# Patient Record
Sex: MALE | Race: WHITE | Employment: OTHER | URBAN - METROPOLITAN AREA
[De-identification: names, ages, dates, MRNs, and addresses within clinical notes are randomized per-mention and may not be internally consistent; named-entity substitution may affect disease eponyms.]

---

## 2022-09-27 ENCOUNTER — ANESTHESIA EVENT (OUTPATIENT)
Dept: INTERVENTIONAL RADIOLOGY/VASCULAR | Age: 67
DRG: 034 | End: 2022-09-27
Payer: COMMERCIAL

## 2022-09-27 ENCOUNTER — APPOINTMENT (OUTPATIENT)
Dept: INTERVENTIONAL RADIOLOGY/VASCULAR | Age: 67
DRG: 034 | End: 2022-09-27
Attending: INTERNAL MEDICINE
Payer: MEDICARE

## 2022-09-27 ENCOUNTER — APPOINTMENT (OUTPATIENT)
Dept: CT IMAGING | Age: 67
DRG: 034 | End: 2022-09-27
Attending: INTERNAL MEDICINE
Payer: MEDICARE

## 2022-09-27 ENCOUNTER — HOSPITAL ENCOUNTER (INPATIENT)
Age: 67
LOS: 29 days | Discharge: NURSING, REHAB OR LONG-TERM CARE | DRG: 034 | End: 2022-10-26
Attending: INTERNAL MEDICINE | Admitting: FAMILY MEDICINE
Payer: MEDICARE

## 2022-09-27 ENCOUNTER — ANESTHESIA (OUTPATIENT)
Dept: INTERVENTIONAL RADIOLOGY/VASCULAR | Age: 67
DRG: 034 | End: 2022-09-27
Payer: COMMERCIAL

## 2022-09-27 DIAGNOSIS — I65.22 STENOSIS OF LEFT CAROTID ARTERY: ICD-10-CM

## 2022-09-27 DIAGNOSIS — D64.9 ANEMIA, UNSPECIFIED TYPE: Primary | ICD-10-CM

## 2022-09-27 DIAGNOSIS — K92.2 GI BLEEDING: ICD-10-CM

## 2022-09-27 PROBLEM — I63.9 ACUTE CEREBROVASCULAR ACCIDENT (CVA) (HCC): Status: ACTIVE | Noted: 2022-09-27

## 2022-09-27 LAB
AADO2: 164.3 MMHG
ABO/RH: NORMAL
ALBUMIN SERPL-MCNC: 2.7 G/DL (ref 3.5–5.2)
ALP BLD-CCNC: 165 U/L (ref 40–129)
ALT SERPL-CCNC: <5 U/L (ref 0–40)
ANION GAP SERPL CALCULATED.3IONS-SCNC: 9 MMOL/L (ref 7–16)
ANION GAP: 11 MMOL/L (ref 7–16)
ANTIBODY SCREEN: NORMAL
AST SERPL-CCNC: 11 U/L (ref 0–39)
B.E.: -4.4 MMOL/L (ref -3–3)
B.E.: -5.7 MMOL/L (ref -3–3)
BASOPHILS ABSOLUTE: 0.04 E9/L (ref 0–0.2)
BASOPHILS RELATIVE PERCENT: 0.5 % (ref 0–2)
BILIRUB SERPL-MCNC: <0.2 MG/DL (ref 0–1.2)
BUN BLDV-MCNC: 89 MG/DL (ref 6–23)
CALCIUM SERPL-MCNC: 8.3 MG/DL (ref 8.6–10.2)
CARDIOPULMONARY BYPASS: NO
CHLORIDE BLD-SCNC: 110 MMOL/L (ref 98–107)
CO2: 24 MMOL/L (ref 22–29)
COHB: 0.5 % (ref 0–1.5)
CREAT SERPL-MCNC: 2.9 MG/DL (ref 0.7–1.2)
CRITICAL: ABNORMAL
DATE ANALYZED: ABNORMAL
DATE OF COLLECTION: ABNORMAL
DEVICE: ABNORMAL
EOSINOPHILS ABSOLUTE: 0.16 E9/L (ref 0.05–0.5)
EOSINOPHILS RELATIVE PERCENT: 1.8 % (ref 0–6)
FIO2: 70 %
GFR AFRICAN AMERICAN: 27
GFR AFRICAN AMERICAN: 28
GFR NON-AFRICAN AMERICAN: 22 ML/MIN/1.73
GFR, ESTIMATED: 23 ML/MIN/1.73
GLUCOSE BLD-MCNC: 106 MG/DL (ref 74–99)
GLUCOSE BLD-MCNC: 134 MG/DL (ref 74–99)
HCO3: 20.6 MMOL/L (ref 22–26)
HCO3: 21.4 MMOL/L (ref 22–26)
HCT VFR BLD CALC: 18.7 % (ref 37–54)
HEMATOCRIT: 15 % (ref 37–54)
HEMOGLOBIN: 5 G/DL (ref 12.5–15.5)
HEMOGLOBIN: 5.6 G/DL (ref 12.5–16.5)
HHB: 1.2 % (ref 0–5)
IMMATURE GRANULOCYTES #: 0.04 E9/L
IMMATURE GRANULOCYTES %: 0.5 % (ref 0–5)
INR BLD: 2.1
LAB: ABNORMAL
LYMPHOCYTES ABSOLUTE: 1.57 E9/L (ref 1.5–4)
LYMPHOCYTES RELATIVE PERCENT: 18.1 % (ref 20–42)
Lab: ABNORMAL
MCH RBC QN AUTO: 27.7 PG (ref 26–35)
MCHC RBC AUTO-ENTMCNC: 29.9 % (ref 32–34.5)
MCV RBC AUTO: 92.6 FL (ref 80–99.9)
METHB: 0.4 % (ref 0–1.5)
MODE: AC
MONOCYTES ABSOLUTE: 0.64 E9/L (ref 0.1–0.95)
MONOCYTES RELATIVE PERCENT: 7.4 % (ref 2–12)
NEUTROPHILS ABSOLUTE: 6.22 E9/L (ref 1.8–7.3)
NEUTROPHILS RELATIVE PERCENT: 71.7 % (ref 43–80)
O2 SATURATION: 98.8 % (ref 92–98.5)
O2 SATURATION: 99.8 % (ref 92–98.5)
O2HB: 97.9 % (ref 94–97)
OPERATOR ID: 421
OPERATOR ID: ABNORMAL
PATIENT TEMP: 37 C
PCO2 37: 52.5 MMHG (ref 35–45)
PCO2: 37.4 MMHG (ref 35–45)
PDW BLD-RTO: 14.7 FL (ref 11.5–15)
PEEP/CPAP: 5 CMH2O
PFO2: 3.96 MMHG/%
PH 37: 7.22 (ref 7.35–7.45)
PH BLOOD GAS: 7.36 (ref 7.35–7.45)
PLATELET # BLD: 305 E9/L (ref 130–450)
PMV BLD AUTO: 9.8 FL (ref 7–12)
PO2 37: 256.3 MMHG (ref 60–80)
PO2: 277.1 MMHG (ref 75–100)
POC BUN: 86 MG/DL (ref 8–23)
POC CHLORIDE: 112 MMOL/L (ref 100–108)
POC CO2: 22 MMOL/L (ref 22–29)
POC CREATININE: 2.8 MG/DL (ref 0.7–1.2)
POC IONIZED CALCIUM: 1.2 (ref 1.1–1.3)
POC LACTIC ACID: 0.3 (ref 0.5–2.2)
POC SODIUM: 145 MMOL/L (ref 132–146)
POC SOURCE: ABNORMAL
POTASSIUM SERPL-SCNC: 4.5 MMOL/L (ref 3.5–5.5)
POTASSIUM SERPL-SCNC: 5.2 MMOL/L (ref 3.5–5)
PROTHROMBIN TIME: 23.2 SEC (ref 9.3–12.4)
RBC # BLD: 2.02 E12/L (ref 3.8–5.8)
RI(T): 0.59
RR MECHANICAL: 18 B/MIN
SODIUM BLD-SCNC: 143 MMOL/L (ref 132–146)
SOURCE, BLOOD GAS: ABNORMAL
THB: 7 G/DL (ref 11.5–16.5)
TIME ANALYZED: 2303
TOTAL PROTEIN: 5.3 G/DL (ref 6.4–8.3)
VT MECHANICAL: 500 ML
WBC # BLD: 8.7 E9/L (ref 4.5–11.5)

## 2022-09-27 PROCEDURE — 6360000004 HC RX CONTRAST MEDICATION: Performed by: RADIOLOGY

## 2022-09-27 PROCEDURE — 86901 BLOOD TYPING SEROLOGIC RH(D): CPT

## 2022-09-27 PROCEDURE — 86923 COMPATIBILITY TEST ELECTRIC: CPT

## 2022-09-27 PROCEDURE — 82803 BLOOD GASES ANY COMBINATION: CPT

## 2022-09-27 PROCEDURE — 70496 CT ANGIOGRAPHY HEAD: CPT | Performed by: RADIOLOGY

## 2022-09-27 PROCEDURE — 6360000002 HC RX W HCPCS

## 2022-09-27 PROCEDURE — 0BH17EZ INSERTION OF ENDOTRACHEAL AIRWAY INTO TRACHEA, VIA NATURAL OR ARTIFICIAL OPENING: ICD-10-PCS | Performed by: ANESTHESIOLOGY

## 2022-09-27 PROCEDURE — 6360000002 HC RX W HCPCS: Performed by: RADIOLOGY

## 2022-09-27 PROCEDURE — P9016 RBC LEUKOCYTES REDUCED: HCPCS

## 2022-09-27 PROCEDURE — 2500000003 HC RX 250 WO HCPCS: Performed by: RADIOLOGY

## 2022-09-27 PROCEDURE — 70450 CT HEAD/BRAIN W/O DYE: CPT | Performed by: RADIOLOGY

## 2022-09-27 PROCEDURE — 4A03X5D MEASUREMENT OF ARTERIAL FLOW, INTRACRANIAL, EXTERNAL APPROACH: ICD-10-PCS | Performed by: RADIOLOGY

## 2022-09-27 PROCEDURE — 3700000001 HC ADD 15 MINUTES (ANESTHESIA)

## 2022-09-27 PROCEDURE — 7100000000 HC PACU RECOVERY - FIRST 15 MIN

## 2022-09-27 PROCEDURE — 86850 RBC ANTIBODY SCREEN: CPT

## 2022-09-27 PROCEDURE — C1725 CATH, TRANSLUMIN NON-LASER: HCPCS

## 2022-09-27 PROCEDURE — 0042T CT BRAIN PERFUSION: CPT | Performed by: RADIOLOGY

## 2022-09-27 PROCEDURE — 3700000000 HC ANESTHESIA ATTENDED CARE

## 2022-09-27 PROCEDURE — 86920 COMPATIBILITY TEST SPIN: CPT

## 2022-09-27 PROCEDURE — 2500000003 HC RX 250 WO HCPCS

## 2022-09-27 PROCEDURE — 86900 BLOOD TYPING SEROLOGIC ABO: CPT

## 2022-09-27 PROCEDURE — B41F1ZZ FLUOROSCOPY OF RIGHT LOWER EXTREMITY ARTERIES USING LOW OSMOLAR CONTRAST: ICD-10-PCS | Performed by: RADIOLOGY

## 2022-09-27 PROCEDURE — C1894 INTRO/SHEATH, NON-LASER: HCPCS

## 2022-09-27 PROCEDURE — 37215 TRANSCATH STENT CCA W/EPS: CPT | Performed by: RADIOLOGY

## 2022-09-27 PROCEDURE — 7100000001 HC PACU RECOVERY - ADDTL 15 MIN

## 2022-09-27 PROCEDURE — 94002 VENT MGMT INPAT INIT DAY: CPT

## 2022-09-27 PROCEDURE — 70496 CT ANGIOGRAPHY HEAD: CPT

## 2022-09-27 PROCEDURE — 2580000003 HC RX 258

## 2022-09-27 PROCEDURE — C1876 STENT, NON-COA/NON-COV W/DEL: HCPCS

## 2022-09-27 PROCEDURE — 36415 COLL VENOUS BLD VENIPUNCTURE: CPT

## 2022-09-27 PROCEDURE — 80053 COMPREHEN METABOLIC PANEL: CPT

## 2022-09-27 PROCEDURE — 70498 CT ANGIOGRAPHY NECK: CPT

## 2022-09-27 PROCEDURE — 037L3DZ DILATION OF LEFT INTERNAL CAROTID ARTERY WITH INTRALUMINAL DEVICE, PERCUTANEOUS APPROACH: ICD-10-PCS | Performed by: RADIOLOGY

## 2022-09-27 PROCEDURE — 99233 SBSQ HOSP IP/OBS HIGH 50: CPT | Performed by: RADIOLOGY

## 2022-09-27 PROCEDURE — P9041 ALBUMIN (HUMAN),5%, 50ML: HCPCS

## 2022-09-27 PROCEDURE — 70498 CT ANGIOGRAPHY NECK: CPT | Performed by: RADIOLOGY

## 2022-09-27 PROCEDURE — 0042T CT BRAIN PERFUSION: CPT

## 2022-09-27 PROCEDURE — 85025 COMPLETE CBC W/AUTO DIFF WBC: CPT

## 2022-09-27 PROCEDURE — 82805 BLOOD GASES W/O2 SATURATION: CPT

## 2022-09-27 PROCEDURE — 85610 PROTHROMBIN TIME: CPT

## 2022-09-27 PROCEDURE — 5A1945Z RESPIRATORY VENTILATION, 24-96 CONSECUTIVE HOURS: ICD-10-PCS | Performed by: ANESTHESIOLOGY

## 2022-09-27 PROCEDURE — 70450 CT HEAD/BRAIN W/O DYE: CPT

## 2022-09-27 PROCEDURE — 2000000000 HC ICU R&B

## 2022-09-27 RX ORDER — ONDANSETRON 4 MG/1
4 TABLET, ORALLY DISINTEGRATING ORAL EVERY 8 HOURS PRN
Status: DISCONTINUED | OUTPATIENT
Start: 2022-09-27 | End: 2022-10-15

## 2022-09-27 RX ORDER — HEPARIN SODIUM 10000 [USP'U]/ML
INJECTION, SOLUTION INTRAVENOUS; SUBCUTANEOUS
Status: COMPLETED | OUTPATIENT
Start: 2022-09-27 | End: 2022-09-27

## 2022-09-27 RX ORDER — PROPOFOL 10 MG/ML
5-50 INJECTION, EMULSION INTRAVENOUS CONTINUOUS
Status: DISCONTINUED | OUTPATIENT
Start: 2022-09-27 | End: 2022-09-27

## 2022-09-27 RX ORDER — SODIUM CHLORIDE 9 MG/ML
INJECTION, SOLUTION INTRAVENOUS CONTINUOUS PRN
Status: DISCONTINUED | OUTPATIENT
Start: 2022-09-27 | End: 2022-09-27 | Stop reason: SDUPTHER

## 2022-09-27 RX ORDER — ASPIRIN 325 MG
325 TABLET ORAL DAILY
Status: DISCONTINUED | OUTPATIENT
Start: 2022-09-28 | End: 2022-09-27

## 2022-09-27 RX ORDER — CHLORHEXIDINE GLUCONATE 0.12 MG/ML
15 RINSE ORAL 2 TIMES DAILY
Status: DISCONTINUED | OUTPATIENT
Start: 2022-09-27 | End: 2022-09-29

## 2022-09-27 RX ORDER — ASPIRIN 81 MG/1
81 TABLET ORAL DAILY
Status: DISCONTINUED | OUTPATIENT
Start: 2022-09-27 | End: 2022-10-16

## 2022-09-27 RX ORDER — PROPOFOL 10 MG/ML
INJECTION, EMULSION INTRAVENOUS
Status: COMPLETED
Start: 2022-09-27 | End: 2022-09-27

## 2022-09-27 RX ORDER — MINERAL OIL AND WHITE PETROLATUM 150; 830 MG/G; MG/G
OINTMENT OPHTHALMIC EVERY 4 HOURS
Status: DISCONTINUED | OUTPATIENT
Start: 2022-09-28 | End: 2022-09-29

## 2022-09-27 RX ORDER — POLYETHYLENE GLYCOL 3350 17 G/17G
17 POWDER, FOR SOLUTION ORAL DAILY
Status: DISCONTINUED | OUTPATIENT
Start: 2022-09-28 | End: 2022-09-30

## 2022-09-27 RX ORDER — POLYVINYL ALCOHOL 14 MG/ML
1 SOLUTION/ DROPS OPHTHALMIC EVERY 4 HOURS
Status: DISCONTINUED | OUTPATIENT
Start: 2022-09-27 | End: 2022-09-29

## 2022-09-27 RX ORDER — FENTANYL CITRATE 50 UG/ML
100 INJECTION, SOLUTION INTRAMUSCULAR; INTRAVENOUS
Status: DISCONTINUED | OUTPATIENT
Start: 2022-09-27 | End: 2022-09-30

## 2022-09-27 RX ORDER — MIDAZOLAM HYDROCHLORIDE 1 MG/ML
INJECTION INTRAMUSCULAR; INTRAVENOUS PRN
Status: DISCONTINUED | OUTPATIENT
Start: 2022-09-27 | End: 2022-09-27 | Stop reason: SDUPTHER

## 2022-09-27 RX ORDER — EPTIFIBATIDE 0.75 MG/ML
INJECTION, SOLUTION INTRAVENOUS CONTINUOUS PRN
Status: DISCONTINUED | OUTPATIENT
Start: 2022-09-27 | End: 2022-09-27 | Stop reason: SDUPTHER

## 2022-09-27 RX ORDER — SODIUM CHLORIDE 9 MG/ML
INJECTION, SOLUTION INTRAVENOUS PRN
Status: DISCONTINUED | OUTPATIENT
Start: 2022-09-27 | End: 2022-09-27

## 2022-09-27 RX ORDER — POLYETHYLENE GLYCOL 3350 17 G/17G
17 POWDER, FOR SOLUTION ORAL DAILY PRN
Status: DISCONTINUED | OUTPATIENT
Start: 2022-09-27 | End: 2022-09-27

## 2022-09-27 RX ORDER — ALBUMIN, HUMAN INJ 5% 5 %
SOLUTION INTRAVENOUS PRN
Status: DISCONTINUED | OUTPATIENT
Start: 2022-09-27 | End: 2022-09-27 | Stop reason: SDUPTHER

## 2022-09-27 RX ORDER — ONDANSETRON 2 MG/ML
INJECTION INTRAMUSCULAR; INTRAVENOUS PRN
Status: DISCONTINUED | OUTPATIENT
Start: 2022-09-27 | End: 2022-09-27 | Stop reason: SDUPTHER

## 2022-09-27 RX ORDER — EPINEPHRINE 1 MG/ML
INJECTION, SOLUTION, CONCENTRATE INTRAVENOUS PRN
Status: DISCONTINUED | OUTPATIENT
Start: 2022-09-27 | End: 2022-09-27 | Stop reason: SDUPTHER

## 2022-09-27 RX ORDER — SUCCINYLCHOLINE CHLORIDE 20 MG/ML
INJECTION INTRAMUSCULAR; INTRAVENOUS PRN
Status: DISCONTINUED | OUTPATIENT
Start: 2022-09-27 | End: 2022-09-27 | Stop reason: SDUPTHER

## 2022-09-27 RX ORDER — EPTIFIBATIDE 0.75 MG/ML
0.5 INJECTION, SOLUTION INTRAVENOUS CONTINUOUS
Status: DISCONTINUED | OUTPATIENT
Start: 2022-09-27 | End: 2022-09-28

## 2022-09-27 RX ORDER — ATORVASTATIN CALCIUM 40 MG/1
80 TABLET, FILM COATED ORAL NIGHTLY
Status: DISCONTINUED | OUTPATIENT
Start: 2022-09-27 | End: 2022-10-26 | Stop reason: HOSPADM

## 2022-09-27 RX ORDER — ENOXAPARIN SODIUM 100 MG/ML
40 INJECTION SUBCUTANEOUS DAILY
Status: DISCONTINUED | OUTPATIENT
Start: 2022-09-27 | End: 2022-09-27

## 2022-09-27 RX ORDER — EPHEDRINE SULFATE 50 MG/ML
INJECTION INTRAVENOUS PRN
Status: DISCONTINUED | OUTPATIENT
Start: 2022-09-27 | End: 2022-09-27 | Stop reason: SDUPTHER

## 2022-09-27 RX ORDER — ASPIRIN 300 MG/1
300 SUPPOSITORY RECTAL DAILY
Status: DISCONTINUED | OUTPATIENT
Start: 2022-09-27 | End: 2022-10-16

## 2022-09-27 RX ORDER — ROCURONIUM BROMIDE 10 MG/ML
INJECTION, SOLUTION INTRAVENOUS PRN
Status: DISCONTINUED | OUTPATIENT
Start: 2022-09-27 | End: 2022-09-27 | Stop reason: SDUPTHER

## 2022-09-27 RX ORDER — PROPOFOL 10 MG/ML
INJECTION, EMULSION INTRAVENOUS PRN
Status: DISCONTINUED | OUTPATIENT
Start: 2022-09-27 | End: 2022-09-27 | Stop reason: SDUPTHER

## 2022-09-27 RX ORDER — PROPOFOL 10 MG/ML
5-50 INJECTION, EMULSION INTRAVENOUS CONTINUOUS
Status: DISCONTINUED | OUTPATIENT
Start: 2022-09-28 | End: 2022-09-29

## 2022-09-27 RX ORDER — ONDANSETRON 2 MG/ML
4 INJECTION INTRAMUSCULAR; INTRAVENOUS EVERY 6 HOURS PRN
Status: DISCONTINUED | OUTPATIENT
Start: 2022-09-27 | End: 2022-10-15

## 2022-09-27 RX ADMIN — EPHEDRINE SULFATE 5 MG: 50 INJECTION, SOLUTION INTRAVENOUS at 19:17

## 2022-09-27 RX ADMIN — EPTIFIBATIDE 0.5 MCG/KG/MIN: 0.75 INJECTION INTRAVENOUS at 19:45

## 2022-09-27 RX ADMIN — EPHEDRINE SULFATE 5 MG: 50 INJECTION, SOLUTION INTRAVENOUS at 19:21

## 2022-09-27 RX ADMIN — Medication 1 KIT: at 20:04

## 2022-09-27 RX ADMIN — SODIUM CHLORIDE: 9 INJECTION, SOLUTION INTRAVENOUS at 20:00

## 2022-09-27 RX ADMIN — MIDAZOLAM 2 MG: 1 INJECTION INTRAMUSCULAR; INTRAVENOUS at 20:31

## 2022-09-27 RX ADMIN — ONDANSETRON HYDROCHLORIDE 4 MG: 2 SOLUTION INTRAMUSCULAR; INTRAVENOUS at 20:22

## 2022-09-27 RX ADMIN — EPTIFIBATIDE 0.5 MCG/KG/MIN: 0.75 INJECTION INTRAVENOUS at 21:29

## 2022-09-27 RX ADMIN — PROPOFOL 100 MG: 10 INJECTION, EMULSION INTRAVENOUS at 18:58

## 2022-09-27 RX ADMIN — SODIUM CHLORIDE: 9 INJECTION, SOLUTION INTRAVENOUS at 20:18

## 2022-09-27 RX ADMIN — IOPAMIDOL 100 ML: 755 INJECTION, SOLUTION INTRAVENOUS at 18:23

## 2022-09-27 RX ADMIN — Medication 5000 UNITS: at 19:35

## 2022-09-27 RX ADMIN — SUGAMMADEX 250 MG: 100 INJECTION, SOLUTION INTRAVENOUS at 20:18

## 2022-09-27 RX ADMIN — ALBUMIN (HUMAN) 25 G: 12.5 INJECTION, SOLUTION INTRAVENOUS at 19:26

## 2022-09-27 RX ADMIN — MIDAZOLAM 2 MG: 1 INJECTION INTRAMUSCULAR; INTRAVENOUS at 19:05

## 2022-09-27 RX ADMIN — IOPAMIDOL 65 ML: 612 INJECTION, SOLUTION INTRAVENOUS at 20:09

## 2022-09-27 RX ADMIN — Medication 1000 ML: at 19:36

## 2022-09-27 RX ADMIN — SODIUM CHLORIDE: 9 INJECTION, SOLUTION INTRAVENOUS at 18:54

## 2022-09-27 RX ADMIN — PROPOFOL 50 MCG/KG/MIN: 10 INJECTION, EMULSION INTRAVENOUS at 21:18

## 2022-09-27 RX ADMIN — Medication 1000 ML: at 19:35

## 2022-09-27 RX ADMIN — SODIUM CHLORIDE: 9 INJECTION, SOLUTION INTRAVENOUS at 19:04

## 2022-09-27 RX ADMIN — LIDOCAINE HYDROCHLORIDE 50 MG: 10 INJECTION, SOLUTION INFILTRATION; PERINEURAL at 18:58

## 2022-09-27 RX ADMIN — SUCCINYLCHOLINE CHLORIDE 160 MG: 20 INJECTION, SOLUTION INTRAMUSCULAR; INTRAVENOUS at 18:58

## 2022-09-27 RX ADMIN — ROCURONIUM BROMIDE 50 MG: 10 INJECTION, SOLUTION INTRAVENOUS at 19:08

## 2022-09-27 RX ADMIN — EPINEPHRINE 100 MCG: 1 INJECTION, SOLUTION INTRAMUSCULAR; SUBCUTANEOUS at 20:31

## 2022-09-27 NOTE — LETTER
[place] Department Medicaid  CERTIFICATION OF NECESSITY  FOR NON-EMERGENCY TRANSPORTATION   BY GROUND AMBULANCE      Individual Information   1. Name: Tuyet Terry 2. [place] Medicaid Billing Number:    3. Address: [facility] [facility]      Transportation Provider Information   4. Provider Name:    5. [place] Provider Number:  National Provider Identifier (NPI):      Certification  7. Criteria:  During transport, this individual requires:  [x] Medical treatment or continuous     supervision by an EMT. [] The administration or regulation of oxygen by another person. [] Supervised protective restraint. 8. Period Beginning Date:    5. Length  [x] Not more than 1 day(s)  [] One Year     Additional Information Relevant to Certification   10. Comments or Explanations, If Necessary or Appropriate   Osteomyelitis, wound to right foot, non ambulatory. Certifying Practitioner Information   11. Name of Practitioner: Shannan Meier   12. [place] Medicaid Provider Number, If Applicable:  [address] Provider Identifier (NPI):      Signature Information   14. Date of Signature: 10/6/2022  15. Name of Person Signing: Electronically signed by Ean Robles RN on 10/6/2022 at 4:07 PM   16. Signature and Professional Designation: Electronically signed by Ean Robles RN on 10/6/2022 at 4:07 PM     OD 21832  Rev. 7/2015  [address] Encounter Date/Time: 9/27/2022 Spring Philip Account: [de-identified]    MRN: [id number]    Patient: Tuyet Terry    Contact Serial #: [id number]      ENCOUNTER          Patient Class: I Private Enc?   No Unit RM BD: SEYZ 4SE ICN [id number]   Hospital Service: WILD   Encounter DX: Acute cerebrovascular ac*   ADM Provider: Danny Teague DO   Procedure:     ATT Provider: Yolande Jimenez DO   REF Provider: Ameena Bauman      Admission DX: Acute cerebrovascular accident (CVA) ([facility] [address] and DX codes: I63.9      PATIENT                 Name: Brian Cabral : 1955 (74 yrs)   Address: [address] [place] [address] [address] Sex: Male   City: [place] [address]         Marital Status:    Employer: DISABLED         Mosque: [employer]   Primary Care Provider: Gerardo Bright MD         Primary Phone: [phone]   EMERGENCY CONTACT   Contact Name Legal Guardian? Relationship to Patient Home Phone Work Phone   1. alejandra Lizarraga  2. Emeli Perez      Other  Other [phone]                 GUARANTOR            Guarantor: Brian Cabral     : 1955   Address: [facility] [address] Sex: Male     [place] [address]     Relation to Patient: Self       Home Phone: [phone]   Guarantor ID: [id number]       Work Phone:     Guarantor Employer: DISABLED         Status: DISABLED      COVERAGE        PRIMARY INSURANCE   Payor: [facility] MEDICARE Plan: [facility] LOC*   Payor Address: [facility] [id number] [address]       Group Number:   Insurance Type: [id number] Name: Olu Daily : 1955   Subscriber ID: [id number] Joserleonardo Dangta. Rel. to Sub: Self   SECONDARY INSURANCE   [place] Plan[place] DEPT OF PUBL*   Payor Address:  [facility] [address] [place] [address]          Group Number:   Insurance Type: INDEMNITY   Subscriber Name: Olu Daily : 1955   Subscriber ID: [id number] Pat.  Rel. to Sub: SELF

## 2022-09-27 NOTE — H&P
Hospitalist History & Physical      PCP: No primary care provider on file. Date of Service: Pt seen/examined on 9/27/2022     Chief Complaint:  had no chief complaint listed for this encounter. History Of Present Illness:    Mr. Suzie Huggins, a 74y.o. year old male  who  has no past medical history on file. Patient presented to [facility] with strokelike symptoms, right-sided paralysis and aphasia. Was found to have severe left ICA stenosis and taken to IR for left ICA angioplasty with stent placement. Patient was intubated and sedated and transferred to the ICU. Vital signs are within normal limits and stable. Patient is afebrile. Laboratory studies demonstrate potassium 5.2, BUN 89, creatinine 2.9, glucose 106, troponin 153, hemoglobin 5.0. Patient was transfused packed red blood cells. Repeat hemoglobin was 6.3. No past medical history on file. No past surgical history on file. Prior to Admission medications    Not on File         Allergies:  Codeine    Social History:    TOBACCO:   has no history on file for tobacco use. ETOH:   has no history on file for alcohol use. Family History:    Reviewed in detail and negative for DM, CAD, Cancer, CVA. Positive as follows\"  No family history on file. REVIEW OF SYSTEMS:   Pertinent positives as noted in the HPI. All other systems reviewed and negative. PHYSICAL EXAM:  /79   Pulse 78   Temp 98.8 °F (37.1 °C) (Axillary)   Resp 26   SpO2 95%   General appearance: Intubated and sedated  HEENT: Normal cephalic, atraumatic without obvious deformity  Neck: Supple, with full range of motion. No jugular venous distention. Trachea midline. Respiratory: Mechanically ventilated  Cardiovascular: Regular rate and rhythm  Abdomen: Soft, nontender, nondistended  Musculoskeletal: No clubbing, cyanosis, edema of bilateral lower extremities. Brisk capillary refill. Skin: Normal skin color.   No rashes or lesions. Neurologic: Intubated and sedated      CBC:   No results for input(s): WBC, RBC, HGB, HCT, MCV, RDW, PLT in the last 72 hours. BMP: No results for input(s): NA, K, CL, CO2, BUN, CREATININE, CA, MG, PHOS in the last 72 hours. LFT:  No results for input(s): PROT, ALB, ALKPHOS, ALT, AST, BILITOT, AMYLASE, LIPASE in the last 72 hours. CE:  No results for input(s): Normajean [place] in the last 72 hours. PT/INR: No results for input(s): INR, APTT in the last 72 hours. BNP: No results for input(s): BNP in the last 72 hours. ESR: No results found for: SEDRATE  CRP: No results found for: CRP  D Dimer: No results found for: DDIMER   Folate and B12: No results found for: HEOMDKYR26, No results found for: FOLATE  Lactic Acid: No results found for: LACTA  Thyroid Studies: No results found for: TSH, F4LJHFX, X7KARYF, THYROIDAB    Oupatient labs:  No results found for: CHOL, TRIG, HDL, LDLCALC, TSH, PSA, INR, LABA1C    Urinalysis:  No results found for: NITRU, [address], BACTERIA, RBCUA, BLOODU, SPECGRAV, GLUCOSEU    Imaging:  CT HEAD WO CONTRAST    Result Date: 2022  Patient MRN:  [id number] : 1955 Age: 74 years Gender: Male Order Date:  2022 EXAM: CT HEAD WO CONTRAST NUMBER OF IMAGES:  357 INDICATION:  cva cva COMPARISON: None Technique: Low-dose CT  acquisition technique included one of following options; 1 . Automated exposure control, 2. Adjustment of MA and or KV according to patient's size or 3. Use of iterative reconstruction. Multiple CT sections were obtained with sagittal and coronal MPR reconstructions. The ventricles are prominent. The gyri and sulci appear  prominent. The white matter appears  prominent. There is no evidence for hemorrhage. There is no infarct identified. There is no mass effect identified. There is no mass identified. Diffuse atrophy likely age related Findings compatible with small vessel ischemic changes.  Findings were called at the time of dictation     CTA NECK W CONTRAST    Result Date: 2022  Patient MRN:  [id number] : 1955 Age: 74 years Gender: Male Order Date:  2022 6:24 PM EXAM: CTA NECK W CONTRAST, CTA HEAD W CONTRAST NUMBER OF IMAGES:  36 INDICATION:  cva cva What reading provider will be dictating this exam?->[facility] COMPARISON: None Technique: Low-dose CT  acquisition technique included one of following options; 1 . Automated exposure control, 2. Adjustment of MA and or KV according to patient's size or 3. Use of iterative reconstruction. Contiguous spiral images were obtained in the axial plane, following the administration of intravenous contrast using CT angiographic protocol. Sagittal and coronal images were reconstructed from the axial plane acquisition. Additional MIP reconstructions were presented to aid in the interpretation of this study. Images were obtained from the skull base cranially. There is mild calcified plaque identified in the vessels compatible with atherosclerotic disease. The right carotid is moderately atherosclerotic without significant stenosis The left carotid is abnormal. There is  evidence for hemodynamically significant stenosis at the level the proximal internal carotid artery. By NASCET criteria estimated stenosis is 90% or greater The right vertebral artery is mildly atherosclerotic without significant stenosis The left vertebral artery is mildly atherosclerotic without significant stenosis The basilar artery is unremarkable The middle cerebral arteries are unremarkable The anterior cerebral arteries are unremarkable The posterior cerebral arteries are unremarkable     1. Estimated stenosis of the proximal right and left internal carotid artery by NASCET criteria is greater than 90% on the left 2. Severe atherosclerotic disease . 3. No large vessel occlusion identified This study was analyzed by the Viz. ai algorithm.      CT BRAIN PERFUSION    Result Date: 2022  Patient MRN: [id number] : 1955 Age:  74 years Gender: Male Order Date: 2022 Exam: CT BRAIN PERFUSION Number of Images: 333 views Indication:   cva cva What reading provider will be dictating this exam?->[facility] Comparison: None. Findings: Perfusion images demonstrate symmetric blood volume Blood flow images demonstrate symmetric blood flow There is no significant ischemic penumbra identified. There is no significant core infarct identified. No significant ischemic penumbra identified This study was analyzed by the Viz. ai algorithm. CTA HEAD W CONTRAST    Result Date: 2022  Patient MRN:  [id number] : 1955 Age: 74 years Gender: Male Order Date:  2022 6:24 PM EXAM: CTA NECK W CONTRAST, CTA HEAD W CONTRAST NUMBER OF IMAGES:  36 INDICATION:  cva cva What reading provider will be dictating this exam?->[facility] COMPARISON: None Technique: Low-dose CT  acquisition technique included one of following options; 1 . Automated exposure control, 2. Adjustment of MA and or KV according to patient's size or 3. Use of iterative reconstruction. Contiguous spiral images were obtained in the axial plane, following the administration of intravenous contrast using CT angiographic protocol. Sagittal and coronal images were reconstructed from the axial plane acquisition. Additional MIP reconstructions were presented to aid in the interpretation of this study. Images were obtained from the skull base cranially. There is mild calcified plaque identified in the vessels compatible with atherosclerotic disease. The right carotid is moderately atherosclerotic without significant stenosis The left carotid is abnormal. There is  evidence for hemodynamically significant stenosis at the level the proximal internal carotid artery.  By NASCET criteria estimated stenosis is 90% or greater The right vertebral artery is mildly atherosclerotic without significant stenosis The left vertebral artery is mildly atherosclerotic without significant stenosis The basilar artery is unremarkable The middle cerebral arteries are unremarkable The anterior cerebral arteries are unremarkable The posterior cerebral arteries are unremarkable     1. Estimated stenosis of the proximal right and left internal carotid artery by NASCET criteria is greater than 90% on the left 2. Severe atherosclerotic disease . 3. No large vessel occlusion identified This study was analyzed by the Viz. ai algorithm. ASSESSMENT:  -Acute CVA  -Acute respiratory failure  -Acute blood loss anemia  -Hyperkalemia  -Acute renal failure  -Elevated troponin      PLAN:  -Admit to the ICU  -Consult critical care  -Neurology following  -MRI of the brain without contrast  -Monitor hemoglobin level  -Transfuse for hemoglobin less than 7.0  -Telemetry  -Repeat troponin  -Monitor renal function  -Monitor serum electrolytes  -Echocardiogram  -Mechanical ventilation wean as tolerated        Diet: Diet NPO  Code Status: Full Code  Surrogate decision maker confirmed with patient:   No emergency contact information on file. DVT Prophylaxis: []Lovenox []Heparin []PCD [] [address] []Encouraged ambulation  Disposition: []Med/Surg [] Intermediate [] ICU/CCU  Admit status: [] Observation [] Inpatient     +++++++++++++++++++++++++++++++++++++++++++++++++  Soumya Garcia, DO  +++++++++++++++++++++++++++++++++++++++++++++++++  NOTE: This report was transcribed using voice recognition software. Every effort was made to ensure accuracy; however, inadvertent computerized transcription errors may be present.

## 2022-09-27 NOTE — CONSULTS
Neuro-Interventional/ Interventional Consult     Patient Kyleigh Martinez  MRN: [id number]  YOB: 1955  DATE OF EVALUATION: 2022    HPI: Patient with acute stroke like symptoms transferred from [facility] to [facility] neuro icu. LKW approximately 1:30 pm at [facility]. Assessment:   Reason For Evaluation:   Zina Rankin a 74 y.o. male presents with acute stroke like symptoms. Patient on ASA and plavix at home. Imaging reviewed by me shows severe left ICA stenosis  Physical Exam: right arm flaccid, right leg flaccid, severe aphasia, patient however appears orientated to time and place and able to head nod yes and no    Plan:     Rec    Angio and possible left ica angioplasty and possible stent  ASA and plavix   Maintain Bp      45 min of which greater then 50% was spent either coordinating care or couseling    We discussed the possible risks including, of stroke and completing current right hemisphere stroke. We discussed bleeding and possible death. Patient agrees and consents to intervention with head nodes for yes. We had an extensive discussion regarding different treatment options. We discussed possible risks of procedure including death. I answered all questions from patient/family to their satisfaction, they understand the options and risk and wishes to proceed. Spoke with . She states not poa. She does not have any other contact info or family. Allergies: Not on File  Prior to Visit Medications    Not on File        No family history on file. No past surgical history on file. No past medical history on file. Objective:   /79   Pulse 78   Temp 98.8 °F (37.1 °C) (Axillary)   Resp 26   SpO2 95%       Laboratory/Radiology:     No results found for this or any previous visit (from the past 24 hour(s)).     CT HEAD WO CONTRAST    Result Date: 2022  Patient MRN:  [id number] : 1955 Age: 74 years Gender: Male Order Date:  2022 EXAM: CT HEAD WO CONTRAST NUMBER OF IMAGES:  357 INDICATION:  cva cva COMPARISON: None Technique: Low-dose CT  acquisition technique included one of following options; 1 . Automated exposure control, 2. Adjustment of MA and or KV according to patient's size or 3. Use of iterative reconstruction. Multiple CT sections were obtained with sagittal and coronal MPR reconstructions. The ventricles are prominent. The gyri and sulci appear  prominent. The white matter appears  prominent. There is no evidence for hemorrhage. There is no infarct identified. There is no mass effect identified. There is no mass identified. Diffuse atrophy likely age related Findings compatible with small vessel ischemic changes. Findings were called at the time of dictation     CTA NECK W CONTRAST    Result Date: 2022  Patient MRN:  [id number] : 1955 Age: 74 years Gender: Male Order Date:  2022 6:24 PM EXAM: CTA NECK W CONTRAST, CTA HEAD W CONTRAST NUMBER OF IMAGES:  36 INDICATION:  cva cva What reading provider will be dictating this exam?->[facility] COMPARISON: None Technique: Low-dose CT  acquisition technique included one of following options; 1 . Automated exposure control, 2. Adjustment of MA and or KV according to patient's size or 3. Use of iterative reconstruction. Contiguous spiral images were obtained in the axial plane, following the administration of intravenous contrast using CT angiographic protocol. Sagittal and coronal images were reconstructed from the axial plane acquisition. Additional MIP reconstructions were presented to aid in the interpretation of this study. Images were obtained from the skull base cranially. There is mild calcified plaque identified in the vessels compatible with atherosclerotic disease. The right carotid is moderately atherosclerotic without significant stenosis The left carotid is abnormal. There is  evidence for hemodynamically significant stenosis at the level the proximal internal carotid artery. By NASCET criteria estimated stenosis is 90% or greater The right vertebral artery is mildly atherosclerotic without significant stenosis The left vertebral artery is mildly atherosclerotic without significant stenosis The basilar artery is unremarkable The middle cerebral arteries are unremarkable The anterior cerebral arteries are unremarkable The posterior cerebral arteries are unremarkable     1. Estimated stenosis of the proximal right and left internal carotid artery by NASCET criteria is greater than 90% on the left 2. Severe atherosclerotic disease . 3. No large vessel occlusion identified This study was analyzed by the [employer]. ai algorithm. CT BRAIN PERFUSION    Result Date: 2022  Patient MRN: [id number] : 1955 Age:  74 years Gender: Male Order Date: 2022 Exam: CT BRAIN PERFUSION Number of Images: 333 views Indication:   cva cva What reading provider will be dictating this exam?->[facility] Comparison: None. Findings: Perfusion images demonstrate symmetric blood volume Blood flow images demonstrate symmetric blood flow There is no significant ischemic penumbra identified. There is no significant core infarct identified. No significant ischemic penumbra identified This study was analyzed by the [employer]. ai algorithm. CTA HEAD W CONTRAST    Result Date: 2022  Patient MRN:  [id number] : 1955 Age: 74 years Gender: Male Order Date:  2022 6:24 PM EXAM: CTA NECK W CONTRAST, CTA HEAD W CONTRAST NUMBER OF IMAGES:  36 INDICATION:  cva cva What reading provider will be dictating this exam?->[facility] COMPARISON: None Technique: Low-dose CT  acquisition technique included one of following options; 1 . Automated exposure control, 2. Adjustment of MA and or KV according to patient's size or 3. Use of iterative reconstruction. Contiguous spiral images were obtained in the axial plane, following the administration of intravenous contrast using CT angiographic protocol.  Sagittal and coronal images were reconstructed from the axial plane acquisition. Additional MIP reconstructions were presented to aid in the interpretation of this study. Images were obtained from the skull base cranially. There is mild calcified plaque identified in the vessels compatible with atherosclerotic disease. The right carotid is moderately atherosclerotic without significant stenosis The left carotid is abnormal. There is  evidence for hemodynamically significant stenosis at the level the proximal internal carotid artery. By NASCET criteria estimated stenosis is 90% or greater The right vertebral artery is mildly atherosclerotic without significant stenosis The left vertebral artery is mildly atherosclerotic without significant stenosis The basilar artery is unremarkable The middle cerebral arteries are unremarkable The anterior cerebral arteries are unremarkable The posterior cerebral arteries are unremarkable     1. Estimated stenosis of the proximal right and left internal carotid artery by NASCET criteria is greater than 90% on the left 2. Severe atherosclerotic disease . 3. No large vessel occlusion identified This study was analyzed by the Viz. ai algorithm. There is no problem list on file for this patient.         Andrei Fletcher MD  6:44 PM  9/27/2022

## 2022-09-27 NOTE — FLOWSHEET NOTE
Report received from [place] at [facility]. Joseph Barajas was 1130 today. Admitted 9/19 with a diabetic ulcer on R heel. 9/23 - surgery on R heel wound vac applied  9/27- L sided weakness with confusion 1130. Head CT showed acute on chronic ischemia with chronic small vessel disease. Wound vac removed and heel wrapped before transported. [facility] was not known.

## 2022-09-27 NOTE — BRIEF OP NOTE
Brief Postoperative Note    Marcella Rivero  YOB: 1955  [id number]    Pre-operative Diagnosis and Procedure: Angio and possible left ica angioplasty and possible stent    Post-operative Diagnosis: Same    Anesthesia: Local    Estimated Blood Loss: < 10 cc    Surgeon: Bindu CELIS     Complications: none    Specimen obtained: none     Findings: none     Mendel Bucco, II, MD   9/27/2022 6:54 PM

## 2022-09-27 NOTE — H&P
Interventional Radiology  Attending Pre-operative History and Physical    DIAGNOSIS:  There is no problem list on file for this patient. CHIEF COMPLAINT: <principal problem not specified>        No current facility-administered medications for this encounter. Not on File    No past medical history on file. No past surgical history on file. No family history on file. Social History     Socioeconomic History    Marital status: Unknown     Spouse name: Not on file    Number of children: Not on file    Years of education: Not on file    Highest education level: Not on file   Occupational History    Not on file   Tobacco Use    Smoking status: Not on file    Smokeless tobacco: Not on file   Substance and Sexual Activity    Alcohol use: Not on file    Drug use: Not on file    Sexual activity: Not on file   Other Topics Concern    Not on file   Social History Narrative    Not on file     Social Determinants of Health     Financial Resource Strain: Not on file   Food Insecurity: Not on file   Transportation Needs: Not on file   Physical Activity: Not on file   Stress: Not on file   Social Connections: Not on file   Intimate Partner Violence: Not on file   Housing Stability: Not on file       ROS: Non-contributory other than as noted above    PHYSICAL EXAM:      Heart and Lungs:  demonstrate no contraindications to proceed  1  DATA:  CBC: No results found for: WBC, RBC, HGB, HCT, MCV, MCH, MCHC, RDW, PLT, MPV  CBC with Differential:  No results found for: WBC, RBC, HGB, HCT, PLT, MCV, MCH, MCHC, RDW, NRBC, SEGSPCT, BANDSPCT, BLASTSPCT, METASPCT, LYMPHOPCT, PROMYELOPCT, MONOPCT, MYELOPCT, EOSPCT, BASOPCT, MONOSABS, LYMPHSABS, EOSABS, BASOSABS, DIFFTYPE  Platelets:  No results found for: PLT  BUN/Creatinine:  No results found for: BUN, CREATININE    ASSESSMENT AND PLAN:  1. Angio and possible left ica angioplasty and possible stent  2. Procedure options, risks and benefits reviewed with patient.   Patient expresses understanding.     Electronically signed by Yordan Polanco MD on 9/27/2022 at 6:54 PM     2

## 2022-09-28 ENCOUNTER — APPOINTMENT (OUTPATIENT)
Dept: GENERAL RADIOLOGY | Age: 67
DRG: 034 | End: 2022-09-28
Attending: INTERNAL MEDICINE
Payer: MEDICARE

## 2022-09-28 ENCOUNTER — APPOINTMENT (OUTPATIENT)
Dept: CT IMAGING | Age: 67
DRG: 034 | End: 2022-09-28
Attending: INTERNAL MEDICINE
Payer: MEDICARE

## 2022-09-28 ENCOUNTER — APPOINTMENT (OUTPATIENT)
Dept: MRI IMAGING | Age: 67
DRG: 034 | End: 2022-09-28
Attending: INTERNAL MEDICINE
Payer: MEDICARE

## 2022-09-28 PROBLEM — E88.09 HYPOALBUMINEMIA: Status: ACTIVE | Noted: 2022-09-28

## 2022-09-28 PROBLEM — J96.01 ACUTE RESPIRATORY FAILURE WITH HYPOXIA (HCC): Status: ACTIVE | Noted: 2022-09-28

## 2022-09-28 PROBLEM — I65.22 STENOSIS OF LEFT CAROTID ARTERY: Status: ACTIVE | Noted: 2022-09-28

## 2022-09-28 LAB
AADO2: 76.3 MMHG
ALBUMIN SERPL-MCNC: 2.8 G/DL (ref 3.5–5.2)
ALP BLD-CCNC: 144 U/L (ref 40–129)
ALT SERPL-CCNC: <5 U/L (ref 0–40)
ANION GAP SERPL CALCULATED.3IONS-SCNC: 12 MMOL/L (ref 7–16)
AST SERPL-CCNC: 12 U/L (ref 0–39)
B.E.: -4.2 MMOL/L (ref -3–3)
BASOPHILS ABSOLUTE: 0.03 E9/L (ref 0–0.2)
BASOPHILS ABSOLUTE: 0.03 E9/L (ref 0–0.2)
BASOPHILS RELATIVE PERCENT: 0.3 % (ref 0–2)
BASOPHILS RELATIVE PERCENT: 0.3 % (ref 0–2)
BILIRUB SERPL-MCNC: 0.4 MG/DL (ref 0–1.2)
BLOOD BANK DISPENSE STATUS: NORMAL
BLOOD BANK PRODUCT CODE: NORMAL
BPU ID: NORMAL
BUN BLDV-MCNC: 83 MG/DL (ref 6–23)
CALCIUM IONIZED: 1.21 MMOL/L (ref 1.15–1.33)
CALCIUM SERPL-MCNC: 8.1 MG/DL (ref 8.6–10.2)
CHLORIDE BLD-SCNC: 111 MMOL/L (ref 98–107)
CO2: 21 MMOL/L (ref 22–29)
COHB: 0.3 % (ref 0–1.5)
CREAT SERPL-MCNC: 2.7 MG/DL (ref 0.7–1.2)
CRITICAL: ABNORMAL
DATE ANALYZED: ABNORMAL
DATE OF COLLECTION: ABNORMAL
DESCRIPTION BLOOD BANK: NORMAL
EKG ATRIAL RATE: 65 BPM
EKG P AXIS: 62 DEGREES
EKG P-R INTERVAL: 150 MS
EKG Q-T INTERVAL: 432 MS
EKG QRS DURATION: 102 MS
EKG QTC CALCULATION (BAZETT): 449 MS
EKG R AXIS: 46 DEGREES
EKG T AXIS: 11 DEGREES
EKG VENTRICULAR RATE: 65 BPM
EOSINOPHILS ABSOLUTE: 0.13 E9/L (ref 0.05–0.5)
EOSINOPHILS ABSOLUTE: 0.2 E9/L (ref 0.05–0.5)
EOSINOPHILS RELATIVE PERCENT: 1.3 % (ref 0–6)
EOSINOPHILS RELATIVE PERCENT: 2.1 % (ref 0–6)
FIO2: 40 %
GFR AFRICAN AMERICAN: 29
GFR NON-AFRICAN AMERICAN: 24 ML/MIN/1.73
GLUCOSE BLD-MCNC: 107 MG/DL (ref 74–99)
HBA1C MFR BLD: 6.4 % (ref 4–5.6)
HCO3: 20.4 MMOL/L (ref 22–26)
HCT VFR BLD CALC: 20.1 % (ref 37–54)
HCT VFR BLD CALC: 23.3 % (ref 37–54)
HCT VFR BLD CALC: 23.6 % (ref 37–54)
HEMOGLOBIN: 6.3 G/DL (ref 12.5–16.5)
HEMOGLOBIN: 7.5 G/DL (ref 12.5–16.5)
HEMOGLOBIN: 7.6 G/DL (ref 12.5–16.5)
HHB: 1.8 % (ref 0–5)
IMMATURE GRANULOCYTES #: 0.05 E9/L
IMMATURE GRANULOCYTES #: 0.08 E9/L
IMMATURE GRANULOCYTES %: 0.5 % (ref 0–5)
IMMATURE GRANULOCYTES %: 0.8 % (ref 0–5)
LAB: ABNORMAL
LACTIC ACID: 0.7 MMOL/L (ref 0.5–2.2)
LV EF: 63 %
LVEF MODALITY: NORMAL
LYMPHOCYTES ABSOLUTE: 1.13 E9/L (ref 1.5–4)
LYMPHOCYTES ABSOLUTE: 1.62 E9/L (ref 1.5–4)
LYMPHOCYTES RELATIVE PERCENT: 11.6 % (ref 20–42)
LYMPHOCYTES RELATIVE PERCENT: 17.3 % (ref 20–42)
Lab: ABNORMAL
MAGNESIUM: 2.4 MG/DL (ref 1.6–2.6)
MCH RBC QN AUTO: 28.4 PG (ref 26–35)
MCH RBC QN AUTO: 29.2 PG (ref 26–35)
MCHC RBC AUTO-ENTMCNC: 31.3 % (ref 32–34.5)
MCHC RBC AUTO-ENTMCNC: 32.6 % (ref 32–34.5)
MCV RBC AUTO: 89.6 FL (ref 80–99.9)
MCV RBC AUTO: 90.5 FL (ref 80–99.9)
METER GLUCOSE: 103 MG/DL (ref 74–99)
METER GLUCOSE: 76 MG/DL (ref 74–99)
METER GLUCOSE: 80 MG/DL (ref 74–99)
METER GLUCOSE: 84 MG/DL (ref 74–99)
METER GLUCOSE: 97 MG/DL (ref 74–99)
METHB: 0.3 % (ref 0–1.5)
MODE: AC
MONOCYTES ABSOLUTE: 0.6 E9/L (ref 0.1–0.95)
MONOCYTES ABSOLUTE: 0.68 E9/L (ref 0.1–0.95)
MONOCYTES RELATIVE PERCENT: 6.1 % (ref 2–12)
MONOCYTES RELATIVE PERCENT: 7.3 % (ref 2–12)
NEUTROPHILS ABSOLUTE: 6.79 E9/L (ref 1.8–7.3)
NEUTROPHILS ABSOLUTE: 7.79 E9/L (ref 1.8–7.3)
NEUTROPHILS RELATIVE PERCENT: 72.5 % (ref 43–80)
NEUTROPHILS RELATIVE PERCENT: 79.9 % (ref 43–80)
O2 SATURATION: 98.2 % (ref 92–98.5)
O2HB: 97.6 % (ref 94–97)
OPERATOR ID: 1893
PATIENT TEMP: 37 C
PCO2: 35.3 MMHG (ref 35–45)
PDW BLD-RTO: 15.1 FL (ref 11.5–15)
PDW BLD-RTO: 15.1 FL (ref 11.5–15)
PEEP/CPAP: 8 CMH2O
PFO2: 3.96 MMHG/%
PH BLOOD GAS: 7.38 (ref 7.35–7.45)
PHOSPHORUS: 3.5 MG/DL (ref 2.5–4.5)
PLATELET # BLD: 272 E9/L (ref 130–450)
PLATELET # BLD: 274 E9/L (ref 130–450)
PMV BLD AUTO: 9.7 FL (ref 7–12)
PMV BLD AUTO: 9.7 FL (ref 7–12)
PO2: 158.3 MMHG (ref 75–100)
POTASSIUM SERPL-SCNC: 4.7 MMOL/L (ref 3.5–5)
RBC # BLD: 2.22 E12/L (ref 3.8–5.8)
RBC # BLD: 2.6 E12/L (ref 3.8–5.8)
RI(T): 0.48
RR MECHANICAL: 18 B/MIN
SODIUM BLD-SCNC: 144 MMOL/L (ref 132–146)
SOURCE, BLOOD GAS: ABNORMAL
THB: 8.4 G/DL (ref 11.5–16.5)
TIME ANALYZED: 519
TOTAL PROTEIN: 5.2 G/DL (ref 6.4–8.3)
TROPONIN, HIGH SENSITIVITY: 149 NG/L (ref 0–11)
TROPONIN, HIGH SENSITIVITY: 153 NG/L (ref 0–11)
TROPONIN, HIGH SENSITIVITY: 169 NG/L (ref 0–11)
VT MECHANICAL: 500 ML
WBC # BLD: 9.4 E9/L (ref 4.5–11.5)
WBC # BLD: 9.8 E9/L (ref 4.5–11.5)

## 2022-09-28 PROCEDURE — 6370000000 HC RX 637 (ALT 250 FOR IP): Performed by: NURSE PRACTITIONER

## 2022-09-28 PROCEDURE — 2000000000 HC ICU R&B

## 2022-09-28 PROCEDURE — 37215 TRANSCATH STENT CCA W/EPS: CPT

## 2022-09-28 PROCEDURE — 93306 TTE W/DOPPLER COMPLETE: CPT

## 2022-09-28 PROCEDURE — 99222 1ST HOSP IP/OBS MODERATE 55: CPT | Performed by: PHYSICIAN ASSISTANT

## 2022-09-28 PROCEDURE — 84484 ASSAY OF TROPONIN QUANT: CPT

## 2022-09-28 PROCEDURE — 70450 CT HEAD/BRAIN W/O DYE: CPT

## 2022-09-28 PROCEDURE — 6360000004 HC RX CONTRAST MEDICATION: Performed by: STUDENT IN AN ORGANIZED HEALTH CARE EDUCATION/TRAINING PROGRAM

## 2022-09-28 PROCEDURE — 80053 COMPREHEN METABOLIC PANEL: CPT

## 2022-09-28 PROCEDURE — 6360000002 HC RX W HCPCS: Performed by: STUDENT IN AN ORGANIZED HEALTH CARE EDUCATION/TRAINING PROGRAM

## 2022-09-28 PROCEDURE — 71045 X-RAY EXAM CHEST 1 VIEW: CPT

## 2022-09-28 PROCEDURE — 70450 CT HEAD/BRAIN W/O DYE: CPT | Performed by: RADIOLOGY

## 2022-09-28 PROCEDURE — 36430 TRANSFUSION BLD/BLD COMPNT: CPT

## 2022-09-28 PROCEDURE — 74018 RADEX ABDOMEN 1 VIEW: CPT

## 2022-09-28 PROCEDURE — A4216 STERILE WATER/SALINE, 10 ML: HCPCS | Performed by: STUDENT IN AN ORGANIZED HEALTH CARE EDUCATION/TRAINING PROGRAM

## 2022-09-28 PROCEDURE — 36415 COLL VENOUS BLD VENIPUNCTURE: CPT

## 2022-09-28 PROCEDURE — 83605 ASSAY OF LACTIC ACID: CPT

## 2022-09-28 PROCEDURE — 93005 ELECTROCARDIOGRAM TRACING: CPT | Performed by: INTERNAL MEDICINE

## 2022-09-28 PROCEDURE — 85018 HEMOGLOBIN: CPT

## 2022-09-28 PROCEDURE — 70551 MRI BRAIN STEM W/O DYE: CPT | Performed by: RADIOLOGY

## 2022-09-28 PROCEDURE — 70551 MRI BRAIN STEM W/O DYE: CPT

## 2022-09-28 PROCEDURE — 94640 AIRWAY INHALATION TREATMENT: CPT

## 2022-09-28 PROCEDURE — 2580000003 HC RX 258: Performed by: STUDENT IN AN ORGANIZED HEALTH CARE EDUCATION/TRAINING PROGRAM

## 2022-09-28 PROCEDURE — 82962 GLUCOSE BLOOD TEST: CPT

## 2022-09-28 PROCEDURE — 85014 HEMATOCRIT: CPT

## 2022-09-28 PROCEDURE — 83735 ASSAY OF MAGNESIUM: CPT

## 2022-09-28 PROCEDURE — 99222 1ST HOSP IP/OBS MODERATE 55: CPT | Performed by: INTERNAL MEDICINE

## 2022-09-28 PROCEDURE — 94003 VENT MGMT INPAT SUBQ DAY: CPT

## 2022-09-28 PROCEDURE — 84100 ASSAY OF PHOSPHORUS: CPT

## 2022-09-28 PROCEDURE — 97605 NEG PRS WND THER DME<=50SQCM: CPT

## 2022-09-28 PROCEDURE — 76377 3D RENDER W/INTRP POSTPROCES: CPT

## 2022-09-28 PROCEDURE — 82805 BLOOD GASES W/O2 SATURATION: CPT

## 2022-09-28 PROCEDURE — 82330 ASSAY OF CALCIUM: CPT

## 2022-09-28 PROCEDURE — 85025 COMPLETE CBC W/AUTO DIFF WBC: CPT

## 2022-09-28 PROCEDURE — 99291 CRITICAL CARE FIRST HOUR: CPT | Performed by: SURGERY

## 2022-09-28 PROCEDURE — 6370000000 HC RX 637 (ALT 250 FOR IP): Performed by: FAMILY MEDICINE

## 2022-09-28 PROCEDURE — 6370000000 HC RX 637 (ALT 250 FOR IP): Performed by: STUDENT IN AN ORGANIZED HEALTH CARE EDUCATION/TRAINING PROGRAM

## 2022-09-28 PROCEDURE — 37799 UNLISTED PX VASCULAR SURGERY: CPT

## 2022-09-28 PROCEDURE — G0269 OCCLUSIVE DEVICE IN VEIN ART: HCPCS

## 2022-09-28 PROCEDURE — 2500000003 HC RX 250 WO HCPCS: Performed by: STUDENT IN AN ORGANIZED HEALTH CARE EDUCATION/TRAINING PROGRAM

## 2022-09-28 PROCEDURE — 83036 HEMOGLOBIN GLYCOSYLATED A1C: CPT

## 2022-09-28 RX ORDER — CETIRIZINE HYDROCHLORIDE 10 MG/1
10 TABLET ORAL DAILY
Status: DISCONTINUED | OUTPATIENT
Start: 2022-09-28 | End: 2022-10-15

## 2022-09-28 RX ORDER — MIDAZOLAM HYDROCHLORIDE 2 MG/2ML
2 INJECTION, SOLUTION INTRAMUSCULAR; INTRAVENOUS ONCE
Status: COMPLETED | OUTPATIENT
Start: 2022-09-28 | End: 2022-09-28

## 2022-09-28 RX ORDER — FLUTICASONE PROPIONATE 50 MCG
2 SPRAY, SUSPENSION (ML) NASAL DAILY
COMMUNITY

## 2022-09-28 RX ORDER — CLONIDINE HYDROCHLORIDE 0.1 MG/1
0.1 TABLET ORAL EVERY 12 HOURS
COMMUNITY

## 2022-09-28 RX ORDER — SODIUM CHLORIDE 9 MG/ML
INJECTION, SOLUTION INTRAVENOUS PRN
Status: DISCONTINUED | OUTPATIENT
Start: 2022-09-28 | End: 2022-09-29

## 2022-09-28 RX ORDER — HYDROCODONE BITARTRATE AND ACETAMINOPHEN 5; 325 MG/1; MG/1
1 TABLET ORAL 2 TIMES DAILY
COMMUNITY

## 2022-09-28 RX ORDER — EZETIMIBE 10 MG/1
10 TABLET ORAL DAILY
COMMUNITY

## 2022-09-28 RX ORDER — AMLODIPINE BESYLATE 5 MG/1
5 TABLET ORAL DAILY
Status: DISCONTINUED | OUTPATIENT
Start: 2022-09-28 | End: 2022-10-02

## 2022-09-28 RX ORDER — LORAZEPAM 2 MG/ML
2 INJECTION INTRAMUSCULAR ONCE
Status: DISCONTINUED | OUTPATIENT
Start: 2022-09-28 | End: 2022-09-28

## 2022-09-28 RX ORDER — CARVEDILOL 25 MG/1
25 TABLET ORAL 2 TIMES DAILY WITH MEALS
Status: ON HOLD | COMMUNITY
End: 2022-10-24 | Stop reason: HOSPADM

## 2022-09-28 RX ORDER — LABETALOL HYDROCHLORIDE 5 MG/ML
10 INJECTION, SOLUTION INTRAVENOUS EVERY 30 MIN PRN
Status: DISCONTINUED | OUTPATIENT
Start: 2022-09-28 | End: 2022-10-26 | Stop reason: HOSPADM

## 2022-09-28 RX ORDER — CETIRIZINE HYDROCHLORIDE 10 MG/1
10 TABLET ORAL DAILY
COMMUNITY

## 2022-09-28 RX ORDER — CLOPIDOGREL BISULFATE 75 MG/1
75 TABLET ORAL DAILY
Status: ON HOLD | COMMUNITY
End: 2022-10-24 | Stop reason: SDUPTHER

## 2022-09-28 RX ORDER — ASPIRIN 81 MG/1
81 TABLET, CHEWABLE ORAL DAILY
COMMUNITY

## 2022-09-28 RX ORDER — INSULIN LISPRO 100 [IU]/ML
0-16 INJECTION, SOLUTION INTRAVENOUS; SUBCUTANEOUS EVERY 4 HOURS
Status: DISCONTINUED | OUTPATIENT
Start: 2022-09-28 | End: 2022-09-30

## 2022-09-28 RX ORDER — BENZONATATE 100 MG/1
100 CAPSULE ORAL 3 TIMES DAILY
COMMUNITY

## 2022-09-28 RX ORDER — BENZONATATE 100 MG/1
100 CAPSULE ORAL 3 TIMES DAILY
Status: DISCONTINUED | OUTPATIENT
Start: 2022-09-28 | End: 2022-10-26 | Stop reason: HOSPADM

## 2022-09-28 RX ORDER — AMLODIPINE BESYLATE 5 MG/1
5 TABLET ORAL DAILY
COMMUNITY

## 2022-09-28 RX ORDER — EZETIMIBE 10 MG/1
10 TABLET ORAL DAILY
Status: DISCONTINUED | OUTPATIENT
Start: 2022-09-28 | End: 2022-10-21

## 2022-09-28 RX ORDER — GABAPENTIN 600 MG/1
600 TABLET ORAL 4 TIMES DAILY
COMMUNITY

## 2022-09-28 RX ORDER — DIPHENHYDRAMINE HCL 25 MG
25 CAPSULE ORAL EVERY 6 HOURS
COMMUNITY

## 2022-09-28 RX ORDER — ACETAMINOPHEN 325 MG/1
650 TABLET ORAL EVERY 6 HOURS PRN
COMMUNITY

## 2022-09-28 RX ORDER — INSULIN GLARGINE 300 U/ML
66 INJECTION, SOLUTION SUBCUTANEOUS DAILY
COMMUNITY

## 2022-09-28 RX ORDER — GABAPENTIN 600 MG/1
600 TABLET ORAL 4 TIMES DAILY
Status: DISCONTINUED | OUTPATIENT
Start: 2022-09-28 | End: 2022-10-10 | Stop reason: SDUPTHER

## 2022-09-28 RX ORDER — HYDRALAZINE HYDROCHLORIDE 100 MG/1
100 TABLET, FILM COATED ORAL 3 TIMES DAILY
Status: ON HOLD | COMMUNITY
End: 2022-10-24 | Stop reason: HOSPADM

## 2022-09-28 RX ORDER — DEXTROSE MONOHYDRATE 100 MG/ML
INJECTION, SOLUTION INTRAVENOUS CONTINUOUS PRN
Status: DISCONTINUED | OUTPATIENT
Start: 2022-09-28 | End: 2022-10-26 | Stop reason: HOSPADM

## 2022-09-28 RX ORDER — HYDRALAZINE HYDROCHLORIDE 20 MG/ML
10 INJECTION INTRAMUSCULAR; INTRAVENOUS EVERY 30 MIN PRN
Status: DISCONTINUED | OUTPATIENT
Start: 2022-09-28 | End: 2022-10-16

## 2022-09-28 RX ORDER — IPRATROPIUM BROMIDE AND ALBUTEROL SULFATE 2.5; .5 MG/3ML; MG/3ML
1 SOLUTION RESPIRATORY (INHALATION)
Status: DISCONTINUED | OUTPATIENT
Start: 2022-09-28 | End: 2022-10-26 | Stop reason: HOSPADM

## 2022-09-28 RX ADMIN — MINERAL OIL, WHITE PETROLATUM: .03; .94 OINTMENT OPHTHALMIC at 16:44

## 2022-09-28 RX ADMIN — POLYVINYL ALCOHOL 1 DROP: 14 SOLUTION/ DROPS OPHTHALMIC at 11:00

## 2022-09-28 RX ADMIN — CARBIDOPA AND LEVODOPA 1 TABLET: 25; 100 TABLET ORAL at 13:52

## 2022-09-28 RX ADMIN — FENTANYL CITRATE 100 MCG: 0.05 INJECTION, SOLUTION INTRAMUSCULAR; INTRAVENOUS at 00:59

## 2022-09-28 RX ADMIN — IPRATROPIUM BROMIDE AND ALBUTEROL SULFATE 1 AMPULE: .5; 2.5 SOLUTION RESPIRATORY (INHALATION) at 20:25

## 2022-09-28 RX ADMIN — PROPOFOL 15 MCG/KG/MIN: 10 INJECTION, EMULSION INTRAVENOUS at 08:22

## 2022-09-28 RX ADMIN — ASPIRIN 300 MG: 300 SUPPOSITORY RECTAL at 08:21

## 2022-09-28 RX ADMIN — POLYETHYLENE GLYCOL 3350 17 G: 17 POWDER, FOR SOLUTION ORAL at 09:26

## 2022-09-28 RX ADMIN — MINERAL OIL, WHITE PETROLATUM: .03; .94 OINTMENT OPHTHALMIC at 12:35

## 2022-09-28 RX ADMIN — BENZONATATE 100 MG: 100 CAPSULE ORAL at 20:06

## 2022-09-28 RX ADMIN — HYDRALAZINE HYDROCHLORIDE 10 MG: 20 INJECTION INTRAMUSCULAR; INTRAVENOUS at 03:16

## 2022-09-28 RX ADMIN — MINERAL OIL, WHITE PETROLATUM: .03; .94 OINTMENT OPHTHALMIC at 08:30

## 2022-09-28 RX ADMIN — GABAPENTIN 600 MG: 600 TABLET, FILM COATED ORAL at 20:06

## 2022-09-28 RX ADMIN — PROPOFOL 20 MCG/KG/MIN: 10 INJECTION, EMULSION INTRAVENOUS at 14:40

## 2022-09-28 RX ADMIN — MINERAL OIL, WHITE PETROLATUM: .03; .94 OINTMENT OPHTHALMIC at 01:37

## 2022-09-28 RX ADMIN — POLYVINYL ALCOHOL 1 DROP: 14 SOLUTION/ DROPS OPHTHALMIC at 13:52

## 2022-09-28 RX ADMIN — IPRATROPIUM BROMIDE AND ALBUTEROL SULFATE 1 AMPULE: .5; 2.5 SOLUTION RESPIRATORY (INHALATION) at 16:50

## 2022-09-28 RX ADMIN — EZETIMIBE 10 MG: 10 TABLET ORAL at 20:06

## 2022-09-28 RX ADMIN — PROPOFOL 40 MCG/KG/MIN: 10 INJECTION, EMULSION INTRAVENOUS at 00:25

## 2022-09-28 RX ADMIN — SENNOSIDES 5 ML: 8.8 SYRUP ORAL at 02:28

## 2022-09-28 RX ADMIN — MINERAL OIL, WHITE PETROLATUM: .03; .94 OINTMENT OPHTHALMIC at 04:22

## 2022-09-28 RX ADMIN — 0.12% CHLORHEXIDINE GLUCONATE 15 ML: 1.2 RINSE ORAL at 20:05

## 2022-09-28 RX ADMIN — PERFLUTREN 1.5 ML: 6.52 INJECTION, SUSPENSION INTRAVENOUS at 09:24

## 2022-09-28 RX ADMIN — ATORVASTATIN CALCIUM 80 MG: 40 TABLET, FILM COATED ORAL at 01:36

## 2022-09-28 RX ADMIN — PROPOFOL 20 MCG/KG/MIN: 10 INJECTION, EMULSION INTRAVENOUS at 20:00

## 2022-09-28 RX ADMIN — IPRATROPIUM BROMIDE AND ALBUTEROL SULFATE 1 AMPULE: .5; 2.5 SOLUTION RESPIRATORY (INHALATION) at 12:49

## 2022-09-28 RX ADMIN — CETIRIZINE HYDROCHLORIDE 10 MG: 10 TABLET, FILM COATED ORAL at 20:19

## 2022-09-28 RX ADMIN — FAMOTIDINE 20 MG: 10 INJECTION INTRAVENOUS at 09:25

## 2022-09-28 RX ADMIN — SENNOSIDES 5 ML: 8.8 SYRUP ORAL at 20:05

## 2022-09-28 RX ADMIN — TICAGRELOR 180 MG: 90 TABLET ORAL at 08:21

## 2022-09-28 RX ADMIN — 0.12% CHLORHEXIDINE GLUCONATE 15 ML: 1.2 RINSE ORAL at 01:35

## 2022-09-28 RX ADMIN — MINERAL OIL, WHITE PETROLATUM: .03; .94 OINTMENT OPHTHALMIC at 23:52

## 2022-09-28 RX ADMIN — MIDAZOLAM HYDROCHLORIDE 1 MG: 1 INJECTION, SOLUTION INTRAMUSCULAR; INTRAVENOUS at 05:54

## 2022-09-28 RX ADMIN — POLYVINYL ALCOHOL 1 DROP: 14 SOLUTION/ DROPS OPHTHALMIC at 05:56

## 2022-09-28 RX ADMIN — AMLODIPINE BESYLATE 5 MG: 5 TABLET ORAL at 10:45

## 2022-09-28 RX ADMIN — FENTANYL CITRATE 100 MCG: 0.05 INJECTION, SOLUTION INTRAMUSCULAR; INTRAVENOUS at 03:22

## 2022-09-28 RX ADMIN — POLYVINYL ALCOHOL 1 DROP: 14 SOLUTION/ DROPS OPHTHALMIC at 22:01

## 2022-09-28 RX ADMIN — MINERAL OIL, WHITE PETROLATUM: .03; .94 OINTMENT OPHTHALMIC at 20:05

## 2022-09-28 RX ADMIN — POLYVINYL ALCOHOL 1 DROP: 14 SOLUTION/ DROPS OPHTHALMIC at 02:20

## 2022-09-28 RX ADMIN — POLYVINYL ALCOHOL 1 DROP: 14 SOLUTION/ DROPS OPHTHALMIC at 18:00

## 2022-09-28 RX ADMIN — ATORVASTATIN CALCIUM 80 MG: 40 TABLET, FILM COATED ORAL at 20:06

## 2022-09-28 RX ADMIN — CARBIDOPA AND LEVODOPA 1 TABLET: 25; 100 TABLET ORAL at 20:06

## 2022-09-28 RX ADMIN — PROPOFOL 18 MCG/KG/MIN: 10 INJECTION, EMULSION INTRAVENOUS at 18:55

## 2022-09-28 RX ADMIN — POLYVINYL ALCOHOL 1 DROP: 14 SOLUTION/ DROPS OPHTHALMIC at 01:38

## 2022-09-28 RX ADMIN — FAMOTIDINE 20 MG: 10 INJECTION INTRAVENOUS at 01:36

## 2022-09-28 RX ADMIN — 0.12% CHLORHEXIDINE GLUCONATE 15 ML: 1.2 RINSE ORAL at 09:25

## 2022-09-28 ASSESSMENT — PULMONARY FUNCTION TESTS
PIF_VALUE: 35
PIF_VALUE: 16
PIF_VALUE: 41
PIF_VALUE: 30
PIF_VALUE: 32
PIF_VALUE: 17
PIF_VALUE: 47
PIF_VALUE: 31
PIF_VALUE: 29
PIF_VALUE: 33
PIF_VALUE: 33
PIF_VALUE: 38
PIF_VALUE: 32
PIF_VALUE: 17
PIF_VALUE: 35
PIF_VALUE: 32
PIF_VALUE: 30
PIF_VALUE: 53
PIF_VALUE: 34
PIF_VALUE: 31
PIF_VALUE: 13
PIF_VALUE: 32
PIF_VALUE: 17
PIF_VALUE: 31
PIF_VALUE: 39

## 2022-09-28 NOTE — CARE COORDINATION
S/p L ica angioplasty and stent on 9/27. Pt is intubated and sedated on vent. Met with pt's foster father/ Jerry Hernandez and Kam's daughter. Pt has been living with his caregiver Jolene Tello. He owns a cane and walker from prev [employer], but does not use them. Pt is being treated for a R foot wound and has an off loading shoe. Pcp is Dr Mojgan Dennis [phone] and preferred pharmacy is [facility] in [place] . Pt [place] and [place], Will follow up with Timoteo Ibarra to discuss transition of care plans as pt stabilizes.

## 2022-09-28 NOTE — PROGRESS NOTES
Jalil served Brenna CARTER for neurology due to MRI results. Small petechial infarcts in left parietal and left posterior frontal lobe. Message read. done

## 2022-09-28 NOTE — ANESTHESIA POSTPROCEDURE EVALUATION
Department of Anesthesiology  Postprocedure Note    Patient: Colonel Cruz  MRN: [id number]  [place]: 1955  Date of evaluation: 9/28/2022      Procedure Summary     Date: 09/27/22 Room / Location: [address] [place] [address]    Anesthesia Start: 5018 Anesthesia Stop: 2100    Procedure: IR CAROTID STENT UNI W PROTECTION Diagnosis:       Stenosis of left carotid artery      (left carotid stenosis)    Scheduled Providers:  General Radiologist Responsible Provider: Bere Jones DO    Anesthesia Type: General ASA Status: 4 - Emergent          Anesthesia Type: General    Amy Phase I: Amy Score: 4    Amy Phase II:        Anesthesia Post Evaluation    Patient location during evaluation: ICU  Patient participation: complete - patient cannot participate  Level of consciousness: sedated and ventilated  Airway patency: patent  Nausea & Vomiting: no nausea and no vomiting  Complications: no  Cardiovascular status: blood pressure returned to baseline  Respiratory status: acceptable  Hydration status: euvolemic

## 2022-09-28 NOTE — ANESTHESIA PRE PROCEDURE
Department of Anesthesiology  Preprocedure Note       Name:  Radha Yu   Age:  74 y.o.  :  1955                                          MRN:  [id number]         Date:  2022      Surgeon: * No surgeons listed *    Procedure: * No procedures listed *    Medications prior to admission:   Prior to Admission medications    Not on File       Current medications:    Current Facility-Administered Medications   Medication Dose Route Frequency Provider Last Rate Last Admin    [START ON 2022] ticagrelor (BRILINTA) tablet 180 mg  180 mg Oral Once Joshua Jernigan MD        [START ON 2022] ticagrelor (BRILINTA) tablet 90 mg  90 mg Oral BID Eugenia Peña II, MD        ondansetron (ZOFRAN-ODT) disintegrating tablet 4 mg  4 mg Oral Q8H PRN Oscar Berry, DO        Or    [facility]) injection 4 mg  4 mg IntraVENous Q6H PRN Oscar Romeroari, DO        polyethylene glycol (GLYCOLAX) packet 17 g  17 g Oral Daily PRN Oscar Romeroari, DO        aspirin EC tablet 81 mg  81 mg Oral Daily Oscar Berry, DO        Or    aspirin suppository 300 mg  300 mg Rectal Daily Oscar Berry, DO        perflutren lipid microspheres (DEFINITY) injection 1.65 mg  1.5 mL IntraVENous ONCE PRN Oscar Jamal, DO        atorvastatin (LIPITOR) tablet 80 mg  80 mg Oral Nightly Oscar Jamal, DO        eptifibatide (INTEGRILIN) 0.75 mg/mL infusion  0.5 mcg/kg/min IntraVENous Continuous Eugenia Peña II, MD        0.9 % sodium chloride infusion   IntraVENous PRN Nighat Vasquez DO        0.9 % sodium chloride infusion   IntraVENous PRN Oscar Berry, DO        propofol 1000 MG/100ML injection              Facility-Administered Medications Ordered in Other Encounters   Medication Dose Route Frequency Provider Last Rate Last Admin    propofol injection   IntraVENous PRN Carlos Cabrera RN   100 mg at 22    succinylcholine (ANECTINE) injection   IntraVENous PRN Carlos Cabrera RN   160 mg at 22  rocuronium (ZEMURON) injection   IntraVENous PRN Nino Specking, RN   50 mg at 09/27/22 1908    midazolam (VERSED) injection   IntraVENous PRN Nino Specking, RN   2 mg at 09/27/22 1905    lidocaine 1 % injection   IntraVENous PRN Nino Specking, RN   50 mg at 09/27/22 1858    0.9 % sodium chloride infusion   IntraVENous Continuous PRN Nino Specking, RN   New Bag at 09/27/22 2018    0.9 % sodium chloride infusion   IntraVENous Continuous PRN Nino Specking, RN   New Bag at 09/27/22 2000    ePHEDrine injection   IntraVENous PRN Nino Specking, RN   5 mg at 09/27/22 1921    albumin human 5 % IV solution   IntraVENous PRN Nino Specking, RN   25 g at 09/27/22 1926    eptifibatide (INTEGRILIN) 0.75 mg/mL infusion   IntraVENous Continuous PRN Nino Specking, RN 4.8 mL/hr at 09/27/22 1945 0.5 mcg/kg/min at 09/27/22 1945    sugammadex (BRIDION) 500 MG/5ML injection   IntraVENous PRN Nino Specking, RN   250 mg at 09/27/22 2018    ondansetron (ZOFRAN) injection   IntraVENous PRN Nino Specking, RN   4 mg at 09/27/22 2022       Allergies: Allergies   Allergen Reactions    Codeine Other (See Comments)     blisters       Problem List:    Patient Active Problem List   Diagnosis Code    [facility] I63.9       Past Medical History:  No past medical history on file. Past Surgical History:  No past surgical history on file.     Social History:    Social History     Tobacco Use    Smoking status: Not on file    Smokeless tobacco: Not on file   Substance Use Topics    Alcohol use: Not on file                                Counseling given: Not Answered      Vital Signs (Current):   Vitals:    09/27/22 1715   BP: 136/79   Pulse: 78   Resp: 26   Temp: 98.8 °F (37.1 °C)   TempSrc: Axillary   SpO2: 95%                                              BP Readings from Last 3 Encounters:   09/27/22 136/79       NPO Status:  unknown BMI:   Wt Readings from Last 3 Encounters:   No data found for Wt     There is no height or weight on file to calculate BMI.    CBC:   Lab Results   Component Value Date/Time    WBC 8.7 09/27/2022 01:12 PM    RBC 2.02 09/27/2022 01:12 PM    HGB 5.6 09/27/2022 01:12 PM    HCT 18.7 09/27/2022 01:12 PM    MCV 92.6 09/27/2022 01:12 PM    RDW 14.7 09/27/2022 01:12 PM     09/27/2022 01:12 PM       CMP:   Lab Results   Component Value Date/Time     09/27/2022 01:12 PM    K 5.2 09/27/2022 01:12 PM     09/27/2022 01:12 PM    CO2 24 09/27/2022 01:12 PM    BUN 89 09/27/2022 01:12 PM    CREATININE 2.9 09/27/2022 01:12 PM    GFRAA 27 09/27/2022 01:12 PM    LABGLOM 22 09/27/2022 01:12 PM    GLUCOSE 106 09/27/2022 01:12 PM    PROT 5.3 09/27/2022 01:12 PM    CALCIUM 8.3 09/27/2022 01:12 PM    BILITOT <0.2 09/27/2022 01:12 PM    ALKPHOS 165 09/27/2022 01:12 PM    AST 11 09/27/2022 01:12 PM    ALT <5 09/27/2022 01:12 PM       POC Tests: No results for input(s): POCGLU, POCNA, POCK, POCCL, POCBUN, POCHEMO, POCHCT in the last 72 hours.     Coags:   Lab Results   Component Value Date/Time    PROTIME 23.2 09/27/2022 01:12 PM    INR 2.1 09/27/2022 01:12 PM       HCG (If Applicable): No results found for: PREGTESTUR, PREGSERUM, HCG, HCGQUANT     ABGs: No results found for: PHART, PO2ART, KVK9YHW, SCQ8WBW, BEART, B2LDIJDM     Type & Screen (If Applicable):  No results found for: LABABO, LABRH    Drug/Infectious Status (If Applicable):  No results found for: HIV, HEPCAB    COVID-19 Screening (If Applicable): No results found for: COVID19        Anesthesia Evaluation  Nursing notes reviewed  Airway: Mallampati: Unable to assess / NA          Dental:    (+) edentulous      Pulmonary:   (+) decreased breath sounds                            Cardiovascular:            Rhythm: regular  Rate: normal                    Neuro/Psych:   (+) CVA:,              ROS comment: Patient with acute stroke like symptoms transferred from [place] to [facility] neuro icu. LKW approximately 1:30 pm at [place]. Left IC stenosis per Dr Jeremy Landin for stenting GI/Hepatic/Renal:             Endo/Other:                     Abdominal:             Vascular: Other Findings:           Anesthesia Plan      general     ASA 4 - emergent         arterial line  MIPS: Postoperative opioids intended, Prophylactic antiemetics administered and Postoperative ventilation. Anesthetic plan and risks discussed with patient. Plan discussed with CRNA.                     Baldomero Hough,    9/27/2022

## 2022-09-28 NOTE — CONSULTS
Inpatient Cardiology Consultation      Reason for Consult: Elevated troponin and extensive vascular disease    Consulting Physician: Dr. Jesse Brunson    Requesting Physician:  Odalis Agarwal    Date of Consultation: 9/28/2022    HISTORY OF PRESENT ILLNESS:     The following information is taken from a review of electronic medical records and from the patient's family who is at the bedside as he is intubated and sedated. This 53-year-old male has a  cardiac history but is new to [facility]. He lives in [place] and is followed by a cardiology group there and records are pending. The family states he has had a myocardial infarction but they are unclear if he has had any stents. He has a history of a CVA and peripheral vascular disease, MI and is on triple therapy [address] [place] [address] for PAD of the lower extremities. He has a wound of the right lower extremities and was at [facility] for almost a week. A wound VAC was recently placed. Yesterday he developed aphasia and a facial droop according to the family. His right side is chronically weak and now it was flaccid. He was anemic at [facility] but not transfused. He was started on aspirin and Plavix and transferred to [facility] around 5 PM yesterday. He was placed in the neuro ICU. He was intubated and sedated a few hours after admission. He underwent interventional radiology procedure last evening around 7 PM with a left ICA angioplasty and stent. Blood pressure on admission 136/79 and heart rate 78 and he was afebrile and O2 saturation 95% on nasal cannula    Hemoglobin was 5.6 on admission and he was transfused. WBCs were 8.7 with a hematocrit of 18.7 and platelets 571, albumin 2.7, troponin 153-169, potassium 5.2 and BUN and creatinine 89 and 2.9, calcium 8.3. There were no prior labs for comparison. CT of the head showed diffuse atrophy and CTA of the neck showed a 90% stenosis proximal internal carotid, right and left. CT brain perfusion showed no significant ischemic penumbra. CTA of the head with contrast.  Chest x-ray on admission showed pulmonary opacities favoring edema and atelectasis and small pleural effusions    EKG this morning shows sinus rhythm with no acute ST-T wave changes. 2 d echo is pending as well as MRI of the head. .  Chest x-ray this morning shows trace bilateral pleural effusions and airspace disease. Past medical history  Smoker hx and quit 25 years ago  COPD  obese  Hypertension  Hyperlipidemia  Diabetes, insulin requiring  Depression  Parkinson's disease  MI, details unknown and records are pending  Peripheral vascular disease, PAD with a femoral artery stent 12/22    <div>74year-old white male with chronic ulceration of the right lower extremity who in December underwent right SFA angioplasty stent and popliteal artery arthrectomy and peroneal angioplasty then had a thrombosis and underwent thrombolysis with right tibioperoneal trunk arthrectomy and right peroneal angioplasty stent is anterior tibial goes down to the foot the ulceration has deteriorated in his right leg and he had an arterial duplex ultrasound performed which shows new high-grade stenosis proximal to the right SFA stent and he now presents for urgent arteriogram for limb salvage</div>     Chronic back pain and neck injury and uses opioids  Ambulates with a cane  CVA X 3 with right-sided weakness  Obstructive sleep apnea and noncompliant with CPAP  COVID-19 infection in 2020  Chronic kidney disease      Medications Prior to admit:  Prior to Admission medications    Medication Sig Start Date End Date Taking?  Authorizing Provider   apixaban (ELIQUIS) 5 MG TABS tablet Take 5 mg by mouth 2 times daily   Yes Historical Provider, MD   aspirin 81 MG chewable tablet Take 81 mg by mouth daily   Yes Historical Provider, MD   carbidopa-levodopa (SINEMET)  MG per tablet Take 1 tablet by mouth 3 times daily   Yes Historical Provider, MD carvedilol (COREG) 25 MG tablet Take 25 mg by mouth 2 times daily (with meals)   Yes Historical Provider, MD   cetirizine (ZYRTEC) 10 MG tablet Take 10 mg by mouth daily   Yes Historical Provider, MD   clopidogrel (PLAVIX) 75 MG tablet Take 75 mg by mouth daily   Yes Historical Provider, MD   diphenhydrAMINE (BENADRYL) 25 MG capsule Take 25 mg by mouth every 6 hours   Yes Historical Provider, MD   ezetimibe (ZETIA) 10 MG tablet Take 10 mg by mouth daily   Yes Historical Provider, MD   fluticasone (FLONASE) 50 MCG/ACT nasal spray 2 sprays by Each Nostril route daily   Yes Historical Provider, MD   gabapentin (NEURONTIN) 600 MG tablet Take 600 mg by mouth 4 times daily. Yes Historical Provider, MD   HYDROcodone-acetaminophen (NORCO) 5-325 MG per tablet Take 1 tablet by mouth 2 times daily. Yes Historical Provider, MD   Insulin Glargine, 2 Unit Dial, (TOUJEO MAX SOLOSTAR) 300 UNIT/ML SOPN Inject 66 Units into the skin daily   Yes Historical Provider, MD   piperacillin-tazobactam (ZOSYN) 3-0.375 GM per 50ML IVPB extended infusion Infuse 4.5 mg intravenously in the morning and 4.5 mg at noon and 4.5 mg in the evening. Yes Historical Provider, MD   acetaminophen (TYLENOL) 325 MG tablet Take 650 mg by mouth every 6 hours as needed for Pain   Yes Historical Provider, MD   amLODIPine (NORVASC) 5 MG tablet Take 5 mg by mouth daily   Yes Historical Provider, MD   benzonatate (TESSALON) 100 MG capsule Take 100 mg by mouth 3 times daily   Yes Historical Provider, MD   cloNIDine (CATAPRES) 0.1 MG tablet Take 0.1 mg by mouth in the morning and 0.1 mg in the evening.    Yes Historical Provider, MD   hydrALAZINE (APRESOLINE) 100 MG tablet Take 100 mg by mouth 3 times daily   Yes Historical Provider, MD       Current Medications:    Current Facility-Administered Medications: 0.9 % sodium chloride infusion, , IntraVENous, PRN  sennosides (SENOKOT) 8.8 MG/5ML syrup 5 mL, 5 mL, Oral, Nightly  labetalol (NORMODYNE;TRANDATE) injection 10 mg, 10 mg, IntraVENous, Q30 Min PRN  hydrALAZINE (APRESOLINE) injection 10 mg, 10 mg, IntraVENous, Q30 Min PRN  glucose chewable tablet 16 g, 4 tablet, Oral, PRN  dextrose bolus 10% 125 mL, 125 mL, IntraVENous, PRN **OR** dextrose bolus 10% 250 mL, 250 mL, IntraVENous, PRN  glucagon (rDNA) injection 1 mg, 1 mg, SubCUTAneous, PRN  dextrose 10 % infusion, , IntraVENous, Continuous PRN  insulin lispro (HUMALOG) injection vial 0-16 Units, 0-16 Units, SubCUTAneous, Q4H  carbidopa-levodopa (SINEMET)  MG per tablet 1 tablet, 1 tablet, Per NG tube, TID  amLODIPine (NORVASC) tablet 5 mg, 5 mg, Per NG tube, Daily  ipratropium-albuterol (DUONEB) nebulizer solution 1 ampule, 1 ampule, Inhalation, Q4H WA  collagenase ointment, , Topical, Q MWF  [START ON 9/29/2022] ticagrelor (BRILINTA) tablet 90 mg, 90 mg, Oral, BID  ondansetron (ZOFRAN-ODT) disintegrating tablet 4 mg, 4 mg, Oral, Q8H PRN **OR** ondansetron (ZOFRAN) injection 4 mg, 4 mg, IntraVENous, Q6H PRN  aspirin EC tablet 81 mg, 81 mg, Oral, Daily **OR** aspirin suppository 300 mg, 300 mg, Rectal, Daily  atorvastatin (LIPITOR) tablet 80 mg, 80 mg, Oral, Nightly  polyethylene glycol (GLYCOLAX) packet 17 g, 17 g, Oral, Daily  chlorhexidine (PERIDEX) 0.12 % solution 15 mL, 15 mL, Mouth/Throat, BID  famotidine (PEPCID) 20 mg in sodium chloride (PF) 10 mL injection, 20 mg, IntraVENous, Daily  polyvinyl alcohol (LIQUIFILM TEARS) 1.4 % ophthalmic solution 1 drop, 1 drop, Both Eyes, Q4H **AND** lubrifresh P.M. (artificial tears) ophthalmic ointment, , Both Eyes, Q4H  fentaNYL (SUBLIMAZE) injection 100 mcg, 100 mcg, IntraVENous, Q1H PRN  propofol injection, 5-50 mcg/kg/min, IntraVENous, Continuous    Allergies:  Codeine    Social History: History of tobacco abuse but quit 25 years ago and occasional alcohol and no illicit drugs and has a partner,       Family History:   History reviewed. No pertinent family history.     REVIEW OF SYSTEMS: Unable  PHYSICAL EXAM:   BP (!) 136/49   Pulse 66   Temp 97.8 °F (36.6 °C) (Temporal)   Resp 18   Wt 260 lb 8 oz (118.2 kg)   SpO2 100%   CONST:  Well developed, well nourished who appears of stated age. Awake, alert and cooperative. No apparent distress. HEENT:   Head- Normocephalic, atraumatic   Eyes- Conjunctivae pink, anicteric  Throat- Oral mucosa pink and moist, orally intubated and orogastric tube  Neck-  No stridor, trachea midline, no jugular venous distention. No carotid bruit. CHEST: Chest symmetrical and non-tender to palpation. No accessory muscle use or intercostal retractions  RESPIRATORY: Lung sounds - clear throughout fields   CARDIOVASCULAR:     Heart Inspection- shows no noted pulsations  Heart Palpation- no heaves or thrills; PMI is non-displaced   Heart Ausculation- Regular rate and rhythm, no murmur. No s3, s4 or rub   PV: No lower extremity edema extremities but upper extremities swollen worse on the right in the low no varicosities. Pedal pulses palpable, no clubbing or cyanosis with dressing to right foot and Ace wrap  ABDOMEN: Soft, non-tender to light palpation. Bowel sounds present. No palpable masses no organomegaly; no abdominal bruit  MS:  No atrophy or abnormal movements.    :   clear yellow urine Mahan catheter  SKIN: Warm and dry no statis dermatitis or ulcers   NEURO / PSYCH: Sedated    DATA:    ECG / Tele strips: Sinus rhythm  Diagnostic:      Intake/Output Summary (Last 24 hours) at 9/28/2022 1249  Last data filed at 9/28/2022 0930  Gross per 24 hour   Intake 3528.31 ml   Output 1725 ml   Net 1803.31 ml       Labs:   CBC:   Recent Labs     09/27/22  2256 09/28/22  0305 09/28/22  0445   WBC 9.8  --  9.4   HGB 6.3* 7.5* 7.6*   HCT 20.1* 23.6* 23.3*     --  274     BMP:   Recent Labs     09/27/22  1312 09/27/22  2058 09/28/22  0445     --  144   K 5.2* 4.5 4.7   CO2 24  --  21*   BUN 89*  --  83*   CREATININE 2.9* 2.8* 2.7*   LABGLOM 22  --  24   CALCIUM 8.3* --  8.1*     Mag:   Recent Labs     09/28/22  0445   MG 2.4     Phos:   Recent Labs     09/28/22  0445   PHOS 3.5     TFT: No results found for: TSH, K7JSLWJ, O3WIOES, THYROIDAB, FT3, T4FREE   HgA1c:   Lab Results   Component Value Date/Time    LABA1C 6.4 09/28/2022 04:45 AM     No results found for: EAG  proBNP: No results found for: PROBNP  PT/INR:   Recent Labs     09/27/22  1312   PROTIME 23.2*   INR 2.1     APTT:No results for input(s): APTT in the last 72 hours. TROPONIN:  Lab Results   Component Value Date/Time    TROPHS 169 09/28/2022 08:40 AM    TROPHS 153 09/28/2022 12:10 AM     CK:No results found for: CKTOTAL  FASTING LIPID PANEL:No results found for: CHOL, HDL, LDLDIRECT, LDLCALC, TRIG  LIVER PROFILE:  Recent Labs     09/27/22  1312 09/28/22  0445   AST 11 12   ALT <5 <5   LABALBU 2.7* 2.8*     Impressions discussed with   Elevated troponin, troponin leak in the setting of hypoxic respiratory failure, severe anemia, strokelike symptoms.   There is no acute coronary syndrome with t high-sensitivity Troponins 153-169  Acute strokelike symptoms with right-sided paralysis and aphasia and left ICA stenosis and status post left ICA angioplasty and stent  Anemia  Hypokalemia  Hypoalbuminemia  PAD of the lower extremities and was on oral anticoagulation triple therapy, now Eliquis has been stopped and he is on Brilinta, history of right SFA angioplasty stent and popliteal artery arthrectomy and peroneal angioplasty and stent status post thrombosis and had thrombolysis of right tibioperoneal trunk atherectomy and right peroneal angioplasty stent with vascular studies right lower extremity showing new high-grade stenosis proximal to the right SFA stents  Renal insufficiency  Diabetes which is insulin requiring  Diabetic foot ulcer right lower extremity status post wound VAC  History of tobacco abuse and COPD  Obesity  Parkinson's disease  Questionable MI history  Chronic back pain  History of 3 prior CVAs  History of obstructive sleep apnea noncompliant with CPAP  Renal insufficiency      Plans  Recommend anemia work-up, defer to primary care and then consider an ischemic evaluation  CVA symptoms per NEUROLOGY  Obtain records from primary cardiologist  Continue aspirin and statin    Electronically signed by CONSTANTINE Mace CNP on 9/28/2022 at 12:49 PM      Patient seen and examined case discussed in detail with cardiology nurse practitioner and the ICU health care providers. Troponin leak likely due to significant anemia. Please proceed with anemia work-up and once that work-up is complete we will discuss further ischemic evaluation. Neurologic and peripheral vascular disease management per neurology and vascular surgery.

## 2022-09-28 NOTE — PROGRESS NOTES
[facility]  SURGICAL INTENSIVE CARE UNIT (SICU)  ATTENDING PHYSICIAN CRITICAL CARE PROGRESS NOTE     I have examined the patient, reviewed the record, and discussed the case with the APN/ resident. Please refer to the APN/ resident's note. I agree with the assessment and plan. I have reviewed all relevant labs and imaging data. The following summarizes my clinical findings and independent assessment.     CC:  critical care management for right hemiplegia and aphasia    Hospital Course/Overnight Events:  9/27--presented to OSH with right sided weakness and aphasia; transferred here for definitive care; found to have left ICA occlusion and underwent IR angioplasty and stent placement; at end of procedure pt developed bradycardia and hypotension and required re-intubation  9/28--completed integrelin; started on ASA/Brilinta    Intubated; sedated  Eyes to voice  Not following commands  Hrt:  regular rate/rhythm; no murmur  Lungs:  fairly clear bilaterally  Abd:  soft; BS active; obese; non-tender  Skin:  warm/dry  Ext:  wound VAC/dressing to right foot wound    Labs personally reviewed  Films personally reviewed/interpreted--bilateral infiltrates on CXR    Patient Active Problem List    Diagnosis Date Noted    Acute respiratory failure with hypoxia ([facility] 09/28/2022    Acute cerebrovascular accident (CVA) ([facility] 09/27/2022     Acute ischemic stroke--monitor neuro exam; MRI pending  S/p left ICA angioplasty/stent placement--cont ASA/Brilinta  [facility]; attempt spont breathing trial  Acute on chronic kidney disease--monitor BUN/Cr/UO  Hypoalbuminemia--start TF if unable to extubate soon  Anemia--unspecified chronicity--transfused yesterday--monitor H/H  Chronic foot ulcer--wound VAC in place--Podiatry consulted  DVT risk--PCDs/ASA/Brilinta    Pt is at risk for neurologic/metabolic/hemodynamic/respiratory deterioration for which I am actively managing and requires ongoing ICU care    Baylee Juares MD, [facility]  9/28/2022  2:59 PM    Critical care time exclusive of teaching and procedures = 40 minutes

## 2022-09-28 NOTE — PROGRESS NOTES
Perfect served Bryn Del Angel NP covering for Dr. Cheryl Cordoba due to new consult. elevated trop, extensive vascular disease. Message read.

## 2022-09-28 NOTE — FLOWSHEET NOTE
Inpatient Wound Care(initial evaluation)  [id number]    Admit Date: 9/27/2022  5:45 PM    Reason for consult:  wound vac management    Wound history:  admitted with wound   Follows podiatry with wound vac management    Findings:     09/28/22 1040   Skin Integumentary    Skin Integrity   (dry flaky thick skin)   Location feet   Skin Integrity Site 2   Skin Integrity Location 2 Vascular discoloration   Location 2 BLE   Wound 09/28/22 Foot Right;Plantar;Lateral   Date First Assessed/Time First Assessed: 09/28/22 1040   Present on Hospital Admission: No  Location: Foot  Wound Location Orientation: Right;Plantar;Lateral   Wound Image    Dressing Status New dressing applied   Wound Cleansed Cleansed with saline   Dressing/Treatment Negative pressure wound therapy   Wound Length (cm) 4.4 cm   Wound Width (cm) 4.4 cm   Wound Depth (cm) 2 cm   Wound Surface Area (cm^2) 19.36 cm^2   Wound Volume (cm^3) 38.72 cm^3   Wound Assessment Slough;Pink/red   Drainage Amount Scant   Drainage Description Serosanguinous   Odor None   Emily-wound Assessment   (calloused)   Negative Pressure Wound Therapy Foot Right;Plantar;Lateral   Placement Date/Time: 09/28/22 1040   Location: Foot  Wound Location Orientation: Right;Plantar;Lateral   $ Standard NPWT <=50 sq cm PER TX $ Yes   Dressing Type Black Foam   Number of pieces used 3   Cycle Continuous   Target Pressure (mmHg) 125   Canister changed?   (new)   Dressing Changed Changed/New   Dressing Change Due 09/30/22   Intubated with vent support at this time  2 point restraint    **Informed Consent**    photos taken of wound and inserted into their chart as part of their permanent medical record for purposes of documentation, treatment management and/or medical review. All Images taken on 9/28/22 of patient name: Meghan Rodriguez were transmitted and stored on [employer] located within [facility] by a registered [employer] Mobile Application Device.       Plan:  Wound vac applied  Will need continued preventative care  Dietary consult      Claudine Watters RN 9/28/2022 12:27 PM

## 2022-09-28 NOTE — PROGRESS NOTES
Received permission to use OG tube from Dr. Robi Valerio. Also addressed PO medication administration. Would like aspirin and brilinta held until Integrilin administration is complete.

## 2022-09-28 NOTE — PROGRESS NOTES
Talked to care giver Zach Desai and patient had contrast with arteriogram 2 to 3 weeks ago and developed a rash from that. Zaida Beckett NP made aware.

## 2022-09-28 NOTE — PROGRESS NOTES
Consent for blood transfusion present on procedural consent,ok to use for 24 hours post op. Patient originally consented to procedure.

## 2022-09-28 NOTE — PROGRESS NOTES
Physical Therapy  Physical Therapy Attempt    Name: Gume Allan  : 1955  MRN: [id number]      Date of Service: 2022  Chart reviewed. Spoke with NP - pt is not appropriate for skilled PT at this time. Will re-attempt as able.     Dinorah Bowden PT, DPT  [id number]

## 2022-09-28 NOTE — PROGRESS NOTES
Spoke to Dr. Иван Rogers regarding going to CT, Propofol infusing but increasing it makes BP soft. Order for versed received. 2mg total ordered, would like 1mg to be given at a time to ensure patient is not over sedated. See MAR.

## 2022-09-28 NOTE — PROGRESS NOTES
Wean parameters done    VT= 337 mls  F= 16 B/M  RSBI= 50-60 l/m  NIF= -46 cmH2O  VC= n/a    Cuff leak present

## 2022-09-28 NOTE — PROGRESS NOTES
Attempted to release soft wrist restraints and patient attempted to pull out ETT. Soft wrist restraints reapplied.

## 2022-09-28 NOTE — CONSULTS
Surgical Intensive Care Unit  Consult Note    Date of admission:  9/27/2022    Reason for ICU transfer:  Acute hypoxemic respiratory failure    Subjective:  Pt is a 74year old male with no significant PMHx who presented from [facility] with stroke-like symptoms of right sided paralysis and aphasia. He was found to have severe left ICA stenosis and was taken to IR for L ICA angioplasty with stent placement. He is on ASA/Plavix. He is intubated and sedated on Propofol. Hospital Course:  527: 74year old male transferred from [facility] with acute stroke like symptoms of right sided paralysis and aphasia. Taken to IR for L ICA angioplasty and stent    Physical Exam:  BP (!) 136/53   Pulse 68   Temp 97 °F (36.1 °C)   Resp 18   Wt 260 lb 8 oz (118.2 kg)   SpO2 100%     CONSTITUTIONAL:  Intubated, sedated  EYES:  Pupils round, equal, and reactive  NECK:  Trachea midline  LUNGS:  On mechanical ventilation  CARDIOVASCULAR:  RR and normotensive  ABDOMEN:  Soft, no grimace to palpation    ASSESSMENT / PLAN:  Neuro: Acute stroke like symptoms s/p L ICA angioplasty and stent, monitor neuro status, continue Integrilin, ASA/Brilinta and Lipitor, Neuro recs pending, CT head and MRI brain pending  CV: Severe L ICA stenosis s/p IR angioplasty and stent placement, maintain -160, continue Integrilin, ASA/Brilinta and Lipitor. Bradycardia and hypotension, likely secondary to stimulation of carotid sinus, continue supportive care  Pulm: Acute hypoxemic respiratory failure, on mechanical ventilation, wean as able  GI:  No acute issues  Renal: LARY, continue IVFs, monitor UOP and Cr  ID:  No acute issues  Endo: No acute issues, maintain glucose <180  MSK: No acute issues, PT/OT as able  Heme: Acute blood loss anemia, given 2u PRBC, monitor CBC Q6    Bowel regime: Glycolax, Senokot  Pain control/Sedation: Propofol, Fentanyl  DVT prophylaxis: SCDs, Integrilin, ASA/Brilinta   GI: Pepcid  Glucose protocol:  None  Mouth/Eye care:  As needed   Mahan: Keep in place for critical care monitoring of fluid balance.   CVC sites: L radial A-line Day #1  Ancillary consults: IR, IM, Neuro  Family Update: As available     Code status:   Full Code    Electronically signed by Robina Le MD on 9/27/2022 at 10:03 PM

## 2022-09-28 NOTE — PROGRESS NOTES
Comprehensive Nutrition Assessment    Type and Reason for Visit:  Initial, Consult (TF O&M)    Nutrition Recommendations/Plan:   Continue NPO. Will Start EN and monitor. TF Recommendations:  Peptide Based High Protein (Vital HP) @ 55ml/hr (Goal) Continuous x 24hr/d= 1320ml TV, 1320kcal, 116gm Pro, 1104ml freewater. Flush per critical care mgmt. TF Recommendations w/ Current Propofol would meet 100% kcal/protein needs at this time. Malnutrition Assessment:  Malnutrition Status: At risk for malnutrition (Comment) (09/28/22 1409)    Context:  Acute Illness     Findings of the 6 clinical characteristics of malnutrition:  Energy Intake:  Mild decrease in energy intake (Comment) (since adm)  Weight Loss:  Unable to assess (2/2 poor EMR wt hx pta)     Body Fat Loss:  No significant body fat loss     Muscle Mass Loss:  No significant muscle mass loss    Fluid Accumulation:  Unable to assess     Strength:  Not Performed    Nutrition Assessment:    Pt adm/trans from [facility] (9/19-9/27 for DMFU, s/p debride?/Vac placement 9/23) 2/2 Rt sided ataxia w/ aphasia x ~1d pta. PMHx previous CVA, CAD/NSTEMI, DM, PVD, Chronic DM Rt Foot Ulcer, CKD, MDD, Parkinsons, Sz Ds; Adm w/ CVA/ARF, s/p Lt Angio w/ stent 9/27, +post-op hypotension resulting in re-intubation/ICU care. Pt at nutritional risk d/t ongoing NPO/Vent support 2/2 CVA as well as w/ increased needs for wound healing. Will Start EN and monitor.     Nutrition Related Findings:    intubated/sedated, MAP 78, no pressors, Abd/BS WDL, +OG clamped, +1/2 edema, +I/O's Wound Type: Diabetic Ulcer, Wound Vac       Current Nutrition Intake & Therapies:    Average Meal Intake: NPO  Average Supplements Intake: NPO  Diet NPO  Additional Calorie Sources:  Propofol @ 17.7ml/hr= 467kcal additional lipid calories daily    Anthropometric Measures:  Height: 5' 8\" (172.7 cm)  Ideal Body Weight (IBW): 154 lbs (70 kg)    Admission Body Weight: 260 lb (117.9 kg) (bed 9/27)  Current Body Weight: 260 lb (117.9 kg) (bed 9/27), 168.8 % IBW. Weight Source: Bed Scale  Current BMI (kg/m2): 39.5  Usual Body Weight:  (UTO UBW 2/2 poor EMR wt hx pta)     Weight Adjustment For: No Adjustment                 BMI Categories: Obese Class 2 (BMI 35.0 -39.9)    Estimated Daily Nutrient Needs:  Energy Requirements Based On: Formula  Weight Used for Energy Requirements: Current  Energy (kcal/day): ;   Weight Used for Protein Requirements: Ideal  Protein (g/day): 1.5-1.8gm/kg IBW= 105-125; as tolerated w/ CKD and increased needs  Method Used for Fluid Requirements: Other (Comment)  Fluid (ml/day): per critical care mgmt    Nutrition Diagnosis:   Inadequate oral intake related to impaired respiratory function as evidenced by NPO or clear liquid status due to medical condition, intubation    Nutrition Interventions:   Food and/or Nutrient Delivery: Continue NPO, Start Tube Feeding (Continue NPO. Will Start EN and monitor. TF Recommendations:  Peptide Based High Protein (Vital HP) @ 55ml/hr (Goal) Continuous x 24hr/d= 1320ml TV, 1320kcal, 116gm Pro, 1104ml freewater. Flush per critical care mgmt.)  Nutrition Education/Counseling: Education not appropriate  Coordination of Nutrition Care: Continue to monitor while inpatient       Goals:     Goals: Initiate nutrition support, Tolerate nutrition support at goal rate       Nutrition Monitoring and Evaluation:   Behavioral-Environmental Outcomes: None Identified  Food/Nutrient Intake Outcomes: Enteral Nutrition Intake/Tolerance  Physical Signs/Symptoms Outcomes: Biochemical Data, GI Status, Fluid Status or Edema, Hemodynamic Status, Nutrition Focused Physical Findings, Skin, Weight    Discharge Planning:     Too soon to determine     Radha Mandujano RD, LD  Contact: ext [phone]

## 2022-09-28 NOTE — PROGRESS NOTES
This patient is on medication that requires renal, weight, and/or indication dose adjustment. Date Body Weight IBW  Adjusted BW SCr  CrCl Dialysis status   9/27/2022 260 lb 8 oz (118.2 kg) Patient height not recorded Creatinine clearance cannot be calculated (Unknown ideal weight.) N/a       Pharmacy has dose-adjusted the following medication(s):    Date Previous Order Adjusted Order   9/27/2022 Famotidine 20mg IV BID Famotidine 20mg IV q24h       These changes were made per protocol according to the [address] for Pharmacists. *Please note this dose may need readjusted if patient's condition changes. Please contact pharmacy with any questions regarding these changes.     Ana Luisa Radford PharmD, [facility], BCPS 9/27/2022 10:05 PM

## 2022-09-28 NOTE — PROGRESS NOTES
OCCUPATIONAL THERAPY    Date:2022  Patient Name: Isac Nguyen  MRN: [id number]  : 1955  Room: [address] [place] [address]              Chart reviewed. Pt not appropriate for therapy at this time. Will re-attempt at later time. Thank you for consult.     Joey El, OTR/L 1640

## 2022-09-28 NOTE — PLAN OF CARE
Problem: Skin/Tissue Integrity  Goal: Absence of new skin breakdown  Description: 1. Monitor for areas of redness and/or skin breakdown  2. Assess vascular access sites hourly  3. Every 4-6 hours minimum:  Change oxygen saturation probe site  4. Every 4-6 hours:  If on nasal continuous positive airway pressure, respiratory therapy assess nares and determine need for appliance change or resting period.   Outcome: Progressing     Problem: Safety - Adult  Goal: Free from fall injury  Outcome: Progressing     Problem: ABCDS Injury Assessment  Goal: Absence of physical injury  9/28/2022 0940 by Julieanne Brunner, RN  Outcome: Progressing  9/27/2022 2330 by Wilma Jaimes RN  Outcome: Progressing  Flowsheets (Taken 9/27/2022 2330)  Absence of Physical Injury: Implement safety measures based on patient assessment     Problem: Chronic Conditions and Co-morbidities  Goal: Patient's chronic conditions and co-morbidity symptoms are monitored and maintained or improved  Outcome: Progressing     Problem: Neurosensory - Adult  Goal: Achieves stable or improved neurological status  Outcome: Progressing  Goal: Achieves maximal functionality and self care  Outcome: Progressing     Problem: Respiratory - Adult  Goal: Achieves optimal ventilation and oxygenation  9/28/2022 0940 by Julieanne Brunner, RN  Outcome: Progressing  9/27/2022 2330 by Wilma Jaimes RN  Outcome: Progressing  Flowsheets (Taken 9/27/2022 2330)  Achieves optimal ventilation and oxygenation:   Assess for changes in respiratory status   Assess for changes in mentation and behavior   Oxygen supplementation based on oxygen saturation or arterial blood gases   Position to facilitate oxygenation and minimize respiratory effort   Assess the need for suctioning and aspirate as needed   Assess and instruct to report shortness of breath or any respiratory difficulty   Respiratory therapy support as indicated     Problem: Cardiovascular - Adult  Goal: Maintains optimal cardiac

## 2022-09-28 NOTE — PLAN OF CARE
Problem: Respiratory - Adult  Goal: Achieves optimal ventilation and oxygenation  9/28/2022 1446 by Ladene Ormond, RCP  Outcome: Progressing

## 2022-09-28 NOTE — PROGRESS NOTES
[facility] [address]  Neurology Consult    Date:  9/28/2022  Patient Name:  Rodrigo Bowden  YOB: 1955  MRN: [id number]     PCP:  Meenu Phipps MD   Referring:  Ramon Garner DO      Chief Complaint: stroke     History obtained from: family member at bedside, chart     Assessment  LMCA stroke 2/2 symptomatic LICA stenosis s/p IR angioplasty and stent placement     Significant vascular history with prior hx of stroke, MI, PVD    Hx of PD   On sinemet     Plan  Continue ASA and Brilinta   Statin therapy with goal LDL < 70  Risk factor modification   Follow up MRI brain   F/u echocardiogram   PT/OT/speech evals when able   Will follow     History of Present Illness:  Rodrigo Bowden is a 74 y.o. male presenting for evaluation of stroke . PMH significant for prior stroke, parkinson's disease, prior MI, PVD, MATT, DM, CKD. He presented with R side weakness and aphasia. Imaging revealed significant LICA stenosis. He underwent IR angioplasty with stent placement. Repeat CT head stable. MRI brain planned for 2pm today. Echo pending. Family at bedside. She does report he has R side weakness from prior stroke. Review of Systems:  ROS unable 2/2 intubation/sedation     Medical History:   Past Medical History:   Diagnosis Date    Chronic back pain     CKD (chronic kidney disease)     CVA (cerebral vascular accident) ([address]     CVA (cerebral vascular accident) ([address]     DM (diabetes mellitus) ([address]     Major depression     MI (myocardial infarction) ([address]     MATT (obstructive sleep apnea)     Parkinson disease ([address]     PVD (peripheral vascular disease) ([address]     Seizure ([address]         Surgical History:   Past Surgical History:   Procedure Laterality Date    FEMORAL ARTERY STENT      IR CAROTID STENT UNI W PROTECTION  9/27/2022    IR CAROTID STENT UNI W PROTECTION 9/27/2022 Lisa Perez MD [facility]        Family History:   History reviewed.  No pertinent family history.     Current Medications:      Current Facility-Administered Medications   Medication Dose Route Frequency Provider Last Rate Last Admin    0.9 % sodium chloride infusion   IntraVENous PRN Deidra Moffett MD        sennosides (SENOKOT) 8.8 MG/5ML syrup 5 mL  5 mL Oral Nightly Estle Cleveland, DO   5 mL at 09/28/22 0228    labetalol (NORMODYNE;TRANDATE) injection 10 mg  10 mg IntraVENous Q30 Min PRN Deidar Moffett MD        hydrALAZINE (APRESOLINE) injection 10 mg  10 mg IntraVENous Q30 Min PRN Deidra Moffett MD   10 mg at 09/28/22 0316    glucose chewable tablet 16 g  4 tablet Oral PRN Carlos Yordan, APRN - CNP        dextrose bolus 10% 125 mL  125 mL IntraVENous PRN Carlos Yordan, APRN - CNP        Or    dextrose bolus 10% 250 mL  250 mL IntraVENous PRN Carlos Yordan, APRN - CNP        glucagon (rDNA) injection 1 mg  1 mg SubCUTAneous PRN Carlos Yordan, APRN - CNP        dextrose 10 % infusion   IntraVENous Continuous PRN Carlos Yordan, APRN - CNP        insulin lispro (HUMALOG) injection vial 0-16 Units  0-16 Units SubCUTAneous Q4H Carlos Yordan, APRN - CNP        carbidopa-levodopa (SINEMET)  MG per tablet 1 tablet  1 tablet Per NG tube TID Carlos Yordan, APRN - CNP        amLODIPine (NORVASC) tablet 5 mg  5 mg Per NG tube Daily Carlos Yordan, APRN - CNP   5 mg at 09/28/22 1045    ipratropium-albuterol (DUONEB) nebulizer solution 1 ampule  1 ampule Inhalation Q4H [address], APRN - CNP   1 ampule at 09/28/22 1249    collagenase ointment   Topical Q MWF Rukhsana Molina DPM        [START ON 9/29/2022] ticagrelor (BRILINTA) tablet 90 mg  90 mg Oral BID Deidra Moffett MD        ondansetron (ZOFRAN-ODT) disintegrating tablet 4 mg  4 mg Oral Q8H PRN Deidra Moffett MD        Or    ondansetron [facility]) injection 4 mg  4 mg IntraVENous Q6H PRN Deidra Moffett MD        aspirin EC tablet 81 mg  81 mg Oral Daily Deidra Moffett MD        Or    aspirin suppository 300 mg  300 mg Rectal Daily Deidra Moffett MD   300 mg at 09/28/22 0821    atorvastatin (LIPITOR) tablet 80 mg  80 mg Oral Nightly Mary Ward MD   80 mg at 09/28/22 0136    polyethylene glycol (GLYCOLAX) packet 17 g  17 g Oral Daily Mary Ward MD   17 g at 09/28/22 0926    chlorhexidine (PERIDEX) 0.12 % solution 15 mL  15 mL Mouth/Throat BID Mary Ward MD   15 mL at 09/28/22 0925    famotidine (PEPCID) 20 mg in sodium chloride (PF) 10 mL injection  20 mg IntraVENous Daily Mary Ward MD   20 mg at 09/28/22 0925    polyvinyl alcohol (LIQUIFILM TEARS) 1.4 % ophthalmic solution 1 drop  1 drop Both Eyes Q4H Mary Ward MD   1 drop at 09/28/22 1100    And    lubrifresh P.M. (artificial tears) ophthalmic ointment   Both Eyes Q4H Mary Ward MD   Given at 09/28/22 1235    fentaNYL (SUBLIMAZE) injection 100 mcg  100 mcg IntraVENous Q1H PRN Mary Ward MD   100 mcg at 09/28/22 0322    propofol injection  5-50 mcg/kg/min IntraVENous Continuous Mary Ward MD 17.7 mL/hr at 09/28/22 0902 25 mcg/kg/min at 09/28/22 0902        Allergies: Allergies   Allergen Reactions    Codeine Other (See Comments)     blisters        Physical Examination  Vitals   Vitals:    09/28/22 1045 09/28/22 1249 09/28/22 1259 09/28/22 1315   BP: (!) 136/49      Pulse:  65 65    Resp:       Temp:       TempSrc:       SpO2:  100% 100%    Weight:       Height:    5' 8\" (1.727 m)        General: Patient appears in no acute distress. Awake  HEENT: Normocephalic, atraumatic  Chest: Chest rise symmetrical on mechanical ventilation. Breathing over vent   Heart: NSR on tele   Extremities/Peripheral vascular: L foot wrapped due to wound. Severe PVD. B/l wrist restraints     Neurologic Examination    Mental Status  Alert, Intubated and does follow simple commands on propofol. Cranial Nerves  II. Visual fields b/l threat   III, IV, VI: Pupils equally round and reactive to light, 3 to 2 mm bilaterally. EOMs: full, no nystagmus.    V. Facial sensation intact to light touch bilaterally  VII: Facial movements appear symmetric around ETT  VIII: Hearing intact to voice  IX,X: Palate elevates symmetrically. XI: Sternocleidomastoid and trapezius 5/5 bilaterally   XII: Tongue is midline    Motor    R hemiparesis   5/5 on L   Normal bulk   No abnormal movements     Sensation  Responds to pain in all limbs     Reflexes    No babinski L, not tested R (foot wrapped)     Coordination  No resting tremors observed     Gait  Deferred for safety/fall consideration      Labs  Recent Labs     09/28/22  0010 09/28/22  0305 09/28/22  0445 09/28/22  0519   NA  --   --  144  --    K  --   --  4.7  --    CL  --   --  111*  --    CO2  --   --  21*  --    BUN  --   --  83*  --    CREATININE  --   --  2.7*  --    GLUCOSE  --   --  107*  --    CALCIUM  --   --  8.1*  --    PROT  --   --  5.2*  --    LABALBU  --   --  2.8*  --    BILITOT  --   --  0.4  --    ALKPHOS  --   --  144*  --    AST  --   --  12  --    ALT  --   --  <5  --    WBC  --   --  9.4  --    RBC  --   --  2.60*  --    HGB  --    < > 7.6*  --    HCT  --    < > 23.3*  --    MCV  --   --  89.6  --    MCH  --   --  29.2  --    MCHC  --   --  32.6  --    RDW  --   --  15.1*  --    PLT  --   --  274  --    MPV  --   --  9.7  --    PH  --   --   --  7.380   PO2  --   --   --  158.3*   PCO2  --   --   --  35.3   HCO3  --   --   --  20.4*   BE  --   --   --  -4.2*   O2SAT  --   --   --  98.2   LACTA 0.7  --   --   --    LABA1C  --   --  6.4*  --     < > = values in this interval not displayed. No results found for: LDLCALC, LDLCHOLESTEROL, LDLDIRECT    Imaging  XR CHEST PORTABLE   Final Result   1. Multifocal bilateral airspace disease more prominent within the lower   lobes. The airspace disease is unchanged when compared with the prior study. CT HEAD WO CONTRAST   Final Result   Diffuse atrophy likely age related   Findings compatible with small vessel ischemic changes. XR CHEST PORTABLE   Final Result   1.  Pulmonary opacities present favoring edema and atelectasis, also small   pleural effusions, but nonspecific with some additional considerations noted   above   2. Support devices present as described above   3. Heart size appears borderline enlarged         XR ABDOMEN FOR NG/OG/NE TUBE PLACEMENT   Final Result   NG/OG is in the stomach. IR CAROTID STENT W PROTECTION   Final Result   1. Angiogram demonstrates successful placement of a carotid stent ,   post procedure images demonstrate a widely patent stent and internal   carotid         CT HEAD WO CONTRAST   Final Result   Diffuse atrophy likely age related   Findings compatible with small vessel ischemic changes. Findings were called at the time of dictation         CT BRAIN PERFUSION   Final Result      No significant ischemic penumbra identified      This study was analyzed by the [employer] algorithm. CTA NECK W CONTRAST   Final Result   1. Estimated stenosis of the proximal right and left internal carotid   artery by NASCET criteria is greater than 90% on the left   2. Severe atherosclerotic disease . 3. No large vessel occlusion identified            This study was analyzed by the [employer] algorithm. CTA HEAD W CONTRAST   Final Result   1. Estimated stenosis of the proximal right and left internal carotid   artery by NASCET criteria is greater than 90% on the left   2. Severe atherosclerotic disease . 3. No large vessel occlusion identified            This study was analyzed by the [employer] algorithm.                MRI BRAIN WO CONTRAST    (Results Pending)   XR CHEST PORTABLE    (Results Pending)         I have personally reviewed the following images: CT head     Electronically signed by RAUL Lacey on 9/28/2022 at 1:22 PM

## 2022-09-28 NOTE — PROGRESS NOTES
Hospitalist Progress Note      SYNOPSIS: Patient admitted on 2022 for Acute cerebrovascular accident (CVA) (HCC)Patient presented to [facility] with strokelike symptoms, right-sided paralysis and aphasia. Was found to have severe left ICA stenosis and taken to IR for left ICA angioplasty with stent placement. Patient was intubated and sedated and transferred to the ICU. Vital signs are within normal limits and stable. Patient is afebrile. Laboratory studies demonstrate potassium 5.2, BUN 89, creatinine 2.9, glucose 106, troponin 153, hemoglobin 5.0. Patient transfused PRBC. Neurology, cardiology, podiatry following. SUBJECTIVE:  Stable overnight. No other overnight issues reported. Patient seen and examined  Records reviewed. Remains intubated  Echo and MRI pending. Temp (24hrs), Av.6 °F (36.4 °C), Min:96.7 °F (35.9 °C), Max:98.8 °F (37.1 °C)    DIET: Diet NPO  CODE: Full Code    Intake/Output Summary (Last 24 hours) at 2022 0757  Last data filed at 2022 0700  Gross per 24 hour   Intake 3528.31 ml   Output 1545 ml   Net 1983.31 ml       Review of Systems  Unable to be obtained due to patient status      OBJECTIVE:    BP (!) 167/61   Pulse 65   Temp 97.8 °F (36.6 °C) (Temporal)   Resp 18   Wt 260 lb 8 oz (118.2 kg)   SpO2 100%     General appearance: intubated, sedated  HEENT:  Conjunctivae/corneas clear. Neck: Supple. No jugular venous distention. Respiratory: symmetrical; clear to auscultation bilaterally; no wheezes; no rhonchi; no rales  Cardiovascular: rhythm regular; rate controlled; no murmurs  Abdomen: Soft, nontender, nondistended  Extremities:  peripheral pulses present; no peripheral edema;  seebelow  Musculoskeletal: No clubbing, cyanosis, no bilateral lower extremity edema. Brisk capillary refill.    Skin:  No rashes  on visible skin  Neurologic: r hemiparesis        ASSESSMENT and PLAN:    Acute CVA- He was found to have severe left ICA stenosis and was taken to IR for L ICA angioplasty with stent placement. He is on ASA/brilinta/statin. Acute respiratory failure- requiring mechanical ventilation. Ventilator weaning per critical care. Acute blood loss anemia- transfuse for hemoglobin <7  Hyperkalemia- resolved  Acute renal failure- unknown baseline. Elevated troponin- Check EKG and repeat. Cardiology following. R foot ulcer- podiatry following. Medications:  REVIEWED DAILY    Infusion Medications    sodium chloride      dextrose      eptifibatide 0.5 mcg/kg/min (09/28/22 0650)    propofol 15 mcg/kg/min (09/28/22 0650)     Scheduled Medications    sennosides  5 mL Oral Nightly    insulin lispro  0-16 Units SubCUTAneous Q4H    ticagrelor  180 mg Oral Once    [START ON 9/29/2022] ticagrelor  90 mg Oral BID    aspirin  81 mg Oral Daily    Or    aspirin  300 mg Rectal Daily    atorvastatin  80 mg Oral Nightly    polyethylene glycol  17 g Oral Daily    chlorhexidine  15 mL Mouth/Throat BID    famotidine (PEPCID) injection  20 mg IntraVENous Daily    polyvinyl alcohol  1 drop Both Eyes Q4H    And    artificial tears   Both Eyes Q4H     PRN Meds: sodium chloride, labetalol, hydrALAZINE, glucose, dextrose bolus **OR** dextrose bolus, glucagon (rDNA), dextrose, ondansetron **OR** ondansetron, perflutren lipid microspheres, fentanNYL    Labs:     Recent Labs     09/27/22 1312 09/27/22 2058 09/27/22  2256 09/28/22  0305 09/28/22  0445   WBC 8.7  --  9.8  --  9.4   HGB 5.6*  --  6.3* 7.5* 7.6*   HCT 18.7*   < > 20.1* 23.6* 23.3*     --  272  --  274    < > = values in this interval not displayed.        Recent Labs     09/27/22 1312 09/27/22 2058 09/28/22  0445     --  144   K 5.2* 4.5 4.7   *  --  111*   CO2 24  --  21*   BUN 89*  --  83*   CREATININE 2.9* 2.8* 2.7*   CALCIUM 8.3*  --  8.1*   PHOS  --   --  3.5       Recent Labs     09/27/22  1312 09/28/22  0445   PROT 5.3* 5.2*   ALKPHOS 165* 144*   ALT <5 <5   AST 11 12   BILITOT <0.2 0.4       Recent Labs     09/27/22  1312   INR 2.1       No results for input(s): Nicol Aguirre in the last 72 hours. Chronic labs:    Lab Results   Component Value Date    INR 2.1 09/27/2022    LABA1C 6.4 (H) 09/28/2022       Radiology: REVIEWED DAILY    +++++++++++++++++++++++++++++++++++++++++++++++++  DO Emil Colón Physician - [address], [place]  +++++++++++++++++++++++++++++++++++++++++++++++++  NOTE: This report was transcribed using voice recognition software. Every effort was made to ensure accuracy; however, inadvertent computerized transcription errors may be present.

## 2022-09-28 NOTE — PLAN OF CARE
Problem: ABCDS Injury Assessment  Goal: Absence of physical injury  Outcome: Progressing  Flowsheets (Taken 9/27/2022 2330)  Absence of Physical Injury: Implement safety measures based on patient assessment     Problem: Respiratory - Adult  Goal: Achieves optimal ventilation and oxygenation  Outcome: Progressing  Flowsheets (Taken 9/27/2022 2330)  Achieves optimal ventilation and oxygenation:   Assess for changes in respiratory status   Assess for changes in mentation and behavior   Oxygen supplementation based on oxygen saturation or arterial blood gases   Position to facilitate oxygenation and minimize respiratory effort   Assess the need for suctioning and aspirate as needed   Assess and instruct to report shortness of breath or any respiratory difficulty   Respiratory therapy support as indicated     Problem: Skin/Tissue Integrity - Adult  Goal: Skin integrity remains intact  Outcome: Progressing  Flowsheets (Taken 9/27/2022 2330)  Skin Integrity Remains Intact:   Monitor for areas of redness and/or skin breakdown   Assess vascular access sites hourly   Every 4-6 hours minimum: Change oxygen saturation probe site   Every 4-6 hours: If on nasal continuous positive airway pressure, respiratory therapy assesses nares and determine need for appliance change or resting period

## 2022-09-28 NOTE — PROGRESS NOTES
Anesthesia at bedside   With patient   Patient intubated  Respiratory called for vent  Propofol pulled per Dr. Cleve Starr request  Ivs hanging connected to this patient are propofol and eptifbatide   Blood at bedside to be started stat.  Warmer obtained and blood started  Rt groin assessment C/D/I gauze with tegaderm  Iv left hand left ac and right hand   Art line left wrist connections checked leveled and waveforms appropriate   Taking over care of this patient at 2100 on  9/27/22

## 2022-09-28 NOTE — PROGRESS NOTES
Blood administration started at [address] ,     [facility]: No s/s of blood transfusion reaction. 0229: Blood transfusion complete.

## 2022-09-28 NOTE — PROGRESS NOTES
Spoke to patient's at home care taker. She lives with patient. Per her patient's POA is his father Zandra Carballo #[phone]. He is patient's foster father. Patient does have children, [place] of [place], no contact information received for them.

## 2022-09-28 NOTE — CONSULTS
Department of Podiatry   Consult Note        Reason for Consult: Foot Wound Evlauation    CHIEF COMPLAINT:  Right foot ulceration    HISTORY OF PRESENT ILLNESS:      Billy Disla is a 74 y.o. male with significant past medical history of CVA,DM,PVD, MI, Parkinson Disease. Podiatry consulted for right foot wound. Patient currently intubated and unresponsive during encounter. Right foot wound present on admission with wound vac applied. No other pedal complaints. Patient see's his podiatrist (Rica Anders) outpatient for ongoing care. He is aware patient is admitted and request wound vac application MWF to right foot wound. Past Medical History:        Diagnosis Date    Chronic back pain     CKD (chronic kidney disease)     [facility]A [facility]cerebral vascular [facility]) [facility]     CVA (cerebral vascular accident) ([facility]     DM (diabetes mellitus) ([facility]     Major depression     MI (myocardial infarction) ([facility]     MATT (obstructive sleep apnea)     Parkinson disease (HCC)     PVD (peripheral vascular disease) ([facility]     Seizure ([facility]        Past Surgical History:        Procedure Laterality Date    FEMORAL ARTERY STENT         Medications Prior to Admission:    Medications Prior to Admission: apixaban (ELIQUIS) 5 MG TABS tablet, Take 5 mg by mouth 2 times daily  aspirin 81 MG chewable tablet, Take 81 mg by mouth daily  carbidopa-levodopa (SINEMET)  MG per tablet, Take 1 tablet by mouth 3 times daily  carvedilol (COREG) 25 MG tablet, Take 25 mg by mouth 2 times daily (with meals)  cetirizine (ZYRTEC) 10 MG tablet, Take 10 mg by mouth daily  clopidogrel (PLAVIX) 75 MG tablet, Take 75 mg by mouth daily  diphenhydrAMINE (BENADRYL) 25 MG capsule, Take 25 mg by mouth every 6 hours  ezetimibe (ZETIA) 10 MG tablet, Take 10 mg by mouth daily  fluticasone (FLONASE) 50 MCG/ACT nasal spray, 2 sprays by Each Nostril route daily  gabapentin (NEURONTIN) 600 MG tablet, Take 600 mg by mouth 4 times daily.   HYDROcodone-acetaminophen (NORCO) 5-325 MG per tablet, Take 1 tablet by mouth 2 times daily. Insulin Glargine, 2 Unit Dial, (TOUJEO MAX SOLOSTAR) 300 UNIT/ML SOPN, Inject 66 Units into the skin daily  piperacillin-tazobactam (ZOSYN) 3-0.375 GM per 50ML IVPB extended infusion, Infuse 4.5 mg intravenously in the morning and 4.5 mg at noon and 4.5 mg in the evening. acetaminophen (TYLENOL) 325 MG tablet, Take 650 mg by mouth every 6 hours as needed for Pain  amLODIPine (NORVASC) 5 MG tablet, Take 5 mg by mouth daily  benzonatate (TESSALON) 100 MG capsule, Take 100 mg by mouth 3 times daily  cloNIDine (CATAPRES) 0.1 MG tablet, Take 0.1 mg by mouth in the morning and 0.1 mg in the evening. hydrALAZINE (APRESOLINE) 100 MG tablet, Take 100 mg by mouth 3 times daily    Allergies:  Codeine    Social History:   TOBACCO:   has no history on file for tobacco use. ETOH:   has no history on file for alcohol use. DRUGS:   Social History     Substance and Sexual Activity   Drug Use Not on file       Family History:   History reviewed. No pertinent family history. REVIEW OF SYSTEMS:    All pertinent positives and negatives as noted in HPI       LOWER EXTREMITY EXAMINATION     VASCULAR:  DP and PT pulses are non palpable. CFT < 5 seconds B/L. Warm to warm from the tibial tuberosity to the distal aspect of the digits dorsally. NEUROLOGIC:  Protective sensation is diminished by grossly intact    DERM:  Right foot lateral plantar wound measuring approx 6cm in diameter. No erythema, serosanguinous drainage, no malodor.     MUSCULOSKELETAL: deferred           CONSULTS:  IP CONSULT TO NEUROLOGY  IP CONSULT TO CRITICAL CARE  IP CONSULT TO PODIATRY  IP CONSULT TO DIETITIAN  IP CONSULT TO CARDIOLOGY    MEDICATION:  Scheduled Meds:   sennosides  5 mL Oral Nightly    insulin lispro  0-16 Units SubCUTAneous Q4H    carbidopa-levodopa  1 tablet Per NG tube TID    amLODIPine  5 mg Per NG tube Daily    ipratropium-albuterol  1 ampule Inhalation Q4H WA    collagenase   Topical Q MWF    [START ON 9/29/2022] ticagrelor  90 mg Oral BID    aspirin  81 mg Oral Daily    Or    aspirin  300 mg Rectal Daily    atorvastatin  80 mg Oral Nightly    polyethylene glycol  17 g Oral Daily    chlorhexidine  15 mL Mouth/Throat BID    famotidine (PEPCID) injection  20 mg IntraVENous Daily    polyvinyl alcohol  1 drop Both Eyes Q4H    And    artificial tears   Both Eyes Q4H     Continuous Infusions:   sodium chloride      dextrose      propofol 25 mcg/kg/min (09/28/22 0902)     PRN Meds:.sodium chloride, labetalol, hydrALAZINE, glucose, dextrose bolus **OR** dextrose bolus, glucagon (rDNA), dextrose, ondansetron **OR** ondansetron, fentanNYL    RADIOLOGY:  XR CHEST PORTABLE   Final Result   1. Multifocal bilateral airspace disease more prominent within the lower   lobes. The airspace disease is unchanged when compared with the prior study. CT HEAD WO CONTRAST   Final Result   Diffuse atrophy likely age related   Findings compatible with small vessel ischemic changes. XR CHEST PORTABLE   Final Result   1. Pulmonary opacities present favoring edema and atelectasis, also small   pleural effusions, but nonspecific with some additional considerations noted   above   2. Support devices present as described above   3. Heart size appears borderline enlarged         XR ABDOMEN FOR NG/OG/NE TUBE PLACEMENT   Final Result   NG/OG is in the stomach. CT HEAD WO CONTRAST   Final Result   Diffuse atrophy likely age related   Findings compatible with small vessel ischemic changes. Findings were called at the time of dictation         CT BRAIN PERFUSION   Final Result      No significant ischemic penumbra identified      This study was analyzed by the [employer] algorithm. CTA NECK W CONTRAST   Final Result   1. Estimated stenosis of the proximal right and left internal carotid   artery by NASCET criteria is greater than 90% on the left   2. Severe atherosclerotic disease . 3.  No large vessel occlusion identified            This study was analyzed by the [address] ai algorithm. CTA HEAD W CONTRAST   Final Result   1. Estimated stenosis of the proximal right and left internal carotid   artery by NASCET criteria is greater than 90% on the left   2. Severe atherosclerotic disease . 3. No large vessel occlusion identified            This study was analyzed by the [employer] algorithm. MRI BRAIN WO CONTRAST    (Results Pending)   IR CAROTID STENT W PROTECTION    (Results Pending)   XR CHEST PORTABLE    (Results Pending)       Vitals:    BP (!) 136/49   Pulse 66   Temp 97.8 °F (36.6 °C) (Temporal)   Resp 18   Wt 260 lb 8 oz (118.2 kg)   SpO2 100%     LABS:   Recent Labs     09/27/22  2256 09/28/22  0305 09/28/22  0445   WBC 9.8  --  9.4   HGB 6.3* 7.5* 7.6*   HCT 20.1* 23.6* 23.3*     --  274     Recent Labs     09/28/22  0445      K 4.7   *   CO2 21*   PHOS 3.5   BUN 83*   CREATININE 2.7*     Recent Labs     09/27/22  1312 09/28/22  0445   PROT 5.3* 5.2*   INR 2.1  --        ASSESSMENTS:   1. Right foot wound diabetic ulcer  2. DM  Acute cerebrovascular accident (CVA) [facility])            PLAN:    - Patient was examined and evaluated. Reviewed patient's recent lab results, charts and pertinent diagnostic imaging. Reviewed ancillary service notes. - Santyl to right foot wound MWF  - Wound vac to right foot wound MWF changes  - X-rays: Pending  - Discussed patient with Dr. Rodolfo Jason  - Will continue to follow patient while they are in-house. Thank you for the opportunity to take part in the patient's care. Please do not hesitate to call for any questions or concerns.

## 2022-09-28 NOTE — PROGRESS NOTES
Bed in [address] obtained   Nurse to Nurse report called   Respiratory notified of patient being transferred

## 2022-09-28 NOTE — PROGRESS NOTES
Patient transferred from IR table to bed. Patient became bradycardic, hypotensive, anesthesia aware, treating both vital signs. Dr. Nery Costello aware. Dr. Belén Mathis in room. Patient reintubated. Dr. Nery Costello aware. Patient transported to PACU, remained stable. All questions answered.

## 2022-09-29 ENCOUNTER — APPOINTMENT (OUTPATIENT)
Dept: GENERAL RADIOLOGY | Age: 67
DRG: 034 | End: 2022-09-29
Attending: INTERNAL MEDICINE
Payer: MEDICARE

## 2022-09-29 LAB
AADO2: 103.9 MMHG
ALBUMIN SERPL-MCNC: 2.7 G/DL (ref 3.5–5.2)
ALP BLD-CCNC: 149 U/L (ref 40–129)
ALT SERPL-CCNC: <5 U/L (ref 0–40)
ANION GAP SERPL CALCULATED.3IONS-SCNC: 10 MMOL/L (ref 7–16)
AST SERPL-CCNC: 14 U/L (ref 0–39)
B.E.: -3.9 MMOL/L (ref -3–3)
BASOPHILS ABSOLUTE: 0.03 E9/L (ref 0–0.2)
BASOPHILS RELATIVE PERCENT: 0.3 % (ref 0–2)
BILIRUB SERPL-MCNC: 0.3 MG/DL (ref 0–1.2)
BUN BLDV-MCNC: 80 MG/DL (ref 6–23)
CALCIUM IONIZED: 1.23 MMOL/L (ref 1.15–1.33)
CALCIUM SERPL-MCNC: 8.1 MG/DL (ref 8.6–10.2)
CHLORIDE BLD-SCNC: 110 MMOL/L (ref 98–107)
CO2: 20 MMOL/L (ref 22–29)
COHB: 0.8 % (ref 0–1.5)
CREAT SERPL-MCNC: 2.8 MG/DL (ref 0.7–1.2)
CRITICAL: ABNORMAL
DATE ANALYZED: ABNORMAL
DATE OF COLLECTION: ABNORMAL
EOSINOPHILS ABSOLUTE: 0.4 E9/L (ref 0.05–0.5)
EOSINOPHILS RELATIVE PERCENT: 4.2 % (ref 0–6)
FIO2: 40 %
GFR AFRICAN AMERICAN: 28
GFR NON-AFRICAN AMERICAN: 23 ML/MIN/1.73
GLUCOSE BLD-MCNC: 105 MG/DL (ref 74–99)
HCO3: 19.9 MMOL/L (ref 22–26)
HCT VFR BLD CALC: 21.8 % (ref 37–54)
HEMOGLOBIN: 7 G/DL (ref 12.5–16.5)
HHB: 1.9 % (ref 0–5)
IMMATURE GRANULOCYTES #: 0.05 E9/L
IMMATURE GRANULOCYTES %: 0.5 % (ref 0–5)
LAB: ABNORMAL
LYMPHOCYTES ABSOLUTE: 0.94 E9/L (ref 1.5–4)
LYMPHOCYTES RELATIVE PERCENT: 9.9 % (ref 20–42)
Lab: ABNORMAL
MAGNESIUM: 2.4 MG/DL (ref 1.6–2.6)
MCH RBC QN AUTO: 28.8 PG (ref 26–35)
MCHC RBC AUTO-ENTMCNC: 32.1 % (ref 32–34.5)
MCV RBC AUTO: 89.7 FL (ref 80–99.9)
METER GLUCOSE: 112 MG/DL (ref 74–99)
METER GLUCOSE: 125 MG/DL (ref 74–99)
METER GLUCOSE: 154 MG/DL (ref 74–99)
METER GLUCOSE: 156 MG/DL (ref 74–99)
METER GLUCOSE: 192 MG/DL (ref 74–99)
METHB: 0.3 % (ref 0–1.5)
MODE: AC
MONOCYTES ABSOLUTE: 0.63 E9/L (ref 0.1–0.95)
MONOCYTES RELATIVE PERCENT: 6.7 % (ref 2–12)
NEUTROPHILS ABSOLUTE: 7.41 E9/L (ref 1.8–7.3)
NEUTROPHILS RELATIVE PERCENT: 78.4 % (ref 43–80)
O2 SATURATION: 98.1 % (ref 92–98.5)
O2HB: 97 % (ref 94–97)
OPERATOR ID: 7221
PATIENT TEMP: 37 C
PCO2: 30.6 MMHG (ref 35–45)
PDW BLD-RTO: 15.7 FL (ref 11.5–15)
PEEP/CPAP: 8 CMH2O
PFO2: 3.4 MMHG/%
PH BLOOD GAS: 7.43 (ref 7.35–7.45)
PHOSPHORUS: 3.6 MG/DL (ref 2.5–4.5)
PLATELET # BLD: 258 E9/L (ref 130–450)
PMV BLD AUTO: 10 FL (ref 7–12)
PO2: 136.1 MMHG (ref 75–100)
POTASSIUM SERPL-SCNC: 4.2 MMOL/L (ref 3.5–5)
RBC # BLD: 2.43 E12/L (ref 3.8–5.8)
RI(T): 0.76
RR MECHANICAL: 18 B/MIN
SODIUM BLD-SCNC: 140 MMOL/L (ref 132–146)
SOURCE, BLOOD GAS: ABNORMAL
THB: 7.5 G/DL (ref 11.5–16.5)
TIME ANALYZED: 412
TOTAL PROTEIN: 5 G/DL (ref 6.4–8.3)
TROPONIN, HIGH SENSITIVITY: 143 NG/L (ref 0–11)
TROPONIN, HIGH SENSITIVITY: 151 NG/L (ref 0–11)
TROPONIN, HIGH SENSITIVITY: 161 NG/L (ref 0–11)
VT MECHANICAL: 500 ML
WBC # BLD: 9.5 E9/L (ref 4.5–11.5)

## 2022-09-29 PROCEDURE — 84484 ASSAY OF TROPONIN QUANT: CPT

## 2022-09-29 PROCEDURE — 6370000000 HC RX 637 (ALT 250 FOR IP): Performed by: NURSE PRACTITIONER

## 2022-09-29 PROCEDURE — 85025 COMPLETE CBC W/AUTO DIFF WBC: CPT

## 2022-09-29 PROCEDURE — A4216 STERILE WATER/SALINE, 10 ML: HCPCS | Performed by: STUDENT IN AN ORGANIZED HEALTH CARE EDUCATION/TRAINING PROGRAM

## 2022-09-29 PROCEDURE — 2700000000 HC OXYGEN THERAPY PER DAY

## 2022-09-29 PROCEDURE — 80053 COMPREHEN METABOLIC PANEL: CPT

## 2022-09-29 PROCEDURE — 92610 EVALUATE SWALLOWING FUNCTION: CPT | Performed by: SPEECH-LANGUAGE PATHOLOGIST

## 2022-09-29 PROCEDURE — 51702 INSERT TEMP BLADDER CATH: CPT

## 2022-09-29 PROCEDURE — 99233 SBSQ HOSP IP/OBS HIGH 50: CPT | Performed by: INTERNAL MEDICINE

## 2022-09-29 PROCEDURE — 97129 THER IVNTJ 1ST 15 MIN: CPT | Performed by: SPEECH-LANGUAGE PATHOLOGIST

## 2022-09-29 PROCEDURE — 92523 SPEECH SOUND LANG COMPREHEN: CPT | Performed by: SPEECH-LANGUAGE PATHOLOGIST

## 2022-09-29 PROCEDURE — 94640 AIRWAY INHALATION TREATMENT: CPT

## 2022-09-29 PROCEDURE — 71045 X-RAY EXAM CHEST 1 VIEW: CPT

## 2022-09-29 PROCEDURE — 99291 CRITICAL CARE FIRST HOUR: CPT | Performed by: SURGERY

## 2022-09-29 PROCEDURE — 2500000003 HC RX 250 WO HCPCS: Performed by: STUDENT IN AN ORGANIZED HEALTH CARE EDUCATION/TRAINING PROGRAM

## 2022-09-29 PROCEDURE — 84100 ASSAY OF PHOSPHORUS: CPT

## 2022-09-29 PROCEDURE — 94003 VENT MGMT INPAT SUBQ DAY: CPT

## 2022-09-29 PROCEDURE — 37799 UNLISTED PX VASCULAR SURGERY: CPT

## 2022-09-29 PROCEDURE — 2000000000 HC ICU R&B

## 2022-09-29 PROCEDURE — 99232 SBSQ HOSP IP/OBS MODERATE 35: CPT | Performed by: PHYSICIAN ASSISTANT

## 2022-09-29 PROCEDURE — 36415 COLL VENOUS BLD VENIPUNCTURE: CPT

## 2022-09-29 PROCEDURE — 2580000003 HC RX 258: Performed by: STUDENT IN AN ORGANIZED HEALTH CARE EDUCATION/TRAINING PROGRAM

## 2022-09-29 PROCEDURE — 82330 ASSAY OF CALCIUM: CPT

## 2022-09-29 PROCEDURE — 82962 GLUCOSE BLOOD TEST: CPT

## 2022-09-29 PROCEDURE — 6370000000 HC RX 637 (ALT 250 FOR IP): Performed by: STUDENT IN AN ORGANIZED HEALTH CARE EDUCATION/TRAINING PROGRAM

## 2022-09-29 PROCEDURE — 82805 BLOOD GASES W/O2 SATURATION: CPT

## 2022-09-29 PROCEDURE — 92526 ORAL FUNCTION THERAPY: CPT | Performed by: SPEECH-LANGUAGE PATHOLOGIST

## 2022-09-29 PROCEDURE — 83735 ASSAY OF MAGNESIUM: CPT

## 2022-09-29 PROCEDURE — 6360000002 HC RX W HCPCS: Performed by: STUDENT IN AN ORGANIZED HEALTH CARE EDUCATION/TRAINING PROGRAM

## 2022-09-29 PROCEDURE — 94664 DEMO&/EVAL PT USE INHALER: CPT

## 2022-09-29 PROCEDURE — 51798 US URINE CAPACITY MEASURE: CPT

## 2022-09-29 PROCEDURE — 94799 UNLISTED PULMONARY SVC/PX: CPT

## 2022-09-29 PROCEDURE — 6370000000 HC RX 637 (ALT 250 FOR IP): Performed by: FAMILY MEDICINE

## 2022-09-29 RX ADMIN — BENZONATATE 100 MG: 100 CAPSULE ORAL at 13:42

## 2022-09-29 RX ADMIN — BENZONATATE 100 MG: 100 CAPSULE ORAL at 21:39

## 2022-09-29 RX ADMIN — LABETALOL HYDROCHLORIDE 10 MG: 5 INJECTION, SOLUTION INTRAVENOUS at 08:22

## 2022-09-29 RX ADMIN — SENNOSIDES 5 ML: 8.8 SYRUP ORAL at 23:59

## 2022-09-29 RX ADMIN — LABETALOL HYDROCHLORIDE 10 MG: 5 INJECTION, SOLUTION INTRAVENOUS at 08:24

## 2022-09-29 RX ADMIN — PROPOFOL 20 MCG/KG/MIN: 10 INJECTION, EMULSION INTRAVENOUS at 02:34

## 2022-09-29 RX ADMIN — CARBIDOPA AND LEVODOPA 1 TABLET: 25; 100 TABLET ORAL at 13:40

## 2022-09-29 RX ADMIN — LABETALOL HYDROCHLORIDE 10 MG: 5 INJECTION, SOLUTION INTRAVENOUS at 11:07

## 2022-09-29 RX ADMIN — POLYVINYL ALCOHOL 1 DROP: 14 SOLUTION/ DROPS OPHTHALMIC at 02:08

## 2022-09-29 RX ADMIN — CARBIDOPA AND LEVODOPA 1 TABLET: 25; 100 TABLET ORAL at 08:07

## 2022-09-29 RX ADMIN — POLYETHYLENE GLYCOL 3350 17 G: 17 POWDER, FOR SOLUTION ORAL at 08:11

## 2022-09-29 RX ADMIN — IPRATROPIUM BROMIDE AND ALBUTEROL SULFATE 1 AMPULE: .5; 2.5 SOLUTION RESPIRATORY (INHALATION) at 07:56

## 2022-09-29 RX ADMIN — MINERAL OIL, WHITE PETROLATUM: .03; .94 OINTMENT OPHTHALMIC at 07:49

## 2022-09-29 RX ADMIN — GABAPENTIN 600 MG: 600 TABLET, FILM COATED ORAL at 13:40

## 2022-09-29 RX ADMIN — GABAPENTIN 600 MG: 600 TABLET, FILM COATED ORAL at 08:04

## 2022-09-29 RX ADMIN — EZETIMIBE 10 MG: 10 TABLET ORAL at 08:04

## 2022-09-29 RX ADMIN — TICAGRELOR 90 MG: 90 TABLET ORAL at 21:39

## 2022-09-29 RX ADMIN — AMLODIPINE BESYLATE 5 MG: 5 TABLET ORAL at 08:04

## 2022-09-29 RX ADMIN — GABAPENTIN 600 MG: 600 TABLET, FILM COATED ORAL at 21:39

## 2022-09-29 RX ADMIN — ASPIRIN 300 MG: 300 SUPPOSITORY RECTAL at 08:09

## 2022-09-29 RX ADMIN — 0.12% CHLORHEXIDINE GLUCONATE 15 ML: 1.2 RINSE ORAL at 08:08

## 2022-09-29 RX ADMIN — IPRATROPIUM BROMIDE AND ALBUTEROL SULFATE 1 AMPULE: .5; 2.5 SOLUTION RESPIRATORY (INHALATION) at 11:22

## 2022-09-29 RX ADMIN — FAMOTIDINE 20 MG: 10 INJECTION INTRAVENOUS at 08:08

## 2022-09-29 RX ADMIN — IPRATROPIUM BROMIDE AND ALBUTEROL SULFATE 1 AMPULE: .5; 2.5 SOLUTION RESPIRATORY (INHALATION) at 17:11

## 2022-09-29 RX ADMIN — IPRATROPIUM BROMIDE AND ALBUTEROL SULFATE 1 AMPULE: .5; 2.5 SOLUTION RESPIRATORY (INHALATION) at 20:26

## 2022-09-29 RX ADMIN — CARBIDOPA AND LEVODOPA 1 TABLET: 25; 100 TABLET ORAL at 23:59

## 2022-09-29 RX ADMIN — CETIRIZINE HYDROCHLORIDE 10 MG: 10 TABLET, FILM COATED ORAL at 08:07

## 2022-09-29 RX ADMIN — ATORVASTATIN CALCIUM 80 MG: 40 TABLET, FILM COATED ORAL at 21:39

## 2022-09-29 RX ADMIN — MINERAL OIL, WHITE PETROLATUM: .03; .94 OINTMENT OPHTHALMIC at 04:13

## 2022-09-29 RX ADMIN — GABAPENTIN 600 MG: 600 TABLET, FILM COATED ORAL at 16:49

## 2022-09-29 RX ADMIN — POLYVINYL ALCOHOL 1 DROP: 14 SOLUTION/ DROPS OPHTHALMIC at 06:02

## 2022-09-29 RX ADMIN — PROPOFOL 10 MCG/KG/MIN: 10 INJECTION, EMULSION INTRAVENOUS at 07:10

## 2022-09-29 RX ADMIN — TICAGRELOR 90 MG: 90 TABLET ORAL at 08:04

## 2022-09-29 ASSESSMENT — PULMONARY FUNCTION TESTS
PIF_VALUE: 31
PIF_VALUE: 29
PIF_VALUE: 21
PIF_VALUE: 29
PIF_VALUE: 21
PIF_VALUE: 28
PIF_VALUE: 29
PIF_VALUE: 29

## 2022-09-29 NOTE — PROGRESS NOTES
Department of Podiatry   Progress Note      Patient seen at bedside. No acute events , Continue wound vac to right foot. No new foot complaints      Past Medical History:            Diagnosis Date    Chronic back pain     CKD (chronic kidney disease)     CVA (cerebral vascular accident) ([facility]     CVA (cerebral vascular accident) ([facility]     DM (diabetes mellitus) ([facility]     Major depression     MI (myocardial infarction) ([facility]     MATT (obstructive sleep apnea)     Parkinson disease ([facility]     PVD (peripheral vascular disease) ([facility]     Seizure ([facility]        Past Surgical History:        Procedure Laterality Date    FEMORAL ARTERY STENT      IR CAROTID STENT UNI W PROTECTION  9/27/2022    IR CAROTID STENT UNI W PROTECTION 9/27/2022 MD DAYLIN Hatch [facility] PROC[facility]       Medications Prior to Admission:    Medications Prior to Admission: apixaban (ELIQUIS) 5 MG TABS tablet, Take 5 mg by mouth 2 times daily  aspirin 81 MG chewable tablet, Take 81 mg by mouth daily  carbidopa-levodopa (SINEMET)  MG per tablet, Take 1 tablet by mouth 3 times daily  carvedilol (COREG) 25 MG tablet, Take 25 mg by mouth 2 times daily (with meals)  cetirizine (ZYRTEC) 10 MG tablet, Take 10 mg by mouth daily  clopidogrel (PLAVIX) 75 MG tablet, Take 75 mg by mouth daily  diphenhydrAMINE (BENADRYL) 25 MG capsule, Take 25 mg by mouth every 6 hours  ezetimibe (ZETIA) 10 MG tablet, Take 10 mg by mouth daily  fluticasone (FLONASE) 50 MCG/ACT nasal spray, 2 sprays by Each Nostril route daily  gabapentin (NEURONTIN) 600 MG tablet, Take 600 mg by mouth 4 times daily. HYDROcodone-acetaminophen (NORCO) 5-325 MG per tablet, Take 1 tablet by mouth 2 times daily.   Insulin Glargine, 2 Unit Dial, (TOUJEO MAX SOLOSTAR) 300 UNIT/ML SOPN, Inject 66 Units into the skin daily  piperacillin-tazobactam (ZOSYN) 3-0.375 GM per 50ML IVPB extended infusion, Infuse 4.5 mg intravenously in the morning and 4.5 mg at noon and 4.5 mg in the evening. acetaminophen (TYLENOL) 325 MG tablet, Take 650 mg by mouth every 6 hours as needed for Pain  amLODIPine (NORVASC) 5 MG tablet, Take 5 mg by mouth daily  benzonatate (TESSALON) 100 MG capsule, Take 100 mg by mouth 3 times daily  cloNIDine (CATAPRES) 0.1 MG tablet, Take 0.1 mg by mouth in the morning and 0.1 mg in the evening. hydrALAZINE (APRESOLINE) 100 MG tablet, Take 100 mg by mouth 3 times daily    Allergies:  Codeine    Social History:   TOBACCO:   has no history on file for tobacco use. ETOH:   has no history on file for alcohol use. DRUGS:   Social History     Substance and Sexual Activity   Drug Use Not on file       Family History:   History reviewed. No pertinent family history. REVIEW OF SYSTEMS:    All pertinent positives and negatives as noted in HPI       LOWER EXTREMITY EXAMINATION     VASCULAR:  DP and PT pulses are non palpable. CFT < 5 seconds B/L. Warm to warm from the tibial tuberosity to the distal aspect of the digits dorsally. NEUROLOGIC:  Protective sensation is diminished by grossly intact    DERM:  Right foot lateral plantar wound measuring approx 6cm in diameter. No erythema, serosanguinous drainage, no malodor.     MUSCULOSKELETAL: deferred           CONSULTS:  IP CONSULT TO NEUROLOGY  IP CONSULT TO CRITICAL CARE  IP CONSULT TO DIETITIAN  IP CONSULT TO CARDIOLOGY  IP CONSULT TO PODIATRY  IP CONSULT TO PODIATRY    MEDICATION:  Scheduled Meds:   sennosides  5 mL Oral Nightly    insulin lispro  0-16 Units SubCUTAneous Q4H    carbidopa-levodopa  1 tablet Per NG tube TID    amLODIPine  5 mg Per NG tube Daily    ipratropium-albuterol  1 ampule Inhalation Q4H WA    collagenase   Topical Q MWF    benzonatate  100 mg Oral TID    cetirizine  10 mg Oral Daily    ezetimibe  10 mg Oral Daily    gabapentin  600 mg Oral 4x Daily    ticagrelor  90 mg Oral BID    aspirin  81 mg Oral Daily    Or    aspirin  300 mg Rectal Daily    atorvastatin  80 mg Oral Nightly    polyethylene glycol 17 g Oral Daily    chlorhexidine  15 mL Mouth/Throat BID    famotidine (PEPCID) injection  20 mg IntraVENous Daily    polyvinyl alcohol  1 drop Both Eyes Q4H    And    artificial tears   Both Eyes Q4H     Continuous Infusions:   sodium chloride      dextrose      propofol Stopped (09/29/22 0758)     PRN Meds:.sodium chloride, labetalol, hydrALAZINE, glucose, dextrose bolus **OR** dextrose bolus, glucagon (rDNA), dextrose, ondansetron **OR** ondansetron, fentanNYL    RADIOLOGY:  XR CHEST PORTABLE   Final Result   Unchanged bilateral atelectasis and or infiltrate as well as small right   pleural effusion. MRI BRAIN WO CONTRAST   Final Result   There are 2 small regions of petechial infarcts one in the left   posterior frontal lobe and a second in the left parietal lobe not   exceeding 3 mm. There is otherwise extensive small vessel ischemic   changes         XR CHEST PORTABLE   Final Result   1. Multifocal bilateral airspace disease more prominent within the lower   lobes. The airspace disease is unchanged when compared with the prior study. CT HEAD WO CONTRAST   Final Result   Diffuse atrophy likely age related   Findings compatible with small vessel ischemic changes. XR CHEST PORTABLE   Final Result   1. Pulmonary opacities present favoring edema and atelectasis, also small   pleural effusions, but nonspecific with some additional considerations noted   above   2. Support devices present as described above   3. Heart size appears borderline enlarged         XR ABDOMEN FOR NG/OG/NE TUBE PLACEMENT   Final Result   NG/OG is in the stomach. IR CAROTID STENT W PROTECTION   Final Result   1. Angiogram demonstrates successful placement of a carotid stent ,   post procedure images demonstrate a widely patent stent and internal   carotid         CT HEAD WO CONTRAST   Final Result   Diffuse atrophy likely age related   Findings compatible with small vessel ischemic changes.    Findings were called at the time of dictation         CT BRAIN PERFUSION   Final Result      No significant ischemic penumbra identified      This study was analyzed by the [employer] ai algorithm. CTA NECK W CONTRAST   Final Result   1. Estimated stenosis of the proximal right and left internal carotid   artery by NASCET criteria is greater than 90% on the left   2. Severe atherosclerotic disease . 3. No large vessel occlusion identified            This study was analyzed by the [employer] algorithm. CTA HEAD W CONTRAST   Final Result   1. Estimated stenosis of the proximal right and left internal carotid   artery by NASCET criteria is greater than 90% on the left   2. Severe atherosclerotic disease . 3. No large vessel occlusion identified            This study was analyzed by the [employer] ai algorithm. XR CHEST PORTABLE    (Results Pending)       Vitals:    BP (!) 136/49   Pulse 79   Temp 97.3 °F (36.3 °C)   Resp (P) 22   Ht 5' 8\" (1.727 m)   Wt 260 lb 8 oz (118.2 kg)   SpO2 98%   BMI 39.61 kg/m²     LABS:   Recent Labs     09/28/22  0445 09/29/22  0415   WBC 9.4 9.5   HGB 7.6* 7.0*   HCT 23.3* 21.8*    258     Recent Labs     09/29/22  0415      K 4.2   *   CO2 20*   PHOS 3.6   BUN 80*   CREATININE 2.8*     Recent Labs     09/27/22  1312 09/28/22  0445 09/29/22  0415   PROT 5.3* 5.2* 5.0*   INR 2.1  --   --        ASSESSMENTS:   1. Right foot wound diabetic ulcer  2. DM  Acute cerebrovascular accident (CVA) [facility])            PLAN:    - Patient was examined and evaluated. Reviewed patient's recent lab results, charts and pertinent diagnostic imaging. Reviewed ancillary service notes. - Santyl to right foot wound MWF  - Continue Wound vac to right foot. MWF changes  - Discussed patient with Dr. Venkatesh Collins  - Will continue to follow patient while they are in-house. Thank you for the opportunity to take part in the patient's care.  Please do not hesitate to call for any questions or concerns.

## 2022-09-29 NOTE — PROGRESS NOTES
[facility]  Neurology follow up     Date:  9/29/2022  Patient Name:  Tamika Hilario  YOB: 1955  MRN: [id number]     Assessment  LMCA stroke 2/2 symptomatic LICA stenosis s/p IR angioplasty and stent placement     Significant vascular history with prior hx of stroke, MI, PVD    Hx of PD   On sinemet     Plan  Continue ASA and Brilinta   Statin therapy with goal LDL < 70  Risk factor modification   PT/OT/speech   Patient is okay for d/c from neurology POV once okay with others   Will need stroke clinic f/u   Neuro will sign off, please call with questions/new issues     History of Present Illness:  Tamika Hilario is a 74 y.o. male presenting for evaluation of stroke . PMH significant for prior stroke, parkinson's disease, prior MI, PVD, MATT, DM, CKD. He presented with R side weakness and aphasia. Imaging revealed significant LICA stenosis. He underwent IR angioplasty with stent placement. Hx of R side weakness from prior stroke. Unsure if his weakness is much different from baseline. MRI brain showed two tiny foci of acute stroke on the L. Echo was unrevealing, though bubble study was suboptimal.     No family at bedside today     Review of Systems:  ROS limited due to aphasia    + R side weakness     Allergies: Allergies   Allergen Reactions    Codeine Other (See Comments)     blisters        Physical Examination  Vitals   Vitals:    09/29/22 0700 09/29/22 0756 09/29/22 0800 09/29/22 0900   BP:       Pulse: 72 77 77 79   Resp: 22  20 (P) 22   Temp:   97.3 °F (36.3 °C)    TempSrc:       SpO2: 100% 100% 100% 98%   Weight:       Height:            General: Patient appears in no acute distress. Awake  HEENT: Normocephalic, atraumatic  Chest: effort normal, nasal O2 in place. Heart: NSR on tele   Extremities/Peripheral vascular: L foot wrapped due to wound. Severe PVD.     Neurologic Examination    Mental Status  Alert, oriented to self, says he is in the emergency room, perseverates on some questions. Mild to moderate expressive aphasia noted. Able to name pen and glove, but not watch. Able to repeat a phrase. Follows simple commands pretty well, but does require some additional prompting. Cranial Nerves  II. Visual fields b/l threat   III, IV, VI: Pupils equally round and reactive to light, 3 to 2 mm bilaterally. EOMs: full, no nystagmus. V. Facial sensation intact to light touch bilaterally  VII: Facial movements appear symmetric around ETT  VIII: Hearing intact to voice  IX,X: Palate elevates symmetrically.   XI: Sternocleidomastoid and trapezius 5/5 bilaterally   XII: Tongue is midline    Motor    R hemiparesis leg appears more so than arm   5/5 on L   Normal bulk   No abnormal movements     Sensation  Responds to pain in all limbs     Reflexes    No babinski L, not tested R (foot wrapped)     Coordination  No resting tremors observed     FFM intact L, impaired R r/t weakness     Gait  Deferred for safety/fall consideration      Labs  Recent Labs     09/28/22  0010 09/28/22  0305 09/28/22  0445 09/28/22  0519 09/29/22  0412 09/29/22  0415   NA  --   --  144  --   --  140   K  --   --  4.7  --   --  4.2   CL  --   --  111*  --   --  110*   CO2  --   --  21*  --   --  20*   BUN  --   --  83*  --   --  80*   CREATININE  --   --  2.7*  --   --  2.8*   GLUCOSE  --   --  107*  --   --  105*   CALCIUM  --   --  8.1*  --   --  8.1*   PROT  --   --  5.2*  --   --  5.0*   LABALBU  --   --  2.8*  --   --  2.7*   BILITOT  --   --  0.4  --   --  0.3   ALKPHOS  --   --  144*  --   --  149*   AST  --   --  12  --   --  14   ALT  --   --  <5  --   --  <5   WBC  --   --  9.4  --   --  9.5   RBC  --   --  2.60*  --   --  2.43*   HGB  --    < > 7.6*  --   --  7.0*   HCT  --    < > 23.3*  --   --  21.8*   MCV  --   --  89.6  --   --  89.7   MCH  --   --  29.2  --   --  28.8   MCHC  --   --  32.6  --   --  32.1   RDW  --   --  15.1*  --   --  15.7*   PLT  --   --  274  --   --  258 MPV  --   --  9.7  --   --  10.0   PH  --   --   --    < > 7.431  --    PO2  --   --   --    < > 136.1*  --    PCO2  --   --   --    < > 30.6*  --    HCO3  --   --   --    < > 19.9*  --    BE  --   --   --    < > -3.9*  --    O2SAT  --   --   --    < > 98.1  --    LACTA 0.7  --   --   --   --   --    LABA1C  --   --  6.4*  --   --   --     < > = values in this interval not displayed. No results found for: LDLCALC, LDLCHOLESTEROL, LDLDIRECT    Imaging  XR CHEST PORTABLE   Final Result   Unchanged bilateral atelectasis and or infiltrate as well as small right   pleural effusion. MRI BRAIN WO CONTRAST   Final Result   There are 2 small regions of petechial infarcts one in the left   posterior frontal lobe and a second in the left parietal lobe not   exceeding 3 mm. There is otherwise extensive small vessel ischemic   changes         XR CHEST PORTABLE   Final Result   1. Multifocal bilateral airspace disease more prominent within the lower   lobes. The airspace disease is unchanged when compared with the prior study. CT HEAD WO CONTRAST   Final Result   Diffuse atrophy likely age related   Findings compatible with small vessel ischemic changes. XR CHEST PORTABLE   Final Result   1. Pulmonary opacities present favoring edema and atelectasis, also small   pleural effusions, but nonspecific with some additional considerations noted   above   2. Support devices present as described above   3. Heart size appears borderline enlarged         XR ABDOMEN FOR NG/OG/NE TUBE PLACEMENT   Final Result   NG/OG is in the stomach. IR CAROTID STENT W PROTECTION   Final Result   1. Angiogram demonstrates successful placement of a carotid stent ,   post procedure images demonstrate a widely patent stent and internal   carotid         CT HEAD WO CONTRAST   Final Result   Diffuse atrophy likely age related   Findings compatible with small vessel ischemic changes.    Findings were called at the time of dictation         CT BRAIN PERFUSION   Final Result      No significant ischemic penumbra identified      This study was analyzed by the [email or web address] algorithm. CTA NECK W CONTRAST   Final Result   1. Estimated stenosis of the proximal right and left internal carotid   artery by NASCET criteria is greater than 90% on the left   2. Severe atherosclerotic disease . 3. No large vessel occlusion identified            This study was analyzed by the [email or web address] algorithm. CTA HEAD W CONTRAST   Final Result   1. Estimated stenosis of the proximal right and left internal carotid   artery by NASCET criteria is greater than 90% on the left   2. Severe atherosclerotic disease . 3. No large vessel occlusion identified            This study was analyzed by the [email or web address] algorithm. XR CHEST PORTABLE    (Results Pending)         I have personally reviewed the following images: CT head , MRI brain         Echo    Severe concentric left ventricular hypertrophy. Ejection fraction is visually estimated at 60 to 65%. Left ventricular diastolic filling is elevated . Mildly dilated right ventricle with normal systolic function. Agitated saline injected for shunt evaluation was technically difficult   due to pt being on vent and lack of corperation. Please obtain limited   echo with Bubble study once pt is extubated and cooperative. Mild mitral regurgitation is present. Mild tricuspid regurgitation. RVSP is 42 mmHg. Physiologic and/or trace, Mild pulmonic regurgitation present.     Electronically signed by RAUL Cruz on 9/29/2022 at 10:42 AM

## 2022-09-29 NOTE — PROGRESS NOTES
[facility]  SURGICAL INTENSIVE CARE UNIT (SICU)  ATTENDING PHYSICIAN CRITICAL CARE PROGRESS NOTE     I have examined the patient, reviewed the record, and discussed the case with the APN/ resident. Please refer to the APN/ resident's note. I agree with the assessment and plan. I have reviewed all relevant labs and imaging data. The following summarizes my clinical findings and independent assessment. CC:  critical care management for right hemiplegia and aphasia    Hospital Course/Overnight Events:  9/27--presented to OSH with right sided weakness and aphasia; transferred here for definitive care; found to have left ICA occlusion and underwent IR angioplasty and stent placement; at end of procedure pt developed bradycardia and hypotension and required re-intubation  9/28--completed integrelin; started on ASA/Brilinta  9/29--extubated this AM    Pt reports some pain in his foot.     Awake and alert  Follows commands  Hrt:  regular rate/rhythm; no murmur  Lungs:  fairly clear bilaterally  Abd:  soft; BS active; obese; non-tender  Skin:  warm/dry  Ext:  wound VAC/dressing to right foot wound    Labs personally reviewed  Films personally reviewed/interpreted--bilateral infiltrates on CXR    Patient Active Problem List    Diagnosis Date Noted    Acute respiratory failure with hypoxia ([facility] 09/28/2022    Stenosis of left carotid artery 09/28/2022    Hypoalbuminemia 09/28/2022    Acute cerebrovascular accident (CVA) ([facility] 09/27/2022     Acute ischemic stroke--monitor neuro exam  S/p left ICA angioplasty/stent placement--cont ASA/Brilinta  Acute resp insuff--monitor resp status  Acute on chronic kidney disease--monitor BUN/Cr/UO  Hypoalbuminemia--check swallow eval and start po as able  Anemia--unspecified chronicity--monitor H/H  Chronic foot ulcer--wound VAC in place--Podiatry consulted  DVT risk--PCDs/ASA/Brilinta    Pt is at risk for neurologic/metabolic/hemodynamic/respiratory deterioration for which I am actively managing and requires ongoing ICU care    Lurdes Aleman MD, [facility]  9/29/2022  10:15 AM    Critical care time exclusive of teaching and procedures = 37 minutes

## 2022-09-29 NOTE — PROGRESS NOTES
SPEECH/LANGUAGE PATHOLOGY  SPEECH/LANGUAGE/COGNITIVE EVALUATION   and PLAN OF CARE      PATIENT NAME:  Homer Ye  (male)     MRN:  [id number]    :  1955  (74 y.o.)  STATUS:  Inpatient: Room 3818/3818-A    TODAY'S DATE:  22    SLP cognitive language evaluation  Start:  22,   End:  22,   ONE TIME,   Standing Count:  1 Occurrences,   [address], APRN [facility]   REASON FOR REFERRAL:  assess speech/lang/cog  EVALUATING THERAPIST: IVONNE Orozco    ADMITTING DIAGNOSIS: Acute cerebrovascular accident ([facility]) ([facility] [I63.9]    VISIT DIAGNOSIS:   Visit Diagnoses         Codes    Stenosis of left carotid artery     I65.22               SPEECH THERAPY  PLAN OF CARE   The speech therapy  POC is established based on physician order, speech pathology diagnosis and results of clinical assessment     SPEECH PATHOLOGY DIAGNOSIS:    Moderate Cog-Ling Deficits, unclear baseline    Speech Pathology intervention is recommended up to 6 times per week for LOS or when goals are met with emphasis on the following:      Conditions Requiring Skilled Therapeutic Intervention for speech, language and/or cognition    Receptive Aphasia  Expressive Aphasia   Anomia  Cognitive linguistic impairment    Specific Speech Therapy Interventions to Include:   Receptive language training   Expressive language training   Therapeutic exercises for dysarthria    Specific instructions for next treatment: To initiate POC    SHORT/LONG TERM GOALS  Pt will improve orientation to spatial and temporal surroundings with use of external memory aides.   Pt will improve immediate, short term, recent memory during structured and unstructured tasks with 75% accuracy   Pt will improve problem solving/thought organization during structured and unstructured tasks with 75% accuracy   Pt will improve receptive and expressive language skills with adequate thought content, organization, and processing time to facilitate improved communication with moderate. Pt will improve word finding and verbal fluency with phrase/sentence level, wh-questions and open ended conversation through incorporation of preparatory and circumlocution strategies 75% accuracy    Patient goals: Patient/family involved in developing goals and treatment plan:   Treatment goals discussed with Patient    The Patient understand(s) the diagnosis, prognosis and plan of care   The patient/family Agreed with above,     This plan may be re-evaluated and revised as warranted. Rehabilitation Potential/Prognosis: fair                CLINICAL ASSESSMENT:  MOTOR SPEECH       Oral Peripheral Examination   Adequate lingual/labial strength     Parameters of Speech Production  Respiration:  Adequate for speech production  Articulation:  Within functional limits  Resonance:  Within functional limits  Quality:   Harsh  Pitch: Within functional limits  Intensity: Within functional limits  Fluency:  Intact  Prosody Monotone    RECEPTIVE LANGUAGE    Comprehension of Yes/No Questions:   Latent    Process  Simple Verbal Commands:   Latent  Process Intermediate Verbal Commands:   Could not test  Process Complex Verbal Commands:     Could not test    Comprehension of Conversation:      Latent      EXPRESSIVE LANGUAGE     Serials: Functional    Imitation:  Words   Functional   Sentences To be assessed    Naming:  (Modality used:  Verbal)  Confrontation Naming  Functional  Functional Description  Impaired  Response Naming: To be assessed    Conversation:      Confusion was noted during conversation and Anomia was present    COGNITION     Attention/Orientation  Attention: Easily Distracted  Orientation:  Oriented to Person    Memory   Immediate Recall: TBA    Delayed Recall:   TBA    Long Term Recall:   TBA    Organization/Problem Solving/Reasoning   Verbal Sequencing: To be assessed        Verbal Problem solving:    To be assessed          CLINICAL OBSERVATIONS NOTED DURING THE EVALUATION  Latent responses, Inconsistent responses, Perseveration errors, Anomic errors, Cueing was required, and Reduced eye contact                  EDUCATION:   The Speech Language Pathologist (SLP) completed education regarding results of evaluation and that intervention is warranted at this time. Learner: Patient  Education: Reviewed results and recommendations of this evaluation  Evaluation of Education:  Verbalizes understanding    Evaluation Time includes thorough review of current medical information, gathering information on past medical history/social history and prior level of function, completion of standardized testing/informal observation of tasks, assessment of data and education on plan of care and goals. CPT code:    61716  eval speech sound lang comprehension      The admitting diagnosis and active problem list, as listed below have been reviewed prior to initiation of this evaluation. ACTIVE PROBLEM LIST:   Patient Active Problem List   Diagnosis    Acute cerebrovascular accident (CVA) ([facility]    Acute respiratory failure with hypoxia (HCC)    Stenosis of left carotid artery    Hypoalbuminemia       INTERVENTION  CPT Code: 92443  therapeutic interventions that focus on cognitive function , initial  15 min    Speech Pathologist (SLP) completed education with the patient and/or family regarding type of cognitive impairment. Discussed compensatory strategies to assist in improving attention, STM/LTM, problem solving, abstract reasoning and safety/insight. Encouraged patient and/or family to engage SLP in structured Q&A session relative to identified deficit areas. Patient and/or family indicated understanding of all information provided via satisfactory verbal response.

## 2022-09-29 NOTE — PROGRESS NOTES
Patient was extubated to 4 liters/min via nasal cannula. Breath Sounds post extubation were bilaterally clear/diminished. Stridor was not present post extubation. SPO2 was 100%.     CONSTANTINE Arrieta and Cirilo Combs., patients's RN at bedside    Performed by  Elmer Mcgrath RCP

## 2022-09-29 NOTE — PROGRESS NOTES
Inpatient Cardiology Consultation  follow up visit    Reason for Consult: Elevated troponin and extensive vascular disease    Consulting Physician: Dr. Lazaro Tian    Requesting Physician:  Meagan Carney    Date of Consultation: 9/29/2022      Objective: Intubated and sedated. Troponin has plateaued. Past medical history  Smoker hx and quit 25 years ago  COPD  obese  Hypertension  Hyperlipidemia  Diabetes, insulin requiring  Depression  Parkinson's disease  MI, details unknown and records are pending  Peripheral vascular disease, PAD with a femoral artery stent 12/22    <div>74year-old white male with chronic ulceration of the right lower extremity who in December underwent right SFA angioplasty stent and popliteal artery arthrectomy and peroneal angioplasty then had a thrombosis and underwent thrombolysis with right tibioperoneal trunk arthrectomy and right peroneal angioplasty stent is anterior tibial goes down to the foot the ulceration has deteriorated in his right leg and he had an arterial duplex ultrasound performed which shows new high-grade stenosis proximal to the right SFA stent and he now presents for urgent arteriogram for limb salvage</div>     Chronic back pain and neck injury and uses opioids  Ambulates with a cane  CVA X 3 with right-sided weakness  Obstructive sleep apnea and noncompliant with CPAP  COVID-19 infection in 2020  Chronic kidney disease      Medications Prior to admit:  Prior to Admission medications    Medication Sig Start Date End Date Taking?  Authorizing Provider   apixaban (ELIQUIS) 5 MG TABS tablet Take 5 mg by mouth 2 times daily   Yes Historical Provider, MD   aspirin 81 MG chewable tablet Take 81 mg by mouth daily   Yes Historical Provider, MD   carbidopa-levodopa (SINEMET)  MG per tablet Take 1 tablet by mouth 3 times daily   Yes Historical Provider, MD   carvedilol (COREG) 25 MG tablet Take 25 mg by mouth 2 times daily (with meals)   Yes Historical Provider, MD cetirizine (ZYRTEC) 10 MG tablet Take 10 mg by mouth daily   Yes Historical Provider, MD   clopidogrel (PLAVIX) 75 MG tablet Take 75 mg by mouth daily   Yes Historical Provider, MD   diphenhydrAMINE (BENADRYL) 25 MG capsule Take 25 mg by mouth every 6 hours   Yes Historical Provider, MD   ezetimibe (ZETIA) 10 MG tablet Take 10 mg by mouth daily   Yes Historical Provider, MD   fluticasone (FLONASE) 50 MCG/ACT nasal spray 2 sprays by Each Nostril route daily   Yes Historical Provider, MD   gabapentin (NEURONTIN) 600 MG tablet Take 600 mg by mouth 4 times daily. Yes Historical Provider, MD   HYDROcodone-acetaminophen (NORCO) 5-325 MG per tablet Take 1 tablet by mouth 2 times daily. Yes Historical Provider, MD   Insulin Glargine, 2 Unit Dial, (TOUJEO MAX SOLOSTAR) 300 UNIT/ML SOPN Inject 66 Units into the skin daily   Yes Historical Provider, MD   piperacillin-tazobactam (ZOSYN) 3-0.375 GM per 50ML IVPB extended infusion Infuse 4.5 mg intravenously in the morning and 4.5 mg at noon and 4.5 mg in the evening. Yes Historical Provider, MD   acetaminophen (TYLENOL) 325 MG tablet Take 650 mg by mouth every 6 hours as needed for Pain   Yes Historical Provider, MD   amLODIPine (NORVASC) 5 MG tablet Take 5 mg by mouth daily   Yes Historical Provider, MD   benzonatate (TESSALON) 100 MG capsule Take 100 mg by mouth 3 times daily   Yes Historical Provider, MD   cloNIDine (CATAPRES) 0.1 MG tablet Take 0.1 mg by mouth in the morning and 0.1 mg in the evening.    Yes Historical Provider, MD   hydrALAZINE (APRESOLINE) 100 MG tablet Take 100 mg by mouth 3 times daily   Yes Historical Provider, MD       Current Medications:    Current Facility-Administered Medications: sennosides (SENOKOT) 8.8 MG/5ML syrup 5 mL, 5 mL, Oral, Nightly  labetalol (NORMODYNE;TRANDATE) injection 10 mg, 10 mg, IntraVENous, Q30 Min PRN  hydrALAZINE (APRESOLINE) injection 10 mg, 10 mg, IntraVENous, Q30 Min PRN  glucose chewable tablet 16 g, 4 tablet, Oral, PRN  dextrose bolus 10% 125 mL, 125 mL, IntraVENous, PRN **OR** dextrose bolus 10% 250 mL, 250 mL, IntraVENous, PRN  glucagon (rDNA) injection 1 mg, 1 mg, SubCUTAneous, PRN  dextrose 10 % infusion, , IntraVENous, Continuous PRN  insulin lispro (HUMALOG) injection vial 0-16 Units, 0-16 Units, SubCUTAneous, Q4H  carbidopa-levodopa (SINEMET)  MG per tablet 1 tablet, 1 tablet, Per NG tube, TID  amLODIPine (NORVASC) tablet 5 mg, 5 mg, Per NG tube, Daily  ipratropium-albuterol (DUONEB) nebulizer solution 1 ampule, 1 ampule, Inhalation, Q4H WA  collagenase ointment, , Topical, Q MWF  benzonatate (TESSALON) capsule 100 mg, 100 mg, Oral, TID  cetirizine (ZYRTEC) tablet 10 mg, 10 mg, Oral, Daily  ezetimibe (ZETIA) tablet 10 mg, 10 mg, Oral, Daily  gabapentin (NEURONTIN) tablet 600 mg, 600 mg, Oral, 4x Daily  ticagrelor (BRILINTA) tablet 90 mg, 90 mg, Oral, BID  ondansetron (ZOFRAN-ODT) disintegrating tablet 4 mg, 4 mg, Oral, Q8H PRN **OR** ondansetron (ZOFRAN) injection 4 mg, 4 mg, IntraVENous, Q6H PRN  aspirin EC tablet 81 mg, 81 mg, Oral, Daily **OR** aspirin suppository 300 mg, 300 mg, Rectal, Daily  atorvastatin (LIPITOR) tablet 80 mg, 80 mg, Oral, Nightly  polyethylene glycol (GLYCOLAX) packet 17 g, 17 g, Oral, Daily  fentaNYL (SUBLIMAZE) injection 100 mcg, 100 mcg, IntraVENous, Q1H PRN    Allergies:  Codeine    Social History: History of tobacco abuse but quit 25 years ago and occasional alcohol and no illicit drugs and has a partner,       Family History:   History reviewed. No pertinent family history. REVIEW OF SYSTEMS:   Unable  PHYSICAL EXAM:   BP (!) 143/71   Pulse 79   Temp 97.6 °F (36.4 °C)   Resp 24   Ht 5' 8\" (1.727 m)   Wt 260 lb 8 oz (118.2 kg)   SpO2 98%   BMI 39.61 kg/m²   CONST:  Well developed, well nourished who appears of stated age. Awake, alert and cooperative. No apparent distress.    HEENT:   Head- Normocephalic, atraumatic   Eyes- Conjunctivae pink, anicteric  Throat- Oral mucosa pink and moist, orally intubated and orogastric tube  Neck-  No stridor, trachea midline, no jugular venous distention. No carotid bruit. CHEST: Chest symmetrical and non-tender to palpation. No accessory muscle use or intercostal retractions  RESPIRATORY: Lung sounds - clear throughout fields   CARDIOVASCULAR:     Heart Inspection- shows no noted pulsations  Heart Palpation- no heaves or thrills; PMI is non-displaced   Heart Ausculation- Regular rate and rhythm, no murmur. No s3, s4 or rub   PV: No lower extremity edema extremities but upper extremities swollen worse on the right in the low no varicosities. Pedal pulses palpable, no clubbing or cyanosis with dressing to right foot and Ace wrap  ABDOMEN: Soft, non-tender to light palpation. Bowel sounds present. No palpable masses no organomegaly; no abdominal bruit  MS:  No atrophy or abnormal movements.    :   clear yellow urine Mahan catheter  SKIN: Warm and dry no statis dermatitis or ulcers   NEURO / PSYCH: Sedated    DATA:    ECG / Tele strips: Sinus rhythm  Diagnostic:      Intake/Output Summary (Last 24 hours) at 9/29/2022 1531  Last data filed at 9/29/2022 1400  Gross per 24 hour   Intake 879.06 ml   Output 2095 ml   Net -1215.94 ml       Labs:   CBC:   Recent Labs     09/28/22  0445 09/29/22  0415   WBC 9.4 9.5   HGB 7.6* 7.0*   HCT 23.3* 21.8*    258     BMP:   Recent Labs     09/28/22  0445 09/29/22  0415    140   K 4.7 4.2   CO2 21* 20*   BUN 83* 80*   CREATININE 2.7* 2.8*   LABGLOM 24 23   CALCIUM 8.1* 8.1*     Mag:   Recent Labs     09/28/22  0445 09/29/22  0415   MG 2.4 2.4     Phos:   Recent Labs     09/28/22  0445 09/29/22  0415   PHOS 3.5 3.6     TFT: No results found for: TSH, R0ZFZIK, E8OWIFA, THYROIDAB, FT3, T4FREE   HgA1c:   Lab Results   Component Value Date/Time    LABA1C 6.4 09/28/2022 04:45 AM     No results found for: EAG  proBNP: No results found for: PROBNP  PT/INR:   Recent Labs     09/27/22  1312   PROTIME 23.2*   INR 2.1     APTT:No results for input(s): APTT in the last 72 hours. TROPONIN:  Lab Results   Component Value Date/Time    TROPHS 161 09/29/2022 07:50 AM    TROPHS 151 09/29/2022 12:00 AM    TROPHS 149 09/28/2022 06:06 PM    TROPHS 169 09/28/2022 08:40 AM    TROPHS 153 09/28/2022 12:10 AM     CK:No results found for: CKTOTAL  FASTING LIPID PANEL:No results found for: CHOL, HDL, LDLDIRECT, LDLCALC, TRIG  LIVER PROFILE:  Recent Labs     09/28/22  0445 09/29/22  0415   AST 12 14   ALT <5 <5   LABALBU 2.8* 2.7*       Transthoracic echocardiogram September 27, 2022    Severe concentric left ventricular hypertrophy. Ejection fraction is visually estimated at 60 to 65%. Left ventricular diastolic filling is elevated . Mildly dilated right ventricle with normal systolic function. Agitated saline injected for shunt evaluation was technically difficult due to pt being on vent and lack of corperation. Please obtain  limited echo with Bubble study once pt is extubated and cooperative. Mild mitral regurgitation is present. Mild tricuspid regurgitation. RVSP is 42 mmHg. Physiologic and/or trace, Mild pulmonic regurgitation present. Impressions/Plan  Elevated troponin, troponin leak in the setting of hypoxic respiratory failure, severe anemia, strokelike symptoms. Troponin has plateaued.   Etiology of troponin leak likely due to significant anemia and stroke as well as respiratory distress  Echo shows normal systolic function  Continue on statin and initiate low-dose beta-blocker if there is no contraindication  Obtain outpatient cardiology records to guide therapy  Acute strokelike symptoms with right-sided paralysis and aphasia and left ICA stenosis and status post left ICA angioplasty and stent  Management per neurology    Anemia  Hypokalemia  Hypoalbuminemia  PAD of the lower extremities and was on oral anticoagulation triple therapy, now Eliquis has been stopped and he is on Brilinta and aspirin for history of right SFA angioplasty stent and popliteal artery arthrectomy and peroneal angioplasty and stent status post thrombosis and had thrombolysis of right tibioperoneal trunk atherectomy and right peroneal angioplasty stent with vascular studies right lower extremity showing new high-grade stenosis proximal to the right SFA stents  Management per vascular surgery    Renal insufficiency  Severe LVH  Further evaluation and outpatient when patient is stable  Diabetes which is insulin requiring  Diabetic foot ulcer right lower extremity status post wound VAC  History of tobacco abuse and COPD  Obesity  Parkinson's disease  Questionable MI history  Chronic back pain  History of 3 prior CVAs  History of obstructive sleep apnea noncompliant with CPAP  Renal insufficiency        Electronically signed by Ann Marei Shannon MD on 9/29/2022 at 3:31 PM

## 2022-09-29 NOTE — PROGRESS NOTES
Hospitalist Progress Note      SYNOPSIS: Patient admitted on 2022 for Acute cerebrovascular accident (CVA) (HCC)Patient presented to [facility] with strokelike symptoms, right-sided paralysis and aphasia. Was found to have severe left ICA stenosis and taken to IR for left ICA angioplasty with stent placement. Patient was intubated and sedated and transferred to the ICU. Vital signs are within normal limits and stable. Patient is afebrile. Laboratory studies demonstrate potassium 5.2, BUN 89, creatinine 2.9, glucose 106, troponin 153, hemoglobin 5.0. Patient transfused PRBC. Neurology, cardiology, podiatry following. MRI showed There are 2 small regions of petechial infarcts one in the left posterior frontal lobe and a second in the left parietal lobe not exceeding 3 mm. There is otherwise extensive small vessel ischemic changes. SUBJECTIVE:  Stable overnight. No other overnight issues reported. Patient seen and examined  Records reviewed. Extubated        Temp (24hrs), Av.3 °F (36.3 °C), Min:97 °F (36.1 °C), Max:97.6 °F (36.4 °C)    DIET: ADULT TUBE FEEDING; Orogastric; Peptide Based High Protein; Continuous; 15; Yes; 20; Q 4 hours; 55; 30; Q 4 hours  CODE: Full Code    Intake/Output Summary (Last 24 hours) at 2022 0802  Last data filed at 2022 0700  Gross per 24 hour   Intake 868.27 ml   Output 1895 ml   Net -1026.73 ml       Review of Systems  All bolded are positive; please see HPI  General:  Fever, chills, diaphoresis, fatigue, malaise, night sweats, weight loss  Psychological:  Anxiety, disorientation, hallucinations. ENT:  Epistaxis, headaches, vertigo, visual changes. Cardiovascular:  Chest pain, irregular heartbeats, palpitations, paroxysmal nocturnal dyspnea. Respiratory:  Shortness of breath, coughing, sputum production, hemoptysis, wheezing, orthopnea.   Gastrointestinal:  Nausea, vomiting, diarrhea, heartburn, constipation, abdominal pain, hematemesis, hematochezia, melena, acholic stools  Genito-Urinary:  Dysuria, urgency, frequency, hematuria  Musculoskeletal:  Joint pain, joint stiffness, joint swelling, muscle pain  Neurology:  Headache, focal neurological deficits, weakness, numbness, paresthesia  Derm:  Rashes, ulcers, excoriations, bruising  Extremities:  Decreased ROM, peripheral edema, mottling      OBJECTIVE:    BP (!) 136/49   Pulse 77   Temp 97 °F (36.1 °C) (Axillary)   Resp 20   Ht 5' 8\" (1.727 m)   Wt 260 lb 8 oz (118.2 kg)   SpO2 100%   BMI 39.61 kg/m²     General appearance: Awake  HEENT:  Conjunctivae/corneas clear. Neck: Supple. No jugular venous distention. Respiratory: symmetrical; clear to auscultation bilaterally; no wheezes; no rhonchi; no rales  Cardiovascular: rhythm regular; rate controlled; no murmurs  Abdomen: Soft, nontender, nondistended  Extremities:  peripheral pulses present; no peripheral edema;  seebelow  Musculoskeletal: No clubbing, cyanosis, no bilateral lower extremity edema. Brisk capillary refill. Skin:  No rashes  on visible skin  Neurologic: r hemiparesis        ASSESSMENT and PLAN:    Acute CVA- He was found to have severe left ICA stenosis and was taken to IR for L ICA angioplasty with stent placement. He is on ASA/brilinta/statin. MRI brain showed two tiny foci of acute stroke on the L. Echo was unrevealing, though bubble study was suboptimal.   Acute respiratory failure- requiring mechanical ventilation. Ventilator weaning per critical care. Extubated 9/29  Acute blood loss anemia- transfuse for hemoglobin <7  Hyperkalemia- resolved  Acute renal failure- unknown baseline. Elevated troponin-  Cardiology following. R foot ulcer- podiatry following.       Medications:  REVIEWED DAILY    Infusion Medications    sodium chloride      dextrose      propofol 10 mcg/kg/min (09/29/22 0710)     Scheduled Medications    sennosides  5 mL Oral Nightly    insulin lispro  0-16 Units SubCUTAneous Q4H This report was transcribed using voice recognition software. Every effort was made to ensure accuracy; however, inadvertent computerized transcription errors may be present.

## 2022-09-29 NOTE — PLAN OF CARE
Problem: Pain  Goal: Verbalizes/displays adequate comfort level or baseline comfort level  9/28/2022 2037 by Wilma Jaimes RN  Outcome: Progressing  Flowsheets (Taken 9/28/2022 2037)  Verbalizes/displays adequate comfort level or baseline comfort level:   Encourage patient to monitor pain and request assistance   Assess pain using appropriate pain scale   Administer analgesics based on type and severity of pain and evaluate response   Implement non-pharmacological measures as appropriate and evaluate response   Consider cultural and social influences on pain and pain management   Notify Licensed Independent Practitioner if interventions unsuccessful or patient reports new pain     Problem: Safety - Adult  Goal: Free from fall injury  9/28/2022 2037 by Wilma Jaimes RN  Outcome: Progressing  [address] (Taken 9/28/2022 2037)  Free From Fall Injury:   Instruct family/caregiver on patient safety   Based on caregiver fall risk screen, instruct family/caregiver to ask for assistance with transferring infant if caregiver noted to have fall risk factors     Problem: Neurosensory - Adult  Goal: Achieves stable or improved neurological status  9/28/2022 2037 by Wilma Jaimes RN  Outcome: Progressing  Flowsheets (Taken 9/28/2022 2037)  Achieves stable or improved neurological status:   Assess for and report changes in neurological status   Initiate measures to prevent increased intracranial pressure   Maintain blood pressure and fluid volume within ordered parameters to optimize cerebral perfusion and minimize risk of hemorrhage   Monitor temperature, glucose, and sodium. Initiate appropriate interventions as ordered     Problem: Safety - Medical Restraint  Goal: Remains free of injury from restraints (Restraint for Interference with Medical Device)  Description: INTERVENTIONS:  1. Determine that other, less restrictive measures have been tried or would not be effective before applying the restraint  2.  Evaluate the patient's condition at the time of restraint application  3. Inform patient/family regarding the reason for restraint  4.  Q2H: Monitor safety, psychosocial status, comfort, nutrition and hydration  9/28/2022 2037 by Jadon Madrigal RN  Outcome: Progressing  Flowsheets (Taken 9/28/2022 2037)  Remains free of injury from restraints (restraint for interference with medical device):   Determine that other, less restrictive measures have been tried or would not be effective before applying the restraint   Evaluate the patient's condition at the time of restraint application   Inform patient/family regarding the reason for restraint   Every 2 hours: Monitor safety, psychosocial status, comfort, nutrition and hydration

## 2022-09-29 NOTE — PROGRESS NOTES
SPEECH/LANGUAGE PATHOLOGY  CLINICAL ASSESSMENT OF SWALLOWING FUNCTION   and PLAN OF CARE    PATIENT NAME:  Ya Victor  (male)     MRN:  [id number]    :  1955  (74 y.o.)  STATUS:  Inpatient: Room 3818/3818-A    TODAY'S DATE:  22    SLP swallowing-dysphagia evaluation and treatment  Start:  22,   End:  22,   ONE TIME,   Standing Count:  1 Occurrences,   [address], [facility]   REASON FOR REFERRAL: assess  swallow function   EVALUATING THERAPIST: Heriberto Marr, SLP                 RESULTS:    DYSPHAGIA DIAGNOSIS:   Clinical indicators of mild-moderate oral phase dysphagia       DIET RECOMMENDATIONS:  Minced and moist consistency solids (IDDSI level 5) with  thin liquids (IDDSI level 0)     FEEDING RECOMMENDATIONS:     Assistance level:  No assistance needed      Compensatory strategies recommended: Not applicable      Discussed recommendations with nursing and/or faxed report to referring provider: Yes    SPEECH THERAPY  PLAN OF CARE   The dysphagia POC is established based on physician order, dysphagia diagnosis and results of clinical assessment     Dysphagia therapy is not recommended     Conditions Requiring Skilled Therapeutic Intervention for dysphagia:    Not applicable    Specific dysphagia interventions to include:     not applicable    Specific instructions for next treatment:  not applicable   Patient Treatment Goals:    Short Term Goals:  Not applicable no therapy warranted     Long Term Goals:   Not applicable no therapy warranted      Patient/family Goal:    not applicable    Plan of care discussed with Patient   The Patient understand(s) the diagnosis, prognosis and plan of care     Rehabilitation Potential/Prognosis: good                    ADMITTING DIAGNOSIS: Acute cerebrovascular accident (CVA) ([facility] [address] [I63.9]    VISIT DIAGNOSIS:   Visit Diagnoses         Codes    Stenosis of left carotid artery     I65.22             PATIENT REPORT/COMPLAINT: denies difficulty swallowing  RN cleared patient for participation in assessment     yes     PRIOR LEVEL OF SWALLOW FUNCTION:    PAST HISTORY OF DYSPHAGIA?: none reported    Home diet: Regular consistency solids (IDDSI level 7) with  thin liquids (IDDSI level 0)  Current Diet Order:  No diet orders on file    PROCEDURE:  Consistencies Administered During the Evaluation   Liquids: thin liquid   Solids:  pureed foods and soft solid foods      Method of Intake:   cup, straw, spoon  Self fed, Fed by clinician, Hand over hand assist      Position:   Seated, upright    CLINICAL ASSESSMENT:  Oral Stage:       Decreased mastication due to:  decreased lingual control and disorganized chewing pattern      Pharyngeal Stage:    No signs of aspiration were noted during this evaluation however, silent aspiration cannot be ruled out at bedside. If silent aspiration is suspected, a Videofluoroscopic Study of Swallowing (MBS) is recommended and requires a physician order. Cognition:   Within functional limits for this exam    Oral Peripheral Examination   Adequate lingual/labial strength     Current Respiratory Status    2L     Parameters of Speech Production  Respiration:  Adequate for speech production  Quality:   Within functional limits  Intensity: Within functional limits    Volitional Swallow: present     Volitional Cough:   present     Pain: No pain reported. EDUCATION:   The Speech Language Pathologist (SLP) completed education regarding results of evaluation and that intervention is warranted at this time. Learner: Patient  Education: Reviewed results and recommendations of this evaluation and Reviewed diet and strategies  Evaluation of Education:  Lucina understanding    This plan may be re-evaluated and revised as warranted.       Evaluation Time includes thorough review of current medical information, gathering information on past medical history/social history and prior level of function, completion of standardized testing/informal observation of tasks, assessment of data and education on plan of care and goals. [x]The admitting diagnosis and active problem list, have been reviewed prior to initiation of this evaluation. ACTIVE PROBLEM LIST:   Patient Active Problem List   Diagnosis    Acute cerebrovascular accident (CVA) ([facility] [address]    Acute respiratory failure with hypoxia (HCC)    Stenosis of left carotid artery    Hypoalbuminemia         CPT code:  51863  bedside swallow eval    INTERVENTION  CPT Code: 79986  dysphagia tx    Speech Pathologist (SLP) completed education with the patient/family regarding type of swallowing impairment. Reviewed current solid/liquid consistency diet recommendations and discussed compensatory strategies to ensure safe PO intake. Reviewed aspiration precautions. Encouraged patient and/or family to engage SLP in unstructured Q&A session relative to identified deficit areas; indicated understanding of all information provided via satisfactory verbal response.       Shiva Khan M.S., [address] [facility] Pathologist  [id number]  9/29/2022

## 2022-09-29 NOTE — PROGRESS NOTES
Spontaneous Parameters performed    VT = 767 ml  f = 22  B/M  Ve = 16.1 L/M  NIF = n/a  cmH2O  VC = 733 L  RSBI = 38    Patient unable to trigger NIF at this time. CNP at bedside during parameters.        Performed by Virgilio Beebe RCP

## 2022-09-30 ENCOUNTER — APPOINTMENT (OUTPATIENT)
Dept: GENERAL RADIOLOGY | Age: 67
DRG: 034 | End: 2022-09-30
Attending: INTERNAL MEDICINE
Payer: MEDICARE

## 2022-09-30 ENCOUNTER — APPOINTMENT (OUTPATIENT)
Dept: CT IMAGING | Age: 67
DRG: 034 | End: 2022-09-30
Attending: INTERNAL MEDICINE
Payer: MEDICARE

## 2022-09-30 ENCOUNTER — APPOINTMENT (OUTPATIENT)
Dept: ULTRASOUND IMAGING | Age: 67
DRG: 034 | End: 2022-09-30
Attending: INTERNAL MEDICINE
Payer: MEDICARE

## 2022-09-30 LAB
ALBUMIN SERPL-MCNC: 2.6 G/DL (ref 3.5–5.2)
ALP BLD-CCNC: 151 U/L (ref 40–129)
ALT SERPL-CCNC: <5 U/L (ref 0–40)
ANION GAP SERPL CALCULATED.3IONS-SCNC: 11 MMOL/L (ref 7–16)
AST SERPL-CCNC: 11 U/L (ref 0–39)
B.E.: -3.4 MMOL/L (ref -3–3)
BASOPHILS ABSOLUTE: 0.02 E9/L (ref 0–0.2)
BASOPHILS RELATIVE PERCENT: 0.2 % (ref 0–2)
BILIRUB SERPL-MCNC: 0.3 MG/DL (ref 0–1.2)
BLOOD BANK DISPENSE STATUS: NORMAL
BLOOD BANK PRODUCT CODE: NORMAL
BPU ID: NORMAL
BUN BLDV-MCNC: 94 MG/DL (ref 6–23)
CALCIUM IONIZED: 1.27 MMOL/L (ref 1.15–1.33)
CALCIUM SERPL-MCNC: 8.3 MG/DL (ref 8.6–10.2)
CHLORIDE BLD-SCNC: 116 MMOL/L (ref 98–107)
CO2: 20 MMOL/L (ref 22–29)
COHB: 1.3 % (ref 0–1.5)
CREAT SERPL-MCNC: 2.8 MG/DL (ref 0.7–1.2)
CRITICAL: ABNORMAL
DATE ANALYZED: ABNORMAL
DATE OF COLLECTION: ABNORMAL
DESCRIPTION BLOOD BANK: NORMAL
EOSINOPHILS ABSOLUTE: 0.28 E9/L (ref 0.05–0.5)
EOSINOPHILS RELATIVE PERCENT: 2.3 % (ref 0–6)
GFR AFRICAN AMERICAN: 28
GFR NON-AFRICAN AMERICAN: 23 ML/MIN/1.73
GLUCOSE BLD-MCNC: 194 MG/DL (ref 74–99)
HCO3: 21.8 MMOL/L (ref 22–26)
HCT VFR BLD CALC: 18.2 % (ref 37–54)
HCT VFR BLD CALC: 20.5 % (ref 37–54)
HCT VFR BLD CALC: 20.8 % (ref 37–54)
HEMOGLOBIN: 5.6 G/DL (ref 12.5–16.5)
HEMOGLOBIN: 6.5 G/DL (ref 12.5–16.5)
HEMOGLOBIN: 6.9 G/DL (ref 12.5–16.5)
HHB: 1.8 % (ref 0–5)
IMMATURE GRANULOCYTES #: 0.1 E9/L
IMMATURE GRANULOCYTES %: 0.8 % (ref 0–5)
LAB: ABNORMAL
LYMPHOCYTES ABSOLUTE: 1.18 E9/L (ref 1.5–4)
LYMPHOCYTES RELATIVE PERCENT: 9.6 % (ref 20–42)
Lab: ABNORMAL
MAGNESIUM: 2.7 MG/DL (ref 1.6–2.6)
MCH RBC QN AUTO: 28.9 PG (ref 26–35)
MCHC RBC AUTO-ENTMCNC: 30.8 % (ref 32–34.5)
MCV RBC AUTO: 93.8 FL (ref 80–99.9)
METER GLUCOSE: 193 MG/DL (ref 74–99)
METER GLUCOSE: 198 MG/DL (ref 74–99)
METER GLUCOSE: 206 MG/DL (ref 74–99)
METER GLUCOSE: 222 MG/DL (ref 74–99)
METER GLUCOSE: 261 MG/DL (ref 74–99)
METER GLUCOSE: 263 MG/DL (ref 74–99)
METHB: 0.5 % (ref 0–1.5)
MODE: ABNORMAL
MONOCYTES ABSOLUTE: 0.82 E9/L (ref 0.1–0.95)
MONOCYTES RELATIVE PERCENT: 6.7 % (ref 2–12)
NEUTROPHILS ABSOLUTE: 9.84 E9/L (ref 1.8–7.3)
NEUTROPHILS RELATIVE PERCENT: 80.4 % (ref 43–80)
O2 SATURATION: 98.4 % (ref 92–98.5)
O2HB: 96.4 % (ref 94–97)
OPERATOR ID: 1768
PATIENT TEMP: 37 C
PCO2: 40.1 MMHG (ref 35–45)
PDW BLD-RTO: 15.5 FL (ref 11.5–15)
PH BLOOD GAS: 7.35 (ref 7.35–7.45)
PHOSPHORUS: 4 MG/DL (ref 2.5–4.5)
PLATELET # BLD: 259 E9/L (ref 130–450)
PMV BLD AUTO: 10.1 FL (ref 7–12)
PO2: 125.4 MMHG (ref 75–100)
POTASSIUM SERPL-SCNC: 5.2 MMOL/L (ref 3.5–5)
POTASSIUM SERPL-SCNC: 5.9 MMOL/L (ref 3.5–5)
PROSTATE SPECIFIC ANTIGEN: 1.13 NG/ML (ref 0–4)
RBC # BLD: 1.94 E12/L (ref 3.8–5.8)
SODIUM BLD-SCNC: 147 MMOL/L (ref 132–146)
SOURCE, BLOOD GAS: ABNORMAL
THB: 6.2 G/DL (ref 11.5–16.5)
TIME ANALYZED: 1017
TOTAL PROTEIN: 5.4 G/DL (ref 6.4–8.3)
TROPONIN, HIGH SENSITIVITY: 140 NG/L (ref 0–11)
TROPONIN, HIGH SENSITIVITY: 156 NG/L (ref 0–11)
TROPONIN, HIGH SENSITIVITY: 160 NG/L (ref 0–11)
TROPONIN, HIGH SENSITIVITY: 160 NG/L (ref 0–11)
WBC # BLD: 12.2 E9/L (ref 4.5–11.5)

## 2022-09-30 PROCEDURE — 82330 ASSAY OF CALCIUM: CPT

## 2022-09-30 PROCEDURE — 36600 WITHDRAWAL OF ARTERIAL BLOOD: CPT

## 2022-09-30 PROCEDURE — 6370000000 HC RX 637 (ALT 250 FOR IP): Performed by: NURSE PRACTITIONER

## 2022-09-30 PROCEDURE — A4216 STERILE WATER/SALINE, 10 ML: HCPCS | Performed by: NURSE PRACTITIONER

## 2022-09-30 PROCEDURE — 80053 COMPREHEN METABOLIC PANEL: CPT

## 2022-09-30 PROCEDURE — 6360000002 HC RX W HCPCS: Performed by: NURSE PRACTITIONER

## 2022-09-30 PROCEDURE — 97129 THER IVNTJ 1ST 15 MIN: CPT

## 2022-09-30 PROCEDURE — 6360000002 HC RX W HCPCS: Performed by: SURGERY

## 2022-09-30 PROCEDURE — 71045 X-RAY EXAM CHEST 1 VIEW: CPT

## 2022-09-30 PROCEDURE — 76770 US EXAM ABDO BACK WALL COMP: CPT

## 2022-09-30 PROCEDURE — 84100 ASSAY OF PHOSPHORUS: CPT

## 2022-09-30 PROCEDURE — 6360000002 HC RX W HCPCS: Performed by: STUDENT IN AN ORGANIZED HEALTH CARE EDUCATION/TRAINING PROGRAM

## 2022-09-30 PROCEDURE — 2700000000 HC OXYGEN THERAPY PER DAY

## 2022-09-30 PROCEDURE — 6360000002 HC RX W HCPCS: Performed by: INTERNAL MEDICINE

## 2022-09-30 PROCEDURE — 6370000000 HC RX 637 (ALT 250 FOR IP): Performed by: STUDENT IN AN ORGANIZED HEALTH CARE EDUCATION/TRAINING PROGRAM

## 2022-09-30 PROCEDURE — 6360000002 HC RX W HCPCS: Performed by: FAMILY MEDICINE

## 2022-09-30 PROCEDURE — C9113 INJ PANTOPRAZOLE SODIUM, VIA: HCPCS | Performed by: NURSE PRACTITIONER

## 2022-09-30 PROCEDURE — 84132 ASSAY OF SERUM POTASSIUM: CPT

## 2022-09-30 PROCEDURE — 2580000003 HC RX 258: Performed by: NURSE PRACTITIONER

## 2022-09-30 PROCEDURE — 6370000000 HC RX 637 (ALT 250 FOR IP): Performed by: INTERNAL MEDICINE

## 2022-09-30 PROCEDURE — 36430 TRANSFUSION BLD/BLD COMPNT: CPT

## 2022-09-30 PROCEDURE — 94640 AIRWAY INHALATION TREATMENT: CPT

## 2022-09-30 PROCEDURE — 2000000000 HC ICU R&B

## 2022-09-30 PROCEDURE — 99291 CRITICAL CARE FIRST HOUR: CPT | Performed by: SURGERY

## 2022-09-30 PROCEDURE — 83735 ASSAY OF MAGNESIUM: CPT

## 2022-09-30 PROCEDURE — 70450 CT HEAD/BRAIN W/O DYE: CPT

## 2022-09-30 PROCEDURE — 84484 ASSAY OF TROPONIN QUANT: CPT

## 2022-09-30 PROCEDURE — 84153 ASSAY OF PSA TOTAL: CPT

## 2022-09-30 PROCEDURE — 99233 SBSQ HOSP IP/OBS HIGH 50: CPT | Performed by: INTERNAL MEDICINE

## 2022-09-30 PROCEDURE — 82805 BLOOD GASES W/O2 SATURATION: CPT

## 2022-09-30 PROCEDURE — 82962 GLUCOSE BLOOD TEST: CPT

## 2022-09-30 PROCEDURE — 85018 HEMOGLOBIN: CPT

## 2022-09-30 PROCEDURE — 94660 CPAP INITIATION&MGMT: CPT

## 2022-09-30 PROCEDURE — 2500000003 HC RX 250 WO HCPCS: Performed by: STUDENT IN AN ORGANIZED HEALTH CARE EDUCATION/TRAINING PROGRAM

## 2022-09-30 PROCEDURE — 36415 COLL VENOUS BLD VENIPUNCTURE: CPT

## 2022-09-30 PROCEDURE — 85025 COMPLETE CBC W/AUTO DIFF WBC: CPT

## 2022-09-30 PROCEDURE — 85014 HEMATOCRIT: CPT

## 2022-09-30 PROCEDURE — 2580000003 HC RX 258: Performed by: SURGERY

## 2022-09-30 RX ORDER — FENTANYL CITRATE 50 UG/ML
50 INJECTION, SOLUTION INTRAMUSCULAR; INTRAVENOUS
Status: DISCONTINUED | OUTPATIENT
Start: 2022-09-30 | End: 2022-10-11

## 2022-09-30 RX ORDER — HYDROCODONE BITARTRATE AND ACETAMINOPHEN 7.5; 325 MG/1; MG/1
1 TABLET ORAL 2 TIMES DAILY
Status: DISCONTINUED | OUTPATIENT
Start: 2022-09-30 | End: 2022-10-15

## 2022-09-30 RX ORDER — FUROSEMIDE 10 MG/ML
40 INJECTION INTRAMUSCULAR; INTRAVENOUS ONCE
Status: COMPLETED | OUTPATIENT
Start: 2022-10-01 | End: 2022-10-01

## 2022-09-30 RX ORDER — BISACODYL 10 MG
10 SUPPOSITORY, RECTAL RECTAL
Status: COMPLETED | OUTPATIENT
Start: 2022-09-30 | End: 2022-09-30

## 2022-09-30 RX ORDER — FUROSEMIDE 10 MG/ML
40 INJECTION INTRAMUSCULAR; INTRAVENOUS ONCE
Status: COMPLETED | OUTPATIENT
Start: 2022-09-30 | End: 2022-09-30

## 2022-09-30 RX ORDER — SODIUM CHLORIDE 9 MG/ML
INJECTION, SOLUTION INTRAVENOUS PRN
Status: DISCONTINUED | OUTPATIENT
Start: 2022-09-30 | End: 2022-10-05

## 2022-09-30 RX ORDER — METHYLPREDNISOLONE SODIUM SUCCINATE 40 MG/ML
40 INJECTION, POWDER, LYOPHILIZED, FOR SOLUTION INTRAMUSCULAR; INTRAVENOUS EVERY 12 HOURS
Status: DISCONTINUED | OUTPATIENT
Start: 2022-09-30 | End: 2022-10-02

## 2022-09-30 RX ORDER — SODIUM CHLORIDE 9 MG/ML
INJECTION, SOLUTION INTRAVENOUS PRN
Status: DISCONTINUED | OUTPATIENT
Start: 2022-09-30 | End: 2022-10-10

## 2022-09-30 RX ORDER — INSULIN LISPRO 100 [IU]/ML
0-16 INJECTION, SOLUTION INTRAVENOUS; SUBCUTANEOUS
Status: DISCONTINUED | OUTPATIENT
Start: 2022-09-30 | End: 2022-10-20

## 2022-09-30 RX ORDER — LACTULOSE 10 G/15ML
20 SOLUTION ORAL 2 TIMES DAILY
Status: DISCONTINUED | OUTPATIENT
Start: 2022-09-30 | End: 2022-10-15

## 2022-09-30 RX ORDER — METOPROLOL SUCCINATE 25 MG/1
25 TABLET, EXTENDED RELEASE ORAL DAILY
Status: DISCONTINUED | OUTPATIENT
Start: 2022-09-30 | End: 2022-10-16

## 2022-09-30 RX ADMIN — HYDRALAZINE HYDROCHLORIDE 10 MG: 20 INJECTION INTRAMUSCULAR; INTRAVENOUS at 13:09

## 2022-09-30 RX ADMIN — LABETALOL HYDROCHLORIDE 10 MG: 5 INJECTION, SOLUTION INTRAVENOUS at 16:03

## 2022-09-30 RX ADMIN — FUROSEMIDE 40 MG: 10 INJECTION, SOLUTION INTRAMUSCULAR; INTRAVENOUS at 10:22

## 2022-09-30 RX ADMIN — IPRATROPIUM BROMIDE AND ALBUTEROL SULFATE 1 AMPULE: .5; 2.5 SOLUTION RESPIRATORY (INHALATION) at 10:00

## 2022-09-30 RX ADMIN — TICAGRELOR 90 MG: 90 TABLET ORAL at 09:21

## 2022-09-30 RX ADMIN — HYDROCODONE BITARTRATE AND ACETAMINOPHEN 1 TABLET: 7.5; 325 TABLET ORAL at 21:54

## 2022-09-30 RX ADMIN — CARBIDOPA AND LEVODOPA 1 TABLET: 25; 100 TABLET ORAL at 09:22

## 2022-09-30 RX ADMIN — LABETALOL HYDROCHLORIDE 10 MG: 5 INJECTION, SOLUTION INTRAVENOUS at 14:16

## 2022-09-30 RX ADMIN — BISACODYL 10 MG: 10 SUPPOSITORY RECTAL at 10:52

## 2022-09-30 RX ADMIN — LACTULOSE 20 G: 20 SOLUTION ORAL at 10:52

## 2022-09-30 RX ADMIN — INSULIN LISPRO 4 UNITS: 100 INJECTION, SOLUTION INTRAVENOUS; SUBCUTANEOUS at 05:32

## 2022-09-30 RX ADMIN — INSULIN LISPRO 4 UNITS: 100 INJECTION, SOLUTION INTRAVENOUS; SUBCUTANEOUS at 11:45

## 2022-09-30 RX ADMIN — GABAPENTIN 600 MG: 600 TABLET, FILM COATED ORAL at 21:54

## 2022-09-30 RX ADMIN — CARBIDOPA AND LEVODOPA 1 TABLET: 25; 100 TABLET ORAL at 21:54

## 2022-09-30 RX ADMIN — GABAPENTIN 600 MG: 600 TABLET, FILM COATED ORAL at 13:18

## 2022-09-30 RX ADMIN — IPRATROPIUM BROMIDE AND ALBUTEROL SULFATE 1 AMPULE: .5; 2.5 SOLUTION RESPIRATORY (INHALATION) at 17:37

## 2022-09-30 RX ADMIN — BENZONATATE 100 MG: 100 CAPSULE ORAL at 09:21

## 2022-09-30 RX ADMIN — INSULIN LISPRO 8 UNITS: 100 INJECTION, SOLUTION INTRAVENOUS; SUBCUTANEOUS at 18:11

## 2022-09-30 RX ADMIN — CETIRIZINE HYDROCHLORIDE 10 MG: 10 TABLET, FILM COATED ORAL at 09:21

## 2022-09-30 RX ADMIN — FENTANYL CITRATE 50 MCG: 50 INJECTION, SOLUTION INTRAMUSCULAR; INTRAVENOUS at 02:25

## 2022-09-30 RX ADMIN — METOPROLOL SUCCINATE 25 MG: 25 TABLET, EXTENDED RELEASE ORAL at 21:54

## 2022-09-30 RX ADMIN — IPRATROPIUM BROMIDE AND ALBUTEROL SULFATE 1 AMPULE: .5; 2.5 SOLUTION RESPIRATORY (INHALATION) at 21:06

## 2022-09-30 RX ADMIN — POLYETHYLENE GLYCOL 3350 17 G: 17 POWDER, FOR SOLUTION ORAL at 09:23

## 2022-09-30 RX ADMIN — GABAPENTIN 600 MG: 600 TABLET, FILM COATED ORAL at 09:21

## 2022-09-30 RX ADMIN — HYDRALAZINE HYDROCHLORIDE 10 MG: 20 INJECTION INTRAMUSCULAR; INTRAVENOUS at 16:27

## 2022-09-30 RX ADMIN — ATORVASTATIN CALCIUM 80 MG: 40 TABLET, FILM COATED ORAL at 21:54

## 2022-09-30 RX ADMIN — BENZONATATE 100 MG: 100 CAPSULE ORAL at 21:54

## 2022-09-30 RX ADMIN — TICAGRELOR 90 MG: 90 TABLET ORAL at 21:54

## 2022-09-30 RX ADMIN — SODIUM CHLORIDE, PRESERVATIVE FREE 40 MG: 5 INJECTION INTRAVENOUS at 16:08

## 2022-09-30 RX ADMIN — AMLODIPINE BESYLATE 5 MG: 5 TABLET ORAL at 09:28

## 2022-09-30 RX ADMIN — EZETIMIBE 10 MG: 10 TABLET ORAL at 09:22

## 2022-09-30 RX ADMIN — GABAPENTIN 600 MG: 600 TABLET, FILM COATED ORAL at 18:11

## 2022-09-30 RX ADMIN — BENZONATATE 100 MG: 100 CAPSULE ORAL at 13:18

## 2022-09-30 RX ADMIN — LABETALOL HYDROCHLORIDE 10 MG: 5 INJECTION, SOLUTION INTRAVENOUS at 11:57

## 2022-09-30 RX ADMIN — AMPICILLIN SODIUM AND SULBACTAM SODIUM 3000 MG: 2; 1 INJECTION, POWDER, FOR SOLUTION INTRAMUSCULAR; INTRAVENOUS at 22:20

## 2022-09-30 RX ADMIN — CARBIDOPA AND LEVODOPA 1 TABLET: 25; 100 TABLET ORAL at 13:20

## 2022-09-30 RX ADMIN — LABETALOL HYDROCHLORIDE 10 MG: 5 INJECTION, SOLUTION INTRAVENOUS at 19:03

## 2022-09-30 RX ADMIN — FENTANYL CITRATE 50 MCG: 50 INJECTION, SOLUTION INTRAMUSCULAR; INTRAVENOUS at 11:55

## 2022-09-30 RX ADMIN — BISACODYL 10 MG: 10 SUPPOSITORY RECTAL at 12:02

## 2022-09-30 RX ADMIN — INSULIN LISPRO 8 UNITS: 100 INJECTION, SOLUTION INTRAVENOUS; SUBCUTANEOUS at 22:21

## 2022-09-30 RX ADMIN — METHYLPREDNISOLONE SODIUM SUCCINATE 40 MG: 40 INJECTION, POWDER, FOR SOLUTION INTRAMUSCULAR; INTRAVENOUS at 10:31

## 2022-09-30 RX ADMIN — LABETALOL HYDROCHLORIDE 10 MG: 5 INJECTION, SOLUTION INTRAVENOUS at 18:34

## 2022-09-30 RX ADMIN — ASPIRIN 81 MG: 81 TABLET, COATED ORAL at 09:21

## 2022-09-30 RX ADMIN — METHYLPREDNISOLONE SODIUM SUCCINATE 40 MG: 40 INJECTION, POWDER, FOR SOLUTION INTRAMUSCULAR; INTRAVENOUS at 21:56

## 2022-09-30 RX ADMIN — AMPICILLIN SODIUM AND SULBACTAM SODIUM 3000 MG: 2; 1 INJECTION, POWDER, FOR SOLUTION INTRAMUSCULAR; INTRAVENOUS at 16:13

## 2022-09-30 RX ADMIN — HYDRALAZINE HYDROCHLORIDE 10 MG: 20 INJECTION INTRAMUSCULAR; INTRAVENOUS at 12:03

## 2022-09-30 ASSESSMENT — PAIN DESCRIPTION - LOCATION
LOCATION: FOOT
LOCATION: HEAD
LOCATION: FOOT
LOCATION: LEG

## 2022-09-30 ASSESSMENT — PAIN SCALES - GENERAL
PAINLEVEL_OUTOF10: 10
PAINLEVEL_OUTOF10: 8
PAINLEVEL_OUTOF10: 4
PAINLEVEL_OUTOF10: 10
PAINLEVEL_OUTOF10: 4
PAINLEVEL_OUTOF10: 9

## 2022-09-30 ASSESSMENT — PAIN DESCRIPTION - FREQUENCY
FREQUENCY: INTERMITTENT
FREQUENCY: CONTINUOUS
FREQUENCY: INTERMITTENT

## 2022-09-30 ASSESSMENT — PAIN DESCRIPTION - ONSET
ONSET: ON-GOING

## 2022-09-30 ASSESSMENT — PAIN DESCRIPTION - PAIN TYPE
TYPE: CHRONIC PAIN;ACUTE PAIN
TYPE: ACUTE PAIN;CHRONIC PAIN
TYPE: CHRONIC PAIN;ACUTE PAIN

## 2022-09-30 ASSESSMENT — PAIN - FUNCTIONAL ASSESSMENT
PAIN_FUNCTIONAL_ASSESSMENT: ACTIVITIES ARE NOT PREVENTED

## 2022-09-30 ASSESSMENT — PAIN DESCRIPTION - ORIENTATION
ORIENTATION: RIGHT
ORIENTATION: LEFT

## 2022-09-30 ASSESSMENT — PAIN DESCRIPTION - DESCRIPTORS
DESCRIPTORS: OTHER (COMMENT)
DESCRIPTORS: SORE;DISCOMFORT;ACHING

## 2022-09-30 NOTE — PROGRESS NOTES
Inpatient Cardiology Consultation  follow up visit    Reason for Consult: Elevated troponin and extensive vascular disease    Consulting Physician: Dr. Alex Calvillo    Requesting Physician:  Trevor Leach    Date of Consultation: 9/30/2022      Objective: Still with droping in hemoglobin. Past medical history  Smoker hx and quit 25 years ago  COPD  obese  Hypertension  Hyperlipidemia  Diabetes, insulin requiring  Depression  Parkinson's disease  MI, details unknown and records are pending  Peripheral vascular disease, PAD with a femoral artery stent 12/22    <div>74year-old white male with chronic ulceration of the right lower extremity who in December underwent right SFA angioplasty stent and popliteal artery arthrectomy and peroneal angioplasty then had a thrombosis and underwent thrombolysis with right tibioperoneal trunk arthrectomy and right peroneal angioplasty stent is anterior tibial goes down to the foot the ulceration has deteriorated in his right leg and he had an arterial duplex ultrasound performed which shows new high-grade stenosis proximal to the right SFA stent and he now presents for urgent arteriogram for limb salvage</div>     Chronic back pain and neck injury and uses opioids  Ambulates with a cane  CVA X 3 with right-sided weakness  Obstructive sleep apnea and noncompliant with CPAP  COVID-19 infection in 2020  Chronic kidney disease      Medications Prior to admit:  Prior to Admission medications    Medication Sig Start Date End Date Taking?  Authorizing Provider   apixaban (ELIQUIS) 5 MG TABS tablet Take 5 mg by mouth 2 times daily   Yes Historical Provider, MD   aspirin 81 MG chewable tablet Take 81 mg by mouth daily   Yes Historical Provider, MD   carbidopa-levodopa (SINEMET)  MG per tablet Take 1 tablet by mouth 3 times daily   Yes Historical Provider, MD   carvedilol (COREG) 25 MG tablet Take 25 mg by mouth 2 times daily (with meals)   Yes Historical Provider, MD   cetirizine (ZYRTEC) 10 MG tablet Take 10 mg by mouth daily   Yes Historical Provider, MD   clopidogrel (PLAVIX) 75 MG tablet Take 75 mg by mouth daily   Yes Historical Provider, MD   diphenhydrAMINE (BENADRYL) 25 MG capsule Take 25 mg by mouth every 6 hours   Yes Historical Provider, MD   ezetimibe (ZETIA) 10 MG tablet Take 10 mg by mouth daily   Yes Historical Provider, MD   fluticasone (FLONASE) 50 MCG/ACT nasal spray 2 sprays by Each Nostril route daily   Yes Historical Provider, MD   gabapentin (NEURONTIN) 600 MG tablet Take 600 mg by mouth 4 times daily. Yes Historical Provider, MD   HYDROcodone-acetaminophen (NORCO) 5-325 MG per tablet Take 1 tablet by mouth 2 times daily. Yes Historical Provider, MD   Insulin Glargine, 2 Unit Dial, (TOUJEO MAX SOLOSTAR) 300 UNIT/ML SOPN Inject 66 Units into the skin daily   Yes Historical Provider, MD   piperacillin-tazobactam (ZOSYN) 3-0.375 GM per 50ML IVPB extended infusion Infuse 4.5 mg intravenously in the morning and 4.5 mg at noon and 4.5 mg in the evening. Yes Historical Provider, MD   acetaminophen (TYLENOL) 325 MG tablet Take 650 mg by mouth every 6 hours as needed for Pain   Yes Historical Provider, MD   amLODIPine (NORVASC) 5 MG tablet Take 5 mg by mouth daily   Yes Historical Provider, MD   benzonatate (TESSALON) 100 MG capsule Take 100 mg by mouth 3 times daily   Yes Historical Provider, MD   cloNIDine (CATAPRES) 0.1 MG tablet Take 0.1 mg by mouth in the morning and 0.1 mg in the evening.    Yes Historical Provider, MD   hydrALAZINE (APRESOLINE) 100 MG tablet Take 100 mg by mouth 3 times daily   Yes Historical Provider, MD       Current Medications:    Current Facility-Administered Medications: fentaNYL (SUBLIMAZE) injection 50 mcg, 50 mcg, IntraVENous, Q2H PRN  0.9 % sodium chloride infusion, , IntraVENous, PRN  methylPREDNISolone sodium (SOLU-MEDROL) injection 40 mg, 40 mg, IntraVENous, Q12H  lactulose (CHRONULAC) 10 GM/15ML solution 20 g, 20 g, Oral, BID  insulin lispro (HUMALOG) injection vial 0-16 Units, 0-16 Units, SubCUTAneous, 4x Daily AC & HS  HYDROcodone-acetaminophen (NORCO) 7.5-325 MG per tablet 1 tablet, 1 tablet, Oral, BID  0.9 % sodium chloride infusion, , IntraVENous, PRN  ampicillin-sulbactam (UNASYN) 3000 mg in 100 mL NS IVPB minibag, 3,000 mg, IntraVENous, Q6H  pantoprazole (PROTONIX) 40 mg in sodium chloride (PF) 10 mL injection, 40 mg, IntraVENous, Q12H  sennosides (SENOKOT) 8.8 MG/5ML syrup 5 mL, 5 mL, Oral, Nightly  labetalol (NORMODYNE;TRANDATE) injection 10 mg, 10 mg, IntraVENous, Q30 Min PRN  hydrALAZINE (APRESOLINE) injection 10 mg, 10 mg, IntraVENous, Q30 Min PRN  glucose chewable tablet 16 g, 4 tablet, Oral, PRN  dextrose bolus 10% 125 mL, 125 mL, IntraVENous, PRN **OR** dextrose bolus 10% 250 mL, 250 mL, IntraVENous, PRN  glucagon (rDNA) injection 1 mg, 1 mg, SubCUTAneous, PRN  dextrose 10 % infusion, , IntraVENous, Continuous PRN  carbidopa-levodopa (SINEMET)  MG per tablet 1 tablet, 1 tablet, Per NG tube, TID  amLODIPine (NORVASC) tablet 5 mg, 5 mg, Per NG tube, Daily  ipratropium-albuterol (DUONEB) nebulizer solution 1 ampule, 1 ampule, Inhalation, Q4H WA  collagenase ointment, , Topical, Q MWF  benzonatate (TESSALON) capsule 100 mg, 100 mg, Oral, TID  cetirizine (ZYRTEC) tablet 10 mg, 10 mg, Oral, Daily  ezetimibe (ZETIA) tablet 10 mg, 10 mg, Oral, Daily  gabapentin (NEURONTIN) tablet 600 mg, 600 mg, Oral, 4x Daily  ticagrelor (BRILINTA) tablet 90 mg, 90 mg, Oral, BID  ondansetron (ZOFRAN-ODT) disintegrating tablet 4 mg, 4 mg, Oral, Q8H PRN **OR** ondansetron (ZOFRAN) injection 4 mg, 4 mg, IntraVENous, Q6H PRN  aspirin EC tablet 81 mg, 81 mg, Oral, Daily **OR** aspirin suppository 300 mg, 300 mg, Rectal, Daily  atorvastatin (LIPITOR) tablet 80 mg, 80 mg, Oral, Nightly    Allergies:  Codeine    Social History: History of tobacco abuse but quit 25 years ago and occasional alcohol and no illicit drugs and has a partner,       Family History:   History reviewed. No pertinent family history. REVIEW OF SYSTEMS:   Unable  PHYSICAL EXAM:   BP (!) 174/71   Pulse 80   Temp 98.8 °F (37.1 °C) (Oral)   Resp 21   Ht 5' 8\" (1.727 m)   Wt 260 lb 8 oz (118.2 kg)   SpO2 100%   BMI 39.61 kg/m²   CONST:  Well developed, well nourished who appears of stated age. Awake, alert and cooperative. No apparent distress. HEENT:   Head- Normocephalic, atraumatic   Eyes- Conjunctivae pink, anicteric  Throat- Oral mucosa pink and moist, orally intubated and orogastric tube  Neck-  No stridor, trachea midline, no jugular venous distention. No carotid bruit. CHEST: Chest symmetrical and non-tender to palpation. No accessory muscle use or intercostal retractions  RESPIRATORY: Lung sounds - clear throughout fields   CARDIOVASCULAR:     Heart Inspection- shows no noted pulsations  Heart Palpation- no heaves or thrills; PMI is non-displaced   Heart Ausculation- Regular rate and rhythm, no murmur. No s3, s4 or rub   PV: No lower extremity edema extremities but upper extremities swollen worse on the right in the low no varicosities. Pedal pulses palpable, no clubbing or cyanosis with dressing to right foot and Ace wrap  ABDOMEN: Soft, non-tender to light palpation. Bowel sounds present. No palpable masses no organomegaly; no abdominal bruit  MS:  No atrophy or abnormal movements.    :   clear yellow urine Mahan catheter  SKIN: Warm and dry no statis dermatitis or ulcers   NEURO / PSYCH: Sedated    DATA:    ECG / Tele strips: Sinus rhythm  Diagnostic:      Intake/Output Summary (Last 24 hours) at 9/30/2022 1832  Last data filed at 9/30/2022 1307  Gross per 24 hour   Intake 425 ml   Output 2200 ml   Net -1775 ml       Labs:   CBC:   Recent Labs     09/29/22  0415 09/30/22  0530 09/30/22  1237   WBC 9.5 12.2*  --    HGB 7.0* 5.6* 6.5*   HCT 21.8* 18.2* 20.5*    259  --      BMP:   Recent Labs     09/29/22  0415 09/30/22  0530    147*   K 4.2 5.9*   CO2 20* 20*   BUN 80* 94*   CREATININE 2.8* 2.8*   LABGLOM 23 23   CALCIUM 8.1* 8.3*     Mag:   Recent Labs     09/29/22  0415 09/30/22  0530   MG 2.4 2.7*     Phos:   Recent Labs     09/29/22  0415 09/30/22  0530   PHOS 3.6 4.0     TFT: No results found for: TSH, Z7RCLHN, E6UNHSP, THYROIDAB, FT3, T4FREE   HgA1c:   Lab Results   Component Value Date/Time    LABA1C 6.4 09/28/2022 04:45 AM     No results found for: EAG  proBNP: No results found for: PROBNP  PT/INR:   No results for input(s): PROTIME, INR in the last 72 hours. APTT:No results for input(s): APTT in the last 72 hours. TROPONIN:  Lab Results   Component Value Date/Time    TROPHS 160 09/30/2022 12:37 PM    TROPHS 156 09/30/2022 08:14 AM    TROPHS 160 09/30/2022 12:50 AM    TROPHS 143 09/29/2022 04:30 PM    TROPHS 161 09/29/2022 07:50 AM     CK:No results found for: CKTOTAL  FASTING LIPID PANEL:No results found for: CHOL, HDL, LDLDIRECT, LDLCALC, TRIG  LIVER PROFILE:  Recent Labs     09/29/22 0415 09/30/22  0530   AST 14 11   ALT <5 <5   LABALBU 2.7* 2.6*       Transthoracic echocardiogram September 27, 2022    Severe concentric left ventricular hypertrophy. Ejection fraction is visually estimated at 60 to 65%. Left ventricular diastolic filling is elevated . Mildly dilated right ventricle with normal systolic function. Agitated saline injected for shunt evaluation was technically difficult due to pt being on vent and lack of corperation. Please obtain  limited echo with Bubble study once pt is extubated and cooperative. Mild mitral regurgitation is present. Mild tricuspid regurgitation. RVSP is 42 mmHg. Physiologic and/or trace, Mild pulmonic regurgitation present. Impressions/Plan  Elevated troponin, troponin leak in the setting of hypoxic respiratory failure, severe anemia, strokelike symptoms.     Etiology of troponin leak due to significant anemia   Echo shows normal systolic function  Continue on statin and and beta-blocker   Obtain outpatient cardiology records to guide therapy    Acute strokelike symptoms with right-sided paralysis and aphasia and left ICA stenosis and status post left ICA angioplasty and stent  Management per neurology    Anemia  Still with drop in hemoglobin  Management per primary service      PAD of the lower extremities and was on oral anticoagulation triple therapy, now Eliquis has been stopped and he is on Brilinta and aspirin for history of right SFA angioplasty stent and popliteal artery arthrectomy and peroneal angioplasty and stent status post thrombosis and had thrombolysis of right tibioperoneal trunk atherectomy and right peroneal angioplasty stent with vascular studies right lower extremity showing new high-grade stenosis proximal to the right SFA stents  Management per vascular surgery    Renal insufficiency  Severe LVH  Further evaluation and outpatient when patient is stable perhaps with cardiac MRI if not has been done previously    Diabetes which is insulin requiring  Diabetic foot ulcer right lower extremity status post wound VAC  History of tobacco abuse and COPD  Obesity  Parkinson's disease  Questionable MI history  Chronic back pain  History of 3 prior CVAs  History of obstructive sleep apnea noncompliant with CPAP  Renal insufficiency        Electronically signed by Va Hall MD on 9/30/2022 at 6:32 PM

## 2022-09-30 NOTE — PROGRESS NOTES
Hospitalist Progress Note      SYNOPSIS: Patient admitted on 2022 for Acute cerebrovascular accident (CVA) (HCC)Patient presented to [facility] with strokelike symptoms, right-sided paralysis and aphasia. Was found to have severe left ICA stenosis and taken to IR for left ICA angioplasty with stent placement. Patient was intubated and sedated and transferred to the ICU. Vital signs are within normal limits and stable. Patient is afebrile. Laboratory studies demonstrate potassium 5.2, BUN 89, creatinine 2.9, glucose 106, troponin 153, hemoglobin 5.0. Patient transfused PRBC. Neurology, cardiology, podiatry following. MRI showed There are 2 small regions of petechial infarcts one in the left posterior frontal lobe and a second in the left parietal lobe not exceeding 3 mm. There is otherwise extensive small vessel ischemic changes. Extubated . SUBJECTIVE:  Stable overnight. No other overnight issues reported. Patient seen and examined  Records reviewed. Anemic this am  Complains of SOB however O2 sat 96% and oxygen is off his face on room air. Difficulty with word finding      Temp (24hrs), Av.9 °F (36.6 °C), Min:97.1 °F (36.2 °C), Max:98.6 °F (37 °C)    DIET: ADULT DIET; Dysphagia - Minced and Moist; 3 carb choices (45 gm/meal); no rice, loves pudding  CODE: Full Code    Intake/Output Summary (Last 24 hours) at 2022 0914  Last data filed at 2022 0600  Gross per 24 hour   Intake 0 ml   Output 1375 ml   Net -1375 ml       Review of Systems  All bolded are positive; please see HPI  General:  Fever, chills, diaphoresis, fatigue, malaise, night sweats, weight loss  Psychological:  Anxiety, disorientation, hallucinations. ENT:  Epistaxis, headaches, vertigo, visual changes. Cardiovascular:  Chest pain, irregular heartbeats, palpitations, paroxysmal nocturnal dyspnea.   Respiratory:  Shortness of breath, coughing, sputum production, hemoptysis, wheezing, sennosides  5 mL Oral Nightly    insulin lispro  0-16 Units SubCUTAneous Q4H    carbidopa-levodopa  1 tablet Per NG tube TID    amLODIPine  5 mg Per NG tube Daily    ipratropium-albuterol  1 ampule Inhalation Q4H WA    collagenase   Topical Q MWF    benzonatate  100 mg Oral TID    cetirizine  10 mg Oral Daily    ezetimibe  10 mg Oral Daily    gabapentin  600 mg Oral 4x Daily    ticagrelor  90 mg Oral BID    aspirin  81 mg Oral Daily    Or    aspirin  300 mg Rectal Daily    atorvastatin  80 mg Oral Nightly    polyethylene glycol  17 g Oral Daily     PRN Meds: fentanNYL, sodium chloride, labetalol, hydrALAZINE, glucose, dextrose bolus **OR** dextrose bolus, glucagon (rDNA), dextrose, ondansetron **OR** ondansetron    Labs:     Recent Labs     09/28/22 0445 09/29/22 0415 09/30/22  0530   WBC 9.4 9.5 12.2*   HGB 7.6* 7.0* 5.6*   HCT 23.3* 21.8* 18.2*    258 259       Recent Labs     09/28/22 0445 09/29/22 0415 09/30/22  0530    140 147*   K 4.7 4.2 5.9*   * 110* 116*   CO2 21* 20* 20*   BUN 83* 80* 94*   CREATININE 2.7* 2.8* 2.8*   CALCIUM 8.1* 8.1* 8.3*   PHOS 3.5 3.6 4.0       Recent Labs     09/28/22 0445 09/29/22 0415 09/30/22  0530   PROT 5.2* 5.0* 5.4*   ALKPHOS 144* 149* 151*   ALT <5 <5 <5   AST 12 14 11   BILITOT 0.4 0.3 0.3       Recent Labs     09/27/22  1312   INR 2.1       No results for input(s): CKTOTAL, TROPONINI in the last 72 hours. Chronic labs:    Lab Results   Component Value Date    INR 2.1 09/27/2022    LABA1C 6.4 (H) 09/28/2022       Radiology: REVIEWED DAILY    +++++++++++++++++++++++++++++++++++++++++++++++++  DO Emil Brown Physician - [address] [place], [place]  +++++++++++++++++++++++++++++++++++++++++++++++++  NOTE: This report was transcribed using voice recognition software. Every effort was made to ensure accuracy; however, inadvertent computerized transcription errors may be present.

## 2022-09-30 NOTE — CONSULTS
Nephrology Consult Note  Patient's Name: Torie Herrera  1:25 PM  9/30/2022        Reason for Consult:  Acute kidney injury/CKD  Requesting Physician:  Elton Cowden, MD    Chief Complaint:  Stroke  History Obtained From:  EHR    History of Present Ilness:    Torie Herrera is a 74 y.o. male history of CAD s/p MI, hypertension, chronic kidney disease stage IIIa (baseline creatinine of recent 1.7-1.9) he is followed by our group with Dr. Erica Barreto. He initially was admitted to [facility] with right lower extremity wound. .  Patient subsequently developed aphasia noted to facial droop associated with right-sided weakness. He was transferred to [address] [place] [address] for further management. Patient was admitted told the neuro ICU. He required intubation with mechanical ventilation. CT of the head and neck demonstrated bilateral carotid stenosis with the left ICA being dominant. IR performed angiogram with angioplasty of the left ICA. Laboratory data showed progressive increase in his serum creatinine to currently 2.9 mg/dL. Hemoglobin was noted to be 5.6. He has received at least 2 units of packed cells but with further drop in his hemoglobin with requirement for additional transfusion. He had an episode of urinary retention with gross hematuria Mahan catheter was reinserted and the hematuria subsequently cleared. Renal is consulted for LARY.     Past Medical History:   Diagnosis Date    Chronic back pain     CKD (chronic kidney disease)     CVA (cerebral vascular accident) ([address]     CVA (cerebral vascular accident) ([address]     DM (diabetes mellitus) ([address]     Major depression     MI (myocardial infarction) ([address]     MATT (obstructive sleep apnea)     Parkinson disease ([address]     PVD (peripheral vascular disease) ([address]     Seizure ([address]        Past Surgical History:   Procedure Laterality Date    FEMORAL ARTERY STENT      IR CAROTID STENT UNI W PROTECTION  9/27/2022    IR CAROTID STENT UNI W PROTECTION 9/27/2022 Ilya Pennington Carolina Cano MD [facility]       History reviewed. No pertinent family history. Allergies:  Codeine    Current Medications:    fentaNYL (SUBLIMAZE) injection 50 mcg, Q2H PRN  0.9 % sodium chloride infusion, PRN  methylPREDNISolone sodium (SOLU-MEDROL) injection 40 mg, Q12H  lactulose (CHRONULAC) 10 GM/15ML solution 20 g, BID  insulin lispro (HUMALOG) injection vial 0-16 Units, 4x Daily AC & HS  sennosides (SENOKOT) 8.8 MG/5ML syrup 5 mL, Nightly  labetalol (NORMODYNE;TRANDATE) injection 10 mg, Q30 Min PRN  hydrALAZINE (APRESOLINE) injection 10 mg, Q30 Min PRN  glucose chewable tablet 16 g, PRN  dextrose bolus 10% 125 mL, PRN   Or  dextrose bolus 10% 250 mL, PRN  glucagon (rDNA) injection 1 mg, PRN  dextrose 10 % infusion, Continuous PRN  carbidopa-levodopa (SINEMET)  MG per tablet 1 tablet, TID  amLODIPine (NORVASC) tablet 5 mg, Daily  ipratropium-albuterol (DUONEB) nebulizer solution 1 ampule, Q4H WA  collagenase ointment, Q MWF  benzonatate (TESSALON) capsule 100 mg, TID  cetirizine (ZYRTEC) tablet 10 mg, Daily  ezetimibe (ZETIA) tablet 10 mg, Daily  gabapentin (NEURONTIN) tablet 600 mg, 4x Daily  ticagrelor (BRILINTA) tablet 90 mg, BID  ondansetron (ZOFRAN-ODT) disintegrating tablet 4 mg, Q8H PRN   Or  ondansetron (ZOFRAN) injection 4 mg, Q6H PRN  aspirin EC tablet 81 mg, Daily   Or  aspirin suppository 300 mg, Daily  atorvastatin (LIPITOR) tablet 80 mg, Nightly        Review of Systems:   Pertinent items are noted in HPI. Physical exam:  Vitals:    09/30/22 1309   BP: (!) 171/91   Pulse:    Resp:    Temp:    SpO2:            General: alert, agitated  Eyes: PERRL. No sclera icterus. No conjunctival injection. ENT: No discharge. Pharynx clear. Neck: Trachea midline. Normal thyroid. Lungs: No accessory muscle use. No crackles. No wheezing. No rhonchi. CV: Regular rate. Regular rhythm. No murmur or rub. .   Abd: Non-tender. Non-distended. No masses. No organmegaly. Normal bowel sounds. Skin: Warm and dry. No nodule on exposed extremities. No rash on exposed extremities.   Ext: No cyanosis, clubbing, edema   Neuro: alert, agitated, oriented to person and time;moving all extremities      Data:   Labs:  Lab Results   Component Value Date     (H) 09/30/2022     09/29/2022     09/28/2022    K 5.9 (H) 09/30/2022    K 4.2 09/29/2022    K 4.7 09/28/2022     (H) 09/30/2022    CO2 20 (L) 09/30/2022    CO2 20 (L) 09/29/2022    CO2 21 (L) 09/28/2022    CREATININE 2.8 (H) 09/30/2022    CREATININE 2.8 (H) 09/29/2022    CREATININE 2.7 (H) 09/28/2022    BUN 94 (H) 09/30/2022    BUN 80 (H) 09/29/2022    BUN 83 (H) 09/28/2022    GLUCOSE 194 (H) 09/30/2022    GLUCOSE 105 (H) 09/29/2022    GLUCOSE 107 (H) 09/28/2022    PHOS 4.0 09/30/2022    PHOS 3.6 09/29/2022    PHOS 3.5 09/28/2022    WBC 12.2 (H) 09/30/2022    WBC 9.5 09/29/2022    WBC 9.4 09/28/2022    HGB 6.5 (LL) 09/30/2022    HGB 5.6 (LL) 09/30/2022    HGB 7.0 (L) 09/29/2022    HCT 20.5 (L) 09/30/2022    HCT 18.2 (L) 09/30/2022    HCT 21.8 (L) 09/29/2022    MCV 93.8 09/30/2022     09/30/2022         Imaging:  Echo Complete    Result Date: 9/28/2022  Transthoracic Echocardiography Report (TTE)  Demographics   Patient Name    Milton Jean-Baptiste  Gender            Male                  L   Medical Record  [id number]     Room Number       1192  Number   Account #       [de-identified]    Procedure Date    09/28/2022   Corporate ID                 Ordering                               Physician   Accession       [id number]   Referring  Number                       Physician   Date of Birth   1955   Sonographer       Rosalba Leblanc RDCS   Age             74 year(s)   Interpreting      [address]                               Physician         Physician Cardiology                                                 Domonique Thompson MD                                Any Other  Procedure Type of Study   TTE procedure  Procedure Date Date: 09/28/2022 Start: 08:56 AM Study Location: Portable Technical Quality: Adequate visualization Indications:CVA. Patient Status: Routine Contrast Medium: Definity and Bubble Study. Height: 69 inches Weight: 260 pounds BSA: 2.31 m^2 BMI: 38.39 kg/m^2 HR: 66 bpm BP: 167/61 mmHg  Findings   Left Ventricle  Severe concentric left ventricular hypertrophy. Ejection fraction is visually estimated at 60 to 65%. Left ventricular diastolic filling is elevated . Right Ventricle  Mildly dilated right ventricle with normal systolic function. Left Atrium  Normal sized left atrium. Right Atrium  Agitated saline injected for shunt evaluation was technically difficult  due to pt being on vent and lack of corperation. Please obtain limited  echo with Bubble study once pt is extubated and cooperative. Mitral Valve  Mild mitral regurgitation is present. Tricuspid Valve  Mild tricuspid regurgitation. RVSP is 42 mmHg. Aortic Valve  The aortic valve is trileaflet. Aortic valve opens well. Pulmonic Valve  Physiologic and/or trace, Mild pulmonic regurgitation present. Aorta  Aortic root dimension within normal limits. Conclusions   Summary  Severe concentric left ventricular hypertrophy. Ejection fraction is visually estimated at 60 to 65%. Left ventricular diastolic filling is elevated . Mildly dilated right ventricle with normal systolic function. Agitated saline injected for shunt evaluation was technically difficult  due to pt being on vent and lack of corperation. Please obtain limited  echo with Bubble study once pt is extubated and cooperative. Mild mitral regurgitation is present. Mild tricuspid regurgitation. RVSP is 42 mmHg. Physiologic and/or trace, Mild pulmonic regurgitation present. Signature   --------------------------------------------------------  --------    Assessment/Plans    1. Acute CVA  S/p IR intervention with angioplasty of the left ICA    2. LARY on CKD stage 3B;  LARY due to the combined effects of ACEI use and contrast nephrotoxicity. Component of urinary retention; He is nonoliguric at this time  Will check urine indices  Monitor labs and urine output  Adjust meds for level of kidney    3. Hypertensive emergency presenting  with acute CVA  Adjust meds to target blood pressure control to SBP less than 160  Adjust BP meds    4. Anemia, chronic with acute worsening  PRBC transfusion as needed  Hemoccult stools R/O blood loss  Will start ASHLEY once BP is better controlled    5. Type 2 diabetes mellitus  Monitor glucose levels    6. Hyperkalemia  Due to combination of LARY and metabolic acidosis  Repeat K level    7.  Urinary retention    D/w ICU NP    Brandy Jean MD  1:25 PM  9/30/2022

## 2022-09-30 NOTE — PROGRESS NOTES
Department of Podiatry   Progress Note      Patient seen at bedside. No acute events , Continue wound vac to right foot. Vac intact running continuous pressure. Patient moved to 4th floor. No new foot complaints      Past Medical History:            Diagnosis Date    Chronic back pain     CKD (chronic kidney disease)     CVA (cerebral vascular accident) ([facility]     [facility]A (cere[facility] accident) ([facility]     DM (diabetes mellitus) ([facility]     Major depression     MI (myocardial infarction) ([facility]     MATT (obstructive sleep apnea)     Parkinson disease ([facility]     PVD (peripheral vascular disease) ([facility]     Seizure ([facility]        Past Surgical History:        Procedure Laterality Date    FEMORAL ARTERY STENT      IR CAROTID STENT UNI W PROTECTION  9/27/2022    IR CAROTID STENT UNI W PROTECTION 9/27/2022 Rl Lugo MD [facility]       Medications Prior to Admission:    Medications Prior to Admission: apixaban (ELIQUIS) 5 MG TABS tablet, Take 5 mg by mouth 2 times daily  aspirin 81 MG chewable tablet, Take 81 mg by mouth daily  carbidopa-levodopa (SINEMET)  MG per tablet, Take 1 tablet by mouth 3 times daily  carvedilol (COREG) 25 MG tablet, Take 25 mg by mouth 2 times daily (with meals)  cetirizine (ZYRTEC) 10 MG tablet, Take 10 mg by mouth daily  clopidogrel (PLAVIX) 75 MG tablet, Take 75 mg by mouth daily  diphenhydrAMINE (BENADRYL) 25 MG capsule, Take 25 mg by mouth every 6 hours  ezetimibe (ZETIA) 10 MG tablet, Take 10 mg by mouth daily  fluticasone (FLONASE) 50 MCG/ACT nasal spray, 2 sprays by Each Nostril route daily  gabapentin (NEURONTIN) 600 MG tablet, Take 600 mg by mouth 4 times daily. HYDROcodone-acetaminophen (NORCO) 5-325 MG per tablet, Take 1 tablet by mouth 2 times daily.   Insulin Glargine, 2 Unit Dial, (TOUJEO MAX SOLOSTAR) 300 UNIT/ML SOPN, Inject 66 Units into the skin daily  piperacillin-tazobactam (ZOSYN) 3-0.375 GM per 50ML IVPB extended infusion, Infuse 4.5 mg intravenously in the morning and 4.5 mg at noon and 4.5 mg in the evening. acetaminophen (TYLENOL) 325 MG tablet, Take 650 mg by mouth every 6 hours as needed for Pain  amLODIPine (NORVASC) 5 MG tablet, Take 5 mg by mouth daily  benzonatate (TESSALON) 100 MG capsule, Take 100 mg by mouth 3 times daily  cloNIDine (CATAPRES) 0.1 MG tablet, Take 0.1 mg by mouth in the morning and 0.1 mg in the evening. hydrALAZINE (APRESOLINE) 100 MG tablet, Take 100 mg by mouth 3 times daily    Allergies:  Codeine    Social History:   TOBACCO:   has no history on file for tobacco use. ETOH:   has no history on file for alcohol use. DRUGS:   Social History     Substance and Sexual Activity   Drug Use Not on file       Family History:   History reviewed. No pertinent family history. REVIEW OF SYSTEMS:    All pertinent positives and negatives as noted in HPI       LOWER EXTREMITY EXAMINATION   Wound vac remains intact    Previous Exam:  VASCULAR:  DP and PT pulses are non palpable. CFT < 5 seconds B/L. Warm to warm from the tibial tuberosity to the distal aspect of the digits dorsally. NEUROLOGIC:  Protective sensation is diminished by grossly intact    DERM:  Right foot lateral plantar wound measuring approx 6cm in diameter. No erythema, serosanguinous drainage, no malodor.     MUSCULOSKELETAL: deferred           CONSULTS:  IP CONSULT TO CRITICAL CARE  IP CONSULT TO DIETITIAN  IP CONSULT TO CARDIOLOGY  IP CONSULT TO PODIATRY  IP CONSULT TO PODIATRY  IP CONSULT TO UROLOGY    MEDICATION:  Scheduled Meds:   sennosides  5 mL Oral Nightly    insulin lispro  0-16 Units SubCUTAneous Q4H    carbidopa-levodopa  1 tablet Per NG tube TID    amLODIPine  5 mg Per NG tube Daily    ipratropium-albuterol  1 ampule Inhalation Q4H WA    collagenase   Topical Q MWF    benzonatate  100 mg Oral TID    cetirizine  10 mg Oral Daily    ezetimibe  10 mg Oral Daily    gabapentin  600 mg Oral 4x Daily    ticagrelor  90 mg Oral BID    aspirin  81 mg Oral Daily    Or aspirin  300 mg Rectal Daily    atorvastatin  80 mg Oral Nightly    polyethylene glycol  17 g Oral Daily     Continuous Infusions:   sodium chloride      dextrose       PRN Meds:.fentanNYL, sodium chloride, labetalol, hydrALAZINE, glucose, dextrose bolus **OR** dextrose bolus, glucagon (rDNA), dextrose, ondansetron **OR** ondansetron    RADIOLOGY:  XR CHEST PORTABLE   Final Result   Unchanged bilateral atelectasis and or infiltrate as well as small right   pleural effusion. MRI BRAIN WO CONTRAST   Final Result   There are 2 small regions of petechial infarcts one in the left   posterior frontal lobe and a second in the left parietal lobe not   exceeding 3 mm. There is otherwise extensive small vessel ischemic   changes         XR CHEST PORTABLE   Final Result   1. Multifocal bilateral airspace disease more prominent within the lower   lobes. The airspace disease is unchanged when compared with the prior study. CT HEAD WO CONTRAST   Final Result   Diffuse atrophy likely age related   Findings compatible with small vessel ischemic changes. XR CHEST PORTABLE   Final Result   1. Pulmonary opacities present favoring edema and atelectasis, also small   pleural effusions, but nonspecific with some additional considerations noted   above   2. Support devices present as described above   3. Heart size appears borderline enlarged         XR ABDOMEN FOR NG/OG/NE TUBE PLACEMENT   Final Result   NG/OG is in the stomach. IR CAROTID STENT W PROTECTION   Final Result   1. Angiogram demonstrates successful placement of a carotid stent ,   post procedure images demonstrate a widely patent stent and internal   carotid         CT HEAD WO CONTRAST   Final Result   Diffuse atrophy likely age related   Findings compatible with small vessel ischemic changes.    Findings were called at the time of dictation         CT BRAIN PERFUSION   Final Result      No significant ischemic penumbra identified This study was analyzed by the [address] ai algorithm. CTA NECK W CONTRAST   Final Result   1. Estimated stenosis of the proximal right and left internal carotid   artery by NASCET criteria is greater than 90% on the left   2. Severe atherosclerotic disease . 3. No large vessel occlusion identified            This study was analyzed by the [employer] ai algorithm. CTA HEAD W CONTRAST   Final Result   1. Estimated stenosis of the proximal right and left internal carotid   artery by NASCET criteria is greater than 90% on the left   2. Severe atherosclerotic disease . 3. No large vessel occlusion identified            This study was analyzed by the [employer]. ai algorithm. Vitals:    BP (!) 148/71   Pulse 79   Temp 98.6 °F (37 °C) (Oral)   Resp 20   Ht 5' 8\" (1.727 m)   Wt 260 lb 8 oz (118.2 kg)   SpO2 98%   BMI 39.61 kg/m²     LABS:   Recent Labs     09/29/22  0415 09/30/22  0530   WBC 9.5 12.2*   HGB 7.0* 5.6*   HCT 21.8* 18.2*    259     Recent Labs     09/30/22  0530   *   K 5.9*   *   CO2 20*   PHOS 4.0   BUN 94*   CREATININE 2.8*     Recent Labs     09/27/22  1312 09/28/22  0445 09/29/22  0415 09/30/22  0530   PROT 5.3*   < > 5.0* 5.4*   INR 2.1  --   --   --     < > = values in this interval not displayed. ASSESSMENTS:   1. Right foot wound diabetic ulcer  2. DM  Acute cerebrovascular accident (CVA) [facility])            PLAN:    - Patient was examined and evaluated. Reviewed patient's recent lab results, charts and pertinent diagnostic imaging. Reviewed ancillary service notes. - Santyl to right foot wound MWF  - Continue Wound vac to right foot. MWF changes. Scheduled for change today. - Discussed patient with Dr. Jackelin Scott  - Will continue to follow patient while they are in-house. Thank you for the opportunity to take part in the patient's care. Please do not hesitate to call for any questions or concerns.

## 2022-09-30 NOTE — CONSULTS
9/30/2022 9:43 AM  Service: Urology  Group: [facility] (Andre[facility]ion[facility])    Nani Carvajal  [id number]     Chief Complaint:    Urinary retention gross hematuria    History of Present Illness: The patient is a 74 y.o. male patient who presents with stroke symptoms with right leg paralysis and aphasia was found to have a left ICA stenosis and had a left internal carotid artery angioplasty with stent placement. His BUN was 89 his creatinine was 2.9. In the course of his hospitalization he is not improved in terms of renal function the patient had been ambulatory and recently has had some foot problems and has been more or less at bedrest.  He had a trial of voiding after he had presented with retention when the catheter came out he had gross hematuria never had it before. The catheter was reinserted and presently his urine is clear.     His caretaker who lives at home with is the historian prior to this he has had no frequency urgency no history of retention no family history of urinary tract malignancy    Only urologic diagnostic test so far has been a KUB we will get a bilateral renal ultrasound sure that his azotemia is not related to hydronephrosis      Past Medical History:   Diagnosis Date    Chronic back pain     CKD (chronic kidney disease)     CVA (cerebral vascular accident) ([address]     CVA (cerebral vascular accident) ([address]     DM (diabetes mellitus) ([address]     Major depression     MI (myocardial infarction) ([address]     MATT (obstructive sleep apnea)     Parkinson disease ([address]     PVD (peripheral vascular disease) ([address]     Seizure ([address]          Past Surgical History:   Procedure Laterality Date    FEMORAL ARTERY STENT      IR CAROTID STENT UNI W PROTECTION  9/27/2022    IR CAROTID STENT UNI W PROTECTION 9/27/2022 Jak Fox MD [facility]       Medications Prior to Admission:    Medications Prior to Admission: apixaban (ELIQUIS) 5 MG TABS tablet, Take 5 mg by mouth 2 times daily  aspirin 81 MG chewable tablet, Take 81 mg by mouth daily  carbidopa-levodopa (SINEMET)  MG per tablet, Take 1 tablet by mouth 3 times daily  carvedilol (COREG) 25 MG tablet, Take 25 mg by mouth 2 times daily (with meals)  cetirizine (ZYRTEC) 10 MG tablet, Take 10 mg by mouth daily  clopidogrel (PLAVIX) 75 MG tablet, Take 75 mg by mouth daily  diphenhydrAMINE (BENADRYL) 25 MG capsule, Take 25 mg by mouth every 6 hours  ezetimibe (ZETIA) 10 MG tablet, Take 10 mg by mouth daily  fluticasone (FLONASE) 50 MCG/ACT nasal spray, 2 sprays by Each Nostril route daily  gabapentin (NEURONTIN) 600 MG tablet, Take 600 mg by mouth 4 times daily. HYDROcodone-acetaminophen (NORCO) 5-325 MG per tablet, Take 1 tablet by mouth 2 times daily. Insulin Glargine, 2 Unit Dial, (TOUJEO MAX SOLOSTAR) 300 UNIT/ML SOPN, Inject 66 Units into the skin daily  piperacillin-tazobactam (ZOSYN) 3-0.375 GM per 50ML IVPB extended infusion, Infuse 4.5 mg intravenously in the morning and 4.5 mg at noon and 4.5 mg in the evening. acetaminophen (TYLENOL) 325 MG tablet, Take 650 mg by mouth every 6 hours as needed for Pain  amLODIPine (NORVASC) 5 MG tablet, Take 5 mg by mouth daily  benzonatate (TESSALON) 100 MG capsule, Take 100 mg by mouth 3 times daily  cloNIDine (CATAPRES) 0.1 MG tablet, Take 0.1 mg by mouth in the morning and 0.1 mg in the evening. hydrALAZINE (APRESOLINE) 100 MG tablet, Take 100 mg by mouth 3 times daily    Allergies:    Codeine    Social History:        Family History:   Non-contributory to this Urological problem  family history is not on file. Review of Systems:  Respiratory: negative for cough and hemoptysis acute respiratory failure obstructive sleep apnea   cardiovascular: negative for chest pain and dyspnea stenosis of the right carotid artery.   Previous history of myocardial infarction  Gastrointestinal: negative for abdominal pain, diarrhea, nausea and vomiting  Derm: negative for rash and skin lesion(s)  Neurological: negative for seizures and tremors acute cerebrovascular accident history of Parkinson disease major depressive disorder  Endocrine: Diabetes mellitus   : Chronic renal insufficiency  Musculoskeletal: Chronic back pain    Physical Exam:     Vitals:  BP (!) 143/74   Pulse 79   Temp 98.6 °F (37 °C) (Oral)   Resp 20   Ht 5' 8\" (1.727 m)   Wt 260 lb 8 oz (118.2 kg)   SpO2 98%   BMI 39.61 kg/m²     General:  Awake, alert, oriented X 3. Well developed, well nourished, well groomed. No apparent distress. HEENT:  Normocephalic, atraumatic. Pupils equal, round. No scleral icterus. No conjunctival injection. Normal lips, teeth, and gums. No nasal discharge. Neck:  Supple, no masses. Heart:  RRR  Lungs:  No audible wheezing. Respirations symmetric and non-labored. Abdomen:  soft, nontender, no masses, no organomegaly, no peritoneal signs  Extremities: Has a lesion on his right foot which is being attended to by the wound service  Skin:  Warm and dry, no open lesions or rashes  Neuro:  There are no motor or sensory deficits in the 4 quadrant extremities   Rectal: deferred  Genitalia: Circumcised catheter is well-positioned testes epididymis and cord normal    Labs:     Recent Labs     09/28/22 0445 09/29/22  0415 09/30/22  0530   WBC 9.4 9.5 12.2*   RBC 2.60* 2.43* 1.94*   HGB 7.6* 7.0* 5.6*   HCT 23.3* 21.8* 18.2*   MCV 89.6 89.7 93.8   MCH 29.2 28.8 28.9   MCHC 32.6 32.1 30.8*   RDW 15.1* 15.7* 15.5*    258 259   MPV 9.7 10.0 10.1         Recent Labs     09/28/22 0445 09/29/22  0415 09/30/22  0530   CREATININE 2.7* 2.8* 2.8*         Assessment:  Tian Oliveros 74 y.o. male     Most likely his retention is situational he does have some chronic renal insufficiency which I presume is referable to his diabetes    Plan:    Plan is to leave the catheter until he is ambulatory and then a trial of voiding I will get a baseline PSA    Electronically signed by Edson Bryant MD on 9/30/2022 at 9:43 AM

## 2022-09-30 NOTE — PROGRESS NOTES
SPEECH LANGUAGE PATHOLOGY  DAILY PROGRESS NOTE        PATIENT NAME:  Marcella Rivero      :  1955          TODAY'S DATE:  2022 ROOM:  [address] [place] [address]    Patient seen for f/u for cognitive tx. Patient was oriented to self only. Patient given 3 elements and able to recall 3/3 immediately and 1/3 after 5 min delay. Patient demo poor insight into deficits. Patient educated on ST POC, but will need reinforcement. Will cont w/  POC.        CPT code(s) V5690659  therapeutic interventions that focus on cognitive function , initial  15 min  Total minutes :  15 minutes    Honor Habermann, M.S., CCC-SLP  Speech-Language Pathologist  [phone]

## 2022-09-30 NOTE — CARE COORDINATION
9/30/2022 - Updated CM note - S/P ICA angioplasty and stent on 9/27. Neurology, cardiology and podiatry following. Wound care following also. Had wound vac applied to right foot today. IV solumedrol q12h. Pt alert to self and knows his birthday. Unsure what hospital he is in. Pt caregiver Nell Pina in room with pt. She states pt lives with her. He was transferred to [place] [id number] from [facility]. Per Nell Pina, the plan was for him to go to a nursing facility from [facility] at discharge but no arrangements had been initiated. Cyril Cools the [employer] for [employer] to make [facility] choices for referrals. Will follow up with her to make referrals. SW/CM will follow.

## 2022-09-30 NOTE — FLOWSHEET NOTE
Inpatient Wound Care(follow up) 4524    Admit Date: 9/27/2022  5:45 PM    Reason for consult:  wound vac management    Findings:    09/30/22 1050   Wound 09/28/22 Foot Right;Plantar;Lateral   Date First Assessed/Time First Assessed: 09/28/22 1040   Present on Hospital Admission: No  Location: Foot  Wound Location Orientation: Right;Plantar;Lateral   Wound Etiology Diabetic   Dressing Status New dressing applied   Dressing/Treatment Negative pressure wound therapy   Wound Assessment [place]/red;Slough   Drainage Amount Scant   Drainage Description Serosanguinous   Odor None   Emily-wound Assessment   (callous)   Negative Pressure Wound Therapy Foot Right;Plantar;Lateral   Placement Date/Time: 09/28/22 1040   Location: Foot  Wound Location Orientation: Right;Plantar;Lateral   Dressing Type Black Foam   Number of pieces used 3   Cycle Continuous   Target Pressure (mmHg) 125   Dressing Change Due 10/03/22     **Informed Consent**    photos taken of wound and inserted into their chart as part of their permanent medical record for purposes of documentation, treatment management and/or medical review. All Images taken on 9/30/22 of patient name: Rodrigo Bowden were transmitted and stored on secured [employer] located within [employer] Tab by a registered [employer] Mobile Application Device.    Nurse reported santyl is not available that was ordered Wednesday   Nurse notified to carin richard not available    Impression:  wound bed much improved compared to Wednesday- refer to media    Plan:  Wound vac dressing changed  Will follow    Barbara Hager RN 9/30/2022 11:43 AM

## 2022-09-30 NOTE — PROGRESS NOTES
[facility]  SURGICAL INTENSIVE CARE UNIT (SICU)  ATTENDING PHYSICIAN CRITICAL CARE PROGRESS NOTE     I have examined the patient, reviewed the record, and discussed the case with the APN/ resident. Please refer to the APN/ resident's note. I agree with the assessment and plan. I have reviewed all relevant labs and imaging data. The following summarizes my clinical findings and independent assessment. CC:  critical care management for right hemiplegia and aphasia    Hospital Course/Overnight Events:  9/27--presented to OSH with right sided weakness and aphasia; transferred here for definitive care; found to have left ICA occlusion and underwent IR angioplasty and stent placement; at end of procedure pt developed bradycardia and hypotension and required re-intubation  9/28--completed integrelin; started on ASA/Brilinta  9/29--extubated this AM  9/30--had repeat head CT today; given lasix; transfused    Pt states he still has pain in his foot.       Awake and alert  Follows commands  Confused   Hrt:  regular rate/rhythm; no murmur  Lungs:  fairly clear bilaterally  Abd:  softly distended; BS active; obese; non-tender  Skin:  warm/dry  Ext:  wound VAC/dressing to right foot wound    Labs personally reviewed  Films personally reviewed/interpreted--bilateral infiltrates on CXR (worse on left; better on right)    Patient Active Problem List    Diagnosis Date Noted    Acute respiratory failure with hypoxia ([facility] 09/28/2022    Stenosis of left carotid artery 09/28/2022    Hypoalbuminemia 09/28/2022    Acute cerebrovascular accident (CVA) ([facility] 09/27/2022     Acute ischemic stroke--monitor neuro exam  S/p left ICA angioplasty/stent placement--cont ASA/Brilinta  Acute resp insuff--monitor resp status  Acute on chronic kidney disease--monitor BUN/Cr/UO  Hypoalbuminemia--dysphagia diet  Bilateral infiltrates on CXR--will start Unasyn empirically  Anemia--unspecified chronicity--monitor H/H; transfused again today  Chronic foot ulcer--wound VAC in place--Podiatry consulted  DVT risk--PCDs/ASA/Brilinta    Pt is at risk for neurologic/metabolic/hemodynamic/respiratory deterioration for which I am actively managing and requires ongoing ICU care    Davonte Hancock MD, [facility]  9/30/2022  2:47 PM    Critical care time exclusive of teaching and procedures = 36 minutes

## 2022-10-01 PROBLEM — E87.8 ELECTROLYTE IMBALANCE: Status: ACTIVE | Noted: 2022-10-01

## 2022-10-01 PROBLEM — E87.0 HYPERNATREMIA: Status: ACTIVE | Noted: 2022-10-01

## 2022-10-01 LAB
ABO/RH: NORMAL
ALBUMIN SERPL-MCNC: 2.6 G/DL (ref 3.5–5.2)
ALP BLD-CCNC: 180 U/L (ref 40–129)
ALT SERPL-CCNC: <5 U/L (ref 0–40)
ANION GAP SERPL CALCULATED.3IONS-SCNC: 12 MMOL/L (ref 7–16)
ANTIBODY SCREEN: NORMAL
AST SERPL-CCNC: 15 U/L (ref 0–39)
BASOPHILS ABSOLUTE: 0.01 E9/L (ref 0–0.2)
BASOPHILS RELATIVE PERCENT: 0.1 % (ref 0–2)
BILIRUB SERPL-MCNC: 0.4 MG/DL (ref 0–1.2)
BLOOD BANK DISPENSE STATUS: NORMAL
BLOOD BANK PRODUCT CODE: NORMAL
BPU ID: NORMAL
BUN BLDV-MCNC: 109 MG/DL (ref 6–23)
CALCIUM IONIZED: 1.22 MMOL/L (ref 1.15–1.33)
CALCIUM SERPL-MCNC: 8.4 MG/DL (ref 8.6–10.2)
CHLORIDE BLD-SCNC: 116 MMOL/L (ref 98–107)
CO2: 19 MMOL/L (ref 22–29)
CREAT SERPL-MCNC: 2.7 MG/DL (ref 0.7–1.2)
DESCRIPTION BLOOD BANK: NORMAL
EOSINOPHILS ABSOLUTE: 0 E9/L (ref 0.05–0.5)
EOSINOPHILS RELATIVE PERCENT: 0 % (ref 0–6)
GFR AFRICAN AMERICAN: 29
GFR NON-AFRICAN AMERICAN: 24 ML/MIN/1.73
GLUCOSE BLD-MCNC: 301 MG/DL (ref 74–99)
HCT VFR BLD CALC: 21.6 % (ref 37–54)
HEMOGLOBIN: 6.9 G/DL (ref 12.5–16.5)
IMMATURE GRANULOCYTES #: 0.21 E9/L
IMMATURE GRANULOCYTES %: 1.6 % (ref 0–5)
LYMPHOCYTES ABSOLUTE: 0.64 E9/L (ref 1.5–4)
LYMPHOCYTES RELATIVE PERCENT: 4.9 % (ref 20–42)
MAGNESIUM: 2.5 MG/DL (ref 1.6–2.6)
MCH RBC QN AUTO: 28.5 PG (ref 26–35)
MCHC RBC AUTO-ENTMCNC: 31.9 % (ref 32–34.5)
MCV RBC AUTO: 89.3 FL (ref 80–99.9)
METER GLUCOSE: 289 MG/DL (ref 74–99)
METER GLUCOSE: 313 MG/DL (ref 74–99)
METER GLUCOSE: 357 MG/DL (ref 74–99)
MONOCYTES ABSOLUTE: 0.26 E9/L (ref 0.1–0.95)
MONOCYTES RELATIVE PERCENT: 2 % (ref 2–12)
NEUTROPHILS ABSOLUTE: 12.04 E9/L (ref 1.8–7.3)
NEUTROPHILS RELATIVE PERCENT: 91.4 % (ref 43–80)
PDW BLD-RTO: 15.3 FL (ref 11.5–15)
PHOSPHORUS: 3.9 MG/DL (ref 2.5–4.5)
PLATELET # BLD: 241 E9/L (ref 130–450)
PMV BLD AUTO: 10.3 FL (ref 7–12)
POTASSIUM SERPL-SCNC: 5.5 MMOL/L (ref 3.5–5)
POTASSIUM SERPL-SCNC: 5.5 MMOL/L (ref 3.5–5)
RBC # BLD: 2.42 E12/L (ref 3.8–5.8)
SARS-COV-2, NAAT: NOT DETECTED
SODIUM BLD-SCNC: 147 MMOL/L (ref 132–146)
TOTAL PROTEIN: 5.5 G/DL (ref 6.4–8.3)
TROPONIN, HIGH SENSITIVITY: 143 NG/L (ref 0–11)
WBC # BLD: 13.2 E9/L (ref 4.5–11.5)

## 2022-10-01 PROCEDURE — 6360000002 HC RX W HCPCS: Performed by: INTERNAL MEDICINE

## 2022-10-01 PROCEDURE — 6370000000 HC RX 637 (ALT 250 FOR IP): Performed by: INTERNAL MEDICINE

## 2022-10-01 PROCEDURE — 6360000002 HC RX W HCPCS: Performed by: NURSE PRACTITIONER

## 2022-10-01 PROCEDURE — 80053 COMPREHEN METABOLIC PANEL: CPT

## 2022-10-01 PROCEDURE — 99233 SBSQ HOSP IP/OBS HIGH 50: CPT | Performed by: INTERNAL MEDICINE

## 2022-10-01 PROCEDURE — 6370000000 HC RX 637 (ALT 250 FOR IP): Performed by: FAMILY MEDICINE

## 2022-10-01 PROCEDURE — 2000000000 HC ICU R&B

## 2022-10-01 PROCEDURE — 87635 SARS-COV-2 COVID-19 AMP PRB: CPT

## 2022-10-01 PROCEDURE — 82330 ASSAY OF CALCIUM: CPT

## 2022-10-01 PROCEDURE — 6360000002 HC RX W HCPCS: Performed by: SURGERY

## 2022-10-01 PROCEDURE — 86900 BLOOD TYPING SEROLOGIC ABO: CPT

## 2022-10-01 PROCEDURE — 83735 ASSAY OF MAGNESIUM: CPT

## 2022-10-01 PROCEDURE — 84484 ASSAY OF TROPONIN QUANT: CPT

## 2022-10-01 PROCEDURE — 86901 BLOOD TYPING SEROLOGIC RH(D): CPT

## 2022-10-01 PROCEDURE — 85025 COMPLETE CBC W/AUTO DIFF WBC: CPT

## 2022-10-01 PROCEDURE — 94660 CPAP INITIATION&MGMT: CPT

## 2022-10-01 PROCEDURE — 6370000000 HC RX 637 (ALT 250 FOR IP): Performed by: NURSE PRACTITIONER

## 2022-10-01 PROCEDURE — 6370000000 HC RX 637 (ALT 250 FOR IP): Performed by: STUDENT IN AN ORGANIZED HEALTH CARE EDUCATION/TRAINING PROGRAM

## 2022-10-01 PROCEDURE — 2700000000 HC OXYGEN THERAPY PER DAY

## 2022-10-01 PROCEDURE — 84100 ASSAY OF PHOSPHORUS: CPT

## 2022-10-01 PROCEDURE — S5553 INSULIN LONG ACTING 5 U: HCPCS | Performed by: INTERNAL MEDICINE

## 2022-10-01 PROCEDURE — P9016 RBC LEUKOCYTES REDUCED: HCPCS

## 2022-10-01 PROCEDURE — A4216 STERILE WATER/SALINE, 10 ML: HCPCS | Performed by: NURSE PRACTITIONER

## 2022-10-01 PROCEDURE — 6360000002 HC RX W HCPCS: Performed by: FAMILY MEDICINE

## 2022-10-01 PROCEDURE — 86923 COMPATIBILITY TEST ELECTRIC: CPT

## 2022-10-01 PROCEDURE — C9113 INJ PANTOPRAZOLE SODIUM, VIA: HCPCS | Performed by: NURSE PRACTITIONER

## 2022-10-01 PROCEDURE — 2580000003 HC RX 258: Performed by: NURSE PRACTITIONER

## 2022-10-01 PROCEDURE — 36430 TRANSFUSION BLD/BLD COMPNT: CPT

## 2022-10-01 PROCEDURE — 82962 GLUCOSE BLOOD TEST: CPT

## 2022-10-01 PROCEDURE — 84132 ASSAY OF SERUM POTASSIUM: CPT

## 2022-10-01 PROCEDURE — 2580000003 HC RX 258: Performed by: SURGERY

## 2022-10-01 PROCEDURE — 2500000003 HC RX 250 WO HCPCS: Performed by: STUDENT IN AN ORGANIZED HEALTH CARE EDUCATION/TRAINING PROGRAM

## 2022-10-01 PROCEDURE — 94640 AIRWAY INHALATION TREATMENT: CPT

## 2022-10-01 PROCEDURE — 86850 RBC ANTIBODY SCREEN: CPT

## 2022-10-01 PROCEDURE — 99233 SBSQ HOSP IP/OBS HIGH 50: CPT | Performed by: SURGERY

## 2022-10-01 RX ORDER — HYDRALAZINE HYDROCHLORIDE 25 MG/1
50 TABLET, FILM COATED ORAL EVERY 8 HOURS SCHEDULED
Status: DISCONTINUED | OUTPATIENT
Start: 2022-10-01 | End: 2022-10-26 | Stop reason: HOSPADM

## 2022-10-01 RX ORDER — HYDRALAZINE HYDROCHLORIDE 50 MG/1
100 TABLET, FILM COATED ORAL 3 TIMES DAILY
Status: DISCONTINUED | OUTPATIENT
Start: 2022-10-01 | End: 2022-10-01

## 2022-10-01 RX ORDER — INSULIN GLARGINE-YFGN 100 [IU]/ML
0.25 INJECTION, SOLUTION SUBCUTANEOUS EVERY MORNING
Status: DISCONTINUED | OUTPATIENT
Start: 2022-10-01 | End: 2022-10-04

## 2022-10-01 RX ORDER — CLONIDINE HYDROCHLORIDE 0.1 MG/1
0.1 TABLET ORAL EVERY 12 HOURS
Status: DISCONTINUED | OUTPATIENT
Start: 2022-10-01 | End: 2022-10-15

## 2022-10-01 RX ADMIN — IPRATROPIUM BROMIDE AND ALBUTEROL SULFATE 1 AMPULE: .5; 2.5 SOLUTION RESPIRATORY (INHALATION) at 12:59

## 2022-10-01 RX ADMIN — AMPICILLIN SODIUM AND SULBACTAM SODIUM 3000 MG: 2; 1 INJECTION, POWDER, FOR SOLUTION INTRAMUSCULAR; INTRAVENOUS at 16:06

## 2022-10-01 RX ADMIN — AMLODIPINE BESYLATE 5 MG: 5 TABLET ORAL at 09:48

## 2022-10-01 RX ADMIN — LACTULOSE 20 G: 20 SOLUTION ORAL at 08:36

## 2022-10-01 RX ADMIN — EZETIMIBE 10 MG: 10 TABLET ORAL at 08:36

## 2022-10-01 RX ADMIN — IPRATROPIUM BROMIDE AND ALBUTEROL SULFATE 1 AMPULE: .5; 2.5 SOLUTION RESPIRATORY (INHALATION) at 21:50

## 2022-10-01 RX ADMIN — HYDROCODONE BITARTRATE AND ACETAMINOPHEN 1 TABLET: 7.5; 325 TABLET ORAL at 08:36

## 2022-10-01 RX ADMIN — GABAPENTIN 600 MG: 600 TABLET, FILM COATED ORAL at 17:43

## 2022-10-01 RX ADMIN — SODIUM ZIRCONIUM CYCLOSILICATE 10 G: 10 POWDER, FOR SUSPENSION ORAL at 09:48

## 2022-10-01 RX ADMIN — TICAGRELOR 90 MG: 90 TABLET ORAL at 21:18

## 2022-10-01 RX ADMIN — AMPICILLIN SODIUM AND SULBACTAM SODIUM 3000 MG: 2; 1 INJECTION, POWDER, FOR SOLUTION INTRAMUSCULAR; INTRAVENOUS at 21:46

## 2022-10-01 RX ADMIN — HYDROCODONE BITARTRATE AND ACETAMINOPHEN 1 TABLET: 7.5; 325 TABLET ORAL at 21:18

## 2022-10-01 RX ADMIN — METOPROLOL SUCCINATE 25 MG: 25 TABLET, EXTENDED RELEASE ORAL at 08:36

## 2022-10-01 RX ADMIN — INSULIN LISPRO 8 UNITS: 100 INJECTION, SOLUTION INTRAVENOUS; SUBCUTANEOUS at 17:42

## 2022-10-01 RX ADMIN — INSULIN LISPRO 12 UNITS: 100 INJECTION, SOLUTION INTRAVENOUS; SUBCUTANEOUS at 21:28

## 2022-10-01 RX ADMIN — SENNOSIDES 5 ML: 8.8 SYRUP ORAL at 21:28

## 2022-10-01 RX ADMIN — CARBIDOPA AND LEVODOPA 1 TABLET: 25; 100 TABLET ORAL at 21:18

## 2022-10-01 RX ADMIN — ASPIRIN 81 MG: 81 TABLET, COATED ORAL at 08:38

## 2022-10-01 RX ADMIN — BENZONATATE 100 MG: 100 CAPSULE ORAL at 13:34

## 2022-10-01 RX ADMIN — GABAPENTIN 600 MG: 600 TABLET, FILM COATED ORAL at 21:17

## 2022-10-01 RX ADMIN — CARBIDOPA AND LEVODOPA 1 TABLET: 25; 100 TABLET ORAL at 13:33

## 2022-10-01 RX ADMIN — LABETALOL HYDROCHLORIDE 10 MG: 5 INJECTION, SOLUTION INTRAVENOUS at 11:15

## 2022-10-01 RX ADMIN — GABAPENTIN 600 MG: 600 TABLET, FILM COATED ORAL at 08:36

## 2022-10-01 RX ADMIN — FUROSEMIDE 40 MG: 10 INJECTION, SOLUTION INTRAMUSCULAR; INTRAVENOUS at 05:25

## 2022-10-01 RX ADMIN — ATORVASTATIN CALCIUM 80 MG: 40 TABLET, FILM COATED ORAL at 21:18

## 2022-10-01 RX ADMIN — METHYLPREDNISOLONE SODIUM SUCCINATE 40 MG: 40 INJECTION, POWDER, FOR SOLUTION INTRAMUSCULAR; INTRAVENOUS at 21:27

## 2022-10-01 RX ADMIN — SODIUM CHLORIDE, PRESERVATIVE FREE 40 MG: 5 INJECTION INTRAVENOUS at 16:06

## 2022-10-01 RX ADMIN — TICAGRELOR 90 MG: 90 TABLET ORAL at 08:37

## 2022-10-01 RX ADMIN — HYDRALAZINE HYDROCHLORIDE 50 MG: 50 TABLET, FILM COATED ORAL at 13:33

## 2022-10-01 RX ADMIN — METHYLPREDNISOLONE SODIUM SUCCINATE 40 MG: 40 INJECTION, POWDER, FOR SOLUTION INTRAMUSCULAR; INTRAVENOUS at 09:47

## 2022-10-01 RX ADMIN — SODIUM CHLORIDE, PRESERVATIVE FREE 40 MG: 5 INJECTION INTRAVENOUS at 05:25

## 2022-10-01 RX ADMIN — IPRATROPIUM BROMIDE AND ALBUTEROL SULFATE 1 AMPULE: .5; 2.5 SOLUTION RESPIRATORY (INHALATION) at 16:26

## 2022-10-01 RX ADMIN — CARBIDOPA AND LEVODOPA 1 TABLET: 25; 100 TABLET ORAL at 08:36

## 2022-10-01 RX ADMIN — INSULIN LISPRO 12 UNITS: 100 INJECTION, SOLUTION INTRAVENOUS; SUBCUTANEOUS at 08:03

## 2022-10-01 RX ADMIN — CETIRIZINE HYDROCHLORIDE 10 MG: 10 TABLET, FILM COATED ORAL at 08:36

## 2022-10-01 RX ADMIN — AMPICILLIN SODIUM AND SULBACTAM SODIUM 3000 MG: 2; 1 INJECTION, POWDER, FOR SOLUTION INTRAMUSCULAR; INTRAVENOUS at 09:51

## 2022-10-01 RX ADMIN — AMPICILLIN SODIUM AND SULBACTAM SODIUM 3000 MG: 2; 1 INJECTION, POWDER, FOR SOLUTION INTRAMUSCULAR; INTRAVENOUS at 04:30

## 2022-10-01 RX ADMIN — HYDRALAZINE HYDROCHLORIDE 50 MG: 50 TABLET, FILM COATED ORAL at 21:27

## 2022-10-01 RX ADMIN — LACTULOSE 20 G: 20 SOLUTION ORAL at 21:18

## 2022-10-01 RX ADMIN — FENTANYL CITRATE 50 MCG: 50 INJECTION, SOLUTION INTRAMUSCULAR; INTRAVENOUS at 21:57

## 2022-10-01 RX ADMIN — CLONIDINE HYDROCHLORIDE 0.1 MG: 0.1 TABLET ORAL at 12:44

## 2022-10-01 RX ADMIN — GABAPENTIN 600 MG: 600 TABLET, FILM COATED ORAL at 12:44

## 2022-10-01 RX ADMIN — FENTANYL CITRATE 50 MCG: 50 INJECTION, SOLUTION INTRAMUSCULAR; INTRAVENOUS at 16:13

## 2022-10-01 RX ADMIN — BENZONATATE 100 MG: 100 CAPSULE ORAL at 08:36

## 2022-10-01 RX ADMIN — INSULIN GLARGINE-YFGN 30 UNITS: 100 INJECTION, SOLUTION SUBCUTANEOUS at 12:43

## 2022-10-01 RX ADMIN — BENZONATATE 100 MG: 100 CAPSULE ORAL at 21:18

## 2022-10-01 RX ADMIN — IPRATROPIUM BROMIDE AND ALBUTEROL SULFATE 1 AMPULE: .5; 2.5 SOLUTION RESPIRATORY (INHALATION) at 09:07

## 2022-10-01 RX ADMIN — INSULIN LISPRO 16 UNITS: 100 INJECTION, SOLUTION INTRAVENOUS; SUBCUTANEOUS at 11:36

## 2022-10-01 ASSESSMENT — PAIN DESCRIPTION - LOCATION
LOCATION: FOOT

## 2022-10-01 ASSESSMENT — PAIN SCALES - GENERAL
PAINLEVEL_OUTOF10: 5
PAINLEVEL_OUTOF10: 2
PAINLEVEL_OUTOF10: 9
PAINLEVEL_OUTOF10: 10
PAINLEVEL_OUTOF10: 8
PAINLEVEL_OUTOF10: 0

## 2022-10-01 ASSESSMENT — PAIN - FUNCTIONAL ASSESSMENT
PAIN_FUNCTIONAL_ASSESSMENT: ACTIVITIES ARE NOT PREVENTED
PAIN_FUNCTIONAL_ASSESSMENT: ACTIVITIES ARE NOT PREVENTED

## 2022-10-01 ASSESSMENT — PAIN DESCRIPTION - ORIENTATION
ORIENTATION: RIGHT;LEFT
ORIENTATION: RIGHT

## 2022-10-01 ASSESSMENT — PAIN DESCRIPTION - ONSET: ONSET: ON-GOING

## 2022-10-01 ASSESSMENT — PAIN DESCRIPTION - DESCRIPTORS
DESCRIPTORS: ACHING;DISCOMFORT;SORE
DESCRIPTORS: ACHING;SORE;DISCOMFORT

## 2022-10-01 ASSESSMENT — PAIN DESCRIPTION - PAIN TYPE: TYPE: CHRONIC PAIN;ACUTE PAIN

## 2022-10-01 ASSESSMENT — PAIN DESCRIPTION - FREQUENCY: FREQUENCY: CONTINUOUS

## 2022-10-01 NOTE — PROGRESS NOTES
Inpatient Cardiology Consultation  follow up visit    Reason for Consult: Elevated troponin and extensive vascular disease    Consulting Physician: Dr. Ely Sutton    Requesting Physician:  Luther Blanton    Date of Consultation: 10/1/2022      Objective: Hemoglobin is still below 7. Cardiology will sign off. Please call back when hemoglobin is stable. .      Past medical history  Smoker hx and quit 25 years ago  COPD  obese  Hypertension  Hyperlipidemia  Diabetes, insulin requiring  Depression  Parkinson's disease  MI, details unknown and records are pending  Peripheral vascular disease, PAD with a femoral artery stent 12/22    <div>74year-old white male with chronic ulceration of the right lower extremity who in December underwent right SFA angioplasty stent and popliteal artery arthrectomy and peroneal angioplasty then had a thrombosis and underwent thrombolysis with right tibioperoneal trunk arthrectomy and right peroneal angioplasty stent is anterior tibial goes down to the foot the ulceration has deteriorated in his right leg and he had an arterial duplex ultrasound performed which shows new high-grade stenosis proximal to the right SFA stent and he now presents for urgent arteriogram for limb salvage</div>     Chronic back pain and neck injury and uses opioids  Ambulates with a cane  CVA X 3 with right-sided weakness  Obstructive sleep apnea and noncompliant with CPAP  COVID-19 infection in 2020  Chronic kidney disease      Medications Prior to admit:  Prior to Admission medications    Medication Sig Start Date End Date Taking?  Authorizing Provider   apixaban (ELIQUIS) 5 MG TABS tablet Take 5 mg by mouth 2 times daily   Yes Historical Provider, MD   aspirin 81 MG chewable tablet Take 81 mg by mouth daily   Yes Historical Provider, MD   carbidopa-levodopa (SINEMET)  MG per tablet Take 1 tablet by mouth 3 times daily   Yes Historical Provider, MD   carvedilol (COREG) 25 MG tablet Take 25 mg by mouth 2 times daily (with meals)   Yes Historical Provider, MD   cetirizine (ZYRTEC) 10 MG tablet Take 10 mg by mouth daily   Yes Historical Provider, MD   clopidogrel (PLAVIX) 75 MG tablet Take 75 mg by mouth daily   Yes Historical Provider, MD   diphenhydrAMINE (BENADRYL) 25 MG capsule Take 25 mg by mouth every 6 hours   Yes Historical Provider, MD   ezetimibe (ZETIA) 10 MG tablet Take 10 mg by mouth daily   Yes Historical Provider, MD   fluticasone (FLONASE) 50 MCG/ACT nasal spray 2 sprays by Each Nostril route daily   Yes Historical Provider, MD   gabapentin (NEURONTIN) 600 MG tablet Take 600 mg by mouth 4 times daily. Yes Historical Provider, MD   HYDROcodone-acetaminophen (NORCO) 5-325 MG per tablet Take 1 tablet by mouth 2 times daily. Yes Historical Provider, MD   Insulin Glargine, 2 Unit Dial, (TOUJEO MAX SOLOSTAR) 300 UNIT/ML SOPN Inject 66 Units into the skin daily   Yes Historical Provider, MD   piperacillin-tazobactam (ZOSYN) 3-0.375 GM per 50ML IVPB extended infusion Infuse 4.5 mg intravenously in the morning and 4.5 mg at noon and 4.5 mg in the evening. Yes Historical Provider, MD   acetaminophen (TYLENOL) 325 MG tablet Take 650 mg by mouth every 6 hours as needed for Pain   Yes Historical Provider, MD   amLODIPine (NORVASC) 5 MG tablet Take 5 mg by mouth daily   Yes Historical Provider, MD   benzonatate (TESSALON) 100 MG capsule Take 100 mg by mouth 3 times daily   Yes Historical Provider, MD   cloNIDine (CATAPRES) 0.1 MG tablet Take 0.1 mg by mouth in the morning and 0.1 mg in the evening.    Yes Historical Provider, MD   hydrALAZINE (APRESOLINE) 100 MG tablet Take 100 mg by mouth 3 times daily   Yes Historical Provider, MD       Current Medications:    Current Facility-Administered Medications: insulin glargine-yfgn (SEMGLEE-YFGN) injection vial 30 Units, 0.25 Units/kg, SubCUTAneous, QAM  cloNIDine (CATAPRES) tablet 0.1 mg, 0.1 mg, Oral, Q12H  hydrALAZINE (APRESOLINE) tablet 50 mg, 50 mg, Oral, 3 times per day  fentaNYL (SUBLIMAZE) injection 50 mcg, 50 mcg, IntraVENous, Q2H PRN  0.9 % sodium chloride infusion, , IntraVENous, PRN  methylPREDNISolone sodium (SOLU-MEDROL) injection 40 mg, 40 mg, IntraVENous, Q12H  lactulose (CHRONULAC) 10 GM/15ML solution 20 g, 20 g, Oral, BID  insulin lispro (HUMALOG) injection vial 0-16 Units, 0-16 Units, SubCUTAneous, 4x Daily AC & HS  HYDROcodone-acetaminophen (NORCO) 7.5-325 MG per tablet 1 tablet, 1 tablet, Oral, BID  0.9 % sodium chloride infusion, , IntraVENous, PRN  ampicillin-sulbactam (UNASYN) 3000 mg in 100 mL NS IVPB minibag, 3,000 mg, IntraVENous, Q6H  pantoprazole (PROTONIX) 40 mg in sodium chloride (PF) 10 mL injection, 40 mg, IntraVENous, Q12H  metoprolol succinate (TOPROL XL) extended release tablet 25 mg, 25 mg, Oral, Daily  0.9 % sodium chloride infusion, , IntraVENous, PRN  sennosides (SENOKOT) 8.8 MG/5ML syrup 5 mL, 5 mL, Oral, Nightly  labetalol (NORMODYNE;TRANDATE) injection 10 mg, 10 mg, IntraVENous, Q30 Min PRN  hydrALAZINE (APRESOLINE) injection 10 mg, 10 mg, IntraVENous, Q30 Min PRN  glucose chewable tablet 16 g, 4 tablet, Oral, PRN  dextrose bolus 10% 125 mL, 125 mL, IntraVENous, PRN **OR** dextrose bolus 10% 250 mL, 250 mL, IntraVENous, PRN  glucagon (rDNA) injection 1 mg, 1 mg, SubCUTAneous, PRN  dextrose 10 % infusion, , IntraVENous, Continuous PRN  carbidopa-levodopa (SINEMET)  MG per tablet 1 tablet, 1 tablet, Per NG tube, TID  amLODIPine (NORVASC) tablet 5 mg, 5 mg, Per NG tube, Daily  ipratropium-albuterol (DUONEB) nebulizer solution 1 ampule, 1 ampule, Inhalation, Q4H WA  collagenase ointment, , Topical, Q MWF  benzonatate (TESSALON) capsule 100 mg, 100 mg, Oral, TID  cetirizine (ZYRTEC) tablet 10 mg, 10 mg, Oral, Daily  ezetimibe (ZETIA) tablet 10 mg, 10 mg, Oral, Daily  gabapentin (NEURONTIN) tablet 600 mg, 600 mg, Oral, 4x Daily  ticagrelor (BRILINTA) tablet 90 mg, 90 mg, Oral, BID  ondansetron (ZOFRAN-ODT) disintegrating tablet 4 mg, 4 mg, Oral, Q8H PRN **OR** ondansetron (ZOFRAN) injection 4 mg, 4 mg, IntraVENous, Q6H PRN  aspirin EC tablet 81 mg, 81 mg, Oral, Daily **OR** aspirin suppository 300 mg, 300 mg, Rectal, Daily  atorvastatin (LIPITOR) tablet 80 mg, 80 mg, Oral, Nightly    Allergies:  Codeine    Social History: History of tobacco abuse but quit 25 years ago and occasional alcohol and no illicit drugs and has a partner,       Family History:   History reviewed. No pertinent family history. REVIEW OF SYSTEMS:   Unable  PHYSICAL EXAM:   BP (!) 141/75   Pulse 75   Temp 98.6 °F (37 °C) (Oral)   Resp 17   Ht 5' 8\" (1.727 m)   Wt 260 lb 8 oz (118.2 kg)   SpO2 100%   BMI 39.61 kg/m²   CONST:  Well developed, well nourished who appears of stated age. Awake, alert and cooperative. No apparent distress. HEENT:   Head- Normocephalic, atraumatic   Eyes- Conjunctivae pink, anicteric  Throat- Oral mucosa pink and moist, orally intubated and orogastric tube  Neck-  No stridor, trachea midline, no jugular venous distention. No carotid bruit. CHEST: Chest symmetrical and non-tender to palpation. No accessory muscle use or intercostal retractions  RESPIRATORY: Lung sounds - clear throughout fields   CARDIOVASCULAR:     Heart Inspection- shows no noted pulsations  Heart Palpation- no heaves or thrills; PMI is non-displaced   Heart Ausculation- Regular rate and rhythm, no murmur. No s3, s4 or rub   PV: No lower extremity edema extremities but upper extremities swollen worse on the right in the low no varicosities. Pedal pulses palpable, no clubbing or cyanosis with dressing to right foot and Ace wrap  ABDOMEN: Soft, non-tender to light palpation. Bowel sounds present. No palpable masses no organomegaly; no abdominal bruit  MS:  No atrophy or abnormal movements.    :   clear yellow urine Mahan catheter  SKIN: Warm and dry no statis dermatitis or ulcers   NEURO / PSYCH: Sedated    DATA:    ECG / Tele strips: Sinus rhythm  Diagnostic:      Intake/Output Summary (Last 24 hours) at 10/1/2022 1223  Last data filed at 10/1/2022 1100  Gross per 24 hour   Intake 1359 ml   Output 3750 ml   Net -2391 ml       Labs:   CBC:   Recent Labs     09/30/22  0530 09/30/22  1237 09/30/22  2200 10/01/22  0603   WBC 12.2*  --   --  13.2*   HGB 5.6*   < > 6.9* 6.9*   HCT 18.2*   < > 20.8* 21.6*     --   --  241    < > = values in this interval not displayed. BMP:   Recent Labs     09/30/22  0530 09/30/22  2016 10/01/22  0603   *  --  147*   K 5.9* 5.2* 5.5*   CO2 20*  --  19*   BUN 94*  --  109*   CREATININE 2.8*  --  2.7*   LABGLOM 23  --  24   CALCIUM 8.3*  --  8.4*     Mag:   Recent Labs     09/30/22  0530 10/01/22  0603   MG 2.7* 2.5     Phos:   Recent Labs     09/30/22  0530 10/01/22  0603   PHOS 4.0 3.9     TFT: No results found for: TSH, Z2AEGLJ, K6JRLTD, THYROIDAB, FT3, T4FREE   HgA1c:   Lab Results   Component Value Date/Time    LABA1C 6.4 09/28/2022 04:45 AM     No results found for: EAG  proBNP: No results found for: PROBNP  PT/INR:   No results for input(s): PROTIME, INR in the last 72 hours. APTT:No results for input(s): APTT in the last 72 hours. TROPONIN:  Lab Results   Component Value Date/Time    TROPHS 143 10/01/2022 12:08 AM    TROPHS 140 09/30/2022 08:16 PM    TROPHS 160 09/30/2022 12:37 PM    TROPHS 156 09/30/2022 08:14 AM    TROPHS 160 09/30/2022 12:50 AM     CK:No results found for: CKTOTAL  FASTING LIPID PANEL:No results found for: CHOL, HDL, LDLDIRECT, LDLCALC, TRIG  LIVER PROFILE:  Recent Labs     09/30/22  0530 10/01/22  0603   AST 11 15   ALT <5 <5   LABALBU 2.6* 2.6*       Transthoracic echocardiogram September 27, 2022    Severe concentric left ventricular hypertrophy. Ejection fraction is visually estimated at 60 to 65%. Left ventricular diastolic filling is elevated . Mildly dilated right ventricle with normal systolic function.   Agitated saline injected for shunt evaluation was technically difficult due to pt being on vent and lack of corperation. Please obtain  limited echo with Bubble study once pt is extubated and cooperative. Mild mitral regurgitation is present. Mild tricuspid regurgitation. RVSP is 42 mmHg. Physiologic and/or trace, Mild pulmonic regurgitation present. Impressions/Plan  Elevated troponin, troponin leak in the setting of  severe anemia. Etiology of troponin leak due to significant anemia   Echo shows normal systolic function  Continue on statin and and beta-blocker   Obtain outpatient cardiology records to guide therapy  Cardiology will sign off. Please call back when hemoglobin is stable. Acute strokelike symptoms with right-sided paralysis and aphasia and left ICA stenosis and status post left ICA angioplasty and stent  Management per neurology    Anemia  Still with  hemoglobin below 7  Management per primary service      PAD of the lower extremities and was on oral anticoagulation triple therapy, now Eliquis has been stopped and he is on Brilinta and aspirin for history of right SFA angioplasty stent and popliteal artery arthrectomy and peroneal angioplasty and stent status post thrombosis and had thrombolysis of right tibioperoneal trunk atherectomy and right peroneal angioplasty stent with vascular studies right lower extremity showing new high-grade stenosis proximal to the right SFA stents  Management per vascular surgery    Renal insufficiency  Severe LVH  Further evaluation and outpatient when patient is stable perhaps with cardiac MRI if not has been done previously    Diabetes which is insulin requiring  Diabetic foot ulcer right lower extremity status post wound VAC  History of tobacco abuse and COPD  Obesity  Parkinson's disease  Questionable MI history  Chronic back pain  History of 3 prior CVAs  History of obstructive sleep apnea noncompliant with CPAP  Renal insufficiency      Cardiology will sign off.   Please call back when hemoglobin is stable.       Electronically signed by Eugene Jackman MD on 10/1/2022 at 12:23 PM

## 2022-10-01 NOTE — PROGRESS NOTES
Nephrology Progress Note  Patient's Name: Tamika Hilario  12:23 PM  10/1/2022        Reason for Consult:  Acute kidney injury/CKD  Requesting Physician:  Mathew Birmingham MD    Chief Complaint:  Stroke  History Obtained From:  EHR    History of Present Ilness:    Tamika Hilario is a 74 y.o. male history of CAD s/p MI, hypertension, chronic kidney disease stage IIIa (baseline creatinine of recent 1.7-1.9) he is followed by our group with Dr. Patrick Bailon. He initially was admitted to [facility] with right lower extremity wound. .  Patient subsequently developed aphasia noted to facial droop associated with right-sided weakness. He was transferred to [address] [place] [address] for further management. Patient was admitted told the neuro ICU. He required intubation with mechanical ventilation. CT of the head and neck demonstrated bilateral carotid stenosis with the left ICA being dominant. IR performed angiogram with angioplasty of the left ICA. Laboratory data showed progressive increase in his serum creatinine to currently 2.9 mg/dL. Hemoglobin was noted to be 5.6. He has received at least 2 units of packed cells but with further drop in his hemoglobin with requirement for additional transfusion. He had an episode of urinary retention with gross hematuria Mahan catheter was reinserted and the hematuria subsequently cleared. Renal is consulted for LARY.     Subjective    10/1: he denies blurred vision, no headaches         Allergies:  Codeine    Current Medications:    insulin glargine-yfgn (SEMGLEE-YFGN) injection vial 30 Units, QAM  cloNIDine (CATAPRES) tablet 0.1 mg, Q12H  hydrALAZINE (APRESOLINE) tablet 50 mg, 3 times per day  fentaNYL (SUBLIMAZE) injection 50 mcg, Q2H PRN  0.9 % sodium chloride infusion, PRN  methylPREDNISolone sodium (SOLU-MEDROL) injection 40 mg, Q12H  lactulose (CHRONULAC) 10 GM/15ML solution 20 g, BID  insulin lispro (HUMALOG) injection vial 0-16 Units, 4x Daily AC & HS  HYDROcodone-acetaminophen ([facility]) 7.5-325 MG per tablet 1 tablet, BID  0.9 % sodium chloride infusion, PRN  ampicillin-sulbactam (UNASYN) 3000 mg in 100 mL NS IVPB minibag, Q6H  pantoprazole (PROTONIX) 40 mg in sodium chloride (PF) 10 mL injection, Q12H  metoprolol succinate (TOPROL XL) extended release tablet 25 mg, Daily  0.9 % sodium chloride infusion, PRN  sennosides (SENOKOT) 8.8 MG/5ML syrup 5 mL, Nightly  labetalol (NORMODYNE;TRANDATE) injection 10 mg, Q30 Min PRN  hydrALAZINE (APRESOLINE) injection 10 mg, Q30 Min PRN  glucose chewable tablet 16 g, PRN  dextrose bolus 10% 125 mL, PRN   Or  dextrose bolus 10% 250 mL, PRN  glucagon (rDNA) injection 1 mg, PRN  dextrose 10 % infusion, Continuous PRN  carbidopa-levodopa (SINEMET)  MG per tablet 1 tablet, TID  amLODIPine (NORVASC) tablet 5 mg, Daily  ipratropium-albuterol (DUONEB) nebulizer solution 1 ampule, Q4H WA  collagenase ointment, Q MWF  benzonatate (TESSALON) capsule 100 mg, TID  cetirizine (ZYRTEC) tablet 10 mg, Daily  ezetimibe (ZETIA) tablet 10 mg, Daily  gabapentin (NEURONTIN) tablet 600 mg, 4x Daily  ticagrelor (BRILINTA) tablet 90 mg, BID  ondansetron (ZOFRAN-ODT) disintegrating tablet 4 mg, Q8H PRN   Or  ondansetron (ZOFRAN) injection 4 mg, Q6H PRN  aspirin EC tablet 81 mg, Daily   Or  aspirin suppository 300 mg, Daily  atorvastatin (LIPITOR) tablet 80 mg, Nightly      Review of Systems:   Pertinent items are noted in HPI. Physical exam:  Vitals:    10/01/22 1130   BP: (!) 141/75   Pulse: 75   Resp: 17   Temp:    SpO2: 100%           General: alert, agitated  Eyes: PERRL. No sclera icterus. No conjunctival injection. ENT: No discharge. Pharynx clear. Neck: Trachea midline. Normal thyroid. Lungs: No accessory muscle use. No crackles. No wheezing. No rhonchi. CV: Regular rate. Regular rhythm. No murmur or rub. .   Abd: Non-tender. Non-distended. No masses. No organmegaly. Normal bowel sounds. Skin: Warm and dry. No nodule on exposed extremities.  No rash on exposed extremities.   Ext: No cyanosis, clubbing, edema   Neuro: alert, agitated, oriented to person and time;moving all extremities      Data:   Labs:  Lab Results   Component Value Date     (H) 10/01/2022     (H) 09/30/2022     09/29/2022    K 5.5 (H) 10/01/2022    K 5.2 (H) 09/30/2022    K 5.9 (H) 09/30/2022     (H) 10/01/2022    CO2 19 (L) 10/01/2022    CO2 20 (L) 09/30/2022    CO2 20 (L) 09/29/2022    CREATININE 2.7 (H) 10/01/2022    CREATININE 2.8 (H) 09/30/2022    CREATININE 2.8 (H) 09/29/2022     (HH) 10/01/2022    BUN 94 (H) 09/30/2022    BUN 80 (H) 09/29/2022    GLUCOSE 301 (H) 10/01/2022    GLUCOSE 194 (H) 09/30/2022    GLUCOSE 105 (H) 09/29/2022    PHOS 3.9 10/01/2022    PHOS 4.0 09/30/2022    PHOS 3.6 09/29/2022    WBC 13.2 (H) 10/01/2022    WBC 12.2 (H) 09/30/2022    WBC 9.5 09/29/2022    HGB 6.9 (LL) 10/01/2022    HGB 6.9 (LL) 09/30/2022    HGB 6.5 (LL) 09/30/2022    HCT 21.6 (L) 10/01/2022    HCT 20.8 (L) 09/30/2022    HCT 20.5 (L) 09/30/2022    MCV 89.3 10/01/2022     10/01/2022         Imaging:  Echo Complete    Result Date: 9/28/2022  Transthoracic Echocardiography Report (TTE)  Demographics   Patient Name    David Bautista  Gender            Male                  L   Medical Record  [id number]     Room Number       9342  Number   Account #       [de-identified]    Procedure Date    09/28/2022   Corporate ID                 Ordering                               Physician   Accession       [id number]   Referring  Number                       Physician   Date of Birth   1955   Sonographer       Josue Zhang RDCS   Age             74 year(s)   Interpreting      [address]                               Physician         Physician Cardiology                                                 Marni Thompson MD                                Any Other  Procedure Type of Study   TTE procedure  Procedure Date Date: 09/28/2022 Start: 08:56 AM Study Location: Portable Technical Quality: Adequate visualization Indications:CVA. Patient Status: Routine Contrast Medium: Definity and Bubble Study. Height: 69 inches Weight: 260 pounds BSA: 2.31 m^2 BMI: 38.39 kg/m^2 HR: 66 bpm BP: 167/61 mmHg  Findings   Left Ventricle  Severe concentric left ventricular hypertrophy. Ejection fraction is visually estimated at 60 to 65%. Left ventricular diastolic filling is elevated . Right Ventricle  Mildly dilated right ventricle with normal systolic function. Left Atrium  Normal sized left atrium. Right Atrium  Agitated saline injected for shunt evaluation was technically difficult  due to pt being on vent and lack of corperation. Please obtain limited  echo with Bubble study once pt is extubated and cooperative. Mitral Valve  Mild mitral regurgitation is present. Tricuspid Valve  Mild tricuspid regurgitation. RVSP is 42 mmHg. Aortic Valve  The aortic valve is trileaflet. Aortic valve opens well. Pulmonic Valve  Physiologic and/or trace, Mild pulmonic regurgitation present. Aorta  Aortic root dimension within normal limits. Conclusions   Summary  Severe concentric left ventricular hypertrophy. Ejection fraction is visually estimated at 60 to 65%. Left ventricular diastolic filling is elevated . Mildly dilated right ventricle with normal systolic function. Agitated saline injected for shunt evaluation was technically difficult  due to pt being on vent and lack of corperation. Please obtain limited  echo with Bubble study once pt is extubated and cooperative. Mild mitral regurgitation is present. Mild tricuspid regurgitation. RVSP is 42 mmHg. Physiologic and/or trace, Mild pulmonic regurgitation present. Signature   --------------------------------------------------------  --------    Assessment/Plans    1. Acute CVA  S/p IR intervention with angioplasty of the left ICA    2. LARY on CKD stage 3B; LARY due to the combined effects of ACEI use and contrast nephrotoxicity. Component of urinary retention; He is nonoliguric at this time  Disproportionate elevation of BUN relative to Cr suspicious for GI bleed  To have stool hemoccult once he has BM  Monitor labs and urine output  Adjust meds for level of kidney    3. Hypertensive emergency presenting  with acute CVA  Adjust meds to target blood pressure control to SBP less than 160  Adjust BP meds    4. Anemia, chronic with acute worsening  PRBC transfusion as needed  Hemoccult stools R/O blood loss  Will start ASHLEY once BP is better controlled    5. Type 2 diabetes mellitus  Monitor glucose levels    6. Hyperkalemia  Due to combination of LARY and metabolic acidosis  Joannekelma ordered  Will repeat level later today    7.  Urinary retention  Suspected situational  Now with trejo  Urology following        Mark Bejarano MD  12:23 PM  10/1/2022

## 2022-10-01 NOTE — PROGRESS NOTES
[facility]  SURGICAL INTENSIVE CARE UNIT (SICU)  ATTENDING PHYSICIAN CRITICAL CARE PROGRESS NOTE     I have examined the patient, reviewed the record, and discussed the case with the APN/ resident. Please refer to the APN/ resident's note. I agree with the assessment and plan. I have reviewed all relevant labs and imaging data. The following summarizes my clinical findings and independent assessment. CC:  critical care management for right hemiplegia and aphasia    Hospital Course/Overnight Events:  9/27--presented to OSH with right sided weakness and aphasia; transferred here for definitive care; found to have left ICA occlusion and underwent IR angioplasty and stent placement; at end of procedure pt developed bradycardia and hypotension and required re-intubation  9/28--completed integrelin; started on ASA/Brilinta  9/29--extubated this AM  9/30--had repeat head CT today; given lasix; transfused  10/1--nothing new overnight    Pt without complaints. Tolerating diet.     Awake and alert  Follows commands  Confused   Hrt:  regular rate/rhythm; no murmur  Lungs:  fairly clear bilaterally  Abd:  softly distended; BS active; obese; non-tender  Skin:  warm/dry  Ext:  wound VAC/dressing to right foot wound    Labs personally reviewed    Patient Active Problem List    Diagnosis Date Noted    Acute respiratory failure with hypoxia ([facility] [address] 09/28/2022    Stenosis of left carotid artery 09/28/2022    Hypoalbuminemia 09/28/2022    Acute cerebrovascular accident (CVA) ([facility] [address] 09/27/2022     Acute ischemic stroke--monitor neuro exam  S/p left ICA angioplasty/stent placement--cont ASA/Brilinta  Acute resp insuff--monitor resp status  Acute on chronic kidney disease--monitor BUN/Cr/UO  Hypoalbuminemia--dysphagia diet  Unasyn #2 empirically  Anemia--unspecified chronicity--monitor H/H; transfused again today  Chronic foot ulcer--wound VAC in place--Podiatry consulted  DVT risk--PCDs/ASA/Brilinta    Baylee Juares, MD, FACS  10/1/2022  1:59 PM

## 2022-10-01 NOTE — PROGRESS NOTES
10/1/2022 12:12 PM  John Nance  [id number]    Subjective:    He is awake and alert   Trejo with yellow urine   He is confused     Review of Systems  Constitutional: No fever or chills   Respiratory: negative for cough and hemoptysis  Cardiovascular: negative for chest pain and dyspnea  Gastrointestinal: negative for abdominal pain, diarrhea, nausea and vomiting   : See above  Derm: negative for rash and skin lesion(s)  Neurological: negative for seizures and tremors  Musculoskeletal: Negative    Psychiatric: Negative   All other reviews are negative      Scheduled Meds:   insulin glargine  0.25 Units/kg SubCUTAneous QAM    cloNIDine  0.1 mg Oral Q12H    hydrALAZINE  50 mg Oral 3 times per day    methylPREDNISolone  40 mg IntraVENous Q12H    lactulose  20 g Oral BID    insulin lispro  0-16 Units SubCUTAneous 4x Daily AC & HS    HYDROcodone-acetaminophen  1 tablet Oral BID    ampicillin-sulbactam  3,000 mg IntraVENous Q6H    pantoprazole (PROTONIX) 40 mg injection  40 mg IntraVENous Q12H    metoprolol succinate  25 mg Oral Daily    sennosides  5 mL Oral Nightly    carbidopa-levodopa  1 tablet Per NG tube TID    amLODIPine  5 mg Per NG tube Daily    ipratropium-albuterol  1 ampule Inhalation Q4H WA    collagenase   Topical Q MWF    benzonatate  100 mg Oral TID    cetirizine  10 mg Oral Daily    ezetimibe  10 mg Oral Daily    gabapentin  600 mg Oral 4x Daily    ticagrelor  90 mg Oral BID    aspirin  81 mg Oral Daily    Or    aspirin  300 mg Rectal Daily    atorvastatin  80 mg Oral Nightly       Objective:  Vitals:    10/01/22 1130   BP: (!) 141/75   Pulse: 75   Resp: 17   Temp:    SpO2: 100%         Allergies: Codeine    General Appearance: awake and alert, confused  Skin: no rash or erythema  Head: normocephalic and atraumatic  Pulmonary/Chest: normal air movement, no respiratory distress  Abdomen: soft, non-tender, non-distended  Genitourinary: trejo with yellow urine   Extremities: no cyanosis, clubbing or edema         Labs:     Recent Labs     10/01/22  0603   *   K 5.5*   *   CO2 19*   *   CREATININE 2.7*   GLUCOSE 301*   CALCIUM 8.4*       Lab Results   Component Value Date/Time    HGB 6.9 10/01/2022 06:03 AM    HCT 21.6 10/01/2022 06:03 AM    HCT 15.0 09/27/2022 08:58 PM       Lab Results   Component Value Date    PSA 1.13 09/30/2022         Assessment/Plan:  Urinary retention (680ml)  Azotemia     Cont to watch the creatinine   Renal ultrasound without any hydronephrosis   Cont the trejo catheter at this time  Voiding trial when  ambulatory   Can be done as an outpatient if he is to go to rehab  Call with questions      CONSTANTINE Vela - CNP   [facility]  Urology

## 2022-10-01 NOTE — PROGRESS NOTES
orthopnea. Gastrointestinal:  Nausea, vomiting, diarrhea, heartburn, constipation, abdominal pain, hematemesis, hematochezia, melena, acholic stools  Genito-Urinary:  Dysuria, urgency, frequency, hematuria  Musculoskeletal:  Joint pain, joint stiffness, joint swelling, muscle pain  Neurology:  Headache, focal neurological deficits, weakness, numbness, paresthesia  Derm:  Rashes, ulcers, excoriations, bruising  Extremities:  Decreased ROM, peripheral edema, mottling      OBJECTIVE:    BP (!) 161/71   Pulse 77   Temp 98.1 °F (36.7 °C) (Axillary)   Resp 18   Ht 5' 8\" (1.727 m)   Wt 260 lb 8 oz (118.2 kg)   SpO2 100%   BMI 39.61 kg/m²     General appearance: Awake, follows commands, intermittently confused. HEENT:  Conjunctivae/corneas clear. Neck: Supple. No jugular venous distention. Respiratory: symmetrical; clear to auscultation bilaterally; no wheezes; no rhonchi; no rales  Cardiovascular: rhythm regular; rate controlled; no murmurs  Abdomen: Soft, nontender, nondistended  Extremities:  peripheral pulses present; no peripheral edema;  seebelow  Musculoskeletal: No clubbing, cyanosis, no bilateral lower extremity edema. Brisk capillary refill. Skin:  No rashes  on visible skin  Neurologic: r hemiparesis        ASSESSMENT and PLAN:    Acute CVA- He was found to have severe left ICA stenosis and was taken to IR for L ICA angioplasty with stent placement. He is on ASA/brilinta/statin. MRI brain showed two tiny foci of acute stroke on the L. Echo was unrevealing, though bubble study was suboptimal.   Acute respiratory failure- requiring mechanical ventilation. Ventilator weaning per critical care. Extubated 9/29  Acute blood loss anemia- transfuse for hemoglobin <7. Check occult blood, if positive recommend scopes. Hyperkalemia- resolved  Acute renal failure- unknown baseline. Elevated troponin-  Cardiology following. R foot ulcer- podiatry following. Has wound vac.      Dispo: remains in icu      Medications:  REVIEWED DAILY    Infusion Medications    sodium chloride      sodium chloride      sodium chloride      dextrose       Scheduled Medications    methylPREDNISolone  40 mg IntraVENous Q12H    lactulose  20 g Oral BID    insulin lispro  0-16 Units SubCUTAneous 4x Daily AC & HS    HYDROcodone-acetaminophen  1 tablet Oral BID    ampicillin-sulbactam  3,000 mg IntraVENous Q6H    pantoprazole (PROTONIX) 40 mg injection  40 mg IntraVENous Q12H    metoprolol succinate  25 mg Oral Daily    sennosides  5 mL Oral Nightly    carbidopa-levodopa  1 tablet Per NG tube TID    amLODIPine  5 mg Per NG tube Daily    ipratropium-albuterol  1 ampule Inhalation Q4H WA    collagenase   Topical Q MWF    benzonatate  100 mg Oral TID    cetirizine  10 mg Oral Daily    ezetimibe  10 mg Oral Daily    gabapentin  600 mg Oral 4x Daily    ticagrelor  90 mg Oral BID    aspirin  81 mg Oral Daily    Or    aspirin  300 mg Rectal Daily    atorvastatin  80 mg Oral Nightly     PRN Meds: fentanNYL, sodium chloride, sodium chloride, sodium chloride, labetalol, hydrALAZINE, glucose, dextrose bolus **OR** dextrose bolus, glucagon (rDNA), dextrose, ondansetron **OR** ondansetron    Labs:     Recent Labs     09/29/22 0415 09/30/22  0530 09/30/22  1237 09/30/22  2200   WBC 9.5 12.2*  --   --    HGB 7.0* 5.6* 6.5* 6.9*   HCT 21.8* 18.2* 20.5* 20.8*    259  --   --        Recent Labs     09/29/22 0415 09/30/22  0530 09/30/22  2016 10/01/22  0603    147*  --  147*   K 4.2 5.9* 5.2* 5.5*   * 116*  --  116*   CO2 20* 20*  --  19*   BUN 80* 94*  --  109*   CREATININE 2.8* 2.8*  --  2.7*   CALCIUM 8.1* 8.3*  --  8.4*   PHOS 3.6 4.0  --  3.9       Recent Labs     09/29/22 0415 09/30/22  0530 10/01/22  0603   PROT 5.0* 5.4* 5.5*   ALKPHOS 149* 151* 180*   ALT <5 <5 <5   AST 14 11 15   BILITOT 0.3 0.3 0.4       No results for input(s): INR in the last 72 hours.       No results for input(s): Hansel Sunshine in the last 72 hours.    Chronic labs:    Lab Results   Component Value Date    PSA 1.13 09/30/2022    INR 2.1 09/27/2022    LABA1C 6.4 (H) 09/28/2022       Radiology: REVIEWED DAILY    +++++++++++++++++++++++++++++++++++++++++++++++++  DO Emil Colón Physician - [address] [place] [address]  +++++++++++++++++++++++++++++++++++++++++++++++++  NOTE: This report was transcribed using voice recognition software. Every effort was made to ensure accuracy; however, inadvertent computerized transcription errors may be present.

## 2022-10-01 NOTE — PROGRESS NOTES
Department of Podiatry   Progress Note      Patient seen at bedside this morning. No acute events,  wound vac intact to right foot. Vac running continuous pressure No new foot complaints      Past Medical History:            Diagnosis Date    Chronic back pain     CKD (chronic kidney disease)     CVA (cere[facility] vascular [facility]) ([facility]     [facility]     DM (diabetes mellitus) ([facility]     Major depression     MI (myocardial infarction) ([facility]     MATT (obstructive sleep apnea)     Parkinson disease ([facility]     PVD (peripheral vascular disease) ([facility]     Seizure ([facility]        Past Surgical History:        Procedure Laterality Date    FEMORAL ARTERY STENT      IR CAROTID STENT UNI W PROTECTION  9/27/2022    IR CAROTID STENT UNI W PROTECTION 9/27/2022 Rl Lugo MD [facility]       Medications Prior to Admission:    Medications Prior to Admission: apixaban (ELIQUIS) 5 MG TABS tablet, Take 5 mg by mouth 2 times daily  aspirin 81 MG chewable tablet, Take 81 mg by mouth daily  carbidopa-levodopa (SINEMET)  MG per tablet, Take 1 tablet by mouth 3 times daily  carvedilol (COREG) 25 MG tablet, Take 25 mg by mouth 2 times daily (with meals)  cetirizine (ZYRTEC) 10 MG tablet, Take 10 mg by mouth daily  clopidogrel (PLAVIX) 75 MG tablet, Take 75 mg by mouth daily  diphenhydrAMINE (BENADRYL) 25 MG capsule, Take 25 mg by mouth every 6 hours  ezetimibe (ZETIA) 10 MG tablet, Take 10 mg by mouth daily  fluticasone (FLONASE) 50 MCG/ACT nasal spray, 2 sprays by Each Nostril route daily  gabapentin (NEURONTIN) 600 MG tablet, Take 600 mg by mouth 4 times daily. HYDROcodone-acetaminophen (NORCO) 5-325 MG per tablet, Take 1 tablet by mouth 2 times daily.   Insulin Glargine, 2 Unit Dial, (TOUJEO MAX SOLOSTAR) 300 UNIT/ML SOPN, Inject 66 Units into the skin daily  piperacillin-tazobactam (ZOSYN) 3-0.375 GM per 50ML IVPB extended infusion, Infuse 4.5 mg intravenously in the morning and 4.5 mg at noon and 4.5 mg in the evening. acetaminophen (TYLENOL) 325 MG tablet, Take 650 mg by mouth every 6 hours as needed for Pain  amLODIPine (NORVASC) 5 MG tablet, Take 5 mg by mouth daily  benzonatate (TESSALON) 100 MG capsule, Take 100 mg by mouth 3 times daily  cloNIDine (CATAPRES) 0.1 MG tablet, Take 0.1 mg by mouth in the morning and 0.1 mg in the evening. hydrALAZINE (APRESOLINE) 100 MG tablet, Take 100 mg by mouth 3 times daily    Allergies:  Codeine    Social History:   TOBACCO:   has no history on file for tobacco use. ETOH:   has no history on file for alcohol use. DRUGS:   Social History     Substance and Sexual Activity   Drug Use Not on file       Family History:   History reviewed. No pertinent family history. REVIEW OF SYSTEMS:    All pertinent positives and negatives as noted in HPI       LOWER EXTREMITY EXAMINATION   Wound vac intact running continuous pressure    Previous Exam:  VASCULAR:  DP and PT pulses are non palpable. CFT < 5 seconds B/L. Warm to warm from the tibial tuberosity to the distal aspect of the digits dorsally. NEUROLOGIC:  Protective sensation is diminished by grossly intact    DERM:  Right foot lateral plantar wound measuring approx 6cm in diameter. No erythema, serosanguinous drainage, no malodor.     MUSCULOSKELETAL: deferred           CONSULTS:  IP CONSULT TO CRITICAL CARE  IP CONSULT TO DIETITIAN  IP CONSULT TO CARDIOLOGY  IP CONSULT TO PODIATRY  IP CONSULT TO PODIATRY  IP CONSULT TO UROLOGY  IP CONSULT TO NEPHROLOGY    MEDICATION:  Scheduled Meds:   insulin glargine  0.25 Units/kg SubCUTAneous QAM    cloNIDine  0.1 mg Oral Q12H    hydrALAZINE  50 mg Oral 3 times per day    methylPREDNISolone  40 mg IntraVENous Q12H    lactulose  20 g Oral BID    insulin lispro  0-16 Units SubCUTAneous 4x Daily AC & HS    HYDROcodone-acetaminophen  1 tablet Oral BID    ampicillin-sulbactam  3,000 mg IntraVENous Q6H    pantoprazole (PROTONIX) 40 mg injection  40 mg IntraVENous Q12H metoprolol succinate  25 mg Oral Daily    sennosides  5 mL Oral Nightly    carbidopa-levodopa  1 tablet Per NG tube TID    amLODIPine  5 mg Per NG tube Daily    ipratropium-albuterol  1 ampule Inhalation Q4H WA    collagenase   Topical Q MWF    benzonatate  100 mg Oral TID    cetirizine  10 mg Oral Daily    ezetimibe  10 mg Oral Daily    gabapentin  600 mg Oral 4x Daily    ticagrelor  90 mg Oral BID    aspirin  81 mg Oral Daily    Or    aspirin  300 mg Rectal Daily    atorvastatin  80 mg Oral Nightly     Continuous Infusions:   sodium chloride      sodium chloride      sodium chloride      dextrose       PRN Meds:.fentanNYL, sodium chloride, sodium chloride, sodium chloride, labetalol, hydrALAZINE, glucose, dextrose bolus **OR** dextrose bolus, glucagon (rDNA), dextrose, ondansetron **OR** ondansetron    RADIOLOGY:  CT HEAD WO CONTRAST   Final Result   Parenchymal volume loss and chronic microvascular ischemic changes. No acute intracranial abnormality. Ethmoid and sphenoid sinusitis. Right mastoid effusion. US RETROPERITONEAL COMPLETE   Final Result   1. Normal appearance of the bilateral kidneys. No hydronephrosis. 2.  A Mahan catheter is decompressing the bladder. XR CHEST PORTABLE   Final Result   Worsening infiltrate and or atelectasis on the left, stable on the right with   suspected layering pleural effusion. XR CHEST PORTABLE   Final Result   Unchanged bilateral atelectasis and or infiltrate as well as small right   pleural effusion. MRI BRAIN WO CONTRAST   Final Result   There are 2 small regions of petechial infarcts one in the left   posterior frontal lobe and a second in the left parietal lobe not   exceeding 3 mm. There is otherwise extensive small vessel ischemic   changes         XR CHEST PORTABLE   Final Result   1. Multifocal bilateral airspace disease more prominent within the lower   lobes.   The airspace disease is unchanged when compared with the prior study. CT HEAD WO CONTRAST   Final Result   Diffuse atrophy likely age related   Findings compatible with small vessel ischemic changes. XR CHEST PORTABLE   Final Result   1. Pulmonary opacities present favoring edema and atelectasis, also small   pleural effusions, but nonspecific with some additional considerations noted   above   2. Support devices present as described above   3. Heart size appears borderline enlarged         XR ABDOMEN FOR NG/OG/NE TUBE PLACEMENT   Final Result   NG/OG is in the stomach. IR CAROTID STENT W PROTECTION   Final Result   1. Angiogram demonstrates successful placement of a carotid stent ,   post procedure images demonstrate a widely patent stent and internal   carotid         CT HEAD WO CONTRAST   Final Result   Diffuse atrophy likely age related   Findings compatible with small vessel ischemic changes. Findings were called at the time of dictation         CT BRAIN PERFUSION   Final Result      No significant ischemic penumbra identified      This study was analyzed by the [employer]. ai algorithm. CTA NECK W CONTRAST   Final Result   1. Estimated stenosis of the proximal right and left internal carotid   artery by NASCET criteria is greater than 90% on the left   2. Severe atherosclerotic disease . 3. No large vessel occlusion identified            This study was analyzed by the [employer]. ai algorithm. CTA HEAD W CONTRAST   Final Result   1. Estimated stenosis of the proximal right and left internal carotid   artery by NASCET criteria is greater than 90% on the left   2. Severe atherosclerotic disease . 3. No large vessel occlusion identified            This study was analyzed by the [employer]. [employer] algorithm.                    Vitals:    BP (!) 141/75   Pulse 75   Temp 98.6 °F (37 °C) (Oral)   Resp 17   Ht 5' 8\" (1.727 m)   Wt 260 lb 8 oz (118.2 kg)   SpO2 100%   BMI 39.61 kg/m²     LABS:   Recent Labs     09/30/22  0530 09/30/22  1237 09/30/22  2200 10/01/22  0603   WBC 12.2*  --   --  13.2*   HGB 5.6*   < > 6.9* 6.9*   HCT 18.2*   < > 20.8* 21.6*     --   --  241    < > = values in this interval not displayed. Recent Labs     10/01/22  0603   *   K 5.5*   *   CO2 19*   PHOS 3.9   *   CREATININE 2.7*     Recent Labs     09/30/22  0530 10/01/22  0603   PROT 5.4* 5.5*       ASSESSMENTS:   1. Right foot wound diabetic ulcer  2. DM  Acute cerebrovascular accident (CVA) [facility])            PLAN:    - Patient was examined and evaluated. -Reviewed patient's recent lab results, charts and pertinent diagnostic imaging. Reviewed ancillary service notes. - Santyl to right foot wound MWF  - Continue Wound vac to right foot. MWF - Will continue to follow patient while they are in-house. Thank you for the opportunity to take part in the patient's care. Please do not hesitate to call for any questions or concerns.

## 2022-10-02 ENCOUNTER — APPOINTMENT (OUTPATIENT)
Dept: GENERAL RADIOLOGY | Age: 67
DRG: 034 | End: 2022-10-02
Attending: INTERNAL MEDICINE
Payer: MEDICARE

## 2022-10-02 LAB
ALBUMIN SERPL-MCNC: 2.8 G/DL (ref 3.5–5.2)
ALP BLD-CCNC: 291 U/L (ref 40–129)
ALT SERPL-CCNC: 10 U/L (ref 0–40)
ANION GAP SERPL CALCULATED.3IONS-SCNC: 10 MMOL/L (ref 7–16)
ANION GAP SERPL CALCULATED.3IONS-SCNC: 9 MMOL/L (ref 7–16)
AST SERPL-CCNC: 42 U/L (ref 0–39)
BASOPHILS ABSOLUTE: 0.01 E9/L (ref 0–0.2)
BASOPHILS RELATIVE PERCENT: 0.1 % (ref 0–2)
BILIRUB SERPL-MCNC: 0.2 MG/DL (ref 0–1.2)
BUN BLDV-MCNC: 118 MG/DL (ref 6–23)
BUN BLDV-MCNC: 121 MG/DL (ref 6–23)
CALCIUM IONIZED: 1.24 MMOL/L (ref 1.15–1.33)
CALCIUM SERPL-MCNC: 8.6 MG/DL (ref 8.6–10.2)
CALCIUM SERPL-MCNC: 9 MG/DL (ref 8.6–10.2)
CHLORIDE BLD-SCNC: 116 MMOL/L (ref 98–107)
CHLORIDE BLD-SCNC: 118 MMOL/L (ref 98–107)
CHLORIDE URINE RANDOM: <20 MMOL/L
CO2: 20 MMOL/L (ref 22–29)
CO2: 22 MMOL/L (ref 22–29)
CREAT SERPL-MCNC: 2.7 MG/DL (ref 0.7–1.2)
CREAT SERPL-MCNC: 2.9 MG/DL (ref 0.7–1.2)
CREATININE URINE: 74 MG/DL (ref 40–278)
EOSINOPHILS ABSOLUTE: 0 E9/L (ref 0.05–0.5)
EOSINOPHILS RELATIVE PERCENT: 0 % (ref 0–6)
GFR AFRICAN AMERICAN: 27
GFR AFRICAN AMERICAN: 29
GFR NON-AFRICAN AMERICAN: 22 ML/MIN/1.73
GFR NON-AFRICAN AMERICAN: 24 ML/MIN/1.73
GLUCOSE BLD-MCNC: 297 MG/DL (ref 74–99)
GLUCOSE BLD-MCNC: 317 MG/DL (ref 74–99)
HCT VFR BLD CALC: 20 % (ref 37–54)
HCT VFR BLD CALC: 21.5 % (ref 37–54)
HEMOGLOBIN: 6.5 G/DL (ref 12.5–16.5)
HEMOGLOBIN: 7 G/DL (ref 12.5–16.5)
IMMATURE GRANULOCYTES #: 0.15 E9/L
IMMATURE GRANULOCYTES %: 0.9 % (ref 0–5)
LYMPHOCYTES ABSOLUTE: 0.72 E9/L (ref 1.5–4)
LYMPHOCYTES RELATIVE PERCENT: 4.5 % (ref 20–42)
MAGNESIUM: 2.6 MG/DL (ref 1.6–2.6)
MCH RBC QN AUTO: 30.4 PG (ref 26–35)
MCHC RBC AUTO-ENTMCNC: 32.5 % (ref 32–34.5)
MCV RBC AUTO: 93.5 FL (ref 80–99.9)
METER GLUCOSE: 282 MG/DL (ref 74–99)
METER GLUCOSE: 297 MG/DL (ref 74–99)
METER GLUCOSE: 297 MG/DL (ref 74–99)
METER GLUCOSE: 300 MG/DL (ref 74–99)
METER GLUCOSE: 349 MG/DL (ref 74–99)
MONOCYTES ABSOLUTE: 0.34 E9/L (ref 0.1–0.95)
MONOCYTES RELATIVE PERCENT: 2.1 % (ref 2–12)
NEUTROPHILS ABSOLUTE: 14.7 E9/L (ref 1.8–7.3)
NEUTROPHILS RELATIVE PERCENT: 92.4 % (ref 43–80)
PDW BLD-RTO: 16.2 FL (ref 11.5–15)
PHOSPHORUS: 4.3 MG/DL (ref 2.5–4.5)
PLATELET # BLD: 255 E9/L (ref 130–450)
PMV BLD AUTO: 10.8 FL (ref 7–12)
POTASSIUM SERPL-SCNC: 5.1 MMOL/L (ref 3.5–5)
POTASSIUM SERPL-SCNC: 5.4 MMOL/L (ref 3.5–5)
POTASSIUM, UR: 35.4 MMOL/L
RBC # BLD: 2.14 E12/L (ref 3.8–5.8)
SODIUM BLD-SCNC: 146 MMOL/L (ref 132–146)
SODIUM BLD-SCNC: 149 MMOL/L (ref 132–146)
SODIUM URINE: 39 MMOL/L
TOTAL PROTEIN: 5.7 G/DL (ref 6.4–8.3)
UREA NITROGEN, UR: 852 MG/DL (ref 800–1666)
WBC # BLD: 15.9 E9/L (ref 4.5–11.5)

## 2022-10-02 PROCEDURE — C9113 INJ PANTOPRAZOLE SODIUM, VIA: HCPCS | Performed by: NURSE PRACTITIONER

## 2022-10-02 PROCEDURE — 85014 HEMATOCRIT: CPT

## 2022-10-02 PROCEDURE — 6360000002 HC RX W HCPCS: Performed by: FAMILY MEDICINE

## 2022-10-02 PROCEDURE — 82330 ASSAY OF CALCIUM: CPT

## 2022-10-02 PROCEDURE — 94660 CPAP INITIATION&MGMT: CPT

## 2022-10-02 PROCEDURE — 6370000000 HC RX 637 (ALT 250 FOR IP): Performed by: NURSE PRACTITIONER

## 2022-10-02 PROCEDURE — 6370000000 HC RX 637 (ALT 250 FOR IP): Performed by: INTERNAL MEDICINE

## 2022-10-02 PROCEDURE — 6360000002 HC RX W HCPCS: Performed by: SURGERY

## 2022-10-02 PROCEDURE — 6370000000 HC RX 637 (ALT 250 FOR IP): Performed by: STUDENT IN AN ORGANIZED HEALTH CARE EDUCATION/TRAINING PROGRAM

## 2022-10-02 PROCEDURE — 80053 COMPREHEN METABOLIC PANEL: CPT

## 2022-10-02 PROCEDURE — 82436 ASSAY OF URINE CHLORIDE: CPT

## 2022-10-02 PROCEDURE — 6360000002 HC RX W HCPCS: Performed by: INTERNAL MEDICINE

## 2022-10-02 PROCEDURE — 82570 ASSAY OF URINE CREATININE: CPT

## 2022-10-02 PROCEDURE — A4216 STERILE WATER/SALINE, 10 ML: HCPCS | Performed by: NURSE PRACTITIONER

## 2022-10-02 PROCEDURE — 2580000003 HC RX 258: Performed by: INTERNAL MEDICINE

## 2022-10-02 PROCEDURE — 99222 1ST HOSP IP/OBS MODERATE 55: CPT | Performed by: SURGERY

## 2022-10-02 PROCEDURE — 80048 BASIC METABOLIC PNL TOTAL CA: CPT

## 2022-10-02 PROCEDURE — 6360000002 HC RX W HCPCS: Performed by: NURSE PRACTITIONER

## 2022-10-02 PROCEDURE — 2060000000 HC ICU INTERMEDIATE R&B

## 2022-10-02 PROCEDURE — 85018 HEMOGLOBIN: CPT

## 2022-10-02 PROCEDURE — 85025 COMPLETE CBC W/AUTO DIFF WBC: CPT

## 2022-10-02 PROCEDURE — 2580000003 HC RX 258: Performed by: NURSE PRACTITIONER

## 2022-10-02 PROCEDURE — S5553 INSULIN LONG ACTING 5 U: HCPCS | Performed by: INTERNAL MEDICINE

## 2022-10-02 PROCEDURE — 2580000003 HC RX 258: Performed by: SURGERY

## 2022-10-02 PROCEDURE — 84540 ASSAY OF URINE/UREA-N: CPT

## 2022-10-02 PROCEDURE — 82962 GLUCOSE BLOOD TEST: CPT

## 2022-10-02 PROCEDURE — 84300 ASSAY OF URINE SODIUM: CPT

## 2022-10-02 PROCEDURE — 73620 X-RAY EXAM OF FOOT: CPT

## 2022-10-02 PROCEDURE — 94640 AIRWAY INHALATION TREATMENT: CPT

## 2022-10-02 PROCEDURE — 84100 ASSAY OF PHOSPHORUS: CPT

## 2022-10-02 PROCEDURE — 36430 TRANSFUSION BLD/BLD COMPNT: CPT

## 2022-10-02 PROCEDURE — 36415 COLL VENOUS BLD VENIPUNCTURE: CPT

## 2022-10-02 PROCEDURE — 84133 ASSAY OF URINE POTASSIUM: CPT

## 2022-10-02 PROCEDURE — 83735 ASSAY OF MAGNESIUM: CPT

## 2022-10-02 RX ORDER — PREDNISONE 20 MG/1
40 TABLET ORAL DAILY
Status: DISCONTINUED | OUTPATIENT
Start: 2022-10-02 | End: 2022-10-02

## 2022-10-02 RX ORDER — LINEZOLID 600 MG/1
600 TABLET, FILM COATED ORAL EVERY 12 HOURS SCHEDULED
Status: DISPENSED | OUTPATIENT
Start: 2022-10-02 | End: 2022-10-09

## 2022-10-02 RX ORDER — PREDNISONE 20 MG/1
40 TABLET ORAL DAILY
Status: COMPLETED | OUTPATIENT
Start: 2022-10-03 | End: 2022-10-04

## 2022-10-02 RX ORDER — SODIUM CHLORIDE 9 MG/ML
INJECTION, SOLUTION INTRAVENOUS PRN
Status: DISCONTINUED | OUTPATIENT
Start: 2022-10-02 | End: 2022-10-10

## 2022-10-02 RX ORDER — AMLODIPINE BESYLATE 10 MG/1
10 TABLET ORAL DAILY
Status: DISCONTINUED | OUTPATIENT
Start: 2022-10-03 | End: 2022-10-10

## 2022-10-02 RX ADMIN — IPRATROPIUM BROMIDE AND ALBUTEROL SULFATE 1 AMPULE: .5; 2.5 SOLUTION RESPIRATORY (INHALATION) at 17:49

## 2022-10-02 RX ADMIN — METOPROLOL SUCCINATE 25 MG: 25 TABLET, EXTENDED RELEASE ORAL at 10:00

## 2022-10-02 RX ADMIN — GABAPENTIN 600 MG: 600 TABLET, FILM COATED ORAL at 12:48

## 2022-10-02 RX ADMIN — SODIUM CHLORIDE, PRESERVATIVE FREE 40 MG: 5 INJECTION INTRAVENOUS at 03:37

## 2022-10-02 RX ADMIN — PIPERACILLIN AND TAZOBACTAM 4500 MG: 4; .5 INJECTION, POWDER, FOR SOLUTION INTRAVENOUS at 15:50

## 2022-10-02 RX ADMIN — HYDROCODONE BITARTRATE AND ACETAMINOPHEN 1 TABLET: 7.5; 325 TABLET ORAL at 09:59

## 2022-10-02 RX ADMIN — HYDROCODONE BITARTRATE AND ACETAMINOPHEN 1 TABLET: 7.5; 325 TABLET ORAL at 20:37

## 2022-10-02 RX ADMIN — TICAGRELOR 90 MG: 90 TABLET ORAL at 20:39

## 2022-10-02 RX ADMIN — BENZONATATE 100 MG: 100 CAPSULE ORAL at 12:48

## 2022-10-02 RX ADMIN — IPRATROPIUM BROMIDE AND ALBUTEROL SULFATE 1 AMPULE: .5; 2.5 SOLUTION RESPIRATORY (INHALATION) at 21:13

## 2022-10-02 RX ADMIN — ATORVASTATIN CALCIUM 80 MG: 40 TABLET, FILM COATED ORAL at 20:36

## 2022-10-02 RX ADMIN — ASPIRIN 81 MG: 81 TABLET, COATED ORAL at 09:57

## 2022-10-02 RX ADMIN — FENTANYL CITRATE 50 MCG: 50 INJECTION, SOLUTION INTRAMUSCULAR; INTRAVENOUS at 13:29

## 2022-10-02 RX ADMIN — CLONIDINE HYDROCHLORIDE 0.1 MG: 0.1 TABLET ORAL at 10:01

## 2022-10-02 RX ADMIN — CETIRIZINE HYDROCHLORIDE 10 MG: 10 TABLET, FILM COATED ORAL at 09:59

## 2022-10-02 RX ADMIN — CLONIDINE HYDROCHLORIDE 0.1 MG: 0.1 TABLET ORAL at 23:14

## 2022-10-02 RX ADMIN — BENZONATATE 100 MG: 100 CAPSULE ORAL at 10:00

## 2022-10-02 RX ADMIN — AMPICILLIN SODIUM AND SULBACTAM SODIUM 3000 MG: 2; 1 INJECTION, POWDER, FOR SOLUTION INTRAMUSCULAR; INTRAVENOUS at 10:14

## 2022-10-02 RX ADMIN — AMPICILLIN SODIUM AND SULBACTAM SODIUM 3000 MG: 2; 1 INJECTION, POWDER, FOR SOLUTION INTRAMUSCULAR; INTRAVENOUS at 03:43

## 2022-10-02 RX ADMIN — GABAPENTIN 600 MG: 600 TABLET, FILM COATED ORAL at 17:16

## 2022-10-02 RX ADMIN — INSULIN LISPRO 8 UNITS: 100 INJECTION, SOLUTION INTRAVENOUS; SUBCUTANEOUS at 12:45

## 2022-10-02 RX ADMIN — IPRATROPIUM BROMIDE AND ALBUTEROL SULFATE 1 AMPULE: .5; 2.5 SOLUTION RESPIRATORY (INHALATION) at 13:03

## 2022-10-02 RX ADMIN — PIPERACILLIN AND TAZOBACTAM 3375 MG: 3; .375 INJECTION, POWDER, FOR SOLUTION INTRAVENOUS at 23:18

## 2022-10-02 RX ADMIN — FENTANYL CITRATE 50 MCG: 50 INJECTION, SOLUTION INTRAMUSCULAR; INTRAVENOUS at 17:44

## 2022-10-02 RX ADMIN — INSULIN LISPRO 12 UNITS: 100 INJECTION, SOLUTION INTRAVENOUS; SUBCUTANEOUS at 17:22

## 2022-10-02 RX ADMIN — INSULIN LISPRO 12 UNITS: 100 INJECTION, SOLUTION INTRAVENOUS; SUBCUTANEOUS at 08:25

## 2022-10-02 RX ADMIN — EZETIMIBE 10 MG: 10 TABLET ORAL at 10:01

## 2022-10-02 RX ADMIN — HYDRALAZINE HYDROCHLORIDE 50 MG: 50 TABLET, FILM COATED ORAL at 06:44

## 2022-10-02 RX ADMIN — AMLODIPINE BESYLATE 5 MG: 5 TABLET ORAL at 10:01

## 2022-10-02 RX ADMIN — SENNOSIDES 5 ML: 8.8 SYRUP ORAL at 20:40

## 2022-10-02 RX ADMIN — SODIUM ZIRCONIUM CYCLOSILICATE 10 G: 10 POWDER, FOR SUSPENSION ORAL at 20:40

## 2022-10-02 RX ADMIN — METHYLPREDNISOLONE SODIUM SUCCINATE 40 MG: 40 INJECTION, POWDER, FOR SOLUTION INTRAMUSCULAR; INTRAVENOUS at 09:57

## 2022-10-02 RX ADMIN — FENTANYL CITRATE 50 MCG: 50 INJECTION, SOLUTION INTRAMUSCULAR; INTRAVENOUS at 03:37

## 2022-10-02 RX ADMIN — CARBIDOPA AND LEVODOPA 1 TABLET: 25; 100 TABLET ORAL at 12:48

## 2022-10-02 RX ADMIN — SODIUM ZIRCONIUM CYCLOSILICATE 10 G: 10 POWDER, FOR SUSPENSION ORAL at 13:29

## 2022-10-02 RX ADMIN — LINEZOLID 600 MG: 600 TABLET, FILM COATED ORAL at 20:37

## 2022-10-02 RX ADMIN — FENTANYL CITRATE 50 MCG: 50 INJECTION, SOLUTION INTRAMUSCULAR; INTRAVENOUS at 23:14

## 2022-10-02 RX ADMIN — TICAGRELOR 90 MG: 90 TABLET ORAL at 10:00

## 2022-10-02 RX ADMIN — SODIUM CHLORIDE, PRESERVATIVE FREE 40 MG: 5 INJECTION INTRAVENOUS at 15:46

## 2022-10-02 RX ADMIN — GABAPENTIN 600 MG: 600 TABLET, FILM COATED ORAL at 10:00

## 2022-10-02 RX ADMIN — GABAPENTIN 600 MG: 600 TABLET, FILM COATED ORAL at 21:04

## 2022-10-02 RX ADMIN — HYDRALAZINE HYDROCHLORIDE 50 MG: 50 TABLET, FILM COATED ORAL at 12:47

## 2022-10-02 RX ADMIN — INSULIN LISPRO 8 UNITS: 100 INJECTION, SOLUTION INTRAVENOUS; SUBCUTANEOUS at 21:01

## 2022-10-02 RX ADMIN — HYDRALAZINE HYDROCHLORIDE 50 MG: 50 TABLET, FILM COATED ORAL at 21:04

## 2022-10-02 RX ADMIN — CLONIDINE HYDROCHLORIDE 0.1 MG: 0.1 TABLET ORAL at 00:02

## 2022-10-02 RX ADMIN — EPOETIN ALFA-EPBX 6000 UNITS: 3000 INJECTION, SOLUTION INTRAVENOUS; SUBCUTANEOUS at 17:16

## 2022-10-02 RX ADMIN — LACTULOSE 20 G: 20 SOLUTION ORAL at 20:43

## 2022-10-02 RX ADMIN — INSULIN GLARGINE-YFGN 30 UNITS: 100 INJECTION, SOLUTION SUBCUTANEOUS at 10:10

## 2022-10-02 RX ADMIN — CARBIDOPA AND LEVODOPA 1 TABLET: 25; 100 TABLET ORAL at 20:35

## 2022-10-02 RX ADMIN — BENZONATATE 100 MG: 100 CAPSULE ORAL at 20:38

## 2022-10-02 RX ADMIN — LACTULOSE 20 G: 20 SOLUTION ORAL at 09:58

## 2022-10-02 RX ADMIN — CARBIDOPA AND LEVODOPA 1 TABLET: 25; 100 TABLET ORAL at 09:57

## 2022-10-02 ASSESSMENT — PAIN - FUNCTIONAL ASSESSMENT: PAIN_FUNCTIONAL_ASSESSMENT: ACTIVITIES ARE NOT PREVENTED

## 2022-10-02 ASSESSMENT — PAIN SCALES - PAIN ASSESSMENT IN ADVANCED DEMENTIA (PAINAD)
FACIALEXPRESSION: 1
FACIALEXPRESSION: 1
TOTALSCORE: 4
BODYLANGUAGE: 1
CONSOLABILITY: 1
NEGVOCALIZATION: 1
BODYLANGUAGE: 1
CONSOLABILITY: 1
BREATHING: 0
BREATHING: 0
NEGVOCALIZATION: 1
TOTALSCORE: 4

## 2022-10-02 ASSESSMENT — PAIN DESCRIPTION - LOCATION: LOCATION: FOOT

## 2022-10-02 ASSESSMENT — PAIN SCALES - GENERAL
PAINLEVEL_OUTOF10: 9
PAINLEVEL_OUTOF10: 4

## 2022-10-02 ASSESSMENT — PAIN DESCRIPTION - ORIENTATION: ORIENTATION: RIGHT;LEFT

## 2022-10-02 ASSESSMENT — PAIN DESCRIPTION - DESCRIPTORS: DESCRIPTORS: ACHING;DISCOMFORT;SORE

## 2022-10-02 NOTE — PLAN OF CARE
Problem: Discharge Planning  Goal: Discharge to home or other facility with appropriate resources  Outcome: Progressing     Problem: Pain  Goal: Verbalizes/displays adequate comfort level or baseline comfort level  Outcome: Progressing     Problem: Skin/Tissue Integrity  Goal: Absence of new skin breakdown  Description: 1. Monitor for areas of redness and/or skin breakdown  2. Assess vascular access sites hourly  3. Every 4-6 hours minimum:  Change oxygen saturation probe site  4. Every 4-6 hours:  If on nasal continuous positive airway pressure, respiratory therapy assess nares and determine need for appliance change or resting period.   Outcome: Progressing     Problem: Safety - Adult  Goal: Free from fall injury  Outcome: Progressing     Problem: ABCDS Injury Assessment  Goal: Absence of physical injury  Outcome: Progressing     Problem: Chronic Conditions and Co-morbidities  Goal: Patient's chronic conditions and co-morbidity symptoms are monitored and maintained or improved  Outcome: Progressing     Problem: Neurosensory - Adult  Goal: Achieves stable or improved neurological status  Outcome: Progressing  Flowsheets (Taken 10/2/2022 0800)  Achieves stable or improved neurological status: Assess for and report changes in neurological status  Goal: Remains free of injury related to seizures activity  Outcome: Progressing  Flowsheets (Taken 10/2/2022 0800)  Remains free of injury related to seizure activity: Maintain airway, patient safety  and administer oxygen as ordered  Goal: Achieves maximal functionality and self care  Outcome: Progressing  Flowsheets (Taken 10/2/2022 0800)  Achieves maximal functionality and self care: Monitor swallowing and airway patency with patient fatigue and changes in neurological status     Problem: Respiratory - Adult  Goal: Achieves optimal ventilation and oxygenation  Outcome: Progressing  Flowsheets (Taken 10/2/2022 0800)  Achieves optimal ventilation and oxygenation: Assess for changes in respiratory status     Problem: Cardiovascular - Adult  Goal: Maintains optimal cardiac output and hemodynamic stability  Outcome: Progressing  Flowsheets (Taken 10/2/2022 0800)  Maintains optimal cardiac output and hemodynamic stability:   Monitor blood pressure and heart rate   Monitor urine output and notify Licensed Independent Practitioner for values outside of normal range   Assess for signs of decreased cardiac output   Administer fluid and/or volume expanders as ordered  Goal: Absence of cardiac dysrhythmias or at baseline  Outcome: Progressing     Problem: Skin/Tissue Integrity - Adult  Goal: Skin integrity remains intact  Outcome: Progressing  Goal: Incisions, wounds, or drain sites healing without S/S of infection  Outcome: Progressing  Goal: Oral mucous membranes remain intact  Outcome: Progressing     Problem: Metabolic/Fluid and Electrolytes - Adult  Goal: Electrolytes maintained within normal limits  Outcome: Progressing  Flowsheets (Taken 10/2/2022 0800)  Electrolytes maintained within normal limits:   Monitor labs and assess patient for signs and symptoms of electrolyte imbalances   Administer electrolyte replacement as ordered   Monitor response to electrolyte replacements, including repeat lab results as appropriate   Fluid restriction as ordered   Instruct patient on fluid and nutrition restrictions as appropriate  Goal: Hemodynamic stability and optimal renal function maintained  Outcome: Progressing  Goal: Glucose maintained within prescribed range  Outcome: Progressing     Problem: Hematologic - Adult  Goal: Maintains hematologic stability  Outcome: Adequate for Discharge     Problem: Safety - Medical Restraint  Goal: Remains free of injury from restraints (Restraint for Interference with Medical Device)  Description: INTERVENTIONS:  1. Determine that other, less restrictive measures have been tried or would not be effective before applying the restraint  2.  Evaluate the patient's condition at the time of restraint application  3. Inform patient/family regarding the reason for restraint  4.  Q2H: Monitor safety, psychosocial status, comfort, nutrition and hydration  Outcome: Completed     Problem: Nutrition Deficit:  Goal: Optimize nutritional status  Outcome: Progressing

## 2022-10-02 NOTE — PROGRESS NOTES
Department of Podiatry   Progress Note      Patient seen at bedside this morning. No acute events,  wound vac intact to right foot. Vac running continuous pressure Patient for EGD tomorrow. No new pedal complaints      Past Medical History:            Diagnosis Date    Chronic back pain     CKD (chronic kidney disease)     CVA (cerebral vascular accident) ([facility]     CVA (cerebral vascular accident) ([facility] [address]     DM [facility]diabetes mellitus[facility]     Major depression     MI (myocardial infarction) [facility]     MATT (obstructive sleep apnea)     Parkinson disease ([facility]     PVD (peripheral vascular disease) ([facility]     Seizure ([facility]        Past Surgical History:        Procedure Laterality Date    FEMORAL ARTERY STENT      IR CAROTID STENT UNI W PROTECTION  9/27/2022    IR CAROTID STENT UNI W PROTECTION 9/27/2022 Trav Jacobsen MD [facility]       Medications Prior to Admission:    Medications Prior to Admission: apixaban (ELIQUIS) 5 MG TABS tablet, Take 5 mg by mouth 2 times daily  aspirin 81 MG chewable tablet, Take 81 mg by mouth daily  carbidopa-levodopa (SINEMET)  MG per tablet, Take 1 tablet by mouth 3 times daily  carvedilol (COREG) 25 MG tablet, Take 25 mg by mouth 2 times daily (with meals)  cetirizine (ZYRTEC) 10 MG tablet, Take 10 mg by mouth daily  clopidogrel (PLAVIX) 75 MG tablet, Take 75 mg by mouth daily  diphenhydrAMINE (BENADRYL) 25 MG capsule, Take 25 mg by mouth every 6 hours  ezetimibe (ZETIA) 10 MG tablet, Take 10 mg by mouth daily  fluticasone (FLONASE) 50 MCG/ACT nasal spray, 2 sprays by Each Nostril route daily  gabapentin (NEURONTIN) 600 MG tablet, Take 600 mg by mouth 4 times daily. HYDROcodone-acetaminophen (NORCO) 5-325 MG per tablet, Take 1 tablet by mouth 2 times daily.   Insulin Glargine, 2 Unit Dial, (TOUJEO MAX SOLOSTAR) 300 UNIT/ML SOPN, Inject 66 Units into the skin daily  piperacillin-tazobactam (ZOSYN) 3-0.375 GM per 50ML IVPB extended infusion, Infuse 4.5 mg intravenously in Daily AC & HS    HYDROcodone-acetaminophen  1 tablet Oral BID    ampicillin-sulbactam  3,000 mg IntraVENous Q6H    pantoprazole (PROTONIX) 40 mg injection  40 mg IntraVENous Q12H    metoprolol succinate  25 mg Oral Daily    sennosides  5 mL Oral Nightly    carbidopa-levodopa  1 tablet Per NG tube TID    amLODIPine  5 mg Per NG tube Daily    ipratropium-albuterol  1 ampule Inhalation Q4H WA    collagenase   Topical Q MWF    benzonatate  100 mg Oral TID    cetirizine  10 mg Oral Daily    ezetimibe  10 mg Oral Daily    gabapentin  600 mg Oral 4x Daily    ticagrelor  90 mg Oral BID    aspirin  81 mg Oral Daily    Or    aspirin  300 mg Rectal Daily    atorvastatin  80 mg Oral Nightly     Continuous Infusions:   sodium chloride      sodium chloride      sodium chloride      sodium chloride      dextrose       PRN Meds:.sodium chloride, fentanNYL, sodium chloride, sodium chloride, sodium chloride, labetalol, hydrALAZINE, glucose, dextrose bolus **OR** dextrose bolus, glucagon (rDNA), dextrose, ondansetron **OR** ondansetron    RADIOLOGY:  CT HEAD WO CONTRAST   Final Result   Parenchymal volume loss and chronic microvascular ischemic changes. No acute intracranial abnormality. Ethmoid and sphenoid sinusitis. Right mastoid effusion. US RETROPERITONEAL COMPLETE   Final Result   1. Normal appearance of the bilateral kidneys. No hydronephrosis. 2.  A Mahan catheter is decompressing the bladder. XR CHEST PORTABLE   Final Result   Worsening infiltrate and or atelectasis on the left, stable on the right with   suspected layering pleural effusion. XR CHEST PORTABLE   Final Result   Unchanged bilateral atelectasis and or infiltrate as well as small right   pleural effusion. MRI BRAIN WO CONTRAST   Final Result   There are 2 small regions of petechial infarcts one in the left   posterior frontal lobe and a second in the left parietal lobe not   exceeding 3 mm.  There is otherwise extensive small vessel ischemic   changes         XR CHEST PORTABLE   Final Result   1. Multifocal bilateral airspace disease more prominent within the lower   lobes. The airspace disease is unchanged when compared with the prior study. CT HEAD WO CONTRAST   Final Result   Diffuse atrophy likely age related   Findings compatible with small vessel ischemic changes. XR CHEST PORTABLE   Final Result   1. Pulmonary opacities present favoring edema and atelectasis, also small   pleural effusions, but nonspecific with some additional considerations noted   above   2. Support devices present as described above   3. Heart size appears borderline enlarged         XR ABDOMEN FOR NG/OG/NE TUBE PLACEMENT   Final Result   NG/OG is in the stomach. IR CAROTID STENT W PROTECTION   Final Result   1. Angiogram demonstrates successful placement of a carotid stent ,   post procedure images demonstrate a widely patent stent and internal   carotid         CT HEAD WO CONTRAST   Final Result   Diffuse atrophy likely age related   Findings compatible with small vessel ischemic changes. Findings were called at the time of dictation         CT BRAIN PERFUSION   Final Result      No significant ischemic penumbra identified      This study was analyzed by the [email or web address] algorithm. CTA NECK W CONTRAST   Final Result   1. Estimated stenosis of the proximal right and left internal carotid   artery by NASCET criteria is greater than 90% on the left   2. Severe atherosclerotic disease . 3. No large vessel occlusion identified            This study was analyzed by the [email or web address] algorithm. CTA HEAD W CONTRAST   Final Result   1. Estimated stenosis of the proximal right and left internal carotid   artery by NASCET criteria is greater than 90% on the left   2. Severe atherosclerotic disease . 3. No large vessel occlusion identified            This study was analyzed by the [email or web address] algorithm. Vitals:    BP (!) 150/55   Pulse 72   Temp 97.7 °F (36.5 °C) (Oral)   Resp 18   Ht 5' 8\" (1.727 m)   Wt 260 lb 8 oz (118.2 kg)   SpO2 96%   BMI 39.61 kg/m²     LABS:   Recent Labs     10/01/22  0603 10/02/22  0618   WBC 13.2* 15.9*   HGB 6.9* 6.5*   HCT 21.6* 20.0*    255     Recent Labs     10/02/22  0618   *   K 5.4*   *   CO2 22   PHOS 4.3   *   CREATININE 2.9*     Recent Labs     10/01/22  0603 10/02/22  0618   PROT 5.5* 5.7*       ASSESSMENTS:   1. Right foot wound diabetic ulcer  2. DM  Acute cerebrovascular accident (CVA) [facility])            PLAN:    - Patient was examined and evaluated. -Reviewed patient's recent lab results, charts and pertinent diagnostic imaging. Reviewed ancillary service notes. - Santyl to right foot wound MWF  -Patient for EGD tomorrow, NPO midnight  - Continue Wound vac to right foot. MWF - Will continue to follow patient while they are in-house. Thank you for the opportunity to take part in the patient's care. Please do not hesitate to call for any questions or concerns.

## 2022-10-02 NOTE — PLAN OF CARE
Problem: Pain  Goal: Verbalizes/displays adequate comfort level or baseline comfort level  10/2/2022 1813 by Luis Samuel RN  Outcome: Progressing     Problem: Skin/Tissue Integrity  Goal: Absence of new skin breakdown  Description: 1. Monitor for areas of redness and/or skin breakdown  2. Assess vascular access sites hourly  3. Every 4-6 hours minimum:  Change oxygen saturation probe site  4. Every 4-6 hours:  If on nasal continuous positive airway pressure, respiratory therapy assess nares and determine need for appliance change or resting period.   10/2/2022 1813 by Luis Samuel RN  Outcome: Progressing     Problem: Safety - Adult  Goal: Free from fall injury  10/2/2022 1813 by Luis Samuel RN  Outcome: Progressing

## 2022-10-02 NOTE — CONSULTS
GENERAL SURGERY  CONSULT NOTE  10/2/2022    Physician Consulted: Dr. Joe Hitchcock  Reason for Consult: GI bleed  Referring Physician: Dr. Zeinab MAY  Meghan Rodriguez is a 74 y.o. male with history of CAD, CKD, prior CVA with right-sided deficits, PVD, Parkinson's disease, COPD, and MATT who presented initially as a transfer from [facility] secondary to strokelike symptoms with right-sided paralysis and aphasia. Patient was found to have an severe left ICA stenosis. Patient was taken with IR for left ICA stent placement. Patient had difficulty with hypoxia initially postoperatively but was eventually able to be weaned off of ventilator. Of note patient's hemoglobin initial presentation was at 5.6 post stent placement. Patient has required a total of 6 units PRBCs during his admission. Most recently his hemoglobin was 6.5 yesterday given 1 unit PRBCs with a repeat of 6.9 today. Patient is currently getting another 1 unit PRBCs. Patient was started on dual antiplatelet therapy of Brilinta/aspirin after stent placement. Patient does have history of being on Eliquis prior which was stopped this hospital mission. General surgery was consulted secondary to patient requiring dual antiplatelet therapy with recent stent and concern for possible GI bleed as reason for anemia. Patient states he has had a colonoscopy and EGD before most recently he believes was 2 months ago at [facility].  He states he does not remember the results of the scopes but does admit to a history of problems with GI bleed in the past.  He denies any prior abdominal surgical procedures. Patient is alert and oriented to self but overall is a poor historian.       Past Medical History:   Diagnosis Date    Chronic back pain     CKD (chronic kidney disease)     CVA (cerebral vascular accident) ([address]     CVA (cerebral vascular accident) ([address]     DM (diabetes mellitus) ([address]     Major depression     MI (myocardial infarction) ([address]     MATT (obstructive sleep apnea)     Parkinson disease (HCC)     PVD (peripheral vascular disease) [facility]     Seizure ([facility]        Past Surgical History:   Procedure Laterality Date    FEMORAL ARTERY STENT      IR CAROTID STENT UNI W PROTECTION  9/27/2022    IR CAROTID STENT UNI W PROTECTION 9/27/2022 MD DAYLIN Flores SPECIAL PROCEDURES       Medications Prior to Admission:    Prior to Admission medications    Medication Sig Start Date End Date Taking? Authorizing Provider   apixaban (ELIQUIS) 5 MG TABS tablet Take 5 mg by mouth 2 times daily   Yes Historical Provider, MD   aspirin 81 MG chewable tablet Take 81 mg by mouth daily   Yes Historical Provider, MD   carbidopa-levodopa (SINEMET)  MG per tablet Take 1 tablet by mouth 3 times daily   Yes Historical Provider, MD   carvedilol (COREG) 25 MG tablet Take 25 mg by mouth 2 times daily (with meals)   Yes Historical Provider, MD   cetirizine (ZYRTEC) 10 MG tablet Take 10 mg by mouth daily   Yes Historical Provider, MD   clopidogrel (PLAVIX) 75 MG tablet Take 75 mg by mouth daily   Yes Historical Provider, MD   diphenhydrAMINE (BENADRYL) 25 MG capsule Take 25 mg by mouth every 6 hours   Yes Historical Provider, MD   ezetimibe (ZETIA) 10 MG tablet Take 10 mg by mouth daily   Yes Historical Provider, MD   fluticasone (FLONASE) 50 MCG/ACT nasal spray 2 sprays by Each Nostril route daily   Yes Historical Provider, MD   gabapentin (NEURONTIN) 600 MG tablet Take 600 mg by mouth 4 times daily. Yes Historical Provider, MD   HYDROcodone-acetaminophen (NORCO) 5-325 MG per tablet Take 1 tablet by mouth 2 times daily. Yes Historical Provider, MD   Insulin Glargine, 2 Unit Dial, (TOUJEO MAX SOLOSTAR) 300 UNIT/ML SOPN Inject 66 Units into the skin daily   Yes Historical Provider, MD   piperacillin-tazobactam (ZOSYN) 3-0.375 GM per 50ML IVPB extended infusion Infuse 4.5 mg intravenously in the morning and 4.5 mg at noon and 4.5 mg in the evening.    Yes Historical Provider, MD   acetaminophen (TYLENOL) 325 MG tablet Take 650 mg by mouth every 6 hours as needed for Pain   Yes Historical Provider, MD   amLODIPine (NORVASC) 5 MG tablet Take 5 mg by mouth daily   Yes Historical Provider, MD   benzonatate (TESSALON) 100 MG capsule Take 100 mg by mouth 3 times daily   Yes Historical Provider, MD   cloNIDine (CATAPRES) 0.1 MG tablet Take 0.1 mg by mouth in the morning and 0.1 mg in the evening. Yes Historical Provider, MD   hydrALAZINE (APRESOLINE) 100 MG tablet Take 100 mg by mouth 3 times daily   Yes Historical Provider, MD       Allergies   Allergen Reactions    Codeine Other (See Comments)     blisters       History reviewed. No pertinent family history. Review of Systems   General ROS: negative for - chills, fatigue, fever, or malaise  Hematological and Lymphatic ROS: negative for - bruising, fatigue, or jaundice  Respiratory ROS: no cough, shortness of breath, or wheezing  Cardiovascular ROS: no chest pain or dyspnea on exertion  Gastrointestinal ROS: no abdominal pain, change in bowel habits, or black or bloody stools  Genito-Urinary ROS: no dysuria, trouble voiding, or hematuria  Musculoskeletal ROS: As per HPI with chronic right-sided weakness      PHYSICAL EXAM:    Vitals:    10/02/22 1000   BP: (!) 144/65   Pulse: 78   Resp: 15   Temp:    SpO2: 95%       General Appearance:  awake, alert, oriented to person, in no acute distress  Skin:  Skin color, texture, turgor normal. No rashes or lesions. Head/face:  NCAT  Eyes:  No gross abnormalities. , PERRL, EOMI, and Sclera nonicteric  Lungs:  Normal expansion. Clear to auscultation. No rales, rhonchi, or wheezing. Heart:  Heart regular rate and rhythm  Abdomen:  Soft, non-tender, normal bowel sounds. No bruits, organomegaly or masses.   Extremities: trace pedal edema  Male Rectal:  Normal male: no hemorrhoids, normal rectal tone, no masses, prostate not enlarged, no nodularity  Brown stool which is noted to be FOBT positive    LABS:    CBC  Recent Labs     10/02/22  0618   WBC 15.9*   HGB 6.5*   HCT 20.0*        BMP  Recent Labs     10/02/22  0618   *   K 5.4*   *   CO2 22   *   CREATININE 2.9*   CALCIUM 9.0     Liver Function  Recent Labs     10/02/22  0618   BILITOT 0.2   AST 42*   ALT 10   ALKPHOS 291*   PROT 5.7*   LABALBU 2.8*     No results for input(s): LACTATE in the last 72 hours. No results for input(s): INR, PTT in the last 72 hours. Invalid input(s): PT    RADIOLOGY    Echo Complete    Result Date: 9/28/2022  Transthoracic Echocardiography Report (TTE)  Demographics   Patient Name    Dina Jo  Gender            Male                  L   Medical Record  [id number]     Room Number       1851  Number   Account #       [de-identified]    Procedure Date    09/28/2022   Corporate ID                 Ordering                               Physician   Accession       [id number]   Referring  Number                       Physician   Date of Birth   1955   Sonographer       Cade Bryant RDCS   Age             74 year(s)   Interpreting      [address]                               Physician         Physician Cardiology                                                 Supriya Thompson MD                                Any Other  Procedure Type of Study   TTE procedure  Procedure Date Date: 09/28/2022 Start: 08:56 AM Study Location: Portable Technical Quality: Adequate visualization Indications:CVA. Patient Status: Routine Contrast Medium: Definity and Bubble Study. Height: 69 inches Weight: 260 pounds BSA: 2.31 m^2 BMI: 38.39 kg/m^2 HR: 66 bpm BP: 167/61 mmHg  Findings   Left Ventricle  Severe concentric left ventricular hypertrophy. Ejection fraction is visually estimated at 60 to 65%. Left ventricular diastolic filling is elevated . Right Ventricle  Mildly dilated right ventricle with normal systolic function. Left Atrium  Normal sized left atrium.    Right Atrium  Agitated saline injected for shunt evaluation was technically difficult  due to pt being on vent and lack of corperation. Please obtain limited  echo with Bubble study once pt is extubated and cooperative. Mitral Valve  Mild mitral regurgitation is present. Tricuspid Valve  Mild tricuspid regurgitation. RVSP is 42 mmHg. Aortic Valve  The aortic valve is trileaflet. Aortic valve opens well. Pulmonic Valve  Physiologic and/or trace, Mild pulmonic regurgitation present. Aorta  Aortic root dimension within normal limits. Conclusions   Summary  Severe concentric left ventricular hypertrophy. Ejection fraction is visually estimated at 60 to 65%. Left ventricular diastolic filling is elevated . Mildly dilated right ventricle with normal systolic function. Agitated saline injected for shunt evaluation was technically difficult  due to pt being on vent and lack of corperation. Please obtain limited  echo with Bubble study once pt is extubated and cooperative. Mild mitral regurgitation is present. Mild tricuspid regurgitation. RVSP is 42 mmHg. Physiologic and/or trace, Mild pulmonic regurgitation present.    Signature   ----------------------------------------------------------------  Electronically signed by Yesenia Lua MD(Interpreting  physician) on 09/28/2022 02:36 PM  ----------------------------------------------------------------  M-Mode/2D Measurements & Calculations   LV Diastolic    LV Systolic Dimension: 2.4   AV Cusp Separation: 1.9 cmLA  Dimension: 3.9  cm                           Dimension: 4.2 cmAO Root  cm              LV Volume Diastolic: 51.6 ml Dimension: 2.8 cm  LV FS:38.5 %    LV Volume Systolic: 61.9 ml  LV PW           LV EDV/LV EDV Index: 85.9  Diastolic: 1.6  HG/20 XM/R^8EA ESV/LV ESV  cm              Index: 21.2 ml/9ml/ m^2      RV Diastolic Dimension: 2.7  LV PW Systolic: EF Calculated: 51.6 %        cm  2.2 cm          LV Mass Index: 119 l/min*m^2  Septum          LV Length: 9.1 cm LA/Aorta: 1.5  Diastolic: 1.8                               Ascending Aorta: 2.8 cm  cm              LVOT: 2 cm                   LA volume/Index: 41.5 ml  Septum                                       /32.42EU/X^5  Systolic: 2 cm                               RA Area: 19.5 cm^2  CO: 4.97 l/min  CI: 2.15                                     IVC Expiration: 2.4 cm  l/m*m^2  LV Mass: 274.91  g  Doppler Measurements & Calculations   MV Peak E-Wave:   AV Peak Velocity: 1.54 m/s    LVOT Peak Velocity: 0.9  1.12 m/s          AV Peak Gradient: 9.45 mmHg   m/s  MV Peak A-Wave:   AV Mean Velocity: 1.03 m/s    LVOT Mean Velocity: 0.66  0.91 m/s          AV Mean Gradient: 5 mmHg      m/s  MV E/A Ratio:     AV VTI: 33.4 cm               LVOT Peak Gradient: 3.2  1.24              AV Area (Continuity):2.26     mmHgLVOT Mean Gradient:  MV Peak Gradient: cm^2                          1.9 mmHg  9.2 mmHg                                        Estimated RVSP: 67.3 mmHg  MV Mean Gradient: LVOT VTI: 24 cm               Estimated RAP:15 mmHg  3.8 mmHg          IVRT: 73.8 msec  MV Mean Velocity: Estimated PASP: 67.33 mmHg  0.9 m/s           Pulm. Vein A Reversal         TR Velocity:3.62 m/s  MV Deceleration   Duration:133.8 msec           TR Gradient:52.33 mmHg  Time: 235.5 msec  Pulm. Vein D Velocity:0.55    PV Peak Velocity: 1.05 m/s  MV P1/2t: 70.9    m/sPulm. Vein A Reversal      PV Peak Gradient: 4.37  msec              Velocity:0.26 m/s             mmHg  MVA by PHT:3.1    Pulm. Vein S Velocity: 0.29   PV Mean Velocity: 0.76 m/s  cm^2              m/s                           PV Mean Gradient: 2.6 mmHg  MV Area  (continuity): 1.7  cm^2  MV E' Septal  Velocity: 0.07  m/s  MV E' Lateral  Velocity: 11 m/s  [email or web address] [id number]    CT HEAD WO CONTRAST    Result Date: 2022  Patient MRN:  [id number] : 1955 Age: 74 years Gender: Male Order Date:  2022 5:35 AM EXAM: CT HEAD WO CONTRAST NUMBER OF IMAGES:  200 INDICATION:  stroke evaluation after mechanical thrombectomy Please assign to read to Dr. Joyce Rodrigues in [facility] stroke evaluation after mechanical thrombectomy What reading provider will be dictating this exam?->[facility] COMPARISON: None Technique: Low-dose CT  acquisition technique included one of following options; 1 . Automated exposure control, 2. Adjustment of MA and or KV according to patient's size or 3. Use of iterative reconstruction. Multiple CT sections were obtained with sagittal and coronal MPR reconstructions. The ventricles are prominent. The gyri and sulci appear  prominent. The white matter appears  prominent. There is no evidence for hemorrhage. There is no infarct identified. There is no mass effect identified. There is no mass identified. Diffuse atrophy likely age related Findings compatible with small vessel ischemic changes. CT HEAD WO CONTRAST    Result Date: 2022  Patient MRN:  [id number] : 1955 Age: 74 years Gender: Male Order Date:  2022 EXAM: CT HEAD WO CONTRAST NUMBER OF IMAGES:  357 INDICATION:  cva cva COMPARISON: None Technique: Low-dose CT  acquisition technique included one of following options; 1 . Automated exposure control, 2. Adjustment of MA and or KV according to patient's size or 3. Use of iterative reconstruction. Multiple CT sections were obtained with sagittal and coronal MPR reconstructions. The ventricles are prominent. The gyri and sulci appear  prominent. The white matter appears  prominent. There is no evidence for hemorrhage. There is no infarct identified. There is no mass effect identified. There is no mass identified. Diffuse atrophy likely age related Findings compatible with small vessel ischemic changes.  Findings were called at the time of dictation     XR CHEST PORTABLE    Result Date: 2022  EXAMINATION: ONE XRAY VIEW OF THE CHEST 2022 6:57 am COMPARISON: None. HISTORY: ORDERING SYSTEM PROVIDED HISTORY: intubated TECHNOLOGIST PROVIDED HISTORY: Reason for exam:->intubated What reading provider will be dictating this exam?->[facility] FINDINGS: There is stable position of the endotracheal tube and NG tube Multifocal bilateral airspace disease is again noted unchanged compared to prior study and most prominent within the lower lobes. There trace bilateral pleural effusions. 1. Multifocal bilateral airspace disease more prominent within the lower lobes. The airspace disease is unchanged when compared with the prior study. XR CHEST PORTABLE    Result Date: 2022  EXAMINATION: ONE XRAY VIEW OF THE CHEST 2022 12:41 am COMPARISON: None. HISTORY: ORDERING SYSTEM PROVIDED HISTORY: intuabted TECHNOLOGIST PROVIDED HISTORY: Reason for exam:->intuabted What reading provider will be dictating this exam?->[facility] FINDINGS: Indistinct pulmonary opacification rather diffusely most aggregated in mid to lower locations. The may represent pulmonary edema but nonspecific probably includes atelectasis at least in the bases other etiologies such is infiltrates, ARDS, etc.  Are possible with no comparison available. Advise clinical correlation Esophageal route catheter is into the stomach. ET tube has tip approximately 4.1 cm above the mark. Heart size around upper normal limits. Small pleural effusions suggested The detail of evaluation on the exam is suboptimal due to technique and body habitus. Further imaging may be helpful. 1. Pulmonary opacities present favoring edema and atelectasis, also small pleural effusions, but nonspecific with some additional considerations noted above 2. Support devices present as described above 3.  Heart size appears borderline enlarged     CTA NECK W CONTRAST    Result Date: 2022  Patient MRN:  [id number] : 1955 Age: 74 years Gender: Male Order Date:  2022 6:24 PM EXAM: CTA NECK W CONTRAST, CTA HEAD W CONTRAST NUMBER OF IMAGES:  36 INDICATION:  cva cva What reading provider will be dictating this exam?->[facility] COMPARISON: None Technique: Low-dose CT  acquisition technique included one of following options; 1 . Automated exposure control, 2. Adjustment of MA and or KV according to patient's size or 3. Use of iterative reconstruction. Contiguous spiral images were obtained in the axial plane, following the administration of intravenous contrast using CT angiographic protocol. Sagittal and coronal images were reconstructed from the axial plane acquisition. Additional MIP reconstructions were presented to aid in the interpretation of this study. Images were obtained from the skull base cranially. There is mild calcified plaque identified in the vessels compatible with atherosclerotic disease. The right carotid is moderately atherosclerotic without significant stenosis The left carotid is abnormal. There is  evidence for hemodynamically significant stenosis at the level the proximal internal carotid artery. By NASCET criteria estimated stenosis is 90% or greater The right vertebral artery is mildly atherosclerotic without significant stenosis The left vertebral artery is mildly atherosclerotic without significant stenosis The basilar artery is unremarkable The middle cerebral arteries are unremarkable The anterior cerebral arteries are unremarkable The posterior cerebral arteries are unremarkable     1. Estimated stenosis of the proximal right and left internal carotid artery by NASCET criteria is greater than 90% on the left 2. Severe atherosclerotic disease . 3. No large vessel occlusion identified This study was analyzed by the Viz. ai algorithm.      CT BRAIN PERFUSION    Result Date: 2022  Patient MRN: [id number] : 1955 Age:  74 years Gender: Male Order Date: 2022 Exam: CT BRAIN PERFUSION Number of Images: 333 views Indication:   cva cva What reading provider will be dictating this exam?->[facility] Comparison: None. Findings: Perfusion images demonstrate symmetric blood volume Blood flow images demonstrate symmetric blood flow There is no significant ischemic penumbra identified. There is no significant core infarct identified. No significant ischemic penumbra identified This study was analyzed by the Viz. ai algorithm. XR ABDOMEN FOR NG/OG/NE TUBE PLACEMENT    Result Date: 2022  EXAMINATION: ONE SUPINE XRAY VIEW(S) OF THE ABDOMEN 2022 12:40 am COMPARISON: None. HISTORY: ORDERING SYSTEM PROVIDED HISTORY: Confirmation of course of NG/OG/NE tube and location of tip of tube TECHNOLOGIST PROVIDED HISTORY: Reason for exam:->Confirmation of course of NG/OG/NE tube and location of tip of tube Portable? ->Yes What reading provider will be dictating this exam?->[facility] FINDINGS: Esophageal route catheter is into the left upper quadrant expected stomach location. No clear bowel obstruction identified. Possible constipation. Limited detailed evaluation present on exam.  Chest evaluation is done separately. NG/OG is in the stomach. IR CAROTID STENT W PROTECTION    Result Date: 2022  Patient MRN:  [id number] : 1955 Age: 74 years Gender: Male Order Date:  2022 7:00 PM Examination angiogram and carotid stent NUMBER OF IMAGES:  12 INDICATION: I65.22 Stenosis of left carotid artery left carotid stenosis What reading provider will be dictating this exam?->[facility] COMPARISON: None FINDINGS:  After obtaining informed consent and following the routine sterile prep and drape after administration of local anesthesia and following a time out a needle was inserted in the right common femoral artery and guidewire and catheter were advanced into the abdominal aorta and subsequently into the thoracic aorta. Subsequently selective catheterization of the left common carotid was performed and angiogram was performed .  Images demonstrate 80 stenosis of the proximal internal carotid artery by NASCET criteria on the  left prior to stent placement The external carotid artery is patent. The catheter was then exchanged for a guide catheter The distal protection device was placed. A filter wire was utilized Angioplasty was performed. Subsequently a carotid stent was placed Subsequently angioplasty of the stent was performed Repeat images demonstrate A patent stent with a patent distal internal carotid artery. Genesis Camarena TICI 3 0 no perfusion 1 Penetration but no distal branch filling 2a perfusion with incomplete distal branch filling, less than 50% 2b  perfusion with incomplete distal branch filling greater than 50% 3  Full perfusion with filling of distal branches The patient tolerated the procedure well. Angiogram of the right common femoral artery demonstrates a patent vessel and Angio-Seal was utilized. The procedure was performed with general anesthesia. 12 minutes 13 seconds of fluoroscopy was utilized. Time out occurred at 1920 hours. 1. Angiogram demonstrates successful placement of a carotid stent , post procedure images demonstrate a widely patent stent and internal carotid     CTA HEAD W CONTRAST    Result Date: 2022  Patient MRN:  [id number] : 1955 Age: 74 years Gender: Male Order Date:  2022 6:24 PM EXAM: CTA NECK W CONTRAST, CTA HEAD W CONTRAST NUMBER OF IMAGES:  36 INDICATION:  cva cva What reading provider will be dictating this exam?->[facility] COMPARISON: None Technique: Low-dose CT  acquisition technique included one of following options; 1 . Automated exposure control, 2. Adjustment of MA and or KV according to patient's size or 3. Use of iterative reconstruction. Contiguous spiral images were obtained in the axial plane, following the administration of intravenous contrast using CT angiographic protocol. Sagittal and coronal images were reconstructed from the axial plane acquisition. Additional MIP reconstructions were presented to aid in the interpretation of this study.  Images were obtained from the skull base cranially. There is mild calcified plaque identified in the vessels compatible with atherosclerotic disease. The right carotid is moderately atherosclerotic without significant stenosis The left carotid is abnormal. There is  evidence for hemodynamically significant stenosis at the level the proximal internal carotid artery. By NASCET criteria estimated stenosis is 90% or greater The right vertebral artery is mildly atherosclerotic without significant stenosis The left vertebral artery is mildly atherosclerotic without significant stenosis The basilar artery is unremarkable The middle cerebral arteries are unremarkable The anterior cerebral arteries are unremarkable The posterior cerebral arteries are unremarkable     1. Estimated stenosis of the proximal right and left internal carotid artery by NASCET criteria is greater than 90% on the left 2. Severe atherosclerotic disease . 3. No large vessel occlusion identified This study was analyzed by the Viz. ai algorithm. MRI BRAIN WO CONTRAST    Result Date: 2022  Patient MRN:  [id number] : 1955 Age: 74 years Gender: Male Order Date:  2022 3:24 PM EXAM: MRI BRAIN WO CONTRAST NUMBER OF IMAGES:  605 INDICATION:  Stroke Evaluation Mechanical Thrombectomy MRI of the brain 24 hours following mechanical thrombectomy. Discontinue order after first follow up. Please assign to Dr. Slime Rush to read in [facility] Stroke Evaluation Mechanical Thrombectomy What reading provider will be dictating this exam?->[facility] COMPARISON: CT scan 2022 Total sequences obtained: 9 The ventricles are prominent. The gyri and sulci appear  prominent. The white matter appears  prominent. No convincing hemorrhage identified. There is no mass effect identified. There is no mass identified.  There is a small region of restricted diffusion measuring approximately 3 mm seen in the left parietal lobe and a similar finding present in the left posterior frontal lobe. No other restricted diffusion is seen to suggest acute infarct. There is a small region of susceptibility infarct in the left frontal lobe. There are 2 small regions of petechial infarcts one in the left posterior frontal lobe and a second in the left parietal lobe not exceeding 3 mm.  There is otherwise extensive small vessel ischemic changes         ASSESSMENT:  74 y.o. male with acute CVA status post left ICA angioplasty/stent currently on DAPT now with anemia and FOBT positive stool    PLAN:  -Plan for EGD 10/3 secondary to patient requiring to be on DAPT and persistent anemia.  -Discussed risk/benefits with patient at bedside and answered any questions.  -Continue PPI twice daily  -Okay for diet today  -N.p.o. at midnight for procedure  -Discussed with Dr. Watts Host    Electronically signed by Marvel Beard DO on 10/2/22 at 12:00 PM EDT

## 2022-10-02 NOTE — PROGRESS NOTES
Hospitalist Progress Note      SYNOPSIS: Patient admitted on 2022 for Acute cerebrovascular accident (CVA) (HCC)Patient presented to [facility] with strokelike symptoms, right-sided paralysis and aphasia. Was found to have severe left ICA stenosis and taken to IR for left ICA angioplasty with stent placement. Patient was intubated and sedated and transferred to the ICU. Vital signs are within normal limits and stable. Patient is afebrile. Laboratory studies demonstrate potassium 5.2, BUN 89, creatinine 2.9, glucose 106, troponin 153, hemoglobin 5.0. Patient transfused PRBC. Neurology, cardiology, podiatry following. MRI showed There are 2 small regions of petechial infarcts one in the left posterior frontal lobe and a second in the left parietal lobe not exceeding 3 mm. There is otherwise extensive small vessel ischemic changes. Extubated . Remains anemic and has to remain on DAPT. For scopes tomorrow      SUBJECTIVE:  Stable overnight. No other overnight issues reported. Patient seen and examined  Records reviewed. Again anemic, surgery following. Recommend endoscopy. On unasyn empirically      Temp (24hrs), Av.5 °F (36.9 °C), Min:98.1 °F (36.7 °C), Max:98.9 °F (37.2 °C)    DIET: ADULT DIET; Dysphagia - Minced and Moist; 3 carb choices (45 gm/meal); no rice, loves pudding  CODE: Full Code    Intake/Output Summary (Last 24 hours) at 10/2/2022 0834  Last data filed at 10/2/2022 0700  Gross per 24 hour   Intake 1268.12 ml   Output 1665 ml   Net -396.88 ml       Review of Systems  All bolded are positive; please see HPI  General:  Fever, chills, diaphoresis, fatigue, malaise, night sweats, weight loss  Psychological:  Anxiety, disorientation, hallucinations. ENT:  Epistaxis, headaches, vertigo, visual changes. Cardiovascular:  Chest pain, irregular heartbeats, palpitations, paroxysmal nocturnal dyspnea.   Respiratory:  Shortness of breath, coughing, sputum production, hemoptysis, wheezing, orthopnea. Gastrointestinal:  Nausea, vomiting, diarrhea, heartburn, constipation, abdominal pain, hematemesis, hematochezia, melena, acholic stools  Genito-Urinary:  Dysuria, urgency, frequency, hematuria  Musculoskeletal:  Joint pain, joint stiffness, joint swelling, muscle pain  Neurology:  Headache, focal neurological deficits, weakness, numbness, paresthesia  Derm:  Rashes, ulcers, excoriations, bruising  Extremities:  Decreased ROM, peripheral edema, mottling      OBJECTIVE:    BP (!) 158/78   Pulse 77   Temp 98.1 °F (36.7 °C) (Oral)   Resp 17   Ht 5' 8\" (1.727 m)   Wt 260 lb 8 oz (118.2 kg)   SpO2 96%   BMI 39.61 kg/m²     General appearance: Awake, follows commands, intermittently confused. HEENT:  Conjunctivae/corneas clear. Neck: Supple. No jugular venous distention. Respiratory: symmetrical; clear to auscultation bilaterally; no wheezes; no rhonchi; no rales  Cardiovascular: rhythm regular; rate controlled; no murmurs  Abdomen: Soft, nontender, nondistended  Extremities:  peripheral pulses present; no peripheral edema;  seebelow  Musculoskeletal: No clubbing, cyanosis, no bilateral lower extremity edema. Brisk capillary refill. Skin:  No rashes  on visible skin  Neurologic: r hemiparesis        ASSESSMENT and PLAN:    Acute CVA- He was found to have severe left ICA stenosis and was taken to IR for L ICA angioplasty with stent placement. He is on ASA/brilinta/statin. MRI brain showed two tiny foci of acute stroke on the L. Echo was unrevealing, though bubble study was suboptimal.   Acute respiratory failure- requiring mechanical ventilation. Ventilator weaning per critical care. Extubated 9/29. On unasyn empirically  Acute blood loss anemia- transfuse for hemoglobin <7. Check occult blood, if positive recommend scopes. Hyperkalemia- resolved  Acute renal failure- unknown baseline. Elevated troponin-  Cardiology following. R foot ulcer- podiatry following.  Has wound vac. Hyperglycemia- likey due to steroids. Will wean. Lantus and SSI. Hemoglobin Ac 6.3    Dispo: remains in icu      Medications:  REVIEWED DAILY    Infusion Medications    sodium chloride      sodium chloride      sodium chloride      sodium chloride      dextrose       Scheduled Medications    insulin glargine  0.25 Units/kg SubCUTAneous QAM    cloNIDine  0.1 mg Oral Q12H    hydrALAZINE  50 mg Oral 3 times per day    methylPREDNISolone  40 mg IntraVENous Q12H    lactulose  20 g Oral BID    insulin lispro  0-16 Units SubCUTAneous 4x Daily AC & HS    HYDROcodone-acetaminophen  1 tablet Oral BID    ampicillin-sulbactam  3,000 mg IntraVENous Q6H    pantoprazole (PROTONIX) 40 mg injection  40 mg IntraVENous Q12H    metoprolol succinate  25 mg Oral Daily    sennosides  5 mL Oral Nightly    carbidopa-levodopa  1 tablet Per NG tube TID    amLODIPine  5 mg Per NG tube Daily    ipratropium-albuterol  1 ampule Inhalation Q4H WA    collagenase   Topical Q MWF    benzonatate  100 mg Oral TID    cetirizine  10 mg Oral Daily    ezetimibe  10 mg Oral Daily    gabapentin  600 mg Oral 4x Daily    ticagrelor  90 mg Oral BID    aspirin  81 mg Oral Daily    Or    aspirin  300 mg Rectal Daily    atorvastatin  80 mg Oral Nightly     PRN Meds: sodium chloride, fentanNYL, sodium chloride, sodium chloride, sodium chloride, labetalol, hydrALAZINE, glucose, dextrose bolus **OR** dextrose bolus, glucagon (rDNA), dextrose, ondansetron **OR** ondansetron    Labs:     Recent Labs     09/30/22  0530 09/30/22  1237 09/30/22  2200 10/01/22  0603 10/02/22  0618   WBC 12.2*  --   --  13.2* 15.9*   HGB 5.6*   < > 6.9* 6.9* 6.5*   HCT 18.2*   < > 20.8* 21.6* 20.0*     --   --  241 255    < > = values in this interval not displayed.        Recent Labs     09/30/22  0530 09/30/22  2016 10/01/22  0603 10/01/22  1550 10/02/22  0618   *  --  147*  --  149*   K 5.9*   < > 5.5* 5.5* 5.4*   *  --  116*  --  118*   CO2 20*  --  19* --  22   BUN 94*  --  109*  --  121*   CREATININE 2.8*  --  2.7*  --  2.9*   CALCIUM 8.3*  --  8.4*  --  9.0   PHOS 4.0  --  3.9  --  4.3    < > = values in this interval not displayed. Recent Labs     09/30/22  0530 10/01/22  0603 10/02/22  0618   PROT 5.4* 5.5* 5.7*   ALKPHOS 151* 180* 291*   ALT <5 <5 10   AST 11 15 42*   BILITOT 0.3 0.4 0.2       No results for input(s): INR in the last 72 hours. No results for input(s): Lalla Ill in the last 72 hours. Chronic labs:    Lab Results   Component Value Date    PSA 1.13 09/30/2022    INR 2.1 09/27/2022    LABA1C 6.4 (H) 09/28/2022       Radiology: REVIEWED DAILY    +++++++++++++++++++++++++++++++++++++++++++++++++  DO Emil Blas Physician - [address] [facility], [place]  +++++++++++++++++++++++++++++++++++++++++++++++++  NOTE: This report was transcribed using voice recognition software. Every effort was made to ensure accuracy; however, inadvertent computerized transcription errors may be present.

## 2022-10-02 NOTE — CONSULTS
Department of Internal Medicine  Infectious Diseases   Consult Note      Reason for Consult: Right foot wound infection       Requesting Physician: Dr Cha Hidden:                The patient is a 74 y.o. male with hx of DM, PAD , HTN , non healing right heel ulcer transferred to [facility] for the evaluation of stroke - he underwent left carotid stent placement . Pt reported pain in the right heel . Called micro lab at [facility] - Cx - MRSA , Proteus, Enterococcus, Pseudomonas , CONS , Group B Streptococcus .  It appears that pt was on IV vancomycin and zosyn at one point of time presents   WBC was 15 K Chest x ray - LLL infiltrates   Pt was given unasyn       Past Medical History:      DM, PAD< CKD< HTN, hyperlipidemia     Past Surgical History:      I & D wound right heel       Current Medications:      Current Facility-Administered Medications   Medication Dose Route Frequency Provider Last Rate Last Admin    0.9 % sodium chloride infusion   IntraVENous PRN Evelyn Anderson DO        sodium zirconium cyclosilicate (LOKELMA) oral suspension 10 g  10 g Oral TID Bryan Alonso MD   10 g at 10/02/22 1329    epoetin sharath-epbx (RETACRIT) injection 6,000 Units  6,000 Units SubCUTAneous Once per day on Mon Wed Fri MD Quincy Akhtar ON 10/3/2022] predniSONE (DELTASONE) tablet 40 mg  40 mg Oral Daily Evelyn Anderson DO        [START ON 10/3/2022] amLODIPine (NORVASC) tablet 10 mg  10 mg Per NG tube Daily Evelyn Anderson DO        insulin glargine-yfgn [facility]) injection vial 30 Units  0.25 Units/kg SubCUTAneous QAM Evelyn Anderson DO   30 Units at 10/02/22 1010    cloNIDine (CATAPRES) tablet 0.1 mg  0.1 mg Oral Q12H Evelyn Anderson DO   0.1 mg at 10/02/22 1001    hydrALAZINE (APRESOLINE) tablet 50 mg  50 mg Oral 3 times per day Germaine Solorzano DO   50 mg at 10/02/22 1247    fentaNYL (SUBLIMAZE) injection 50 mcg  50 mcg IntraVENous Q2H PRN Roger Abt Dimas Leigh, DO   50 mcg at 10/02/22 1329    0.9 % sodium chloride infusion   IntraVENous PRN Margene De Soto, APRN - CNP        lactulose (CHRONULAC) 10 GM/15ML solution 20 g  20 g Oral BID Margene Candy, APRN - CNP   20 g at 10/02/22 0958    insulin lispro (HUMALOG) injection vial 0-16 Units  0-16 Units SubCUTAneous 4x Daily AC & HS Margene Candy, APRN - CNP   8 Units at 10/02/22 1245    HYDROcodone-acetaminophen (NORCO) 7.5-325 MG per tablet 1 tablet  1 tablet Oral BID Margene Candy, APRN - CNP   1 tablet at 10/02/22 0959    0.9 % sodium chloride infusion   IntraVENous PRN Margene Candy, APRN - CNP        ampicillin-sulbactam (UNASYN) 3000 mg in 100 mL NS IVPB minibag  3,000 mg IntraVENous Q6H Kennedy Ulrich MD   Stopped at 10/02/22 1044    pantoprazole (PROTONIX) 40 mg in sodium chloride (PF) 10 mL injection  40 mg IntraVENous Q12H Margene Candy, APRN - CNP   40 mg at 10/02/22 7163    metoprolol succinate (TOPROL XL) extended release tablet 25 mg  25 mg Oral Daily Julia Thompson MD   25 mg at 10/02/22 1000    0.9 % sodium chloride infusion   IntraVENous PRN Marliner Sofía Rossi MD        sennosides (SENOKOT) 8.8 MG/5ML syrup 5 mL  5 mL Oral Nightly [facility] Jordin DO   5 mL at 10/01/22 2128    labetalol (NORMODYNE;TRANDATE) injection 10 mg  10 mg IntraVENous Q30 Min PRN Dotti Aschoff, MD   10 mg at 10/01/22 1115    hydrALAZINE (APRESOLINE) injection 10 mg  10 mg IntraVENous Q30 Min PRN Dotti Aschoff, MD   10 mg at 09/30/22 1627    glucose chewable tablet 16 g  4 tablet Oral PRN Dalia Berumen, APRN - CNP        dextrose bolus 10% 125 mL  125 mL IntraVENous PRN Dalia Berumen, APRN - CNP        Or    dextrose bolus 10% 250 mL  250 mL IntraVENous PRN Dalia Berumen, APRN - CNP        glucagon (rDNA) injection 1 mg  1 mg SubCUTAneous PRN CONSTANTINE Maloney CNP        dextrose 10 % infusion   IntraVENous Continuous PRN CONSTANTINE Maloney CNP        carbidopa-levodopa (SINEMET)  MG per tablet 1 tablet 1 tablet Per NG tube TID CONSTANTINE Meza CNP   1 tablet at 10/02/22 1248    ipratropium-albuterol (DUONEB) nebulizer solution 1 ampule  1 ampule Inhalation Q4H WA CONSTANTINE Meza CNP   1 ampule at 10/02/22 1303    collagenase ointment   Topical Q MWF Thor Yelena, DPM        benzonatate (TESSALON) capsule 100 mg  100 mg Oral TID Cruz Abdalla MD   100 mg at 10/02/22 1248    cetirizine (ZYRTEC) tablet 10 mg  10 mg Oral Daily Cruz Abdalla MD   10 mg at 10/02/22 0959    ezetimibe (ZETIA) tablet 10 mg  10 mg Oral Daily Cruz Abdalla MD   10 mg at 10/02/22 1001    gabapentin (NEURONTIN) tablet 600 mg  600 mg Oral 4x Daily Cruz Abdalla MD   600 mg at 10/02/22 1248    ticagrelor (BRILINTA) tablet 90 mg  90 mg Oral BID Cruz Abdalla MD   90 mg at 10/02/22 1000    ondansetron (ZOFRAN-ODT) disintegrating tablet 4 mg  4 mg Oral Q8H PRN Cruz Abdalla MD        Or    ondansetron [facility]) injection 4 mg  4 mg IntraVENous Q6H PRN Cruz Abdalla MD        aspirin EC tablet 81 mg  81 mg Oral Daily Cruz Abdalla MD   81 mg at 10/02/22 0631    Or    aspirin suppository 300 mg  300 mg Rectal Daily Cruz Abdalla MD   300 mg at 09/29/22 0809    atorvastatin (LIPITOR) tablet 80 mg  80 mg Oral Nightly Cruz Abdalla MD   80 mg at 10/01/22 2118       Allergies:  Codeine    Social History: Former smoker     Family History:    Not pertinent to present illness       REVIEW OF SYSTEMS:   CONSTITUTIONAL:  Denies fever, chill or rigors. HEENT: denies blurring of vision or double vision, denies hearing problem  RESPIRATORY: denies cough, shortness of breath,  CARDIOVASCULAR:  Denies palpitation  GASTROINTESTINAL:  Denies abdomen pain, diarrhea or constipation. GENITOURINARY:  Denies burning urination or frequency of urination  INTEGUMENT: Right heel wound  HEMATOLOGIC/LYMPHATIC:  Denies lymph node swelling, gum bleeding or easy bruising.   MUSCULOSKELETAL:  Denies leg pain , joint pain , joint swelling  NEUROLOGICAL:  weakness right side . PHYSICAL EXAM:      Vitals:     /62   Pulse 70   Temp 97.7 °F (36.5 °C) (Oral)   Resp 12   Ht 5' 8\" (1.727 m)   Wt 260 lb 8 oz (118.2 kg)   SpO2 97%   BMI 39.61 kg/m²     General Appearance:    Awake, alert , no acute distress. Head:    Normocephalic, atraumatic   Eyes:    No pallor, no icterus,   Ears:    No obvious deformity or drainage.    Nose:   No nasal drainage   Throat:   Mucosa moist, no oral thrush   Neck:   Supple, no lymphadenopathy   Back:     no CVA tenderness   Lungs:     Clear to auscultation bilaterally, no wheeze    Heart:    Regular rate and rhythm, no murmur   Abdomen:     Soft, non-tender, bowel sounds present    Extremities:    Right heel wound    Pulses:   Dorsalis pedis palpable - diminished    Skin:   Right heel wound with VAC              CBC with Differential:      Lab Results   Component Value Date/Time    WBC 15.9 10/02/2022 06:18 AM    RBC 2.14 10/02/2022 06:18 AM    HGB 7.0 10/02/2022 01:49 PM    HCT 21.5 10/02/2022 01:49 PM    HCT 15.0 09/27/2022 08:58 PM     10/02/2022 06:18 AM    MCV 93.5 10/02/2022 06:18 AM    MCH 30.4 10/02/2022 06:18 AM    MCHC 32.5 10/02/2022 06:18 AM    RDW 16.2 10/02/2022 06:18 AM    LYMPHOPCT 4.5 10/02/2022 06:18 AM    MONOPCT 2.1 10/02/2022 06:18 AM    BASOPCT 0.1 10/02/2022 06:18 AM    MONOSABS 0.34 10/02/2022 06:18 AM    LYMPHSABS 0.72 10/02/2022 06:18 AM    EOSABS 0.00 10/02/2022 06:18 AM    BASOSABS 0.01 10/02/2022 06:18 AM       CMP     Lab Results   Component Value Date/Time     10/02/2022 06:18 AM    K 5.4 10/02/2022 06:18 AM     10/02/2022 06:18 AM    CO2 22 10/02/2022 06:18 AM     10/02/2022 06:18 AM    CREATININE 2.9 10/02/2022 06:18 AM    GFRAA 27 10/02/2022 06:18 AM    LABGLOM 22 10/02/2022 06:18 AM    GLUCOSE 317 10/02/2022 06:18 AM    PROT 5.7 10/02/2022 06:18 AM    LABALBU 2.8 10/02/2022 06:18 AM    CALCIUM 9.0 10/02/2022 06:18 AM    BILITOT 0.2 10/02/2022 06:18 AM    ALKPHOS 291 10/02/2022 06:18 AM    AST 42 10/02/2022 06:18 AM    ALT 10 10/02/2022 06:18 AM         Hepatic Function Panel:    Lab Results   Component Value Date/Time    ALKPHOS 291 10/02/2022 06:18 AM    ALT 10 10/02/2022 06:18 AM    AST 42 10/02/2022 06:18 AM    PROT 5.7 10/02/2022 06:18 AM    BILITOT 0.2 10/02/2022 06:18 AM    LABALBU 2.8 10/02/2022 06:18 AM       PT/INR:    Lab Results   Component Value Date/Time    PROTIME 23.2 09/27/2022 01:12 PM    INR 2.1 09/27/2022 01:12 PM       TSH:  No results found for: TSH    U/A:  No results found for: NITRITE, COLORU, PHUR, LABCAST, WBCUA, RBCUA, MUCUS, TRICHOMONAS, YEAST, BACTERIA, CLARITYU, SPECGRAV, LEUKOCYTESUR, UROBILINOGEN, BILIRUBINUR, BLOODU, GLUCOSEU, AMORPHOUS    ABG:  No results found for: ONB7TOG, BEART, H8FTAJCG, PHART, THGBART, JDY7PGV, PO2ART, TDE3MSD    MICROBIOLOGY:    SARS CoV 2 neg         Radiology :    Chest X ray : LLL infiltrates, atelectasis       IMPRESSION:     Right foot  non healing wound, wound infection  r/o osteo   Leukocytosis   ?  Aspiration       RECOMMENDATIONS:      IV zosyn 3.375 grams iV q 8 hrs, stop unasyn   Zyvox 600 mg po q 12 hs   X ray right foot, sed rate and CRP level       Thank you Dr Jocelyn Cheek for the consult

## 2022-10-02 NOTE — PROGRESS NOTES
Nephrology Progress Note  Patient's Name: Brian Cabral  11:10 AM  10/2/2022        Reason for Consult:  Acute kidney injury/CKD  Requesting Physician:  Gerardo Bright MD    Chief Complaint:  Stroke  History Obtained From:  EHR    History of Present Ilness:    Brian Cabral is a 74 y.o. male history of CAD s/p MI, hypertension, chronic kidney disease stage IIIa (baseline creatinine of recent 1.7-1.9) he is followed by our group with Dr. Adelaida Moreno. He initially was admitted to [facility] with right lower extremity wound. .  Patient subsequently developed aphasia noted to facial droop associated with right-sided weakness. He was transferred to [address] [place] [address] for further management. Patient was admitted told the neuro ICU. He required intubation with mechanical ventilation. CT of the head and neck demonstrated bilateral carotid stenosis with the left ICA being dominant. IR performed angiogram with angioplasty of the left ICA. Laboratory data showed progressive increase in his serum creatinine to currently 2.9 mg/dL. Hemoglobin was noted to be 5.6. He has received at least 2 units of packed cells but with further drop in his hemoglobin with requirement for additional transfusion. He had an episode of urinary retention with gross hematuria Mahan catheter was reinserted and the hematuria subsequently cleared. Renal is consulted for LARY.     Subjective    10/1: he denies blurred vision, no headaches    10/2: No new acute issues from overnight; more alert; receiving PRBCs hemoglobin trending low         Allergies:  Codeine    Current Medications:    0.9 % sodium chloride infusion, PRN  insulin glargine-yfgn (SEMGLEE-YFGN) injection vial 30 Units, QAM  cloNIDine (CATAPRES) tablet 0.1 mg, Q12H  hydrALAZINE (APRESOLINE) tablet 50 mg, 3 times per day  fentaNYL (SUBLIMAZE) injection 50 mcg, Q2H PRN  0.9 % sodium chloride infusion, PRN  methylPREDNISolone sodium (SOLU-MEDROL) injection 40 mg, Q12H  lactulose (CHRONULAC) 10 GM/15ML solution 20 g, BID  insulin lispro (HUMALOG) injection vial 0-16 Units, 4x Daily AC & HS  HYDROcodone-acetaminophen (NORCO) 7.5-325 MG per tablet 1 tablet, BID  0.9 % sodium chloride infusion, PRN  ampicillin-sulbactam (UNASYN) 3000 mg in 100 mL NS IVPB minibag, Q6H  pantoprazole (PROTONIX) 40 mg in sodium chloride (PF) 10 mL injection, Q12H  metoprolol succinate (TOPROL XL) extended release tablet 25 mg, Daily  0.9 % sodium chloride infusion, PRN  sennosides (SENOKOT) 8.8 MG/5ML syrup 5 mL, Nightly  labetalol (NORMODYNE;TRANDATE) injection 10 mg, Q30 Min PRN  hydrALAZINE (APRESOLINE) injection 10 mg, Q30 Min PRN  glucose chewable tablet 16 g, PRN  dextrose bolus 10% 125 mL, PRN   Or  dextrose bolus 10% 250 mL, PRN  glucagon (rDNA) injection 1 mg, PRN  dextrose 10 % infusion, Continuous PRN  carbidopa-levodopa (SINEMET)  MG per tablet 1 tablet, TID  amLODIPine (NORVASC) tablet 5 mg, Daily  ipratropium-albuterol (DUONEB) nebulizer solution 1 ampule, Q4H WA  collagenase ointment, Q MWF  benzonatate (TESSALON) capsule 100 mg, TID  cetirizine (ZYRTEC) tablet 10 mg, Daily  ezetimibe (ZETIA) tablet 10 mg, Daily  gabapentin (NEURONTIN) tablet 600 mg, 4x Daily  ticagrelor (BRILINTA) tablet 90 mg, BID  ondansetron (ZOFRAN-ODT) disintegrating tablet 4 mg, Q8H PRN   Or  ondansetron (ZOFRAN) injection 4 mg, Q6H PRN  aspirin EC tablet 81 mg, Daily   Or  aspirin suppository 300 mg, Daily  atorvastatin (LIPITOR) tablet 80 mg, Nightly      Review of Systems:   Pertinent items are noted in HPI. Physical exam:  Vitals:    10/02/22 1000   BP: (!) 144/65   Pulse: 78   Resp: 15   Temp:    SpO2: 95%           General: alert, agitated  Eyes: PERRL. No sclera icterus. No conjunctival injection. ENT: No discharge. Pharynx clear. Neck: Trachea midline. Normal thyroid. Lungs: No accessory muscle use. No crackles. No wheezing. No rhonchi. CV: Regular rate. Regular rhythm. No murmur or rub.  .   Abd: Non-tender. Non-distended. No masses. No organmegaly. Normal bowel sounds. Skin: Warm and dry. No nodule on exposed extremities. No rash on exposed extremities.   Ext: No cyanosis, clubbing, edema ; dressing to right foot wound  Neuro: alert, agitated, oriented to person and time;moving all extremities; confused at times      Data:   Labs:  Lab Results   Component Value Date     (H) 10/02/2022     (H) 10/01/2022     (H) 09/30/2022    K 5.4 (H) 10/02/2022    K 5.5 (H) 10/01/2022    K 5.5 (H) 10/01/2022     (H) 10/02/2022    CO2 22 10/02/2022    CO2 19 (L) 10/01/2022    CO2 20 (L) 09/30/2022    CREATININE 2.9 (H) 10/02/2022    CREATININE 2.7 (H) 10/01/2022    CREATININE 2.8 (H) 09/30/2022     (HH) 10/02/2022     (HH) 10/01/2022    BUN 94 (H) 09/30/2022    GLUCOSE 317 (H) 10/02/2022    GLUCOSE 301 (H) 10/01/2022    GLUCOSE 194 (H) 09/30/2022    PHOS 4.3 10/02/2022    PHOS 3.9 10/01/2022    PHOS 4.0 09/30/2022    WBC 15.9 (H) 10/02/2022    WBC 13.2 (H) 10/01/2022    WBC 12.2 (H) 09/30/2022    HGB 6.5 (LL) 10/02/2022    HGB 6.9 (LL) 10/01/2022    HGB 6.9 (LL) 09/30/2022    HCT 20.0 (L) 10/02/2022    HCT 21.6 (L) 10/01/2022    HCT 20.8 (L) 09/30/2022    MCV 93.5 10/02/2022     10/02/2022         Imaging:  Echo Complete    Result Date: 9/28/2022  Transthoracic Echocardiography Report (TTE)  Demographics   Patient Name    aDne John  Gender            Male                  L   Medical Record  [id number]     Room Number       2017  Number   Account #       [de-identified]    Procedure Date    09/28/2022   Corporate ID                 Ordering                               Physician   Accession       [id number]   Referring  Number                       Physician   Date of Birth   1955   Sonographer       Hector Birmingham RDCS   Age             74 year(s)   Interpreting      [address]                               Physician         Physician Cardiology Chu Lou MD                                Any Other  Procedure Type of Study   TTE procedure  Procedure Date Date: 09/28/2022 Start: 08:56 AM Study Location: Portable Technical Quality: Adequate visualization Indications:CVA. Patient Status: Routine Contrast Medium: Definity and Bubble Study. Height: 69 inches Weight: 260 pounds BSA: 2.31 m^2 BMI: 38.39 kg/m^2 HR: 66 bpm BP: 167/61 mmHg  Findings   Left Ventricle  Severe concentric left ventricular hypertrophy. Ejection fraction is visually estimated at 60 to 65%. Left ventricular diastolic filling is elevated . Right Ventricle  Mildly dilated right ventricle with normal systolic function. Left Atrium  Normal sized left atrium. Right Atrium  Agitated saline injected for shunt evaluation was technically difficult  due to pt being on vent and lack of corperation. Please obtain limited  echo with Bubble study once pt is extubated and cooperative. Mitral Valve  Mild mitral regurgitation is present. Tricuspid Valve  Mild tricuspid regurgitation. RVSP is 42 mmHg. Aortic Valve  The aortic valve is trileaflet. Aortic valve opens well. Pulmonic Valve  Physiologic and/or trace, Mild pulmonic regurgitation present. Aorta  Aortic root dimension within normal limits. Conclusions   Summary  Severe concentric left ventricular hypertrophy. Ejection fraction is visually estimated at 60 to 65%. Left ventricular diastolic filling is elevated . Mildly dilated right ventricle with normal systolic function. Agitated saline injected for shunt evaluation was technically difficult  due to pt being on vent and lack of corperation. Please obtain limited  echo with Bubble study once pt is extubated and cooperative. Mild mitral regurgitation is present. Mild tricuspid regurgitation. RVSP is 42 mmHg. Physiologic and/or trace, Mild pulmonic regurgitation present.    Signature --------------------------------------------------------  --------    Assessment/Plans    1. Acute CVA  S/p IR intervention with angioplasty of the left ICA    2. LARY on CKD stage 3B; LARY due to the combined effects of ACEI use and contrast nephrotoxicity. Component of urinary retention; He is nonoliguric at this time  Disproportionate elevation of BUN relative to Cr suspicious for GI bleed; stool heme positive  Worsening renal unction despite being nonoliguric  May require dialysis support  Monitor labs and urine output      3. Hypertensive emergency presenting  with acute CVA  Adjust meds to target blood pressure control to SBP less than 160  BP with improved control    4. Anemia, chronic with acute worsening  Informed stool heme positive  PRBC transfusion as needed  Will start ASHLEY as ABP better controlled    5. Type 2 diabetes mellitus  Monitor glucose levels    6. Hyperkalemia  Due to combination of LARY and metabolic acidosis; PRBC transfusios  [facility] ordered  Will repeat level later today    7. Urinary retention  Suspected situational  Now with trejo  Urology following    8.  Hypernatremia  Suspect intervascular volume depletion  Repeat labs later ; if worse will start short course of IVF        Mark Bejarano MD  11:10 AM  10/2/2022

## 2022-10-03 ENCOUNTER — ANESTHESIA EVENT (OUTPATIENT)
Dept: ENDOSCOPY | Age: 67
DRG: 034 | End: 2022-10-03
Payer: COMMERCIAL

## 2022-10-03 ENCOUNTER — ANESTHESIA (OUTPATIENT)
Dept: ENDOSCOPY | Age: 67
DRG: 034 | End: 2022-10-03
Payer: COMMERCIAL

## 2022-10-03 LAB
ALBUMIN SERPL-MCNC: 2.8 G/DL (ref 3.5–5.2)
ALP BLD-CCNC: 378 U/L (ref 40–129)
ALT SERPL-CCNC: 15 U/L (ref 0–40)
ANION GAP SERPL CALCULATED.3IONS-SCNC: 13 MMOL/L (ref 7–16)
AST SERPL-CCNC: 68 U/L (ref 0–39)
BASOPHILS ABSOLUTE: 0.02 E9/L (ref 0–0.2)
BASOPHILS RELATIVE PERCENT: 0.1 % (ref 0–2)
BILIRUB SERPL-MCNC: 0.3 MG/DL (ref 0–1.2)
BLOOD BANK DISPENSE STATUS: NORMAL
BLOOD BANK DISPENSE STATUS: NORMAL
BLOOD BANK PRODUCT CODE: NORMAL
BLOOD BANK PRODUCT CODE: NORMAL
BPU ID: NORMAL
BPU ID: NORMAL
BUN BLDV-MCNC: 128 MG/DL (ref 6–23)
C-REACTIVE PROTEIN: 0.8 MG/DL (ref 0–0.4)
CALCIUM IONIZED: 1.28 MMOL/L (ref 1.15–1.33)
CALCIUM SERPL-MCNC: 8.6 MG/DL (ref 8.6–10.2)
CHLORIDE BLD-SCNC: 119 MMOL/L (ref 98–107)
CO2: 19 MMOL/L (ref 22–29)
CREAT SERPL-MCNC: 3 MG/DL (ref 0.7–1.2)
DESCRIPTION BLOOD BANK: NORMAL
DESCRIPTION BLOOD BANK: NORMAL
EOSINOPHILS ABSOLUTE: 0.01 E9/L (ref 0.05–0.5)
EOSINOPHILS RELATIVE PERCENT: 0.1 % (ref 0–6)
FERRITIN: 94 NG/ML
GFR AFRICAN AMERICAN: 26
GFR NON-AFRICAN AMERICAN: 21 ML/MIN/1.73
GLUCOSE BLD-MCNC: 243 MG/DL (ref 74–99)
HCT VFR BLD CALC: 21.4 % (ref 37–54)
HCT VFR BLD CALC: 22.7 % (ref 37–54)
HEMOGLOBIN: 6.6 G/DL (ref 12.5–16.5)
HEMOGLOBIN: 7.3 G/DL (ref 12.5–16.5)
IMMATURE GRANULOCYTES #: 0.17 E9/L
IMMATURE GRANULOCYTES %: 1.1 % (ref 0–5)
IRON SATURATION: 12 % (ref 20–55)
IRON: 24 MCG/DL (ref 59–158)
LYMPHOCYTES ABSOLUTE: 1.82 E9/L (ref 1.5–4)
LYMPHOCYTES RELATIVE PERCENT: 11.4 % (ref 20–42)
MAGNESIUM: 2.7 MG/DL (ref 1.6–2.6)
MCH RBC QN AUTO: 29.1 PG (ref 26–35)
MCHC RBC AUTO-ENTMCNC: 30.8 % (ref 32–34.5)
MCV RBC AUTO: 94.3 FL (ref 80–99.9)
METER GLUCOSE: 233 MG/DL (ref 74–99)
METER GLUCOSE: 239 MG/DL (ref 74–99)
METER GLUCOSE: 252 MG/DL (ref 74–99)
METER GLUCOSE: 279 MG/DL (ref 74–99)
MONOCYTES ABSOLUTE: 1.14 E9/L (ref 0.1–0.95)
MONOCYTES RELATIVE PERCENT: 7.1 % (ref 2–12)
NEUTROPHILS ABSOLUTE: 12.8 E9/L (ref 1.8–7.3)
NEUTROPHILS RELATIVE PERCENT: 80.2 % (ref 43–80)
PDW BLD-RTO: 16.2 FL (ref 11.5–15)
PHOSPHORUS: 4.2 MG/DL (ref 2.5–4.5)
PLATELET # BLD: 257 E9/L (ref 130–450)
PMV BLD AUTO: 10.9 FL (ref 7–12)
POTASSIUM SERPL-SCNC: 4.9 MMOL/L (ref 3.5–5)
RBC # BLD: 2.27 E12/L (ref 3.8–5.8)
SEDIMENTATION RATE, ERYTHROCYTE: 15 MM/HR (ref 0–15)
SODIUM BLD-SCNC: 151 MMOL/L (ref 132–146)
TOTAL IRON BINDING CAPACITY: 195 MCG/DL (ref 250–450)
TOTAL PROTEIN: 5.4 G/DL (ref 6.4–8.3)
WBC # BLD: 16 E9/L (ref 4.5–11.5)

## 2022-10-03 PROCEDURE — C9113 INJ PANTOPRAZOLE SODIUM, VIA: HCPCS | Performed by: NURSE PRACTITIONER

## 2022-10-03 PROCEDURE — 36415 COLL VENOUS BLD VENIPUNCTURE: CPT

## 2022-10-03 PROCEDURE — 83540 ASSAY OF IRON: CPT

## 2022-10-03 PROCEDURE — 97162 PT EVAL MOD COMPLEX 30 MIN: CPT

## 2022-10-03 PROCEDURE — S5553 INSULIN LONG ACTING 5 U: HCPCS | Performed by: INTERNAL MEDICINE

## 2022-10-03 PROCEDURE — 97110 THERAPEUTIC EXERCISES: CPT

## 2022-10-03 PROCEDURE — 6370000000 HC RX 637 (ALT 250 FOR IP): Performed by: INTERNAL MEDICINE

## 2022-10-03 PROCEDURE — 6360000002 HC RX W HCPCS: Performed by: INTERNAL MEDICINE

## 2022-10-03 PROCEDURE — 2700000000 HC OXYGEN THERAPY PER DAY

## 2022-10-03 PROCEDURE — 6370000000 HC RX 637 (ALT 250 FOR IP): Performed by: NURSE PRACTITIONER

## 2022-10-03 PROCEDURE — 2709999900 HC NON-CHARGEABLE SUPPLY: Performed by: SURGERY

## 2022-10-03 PROCEDURE — 2580000003 HC RX 258: Performed by: NURSE ANESTHETIST, CERTIFIED REGISTERED

## 2022-10-03 PROCEDURE — 85651 RBC SED RATE NONAUTOMATED: CPT

## 2022-10-03 PROCEDURE — 83735 ASSAY OF MAGNESIUM: CPT

## 2022-10-03 PROCEDURE — 83550 IRON BINDING TEST: CPT

## 2022-10-03 PROCEDURE — 6360000002 HC RX W HCPCS: Performed by: FAMILY MEDICINE

## 2022-10-03 PROCEDURE — 6360000002 HC RX W HCPCS: Performed by: NURSE ANESTHETIST, CERTIFIED REGISTERED

## 2022-10-03 PROCEDURE — 86140 C-REACTIVE PROTEIN: CPT

## 2022-10-03 PROCEDURE — 6370000000 HC RX 637 (ALT 250 FOR IP): Performed by: PODIATRIST

## 2022-10-03 PROCEDURE — 97530 THERAPEUTIC ACTIVITIES: CPT

## 2022-10-03 PROCEDURE — 84100 ASSAY OF PHOSPHORUS: CPT

## 2022-10-03 PROCEDURE — 36430 TRANSFUSION BLD/BLD COMPNT: CPT

## 2022-10-03 PROCEDURE — 43235 EGD DIAGNOSTIC BRUSH WASH: CPT | Performed by: SURGERY

## 2022-10-03 PROCEDURE — 97129 THER IVNTJ 1ST 15 MIN: CPT | Performed by: SPEECH-LANGUAGE PATHOLOGIST

## 2022-10-03 PROCEDURE — 3609017100 HC EGD: Performed by: SURGERY

## 2022-10-03 PROCEDURE — 94660 CPAP INITIATION&MGMT: CPT

## 2022-10-03 PROCEDURE — 2580000003 HC RX 258: Performed by: INTERNAL MEDICINE

## 2022-10-03 PROCEDURE — 87075 CULTR BACTERIA EXCEPT BLOOD: CPT

## 2022-10-03 PROCEDURE — 3700000001 HC ADD 15 MINUTES (ANESTHESIA): Performed by: SURGERY

## 2022-10-03 PROCEDURE — 7100000000 HC PACU RECOVERY - FIRST 15 MIN: Performed by: SURGERY

## 2022-10-03 PROCEDURE — 85018 HEMOGLOBIN: CPT

## 2022-10-03 PROCEDURE — 82962 GLUCOSE BLOOD TEST: CPT

## 2022-10-03 PROCEDURE — 85025 COMPLETE CBC W/AUTO DIFF WBC: CPT

## 2022-10-03 PROCEDURE — 0DJ08ZZ INSPECTION OF UPPER INTESTINAL TRACT, VIA NATURAL OR ARTIFICIAL OPENING ENDOSCOPIC: ICD-10-PCS | Performed by: SURGERY

## 2022-10-03 PROCEDURE — 94640 AIRWAY INHALATION TREATMENT: CPT

## 2022-10-03 PROCEDURE — 6370000000 HC RX 637 (ALT 250 FOR IP): Performed by: STUDENT IN AN ORGANIZED HEALTH CARE EDUCATION/TRAINING PROGRAM

## 2022-10-03 PROCEDURE — 7100000001 HC PACU RECOVERY - ADDTL 15 MIN: Performed by: SURGERY

## 2022-10-03 PROCEDURE — 6360000002 HC RX W HCPCS: Performed by: NURSE PRACTITIONER

## 2022-10-03 PROCEDURE — 82728 ASSAY OF FERRITIN: CPT

## 2022-10-03 PROCEDURE — 85014 HEMATOCRIT: CPT

## 2022-10-03 PROCEDURE — 3700000000 HC ANESTHESIA ATTENDED CARE: Performed by: SURGERY

## 2022-10-03 PROCEDURE — 2060000000 HC ICU INTERMEDIATE R&B

## 2022-10-03 PROCEDURE — A4216 STERILE WATER/SALINE, 10 ML: HCPCS | Performed by: NURSE PRACTITIONER

## 2022-10-03 PROCEDURE — 80053 COMPREHEN METABOLIC PANEL: CPT

## 2022-10-03 PROCEDURE — 97130 THER IVNTJ EA ADDL 15 MIN: CPT | Performed by: SPEECH-LANGUAGE PATHOLOGIST

## 2022-10-03 PROCEDURE — 2580000003 HC RX 258: Performed by: NURSE PRACTITIONER

## 2022-10-03 PROCEDURE — 82330 ASSAY OF CALCIUM: CPT

## 2022-10-03 PROCEDURE — 97166 OT EVAL MOD COMPLEX 45 MIN: CPT

## 2022-10-03 PROCEDURE — 87070 CULTURE OTHR SPECIMN AEROBIC: CPT

## 2022-10-03 RX ORDER — DEXTROSE MONOHYDRATE 50 MG/ML
INJECTION, SOLUTION INTRAVENOUS CONTINUOUS
Status: DISCONTINUED | OUTPATIENT
Start: 2022-10-03 | End: 2022-10-08

## 2022-10-03 RX ORDER — SODIUM CHLORIDE 9 MG/ML
INJECTION, SOLUTION INTRAVENOUS PRN
Status: DISCONTINUED | OUTPATIENT
Start: 2022-10-03 | End: 2022-10-10

## 2022-10-03 RX ORDER — SODIUM CHLORIDE 9 MG/ML
INJECTION, SOLUTION INTRAVENOUS CONTINUOUS PRN
Status: DISCONTINUED | OUTPATIENT
Start: 2022-10-03 | End: 2022-10-03 | Stop reason: SDUPTHER

## 2022-10-03 RX ORDER — PROPOFOL 10 MG/ML
INJECTION, EMULSION INTRAVENOUS PRN
Status: DISCONTINUED | OUTPATIENT
Start: 2022-10-03 | End: 2022-10-03 | Stop reason: SDUPTHER

## 2022-10-03 RX ADMIN — METOPROLOL SUCCINATE 25 MG: 25 TABLET, EXTENDED RELEASE ORAL at 15:08

## 2022-10-03 RX ADMIN — FENTANYL CITRATE 50 MCG: 50 INJECTION, SOLUTION INTRAMUSCULAR; INTRAVENOUS at 05:51

## 2022-10-03 RX ADMIN — IPRATROPIUM BROMIDE AND ALBUTEROL SULFATE 1 AMPULE: .5; 2.5 SOLUTION RESPIRATORY (INHALATION) at 16:04

## 2022-10-03 RX ADMIN — SODIUM CHLORIDE: 9 INJECTION, SOLUTION INTRAVENOUS at 10:52

## 2022-10-03 RX ADMIN — LINEZOLID 600 MG: 600 TABLET, FILM COATED ORAL at 15:10

## 2022-10-03 RX ADMIN — HYDRALAZINE HYDROCHLORIDE 50 MG: 50 TABLET, FILM COATED ORAL at 15:23

## 2022-10-03 RX ADMIN — INSULIN LISPRO 8 UNITS: 100 INJECTION, SOLUTION INTRAVENOUS; SUBCUTANEOUS at 15:24

## 2022-10-03 RX ADMIN — LACTULOSE 20 G: 20 SOLUTION ORAL at 20:29

## 2022-10-03 RX ADMIN — INSULIN LISPRO 4 UNITS: 100 INJECTION, SOLUTION INTRAVENOUS; SUBCUTANEOUS at 20:36

## 2022-10-03 RX ADMIN — PIPERACILLIN AND TAZOBACTAM 3375 MG: 3; .375 INJECTION, POWDER, FOR SOLUTION INTRAVENOUS at 23:30

## 2022-10-03 RX ADMIN — CETIRIZINE HYDROCHLORIDE 10 MG: 10 TABLET, FILM COATED ORAL at 15:06

## 2022-10-03 RX ADMIN — GABAPENTIN 600 MG: 600 TABLET, FILM COATED ORAL at 15:09

## 2022-10-03 RX ADMIN — BENZONATATE 100 MG: 100 CAPSULE ORAL at 15:08

## 2022-10-03 RX ADMIN — IPRATROPIUM BROMIDE AND ALBUTEROL SULFATE 1 AMPULE: .5; 2.5 SOLUTION RESPIRATORY (INHALATION) at 12:05

## 2022-10-03 RX ADMIN — INSULIN LISPRO 8 UNITS: 100 INJECTION, SOLUTION INTRAVENOUS; SUBCUTANEOUS at 18:55

## 2022-10-03 RX ADMIN — LINEZOLID 600 MG: 600 TABLET, FILM COATED ORAL at 20:28

## 2022-10-03 RX ADMIN — INSULIN LISPRO 4 UNITS: 100 INJECTION, SOLUTION INTRAVENOUS; SUBCUTANEOUS at 08:40

## 2022-10-03 RX ADMIN — EZETIMIBE 10 MG: 10 TABLET ORAL at 15:09

## 2022-10-03 RX ADMIN — CLONIDINE HYDROCHLORIDE 0.1 MG: 0.1 TABLET ORAL at 15:10

## 2022-10-03 RX ADMIN — HYDROCODONE BITARTRATE AND ACETAMINOPHEN 1 TABLET: 7.5; 325 TABLET ORAL at 15:06

## 2022-10-03 RX ADMIN — SODIUM CHLORIDE, PRESERVATIVE FREE 40 MG: 5 INJECTION INTRAVENOUS at 15:06

## 2022-10-03 RX ADMIN — LACTULOSE 20 G: 20 SOLUTION ORAL at 15:10

## 2022-10-03 RX ADMIN — PROPOFOL 90 MG: 10 INJECTION, EMULSION INTRAVENOUS at 10:57

## 2022-10-03 RX ADMIN — IPRATROPIUM BROMIDE AND ALBUTEROL SULFATE 1 AMPULE: .5; 2.5 SOLUTION RESPIRATORY (INHALATION) at 08:27

## 2022-10-03 RX ADMIN — COLLAGENASE SANTYL: 250 OINTMENT TOPICAL at 15:07

## 2022-10-03 RX ADMIN — SODIUM CHLORIDE, PRESERVATIVE FREE 40 MG: 5 INJECTION INTRAVENOUS at 03:23

## 2022-10-03 RX ADMIN — CLONIDINE HYDROCHLORIDE 0.1 MG: 0.1 TABLET ORAL at 23:29

## 2022-10-03 RX ADMIN — TICAGRELOR 90 MG: 90 TABLET ORAL at 15:10

## 2022-10-03 RX ADMIN — PIPERACILLIN AND TAZOBACTAM 3375 MG: 3; .375 INJECTION, POWDER, FOR SOLUTION INTRAVENOUS at 07:01

## 2022-10-03 RX ADMIN — ATORVASTATIN CALCIUM 80 MG: 40 TABLET, FILM COATED ORAL at 20:28

## 2022-10-03 RX ADMIN — CARBIDOPA AND LEVODOPA 1 TABLET: 25; 100 TABLET ORAL at 15:09

## 2022-10-03 RX ADMIN — PIPERACILLIN AND TAZOBACTAM 3375 MG: 3; .375 INJECTION, POWDER, FOR SOLUTION INTRAVENOUS at 15:26

## 2022-10-03 RX ADMIN — IPRATROPIUM BROMIDE AND ALBUTEROL SULFATE 1 AMPULE: .5; 2.5 SOLUTION RESPIRATORY (INHALATION) at 19:37

## 2022-10-03 RX ADMIN — GABAPENTIN 600 MG: 600 TABLET, FILM COATED ORAL at 20:28

## 2022-10-03 RX ADMIN — CARBIDOPA AND LEVODOPA 1 TABLET: 25; 100 TABLET ORAL at 20:28

## 2022-10-03 RX ADMIN — TICAGRELOR 90 MG: 90 TABLET ORAL at 20:28

## 2022-10-03 RX ADMIN — AMLODIPINE BESYLATE 10 MG: 10 TABLET ORAL at 15:10

## 2022-10-03 RX ADMIN — BENZONATATE 100 MG: 100 CAPSULE ORAL at 20:28

## 2022-10-03 RX ADMIN — HYDROCODONE BITARTRATE AND ACETAMINOPHEN 1 TABLET: 7.5; 325 TABLET ORAL at 20:27

## 2022-10-03 RX ADMIN — INSULIN GLARGINE-YFGN 30 UNITS: 100 INJECTION, SOLUTION SUBCUTANEOUS at 15:23

## 2022-10-03 RX ADMIN — PREDNISONE 40 MG: 20 TABLET ORAL at 15:08

## 2022-10-03 RX ADMIN — DEXTROSE MONOHYDRATE: 50 INJECTION, SOLUTION INTRAVENOUS at 11:55

## 2022-10-03 RX ADMIN — HYDRALAZINE HYDROCHLORIDE 50 MG: 50 TABLET, FILM COATED ORAL at 20:27

## 2022-10-03 RX ADMIN — SENNOSIDES 5 ML: 8.8 SYRUP ORAL at 20:27

## 2022-10-03 RX ADMIN — ASPIRIN 81 MG: 81 TABLET, COATED ORAL at 15:09

## 2022-10-03 ASSESSMENT — PAIN - FUNCTIONAL ASSESSMENT: PAIN_FUNCTIONAL_ASSESSMENT: ACTIVITIES ARE NOT PREVENTED

## 2022-10-03 ASSESSMENT — PAIN SCALES - PAIN ASSESSMENT IN ADVANCED DEMENTIA (PAINAD)
NEGVOCALIZATION: 1
BREATHING: 0
CONSOLABILITY: 1
FACIALEXPRESSION: 0
CONSOLABILITY: 1
FACIALEXPRESSION: 0
FACIALEXPRESSION: 1
TOTALSCORE: 1
BODYLANGUAGE: 1
TOTALSCORE: 4
TOTALSCORE: 4
NEGVOCALIZATION: 1
BODYLANGUAGE: 0
FACIALEXPRESSION: 0
BODYLANGUAGE: 0
BREATHING: 0
BREATHING: 0
CONSOLABILITY: 1
NEGVOCALIZATION: 0
BREATHING: 0
TOTALSCORE: 4
BODYLANGUAGE: 1
BREATHING: 0
NEGVOCALIZATION: 1
CONSOLABILITY: 1
TOTALSCORE: 1
CONSOLABILITY: 1
FACIALEXPRESSION: 1
TOTALSCORE: 4
TOTALSCORE: 2
BREATHING: 0
TOTALSCORE: 1
BODYLANGUAGE: 0
CONSOLABILITY: 1
TOTALSCORE: 1
CONSOLABILITY: 1
CONSOLABILITY: 1
NEGVOCALIZATION: 0
CONSOLABILITY: 1
CONSOLABILITY: 1
NEGVOCALIZATION: 0
BODYLANGUAGE: 1
BODYLANGUAGE: 0
BREATHING: 0
NEGVOCALIZATION: 1
BODYLANGUAGE: 1
NEGVOCALIZATION: 1
NEGVOCALIZATION: 0
BODYLANGUAGE: 0
BREATHING: 0
BODYLANGUAGE: 0
BODYLANGUAGE: 1
NEGVOCALIZATION: 1
TOTALSCORE: 1
FACIALEXPRESSION: 2
FACIALEXPRESSION: 1
BREATHING: 0
BREATHING: 0
CONSOLABILITY: 1
CONSOLABILITY: 1
TOTALSCORE: 1
CONSOLABILITY: 1
TOTALSCORE: 7
BODYLANGUAGE: 1
FACIALEXPRESSION: 0
TOTALSCORE: 4
BREATHING: 0
FACIALEXPRESSION: 1
CONSOLABILITY: 1
BREATHING: 0
NEGVOCALIZATION: 0
NEGVOCALIZATION: 1
NEGVOCALIZATION: 0
BODYLANGUAGE: 1
FACIALEXPRESSION: 0
BREATHING: 0
FACIALEXPRESSION: 1
BREATHING: 1
FACIALEXPRESSION: 0
NEGVOCALIZATION: 2
BODYLANGUAGE: 0
FACIALEXPRESSION: 0
TOTALSCORE: 4
FACIALEXPRESSION: 1

## 2022-10-03 ASSESSMENT — PAIN DESCRIPTION - DESCRIPTORS: DESCRIPTORS: ACHING;SORE;DISCOMFORT

## 2022-10-03 ASSESSMENT — ENCOUNTER SYMPTOMS: SHORTNESS OF BREATH: 1

## 2022-10-03 ASSESSMENT — PAIN DESCRIPTION - ORIENTATION: ORIENTATION: RIGHT

## 2022-10-03 ASSESSMENT — PAIN SCALES - GENERAL
PAINLEVEL_OUTOF10: 7
PAINLEVEL_OUTOF10: 4
PAINLEVEL_OUTOF10: 10
PAINLEVEL_OUTOF10: 0

## 2022-10-03 ASSESSMENT — PAIN DESCRIPTION - LOCATION: LOCATION: FOOT

## 2022-10-03 ASSESSMENT — PAIN DESCRIPTION - ONSET: ONSET: ON-GOING

## 2022-10-03 ASSESSMENT — LIFESTYLE VARIABLES: SMOKING_STATUS: 0

## 2022-10-03 ASSESSMENT — PAIN DESCRIPTION - PAIN TYPE: TYPE: ACUTE PAIN;CHRONIC PAIN

## 2022-10-03 ASSESSMENT — PAIN DESCRIPTION - FREQUENCY: FREQUENCY: CONTINUOUS

## 2022-10-03 NOTE — PROGRESS NOTES
Nephrology Progress Note  Patient's Name: Nani Carvajal  11:27 AM  10/3/2022        Reason for Consult:  Acute kidney injury/CKD  Requesting Physician:  Travis Harman MD    Chief Complaint:  Stroke  History Obtained From:  EHR    History of Present Ilness:    Nani Carvajal is a 74 y.o. male history of CAD s/p MI, hypertension, chronic kidney disease stage IIIa (baseline creatinine of recent 1.7-1.9) he is followed by our group with Dr. Michelle Morejon. He initially was admitted to [facility] with right lower extremity wound. .  Patient subsequently developed aphasia noted to facial droop associated with right-sided weakness. He was transferred to [address] [place] [address] for further management. Patient was admitted told the neuro ICU. He required intubation with mechanical ventilation. CT of the head and neck demonstrated bilateral carotid stenosis with the left ICA being dominant. IR performed angiogram with angioplasty of the left ICA. Laboratory data showed progressive increase in his serum creatinine to currently 2.9 mg/dL. Hemoglobin was noted to be 5.6. He has received at least 2 units of packed cells but with further drop in his hemoglobin with requirement for additional transfusion. He had an episode of urinary retention with gross hematuria Mahan catheter was reinserted and the hematuria subsequently cleared. Renal is consulted for LARY.     Subjective    10/1: he denies blurred vision, no headaches    10/2: No new acute issues from overnight; more alert; receiving PRBCs hemoglobin trending low    10/3: pt seen sp egd today, no cp or sob, family and nurse in room, no cp or sob         Allergies:  Codeine    Current Medications:    0.9 % sodium chloride infusion, PRN  0.9 % sodium chloride infusion, PRN  epoetin sharath-epbx (RETACRIT) injection 6,000 Units, Once per day on Mon Wed Fri  predniSONE (DELTASONE) tablet 40 mg, Daily  amLODIPine (NORVASC) tablet 10 mg, Daily  piperacillin-tazobactam (ZOSYN) 3,375 mg in sodium chloride 0.9 % 50 mL IVPB ([id number]), Q8H  linezolid (ZYVOX) tablet 600 mg, 2 times per day  insulin glargine-yfgn (SEMGLEE-YFGN) injection vial 30 Units, QAM  cloNIDine (CATAPRES) tablet 0.1 mg, Q12H  hydrALAZINE (APRESOLINE) tablet 50 mg, 3 times per day  fentaNYL (SUBLIMAZE) injection 50 mcg, Q2H PRN  0.9 % sodium chloride infusion, PRN  lactulose (CHRONULAC) 10 GM/15ML solution 20 g, BID  insulin lispro (HUMALOG) injection vial 0-16 Units, 4x Daily AC & HS  HYDROcodone-acetaminophen (NORCO) 7.5-325 MG per tablet 1 tablet, BID  0.9 % sodium chloride infusion, PRN  pantoprazole (PROTONIX) 40 mg in sodium chloride (PF) 10 mL injection, Q12H  metoprolol succinate (TOPROL XL) extended release tablet 25 mg, Daily  0.9 % sodium chloride infusion, PRN  sennosides (SENOKOT) 8.8 MG/5ML syrup 5 mL, Nightly  labetalol (NORMODYNE;TRANDATE) injection 10 mg, Q30 Min PRN  hydrALAZINE (APRESOLINE) injection 10 mg, Q30 Min PRN  glucose chewable tablet 16 g, PRN  dextrose bolus 10% 125 mL, PRN   Or  dextrose bolus 10% 250 mL, PRN  glucagon (rDNA) injection 1 mg, PRN  dextrose 10 % infusion, Continuous PRN  carbidopa-levodopa (SINEMET)  MG per tablet 1 tablet, TID  ipratropium-albuterol (DUONEB) nebulizer solution 1 ampule, Q4H WA  collagenase ointment, Q MWF  benzonatate (TESSALON) capsule 100 mg, TID  cetirizine (ZYRTEC) tablet 10 mg, Daily  ezetimibe (ZETIA) tablet 10 mg, Daily  gabapentin (NEURONTIN) tablet 600 mg, 4x Daily  ticagrelor (BRILINTA) tablet 90 mg, BID  ondansetron (ZOFRAN-ODT) disintegrating tablet 4 mg, Q8H PRN   Or  ondansetron (ZOFRAN) injection 4 mg, Q6H PRN  aspirin EC tablet 81 mg, Daily   Or  aspirin suppository 300 mg, Daily  atorvastatin (LIPITOR) tablet 80 mg, Nightly      Review of Systems:   Pertinent items are noted in HPI. Physical exam:  Vitals:    10/03/22 1102   BP: (!) 116/56   Pulse: 66   Resp: 16   Temp:    SpO2: 100%           General: alert, agitated  Eyes: PERRL.  No sclera icterus. No conjunctival injection. ENT: No discharge. Pharynx clear. Neck: Trachea midline. Normal thyroid. Lungs: No accessory muscle use. No crackles. No wheezing. No rhonchi. CV: Regular rate. Regular rhythm. No murmur or rub. .   Abd: Non-tender. Non-distended. No masses. No organmegaly. Normal bowel sounds. Skin: Warm and dry. No nodule on exposed extremities. No rash on exposed extremities.   Ext: No cyanosis, clubbing, edema ; dressing to right foot wound  Neuro: alert, agitated, oriented to person and time;moving all extremities; confused at times      Data:   Labs:  Lab Results   Component Value Date     (H) 10/03/2022     10/02/2022     (H) 10/02/2022    K 4.9 10/03/2022    K 5.1 (H) 10/02/2022    K 5.4 (H) 10/02/2022     (H) 10/03/2022    CO2 19 (L) 10/03/2022    CO2 20 (L) 10/02/2022    CO2 22 10/02/2022    CREATININE 3.0 (H) 10/03/2022    CREATININE 2.7 (H) 10/02/2022    CREATININE 2.9 (H) 10/02/2022     (HH) 10/03/2022     (HH) 10/02/2022     (HH) 10/02/2022    GLUCOSE 243 (H) 10/03/2022    GLUCOSE 297 (H) 10/02/2022    GLUCOSE 317 (H) 10/02/2022    PHOS 4.2 10/03/2022    PHOS 4.3 10/02/2022    PHOS 3.9 10/01/2022    WBC 16.0 (H) 10/03/2022    WBC 15.9 (H) 10/02/2022    WBC 13.2 (H) 10/01/2022    HGB 6.6 (LL) 10/03/2022    HGB 7.0 (L) 10/02/2022    HGB 6.5 (LL) 10/02/2022    HCT 21.4 (L) 10/03/2022    HCT 21.5 (L) 10/02/2022    HCT 20.0 (L) 10/02/2022    MCV 94.3 10/03/2022     10/03/2022         Imaging:  Echo Complete    Result Date: 9/28/2022  Transthoracic Echocardiography Report (TTE)  Demographics   Patient Name    Rebecca Dugan  Gender            Male                  L   Medical Record  [id number]     Room Number       6322  Number   Account #       [de-identified]    Procedure Date    09/28/2022   Corporate ID                 Ordering                               Physician   Accession       [id number]   Referring  Number Physician   Date of Birth   1955   Sonographer       Malka Durham GHAZALA   Age             74 year(s)   Interpreting      [address]                               Physician         Physician Cardiology                                                 Benjamin Wilson MD                                Any Other  Procedure Type of Study   TTE procedure  Procedure Date Date: 09/28/2022 Start: 08:56 AM Study Location: Portable Technical Quality: Adequate visualization Indications:CVA. Patient Status: Routine Contrast Medium: Definity and Bubble Study. Height: 69 inches Weight: 260 pounds BSA: 2.31 m^2 BMI: 38.39 kg/m^2 HR: 66 bpm BP: 167/61 mmHg  Findings   Left Ventricle  Severe concentric left ventricular hypertrophy. Ejection fraction is visually estimated at 60 to 65%. Left ventricular diastolic filling is elevated . Right Ventricle  Mildly dilated right ventricle with normal systolic function. Left Atrium  Normal sized left atrium. Right Atrium  Agitated saline injected for shunt evaluation was technically difficult  due to pt being on vent and lack of corperation. Please obtain limited  echo with Bubble study once pt is extubated and cooperative. Mitral Valve  Mild mitral regurgitation is present. Tricuspid Valve  Mild tricuspid regurgitation. RVSP is 42 mmHg. Aortic Valve  The aortic valve is trileaflet. Aortic valve opens well. Pulmonic Valve  Physiologic and/or trace, Mild pulmonic regurgitation present. Aorta  Aortic root dimension within normal limits. Conclusions   Summary  Severe concentric left ventricular hypertrophy. Ejection fraction is visually estimated at 60 to 65%. Left ventricular diastolic filling is elevated . Mildly dilated right ventricle with normal systolic function. Agitated saline injected for shunt evaluation was technically difficult  due to pt being on vent and lack of corperation.  Please obtain limited  echo with Bubble study once pt is extubated and cooperative. Mild mitral regurgitation is present. Mild tricuspid regurgitation. RVSP is 42 mmHg. Physiologic and/or trace, Mild pulmonic regurgitation present. Signature   --------------------------------------------------------  --------    Assessment/Plans    1. Acute CVA  S/p IR intervention with angioplasty of the left ICA    2. LARY on CKD stage 3b  Baseline 1.7-1.9  combined effects of ACEI use and contrast nephrotoxicity  Component of urinary retention  nonoliguric at this time  Disproportionate elevation of BUN relative to Cr suspicious for gib  stool heme positive  Worsening renal unction despite being nonoliguric  May require dialysis support  Monitor labs and urine  1.4L  Cr 3.0 bun 128    3. Hypertensive emergency  presenting  with acute CVA  Adjust meds to target blood pressure control to SBP less than 160  BP with improved control    4. Anemia, chronic with acute worsening  Informed stool heme positive  PRBC transfusion as needed  Sp egd  Trf < 7  Will start ASHLEY     5. Type 2 diabetes mellitus  Monitor glucose levels    6. Hyperkalemia  Due to combination of LARY and metabolic acidosis; PRBC transfusios  Lokelma  prn    7. Urinary retention  Suspected situational  Now with trejo  Urology following    8.  Hypernatremia  Suspect intervascular volume depletion  Start d5        Greg Rodriguez MD  11:27 AM  10/3/2022

## 2022-10-03 NOTE — PROGRESS NOTES
Comprehensive Nutrition Assessment    Type and Reason for Visit:  Reassess    Nutrition Recommendations/Plan:   Continue Diet. Will Start ONS and monitor. Malnutrition Assessment:  Malnutrition Status: At risk for malnutrition (Comment) (09/28/22 1409)    Context:  Acute Illness     Findings of the 6 clinical characteristics of malnutrition:  Energy Intake:  Mild decrease in energy intake (Comment) (since adm)  Weight Loss:  Unable to assess (2/2 poor EMR wt hx pta)     Body Fat Loss:  No significant body fat loss     Muscle Mass Loss:  No significant muscle mass loss    Fluid Accumulation:  Unable to assess     Strength:  Not Performed    Nutrition Assessment:    Pt adm/trans from [facility] (9/19-9/27 for DMFU, s/p debride?/Vac placement 9/23) 2/2 Rt sided ataxia w/ aphasia x ~1d pta. PMHx previous CVA, CAD/NSTEMI, DM, PVD, Chronic Rt DMFU, CKD, MDD, Parkinsons, Sz Ds; Adm w/ CVA/ARF, s/p Lt Angio w/ stent 9/27, +post-op hypotension resulting in re-intubation/ICU care. Now s/p extubation 9/29, BSE w/ mild-mod Dysphagia 9/29, EGD w/ no bleed 10/3. Remains at risk d/t ongoing decreased prosper/intake 2/2 CVA/AMS as well as w/ increased needs for wound healing. Will Start ONS and monitor. Nutrition Related Findings:    AMSx2, poor dentition, Abd/BS WDL, +1/3 edema, -I/Os, Elevated Na/Mg/BGL/Renal/LFT's Wound Type: Diabetic Ulcer, Wound Vac       Current Nutrition Intake & Therapies:    Average Meal Intake: 51-75%  Average Supplements Intake: None Ordered  ADULT DIET; Dysphagia - Minced and Moist; 3 carb choices (45 gm/meal); no rice, loves pudding    Anthropometric Measures:  Height: 5' 8\" (172.7 cm)  Ideal Body Weight (IBW): 154 lbs (70 kg)    Admission Body Weight: 260 lb (117.9 kg) (bed 9/27)  Current Body Weight: 260 lb (117.9 kg) (bed 9/27), 168.8 % IBW.  Weight Source: Bed Scale  Current BMI (kg/m2): 39.5  Usual Body Weight:  (UTO UBW 2/2 poor EMR wt hx pta)     Weight Adjustment For: No Adjustment                 BMI Categories: Obese Class 2 (BMI 35.0 -39.9)    Estimated Daily Nutrient Needs:  Energy Requirements Based On: Formula  Weight Used for Energy Requirements: Current  Energy (kcal/day):   Weight Used for Protein Requirements: Ideal  Protein (g/day): 1.3-1.5gm/kg IBW= ; as tolerated w/ current renal labs/CKD w/ increased needs  Method Used for Fluid Requirements: Other (Comment)  Fluid (ml/day): per critical care mgmt    Nutrition Diagnosis:   Inadequate oral intake related to cognitive or neurological impairment (2/2 CVA) as evidenced by intake 51-75%, swallow study results    Nutrition Interventions:   Food and/or Nutrient Delivery: Continue Current Diet, Start Oral Nutrition Supplement (Continue Diet. Will Start ONS and monitor.)  Nutrition Education/Counseling: Education not indicated  Coordination of Nutrition Care: No recommendation at this time       Goals:  Previous Goal Met: Goal(s) Achieved  Goals: PO intake 75% or greater       Nutrition Monitoring and Evaluation:   Behavioral-Environmental Outcomes: None Identified  Food/Nutrient Intake Outcomes: Food and Nutrient Intake, Supplement Intake  Physical Signs/Symptoms Outcomes: Biochemical Data, Chewing or Swallowing, GI Status, Fluid Status or Edema, Nutrition Focused Physical Findings, Skin, Weight    Discharge Planning:     Too soon to determine     Martin SALVATORE Ladd, LD  Contact: ext [phone]

## 2022-10-03 NOTE — PLAN OF CARE
Problem: Discharge Planning  Goal: Discharge to home or other facility with appropriate resources  Outcome: Progressing     Problem: Pain  Goal: Verbalizes/displays adequate comfort level or baseline comfort level  10/3/2022 0625 by Chadd De La O RN  Outcome: Progressing  10/2/2022 1813 by Lorena Geiger RN  Outcome: Progressing     Problem: Skin/Tissue Integrity  Goal: Absence of new skin breakdown  Description: 1. Monitor for areas of redness and/or skin breakdown  2. Assess vascular access sites hourly  3. Every 4-6 hours minimum:  Change oxygen saturation probe site  4. Every 4-6 hours:  If on nasal continuous positive airway pressure, respiratory therapy assess nares and determine need for appliance change or resting period.   10/3/2022 0625 by Chadd De La O RN  Outcome: Progressing  10/2/2022 1813 by Lorena Geiger RN  Outcome: Progressing

## 2022-10-03 NOTE — PROGRESS NOTES
Department of Internal Medicine  Infectious Diseases   Progress Note      C/C :  Right foot  wound infection     Denies fever or chills  Reports pain   Afebrile      Current Facility-Administered Medications   Medication Dose Route Frequency Provider Last Rate Last Admin    0.9 % sodium chloride infusion   IntraVENous PRN Evelyn Anderson DO        0.9 % sodium chloride infusion   IntraVENous PRN Doraine Sledge, DO        epoetin sharath-epbx (RETACRIT) injection 6,000 Units  6,000 Units SubCUTAneous Once per day on Mon Wed Fri Zelalem Alonso MD   6,000 Units at 10/02/22 1716    predniSONE (DELTASONE) tablet 40 mg  40 mg Oral Daily [facility] DWAYNE Anderson DO        amLODIPine (NORVASC) tablet 10 mg  10 mg Per NG tube Daily [facility] DWAYNE Anderson DO        piperacillin-tazobactam (ZOSYN) 3,375 mg in sodium chloride 0.9 % 50 mL IVPB ([id number])  3,375 mg IntraVENous Q8H Brian Youssef MD 12.5 mL/hr at 10/03/22 0701 3,375 mg at 10/03/22 0701    linezolid (ZYVOX) tablet 600 mg  600 mg Oral 2 times per day Carmen Duffy MD   600 mg at 10/02/22 2037    insulin glargine-yfgn (SEMGLEE-YFGN) injection vial 30 Units  0.25 Units/kg SubCUTAneous QAM Lca Mat Anderson DO   30 Units at 10/02/22 1010    cloNIDine (CATAPRES) tablet 0.1 mg  0.1 mg Oral Q12H [facility] DWAYNE Anderson DO   0.1 mg at 10/02/22 2314    hydrALAZINE (APRESOLINE) tablet 50 mg  50 mg Oral 3 times per day Doraine Sledge, DO   50 mg at 10/02/22 2104    fentaNYL (SUBLIMAZE) injection 50 mcg  50 mcg IntraVENous Q2H PRN Rigoberto Guillory DO   50 mcg at 10/03/22 0551    0.9 % sodium chloride infusion   IntraVENous PRN CONSTANTINE James CNP        lactulose (CHRONULAC) 10 GM/15ML solution 20 g  20 g Oral BID CONSTANTINE James CNP   20 g at 10/02/22 2043    insulin lispro (HUMALOG) injection vial 0-16 Units  0-16 Units SubCUTAneous 4x Daily AC & HS Shana Goldberg, APRN - CNP   4 Units at 10/03/22 0840    HYDROcodone-acetaminophen (Gus Ortez) 7.5-325 MG per tablet 1 tablet  1 tablet Oral BID CONSTANTINE Masterson CNP   1 tablet at 10/02/22 2037    0.9 % sodium chloride infusion   IntraVENous PRN CONSTANTINE Masterson CNP        pantoprazole (PROTONIX) 40 mg in sodium chloride (PF) 10 mL injection  40 mg IntraVENous Q12H CONSTANTINE Masterson CNP   40 mg at 10/03/22 0323    metoprolol succinate (TOPROL XL) extended release tablet 25 mg  25 mg Oral Daily Giorgio Liz Thompson MD   25 mg at 10/02/22 1000    0.9 % sodium chloride infusion   IntraVENous PRN Pina Low MD        sennosides (SENOKOT) 8.8 MG/5ML syrup 5 mL  5 mL Oral Nightly Soumya Garcia DO   5 mL at 10/02/22 2040    labetalol (NORMODYNE;TRANDATE) injection 10 mg  10 mg IntraVENous Q30 Min PRN Rod Craig MD   10 mg at 10/01/22 1115    hydrALAZINE (APRESOLINE) injection 10 mg  10 mg IntraVENous Q30 Min PRN Rod Craig MD   10 mg at 09/30/22 1627    glucose chewable tablet 16 g  4 tablet Oral PRN CONSTANTINE Schumacher CNP        dextrose bolus 10% 125 mL  125 mL IntraVENous PRN CONSTANTINE Schumacher CNP        Or    dextrose bolus 10% 250 mL  250 mL IntraVENous PRN CONSTANTINE Schumacher CNP        glucagon (rDNA) injection 1 mg  1 mg SubCUTAneous PRN CONSTANTINE Schumacher CNP        dextrose 10 % infusion   IntraVENous Continuous PRN CONSTANTINE Schumacher CNP        carbidopa-levodopa (SINEMET)  MG per tablet 1 tablet  1 tablet Per NG tube TID CONSTANTINE Schumacher CNP   1 tablet at 10/02/22 2035    ipratropium-albuterol (DUONEB) nebulizer solution 1 ampule  1 ampule Inhalation Q4H [place] CONSTANTINE Schumacher CNP   1 ampule at 10/03/22 0827    collagenase ointment   Topical Q MWF Dwaine Raya DPM        benzonatate (TESSALON) capsule 100 mg  100 mg Oral TID Rod Craig MD   100 mg at 10/02/22 2038    cetirizine (ZYRTEC) tablet 10 mg  10 mg Oral Daily Rod Craig MD   10 mg at 10/02/22 0959    ezetimibe (ZETIA) tablet 10 mg  10 mg Oral Daily Rod Craig MD   10 mg at 10/02/22 1001 gabapentin (NEURONTIN) tablet 600 mg  600 mg Oral 4x Daily Juan A Smith MD   600 mg at 10/02/22 2104    ticagrelor (BRILINTA) tablet 90 mg  90 mg Oral BID Juan A Smith MD   90 mg at 10/02/22 2039    ondansetron (ZOFRAN-ODT) disintegrating tablet 4 mg  4 mg Oral Q8H PRN Juan A Smith MD        Or    ondansetron [facility]) injection 4 mg  4 mg IntraVENous Q6H PRN Juan A Smith MD        aspirin EC tablet 81 mg  81 mg Oral Daily Juan A Smith MD   81 mg at 10/02/22 3218    Or    aspirin suppository 300 mg  300 mg Rectal Daily Juan A Smith MD   300 mg at 09/29/22 0809    atorvastatin (LIPITOR) tablet 80 mg  80 mg Oral Nightly Juan A Smith MD   80 mg at 10/02/22 2036           REVIEW OF SYSTEMS:   CONSTITUTIONAL:  Denies fever, chill or rigors. HEENT: denies blurring of vision or double vision, denies hearing problem  RESPIRATORY: denies cough, shortness of breath,  CARDIOVASCULAR:  Denies palpitation  GASTROINTESTINAL:  Denies abdomen pain, diarrhea or constipation. GENITOURINARY:  Denies burning urination or frequency of urination  INTEGUMENT: Right heel wound  HEMATOLOGIC/LYMPHATIC:  Denies lymph node swelling, gum bleeding or easy bruising. MUSCULOSKELETAL:  Denies leg pain , joint pain , joint swelling  NEUROLOGICAL:  weakness right side . PHYSICAL EXAM:      Vitals:     BP (!) 116/56   Pulse 66   Temp 97.5 °F (36.4 °C)   Resp 16   Ht 5' 8\" (1.727 m)   Wt 260 lb 8 oz (118.2 kg)   SpO2 100%   BMI 39.61 kg/m²     General Appearance:    Awake, alert , no acute distress. Head:    Normocephalic, atraumatic   Eyes:    No pallor, no icterus,   Ears:    No obvious deformity or drainage.    Nose:   No nasal drainage   Throat:   Mucosa moist, no oral thrush   Neck:   Supple, no lymphadenopathy   Back:     no CVA tenderness   Lungs:     Clear to auscultation bilaterally, no wheeze    Heart:    Regular rate and rhythm, no murmur   Abdomen:     Soft, non-tender, bowel sounds present Extremities:    Right heel wound    Pulses:   Dorsalis pedis palpable - diminished    Skin:   Right heel wound with VAC              CBC with Differential:      Lab Results   Component Value Date/Time    WBC 16.0 10/03/2022 04:59 AM    RBC 2.27 10/03/2022 04:59 AM    HGB 6.6 10/03/2022 04:59 AM    HCT 21.4 10/03/2022 04:59 AM    HCT 15.0 09/27/2022 08:58 PM     10/03/2022 04:59 AM    MCV 94.3 10/03/2022 04:59 AM    MCH 29.1 10/03/2022 04:59 AM    MCHC 30.8 10/03/2022 04:59 AM    RDW 16.2 10/03/2022 04:59 AM    LYMPHOPCT 11.4 10/03/2022 04:59 AM    MONOPCT 7.1 10/03/2022 04:59 AM    BASOPCT 0.1 10/03/2022 04:59 AM    MONOSABS 1.14 10/03/2022 04:59 AM    LYMPHSABS 1.82 10/03/2022 04:59 AM    EOSABS 0.01 10/03/2022 04:59 AM    BASOSABS 0.02 10/03/2022 04:59 AM       CMP     Lab Results   Component Value Date/Time     10/03/2022 04:59 AM    K 4.9 10/03/2022 04:59 AM     10/03/2022 04:59 AM    CO2 19 10/03/2022 04:59 AM     10/03/2022 04:59 AM    CREATININE 3.0 10/03/2022 04:59 AM    GFRAA 26 10/03/2022 04:59 AM    LABGLOM 21 10/03/2022 04:59 AM    GLUCOSE 243 10/03/2022 04:59 AM    PROT 5.4 10/03/2022 04:59 AM    LABALBU 2.8 10/03/2022 04:59 AM    CALCIUM 8.6 10/03/2022 04:59 AM    BILITOT 0.3 10/03/2022 04:59 AM    ALKPHOS 378 10/03/2022 04:59 AM    AST 68 10/03/2022 04:59 AM    ALT 15 10/03/2022 04:59 AM         Hepatic Function Panel:    Lab Results   Component Value Date/Time    ALKPHOS 378 10/03/2022 04:59 AM    ALT 15 10/03/2022 04:59 AM    AST 68 10/03/2022 04:59 AM    PROT 5.4 10/03/2022 04:59 AM    BILITOT 0.3 10/03/2022 04:59 AM    LABALBU 2.8 10/03/2022 04:59 AM       PT/INR:    Lab Results   Component Value Date/Time    PROTIME 23.2 09/27/2022 01:12 PM    INR 2.1 09/27/2022 01:12 PM       TSH:  No results found for: TSH    U/A:  No results found for: NITRITE, COLORU, PHUR, LABCAST, WBCUA, RBCUA, MUCUS, TRICHOMONAS, YEAST, BACTERIA, CLARITYU, SPECGRAV, LEUKOCYTESUR, UROBILINOGEN, BILIRUBINUR, BLOODU, GLUCOSEU, AMORPHOUS    ABG:  No results found for: CRA5KJY, BEART, W7VBVENQ, PHART, THGBART, BJF0TKT, PO2ART, DON0ZUA    MICROBIOLOGY:    SARS CoV 2 neg     CRP 0.8      Radiology :    Chest X ray : LLL infiltrates, atelectasis       X ray foot :           No evidence of calcaneal osteomyelitis. IMPRESSION:     Right foot  non healing wound  Leukocytosis   ?  Aspiration       RECOMMENDATIONS:      IV zosyn 3.375 grams iV q 8 hrs   Zyvox 600 mg po q 12 hs   Local wound care

## 2022-10-03 NOTE — ANESTHESIA PRE PROCEDURE
Department of Anesthesiology  Preprocedure Note       Name:  Levon Ortega   Age:  74 y.o.  :  1955                                          MRN:  [id number]         Date:  10/3/2022      Surgeon: Mike Schwarz):  Anupam Cerna MD    Procedure: Procedure(s):  EGD DIAGNOSTIC ONLY    Medications prior to admission:   Prior to Admission medications    Medication Sig Start Date End Date Taking? Authorizing Provider   apixaban (ELIQUIS) 5 MG TABS tablet Take 5 mg by mouth 2 times daily   Yes Historical Provider, MD   aspirin 81 MG chewable tablet Take 81 mg by mouth daily   Yes Historical Provider, MD   carbidopa-levodopa (SINEMET)  MG per tablet Take 1 tablet by mouth 3 times daily   Yes Historical Provider, MD   carvedilol (COREG) 25 MG tablet Take 25 mg by mouth 2 times daily (with meals)   Yes Historical Provider, MD   cetirizine (ZYRTEC) 10 MG tablet Take 10 mg by mouth daily   Yes Historical Provider, MD   clopidogrel (PLAVIX) 75 MG tablet Take 75 mg by mouth daily   Yes Historical Provider, MD   diphenhydrAMINE (BENADRYL) 25 MG capsule Take 25 mg by mouth every 6 hours   Yes Historical Provider, MD   ezetimibe (ZETIA) 10 MG tablet Take 10 mg by mouth daily   Yes Historical Provider, MD   fluticasone (FLONASE) 50 MCG/ACT nasal spray 2 sprays by Each Nostril route daily   Yes Historical Provider, MD   gabapentin (NEURONTIN) 600 MG tablet Take 600 mg by mouth 4 times daily. Yes Historical Provider, MD   HYDROcodone-acetaminophen (NORCO) 5-325 MG per tablet Take 1 tablet by mouth 2 times daily. Yes Historical Provider, MD   Insulin Glargine, 2 Unit Dial, (TOUJEO MAX SOLOSTAR) 300 UNIT/ML SOPN Inject 66 Units into the skin daily   Yes Historical Provider, MD   piperacillin-tazobactam (ZOSYN) 3-0.375 GM per 50ML IVPB extended infusion Infuse 4.5 mg intravenously in the morning and 4.5 mg at noon and 4.5 mg in the evening.    Yes Historical Provider, MD   acetaminophen (TYLENOL) 325 MG tablet Take 650 mg by mouth every 6 hours as needed for Pain   Yes Historical Provider, MD   amLODIPine (NORVASC) 5 MG tablet Take 5 mg by mouth daily   Yes Historical Provider, MD   benzonatate (TESSALON) 100 MG capsule Take 100 mg by mouth 3 times daily   Yes Historical Provider, MD   cloNIDine (CATAPRES) 0.1 MG tablet Take 0.1 mg by mouth in the morning and 0.1 mg in the evening.    Yes Historical Provider, MD   hydrALAZINE (APRESOLINE) 100 MG tablet Take 100 mg by mouth 3 times daily   Yes Historical Provider, MD       Current medications:    Current Facility-Administered Medications   Medication Dose Route Frequency Provider Last Rate Last Admin    0.9 % sodium chloride infusion   IntraVENous PRN Sharonda Michelle, DO        0.9 % sodium chloride infusion   IntraVENous PRN Sharonda Michelle, DO        epoetin sharath-epbx (RETACRIT) injection 6,000 Units  6,000 Units SubCUTAneous Once per day on Mon Wed Fri Tayny Bonnie Alonso MD   6,000 Units at 10/02/22 1716    predniSONE (DELTASONE) tablet 40 mg  40 mg Oral Daily [facility] DWAYNE Anderson DO        amLODIPine (NORVASC) tablet 10 mg  10 mg Per NG tube Daily [facility] DWAYNE Anderson DO        piperacillin-tazobactam (ZOSYN) 3,375 mg in sodium chloride 0.9 % 50 mL IVPB ([id number])  3,375 mg IntraVENous Q8H Brian Youssef MD 12.5 mL/hr at 10/03/22 0701 3,375 mg at 10/03/22 0701    linezolid (ZYVOX) tablet 600 mg  600 mg Oral 2 times per day Rajiv Daniels MD   600 mg at 10/02/22 2037    insulin glargine-yfgn (SEMGLEE-YFGN) injection vial 30 Units  0.25 Units/kg SubCUTAneous QAM Corena Goodell Lesher, DO   30 Units at 10/02/22 1010    cloNIDine (CATAPRES) tablet 0.1 mg  0.1 mg Oral Q12H [facility] DWAYNE Anderson DO   0.1 mg at 10/02/22 2314    hydrALAZINE (APRESOLINE) tablet 50 mg  50 mg Oral 3 times per day Sharonda Michelle, DO   50 mg at 10/02/22 2104    fentaNYL (SUBLIMAZE) injection 50 mcg  50 mcg IntraVENous Q2H PRN Doran Class, DO   50 mcg at 10/03/22 0555    0.9 % sodium chloride infusion IntraVENous PRN Bowlus Magyar, APRN - CNP        lactulose (CHRONULAC) 10 GM/15ML solution 20 g  20 g Oral BID Bowlus Magyar, APRN - CNP   20 g at 10/02/22 2043    insulin lispro (HUMALOG) injection vial 0-16 Units  0-16 Units SubCUTAneous 4x Daily AC & HS Bowlus Magyar, APRN - CNP   8 Units at 10/02/22 2101    HYDROcodone-acetaminophen (Nancy Courser) 7.5-325 MG per tablet 1 tablet  1 tablet Oral BID Bowlus Magyar, APRN - CNP   1 tablet at 10/02/22 2037    0.9 % sodium chloride infusion   IntraVENous PRN Bowlus Magyar, APRN - CNP        pantoprazole (PROTONIX) 40 mg in sodium chloride (PF) 10 mL injection  40 mg IntraVENous Q12H Bowlus Magyar, APRN - CNP   40 mg at 10/03/22 0323    metoprolol succinate (TOPROL XL) extended release tablet 25 mg  25 mg Oral Daily Giorgio Liz Thompson MD   25 mg at 10/02/22 1000    0.9 % sodium chloride infusion   IntraVENous PRN Samer Mindy Frank MD        sennosides (SENOKOT) 8.8 MG/5ML syrup 5 mL  5 mL Oral Nightly Lestine Macon, DO   5 mL at 10/02/22 2040    labetalol (NORMODYNE;TRANDATE) injection 10 mg  10 mg IntraVENous Q30 Min PRN Franklin Chowdhury MD   10 mg at 10/01/22 1115    hydrALAZINE (APRESOLINE) injection 10 mg  10 mg IntraVENous Q30 Min PRN Franklin Chowdhury MD   10 mg at 09/30/22 1627    glucose chewable tablet 16 g  4 tablet Oral PRN [place] Jazmín, APRN - CNP        dextrose bolus 10% 125 mL  125 mL IntraVENous PRN [place] Jazmín, APRN - CNP        Or    dextrose bolus 10% 250 mL  250 mL IntraVENous PRN [place] Jazmín, APRN - CNP        glucagon (rDNA) injection 1 mg  1 mg SubCUTAneous PRN [place] Jazmín, APRN - CNP        dextrose 10 % infusion   IntraVENous Continuous PRN [place], APRN - CNP        carbidopa-levodopa (SINEMET)  MG per tablet 1 tablet  1 tablet Per NG tube TID [place], APRN - CNP   1 tablet at 10/02/22 2035    ipratropium-albuterol (DUONEB) nebulizer solution 1 ampule  1 ampule Inhalation Q4H [address], APRN - CNP   1 ampule at 10/02/22 2113    collagenase ointment   Topical Q MWF Elmo Sanderson DPM        benzonatate (TESSALON) capsule 100 mg  100 mg Oral TID Mayra Lopez MD   100 mg at 10/02/22 2038    cetirizine (ZYRTEC) tablet 10 mg  10 mg Oral Daily Mayra Lopez MD   10 mg at 10/02/22 0959    ezetimibe (ZETIA) tablet 10 mg  10 mg Oral Daily Mayra Lopez MD   10 mg at 10/02/22 1001    gabapentin (NEURONTIN) tablet 600 mg  600 mg Oral 4x Daily Mayra Lopez MD   600 mg at 10/02/22 2104    ticagrelor (BRILINTA) tablet 90 mg  90 mg Oral BID Mayra Lopez MD   90 mg at 10/02/22 2039    ondansetron (ZOFRAN-ODT) disintegrating tablet 4 mg  4 mg Oral Q8H PRN Mayra Lopez MD        Or    ondansetron [facility]) injection 4 mg  4 mg IntraVENous Q6H PRN Mayra Lopez MD        aspirin EC tablet 81 mg  81 mg Oral Daily Mayra Lopez MD   81 mg at 10/02/22 0957    Or    aspirin suppository 300 mg  300 mg Rectal Daily Mayra Lopez MD   300 mg at 09/29/22 0809    atorvastatin (LIPITOR) tablet 80 mg  80 mg Oral Nightly Mayra Lopez MD   80 mg at 10/02/22 2036       Allergies:     Allergies   Allergen Reactions    Codeine Other (See Comments)     blisters       Problem List:    Patient Active Problem List   Diagnosis Code    [facility] I63.9    Acute respiratory failure with hypoxia ([facility] J96.01    Stenosis of left carotid artery I65.22    Hypoalbuminemia E88.09    Electrolyte imbalance E87.8    Hypernatremia E87.0       Past Medical History:        Diagnosis Date    Chronic back pain     CKD (chronic kidney disease)     [facility]     [facility]A [facility]cerebral vascular accident) [facility]     DM (diabetes mellitus) ([facility]     Major depression     MI (myocardial infarction) ([facility]     MATT (obstructive sleep apnea)     Parkinson disease ([facility]     PVD (peripheral vascular disease) ([facility]     Seizure ([facility]        Past Surgical History: Procedure Laterality Date    FEMORAL ARTERY STENT      IR CAROTID STENT UNI W PROTECTION  9/27/2022    IR CAROTID STENT UNI W PROTECTION 9/27/2022 Vikki Harris MD [facility]       Social History:    Social History     Tobacco Use    Smoking status: Not on file    Smokeless tobacco: Not on file   Substance Use Topics    Alcohol use: Not on file                                Counseling given: Not Answered      Vital Signs (Current):   Vitals:    10/03/22 0500 10/03/22 0600 10/03/22 0621 10/03/22 0700   BP:       Pulse: 71 72  71   Resp: 29 12 14 18   Temp:       TempSrc:       SpO2: 100%   94%   Weight:       Height:                                                  BP Readings from Last 3 Encounters:   10/03/22 (!) 149/71       NPO Status:                                                                                 BMI:   Wt Readings from Last 3 Encounters:   09/27/22 260 lb 8 oz (118.2 kg)     Body mass index is 39.61 kg/m². CBC:   Lab Results   Component Value Date/Time    WBC 16.0 10/03/2022 04:59 AM    RBC 2.27 10/03/2022 04:59 AM    HGB 6.6 10/03/2022 04:59 AM    HCT 21.4 10/03/2022 04:59 AM    HCT 15.0 09/27/2022 08:58 PM    MCV 94.3 10/03/2022 04:59 AM    RDW 16.2 10/03/2022 04:59 AM     10/03/2022 04:59 AM       CMP:   Lab Results   Component Value Date/Time     10/03/2022 04:59 AM    K 4.9 10/03/2022 04:59 AM     10/03/2022 04:59 AM    CO2 19 10/03/2022 04:59 AM     10/03/2022 04:59 AM    CREATININE 3.0 10/03/2022 04:59 AM    GFRAA 26 10/03/2022 04:59 AM    LABGLOM 21 10/03/2022 04:59 AM    GLUCOSE 243 10/03/2022 04:59 AM    PROT 5.4 10/03/2022 04:59 AM    CALCIUM 8.6 10/03/2022 04:59 AM    BILITOT 0.3 10/03/2022 04:59 AM    ALKPHOS 378 10/03/2022 04:59 AM    AST 68 10/03/2022 04:59 AM    ALT 15 10/03/2022 04:59 AM       POC Tests: No results for input(s): POCGLU, POCNA, POCK, POCCL, POCBUN, POCHEMO, POCHCT in the last 72 hours.     Coags:   Lab Results Component Value Date/Time    PROTIME 23.2 09/27/2022 01:12 PM    INR 2.1 09/27/2022 01:12 PM       HCG (If Applicable): No results found for: PREGTESTUR, PREGSERUM, HCG, HCGQUANT     ABGs: No results found for: PHART, PO2ART, REQ7MEY, SSV3YAL, BEART, S7OIDWQT     Type & Screen (If Applicable):  No results found for: LABABO, LABRH    Drug/Infectious Status (If Applicable):  No results found for: HIV, HEPCAB    COVID-19 Screening (If Applicable):   Lab Results   Component Value Date/Time    COVID19 Not Detected 10/01/2022 08:23 AM           Anesthesia Evaluation  Patient summary reviewed and Nursing notes reviewed no history of anesthetic complications:   Airway: Mallampati: II  TM distance: >3 FB   Neck ROM: full  Mouth opening: > = 3 FB   Dental:    (+) edentulous      Pulmonary: breath sounds clear to auscultation  (+) COPD:  shortness of breath:  sleep apnea: on CPAP,      (-) not a current smoker          Patient did not smoke on day of surgery. Cardiovascular:  Exercise tolerance: poor (<4 METS),   (+) hypertension:, past MI:, CAD:, AYERS:, hyperlipidemia        Rhythm: regular  Rate: normal           Beta Blocker:  Dose within 24 Hrs         Neuro/Psych:   (+) CVA (Acute CVA):, neuromuscular disease: Parkinson's disease, depression/anxiety    Psychiatric history: pt states hx bipolar. ROS comment: Pt is A/O x 2, confused with place/situation GI/Hepatic/Renal:   (+) renal disease (per nephrology CKD stage 3):,          ROS comment: GI bleed. Endo/Other:    (+) DiabetesType II DM, using insulin, blood dyscrasia: anemia:., .                 Abdominal:             Vascular: Other Findings:        EKG 9/28/2022  Normal sinus rhythm  Normal ECG  No previous ECGs available  Confirmed by Christopher Collado ([id number]) on 9/28/2022 4:12:39 PM      ECHO 9/28/2022   Severe concentric left ventricular hypertrophy. Ejection fraction is visually estimated at 60 to 65%.    Left ventricular diastolic filling is elevated . Mildly dilated right ventricle with normal systolic function. Agitated saline injected for shunt evaluation was technically difficult   due to pt being on vent and lack of corperation. Please obtain limited   echo with Bubble study once pt is extubated and cooperative. Mild mitral regurgitation is present. Mild tricuspid regurgitation. RVSP is 42 mmHg. Physiologic and/or trace, Mild pulmonic regurgitation present. CT HEAD 9/30/2022  Parenchymal volume loss and chronic microvascular ischemic changes.       No acute intracranial abnormality.       Ethmoid and sphenoid sinusitis.       Right mastoid effusion. XR CHEST 9/30/2022  Worsening infiltrate and or atelectasis on the left, stable on the right with   suspected layering pleural effusion.                Anesthesia Plan      MAC     ASA 4       Induction: intravenous. Anesthetic plan and risks discussed with patient (CRNA received consent from [address] [place] [address] by phone). Use of blood products discussed with patient whom consented to blood products. Plan discussed with CRNA and attending. Roel Lucas RN   10/3/2022    Patient seen and examined, chart reviewed, agree with above findings. Anesthetic plan, risks, benefits, alternatives, and personnel involved discussed with patient. Patient verbalized an understanding and agreed to proceed. NPO status confirmed. CRNA obtained consent from [address] [place] [address]. Anesthetic plan discussed with care team members and agreed upon.     Paul Larson DO   10/3/2022  10:47 AM

## 2022-10-03 NOTE — PROGRESS NOTES
SPEECH LANGUAGE PATHOLOGY  DAILY PROGRESS NOTE        PATIENT NAME:  John Nance      :  1955          TODAY'S DATE:  10/3/2022 ROOM:  [address] [place] [address]    Patient seen for f/u for cognitive-linguistic tx. Caregiver at bedside. Patient was oriented x3 (person, , place). Patient given 3 elements and able to recall 3/3 immediately and 0/3 after 5 min delay despite max cues and prompts. Auditory Processing and Retention portions of the RIPA-2 was completed; patient provided yes/no answers with 50% accuracy, or 5/10 trials, with mod/max cues and support. Patient produced a score of 23 - moderate deficit for this section of the RIPA-2. Patient continues demo poor insight into deficits. Patient and caregiver educated on ST POC, but will need reinforcement. SLP student answered all questions. Will cont w/  POC. [address] Speech Therapy Student     Trinity Shelton.  Amena VELARDE, CCC-SLP  SP. [id number]      CPT code(s) 50062  therapeutic interventions that focus on cognitive function , initial  15 min  64176  therapeutic interventions that focus on cognitive function, each additional 15 min  Total minutes :  30 minutes

## 2022-10-03 NOTE — PROGRESS NOTES
Department of Podiatry   Progress Note      Patient seen at bedside this morning. No acute events,  wound vac intact to right foot. Wound vac intact and scheduled for change today. Patient for EGD today. Wound cx collected yesterday, results pending. No new pedal complaints. Past Medical History:            Diagnosis Date    Chronic back pain     CKD (chronic kidney disease)     CVA (cerebral vascular accident) ([facility])     CVA (cerebral vascular accident) ([facility])     DM (diabetes mellitus) ([facility])     Major depression     MI (myocardial infarction) ([facility])     MATT (obstructive sleep apnea)     Parkinson disease ([facility])     PVD (peripheral vascular disease) ([facility])     Seizure ([facility])        Past Surgical History:        Procedure Laterality Date    FEMORAL ARTERY STENT      IR CAROTID STENT UNI W PROTECTION  9/27/2022    IR CAROTID STENT UNI W PROTECTION 9/27/2022 Sebastian Patton MD [facility]       Medications Prior to Admission:    Medications Prior to Admission: apixaban (ELIQUIS) 5 MG TABS tablet, Take 5 mg by mouth 2 times daily  aspirin 81 MG chewable tablet, Take 81 mg by mouth daily  carbidopa-levodopa (SINEMET)  MG per tablet, Take 1 tablet by mouth 3 times daily  carvedilol (COREG) 25 MG tablet, Take 25 mg by mouth 2 times daily (with meals)  cetirizine (ZYRTEC) 10 MG tablet, Take 10 mg by mouth daily  clopidogrel (PLAVIX) 75 MG tablet, Take 75 mg by mouth daily  diphenhydrAMINE (BENADRYL) 25 MG capsule, Take 25 mg by mouth every 6 hours  ezetimibe (ZETIA) 10 MG tablet, Take 10 mg by mouth daily  fluticasone (FLONASE) 50 MCG/ACT nasal spray, 2 sprays by Each Nostril route daily  gabapentin (NEURONTIN) 600 MG tablet, Take 600 mg by mouth 4 times daily. HYDROcodone-acetaminophen (NORCO) 5-325 MG per tablet, Take 1 tablet by mouth 2 times daily.   Insulin Glargine, 2 Unit Dial, (TOUJEO MAX SOLOSTAR) 300 UNIT/ML SOPN, Inject 66 Units into the skin daily  piperacillin-tazobactam (ZOSYN) 3-0.375 GM per 50ML IVPB extended infusion, Infuse 4.5 mg intravenously in the morning and 4.5 mg at noon and 4.5 mg in the evening. acetaminophen (TYLENOL) 325 MG tablet, Take 650 mg by mouth every 6 hours as needed for Pain  amLODIPine (NORVASC) 5 MG tablet, Take 5 mg by mouth daily  benzonatate (TESSALON) 100 MG capsule, Take 100 mg by mouth 3 times daily  cloNIDine (CATAPRES) 0.1 MG tablet, Take 0.1 mg by mouth in the morning and 0.1 mg in the evening. hydrALAZINE (APRESOLINE) 100 MG tablet, Take 100 mg by mouth 3 times daily    Allergies:  Codeine    Social History:   TOBACCO:   has no history on file for tobacco use. ETOH:   has no history on file for alcohol use. DRUGS:   Social History     Substance and Sexual Activity   Drug Use Not on file       Family History:   History reviewed. No pertinent family history. REVIEW OF SYSTEMS:    All pertinent positives and negatives as noted in HPI       LOWER EXTREMITY EXAMINATION   Wound vac intact running continuous pressure    Previous Exam:  VASCULAR:  DP and PT pulses are non palpable. CFT < 5 seconds B/L. Warm to warm from the tibial tuberosity to the distal aspect of the digits dorsally. NEUROLOGIC:  Protective sensation is diminished by grossly intact    DERM:  Right foot lateral plantar wound measuring approx 6cm in diameter. No erythema, serosanguinous drainage, no malodor.     MUSCULOSKELETAL: deferred           CONSULTS:  IP CONSULT TO CRITICAL CARE  IP CONSULT TO DIETITIAN  IP CONSULT TO CARDIOLOGY  IP CONSULT TO PODIATRY  IP CONSULT TO PODIATRY  IP CONSULT TO UROLOGY  IP CONSULT TO NEPHROLOGY  IP CONSULT TO GENERAL SURGERY  IP CONSULT TO INFECTIOUS DISEASES    MEDICATION:  Scheduled Meds:   epoetin sharath-epbx  6,000 Units SubCUTAneous Once per day on Mon Wed Fri    predniSONE  40 mg Oral Daily    amLODIPine  10 mg Per NG tube Daily    piperacillin-tazobactam  3,375 mg IntraVENous Q8H    linezolid  600 mg Oral 2 times per day    insulin glargine  0.25 Units/kg SubCUTAneous QAM    cloNIDine  0.1 mg Oral Q12H    hydrALAZINE  50 mg Oral 3 times per day    lactulose  20 g Oral BID    insulin lispro  0-16 Units SubCUTAneous 4x Daily AC & HS    HYDROcodone-acetaminophen  1 tablet Oral BID    pantoprazole (PROTONIX) 40 mg injection  40 mg IntraVENous Q12H    metoprolol succinate  25 mg Oral Daily    sennosides  5 mL Oral Nightly    carbidopa-levodopa  1 tablet Per NG tube TID    ipratropium-albuterol  1 ampule Inhalation Q4H WA    collagenase   Topical Q MWF    benzonatate  100 mg Oral TID    cetirizine  10 mg Oral Daily    ezetimibe  10 mg Oral Daily    gabapentin  600 mg Oral 4x Daily    ticagrelor  90 mg Oral BID    aspirin  81 mg Oral Daily    Or    aspirin  300 mg Rectal Daily    atorvastatin  80 mg Oral Nightly     Continuous Infusions:   sodium chloride      sodium chloride      sodium chloride      sodium chloride      sodium chloride      dextrose       PRN Meds:.sodium chloride, sodium chloride, fentanNYL, sodium chloride, sodium chloride, sodium chloride, labetalol, hydrALAZINE, glucose, dextrose bolus **OR** dextrose bolus, glucagon (rDNA), dextrose, ondansetron **OR** ondansetron    RADIOLOGY:  XR FOOT RIGHT (2 VIEWS)   Final Result   No evidence of calcaneal osteomyelitis. CT HEAD WO CONTRAST   Final Result   Parenchymal volume loss and chronic microvascular ischemic changes. No acute intracranial abnormality. Ethmoid and sphenoid sinusitis. Right mastoid effusion. US RETROPERITONEAL COMPLETE   Final Result   1. Normal appearance of the bilateral kidneys. No hydronephrosis. 2.  A Mahan catheter is decompressing the bladder. XR CHEST PORTABLE   Final Result   Worsening infiltrate and or atelectasis on the left, stable on the right with   suspected layering pleural effusion. XR CHEST PORTABLE   Final Result   Unchanged bilateral atelectasis and or infiltrate as well as small right   pleural effusion. MRI BRAIN WO CONTRAST   Final Result   There are 2 small regions of petechial infarcts one in the left   posterior frontal lobe and a second in the left parietal lobe not   exceeding 3 mm. There is otherwise extensive small vessel ischemic   changes         XR CHEST PORTABLE   Final Result   1. Multifocal bilateral airspace disease more prominent within the lower   lobes. The airspace disease is unchanged when compared with the prior study. CT HEAD WO CONTRAST   Final Result   Diffuse atrophy likely age related   Findings compatible with small vessel ischemic changes. XR CHEST PORTABLE   Final Result   1. Pulmonary opacities present favoring edema and atelectasis, also small   pleural effusions, but nonspecific with some additional considerations noted   above   2. Support devices present as described above   3. Heart size appears borderline enlarged         XR ABDOMEN FOR NG/OG/NE TUBE PLACEMENT   Final Result   NG/OG is in the stomach. IR CAROTID STENT W PROTECTION   Final Result   1. Angiogram demonstrates successful placement of a carotid stent ,   post procedure images demonstrate a widely patent stent and internal   carotid         CT HEAD WO CONTRAST   Final Result   Diffuse atrophy likely age related   Findings compatible with small vessel ischemic changes. Findings were called at the time of dictation         CT BRAIN PERFUSION   Final Result      No significant ischemic penumbra identified      This study was analyzed by the Desktime. ai algorithm. CTA NECK W CONTRAST   Final Result   1. Estimated stenosis of the proximal right and left internal carotid   artery by NASCET criteria is greater than 90% on the left   2. Severe atherosclerotic disease . 3. No large vessel occlusion identified            This study was analyzed by the Desktime. ai algorithm. CTA HEAD W CONTRAST   Final Result   1.  Estimated stenosis of the proximal right and left internal carotid   artery by NASCET criteria is greater than 90% on the left   2. Severe atherosclerotic disease . 3. No large vessel occlusion identified            This study was analyzed by the Viz. ai algorithm. Vitals:    BP (!) 141/75   Pulse 69   Temp 97.5 °F (36.4 °C)   Resp 13   Ht 5' 8\" (1.727 m)   Wt 260 lb 8 oz (118.2 kg)   SpO2 100%   BMI 39.61 kg/m²     LABS:   Recent Labs     10/02/22  0618 10/02/22  1349 10/03/22  0459   WBC 15.9*  --  16.0*   HGB 6.5* 7.0* 6.6*   HCT 20.0* 21.5* 21.4*     --  257     Recent Labs     10/03/22  0459   *   K 4.9   *   CO2 19*   PHOS 4.2   *   CREATININE 3.0*     Recent Labs     10/02/22  0618 10/03/22  0459   PROT 5.7* 5.4*       ASSESSMENTS:   1. Right foot wound diabetic ulcer  2. DM  3. Pain right foot  Acute cerebrovascular accident (CVA) ([facility] [address]            PLAN:    - Patient was examined and evaluated. -Reviewed patient's recent lab results, charts and pertinent diagnostic imaging. Reviewed ancillary service notes.  -Wound culture pending  - Santyl to right foot wound MWF  -Patient for EGD tomorrow, NPO midnight  - Continue Wound vac with santyl right foot. MWF   - Will follow      Thank you for the opportunity to take part in the patient's care. Please do not hesitate to call for any questions or concerns.

## 2022-10-03 NOTE — OP NOTE
[address] [place] [address]  UPPER ENDOSCOPY REPORT    DATE OF PROCEDURE: 10/3/22     SURGEON: PAOLA Elizondo [place]: none    PREOPERATIVE DIAGNOSIS: acute anemia/ requiring blood transfusion    POSTOPERATIVE DIAGNOSIS: no evidence of upper gi bleed    OPERATION: Esophagogastroduodenoscopy     ANESTHESIA: Local monitored anesthesia. (refer to Anesthesia record for details)    ESTIMATED BLOOD LOSS:  0 ml    COMPLICATIONS: None    SPECIMENS:  Was Not Obtained    HISTORY: The patient is a 74y.o. year old male with history of above preop diagnosis. I recommended esophagogastroduodenoscopy with possible biopsy and I explained the risk, benefits, expected outcome, and alternatives to the procedure. Risks included but are not limited to bleeding, infection, respiratory distress, hypotension, and perforation of the esophagus, stomach, or duodenum. Patient understands and is in agreement. PROCEDURE: The patient was connected to the monitors and given supplemental oxygen by nasal cannula. The patient was sedated. The gastroscope was inserted orally and advanced under direct vision through the esophagus, through the stomach, through the pylorus, and into the descending duodenum. Findings:  Duodenum:     Descending: normal    Bulb: normal    Stomach:    Antrum: normal      Body: normal    Fundus: normal    Esophagus: normal    Larynx: normal    The scope was removed and the patient tolerated the procedure well.      IMPRESSION/PLAN:   No evidence of upper GI bleed  Continue protonix    Fadumo Choudhury MD, FACS  10/3/2022  11:02 AM

## 2022-10-03 NOTE — PROGRESS NOTES
Hospitalist Progress Note      SYNOPSIS: Patient admitted on 2022 for Acute cerebrovascular accident (CVA) (HCC)Patient presented to [facility] with strokelike symptoms, right-sided paralysis and aphasia. Was found to have severe left ICA stenosis and taken to IR for left ICA angioplasty with stent placement. Patient was intubated and sedated and transferred to the ICU. Vital signs are within normal limits and stable. Patient is afebrile. Laboratory studies demonstrate potassium 5.2, BUN 89, creatinine 2.9, glucose 106, troponin 153, hemoglobin 5.0. Patient transfused PRBC. Neurology, cardiology, podiatry following. MRI showed There are 2 small regions of petechial infarcts one in the left posterior frontal lobe and a second in the left parietal lobe not exceeding 3 mm. There is otherwise extensive small vessel ischemic changes. Extubated . Remains anemic and has to remain on DAPT. For EGDtoday. SUBJECTIVE:  Stable overnight. No other overnight issues reported. Patient seen and examined  Records reviewed. Again anemic, surgery following. For EGD today  ID consulted yesterday, antibiotics for foot wound changed to zosyn and xyvox      Temp (24hrs), Av.9 °F (36.6 °C), Min:97.5 °F (36.4 °C), Max:98.2 °F (36.8 °C)    DIET: Diet NPO  CODE: Full Code    Intake/Output Summary (Last 24 hours) at 10/3/2022 0817  Last data filed at 10/3/2022 0800  Gross per 24 hour   Intake 757.35 ml   Output 1435 ml   Net -677.65 ml       Review of Systems  All bolded are positive; please see HPI  General:  Fever, chills, diaphoresis, fatigue, malaise, night sweats, weight loss  Psychological:  Anxiety, disorientation, hallucinations. ENT:  Epistaxis, headaches, vertigo, visual changes. Cardiovascular:  Chest pain, irregular heartbeats, palpitations, paroxysmal nocturnal dyspnea.   Respiratory:  Shortness of breath, coughing, sputum production, hemoptysis, wheezing, orthopnea. Gastrointestinal:  Nausea, vomiting, diarrhea, heartburn, constipation, abdominal pain, hematemesis, hematochezia, melena, acholic stools  Genito-Urinary:  Dysuria, urgency, frequency, hematuria  Musculoskeletal:  Joint pain, joint stiffness, joint swelling, muscle pain  Neurology:  Headache, focal neurological deficits, weakness, numbness, paresthesia  Derm:  Rashes, ulcers, excoriations, bruising  Extremities:  Decreased ROM, peripheral edema, mottling      OBJECTIVE:    BP (!) 149/71   Pulse 71   Temp 97.5 °F (36.4 °C) (Oral)   Resp 18   Ht 5' 8\" (1.727 m)   Wt 260 lb 8 oz (118.2 kg)   SpO2 94%   BMI 39.61 kg/m²     General appearance: Awake, follows commands, intermittently confused. HEENT:  Conjunctivae/corneas clear. Neck: Supple. No jugular venous distention. Respiratory: symmetrical; clear to auscultation bilaterally; no wheezes; no rhonchi; no rales  Cardiovascular: rhythm regular; rate controlled; no murmurs  Abdomen: Soft, nontender, nondistended  Extremities:  peripheral pulses present; no peripheral edema;  seebelow  Musculoskeletal: No clubbing, cyanosis, no bilateral lower extremity edema. Brisk capillary refill. Skin:  No rashes  on visible skin  Neurologic: r hemiparesis        ASSESSMENT and PLAN:    Acute CVA- He was found to have severe left ICA stenosis and was taken to IR for L ICA angioplasty with stent placement. He is on ASA/brilinta/statin. MRI brain showed two tiny foci of acute stroke on the L. Echo was unrevealing, though bubble study was suboptimal.   Acute respiratory failure- requiring mechanical ventilation. Ventilator weaning per critical care. Extubated 9/29. Acute blood loss anemia- transfuse for hemoglobin <7. For EGD today with general surgery. Hyperkalemia- resolved  Acute renal failure- unknown baseline. Elevated troponin-  Cardiology following. R foot ulcer- podiatry following. Has wound vac. ID also consulted, IV zosyn and zyvox. Hyperglycemia- likey due to steroids. Will wean. Lantus and SSI. Hemoglobin Ac 6.3  Urinary retention- urology followed. Continue trejo. Voiding trial when ambulatory.  This can be done as an outpatient if he is to go to rehab    Dispo: remains in icu      Medications:  REVIEWED DAILY    Infusion Medications    sodium chloride      sodium chloride      sodium chloride      sodium chloride      sodium chloride      dextrose       Scheduled Medications    epoetin sharath-epbx  6,000 Units SubCUTAneous Once per day on Mon Wed Fri    predniSONE  40 mg Oral Daily    amLODIPine  10 mg Per NG tube Daily    piperacillin-tazobactam  3,375 mg IntraVENous Q8H    linezolid  600 mg Oral 2 times per day    insulin glargine  0.25 Units/kg SubCUTAneous QAM    cloNIDine  0.1 mg Oral Q12H    hydrALAZINE  50 mg Oral 3 times per day    lactulose  20 g Oral BID    insulin lispro  0-16 Units SubCUTAneous 4x Daily AC & HS    HYDROcodone-acetaminophen  1 tablet Oral BID    pantoprazole (PROTONIX) 40 mg injection  40 mg IntraVENous Q12H    metoprolol succinate  25 mg Oral Daily    sennosides  5 mL Oral Nightly    carbidopa-levodopa  1 tablet Per NG tube TID    ipratropium-albuterol  1 ampule Inhalation Q4H WA    collagenase   Topical Q MWF    benzonatate  100 mg Oral TID    cetirizine  10 mg Oral Daily    ezetimibe  10 mg Oral Daily    gabapentin  600 mg Oral 4x Daily    ticagrelor  90 mg Oral BID    aspirin  81 mg Oral Daily    Or    aspirin  300 mg Rectal Daily    atorvastatin  80 mg Oral Nightly     PRN Meds: sodium chloride, sodium chloride, fentanNYL, sodium chloride, sodium chloride, sodium chloride, labetalol, hydrALAZINE, glucose, dextrose bolus **OR** dextrose bolus, glucagon (rDNA), dextrose, ondansetron **OR** ondansetron    Labs:     Recent Labs     10/01/22  0603 10/02/22  0618 10/02/22  1349 10/03/22  0459   WBC 13.2* 15.9*  --  16.0*   HGB 6.9* 6.5* 7.0* 6.6*   HCT 21.6* 20.0* 21.5* 21.4*    255  --  257       Recent Labs     10/01/22  0603 10/01/22  1550 10/02/22  0618 10/02/22  1651 10/03/22  0459   *  --  149* 146 151*   K 5.5*   < > 5.4* 5.1* 4.9   *  --  118* 116* 119*   CO2 19*  --  22 20* 19*   *  --  121* 118* 128*   CREATININE 2.7*  --  2.9* 2.7* 3.0*   CALCIUM 8.4*  --  9.0 8.6 8.6   PHOS 3.9  --  4.3  --  4.2    < > = values in this interval not displayed. Recent Labs     10/01/22  0603 10/02/22  0618 10/03/22  0459   PROT 5.5* 5.7* 5.4*   ALKPHOS 180* 291* 378*   ALT <5 10 15   AST 15 42* 68*   BILITOT 0.4 0.2 0.3       No results for input(s): INR in the last 72 hours. No results for input(s): [employer] in the last 72 hours. Chronic labs:    Lab Results   Component Value Date    PSA 1.13 09/30/2022    INR 2.1 09/27/2022    LABA1C 6.4 (H) 09/28/2022       Radiology: REVIEWED DAILY    +++++++++++++++++++++++++++++++++++++++++++++++++  DO Emil Messer Physician - [address] [place], [place]  +++++++++++++++++++++++++++++++++++++++++++++++++  NOTE: This report was transcribed using voice recognition software. Every effort was made to ensure accuracy; however, inadvertent computerized transcription errors may be present.

## 2022-10-03 NOTE — PROGRESS NOTES
[address]       Date:10/3/2022                                                               Patient Name: Tian Oliveros  MRN: [id number]  : 1955  Room: [address] [place] [address]    Evaluating OT: MAICO Ann,  OTR/L; [id number]    Referring Provider: Garrett Awad DO   Specific Provider Orders/Date: OT eval and treat (10/3/22)       Diagnosis: Acute cerebrovascular accident (CVA) ([facility] [I63.9]     Reason for admission: Pt admitted with Acute CVA, R heel wound. Surgery/Procedures: 10/3: EGD     Pertinent Medical History:    Past Medical History:   Diagnosis Date    Chronic back pain     CKD (chronic kidney disease)     CVA (cerebral vascular accident) ([address]     CVA (cerebral vascular accident) ([facility]     DM (diabetes mellitus) ([facility]     Major depression     MI (myocardial infarction) ([facility]     MATT (obstructive sleep apnea)     Parkinson disease ([facility]     PVD (peripheral vascular disease) ([address]     Seizure ([address]         *Precautions:  Fall Risk, O2, R shaista, expressive > receptive aphasia, RLE wound with wound vac, NWB RLE, off-loading shoe to RLE. Assessment of current deficits   [x] Functional mobility  [x]ADLs  [x] Strength               [x]Cognition   [x] Functional transfers   [x] IADLs         [x] Safety Awareness   [x]Endurance   [x] Fine Coordination        [x] ROM     [x] Vision/perception   []Sensation    [x]Gross Motor Coordination [x] Balance   [] Delirium                  [x]Motor Control     [x] Communication    OT PLAN OF CARE   OT POC based on physician orders, patient diagnosis and results of clinical assessment.        Frequency/Duration: 1-3 days/wk for 1-2 weeks PRN    Specific OT Treatment Interventions to include:   * Instruction/training on adapted ADL techniques and AE recommendations to increase functional independence within precautions       * Training on energy conservation strategies, correct breathing pattern and techniques to improve independence/tolerance for self-care routine  * Functional transfer/mobility training/DME recommendations for increased independence, safety, and fall prevention  * Patient/Family education to increase follow through with safety techniques and functional independence  * Recommendation of environmental modifications for increased safety with functional transfers/mobility and ADLs  * Cognitive retraining/development of therapeutic activities to improve problem solving, judgement, memory, and attention for increased safety/participation in ADL/IADL tasks  * Sensory re-education to improve body/limb awareness, maintain/improve skin integrity, and improve hand/UE motor function  * Visual-perceptual training to improve environmental scanning, visual attention/focus, and oculomotor skills for increased safety/independence with functional transfers/mobility and ADLs  * Splinting/positioning for increased function, prevention of contractures, and improve skin integrity  * Therapeutic exercise to improve motor endurance, ROM, and functional strength for ADLs/functional transfers  * Therapeutic activities to facilitate/challenge dynamic balance, stand tolerance for increased safety and independence with ADLs  * Therapeutic activities to facilitate gross/fine motor skills for increased independence with ADLs  * Neuro-muscular re-education: facilitation of righting/equilibrium reactions, midline orientation, scapular stability/mobility, normalization of muscle tone, and facilitation of volitional active controled movement  * Positioning to improve skin integrity, interaction with environment and functional independence  * Delirium prevention/treatment  * Manual techniques for edema management    Modified Chris Scale   Score     Description  0             No symptoms  1             No significant disability despite symptoms  2 WFL Proximal 4+/5  Elbow: 5/5  : Good  FMC: WFL  GMC: WFL Increase overall LUE strength       Sensation: No c/o numbness/tingling in extremities. Light touch intact to RUE  Tone: Hypertonicity in RUE flexors. Edema: Unremarkable. Functional Assessment:  AM-PAC Daily Activity Raw Score: 10/24   Initial Eval Status  Date: 10/3/22 Treatment Status  Date:  STGs = LTGs  Time frame: 7-14 days   Feeding Min A  To drink from cup with BUE for stabilization. S; set-up       Grooming Max A overall  Min A to wash face with LUE. Verbal cues for use of RUE. Min A        UB dressing/bathing Max A                      Min A  With use of shaista techniques       LB dressing/bathing Dep                      Mod A   With use of shaista techniques       Toileting Dep                      Mod A     Bed Mobility  Supine to sit:   Mod A    Sit to supine:   NT  Pt seated in chair upon exit. SBA     Functional Transfers Sit to stand: Mod A x2    Stand to sit:   Mod A x2    Stand Pivot: Mod A x2  Verbal cues to maintain NWB of RLE. Mod A     Functional Mobility NT                      Mod A        Balance Sitting:     Static: Min A    Dynamic: Mod A  Standing: Mod A x2  Sitting:     Static: SBA    Dynamic: Min A  Standing: Mod A                   Endurance/Activity Tolerance   fair tolerance with light activity. WFL  For full ADL. Visual/  Perceptual Impaired: Pt reporting vision is \"dimming. \" Impaired peripheral vision with assessment, improving at midline.                        Vitals:   HR at rest: 71 bpm HR at end of session: 72 bpm   Spo2 at rest: 100% Spo2 at end of session --%   BP at rest: 140/102 mmHg BP at end of session 119/91 mmHg       Treatment: OT treatment provided this date includes:   Instruction/training on safe bed mobility/functional mobility/transfer techniques: Pt educated on precautions and body mechanics to maximize safety during transfers. Proper Positioning/Alignment: Pt properly positioned in chair with RUE supported for optimal healing, to decrease edema, and reduce risk of contracture. Skilled Monitoring of Vitals: to include BP, spO2, and HR throughout session to maximize safety. Therapeutic exercise: Instruction on BUE AAROM exercises x5 to improve strength/function for increased [place] with ADL/IADLs. Therapeutic activity: to challenge dynamic sitting/standing balance and endurance to promote safety during ADL tasks and functional transfers and mobility. Delirium Prevention: Environmental and sensory modifications assessed and implemented to decrease ICU acquired delirium and to improve overall orientation, mentation and pt interaction with family/staff. Line management and environmental modifications made prior to and end of session to ensure patient safety and to increase efficiency of session. Skilled monitoring of HR, O2 saturation, blood pressure and patient's response to activity performed throughout session. Comments: Pt case discussed in rounds, OK from RN to see patient. Upon arrival, patient semi supine in bed. Pt pleasant and agreeable to participate in therapy session. Pt demo fair tolerance with fair+ understanding of education/techniques. At end of session, patient properly positioned in chair with call light within reach, all lines and tubes intact. Pt instructed on use of call light for assistance and fall prevention. Nursing notified of patient positioning. Patient presents with decreased ROM/strength, activity tolerance, dynamic balance, functional mobility limiting completion of ADLs and safety. Pt can benefit from intensive OT services to increase safety, functional independence and quality of life.      Rehab Potential: Good for established goals    Patient / Family Goal: to return to PLOF    Patient and/or family were instructed/educated on diagnosis, prognosis/goals and plan of care. Patient demonstrated good understanding. Evaluation Complexity: Moderate     History: Expanded chart review of consults, imaging, and psychosocial history related to current functional performance. Exam: 5+ performance deficits identified limiting functional independence and safe return home   Assistance/Modification: Min/mod assistance or modifications required to perform tasks. May have comorbidities that affect occupational performance. [] Malnutrition indicators have been identified and nursing has been notified to ensure a dietitian consult is ordered. Time In: 1354           Time Out: 1436         Total Treatment time: 27 min   Min Units   OT Eval Low 05752     OT Eval Medium 88177 X    OT Eval High 54996     OT Re-Eval 50569     Therapeutic Ex 93192 10 1   Therapeutic Activities 40107 09 1   ADL/Self Care 15746     Orthotic Management 75646     Neuro Re-Ed 91863     Non-Billable Time        Evaluation time includes thorough review of current medical information, gathering information on past medical history/social history and prior level of function, completion of standardized testing/informal observation of tasks, assessment of data and development of POC/Goals.      MAICO Denis,  OTR/L; [id number]

## 2022-10-03 NOTE — PLAN OF CARE
Problem: Discharge Planning  Goal: Discharge to home or other facility with appropriate resources  10/3/2022 0721 by Davina Roldan RN  Outcome: Progressing  10/3/2022 0625 by John Ley RN  Outcome: Progressing     Problem: Pain  Goal: Verbalizes/displays adequate comfort level or baseline comfort level  10/3/2022 0721 by Davina Roldan RN  Outcome: Progressing  10/3/2022 0625 by John Ley RN  Outcome: Progressing  10/2/2022 1813 by Cori Phalen, RN  Outcome: Progressing     Problem: Skin/Tissue Integrity  Goal: Absence of new skin breakdown  Description: 1. Monitor for areas of redness and/or skin breakdown  2. Assess vascular access sites hourly  3. Every 4-6 hours minimum:  Change oxygen saturation probe site  4. Every 4-6 hours:  If on nasal continuous positive airway pressure, respiratory therapy assess nares and determine need for appliance change or resting period.   10/3/2022 0721 by Davina Roldan RN  Outcome: Progressing  10/3/2022 0625 by John Ley RN  Outcome: Progressing  10/2/2022 1813 by Cori Phalen, RN  Outcome: Progressing     Problem: Safety - Adult  Goal: Free from fall injury  10/3/2022 0721 by Davina Roldan RN  Outcome: Progressing  10/2/2022 1813 by Cori Phalen, RN  Outcome: Progressing     Problem: ABCDS Injury Assessment  Goal: Absence of physical injury  Outcome: Progressing     Problem: Chronic Conditions and Co-morbidities  Goal: Patient's chronic conditions and co-morbidity symptoms are monitored and maintained or improved  Outcome: Progressing     Problem: Neurosensory - Adult  Goal: Achieves stable or improved neurological status  Outcome: Progressing  Goal: Remains free of injury related to seizures activity  Outcome: Progressing  Goal: Achieves maximal functionality and self care  Outcome: Progressing     Problem: Respiratory - Adult  Goal: Achieves optimal ventilation and oxygenation  Outcome: Progressing     Problem: Cardiovascular - Adult  Goal: Maintains optimal cardiac output and hemodynamic stability  Outcome: Progressing  Goal: Absence of cardiac dysrhythmias or at baseline  Outcome: Progressing

## 2022-10-03 NOTE — CARE COORDINATION
10/3/2022 - Updated CM note - S/P ICA angioplasty and stent on 9/27. nephrology, ID, podiatry, wound care following. Right foot wound vac continued. Wearing 2L NC. Na 151, Cr 3.0, Hgb 6.6 today. To receive 1 unit PRBC today. EGD done today. PO zyvox twice a day, IV zosyn q8h, IV D5 @ 100 cc/hr. Referral message sent to Мария Bolaños at [facility]. Also spoke with pt caregiver, Chani Anthony, and inquired if she made [facility] choices for referral - she left the lists at home but remembered she did carin [facility] in [place]. Called referral to the Cornelio at [employer]. SW/CM will follow.

## 2022-10-04 ENCOUNTER — APPOINTMENT (OUTPATIENT)
Dept: GENERAL RADIOLOGY | Age: 67
DRG: 034 | End: 2022-10-04
Attending: INTERNAL MEDICINE
Payer: MEDICARE

## 2022-10-04 LAB
ALBUMIN SERPL-MCNC: 2.7 G/DL (ref 3.5–5.2)
ALP BLD-CCNC: 290 U/L (ref 40–129)
ALT SERPL-CCNC: 12 U/L (ref 0–40)
ANION GAP SERPL CALCULATED.3IONS-SCNC: 11 MMOL/L (ref 7–16)
AST SERPL-CCNC: 27 U/L (ref 0–39)
BASOPHILS ABSOLUTE: 0.02 E9/L (ref 0–0.2)
BASOPHILS RELATIVE PERCENT: 0.1 % (ref 0–2)
BILIRUB SERPL-MCNC: 0.3 MG/DL (ref 0–1.2)
BUN BLDV-MCNC: 115 MG/DL (ref 6–23)
CALCIUM IONIZED: 1.26 MMOL/L (ref 1.15–1.33)
CALCIUM SERPL-MCNC: 8.3 MG/DL (ref 8.6–10.2)
CHLORIDE BLD-SCNC: 114 MMOL/L (ref 98–107)
CO2: 20 MMOL/L (ref 22–29)
CREAT SERPL-MCNC: 2.6 MG/DL (ref 0.7–1.2)
EOSINOPHILS ABSOLUTE: 0 E9/L (ref 0.05–0.5)
EOSINOPHILS RELATIVE PERCENT: 0 % (ref 0–6)
GFR AFRICAN AMERICAN: 30
GFR NON-AFRICAN AMERICAN: 25 ML/MIN/1.73
GLUCOSE BLD-MCNC: 199 MG/DL (ref 74–99)
HCT VFR BLD CALC: 22.2 % (ref 37–54)
HEMOGLOBIN: 7 G/DL (ref 12.5–16.5)
IMMATURE GRANULOCYTES #: 0.19 E9/L
IMMATURE GRANULOCYTES %: 1.3 % (ref 0–5)
LYMPHOCYTES ABSOLUTE: 1.02 E9/L (ref 1.5–4)
LYMPHOCYTES RELATIVE PERCENT: 7.2 % (ref 20–42)
MAGNESIUM: 2.6 MG/DL (ref 1.6–2.6)
MCH RBC QN AUTO: 30.2 PG (ref 26–35)
MCHC RBC AUTO-ENTMCNC: 31.5 % (ref 32–34.5)
MCV RBC AUTO: 95.7 FL (ref 80–99.9)
METER GLUCOSE: 241 MG/DL (ref 74–99)
METER GLUCOSE: 249 MG/DL (ref 74–99)
METER GLUCOSE: 270 MG/DL (ref 74–99)
METER GLUCOSE: 287 MG/DL (ref 74–99)
MONOCYTES ABSOLUTE: 0.7 E9/L (ref 0.1–0.95)
MONOCYTES RELATIVE PERCENT: 4.9 % (ref 2–12)
NEUTROPHILS ABSOLUTE: 12.27 E9/L (ref 1.8–7.3)
NEUTROPHILS RELATIVE PERCENT: 86.5 % (ref 43–80)
PDW BLD-RTO: 16.2 FL (ref 11.5–15)
PHOSPHORUS: 4.5 MG/DL (ref 2.5–4.5)
PLATELET # BLD: 231 E9/L (ref 130–450)
PMV BLD AUTO: 10.6 FL (ref 7–12)
POTASSIUM SERPL-SCNC: 5 MMOL/L (ref 3.5–5)
RBC # BLD: 2.32 E12/L (ref 3.8–5.8)
SODIUM BLD-SCNC: 145 MMOL/L (ref 132–146)
TOTAL PROTEIN: 5.2 G/DL (ref 6.4–8.3)
WBC # BLD: 14.2 E9/L (ref 4.5–11.5)

## 2022-10-04 PROCEDURE — 84100 ASSAY OF PHOSPHORUS: CPT

## 2022-10-04 PROCEDURE — 97129 THER IVNTJ 1ST 15 MIN: CPT | Performed by: SPEECH-LANGUAGE PATHOLOGIST

## 2022-10-04 PROCEDURE — 6370000000 HC RX 637 (ALT 250 FOR IP): Performed by: INTERNAL MEDICINE

## 2022-10-04 PROCEDURE — 74018 RADEX ABDOMEN 1 VIEW: CPT

## 2022-10-04 PROCEDURE — 94640 AIRWAY INHALATION TREATMENT: CPT

## 2022-10-04 PROCEDURE — 2580000003 HC RX 258: Performed by: INTERNAL MEDICINE

## 2022-10-04 PROCEDURE — 2700000000 HC OXYGEN THERAPY PER DAY

## 2022-10-04 PROCEDURE — 6370000000 HC RX 637 (ALT 250 FOR IP): Performed by: NURSE PRACTITIONER

## 2022-10-04 PROCEDURE — 92526 ORAL FUNCTION THERAPY: CPT | Performed by: SPEECH-LANGUAGE PATHOLOGIST

## 2022-10-04 PROCEDURE — 82330 ASSAY OF CALCIUM: CPT

## 2022-10-04 PROCEDURE — 6360000002 HC RX W HCPCS: Performed by: INTERNAL MEDICINE

## 2022-10-04 PROCEDURE — C9113 INJ PANTOPRAZOLE SODIUM, VIA: HCPCS | Performed by: NURSE PRACTITIONER

## 2022-10-04 PROCEDURE — 6370000000 HC RX 637 (ALT 250 FOR IP): Performed by: STUDENT IN AN ORGANIZED HEALTH CARE EDUCATION/TRAINING PROGRAM

## 2022-10-04 PROCEDURE — 6360000002 HC RX W HCPCS: Performed by: NURSE PRACTITIONER

## 2022-10-04 PROCEDURE — 83735 ASSAY OF MAGNESIUM: CPT

## 2022-10-04 PROCEDURE — 71045 X-RAY EXAM CHEST 1 VIEW: CPT

## 2022-10-04 PROCEDURE — 6360000002 HC RX W HCPCS: Performed by: FAMILY MEDICINE

## 2022-10-04 PROCEDURE — 2580000003 HC RX 258: Performed by: NURSE PRACTITIONER

## 2022-10-04 PROCEDURE — 94660 CPAP INITIATION&MGMT: CPT

## 2022-10-04 PROCEDURE — 80053 COMPREHEN METABOLIC PANEL: CPT

## 2022-10-04 PROCEDURE — S5553 INSULIN LONG ACTING 5 U: HCPCS | Performed by: INTERNAL MEDICINE

## 2022-10-04 PROCEDURE — 82962 GLUCOSE BLOOD TEST: CPT

## 2022-10-04 PROCEDURE — 2060000000 HC ICU INTERMEDIATE R&B

## 2022-10-04 PROCEDURE — 85025 COMPLETE CBC W/AUTO DIFF WBC: CPT

## 2022-10-04 PROCEDURE — A4216 STERILE WATER/SALINE, 10 ML: HCPCS | Performed by: NURSE PRACTITIONER

## 2022-10-04 RX ORDER — SODIUM PHOSPHATE, DIBASIC AND SODIUM PHOSPHATE, MONOBASIC 7; 19 G/133ML; G/133ML
1 ENEMA RECTAL ONCE
Status: COMPLETED | OUTPATIENT
Start: 2022-10-04 | End: 2022-10-04

## 2022-10-04 RX ORDER — INSULIN GLARGINE-YFGN 100 [IU]/ML
32 INJECTION, SOLUTION SUBCUTANEOUS EVERY MORNING
Status: DISCONTINUED | OUTPATIENT
Start: 2022-10-05 | End: 2022-10-16

## 2022-10-04 RX ADMIN — METOPROLOL SUCCINATE 25 MG: 25 TABLET, EXTENDED RELEASE ORAL at 10:06

## 2022-10-04 RX ADMIN — GABAPENTIN 600 MG: 600 TABLET, FILM COATED ORAL at 12:42

## 2022-10-04 RX ADMIN — CARBIDOPA AND LEVODOPA 1 TABLET: 25; 100 TABLET ORAL at 10:06

## 2022-10-04 RX ADMIN — GABAPENTIN 600 MG: 600 TABLET, FILM COATED ORAL at 10:00

## 2022-10-04 RX ADMIN — PIPERACILLIN AND TAZOBACTAM 3375 MG: 3; .375 INJECTION, POWDER, FOR SOLUTION INTRAVENOUS at 22:43

## 2022-10-04 RX ADMIN — IPRATROPIUM BROMIDE AND ALBUTEROL SULFATE 1 AMPULE: .5; 2.5 SOLUTION RESPIRATORY (INHALATION) at 16:07

## 2022-10-04 RX ADMIN — FENTANYL CITRATE 50 MCG: 50 INJECTION, SOLUTION INTRAMUSCULAR; INTRAVENOUS at 06:37

## 2022-10-04 RX ADMIN — EZETIMIBE 10 MG: 10 TABLET ORAL at 10:00

## 2022-10-04 RX ADMIN — BENZONATATE 100 MG: 100 CAPSULE ORAL at 10:01

## 2022-10-04 RX ADMIN — INSULIN LISPRO 4 UNITS: 100 INJECTION, SOLUTION INTRAVENOUS; SUBCUTANEOUS at 10:40

## 2022-10-04 RX ADMIN — INSULIN LISPRO 8 UNITS: 100 INJECTION, SOLUTION INTRAVENOUS; SUBCUTANEOUS at 22:20

## 2022-10-04 RX ADMIN — INSULIN LISPRO 4 UNITS: 100 INJECTION, SOLUTION INTRAVENOUS; SUBCUTANEOUS at 12:39

## 2022-10-04 RX ADMIN — SODIUM CHLORIDE, PRESERVATIVE FREE 40 MG: 5 INJECTION INTRAVENOUS at 04:10

## 2022-10-04 RX ADMIN — LINEZOLID 600 MG: 600 TABLET, FILM COATED ORAL at 10:03

## 2022-10-04 RX ADMIN — SODIUM PHOSPHATE 1 ENEMA: 7; 19 ENEMA RECTAL at 22:25

## 2022-10-04 RX ADMIN — FENTANYL CITRATE 50 MCG: 50 INJECTION, SOLUTION INTRAMUSCULAR; INTRAVENOUS at 12:41

## 2022-10-04 RX ADMIN — DEXTROSE MONOHYDRATE: 50 INJECTION, SOLUTION INTRAVENOUS at 04:08

## 2022-10-04 RX ADMIN — PREDNISONE 40 MG: 20 TABLET ORAL at 10:08

## 2022-10-04 RX ADMIN — FENTANYL CITRATE 50 MCG: 50 INJECTION, SOLUTION INTRAMUSCULAR; INTRAVENOUS at 16:45

## 2022-10-04 RX ADMIN — HYDRALAZINE HYDROCHLORIDE 50 MG: 50 TABLET, FILM COATED ORAL at 05:52

## 2022-10-04 RX ADMIN — PIPERACILLIN AND TAZOBACTAM 3375 MG: 3; .375 INJECTION, POWDER, FOR SOLUTION INTRAVENOUS at 15:51

## 2022-10-04 RX ADMIN — CLONIDINE HYDROCHLORIDE 0.1 MG: 0.1 TABLET ORAL at 10:09

## 2022-10-04 RX ADMIN — LACTULOSE 20 G: 20 SOLUTION ORAL at 10:05

## 2022-10-04 RX ADMIN — DEXTROSE MONOHYDRATE: 50 INJECTION, SOLUTION INTRAVENOUS at 22:34

## 2022-10-04 RX ADMIN — INSULIN GLARGINE-YFGN 30 UNITS: 100 INJECTION, SOLUTION SUBCUTANEOUS at 10:25

## 2022-10-04 RX ADMIN — TICAGRELOR 90 MG: 90 TABLET ORAL at 10:10

## 2022-10-04 RX ADMIN — HYDROCODONE BITARTRATE AND ACETAMINOPHEN 1 TABLET: 7.5; 325 TABLET ORAL at 10:00

## 2022-10-04 RX ADMIN — CETIRIZINE HYDROCHLORIDE 10 MG: 10 TABLET, FILM COATED ORAL at 10:08

## 2022-10-04 RX ADMIN — IPRATROPIUM BROMIDE AND ALBUTEROL SULFATE 1 AMPULE: .5; 2.5 SOLUTION RESPIRATORY (INHALATION) at 12:08

## 2022-10-04 RX ADMIN — ASPIRIN 81 MG: 81 TABLET, COATED ORAL at 10:07

## 2022-10-04 RX ADMIN — SODIUM CHLORIDE, PRESERVATIVE FREE 40 MG: 5 INJECTION INTRAVENOUS at 16:12

## 2022-10-04 RX ADMIN — AMLODIPINE BESYLATE 10 MG: 10 TABLET ORAL at 10:08

## 2022-10-04 RX ADMIN — IPRATROPIUM BROMIDE AND ALBUTEROL SULFATE 1 AMPULE: .5; 2.5 SOLUTION RESPIRATORY (INHALATION) at 19:58

## 2022-10-04 RX ADMIN — PIPERACILLIN AND TAZOBACTAM 3375 MG: 3; .375 INJECTION, POWDER, FOR SOLUTION INTRAVENOUS at 06:47

## 2022-10-04 RX ADMIN — IPRATROPIUM BROMIDE AND ALBUTEROL SULFATE 1 AMPULE: .5; 2.5 SOLUTION RESPIRATORY (INHALATION) at 08:00

## 2022-10-04 ASSESSMENT — PAIN SCALES - PAIN ASSESSMENT IN ADVANCED DEMENTIA (PAINAD)
NEGVOCALIZATION: 0
NEGVOCALIZATION: 0
CONSOLABILITY: 1
BREATHING: 0
TOTALSCORE: 1
NEGVOCALIZATION: 0
BODYLANGUAGE: 0
NEGVOCALIZATION: 0
TOTALSCORE: 1
BODYLANGUAGE: 0
CONSOLABILITY: 1
TOTALSCORE: 1
CONSOLABILITY: 1
FACIALEXPRESSION: 0
TOTALSCORE: 1
BREATHING: 0
CONSOLABILITY: 1
BODYLANGUAGE: 0
NEGVOCALIZATION: 0
FACIALEXPRESSION: 0
BREATHING: 0
TOTALSCORE: 1
CONSOLABILITY: 1
BODYLANGUAGE: 0
BREATHING: 0
TOTALSCORE: 1
CONSOLABILITY: 1
FACIALEXPRESSION: 0
BREATHING: 0
CONSOLABILITY: 1
NEGVOCALIZATION: 0
TOTALSCORE: 1
BREATHING: 0
FACIALEXPRESSION: 0
FACIALEXPRESSION: 0
BODYLANGUAGE: 0
NEGVOCALIZATION: 0
FACIALEXPRESSION: 0
FACIALEXPRESSION: 0
BREATHING: 0
FACIALEXPRESSION: 0
NEGVOCALIZATION: 0
BODYLANGUAGE: 0
BODYLANGUAGE: 0
FACIALEXPRESSION: 0
NEGVOCALIZATION: 0
NEGVOCALIZATION: 0
BODYLANGUAGE: 0
CONSOLABILITY: 1
BREATHING: 0
FACIALEXPRESSION: 0
CONSOLABILITY: 1
BODYLANGUAGE: 0
CONSOLABILITY: 1
BODYLANGUAGE: 0
TOTALSCORE: 1

## 2022-10-04 ASSESSMENT — PAIN DESCRIPTION - FREQUENCY
FREQUENCY: CONTINUOUS

## 2022-10-04 ASSESSMENT — PAIN DESCRIPTION - ORIENTATION
ORIENTATION: RIGHT
ORIENTATION: RIGHT;LEFT
ORIENTATION: RIGHT

## 2022-10-04 ASSESSMENT — PAIN SCALES - GENERAL
PAINLEVEL_OUTOF10: 6
PAINLEVEL_OUTOF10: 9
PAINLEVEL_OUTOF10: 8
PAINLEVEL_OUTOF10: 5
PAINLEVEL_OUTOF10: 0

## 2022-10-04 ASSESSMENT — PAIN DESCRIPTION - DESCRIPTORS
DESCRIPTORS: PATIENT UNABLE TO DESCRIBE

## 2022-10-04 ASSESSMENT — PAIN - FUNCTIONAL ASSESSMENT
PAIN_FUNCTIONAL_ASSESSMENT: ACTIVITIES ARE NOT PREVENTED

## 2022-10-04 ASSESSMENT — PAIN DESCRIPTION - LOCATION
LOCATION: FOOT

## 2022-10-04 ASSESSMENT — PAIN DESCRIPTION - PAIN TYPE
TYPE: ACUTE PAIN;CHRONIC PAIN

## 2022-10-04 ASSESSMENT — PAIN DESCRIPTION - ONSET
ONSET: ON-GOING

## 2022-10-04 NOTE — PROGRESS NOTES
Department of Internal Medicine  Infectious Diseases   Progress Note      C/C :  Right foot  wound infection     Denies fever or chills  Reports pain   Afebrile      Current Facility-Administered Medications   Medication Dose Route Frequency Provider Last Rate Last Admin    0.9 % sodium chloride infusion   IntraVENous PRN Vicky Simon DO        dextrose 5 % solution   IntraVENous Continuous Ashley Haskins  mL/hr at 10/04/22 0408 New Bag at 10/04/22 0408    0.9 % sodium chloride infusion   IntraVENous PRN Vicky Simon DO        epoetin sharath-epbx (RETACRIT) injection 6,000 Units  6,000 Units SubCUTAneous Once per day on Mon Wed Fri Zelalem Alonso MD   6,000 Units at 10/02/22 1716    amLODIPine (NORVASC) tablet 10 mg  10 mg Per NG tube Daily Arminlorelei Anderson, DO   10 mg at 10/04/22 1008    piperacillin-tazobactam (ZOSYN) 3,375 mg in sodium chloride 0.9 % 50 mL IVPB ([id number])  3,375 mg IntraVENous Q8H Brian Youssef MD   Stopped at 10/04/22 1053    linezolid (ZYVOX) tablet 600 mg  600 mg Oral 2 times per day Elian Sim MD   600 mg at 10/04/22 1003    insulin glargine-yfgn (SEMGLEE-YFGN) injection vial 30 Units  0.25 Units/kg SubCUTAneous QAM [facility] DWAYNE Anderson DO   30 Units at 10/04/22 1025    cloNIDine (CATAPRES) tablet 0.1 mg  0.1 mg Oral Q12H [facility] DWAYNE Anderson DO   0.1 mg at 10/04/22 1009    hydrALAZINE (APRESOLINE) tablet 50 mg  50 mg Oral 3 times per day Vicky Simon DO   50 mg at 10/04/22 0552    fentaNYL (SUBLIMAZE) injection 50 mcg  50 mcg IntraVENous Q2H PRN Willie Madera DO   50 mcg at 10/04/22 0637    0.9 % sodium chloride infusion   IntraVENous PRN Divya Larsen APRN - CNP        lactulose (CHRONULAC) 10 GM/15ML solution 20 g  20 g Oral BID Divya Larsen APRN - CNP   20 g at 10/04/22 1005    insulin lispro (HUMALOG) injection vial 0-16 Units  0-16 Units SubCUTAneous 4x Daily AC & HS CONSTANTINE Bowen - CNP   4 Units at 10/04/22 1041 HYDROcodone-acetaminophen (NORCO) 7.5-325 MG per tablet 1 tablet  1 tablet Oral BID Tita Locks, APRN - CNP   1 tablet at 10/04/22 1000    0.9 % sodium chloride infusion   IntraVENous PRN Tita Locks, APRN - CNP        pantoprazole (PROTONIX) 40 mg in sodium chloride (PF) 10 mL injection  40 mg IntraVENous Q12H Tita Locks, APRN - CNP   40 mg at 10/04/22 0410    metoprolol succinate (TOPROL XL) extended release tablet 25 mg  25 mg Oral Daily Giorgio Thompson MD   25 mg at 10/04/22 1006    0.9 % sodium chloride infusion   IntraVENous PRN Marliner Gabo Sanchez MD        sennosides (SENOKOT) 8.8 MG/5ML syrup 5 mL  5 mL Oral Nightly Danny Ni, DO   5 mL at 10/03/22 2027    labetalol (NORMODYNE;TRANDATE) injection 10 mg  10 mg IntraVENous Q30 Min PRN Jm Levin MD   10 mg at 10/01/22 1115    hydrALAZINE (APRESOLINE) injection 10 mg  10 mg IntraVENous Q30 Min PRN Jm Levin MD   10 mg at 09/30/22 1627    glucose chewable tablet 16 g  4 tablet Oral PRN Lavone Pontiff, APRN - CNP        dextrose bolus 10% 125 mL  125 mL IntraVENous PRN Lavone Pontiff, APRN - CNP        Or    dextrose bolus 10% 250 mL  250 mL IntraVENous PRN Lavone Pontiff, APRN - CNP        glucagon (rDNA) injection 1 mg  1 mg SubCUTAneous PRN Lavone Pontiff, APRN - CNP        dextrose 10 % infusion   IntraVENous Continuous PRN Lavone Pontiff, APRN - CNP        carbidopa-levodopa (SINEMET)  MG per tablet 1 tablet  1 tablet Per NG tube TID Lavone Pontiff, APRN - CNP   1 tablet at 10/04/22 1006    ipratropium-albuterol (DUONEB) nebulizer solution 1 ampule  1 ampule Inhalation Q4H [address], APRN - CNP   1 ampule at 10/04/22 0800    collagenase ointment   Topical Q MWF Shola Loredo DPM   Given at 10/03/22 1507    benzonatate (TESSALON) capsule 100 mg  100 mg Oral TID Jm Levin MD   100 mg at 10/04/22 1001    cetirizine (ZYRTEC) tablet 10 mg  10 mg Oral Daily Jm Levin MD   10 mg at 10/04/22 1008    ezetimibe (ZETIA) tablet 10 mg  10 mg Oral Daily Parish Pollock MD   10 mg at 10/04/22 1000    gabapentin (NEURONTIN) tablet 600 mg  600 mg Oral 4x Daily Parish Pollock MD   600 mg at 10/04/22 1000    ticagrelor (BRILINTA) tablet 90 mg  90 mg Oral BID Parish Pollock MD   90 mg at 10/04/22 1010    ondansetron (ZOFRAN-ODT) disintegrating tablet 4 mg  4 mg Oral Q8H PRN Parish Pollock MD        Or    [facility]ron [facility]) injection 4 mg  4 mg IntraVENous Q6H PRN Parish Pollock MD        aspirin EC tablet 81 mg  81 mg Oral Daily Parihs Pollock MD   81 mg at 10/04/22 1007    Or    aspirin suppository 300 mg  300 mg Rectal Daily Parish Pollock MD   300 mg at 09/29/22 0809    atorvastatin (LIPITOR) tablet 80 mg  80 mg Oral Nightly Parish Pollock MD   80 mg at 10/03/22 2028           REVIEW OF SYSTEMS:   CONSTITUTIONAL:  Denies fever, chill or rigors. HEENT: denies blurring of vision or double vision, denies hearing problem  RESPIRATORY: denies cough, shortness of breath,  CARDIOVASCULAR:  Denies palpitation  GASTROINTESTINAL:  Denies abdomen pain, diarrhea or constipation. GENITOURINARY:  Denies burning urination or frequency of urination  INTEGUMENT: Right heel wound  HEMATOLOGIC/LYMPHATIC:  Denies lymph node swelling, gum bleeding or easy bruising. MUSCULOSKELETAL:  Denies leg pain , joint pain , joint swelling  NEUROLOGICAL:  weakness right side . PHYSICAL EXAM:      Vitals:     BP (!) 138/102   Pulse 69   Temp 97.7 °F (36.5 °C) (Oral)   Resp 23   Ht 5' 8\" (1.727 m)   Wt 260 lb 8 oz (118.2 kg)   SpO2 98%   BMI 39.61 kg/m²     General Appearance:    Awake, alert , no acute distress. Head:    Normocephalic, atraumatic   Eyes:    No pallor, no icterus,   Ears:    No obvious deformity or drainage.    Nose:   No nasal drainage   Throat:   Mucosa moist, no oral thrush   Neck:   Supple, no lymphadenopathy   Back:     no CVA tenderness   Lungs:     Clear to auscultation bilaterally, no wheeze    Heart:    Regular rate and TRICHOMONAS, YEAST, BACTERIA, CLARITYU, SPECGRAV, LEUKOCYTESUR, UROBILINOGEN, BILIRUBINUR, BLOODU, GLUCOSEU, AMORPHOUS    ABG:  No results found for: RHS3FVC, BEART, H6KSCUWT, PHART, THGBART, ERX9BEZ, PO2ART, ZGT3KZK    MICROBIOLOGY:    SARS CoV 2 neg     CRP 0.8      Radiology :    Chest X ray : LLL infiltrates, atelectasis       X ray foot :           No evidence of calcaneal osteomyelitis. IMPRESSION:     Right foot  non healing wound  Leukocytosis   ?  Aspiration       RECOMMENDATIONS:      IV zosyn 3.375 grams IV q 8 hrs ( no IV abx at discharge )   Zyvox 600 mg po q 12 hs   Local wound care   CBC with diff

## 2022-10-04 NOTE — PROGRESS NOTES
Hospitalist Progress Note      SYNOPSIS: Patient admitted on 2022 for Acute cerebrovascular accident (CVA) [facility])  Patient presented to [facility] with strokelike symptoms, right-sided paralysis and aphasia. Was found to have severe left ICA stenosis and taken to IR for left ICA angioplasty with stent placement. Patient was intubated and sedated and transferred to the ICU. SUBJECTIVE:  Patient's nurse reported to me that patient had episodes of hallucination overnight  He thought he saw a person in his room that was exposed to be there  He thought it was raining outside    Hemoglobin this morning is 7  Serum creatinine 2.6    Patient states that he apparently had an EGD and colonoscopy 2 months ago at [facility]  Does not recall the details of the results  Patient denies prior history GI or bowel surgeries  Patient denies history of previous aortic surgeries  He does not recall when his last bowel movement occurred  He did not melena prior to admission and has not seen any blood in his stools  Denies abdominal pain  He is not sure if he experienced any recent change in his bowel habits      Chronic medical problem list  Type 2 diabetes  Chronic kidney disease  Obstructive sleep apnea  Parkinson's disease  R foot wound infxn/non healing    Temp (24hrs), Av.5 °F (36.4 °C), Min:97.1 °F (36.2 °C), Max:97.7 °F (36.5 °C)    DIET: ADULT DIET; Dysphagia - Minced and Moist; 3 carb choices (45 gm/meal); no rice, loves pudding  ADULT ORAL NUTRITION SUPPLEMENT; Breakfast, Dinner; Diabetic Oral Supplement  ADULT ORAL NUTRITION SUPPLEMENT; Lunch;  Fortified Pudding Oral Supplement  CODE: Full Code    OBJECTIVE:room air    BP (!) 138/102   Pulse 69   Temp 97.7 °F (36.5 °C) (Oral)   Resp 23   Ht 5' 8\" (1.727 m)   Wt 260 lb 8 oz (118.2 kg)   SpO2 98%   BMI 39.61 kg/m²     General appearance: Not jaundiced not diaphoretic    HEENT: No obvious swelling to lips or tongue  Respiratory: Clear to auscultation bilaterally, unlabored respiratory pattern at rest  Cardiovascular: Audible S1-S2 no lower extremity edema    Musculoskeletal:   Skin: No petechiae or hives on inspection warm to palpation  Neurologic: awake, alert speech is clear  ASSESSMENT:  Acute CVA/LMCA   Status post left ICA angioplasty  Hypertensive emergency  presenting  with acute CVA/blood pressure in better range    Hallucination-this may be in part triggered by underlying metabolic disorders    Anemia/ kirill   R foot wound infxn  Acute blood loss anemia  hgb   on 09                 Anemia  nc    Status post EGD 10/03-no evidence of upper GI bleed  Kirill  Cxd stage III baseline ser cr 1.7-1.9  Elevated troponin  Azotemia in setting of acute blood loss anemia    Elevated troponin, troponin leak in the setting of hypoxic respiratory failure, severe anemia, strokelike symptoms.     Etiology of troponin leak due to significant anemia   Echo shows normal systolic function  Hyperglycemia-glu 241         Hypoglycemia may be triggered by inflammatory cytokines          And altered binding proteins  Urine retention gross hematuria  Hyperkalemia resolved  Leukocytosis without evidence of bands or blasts on differential percentage      Neutrophil predominant/lymphocyte percentage low/no eosinophilia/no basophilia  PLAN:    For acute CVA plan as per neuro/neuro signed off 929        Continue ASA and Brilinta   Statin therapy with goal LDL < 70  Will need stroke clinic f/u     For hypertension     Continue antihypertensives    For acute anemia /with nl findings on egd /anemia which required blood transfusion     Continue Protonix     Monitor hemoglobin   Track down records from [facility] with regards to recent egd//colonoscopy     Unclear if push enteroscopy was done  For acute kidney injury      Nephrology team following       I would recommend to avoid nephrotoxic agents/avoid use of NSAIDs       Nephrology recommends start ASHLEY        Transfuse for hemoglobin less than 7    For urine retention      Continue Mahan catheter    For right foot ulcer     Infectious disease recommends      IV zosyn 3.375 grams IV q 8 hrs ( no IV abx at discharge )   Zyvox 600 mg po q 12 hs   Local wound care   And to follow-up CBC with differential      For foot wound     Santyl to right foot wound MWF    Continue Wound vac with santyl right foot. MWF  For elevated troponin  Cardiology recommended/Continue on statin and and beta-blocker   Obtain outpatient cardiology records to guide therapy on 09/30     For hyperglycemia  Increased dose of glargine from 30 units to 32 units     Then reevaluate  DISPOSITION:     Medications:  REVIEWED DAILY    Infusion Medications    sodium chloride      dextrose 100 mL/hr at 10/04/22 0408    sodium chloride      sodium chloride      sodium chloride      sodium chloride      dextrose       Scheduled Medications    epoetin sharath-epbx  6,000 Units SubCUTAneous Once per day on Mon Wed Fri    amLODIPine  10 mg Per NG tube Daily    piperacillin-tazobactam  3,375 mg IntraVENous Q8H    linezolid  600 mg Oral 2 times per day    insulin glargine  0.25 Units/kg SubCUTAneous QAM    cloNIDine  0.1 mg Oral Q12H    hydrALAZINE  50 mg Oral 3 times per day    lactulose  20 g Oral BID    insulin lispro  0-16 Units SubCUTAneous 4x Daily AC & HS    HYDROcodone-acetaminophen  1 tablet Oral BID    pantoprazole (PROTONIX) 40 mg injection  40 mg IntraVENous Q12H    metoprolol succinate  25 mg Oral Daily    sennosides  5 mL Oral Nightly    carbidopa-levodopa  1 tablet Per NG tube TID    ipratropium-albuterol  1 ampule Inhalation Q4H WA    collagenase   Topical Q MWF    benzonatate  100 mg Oral TID    cetirizine  10 mg Oral Daily    ezetimibe  10 mg Oral Daily    gabapentin  600 mg Oral 4x Daily    ticagrelor  90 mg Oral BID    aspirin  81 mg Oral Daily    Or    aspirin  300 mg Rectal Daily    atorvastatin  80 mg Oral Nightly     PRN Meds: sodium chloride, sodium chloride, fentanNYL, sodium chloride, sodium chloride, sodium chloride, labetalol, hydrALAZINE, glucose, dextrose bolus **OR** dextrose bolus, glucagon (rDNA), dextrose, ondansetron **OR** ondansetron    Labs:     Recent Labs     10/02/22  0618 10/02/22  1349 10/03/22  0459 10/03/22  2030 10/04/22  0530   WBC 15.9*  --  16.0*  --  14.2*   HGB 6.5*   < > 6.6* 7.3* 7.0*   HCT 20.0*   < > 21.4* 22.7* 22.2*     --  257  --  231    < > = values in this interval not displayed. Recent Labs     10/02/22  0618 10/02/22  1651 10/03/22  0459 10/04/22  0530   * 146 151* 145   K 5.4* 5.1* 4.9 5.0   * 116* 119* 114*   CO2 22 20* 19* 20*   * 118* 128* 115*   CREATININE 2.9* 2.7* 3.0* 2.6*   CALCIUM 9.0 8.6 8.6 8.3*   PHOS 4.3  --  4.2 4.5       Recent Labs     10/02/22  0618 10/03/22  0459 10/04/22  0530   PROT 5.7* 5.4* 5.2*   ALKPHOS 291* 378* 290*   ALT 10 15 12   AST 42* 68* 27   BILITOT 0.2 0.3 0.3             Radiology:     +++++++++++++++++++++++++++++++++++++++++++++++++  DO Emil Acosta Physician - [facility], [place]  +++++++++++++++++++++++++++++++++++++++++++++++++  NOTE: This report was transcribed using voice recognition software. Every effort was made to ensure accuracy; however, inadvertent computerized transcription errors may be present.

## 2022-10-04 NOTE — PROGRESS NOTES
Speech Language Pathology      NAME:  Elsie Hernandez  :  1955  DATE: 10/4/2022  ROOM:  [address] [place] [address]L    Spoke with RN, concerns reported for current diet of Minced and moist consistency solids (IDDSI level 5) -- coughing reported. Per family friend, pt has dysphagia at baseline that comes and goes. Pt provided with pureed solids and thin liquid via straw. Cough/gag with puree x1 only; which Pt related to him being concerned there was medication in (edu'd Pt that SLP cannot administer medications) with no other diffiuclty. Did require ongoing cues to reduce bolus size. Cough x1 following consecutive straw sips; edu'd Pt to take single sips with adequate implementation following and no further overt s/s of pen/asp. Reviewed Dr. Suellen Engle note-- LLL infiltrates on recent CXR and questioning aspiration. Consider MBSS to further assess. Cog-ling therapy provided. Orientation provided, training on external memory aides for increased orientation time. Poor use of aide s/p 5 minutes during recall task. Training again provided. Confusion noted during conversation. Emotionally labile.        Acute cerebrovascular accident (CVA) ([facility] [address] [I63.9]    [address]  [place]ysphagia tx  [address]  therapeutic interventions that focus on cognitive function , initial  15 min    Anjali Valdes [id number]  10/4/2022

## 2022-10-04 NOTE — ANESTHESIA POSTPROCEDURE EVALUATION
Department of Anesthesiology  Postprocedure Note    Patient: Herman Fritz  MRN: [id number]  YOB: 1955  Date of evaluation: 10/3/2022      Procedure Summary     Date: 10/03/22 Room / Location: [address] [place] [address] / [facility]    Anesthesia Start: 0026 Anesthesia Stop: 1110    Procedure: EGD DIAGNOSTIC ONLY Diagnosis:       Anemia, unspecified type      (Anemia, unspecified type [D64.9])    Surgeons: Jan Ibrahim MD Responsible Provider: Jazmin West DO    Anesthesia Type: MAC ASA Status: 4          Anesthesia Type: No value filed.     Amy Phase I: Amy Score: 9    Amy Phase II: Amy Score: 9      Anesthesia Post Evaluation    Patient location during evaluation: PACU  Patient participation: complete - patient participated  Level of consciousness: awake and alert  Pain score: 1  Airway patency: patent  Nausea & Vomiting: no nausea and no vomiting  Complications: no  Cardiovascular status: hemodynamically stable  Respiratory status: acceptable  Hydration status: euvolemic

## 2022-10-04 NOTE — PROGRESS NOTES
Department of Podiatry   Progress Note      Patient seen at bedside this morning. No acute events,  wound vac intact to right foot. Wound vac intact running continuous pressure. Wound cx pending. No new pedal complaints. Past Medical History:            Diagnosis Date    Chronic back pain     CKD (chronic kidney disease)     CVA (cerebral vascular accident) ([facility] [address]     [facility]     DM (diabetes mellitus) ([facility]     Major depression     MI (myocardial infarction) ([facility]     MATT (obstructive sleep apnea)     Parkinson disease (HCC)     PVD (peripheral vascular disease) ([facility]     Seizure ([facility]        Past Surgical History:        Procedure Laterality Date    FEMORAL ARTERY STENT      IR CAROTID STENT UNI W PROTECTION  9/27/2022    IR CAROTID STENT UNI W PROTECTION 9/27/2022 Reuben Seymour MD [facility] PROCEDURES       Medications Prior to Admission:    Medications Prior to Admission: apixaban (ELIQUIS) 5 MG TABS tablet, Take 5 mg by mouth 2 times daily  aspirin 81 MG chewable tablet, Take 81 mg by mouth daily  carbidopa-levodopa (SINEMET)  MG per tablet, Take 1 tablet by mouth 3 times daily  carvedilol (COREG) 25 MG tablet, Take 25 mg by mouth 2 times daily (with meals)  cetirizine (ZYRTEC) 10 MG tablet, Take 10 mg by mouth daily  clopidogrel (PLAVIX) 75 MG tablet, Take 75 mg by mouth daily  diphenhydrAMINE (BENADRYL) 25 MG capsule, Take 25 mg by mouth every 6 hours  ezetimibe (ZETIA) 10 MG tablet, Take 10 mg by mouth daily  fluticasone (FLONASE) 50 MCG/ACT nasal spray, 2 sprays by Each Nostril route daily  gabapentin (NEURONTIN) 600 MG tablet, Take 600 mg by mouth 4 times daily. HYDROcodone-acetaminophen (NORCO) 5-325 MG per tablet, Take 1 tablet by mouth 2 times daily.   Insulin Glargine, 2 Unit Dial, (TOUJEO MAX SOLOSTAR) 300 UNIT/ML SOPN, Inject 66 Units into the skin daily  piperacillin-tazobactam (ZOSYN) 3-0.375 GM per 50ML IVPB extended infusion, Infuse 4.5 mg intravenously in the morning and 4.5 mg at noon and 4.5 mg in the evening. acetaminophen (TYLENOL) 325 MG tablet, Take 650 mg by mouth every 6 hours as needed for Pain  amLODIPine (NORVASC) 5 MG tablet, Take 5 mg by mouth daily  benzonatate (TESSALON) 100 MG capsule, Take 100 mg by mouth 3 times daily  cloNIDine (CATAPRES) 0.1 MG tablet, Take 0.1 mg by mouth in the morning and 0.1 mg in the evening. hydrALAZINE (APRESOLINE) 100 MG tablet, Take 100 mg by mouth 3 times daily    Allergies:  Codeine    Social History:   TOBACCO:   has no history on file for tobacco use. ETOH:   has no history on file for alcohol use. DRUGS:   Social History     Substance and Sexual Activity   Drug Use Not on file       Family History:   History reviewed. No pertinent family history. REVIEW OF SYSTEMS:    All pertinent positives and negatives as noted in HPI       LOWER EXTREMITY EXAMINATION   Wound vac intact running continuous pressure    Previous Exam:  VASCULAR:  DP and PT pulses are non palpable. CFT < 5 seconds B/L. Warm to warm from the tibial tuberosity to the distal aspect of the digits dorsally. NEUROLOGIC:  Protective sensation is diminished by grossly intact    DERM:  Right foot lateral plantar wound measuring approx 6cm in diameter. No erythema, serosanguinous drainage, no malodor.     MUSCULOSKELETAL: deferred           CONSULTS:  IP CONSULT TO CRITICAL CARE  IP CONSULT TO DIETITIAN  IP CONSULT TO CARDIOLOGY  IP CONSULT TO PODIATRY  IP CONSULT TO PODIATRY  IP CONSULT TO UROLOGY  IP CONSULT TO NEPHROLOGY  IP CONSULT TO GENERAL SURGERY  IP CONSULT TO INFECTIOUS DISEASES    MEDICATION:  Scheduled Meds:   epoetin sharath-epbx  6,000 Units SubCUTAneous Once per day on Mon Wed Fri    amLODIPine  10 mg Per NG tube Daily    piperacillin-tazobactam  3,375 mg IntraVENous Q8H    linezolid  600 mg Oral 2 times per day    insulin glargine  0.25 Units/kg SubCUTAneous QAM    cloNIDine  0.1 mg Oral Q12H    hydrALAZINE  50 mg Oral 3 times per day    lactulose  20 g Oral BID    insulin lispro  0-16 Units SubCUTAneous 4x Daily AC & HS    HYDROcodone-acetaminophen  1 tablet Oral BID    pantoprazole (PROTONIX) 40 mg injection  40 mg IntraVENous Q12H    metoprolol succinate  25 mg Oral Daily    sennosides  5 mL Oral Nightly    carbidopa-levodopa  1 tablet Per NG tube TID    ipratropium-albuterol  1 ampule Inhalation Q4H WA    collagenase   Topical Q MWF    benzonatate  100 mg Oral TID    cetirizine  10 mg Oral Daily    ezetimibe  10 mg Oral Daily    gabapentin  600 mg Oral 4x Daily    ticagrelor  90 mg Oral BID    aspirin  81 mg Oral Daily    Or    aspirin  300 mg Rectal Daily    atorvastatin  80 mg Oral Nightly     Continuous Infusions:   sodium chloride      dextrose 100 mL/hr at 10/04/22 0408    sodium chloride      sodium chloride      sodium chloride      sodium chloride      dextrose       PRN Meds:.sodium chloride, sodium chloride, fentanNYL, sodium chloride, sodium chloride, sodium chloride, labetalol, hydrALAZINE, glucose, dextrose bolus **OR** dextrose bolus, glucagon (rDNA), dextrose, ondansetron **OR** ondansetron    RADIOLOGY:  XR FOOT RIGHT (2 VIEWS)   Final Result   No evidence of calcaneal osteomyelitis. CT HEAD WO CONTRAST   Final Result   Parenchymal volume loss and chronic microvascular ischemic changes. No acute intracranial abnormality. Ethmoid and sphenoid sinusitis. Right mastoid effusion. US RETROPERITONEAL COMPLETE   Final Result   1. Normal appearance of the bilateral kidneys. No hydronephrosis. 2.  A Mahan catheter is decompressing the bladder. XR CHEST PORTABLE   Final Result   Worsening infiltrate and or atelectasis on the left, stable on the right with   suspected layering pleural effusion. XR CHEST PORTABLE   Final Result   Unchanged bilateral atelectasis and or infiltrate as well as small right   pleural effusion.          MRI BRAIN WO CONTRAST   Final Result There are 2 small regions of petechial infarcts one in the left   posterior frontal lobe and a second in the left parietal lobe not   exceeding 3 mm. There is otherwise extensive small vessel ischemic   changes         XR CHEST PORTABLE   Final Result   1. Multifocal bilateral airspace disease more prominent within the lower   lobes. The airspace disease is unchanged when compared with the prior study. CT HEAD WO CONTRAST   Final Result   Diffuse atrophy likely age related   Findings compatible with small vessel ischemic changes. XR CHEST PORTABLE   Final Result   1. Pulmonary opacities present favoring edema and atelectasis, also small   pleural effusions, but nonspecific with some additional considerations noted   above   2. Support devices present as described above   3. Heart size appears borderline enlarged         XR ABDOMEN FOR NG/OG/NE TUBE PLACEMENT   Final Result   NG/OG is in the stomach. IR CAROTID STENT W PROTECTION   Final Result   1. Angiogram demonstrates successful placement of a carotid stent ,   post procedure images demonstrate a widely patent stent and internal   carotid         CT HEAD WO CONTRAST   Final Result   Diffuse atrophy likely age related   Findings compatible with small vessel ischemic changes. Findings were called at the time of dictation         CT BRAIN PERFUSION   Final Result      No significant ischemic penumbra identified      This study was analyzed by the [employer] ai algorithm. CTA NECK W CONTRAST   Final Result   1. Estimated stenosis of the proximal right and left internal carotid   artery by NASCET criteria is greater than 90% on the left   2. Severe atherosclerotic disease . 3. No large vessel occlusion identified            This study was analyzed by the [employer] ai algorithm. CTA HEAD W CONTRAST   Final Result   1.  Estimated stenosis of the proximal right and left internal carotid   artery by NASCET criteria is greater than 90% on the left   2. Severe atherosclerotic disease . 3. No large vessel occlusion identified            This study was analyzed by the [employer] algorithm. Vitals:    BP (!) 138/102   Pulse 69   Temp 97.7 °F (36.5 °C) (Oral)   Resp 23   Ht 5' 8\" (1.727 m)   Wt 260 lb 8 oz (118.2 kg)   SpO2 98%   BMI 39.61 kg/m²     LABS:   Recent Labs     10/03/22  0459 10/03/22  2030 10/04/22  0530   WBC 16.0*  --  14.2*   HGB 6.6* 7.3* 7.0*   HCT 21.4* 22.7* 22.2*     --  231     Recent Labs     10/04/22  0530      K 5.0   *   CO2 20*   PHOS 4.5   *   CREATININE 2.6*     Recent Labs     10/03/22  0459 10/04/22  0530   PROT 5.4* 5.2*       ASSESSMENTS:   1. Right foot wound diabetic ulcer  2. DM  3. Pain right foot  Acute cerebrovascular accident (CVA) ([facility] [address]            PLAN:    - Patient was examined and evaluated. -Reviewed patient's recent lab results, charts and pertinent diagnostic imaging. Reviewed ancillary service notes.  -Wound culture:pending  - Santyl to right foot wound MWF  - Continue Wound vac with santyl right foot. MWF. changed yesterday  - Will follow      Thank you for the opportunity to take part in the patient's care. Please do not hesitate to call for any questions or concerns.

## 2022-10-04 NOTE — PROGRESS NOTES
[facility]  Nephrology Attending Progress Note  Nasrin Lutz. MD Judith        SUBJECTIVE:     History of Present Ilness:    Noam Ferguson is a 74 y.o. male history of CAD s/p MI, hypertension, chronic kidney disease stage IIIa (baseline creatinine of recent 1.7-1.9) he is followed by our group with Dr. Martin Meek. He initially was admitted to [facility] with right lower extremity wound. .  Patient subsequently developed aphasia noted to facial droop associated with right-sided weakness. He was transferred to [address] [place] [address] for further management. Patient was admitted told the neuro ICU. He required intubation with mechanical ventilation. CT of the head and neck demonstrated bilateral carotid stenosis with the left ICA being dominant. IR performed angiogram with angioplasty of the left ICA. Laboratory data showed progressive increase in his serum creatinine to currently 2.9 mg/dL. Hemoglobin was noted to be 5.6. He has received at least 2 units of packed cells but with further drop in his hemoglobin with requirement for additional transfusion. He had an episode of urinary retention with gross hematuria Mahan catheter was reinserted and the hematuria subsequently cleared. Renal is consulted for LARY.      Subjective     10/1: he denies blurred vision, no headaches  10/2: No new acute issues from overnight; more alert; receiving PRBCs hemoglobin trending low  10/3: pt seen sp egd today, no cp or sob, family and nurse in room, no cp or sob  10/4: pt seen in room, no complaints         PROBLEM LIST:    Patient Active Problem List   Diagnosis    Acute cerebrovascular accident (CVA) ([address]    Acute respiratory failure with hypoxia ([address]    Stenosis of left carotid artery    Hypoalbuminemia    Electrolyte imbalance    Hypernatremia        PAST MEDICAL HISTORY:    Past Medical History:   Diagnosis Date    Chronic back pain     CKD (chronic kidney disease)     CVA (cerebral vascular accident) ([address]     CVA (cerebral vascular accident) ([place] [address].)     DM (diabetes mellitus) ([place] [address]     Major depression     MI (myocardial infarction) ([place] [address].)     MATT (obstructive sleep apnea)     Parkinson disease ([place] [address].)     PVD (peripheral vascular disease) ([place] [address]     Seizure ([place] [address].)        DIET:    ADULT DIET; Dysphagia - Minced and Moist; 3 carb choices (45 gm/meal); no rice, loves pudding  ADULT ORAL NUTRITION SUPPLEMENT; Breakfast, Dinner; Diabetic Oral Supplement  ADULT ORAL NUTRITION SUPPLEMENT; Lunch;  Fortified Pudding Oral Supplement     PHYSICAL EXAM:     Patient Vitals for the past 24 hrs:   BP Temp Temp src Pulse Resp SpO2   10/04/22 1009 (!) 138/102 -- -- -- -- --   10/04/22 1008 (!) 138/102 -- -- -- -- --   10/04/22 0801 -- -- -- 69 23 98 %   10/04/22 0600 -- -- -- 66 23 --   10/04/22 0400 95/78 97.7 °F (36.5 °C) Oral 69 21 99 %   10/04/22 0200 -- -- -- 63 14 --   10/04/22 0000 134/81 -- -- 72 21 98 %   10/03/22 2226 -- -- -- -- 27 --   10/03/22 2200 -- -- -- 73 17 --   10/03/22 2057 -- -- -- -- 22 --   10/03/22 2000 (!) 145/107 97.7 °F (36.5 °C) Oral 68 25 100 %   10/03/22 1900 (!) 118/93 -- -- 63 17 98 %   10/03/22 1800 116/69 97.7 °F (36.5 °C) -- 58 15 98 %   10/03/22 1700 (!) 141/70 -- -- 58 13 98 %   10/03/22 1600 123/60 97.5 °F (36.4 °C) -- 60 14 98 %   10/03/22 1500 -- -- -- 67 20 98 %   10/03/22 1400 (!) 155/89 97.6 °F (36.4 °C) -- 72 20 99 %   10/03/22 1300 (!) 140/102 -- -- 74 15 100 %   10/03/22 1205 -- -- -- 73 22 100 %   10/03/22 1200 (!) 150/68 97.1 °F (36.2 °C) -- 71 20 96 %   @      Intake/Output Summary (Last 24 hours) at 10/4/2022 1152  Last data filed at 10/4/2022 0600  Gross per 24 hour   Intake 1753.13 ml   Output 1522 ml   Net 231.13 ml         Wt Readings from Last 3 Encounters:   09/27/22 260 lb 8 oz (118.2 kg)       Constitutional:  Pt is in no acute distress  Head: normocephalic, atraumatic  Neck: no JVD  Cardiovascular: regular rate and rhythm, no murmurs, gallops, or rubs  Respiratory:  No rales, rhochi, or wheezes  Gastrointestinal:  Soft, nontender, nondistended, bowel sounds x 4  Ext: tr edema  Skin: dry, no rash  Neuro: aaox3    MEDS (scheduled):    epoetin shaarth-epbx  6,000 Units SubCUTAneous Once per day on Mon Wed Fri    amLODIPine  10 mg Per NG tube Daily    piperacillin-tazobactam  3,375 mg IntraVENous Q8H    linezolid  600 mg Oral 2 times per day    insulin glargine  0.25 Units/kg SubCUTAneous QAM    cloNIDine  0.1 mg Oral Q12H    hydrALAZINE  50 mg Oral 3 times per day    lactulose  20 g Oral BID    insulin lispro  0-16 Units SubCUTAneous 4x Daily AC & HS    HYDROcodone-acetaminophen  1 tablet Oral BID    pantoprazole (PROTONIX) 40 mg injection  40 mg IntraVENous Q12H    metoprolol succinate  25 mg Oral Daily    sennosides  5 mL Oral Nightly    carbidopa-levodopa  1 tablet Per NG tube TID    ipratropium-albuterol  1 ampule Inhalation Q4H WA    collagenase   Topical Q MWF    benzonatate  100 mg Oral TID    cetirizine  10 mg Oral Daily    ezetimibe  10 mg Oral Daily    gabapentin  600 mg Oral 4x Daily    ticagrelor  90 mg Oral BID    aspirin  81 mg Oral Daily    Or    aspirin  300 mg Rectal Daily    atorvastatin  80 mg Oral Nightly       MEDS (infusions):   sodium chloride      dextrose 100 mL/hr at 10/04/22 0408    sodium chloride      sodium chloride      sodium chloride      sodium chloride      dextrose         MEDS (prn):  sodium chloride, sodium chloride, fentanNYL, sodium chloride, sodium chloride, sodium chloride, labetalol, hydrALAZINE, glucose, dextrose bolus **OR** dextrose bolus, glucagon (rDNA), dextrose, ondansetron **OR** ondansetron    DATA:    Recent Labs     10/02/22  0618 10/02/22  1349 10/03/22  0459 10/03/22  2030 10/04/22  0530   WBC 15.9*  --  16.0*  --  14.2*   HGB 6.5*   < > 6.6* 7.3* 7.0*   HCT 20.0*   < > 21.4* 22.7* 22.2*   MCV 93.5  --  94.3  --  95.7     --  257  --  231    < > = values in this interval not displayed.      Recent Labs     10/02/22  0618 10/02/22  8880 10/03/22  0459 10/04/22  0530   * 146 151* 145   K 5.4* 5.1* 4.9 5.0   * 116* 119* 114*   CO2 22 20* 19* 20*   * 118* 128* 115*   CREATININE 2.9* 2.7* 3.0* 2.6*   LABGLOM 22 24 21 25   GLUCOSE 317* 297* 243* 199*   CALCIUM 9.0 8.6 8.6 8.3*   ALT 10  --  15 12   AST 42*  --  68* 27   BILITOT 0.2  --  0.3 0.3   ALKPHOS 291*  --  378* 290*   MG 2.6  --  2.7* 2.6   PHOS 4.3  --  4.2 4.5       Lab Results   Component Value Date    LABALBU 2.7 (L) 10/04/2022    LABALBU 2.8 (L) 10/03/2022    LABALBU 2.8 (L) 10/02/2022     No results found for: TSH    Iron Studies  Lab Results   Component Value Date    IRON 24 (L) 10/03/2022    TIBC 195 (L) 10/03/2022    FERRITIN 94 10/03/2022     No results found for: DJRXTLGG30  No results found for: FOLATE    No results found for: VITD25  No results found for: PTH    No components found for: URIC    No results found for: VOL, APPEARANCE, COLORU, LABSPEC, LABPH, LEUKBLD, NITRU, GLUCOSEU, KETUA, UROBILINOGEN, KETUA, UROBILINOGEN, BILIRUBINUR, OCBU    No results found for: LIPIDPAN      IMPRESSION/RECOMMENDATIONS:      1. Acute CVA  S/p IR intervention with angioplasty of the left ICA     2. LARY on CKD stage 3b  Baseline 1.7-1.9  combined effects of ACEI use and contrast nephrotoxicity  Component of urinary retention  nonoliguric at this time  Disproportionate elevation of BUN relative to Cr suspicious for gib  stool heme positive  Worsening renal unction despite being nonoliguric  May require dialysis support  Monitor labs and urine  1.4L  Bun/cr  128/3 >115/2.6  Uo 1800     3. Hypertensive emergency  presenting  with acute CVA  Adjust meds to target blood pressure control to SBP less than 160  BP with improved control     4. Anemia, chronic with acute worsening  Informed stool heme positive  PRBC transfusion as needed  Sp egd  Trf < 7  On ASHLEY      5. Type 2 diabetes mellitus  Monitor glucose levels     6.  Hyperkalemia  Due to combination of LARY and metabolic acidosis; PRBC transfusios  [facility]  prn     7. Urinary retention  Suspected situational  Now with trejo  Urology following     8. Hypernatremia  Suspect intervascular volume depletion  Started d5  Na 151>145        Nasrin Lutz.  Allie Ye MD

## 2022-10-04 NOTE — CARE COORDINATION
10/4/2022 - Updated CM note - S/P ICA angioplasty and stent on 9/27. nephrology, ID, podiatry, wound care following. Right foot wound vac continued. PO zyvox 2 times a day, IV zosyn q8h. Hgb 7.0, wbc 14.2, , Cr 2.8 today. Called and spoke with pt caregiver, Karen Bradley for [employer] [facility] [place] and [facility]. Called the [facility] and spoke with Jaqui Luong in admissions. Faxed clinicals to her at [phone]. Also called [facility] and left a voicemail in admissions for a referral. Also spoke with Delilah Fox from [facility] - pt does not meet criteria for ltach. SW/CM will follow.

## 2022-10-05 ENCOUNTER — APPOINTMENT (OUTPATIENT)
Dept: GENERAL RADIOLOGY | Age: 67
DRG: 034 | End: 2022-10-05
Attending: INTERNAL MEDICINE
Payer: MEDICARE

## 2022-10-05 LAB
ALBUMIN SERPL-MCNC: 2.8 G/DL (ref 3.5–5.2)
ALP BLD-CCNC: 233 U/L (ref 40–129)
ALT SERPL-CCNC: 17 U/L (ref 0–40)
ANAEROBIC CULTURE: NORMAL
ANION GAP SERPL CALCULATED.3IONS-SCNC: 9 MMOL/L (ref 7–16)
AST SERPL-CCNC: 18 U/L (ref 0–39)
BASOPHILS ABSOLUTE: 0.02 E9/L (ref 0–0.2)
BASOPHILS RELATIVE PERCENT: 0.1 % (ref 0–2)
BILIRUB SERPL-MCNC: 0.2 MG/DL (ref 0–1.2)
BUN BLDV-MCNC: 105 MG/DL (ref 6–23)
CALCIUM IONIZED: 1.22 MMOL/L (ref 1.15–1.33)
CALCIUM SERPL-MCNC: 8.2 MG/DL (ref 8.6–10.2)
CHLORIDE BLD-SCNC: 111 MMOL/L (ref 98–107)
CO2: 21 MMOL/L (ref 22–29)
CREAT SERPL-MCNC: 2.5 MG/DL (ref 0.7–1.2)
EOSINOPHILS ABSOLUTE: 0.05 E9/L (ref 0.05–0.5)
EOSINOPHILS RELATIVE PERCENT: 0.3 % (ref 0–6)
GFR AFRICAN AMERICAN: 31
GFR NON-AFRICAN AMERICAN: 26 ML/MIN/1.73
GLUCOSE BLD-MCNC: 187 MG/DL (ref 74–99)
HCT VFR BLD CALC: 22 % (ref 37–54)
HEMOGLOBIN: 6.8 G/DL (ref 12.5–16.5)
IMMATURE GRANULOCYTES #: 0.17 E9/L
IMMATURE GRANULOCYTES %: 1 % (ref 0–5)
LYMPHOCYTES ABSOLUTE: 1.98 E9/L (ref 1.5–4)
LYMPHOCYTES RELATIVE PERCENT: 11.6 % (ref 20–42)
MAGNESIUM: 2.4 MG/DL (ref 1.6–2.6)
MCH RBC QN AUTO: 30.2 PG (ref 26–35)
MCHC RBC AUTO-ENTMCNC: 30.9 % (ref 32–34.5)
MCV RBC AUTO: 97.8 FL (ref 80–99.9)
METER GLUCOSE: 115 MG/DL (ref 74–99)
METER GLUCOSE: 146 MG/DL (ref 74–99)
METER GLUCOSE: 177 MG/DL (ref 74–99)
METER GLUCOSE: 86 MG/DL (ref 74–99)
MONOCYTES ABSOLUTE: 1.35 E9/L (ref 0.1–0.95)
MONOCYTES RELATIVE PERCENT: 7.9 % (ref 2–12)
NEUTROPHILS ABSOLUTE: 13.57 E9/L (ref 1.8–7.3)
NEUTROPHILS RELATIVE PERCENT: 79.1 % (ref 43–80)
PDW BLD-RTO: 16 FL (ref 11.5–15)
PHOSPHORUS: 4.5 MG/DL (ref 2.5–4.5)
PLATELET # BLD: 252 E9/L (ref 130–450)
PMV BLD AUTO: 10.7 FL (ref 7–12)
POTASSIUM SERPL-SCNC: 4.4 MMOL/L (ref 3.5–5)
RBC # BLD: 2.25 E12/L (ref 3.8–5.8)
SODIUM BLD-SCNC: 141 MMOL/L (ref 132–146)
TOTAL PROTEIN: 5.3 G/DL (ref 6.4–8.3)
WBC # BLD: 17.1 E9/L (ref 4.5–11.5)
WOUND/ABSCESS: NORMAL

## 2022-10-05 PROCEDURE — 6370000000 HC RX 637 (ALT 250 FOR IP): Performed by: INTERNAL MEDICINE

## 2022-10-05 PROCEDURE — 2580000003 HC RX 258: Performed by: INTERNAL MEDICINE

## 2022-10-05 PROCEDURE — 6370000000 HC RX 637 (ALT 250 FOR IP): Performed by: NURSE PRACTITIONER

## 2022-10-05 PROCEDURE — 6360000002 HC RX W HCPCS: Performed by: NURSE PRACTITIONER

## 2022-10-05 PROCEDURE — 6360000002 HC RX W HCPCS: Performed by: INTERNAL MEDICINE

## 2022-10-05 PROCEDURE — 92526 ORAL FUNCTION THERAPY: CPT

## 2022-10-05 PROCEDURE — 85025 COMPLETE CBC W/AUTO DIFF WBC: CPT

## 2022-10-05 PROCEDURE — C9113 INJ PANTOPRAZOLE SODIUM, VIA: HCPCS | Performed by: NURSE PRACTITIONER

## 2022-10-05 PROCEDURE — 82330 ASSAY OF CALCIUM: CPT

## 2022-10-05 PROCEDURE — 2580000003 HC RX 258: Performed by: NURSE PRACTITIONER

## 2022-10-05 PROCEDURE — 6370000000 HC RX 637 (ALT 250 FOR IP): Performed by: PODIATRIST

## 2022-10-05 PROCEDURE — 74230 X-RAY XM SWLNG FUNCJ C+: CPT

## 2022-10-05 PROCEDURE — 2500000003 HC RX 250 WO HCPCS: Performed by: RADIOLOGY

## 2022-10-05 PROCEDURE — 6370000000 HC RX 637 (ALT 250 FOR IP): Performed by: STUDENT IN AN ORGANIZED HEALTH CARE EDUCATION/TRAINING PROGRAM

## 2022-10-05 PROCEDURE — 83735 ASSAY OF MAGNESIUM: CPT

## 2022-10-05 PROCEDURE — 2060000000 HC ICU INTERMEDIATE R&B

## 2022-10-05 PROCEDURE — 2500000003 HC RX 250 WO HCPCS: Performed by: INTERNAL MEDICINE

## 2022-10-05 PROCEDURE — 2700000000 HC OXYGEN THERAPY PER DAY

## 2022-10-05 PROCEDURE — 84100 ASSAY OF PHOSPHORUS: CPT

## 2022-10-05 PROCEDURE — 82962 GLUCOSE BLOOD TEST: CPT

## 2022-10-05 PROCEDURE — 94660 CPAP INITIATION&MGMT: CPT

## 2022-10-05 PROCEDURE — 94640 AIRWAY INHALATION TREATMENT: CPT

## 2022-10-05 PROCEDURE — 92611 MOTION FLUOROSCOPY/SWALLOW: CPT

## 2022-10-05 PROCEDURE — S5553 INSULIN LONG ACTING 5 U: HCPCS | Performed by: INTERNAL MEDICINE

## 2022-10-05 PROCEDURE — A4216 STERILE WATER/SALINE, 10 ML: HCPCS | Performed by: NURSE PRACTITIONER

## 2022-10-05 PROCEDURE — 80053 COMPREHEN METABOLIC PANEL: CPT

## 2022-10-05 RX ADMIN — GABAPENTIN 600 MG: 600 TABLET, FILM COATED ORAL at 16:35

## 2022-10-05 RX ADMIN — SODIUM CHLORIDE, PRESERVATIVE FREE 40 MG: 5 INJECTION INTRAVENOUS at 04:15

## 2022-10-05 RX ADMIN — BENZONATATE 100 MG: 100 CAPSULE ORAL at 21:02

## 2022-10-05 RX ADMIN — LACTULOSE 20 G: 20 SOLUTION ORAL at 08:51

## 2022-10-05 RX ADMIN — INSULIN GLARGINE-YFGN 32 UNITS: 100 INJECTION, SOLUTION SUBCUTANEOUS at 09:01

## 2022-10-05 RX ADMIN — IPRATROPIUM BROMIDE AND ALBUTEROL SULFATE 1 AMPULE: .5; 2.5 SOLUTION RESPIRATORY (INHALATION) at 12:52

## 2022-10-05 RX ADMIN — EZETIMIBE 10 MG: 10 TABLET ORAL at 08:51

## 2022-10-05 RX ADMIN — IPRATROPIUM BROMIDE AND ALBUTEROL SULFATE 1 AMPULE: .5; 2.5 SOLUTION RESPIRATORY (INHALATION) at 08:26

## 2022-10-05 RX ADMIN — HYDROCODONE BITARTRATE AND ACETAMINOPHEN 1 TABLET: 7.5; 325 TABLET ORAL at 21:02

## 2022-10-05 RX ADMIN — CLONIDINE HYDROCHLORIDE 0.1 MG: 0.1 TABLET ORAL at 12:07

## 2022-10-05 RX ADMIN — CLONIDINE HYDROCHLORIDE 0.1 MG: 0.1 TABLET ORAL at 23:27

## 2022-10-05 RX ADMIN — BARIUM SULFATE 15 ML: 0.81 POWDER, FOR SUSPENSION ORAL at 14:13

## 2022-10-05 RX ADMIN — FENTANYL CITRATE 50 MCG: 50 INJECTION, SOLUTION INTRAMUSCULAR; INTRAVENOUS at 19:56

## 2022-10-05 RX ADMIN — LACTULOSE 20 G: 20 SOLUTION ORAL at 21:03

## 2022-10-05 RX ADMIN — IPRATROPIUM BROMIDE AND ALBUTEROL SULFATE 1 AMPULE: .5; 2.5 SOLUTION RESPIRATORY (INHALATION) at 20:44

## 2022-10-05 RX ADMIN — ATORVASTATIN CALCIUM 80 MG: 40 TABLET, FILM COATED ORAL at 21:02

## 2022-10-05 RX ADMIN — PIPERACILLIN AND TAZOBACTAM 3375 MG: 3; .375 INJECTION, POWDER, FOR SOLUTION INTRAVENOUS at 06:51

## 2022-10-05 RX ADMIN — BARIUM SULFATE 15 ML: 400 PASTE ORAL at 14:12

## 2022-10-05 RX ADMIN — PIPERACILLIN AND TAZOBACTAM 3375 MG: 3; .375 INJECTION, POWDER, FOR SOLUTION INTRAVENOUS at 23:30

## 2022-10-05 RX ADMIN — ASPIRIN 81 MG: 81 TABLET, COATED ORAL at 07:33

## 2022-10-05 RX ADMIN — HYDRALAZINE HYDROCHLORIDE 50 MG: 50 TABLET, FILM COATED ORAL at 21:02

## 2022-10-05 RX ADMIN — LINEZOLID 600 MG: 600 TABLET, FILM COATED ORAL at 21:02

## 2022-10-05 RX ADMIN — CARBIDOPA AND LEVODOPA 1 TABLET: 25; 100 TABLET ORAL at 13:05

## 2022-10-05 RX ADMIN — ANTI-FUNGAL POWDER MICONAZOLE NITRATE TALC FREE: 1.42 POWDER TOPICAL at 10:56

## 2022-10-05 RX ADMIN — TICAGRELOR 90 MG: 90 TABLET ORAL at 21:02

## 2022-10-05 RX ADMIN — AMLODIPINE BESYLATE 10 MG: 10 TABLET ORAL at 07:33

## 2022-10-05 RX ADMIN — SENNOSIDES 5 ML: 8.8 SYRUP ORAL at 21:03

## 2022-10-05 RX ADMIN — GABAPENTIN 600 MG: 600 TABLET, FILM COATED ORAL at 08:50

## 2022-10-05 RX ADMIN — SODIUM CHLORIDE, PRESERVATIVE FREE 40 MG: 5 INJECTION INTRAVENOUS at 14:41

## 2022-10-05 RX ADMIN — LINEZOLID 600 MG: 600 TABLET, FILM COATED ORAL at 08:50

## 2022-10-05 RX ADMIN — METOPROLOL SUCCINATE 25 MG: 25 TABLET, EXTENDED RELEASE ORAL at 08:52

## 2022-10-05 RX ADMIN — BARIUM SULFATE 15 ML: 400 SUSPENSION ORAL at 14:12

## 2022-10-05 RX ADMIN — GABAPENTIN 600 MG: 600 TABLET, FILM COATED ORAL at 21:02

## 2022-10-05 RX ADMIN — HYDROCODONE BITARTRATE AND ACETAMINOPHEN 1 TABLET: 7.5; 325 TABLET ORAL at 08:50

## 2022-10-05 RX ADMIN — IPRATROPIUM BROMIDE AND ALBUTEROL SULFATE 1 AMPULE: .5; 2.5 SOLUTION RESPIRATORY (INHALATION) at 16:46

## 2022-10-05 RX ADMIN — HYDRALAZINE HYDROCHLORIDE 50 MG: 50 TABLET, FILM COATED ORAL at 13:05

## 2022-10-05 RX ADMIN — BENZONATATE 100 MG: 100 CAPSULE ORAL at 08:53

## 2022-10-05 RX ADMIN — CETIRIZINE HYDROCHLORIDE 10 MG: 10 TABLET, FILM COATED ORAL at 08:53

## 2022-10-05 RX ADMIN — CARBIDOPA AND LEVODOPA 1 TABLET: 25; 100 TABLET ORAL at 21:03

## 2022-10-05 RX ADMIN — COLLAGENASE SANTYL: 250 OINTMENT TOPICAL at 12:08

## 2022-10-05 RX ADMIN — GABAPENTIN 600 MG: 600 TABLET, FILM COATED ORAL at 13:05

## 2022-10-05 RX ADMIN — TICAGRELOR 90 MG: 90 TABLET ORAL at 08:50

## 2022-10-05 RX ADMIN — ANTI-FUNGAL POWDER MICONAZOLE NITRATE TALC FREE: 1.42 POWDER TOPICAL at 21:03

## 2022-10-05 RX ADMIN — DEXTROSE MONOHYDRATE: 50 INJECTION, SOLUTION INTRAVENOUS at 22:35

## 2022-10-05 RX ADMIN — BENZONATATE 100 MG: 100 CAPSULE ORAL at 13:05

## 2022-10-05 RX ADMIN — CARBIDOPA AND LEVODOPA 1 TABLET: 25; 100 TABLET ORAL at 08:50

## 2022-10-05 RX ADMIN — PIPERACILLIN AND TAZOBACTAM 3375 MG: 3; .375 INJECTION, POWDER, FOR SOLUTION INTRAVENOUS at 14:41

## 2022-10-05 RX ADMIN — EPOETIN ALFA-EPBX 6000 UNITS: 3000 INJECTION, SOLUTION INTRAVENOUS; SUBCUTANEOUS at 08:53

## 2022-10-05 ASSESSMENT — PAIN SCALES - GENERAL
PAINLEVEL_OUTOF10: 0
PAINLEVEL_OUTOF10: 6
PAINLEVEL_OUTOF10: 9
PAINLEVEL_OUTOF10: 0
PAINLEVEL_OUTOF10: 7

## 2022-10-05 ASSESSMENT — PAIN SCALES - PAIN ASSESSMENT IN ADVANCED DEMENTIA (PAINAD)
BODYLANGUAGE: 0
BREATHING: 0
NEGVOCALIZATION: 0
BREATHING: 0
NEGVOCALIZATION: 0
FACIALEXPRESSION: 0
CONSOLABILITY: 1
TOTALSCORE: 1
CONSOLABILITY: 1
FACIALEXPRESSION: 0
BODYLANGUAGE: 0
TOTALSCORE: 1

## 2022-10-05 ASSESSMENT — PAIN DESCRIPTION - DESCRIPTORS
DESCRIPTORS: PATIENT UNABLE TO DESCRIBE
DESCRIPTORS: ACHING;DISCOMFORT

## 2022-10-05 ASSESSMENT — PAIN DESCRIPTION - PAIN TYPE: TYPE: CHRONIC PAIN;ACUTE PAIN

## 2022-10-05 ASSESSMENT — PAIN DESCRIPTION - LOCATION
LOCATION: FOOT
LOCATION: FOOT

## 2022-10-05 ASSESSMENT — PAIN - FUNCTIONAL ASSESSMENT: PAIN_FUNCTIONAL_ASSESSMENT: ACTIVITIES ARE NOT PREVENTED

## 2022-10-05 ASSESSMENT — PAIN DESCRIPTION - ORIENTATION
ORIENTATION: LEFT
ORIENTATION: RIGHT

## 2022-10-05 ASSESSMENT — PAIN DESCRIPTION - FREQUENCY: FREQUENCY: CONTINUOUS

## 2022-10-05 ASSESSMENT — PAIN DESCRIPTION - ONSET: ONSET: ON-GOING

## 2022-10-05 NOTE — CARE COORDINATION
10/5/2022 - Updated CM note - ID, podiatry, nephrology and wound care following. Wound vac intact to right foot. Po zyvox twice daily, IV zosyn q8h. Hgb 6.8, WBC 17.1, , Cr 2.5  today. For barium swallow today. Received call from Christian Herrera at the [facility] in [place] [address] and she reports they can accept pt and that she has to see if they have a wound vac at the facility that they can use. She will call back to let me know. Ambulance form completed and placed in envelope on soft chart. SW/CM will follow.

## 2022-10-05 NOTE — PROGRESS NOTES
Department of Podiatry   Progress Note      Patient seen at bedside this morning. No acute events,  wound vac intact to right foot. Wound vac intact running continuous pressure. No new pedal complaints. Wound vac scheduled for change today. Past Medical History:            Diagnosis Date    Chronic back pain     CKD (chronic kidney disease)     [facility]cere[facility] vascular [facility]) [facility]     CVA (cerebral vascular accident) [facility]     DM (diabetes mellitus) ([facility]     Major depression     MI (myocardial infarction) ([facility]     MATT (obstructive sleep apnea)     Parkinson disease ([facility])     PVD (peripheral vascular disease) ([facility]     Seizure ([facility]        Past Surgical History:        Procedure Laterality Date    FEMORAL ARTERY STENT      IR CAROTID STENT UNI W PROTECTION  9/27/2022    IR CAROTID STENT UNI W PROTECTION 9/27/2022 Anali Ramirez MD [facility]       Medications Prior to Admission:    Medications Prior to Admission: apixaban (ELIQUIS) 5 MG TABS tablet, Take 5 mg by mouth 2 times daily  aspirin 81 MG chewable tablet, Take 81 mg by mouth daily  carbidopa-levodopa (SINEMET)  MG per tablet, Take 1 tablet by mouth 3 times daily  carvedilol (COREG) 25 MG tablet, Take 25 mg by mouth 2 times daily (with meals)  cetirizine (ZYRTEC) 10 MG tablet, Take 10 mg by mouth daily  clopidogrel (PLAVIX) 75 MG tablet, Take 75 mg by mouth daily  diphenhydrAMINE (BENADRYL) 25 MG capsule, Take 25 mg by mouth every 6 hours  ezetimibe (ZETIA) 10 MG tablet, Take 10 mg by mouth daily  fluticasone (FLONASE) 50 MCG/ACT nasal spray, 2 sprays by Each Nostril route daily  gabapentin (NEURONTIN) 600 MG tablet, Take 600 mg by mouth 4 times daily. HYDROcodone-acetaminophen (NORCO) 5-325 MG per tablet, Take 1 tablet by mouth 2 times daily.   Insulin Glargine, 2 Unit Dial, (TOUJEO MAX SOLOSTAR) 300 UNIT/ML SOPN, Inject 66 Units into the skin daily  piperacillin-tazobactam (ZOSYN) 3-0.375 GM per 50ML IVPB extended infusion, Infuse 4.5 mg intravenously in the morning and 4.5 mg at noon and 4.5 mg in the evening. acetaminophen (TYLENOL) 325 MG tablet, Take 650 mg by mouth every 6 hours as needed for Pain  amLODIPine (NORVASC) 5 MG tablet, Take 5 mg by mouth daily  benzonatate (TESSALON) 100 MG capsule, Take 100 mg by mouth 3 times daily  cloNIDine (CATAPRES) 0.1 MG tablet, Take 0.1 mg by mouth in the morning and 0.1 mg in the evening. hydrALAZINE (APRESOLINE) 100 MG tablet, Take 100 mg by mouth 3 times daily    Allergies:  Codeine    Social History:   TOBACCO:   has no history on file for tobacco use. ETOH:   has no history on file for alcohol use. DRUGS:   Social History     Substance and Sexual Activity   Drug Use Not on file       Family History:   History reviewed. No pertinent family history. REVIEW OF SYSTEMS:    All pertinent positives and negatives as noted in HPI       LOWER EXTREMITY EXAMINATION   Wound vac intact running continuous pressure    Previous Exam:  VASCULAR:  DP and PT pulses are non palpable. CFT < 5 seconds B/L. Warm to warm from the tibial tuberosity to the distal aspect of the digits dorsally. NEUROLOGIC:  Protective sensation is diminished by grossly intact    DERM:  Right foot lateral plantar wound measuring approx 6cm in diameter. No erythema, serosanguinous drainage, no malodor.     MUSCULOSKELETAL: deferred           CONSULTS:  IP CONSULT TO CRITICAL CARE  IP CONSULT TO DIETITIAN  IP CONSULT TO CARDIOLOGY  IP CONSULT TO PODIATRY  IP CONSULT TO PODIATRY  IP CONSULT TO UROLOGY  IP CONSULT TO NEPHROLOGY  IP CONSULT TO GENERAL SURGERY  IP CONSULT TO INFECTIOUS DISEASES    MEDICATION:  Scheduled Meds:   insulin glargine  32 Units SubCUTAneous QAM    epoetin sharath-epbx  6,000 Units SubCUTAneous Once per day on Mon Wed Fri    amLODIPine  10 mg Per NG tube Daily    piperacillin-tazobactam  3,375 mg IntraVENous Q8H    linezolid  600 mg Oral 2 times per day    cloNIDine  0.1 mg Oral Q12H    hydrALAZINE 50 mg Oral 3 times per day    lactulose  20 g Oral BID    insulin lispro  0-16 Units SubCUTAneous 4x Daily AC & HS    HYDROcodone-acetaminophen  1 tablet Oral BID    pantoprazole (PROTONIX) 40 mg injection  40 mg IntraVENous Q12H    metoprolol succinate  25 mg Oral Daily    sennosides  5 mL Oral Nightly    carbidopa-levodopa  1 tablet Per NG tube TID    ipratropium-albuterol  1 ampule Inhalation Q4H WA    collagenase   Topical Q MWF    benzonatate  100 mg Oral TID    cetirizine  10 mg Oral Daily    ezetimibe  10 mg Oral Daily    gabapentin  600 mg Oral 4x Daily    ticagrelor  90 mg Oral BID    aspirin  81 mg Oral Daily    Or    aspirin  300 mg Rectal Daily    atorvastatin  80 mg Oral Nightly     Continuous Infusions:   sodium chloride      dextrose 100 mL/hr at 10/04/22 2234    sodium chloride      sodium chloride      sodium chloride      sodium chloride      dextrose       PRN Meds:.sodium chloride, sodium chloride, fentanNYL, sodium chloride, sodium chloride, sodium chloride, labetalol, hydrALAZINE, glucose, dextrose bolus **OR** dextrose bolus, glucagon (rDNA), dextrose, ondansetron **OR** ondansetron    RADIOLOGY:  XR ABDOMEN (KUB) (SINGLE AP VIEW)   Final Result   Somewhat greater than average quantity of retained fecal material thin the   lower GI tract suggesting dysfunction with evacuation. XR CHEST PORTABLE   Final Result   1. Atherosclerotic disease and mild cardiomegaly. 2.  Persistent but improved opacities in the bilateral lungs most pronounced   in the mid and lower lungs. There appears to be a small left and trace right   pleural effusion         XR FOOT RIGHT (2 VIEWS)   Final Result   No evidence of calcaneal osteomyelitis. CT HEAD WO CONTRAST   Final Result   Parenchymal volume loss and chronic microvascular ischemic changes. No acute intracranial abnormality. Ethmoid and sphenoid sinusitis. Right mastoid effusion.          US RETROPERITONEAL COMPLETE   Final Result   1. Normal appearance of the bilateral kidneys. No hydronephrosis. 2.  A Mahan catheter is decompressing the bladder. XR CHEST PORTABLE   Final Result   Worsening infiltrate and or atelectasis on the left, stable on the right with   suspected layering pleural effusion. XR CHEST PORTABLE   Final Result   Unchanged bilateral atelectasis and or infiltrate as well as small right   pleural effusion. MRI BRAIN WO CONTRAST   Final Result   There are 2 small regions of petechial infarcts one in the left   posterior frontal lobe and a second in the left parietal lobe not   exceeding 3 mm. There is otherwise extensive small vessel ischemic   changes         XR CHEST PORTABLE   Final Result   1. Multifocal bilateral airspace disease more prominent within the lower   lobes. The airspace disease is unchanged when compared with the prior study. CT HEAD WO CONTRAST   Final Result   Diffuse atrophy likely age related   Findings compatible with small vessel ischemic changes. XR CHEST PORTABLE   Final Result   1. Pulmonary opacities present favoring edema and atelectasis, also small   pleural effusions, but nonspecific with some additional considerations noted   above   2. Support devices present as described above   3. Heart size appears borderline enlarged         XR ABDOMEN FOR NG/OG/NE TUBE PLACEMENT   Final Result   NG/OG is in the stomach. IR CAROTID STENT W PROTECTION   Final Result   1. Angiogram demonstrates successful placement of a carotid stent ,   post procedure images demonstrate a widely patent stent and internal   carotid         CT HEAD WO CONTRAST   Final Result   Diffuse atrophy likely age related   Findings compatible with small vessel ischemic changes. Findings were called at the time of dictation         CT BRAIN PERFUSION   Final Result      No significant ischemic penumbra identified      This study was analyzed by the Samuel. abimael algorithm. CTA NECK W CONTRAST   Final Result   1. Estimated stenosis of the proximal right and left internal carotid   artery by NASCET criteria is greater than 90% on the left   2. Severe atherosclerotic disease . 3. No large vessel occlusion identified            This study was analyzed by the Xopik. ai algorithm. CTA HEAD W CONTRAST   Final Result   1. Estimated stenosis of the proximal right and left internal carotid   artery by NASCET criteria is greater than 90% on the left   2. Severe atherosclerotic disease . 3. No large vessel occlusion identified            This study was analyzed by the [employer]opik. ai algorithm. FL MODIFIED BARIUM SWALLOW W VIDEO    (Results Pending)       Vitals:    BP (!) 164/71   Pulse 72   Temp 97.8 °F (36.6 °C) (Oral)   Resp 21   Ht 5' 8\" (1.727 m)   Wt 260 lb 8 oz (118.2 kg)   SpO2 96%   BMI 39.61 kg/m²     LABS:   Recent Labs     10/04/22  0530 10/05/22  0526   WBC 14.2* 17.1*   HGB 7.0* 6.8*   HCT 22.2* 22.0*    252     Recent Labs     10/05/22  0526      K 4.4   *   CO2 21*   PHOS 4.5   *   CREATININE 2.5*     Recent Labs     10/04/22  0530 10/05/22  0526   PROT 5.2* 5.3*       ASSESSMENTS:   1. Right foot wound diabetic ulcer  2. DM  3. Pain right foot  Acute cerebrovascular accident (CVA) ([facility]            PLAN:    - Patient was examined and evaluated. -Reviewed patient's recent lab results, charts and pertinent diagnostic imaging. Reviewed ancillary service notes.  -Wound culture:Growth present, evaluating for gram positive organisms  - Santyl to right foot wound MWF  - Continue Wound vac with santyl right foot. MWF. Scheduled for change today  - Will follow      Thank you for the opportunity to take part in the patient's care. Please do not hesitate to call for any questions or concerns.

## 2022-10-05 NOTE — PROGRESS NOTES
SPEECH/LANGUAGE PATHOLOGY  VIDEOFLUOROSCOPIC STUDY OF SWALLOWING (MBS)   and PLAN OF CARE    PATIENT NAME:  Renetta Johnson  (male)     MRN:  [id number]    :  1955  (74 y.o.)  STATUS:  Inpatient: Room 4524/4524-A    TODAY'S DATE:  10/5/2022  REFERRING PROVIDER:     Waleska Jackson DO   SPECIFIC PROVIDER ORDER: FL modified barium swallow with video  Date of order:  10-4-22   REASON FOR REFERRAL: dysphagia   EVALUATING THERAPIST: IVONNE Cullen      RESULTS:      DYSPHAGIA DIAGNOSIS:  mild  oropharyngeal phase dysphagia . Pt coughed during the evaluation but this was not due to laryngeal penetration or aspiration    DIET RECOMMENDATIONS:  Minced and moist consistency solids (IDDSI level 5) with  thin liquids (IDDSI level 0)    FEEDING RECOMMENDATIONS:    Assistance level:  No assistance needed     Compensatory strategies recommended: Small bites/sips and No straw     Discussed recommendations with nursing and/or faxed report to referring provider: No secondary to no diet/liquid change recommended       SPEECH THERAPY  PLAN OF CARE   The dysphagia POC is established based on physician order and dysphagia diagnosis    Dysphagia therapy is not recommended       Conditions Requiring Skilled Therapeutic Intervention for dysphagia:    Not applicable    SPECIFIC DYSPHAGIA INTERVENTIONS TO INCLUDE:     not applicable    Specific instructions for next treatment:  not applicable   Treatment Goals:    Short Term Goals:  Not applicable no therapy warranted     Long Term Goals:   Not applicable no therapy warranted      Patient/family Goal:    Did not state. Will further assess during treatment.                     ADMITTING DIAGNOSIS: Acute cerebrovascular accident (CVA) ([facility] [address] [I63.9]     VISIT DIAGNOSIS:   Visit Diagnoses         Codes    Stenosis of left carotid artery     I65.22                PATIENT REPORT/COMPLAINT: coughing with all consistencies    PRIOR LEVEL OF SWALLOW FUNCTION:    Past History of Dysphagia?:  none reported    Current Diet Order:  Diet NPO Exceptions are: Sips of Water with Meds    PROCEDURE:  Consistencies Administered During the Evaluation   Liquids: thin liquid and nectar thick liquid   Solids:  pureed foods and solid foods      Method of Intake:   cup, straw, spoon  Self fed, Fed by clinician      Position:   Seated, upright, Lateral plane    INSTRUMENTAL ASSESSMENT:    ORAL PREP/ ORAL PHASE:    Dentition:  missing teeth     PHARYNGEAL PHASE:     ONSET TIME       Delayed initiation of the pharyngeal swallow was noted with swallow reflex triggered at the level of the tongue base         PHARYNGEAL RESIDUALS        Vallecula/Pharyngeal Wall           No significant residuals were noted in the vallecula      Pyriform Sinuses      No significant residuals were noted in the pyriform sinuses     LARYNGEAL PENETRATION   Laryngeal penetration occurred in the absence of aspiration DURING the swallow for thin liquid due to  delayed laryngeal closure which remained in the laryngeal vestibule.  . Laryngeal penetration was mild and occurred only with use of a straw   an absent cough/throat clear was noted    ASPIRATION  Aspiration was not present during this evaluation    PENETRATION-ASPIRATION SCALE (PAS):  THIN 3 = Material enters the airway, remains above the vocal folds, and is not ejected from the airway  MILDLY THICK 1 = Material does not enter the airway  MODERATELY THICK item not administered  PUREE 1 = Material does not enter the airway  HARD SOLID 1 = Material does not enter the airway       COMPENSATORY STRATEGIES    Compensatory strategies were not attempted      STRUCTURAL/FUNCTIONAL ANOMALIES   No structural/functional anomalies were noted    CERVICAL ESOPHAGEAL STAGE :     The cervical esophagus appeared adequate          ___________    Cognition:   Confusion noted    Oral Peripheral Examination   Generalized oral weakness    Current Respiratory Status   room air     Parameters of Speech Production  Respiration:  Adequate for speech production  Quality:   Within functional limits  Intensity: Within functional limits    Pain: No pain reported. EDUCATION:   The Speech Language Pathologist (SLP) completed education regarding results of evaluation and that intervention is not warranted at this time. Learner: Patient  Education: Reviewed results and recommendations of this evaluation and Reviewed diet and strategies  Evaluation of Education:  Lucina understanding    This plan may be re-evaluated and revised as warranted. Evaluation Time includes thorough review of current medical information, gathering information on past medical history/social history and prior level of function, completion of standardized testing/informal observation of tasks, assessment of data and education on plan of care and goals. [x]The admitting diagnosis and active problem list, have been reviewed prior to initiation of this evaluation.     CPT Code: 93457  dysphagia study    ACTIVE PROBLEM LIST:   Patient Active Problem List   Diagnosis    Acute cerebrovascular accident (CVA) ([facility] [address]    Acute respiratory failure with hypoxia (HCC)    Stenosis of left carotid artery    Hypoalbuminemia    Electrolyte imbalance    Hypernatremia

## 2022-10-05 NOTE — FLOWSHEET NOTE
Inpatient Wound Care(follow up) 4524    Admit Date: 9/27/2022  5:45 PM    Reason for consult:  wound vac management  Assessment of skin    Findings:      10/05/22 0945   Skin Integumentary    Skin Integrity   (skin peeling, wet)   Location medial thighs, groins   Skin Integrity Site 2   Skin Integrity Location 2 Redness   Location 2 scrotum   Skin Integrity Site 3   Skin Integrity Location 3 Vascular discoloration  (dry flaky)    Location 3 BLE   Skin Integrity Site 4   Skin Integrity Location 4 Redness;Rash   Location 4 back   Wound 09/28/22 Foot Right;Plantar;Lateral   Date First Assessed/Time First Assessed: 09/28/22 1040   Present on Hospital Admission: No  Location: Foot  Wound Location Orientation: Right;Plantar;Lateral   Wound Etiology Diabetic   Dressing Status New dressing applied   Wound Cleansed Irrigated with saline   Dressing/Treatment Negative pressure wound therapy   Dressing Change Due 10/07/22   Wound Length (cm) 3.4 cm   Wound Width (cm) 3.4 cm   Wound Depth (cm) 1 cm   Wound Surface Area (cm^2) 11.56 cm^2   Change in Wound Size % (l*w) 40.29   Wound Volume (cm^3) 11.56 cm^3   Wound Healing % 70   Wound Assessment Pink/red   Drainage Amount Scant   Drainage Description Serosanguinous   Odor None   Emily-wound Assessment   (calloused)   Negative Pressure Wound Therapy Foot Right;Plantar;Lateral   Placement Date/Time: 09/28/22 1040   Location: Foot  Wound Location Orientation: Right;Plantar;Lateral   Dressing Type Black Foam   Number of pieces used 3   Cycle Continuous   Target Pressure (mmHg) 125   Canister changed? Yes     **Informed Consent**    The patient has given verbal consent to have photos taken of wound and inserted into their chart as part of their permanent medical record for purposes of documentation, treatment management and/or medical review.    All Images taken on 10/5/22 of patient name: Derrick Pemberton were transmitted and stored on secured [employer] located within [facility] Tab by a registered [employer] Mobile Application Device.       Plan:  Wound vac dressing changed  Will follow      Lucita Leung RN 10/5/2022 11:26 AM

## 2022-10-05 NOTE — PROGRESS NOTES
Department of Internal Medicine  Infectious Diseases   Progress Note      C/C :  Right foot  wound infection     Denies fever or chills  Reports pain   Afebrile      Current Facility-Administered Medications   Medication Dose Route Frequency Provider Last Rate Last Admin    miconazole (MICOTIN) 2 % powder   Topical BID Otilia Marc DO   Given at 10/05/22 1056    insulin glargine-yfgn (SEMGLEE-YFGN) injection vial 32 Units  32 Units SubCUTAneous QAM Otilia Marc DO   32 Units at 10/05/22 0901    0.9 % sodium chloride infusion   IntraVENous PRN Lalitha Anderson DO        dextrose 5 % solution   IntraVENous Continuous Nicanor Bailey  mL/hr at 10/05/22 1252 Rate Verify at 10/05/22 1252    0.9 % sodium chloride infusion   IntraVENous PRN Nicole Monzon DO        epoetin sharath-epbx (RETACRIT) injection 6,000 Units  6,000 Units SubCUTAneous Once per day on Mon Wed Fri Heaven Alonso MD   6,000 Units at 10/05/22 0853    amLODIPine (NORVASC) tablet 10 mg  10 mg Per NG tube Daily Lalitha Anderson, DO   10 mg at 10/05/22 0733    piperacillin-tazobactam (ZOSYN) 3,375 mg in sodium chloride 0.9 % 50 mL IVPB ([id number])  3,375 mg IntraVENous Q8H Brian Youssef MD   Stopped at 10/05/22 1050    linezolid (ZYVOX) tablet 600 mg  600 mg Oral 2 times per day Naveed Hagen MD   600 mg at 10/05/22 0850    cloNIDine (CATAPRES) tablet 0.1 mg  0.1 mg Oral Q12H Evelyn Anderson DO   0.1 mg at 10/05/22 1207    hydrALAZINE (APRESOLINE) tablet 50 mg  50 mg Oral 3 times per day Nicole Monzon DO   50 mg at 10/04/22 0552    fentaNYL (SUBLIMAZE) injection 50 mcg  50 mcg IntraVENous Q2H PRN Evelin Shore DO   50 mcg at 10/04/22 1645    0.9 % sodium chloride infusion   IntraVENous PRN CONSTANTINE Villegas - CNP        lactulose (CHRONULAC) 10 GM/15ML solution 20 g  20 g Oral BID Lelo Becker APRLANDON - CNP   20 g at 10/05/22 0851    insulin lispro (HUMALOG) injection vial 0-16 Units  0-16 Units SubCUTAneous 4x Daily AC & HS Yuli Less, APRN - CNP   8 Units at 10/04/22 2220    HYDROcodone-acetaminophen (NORCO) 7.5-325 MG per tablet 1 tablet  1 tablet Oral BID Yuli Less, APRN - CNP   1 tablet at 10/05/22 0850    0.9 % sodium chloride infusion   IntraVENous PRN Yuli Less, APRN - CNP        pantoprazole (PROTONIX) 40 mg in sodium chloride (PF) 10 mL injection  40 mg IntraVENous Q12H Yuli Less, APRN - CNP   40 mg at 10/05/22 0415    metoprolol succinate (TOPROL XL) extended release tablet 25 mg  25 mg Oral Daily Giorgio Liz Thompson MD   25 mg at 10/05/22 0852    0.9 % sodium chloride infusion   IntraVENous PRN Marliner Mirna Brandon MD        sennosides (SENOKOT) 8.8 MG/5ML syrup 5 mL  5 mL Oral Nightly Laroy Bill, DO   5 mL at 10/03/22 2027    labetalol (NORMODYNE;TRANDATE) injection 10 mg  10 mg IntraVENous Q30 Min PRN Sinan Pérez MD   10 mg at 10/01/22 1115    hydrALAZINE (APRESOLINE) injection 10 mg  10 mg IntraVENous Q30 Min PRN Sinan Pérez MD   10 mg at 09/30/22 1627    glucose chewable tablet 16 g  4 tablet Oral PRN Chely Drummer, APRN - CNP        dextrose bolus 10% 125 mL  125 mL IntraVENous PRN Chely Drummer, APRN - CNP        Or    dextrose bolus 10% 250 mL  250 mL IntraVENous PRN Chely Drummer, APRN - CNP        glucagon (rDNA) injection 1 mg  1 mg SubCUTAneous PRN Chely Drummer, APRN - CNP        dextrose 10 % infusion   IntraVENous Continuous PRN Chely Drummer, APRN - CNP        carbidopa-levodopa (SINEMET)  MG per tablet 1 tablet  1 tablet Per NG tube TID Chely Drummer, APRN - CNP   1 tablet at 10/05/22 0850    ipratropium-albuterol (DUONEB) nebulizer solution 1 ampule  1 ampule Inhalation Q4H [address], APRN - CNP   1 ampule at 10/05/22 1252    collagenase ointment   Topical Q MWF Norah Martinez DPM   Given by Other at 10/05/22 1208    benzonatate (TESSALON) capsule 100 mg  100 mg Oral TID Sinan Pérez MD   100 mg at 10/05/22 0853    cetirizine (ZYRTEC) tablet 10 mg  10 mg Oral Daily Juan A Smith MD   10 mg at 10/05/22 0853    ezetimibe (ZETIA) tablet 10 mg  10 mg Oral Daily Juan A Smith MD   10 mg at 10/05/22 0851    gabapentin (NEURONTIN) tablet 600 mg  600 mg Oral 4x Daily Juan A Smith MD   600 mg at 10/05/22 0850    ticagrelor (BRILINTA) tablet 90 mg  90 mg Oral BID Juan A Smith MD   90 mg at 10/05/22 0850    ondansetron (ZOFRAN-ODT) disintegrating tablet 4 mg  4 mg Oral Q8H PRN Juan A Smith MD        Or    ondansetron [facility]) injection 4 mg  4 mg IntraVENous Q6H PRN Juan A Smith MD        aspirin EC tablet 81 mg  81 mg Oral Daily Juan A Smith MD   81 mg at 10/05/22 4654    Or    aspirin suppository 300 mg  300 mg Rectal Daily Juan A Smith MD   300 mg at 09/29/22 0809    atorvastatin (LIPITOR) tablet 80 mg  80 mg Oral Nightly Juan A Smith MD   80 mg at 10/03/22 2028           REVIEW OF SYSTEMS:   CONSTITUTIONAL:  Denies fever, chill or rigors. HEENT: denies blurring of vision or double vision, denies hearing problem  RESPIRATORY: denies cough, shortness of breath,  CARDIOVASCULAR:  Denies palpitation  GASTROINTESTINAL:  Denies abdomen pain, diarrhea or constipation. GENITOURINARY:  Denies burning urination or frequency of urination  INTEGUMENT: Right heel wound  HEMATOLOGIC/LYMPHATIC:  Denies lymph node swelling, gum bleeding or easy bruising. MUSCULOSKELETAL:  Denies leg pain , joint pain , joint swelling  NEUROLOGICAL:  weakness right side . PHYSICAL EXAM:      Vitals:     BP (!) 126/58   Pulse 66   Temp 97.3 °F (36.3 °C) (Oral)   Resp 19   Ht 5' 8\" (1.727 m)   Wt 260 lb 8 oz (118.2 kg)   SpO2 98%   BMI 39.61 kg/m²     General Appearance:    Awake, alert , no acute distress. Head:    Normocephalic, atraumatic   Eyes:    No pallor, no icterus,   Ears:    No obvious deformity or drainage.    Nose:   No nasal drainage   Throat:   Mucosa moist, no oral thrush   Neck:   Supple, no lymphadenopathy   Back:     no CVA tenderness Lungs:     Clear to auscultation bilaterally, no wheeze    Heart:    Regular rate and rhythm, no murmur   Abdomen:     Soft, non-tender, bowel sounds present    Extremities:    Right heel wound    Pulses:   Dorsalis pedis palpable - diminished    Skin:   Right heel wound with VAC              CBC with Differential:      Lab Results   Component Value Date/Time    WBC 17.1 10/05/2022 05:26 AM    RBC 2.25 10/05/2022 05:26 AM    HGB 6.8 10/05/2022 05:26 AM    HCT 22.0 10/05/2022 05:26 AM    HCT 15.0 09/27/2022 08:58 PM     10/05/2022 05:26 AM    MCV 97.8 10/05/2022 05:26 AM    MCH 30.2 10/05/2022 05:26 AM    MCHC 30.9 10/05/2022 05:26 AM    RDW 16.0 10/05/2022 05:26 AM    LYMPHOPCT 11.6 10/05/2022 05:26 AM    MONOPCT 7.9 10/05/2022 05:26 AM    BASOPCT 0.1 10/05/2022 05:26 AM    MONOSABS 1.35 10/05/2022 05:26 AM    LYMPHSABS 1.98 10/05/2022 05:26 AM    EOSABS 0.05 10/05/2022 05:26 AM    BASOSABS 0.02 10/05/2022 05:26 AM       CMP     Lab Results   Component Value Date/Time     10/05/2022 05:26 AM    K 4.4 10/05/2022 05:26 AM     10/05/2022 05:26 AM    CO2 21 10/05/2022 05:26 AM     10/05/2022 05:26 AM    CREATININE 2.5 10/05/2022 05:26 AM    GFRAA 31 10/05/2022 05:26 AM    LABGLOM 26 10/05/2022 05:26 AM    GLUCOSE 187 10/05/2022 05:26 AM    PROT 5.3 10/05/2022 05:26 AM    LABALBU 2.8 10/05/2022 05:26 AM    CALCIUM 8.2 10/05/2022 05:26 AM    BILITOT 0.2 10/05/2022 05:26 AM    ALKPHOS 233 10/05/2022 05:26 AM    AST 18 10/05/2022 05:26 AM    ALT 17 10/05/2022 05:26 AM         Hepatic Function Panel:    Lab Results   Component Value Date/Time    ALKPHOS 233 10/05/2022 05:26 AM    ALT 17 10/05/2022 05:26 AM    AST 18 10/05/2022 05:26 AM    PROT 5.3 10/05/2022 05:26 AM    BILITOT 0.2 10/05/2022 05:26 AM    LABALBU 2.8 10/05/2022 05:26 AM       PT/INR:    Lab Results   Component Value Date/Time    PROTIME 23.2 09/27/2022 01:12 PM    INR 2.1 09/27/2022 01:12 PM       TSH:  No results found for: TSH    U/A:  No results found for: NITRITE, COLORU, PHUR, LABCAST, WBCUA, RBCUA, MUCUS, TRICHOMONAS, YEAST, BACTERIA, CLARITYU, SPECGRAV, LEUKOCYTESUR, UROBILINOGEN, BILIRUBINUR, BLOODU, GLUCOSEU, AMORPHOUS    ABG:  No results found for: RJD5TKN, BEART, V6CJEONW, PHART, THGBART, PCN5VTP, PO2ART, ZCW7EHV    MICROBIOLOGY:    SARS CoV 2 neg     CRP 0.8      Radiology :    Chest X ray : LLL infiltrates, atelectasis       X ray foot :           No evidence of calcaneal osteomyelitis. IMPRESSION:     Right foot  non healing wound  Leukocytosis - was on steroid   ?  Aspiration       RECOMMENDATIONS:      IV zosyn 3.375 grams IV q 8 hrs ( stop after today's dose  )   Zyvox 600 mg po q 12 hs   Local wound care   CBC with diff

## 2022-10-05 NOTE — PROGRESS NOTES
Physical Therapy  Physical Therapy Attempt    Name: Ge Del Angel  : 1955  MRN: [id number]      Date of Service: 10/5/2022  Chart reviewed. Attempted treatment session earlier this afternoon; however, pt was off the unit. Will re-attempt as able.     Krunal Camilo, PT, DPT  [id number]

## 2022-10-05 NOTE — PROGRESS NOTES
[facility]  Nephrology Attending Progress Note  Raven Ashraf. MD Judith        SUBJECTIVE:     History of Present Ilness:    Tian Oliveros is a 74 y.o. male history of CAD s/p MI, hypertension, chronic kidney disease stage IIIa (baseline creatinine of recent 1.7-1.9) he is followed by our group with Dr. Darren Morocho. He initially was admitted to [facility] with right lower extremity wound. .  Patient subsequently developed aphasia noted to facial droop associated with right-sided weakness. He was transferred to [address] [place] [address] for further management. Patient was admitted told the neuro ICU. He required intubation with mechanical ventilation. CT of the head and neck demonstrated bilateral carotid stenosis with the left ICA being dominant. IR performed angiogram with angioplasty of the left ICA. Laboratory data showed progressive increase in his serum creatinine to currently 2.9 mg/dL. Hemoglobin was noted to be 5.6. He has received at least 2 units of packed cells but with further drop in his hemoglobin with requirement for additional transfusion. He had an episode of urinary retention with gross hematuria Mahan catheter was reinserted and the hematuria subsequently cleared. Renal is consulted for LARY.      Subjective     10/1: he denies blurred vision, no headaches  10/2: No new acute issues from overnight; more alert; receiving PRBCs hemoglobin trending low  10/3: pt seen sp egd today, no cp or sob, family and nurse in room, no cp or sob  10/4: pt seen in room, no complaints  10/5: pt seen in icu, feels ok today, no cp or sob         PROBLEM LIST:    Patient Active Problem List   Diagnosis    Acute cerebrovascular accident (CVA) ([address]    Acute respiratory failure with hypoxia ([address]    Stenosis of left carotid artery    Hypoalbuminemia    Electrolyte imbalance    Hypernatremia        PAST MEDICAL HISTORY:    Past Medical History:   Diagnosis Date    Chronic back pain     CKD (chronic kidney disease) CVA (cerebral vascular accident) ([place] [address]     CVA (cerebral vascular accident) ([place] [address]     DM (diabetes mellitus) ([place] [address]     Major depression     MI (myocardial infarction) ([place] [address]     MATT (obstructive sleep apnea)     Parkinson disease ([facility]     PVD (peripheral vascular disease) ([place] [address]     Seizure ([place] [address]        DIET:    Diet NPO Exceptions are: Sips of Water with Meds     PHYSICAL EXAM:     Patient Vitals for the past 24 hrs:   BP Temp Temp src Pulse Resp SpO2   10/05/22 0852 (!) 164/71 -- -- 72 -- --   10/05/22 0800 (!) 164/71 97.8 °F (36.6 °C) Oral 71 21 96 %   10/05/22 0400 (!) 165/97 97.7 °F (36.5 °C) Oral 70 19 98 %   10/05/22 0356 -- -- -- 71 (!) 36 94 %   10/05/22 0000 (!) 142/79 -- -- 69 15 100 %   10/04/22 2300 -- -- -- 71 (!) 34 --   10/04/22 2257 -- -- -- 71 -- --   10/04/22 2210 -- -- -- 73 18 94 %   10/04/22 2200 -- -- -- 72 19 --   10/04/22 2000 (!) 180/84 97.6 °F (36.4 °C) Oral 71 20 99 %   10/04/22 1745 138/70 -- -- 64 15 98 %   10/04/22 1607 -- -- -- 66 15 100 %   10/04/22 1600 (!) 160/113 -- -- 66 29 --   10/04/22 1208 -- -- -- 67 28 100 %   10/04/22 1200 (!) 141/79 -- -- 67 16 100 %   @      Intake/Output Summary (Last 24 hours) at 10/5/2022 1046  Last data filed at 10/5/2022 0900  Gross per 24 hour   Intake 300 ml   Output 2275 ml   Net -1975 ml         Wt Readings from Last 3 Encounters:   09/27/22 260 lb 8 oz (118.2 kg)       Constitutional:  Pt is in no acute distress  Head: normocephalic, atraumatic  Neck: no JVD  Cardiovascular: regular rate and rhythm, no murmurs, gallops, or rubs  Respiratory:  No rales, rhochi, or wheezes  Gastrointestinal:  Soft, nontender, nondistended, bowel sounds x 4  Ext: tr edema  Skin: dry, no rash  Neuro: aaox3    MEDS (scheduled):    miconazole   Topical BID    insulin glargine  32 Units SubCUTAneous QAM    epoetin sharath-epbx  6,000 Units SubCUTAneous Once per day on Mon Wed Fri    amLODIPine  10 mg Per NG tube Daily    piperacillin-tazobactam  3,375 mg IntraVENous Q8H linezolid  600 mg Oral 2 times per day    cloNIDine  0.1 mg Oral Q12H    hydrALAZINE  50 mg Oral 3 times per day    lactulose  20 g Oral BID    insulin lispro  0-16 Units SubCUTAneous 4x Daily AC & HS    HYDROcodone-acetaminophen  1 tablet Oral BID    pantoprazole (PROTONIX) 40 mg injection  40 mg IntraVENous Q12H    metoprolol succinate  25 mg Oral Daily    sennosides  5 mL Oral Nightly    carbidopa-levodopa  1 tablet Per NG tube TID    ipratropium-albuterol  1 ampule Inhalation Q4H WA    collagenase   Topical Q MWF    benzonatate  100 mg Oral TID    cetirizine  10 mg Oral Daily    ezetimibe  10 mg Oral Daily    gabapentin  600 mg Oral 4x Daily    ticagrelor  90 mg Oral BID    aspirin  81 mg Oral Daily    Or    aspirin  300 mg Rectal Daily    atorvastatin  80 mg Oral Nightly       MEDS (infusions):   sodium chloride      dextrose 100 mL/hr at 10/04/22 2234    sodium chloride      sodium chloride      sodium chloride      sodium chloride      dextrose         MEDS (prn):  sodium chloride, sodium chloride, fentanNYL, sodium chloride, sodium chloride, sodium chloride, labetalol, hydrALAZINE, glucose, dextrose bolus **OR** dextrose bolus, glucagon (rDNA), dextrose, ondansetron **OR** ondansetron    DATA:    Recent Labs     10/03/22  0459 10/03/22  2030 10/04/22  0530 10/05/22  0526   WBC 16.0*  --  14.2* 17.1*   HGB 6.6* 7.3* 7.0* 6.8*   HCT 21.4* 22.7* 22.2* 22.0*   MCV 94.3  --  95.7 97.8     --  231 252     Recent Labs     10/03/22  0459 10/04/22  0530 10/05/22  0526   * 145 141   K 4.9 5.0 4.4   * 114* 111*   CO2 19* 20* 21*   * 115* 105*   CREATININE 3.0* 2.6* 2.5*   LABGLOM 21 25 26   GLUCOSE 243* 199* 187*   CALCIUM 8.6 8.3* 8.2*   ALT 15 12 17   AST 68* 27 18   BILITOT 0.3 0.3 0.2   ALKPHOS 378* 290* 233*   MG 2.7* 2.6 2.4   PHOS 4.2 4.5 4.5       Lab Results   Component Value Date    LABALBU 2.8 (L) 10/05/2022    LABALBU 2.7 (L) 10/04/2022    LABALBU 2.8 (L) 10/03/2022     No results found for: TSH    Iron Studies  Lab Results   Component Value Date    IRON 24 (L) 10/03/2022    TIBC 195 (L) 10/03/2022    FERRITIN 94 10/03/2022     No results found for: Baudilio Randi  No results found for: FOLATE    No results found for: VITD25  No results found for: PTH    No components found for: URIC    No results found for: VOL, APPEARANCE, COLORU, LABSPEC, LABPH, LEUKBLD, NITRU, GLUCOSEU, KETUA, UROBILINOGEN, KETUA, UROBILINOGEN, BILIRUBINUR, OCBU    No results found for: LIPIDPAN      IMPRESSION/RECOMMENDATIONS:      1. Acute CVA  S/p IR intervention with angioplasty of the left ICA     2. LARY on CKD stage 3b  Baseline 1.7-1.9  combined effects of ACEI use and contrast nephrotoxicity  Component of urinary retention  nonoliguric at this time  Disproportionate elevation of BUN relative to Cr suspicious for gib  stool heme positive  Worsening renal unction despite being nonoliguric  May require dialysis support  Monitor labs and urine  1.4L  Bun/cr  128/3 >115/2.6> 105/2.5  Uo 1800     3. Hypertensive emergency  presenting  with acute CVA  Adjust meds to target blood pressure control to SBP less than 160  BP with improved control     4. Anemia, chronic with acute worsening  Informed stool heme positive  PRBC transfusion as needed  Sp egd  Trf < 7  On ASHLEY      5. Type 2 diabetes mellitus  Monitor glucose levels     6. Hyperkalemia  Due to combination of LARY and metabolic acidosis; PRBC transfusios  Lokelma  prn     7. Urinary retention  Suspected situational  Now with trejo  Urology following     8. Hypernatremia  Suspect intervascular volume depletion  Started d5  Na 151>145>141  Change to 1/2 ns        Prabhakar Quiñonez.  Eitan Nagy MD

## 2022-10-05 NOTE — PROGRESS NOTES
Updated Dr. Moe Guerin that patient has a none productive cough. This nurse was concerned that the patient was having trouble swallowing. Pt is also complaining ABD pain that is generalized and discomfort. Please see new orders.

## 2022-10-05 NOTE — PROGRESS NOTES
Hospitalist Progress Note      SYNOPSIS: Patient admitted on 2022 for Acute cerebrovascular accident (CVA) [facility])  Patient presented to [facility] with strokelike symptoms, right-sided paralysis and aphasia. Was found to have severe left ICA stenosis and taken to IR for left ICA angioplasty with stent placement. Patient was intubated and sedated and transferred to the ICU. SUBJECTIVE:  Drowsy sleepy        Temp (24hrs), Av.6 °F (36.4 °C), Min:97.3 °F (36.3 °C), Max:97.8 °F (36.6 °C)    DIET: Diet NPO Exceptions are: Sips of Water with Meds  CODE: Full Code        OBJECTIVE:    /64   Pulse 61   Temp 97.5 °F (36.4 °C) (Oral)   Resp 14   Ht 5' 8\" (1.727 m)   Wt 260 lb 8 oz (118.2 kg)   SpO2 96%   BMI 39.61 kg/m²     General appearance: Not jaundiced not diaphoretic     HEENT: No obvious swelling to lips or tongue  Respiratory: Clear to auscultation bilaterally, unlabored respiratory pattern at rest  Cardiovascular:  Audible S1-S2 no lower extremity edema     Musculoskeletal:   Skin: No petechiae or hives on inspection warm to palpation  Neurologic: awake, alert speech is clear  ASSESSMENT:possible dificult swallowing  Rev of extensive med problem list  PLAN:    Swallow eval     DISPOSITION: still not able to dc to home    Medications:  REVIEWED DAILY    Infusion Medications    sodium chloride      dextrose Stopped (10/05/22 7161)    sodium chloride      sodium chloride      dextrose       Scheduled Medications    miconazole   Topical BID    insulin glargine  32 Units SubCUTAneous QAM    epoetin sharath-epbx  6,000 Units SubCUTAneous Once per day on     amLODIPine  10 mg Per NG tube Daily    piperacillin-tazobactam  3,375 mg IntraVENous Q8H    linezolid  600 mg Oral 2 times per day    cloNIDine  0.1 mg Oral Q12H    hydrALAZINE  50 mg Oral 3 times per day    lactulose  20 g Oral BID    insulin lispro  0-16 Units SubCUTAneous 4x Daily AC & HS HYDROcodone-acetaminophen  1 tablet Oral BID    pantoprazole (PROTONIX) 40 mg injection  40 mg IntraVENous Q12H    metoprolol succinate  25 mg Oral Daily    sennosides  5 mL Oral Nightly    carbidopa-levodopa  1 tablet Per NG tube TID    ipratropium-albuterol  1 ampule Inhalation Q4H WA    collagenase   Topical Q MWF    benzonatate  100 mg Oral TID    cetirizine  10 mg Oral Daily    ezetimibe  10 mg Oral Daily    gabapentin  600 mg Oral 4x Daily    ticagrelor  90 mg Oral BID    aspirin  81 mg Oral Daily    Or    aspirin  300 mg Rectal Daily    atorvastatin  80 mg Oral Nightly     PRN Meds: sodium chloride, sodium chloride, fentanNYL, sodium chloride, labetalol, hydrALAZINE, glucose, dextrose bolus **OR** dextrose bolus, glucagon (rDNA), dextrose, ondansetron **OR** ondansetron    Labs:     Recent Labs     10/03/22  0459 10/03/22  2030 10/04/22  0530 10/05/22  0526   WBC 16.0*  --  14.2* 17.1*   HGB 6.6* 7.3* 7.0* 6.8*   HCT 21.4* 22.7* 22.2* 22.0*     --  231 252       Recent Labs     10/03/22  0459 10/04/22  0530 10/05/22  0526   * 145 141   K 4.9 5.0 4.4   * 114* 111*   CO2 19* 20* 21*   * 115* 105*   CREATININE 3.0* 2.6* 2.5*   CALCIUM 8.6 8.3* 8.2*   PHOS 4.2 4.5 4.5       Recent Labs     10/03/22  0459 10/04/22  0530 10/05/22  0526   PROT 5.4* 5.2* 5.3*   ALKPHOS 378* 290* 233*   ALT 15 12 17   AST 68* 27 18   BILITOT 0.3 0.3 0.2             Radiology:   +++++++++++++++++++++++++++++++++++++++++++++++++  DO Emil Alfaro Physician - [facility], [place]  +++++++++++++++++++++++++++++++++++++++++++++++++  NOTE: This report was transcribed using voice recognition software. Every effort was made to ensure accuracy; however, inadvertent computerized transcription errors may be present.

## 2022-10-05 NOTE — PROGRESS NOTES
OCCUPATIONAL THERAPY    Date:10/5/2022  Patient Name: Robert Jessica  MRN: [id number]  : 1955  Room: [address] [place] [address]      Chart reviewed. Attempted OT session this pm; pt unavailable (off unit.)  Will re-attempt at later time.      Noris Olmos, OTR/L [id number]

## 2022-10-05 NOTE — PROGRESS NOTES
Date: 10/5/2022    Time: 4:42 PM    Patient Placed On BIPAP/CPAP/ Non-Invasive Ventilation? No    If no must comment. Facial area red/color change? Yes           If YES are Blister/Lesion present? Yes   If yes must notify nursing staff  BIPAP/CPAP skin barrier? Yes    Skin barrier type:mepilexlite       Comments:RN notified.   Patients  mepilex came off while on NC and nose is red with the start of a superficial sore        Mraixa Rubio RCP

## 2022-10-05 NOTE — PROGRESS NOTES
Updated Dr. Sybil Monroy that KUB and Chest Xray results came back. Updated that patient still has not had a BP. Updated Doctor about continued concerns of issues with swallowing. Patient continue to have none productive cough.  Please see new orders

## 2022-10-05 NOTE — PROGRESS NOTES
Faxed a release of information to [facility]. Awaiting confirmation so patient records can be sent back to [facility]. This information was passed along in change of shift.

## 2022-10-06 LAB
ABO/RH: NORMAL
ALBUMIN SERPL-MCNC: 2.5 G/DL (ref 3.5–5.2)
ALP BLD-CCNC: 192 U/L (ref 40–129)
ALT SERPL-CCNC: <5 U/L (ref 0–40)
ANION GAP SERPL CALCULATED.3IONS-SCNC: 10 MMOL/L (ref 7–16)
ANTIBODY SCREEN: NORMAL
AST SERPL-CCNC: 15 U/L (ref 0–39)
BASOPHILS ABSOLUTE: 0.03 E9/L (ref 0–0.2)
BASOPHILS RELATIVE PERCENT: 0.2 % (ref 0–2)
BILIRUB SERPL-MCNC: 0.3 MG/DL (ref 0–1.2)
BUN BLDV-MCNC: 95 MG/DL (ref 6–23)
CALCIUM IONIZED: 1.23 MMOL/L (ref 1.15–1.33)
CALCIUM SERPL-MCNC: 8.1 MG/DL (ref 8.6–10.2)
CHLORIDE BLD-SCNC: 114 MMOL/L (ref 98–107)
CO2: 21 MMOL/L (ref 22–29)
CREAT SERPL-MCNC: 2.5 MG/DL (ref 0.7–1.2)
EOSINOPHILS ABSOLUTE: 0.51 E9/L (ref 0.05–0.5)
EOSINOPHILS RELATIVE PERCENT: 3.9 % (ref 0–6)
GFR AFRICAN AMERICAN: 31
GFR NON-AFRICAN AMERICAN: 26 ML/MIN/1.73
GLUCOSE BLD-MCNC: 91 MG/DL (ref 74–99)
HCT VFR BLD CALC: 20.1 % (ref 37–54)
HCT VFR BLD CALC: 20.5 % (ref 37–54)
HEMOGLOBIN: 6.3 G/DL (ref 12.5–16.5)
HEMOGLOBIN: 6.4 G/DL (ref 12.5–16.5)
IMMATURE GRANULOCYTES #: 0.11 E9/L
IMMATURE GRANULOCYTES %: 0.8 % (ref 0–5)
LYMPHOCYTES ABSOLUTE: 2.08 E9/L (ref 1.5–4)
LYMPHOCYTES RELATIVE PERCENT: 15.8 % (ref 20–42)
MAGNESIUM: 2.6 MG/DL (ref 1.6–2.6)
MCH RBC QN AUTO: 30.6 PG (ref 26–35)
MCHC RBC AUTO-ENTMCNC: 30.7 % (ref 32–34.5)
MCV RBC AUTO: 99.5 FL (ref 80–99.9)
METER GLUCOSE: 125 MG/DL (ref 74–99)
METER GLUCOSE: 132 MG/DL (ref 74–99)
METER GLUCOSE: 157 MG/DL (ref 74–99)
METER GLUCOSE: 95 MG/DL (ref 74–99)
MONOCYTES ABSOLUTE: 0.92 E9/L (ref 0.1–0.95)
MONOCYTES RELATIVE PERCENT: 7 % (ref 2–12)
NEUTROPHILS ABSOLUTE: 9.54 E9/L (ref 1.8–7.3)
NEUTROPHILS RELATIVE PERCENT: 72.3 % (ref 43–80)
PDW BLD-RTO: 16.8 FL (ref 11.5–15)
PHOSPHORUS: 4.6 MG/DL (ref 2.5–4.5)
PLATELET # BLD: 220 E9/L (ref 130–450)
PMV BLD AUTO: 10.8 FL (ref 7–12)
POTASSIUM SERPL-SCNC: 4.5 MMOL/L (ref 3.5–5)
RBC # BLD: 2.06 E12/L (ref 3.8–5.8)
SODIUM BLD-SCNC: 145 MMOL/L (ref 132–146)
TOTAL PROTEIN: 4.6 G/DL (ref 6.4–8.3)
WBC # BLD: 13.2 E9/L (ref 4.5–11.5)

## 2022-10-06 PROCEDURE — 6370000000 HC RX 637 (ALT 250 FOR IP): Performed by: INTERNAL MEDICINE

## 2022-10-06 PROCEDURE — 86923 COMPATIBILITY TEST ELECTRIC: CPT

## 2022-10-06 PROCEDURE — 97535 SELF CARE MNGMENT TRAINING: CPT

## 2022-10-06 PROCEDURE — 6370000000 HC RX 637 (ALT 250 FOR IP): Performed by: STUDENT IN AN ORGANIZED HEALTH CARE EDUCATION/TRAINING PROGRAM

## 2022-10-06 PROCEDURE — 6370000000 HC RX 637 (ALT 250 FOR IP): Performed by: NURSE PRACTITIONER

## 2022-10-06 PROCEDURE — 2700000000 HC OXYGEN THERAPY PER DAY

## 2022-10-06 PROCEDURE — S5553 INSULIN LONG ACTING 5 U: HCPCS | Performed by: INTERNAL MEDICINE

## 2022-10-06 PROCEDURE — 86901 BLOOD TYPING SEROLOGIC RH(D): CPT

## 2022-10-06 PROCEDURE — 84100 ASSAY OF PHOSPHORUS: CPT

## 2022-10-06 PROCEDURE — 36430 TRANSFUSION BLD/BLD COMPNT: CPT

## 2022-10-06 PROCEDURE — 6360000002 HC RX W HCPCS: Performed by: NURSE PRACTITIONER

## 2022-10-06 PROCEDURE — C9113 INJ PANTOPRAZOLE SODIUM, VIA: HCPCS | Performed by: NURSE PRACTITIONER

## 2022-10-06 PROCEDURE — 94660 CPAP INITIATION&MGMT: CPT

## 2022-10-06 PROCEDURE — 97530 THERAPEUTIC ACTIVITIES: CPT

## 2022-10-06 PROCEDURE — 86900 BLOOD TYPING SEROLOGIC ABO: CPT

## 2022-10-06 PROCEDURE — 82962 GLUCOSE BLOOD TEST: CPT

## 2022-10-06 PROCEDURE — P9016 RBC LEUKOCYTES REDUCED: HCPCS

## 2022-10-06 PROCEDURE — 86850 RBC ANTIBODY SCREEN: CPT

## 2022-10-06 PROCEDURE — 94640 AIRWAY INHALATION TREATMENT: CPT

## 2022-10-06 PROCEDURE — 85025 COMPLETE CBC W/AUTO DIFF WBC: CPT

## 2022-10-06 PROCEDURE — A4216 STERILE WATER/SALINE, 10 ML: HCPCS | Performed by: NURSE PRACTITIONER

## 2022-10-06 PROCEDURE — 85018 HEMOGLOBIN: CPT

## 2022-10-06 PROCEDURE — 2060000000 HC ICU INTERMEDIATE R&B

## 2022-10-06 PROCEDURE — 80053 COMPREHEN METABOLIC PANEL: CPT

## 2022-10-06 PROCEDURE — 83735 ASSAY OF MAGNESIUM: CPT

## 2022-10-06 PROCEDURE — 2580000003 HC RX 258: Performed by: NURSE PRACTITIONER

## 2022-10-06 PROCEDURE — 82330 ASSAY OF CALCIUM: CPT

## 2022-10-06 PROCEDURE — 6360000002 HC RX W HCPCS: Performed by: INTERNAL MEDICINE

## 2022-10-06 PROCEDURE — 2580000003 HC RX 258: Performed by: INTERNAL MEDICINE

## 2022-10-06 PROCEDURE — 85014 HEMATOCRIT: CPT

## 2022-10-06 RX ORDER — SODIUM CHLORIDE 9 MG/ML
INJECTION, SOLUTION INTRAVENOUS PRN
Status: DISCONTINUED | OUTPATIENT
Start: 2022-10-06 | End: 2022-10-10

## 2022-10-06 RX ORDER — CIPROFLOXACIN 500 MG/1
500 TABLET, FILM COATED ORAL EVERY 12 HOURS SCHEDULED
Status: DISCONTINUED | OUTPATIENT
Start: 2022-10-06 | End: 2022-10-12

## 2022-10-06 RX ADMIN — SENNOSIDES 5 ML: 8.8 SYRUP ORAL at 20:27

## 2022-10-06 RX ADMIN — GABAPENTIN 600 MG: 600 TABLET, FILM COATED ORAL at 17:00

## 2022-10-06 RX ADMIN — CLONIDINE HYDROCHLORIDE 0.1 MG: 0.1 TABLET ORAL at 13:27

## 2022-10-06 RX ADMIN — CETIRIZINE HYDROCHLORIDE 10 MG: 10 TABLET, FILM COATED ORAL at 09:52

## 2022-10-06 RX ADMIN — CARBIDOPA AND LEVODOPA 1 TABLET: 25; 100 TABLET ORAL at 20:24

## 2022-10-06 RX ADMIN — HYDROCODONE BITARTRATE AND ACETAMINOPHEN 1 TABLET: 7.5; 325 TABLET ORAL at 20:26

## 2022-10-06 RX ADMIN — CIPROFLOXACIN 500 MG: 500 TABLET, FILM COATED ORAL at 20:26

## 2022-10-06 RX ADMIN — EZETIMIBE 10 MG: 10 TABLET ORAL at 09:52

## 2022-10-06 RX ADMIN — PIPERACILLIN AND TAZOBACTAM 3375 MG: 3; .375 INJECTION, POWDER, FOR SOLUTION INTRAVENOUS at 06:25

## 2022-10-06 RX ADMIN — INSULIN GLARGINE-YFGN 32 UNITS: 100 INJECTION, SOLUTION SUBCUTANEOUS at 09:59

## 2022-10-06 RX ADMIN — HYDROCODONE BITARTRATE AND ACETAMINOPHEN 1 TABLET: 7.5; 325 TABLET ORAL at 09:50

## 2022-10-06 RX ADMIN — BENZONATATE 100 MG: 100 CAPSULE ORAL at 13:43

## 2022-10-06 RX ADMIN — BENZONATATE 100 MG: 100 CAPSULE ORAL at 20:26

## 2022-10-06 RX ADMIN — GABAPENTIN 600 MG: 600 TABLET, FILM COATED ORAL at 09:49

## 2022-10-06 RX ADMIN — HYDRALAZINE HYDROCHLORIDE 50 MG: 50 TABLET, FILM COATED ORAL at 20:25

## 2022-10-06 RX ADMIN — SODIUM CHLORIDE, PRESERVATIVE FREE 40 MG: 5 INJECTION INTRAVENOUS at 13:41

## 2022-10-06 RX ADMIN — IPRATROPIUM BROMIDE AND ALBUTEROL SULFATE 1 AMPULE: .5; 2.5 SOLUTION RESPIRATORY (INHALATION) at 15:46

## 2022-10-06 RX ADMIN — HYDRALAZINE HYDROCHLORIDE 50 MG: 50 TABLET, FILM COATED ORAL at 13:29

## 2022-10-06 RX ADMIN — CIPROFLOXACIN 500 MG: 500 TABLET, FILM COATED ORAL at 13:27

## 2022-10-06 RX ADMIN — GABAPENTIN 600 MG: 600 TABLET, FILM COATED ORAL at 20:25

## 2022-10-06 RX ADMIN — METOPROLOL SUCCINATE 25 MG: 25 TABLET, EXTENDED RELEASE ORAL at 09:51

## 2022-10-06 RX ADMIN — ASPIRIN 81 MG: 81 TABLET, COATED ORAL at 09:49

## 2022-10-06 RX ADMIN — TICAGRELOR 90 MG: 90 TABLET ORAL at 20:25

## 2022-10-06 RX ADMIN — IPRATROPIUM BROMIDE AND ALBUTEROL SULFATE 1 AMPULE: .5; 2.5 SOLUTION RESPIRATORY (INHALATION) at 19:34

## 2022-10-06 RX ADMIN — TICAGRELOR 90 MG: 90 TABLET ORAL at 09:51

## 2022-10-06 RX ADMIN — HYDRALAZINE HYDROCHLORIDE 50 MG: 50 TABLET, FILM COATED ORAL at 06:00

## 2022-10-06 RX ADMIN — LACTULOSE 20 G: 20 SOLUTION ORAL at 09:48

## 2022-10-06 RX ADMIN — CARBIDOPA AND LEVODOPA 1 TABLET: 25; 100 TABLET ORAL at 13:40

## 2022-10-06 RX ADMIN — ANTI-FUNGAL POWDER MICONAZOLE NITRATE TALC FREE: 1.42 POWDER TOPICAL at 20:28

## 2022-10-06 RX ADMIN — LINEZOLID 600 MG: 600 TABLET, FILM COATED ORAL at 09:49

## 2022-10-06 RX ADMIN — LINEZOLID 600 MG: 600 TABLET, FILM COATED ORAL at 20:26

## 2022-10-06 RX ADMIN — AMLODIPINE BESYLATE 10 MG: 10 TABLET ORAL at 09:59

## 2022-10-06 RX ADMIN — LACTULOSE 20 G: 20 SOLUTION ORAL at 20:27

## 2022-10-06 RX ADMIN — CARBIDOPA AND LEVODOPA 1 TABLET: 25; 100 TABLET ORAL at 09:51

## 2022-10-06 RX ADMIN — BENZONATATE 100 MG: 100 CAPSULE ORAL at 09:50

## 2022-10-06 RX ADMIN — ATORVASTATIN CALCIUM 80 MG: 40 TABLET, FILM COATED ORAL at 20:25

## 2022-10-06 RX ADMIN — ANTI-FUNGAL POWDER MICONAZOLE NITRATE TALC FREE: 1.42 POWDER TOPICAL at 09:54

## 2022-10-06 RX ADMIN — GABAPENTIN 600 MG: 600 TABLET, FILM COATED ORAL at 13:41

## 2022-10-06 RX ADMIN — SODIUM CHLORIDE, PRESERVATIVE FREE 40 MG: 5 INJECTION INTRAVENOUS at 03:40

## 2022-10-06 ASSESSMENT — PAIN SCALES - GENERAL: PAINLEVEL_OUTOF10: 0

## 2022-10-06 NOTE — PLAN OF CARE
Problem: Discharge Planning  Goal: Discharge to home or other facility with appropriate resources  Outcome: Progressing  Flowsheets (Taken 10/6/2022 0720)  Discharge to home or other facility with appropriate resources: Identify barriers to discharge with patient and caregiver     Problem: Pain  Goal: Verbalizes/displays adequate comfort level or baseline comfort level  Outcome: Progressing  Flowsheets (Taken 10/5/2022 2000 by Anna Rojo)  Verbalizes/displays adequate comfort level or baseline comfort level:   Encourage patient to monitor pain and request assistance   Assess pain using appropriate pain scale   Administer analgesics based on type and severity of pain and evaluate response     Problem: Skin/Tissue Integrity  Goal: Absence of new skin breakdown  Description: 1. Monitor for areas of redness and/or skin breakdown  2. Assess vascular access sites hourly  3. Every 4-6 hours minimum:  Change oxygen saturation probe site  4. Every 4-6 hours:  If on nasal continuous positive airway pressure, respiratory therapy assess nares and determine need for appliance change or resting period.   Outcome: Progressing     Problem: Safety - Adult  Goal: Free from fall injury  Outcome: Progressing  Flowsheets (Taken 10/5/2022 2123 by Anna Rojo)  Free From Fall Injury: Instruct family/caregiver on patient safety     Problem: ABCDS Injury Assessment  Goal: Absence of physical injury  Outcome: Progressing  Flowsheets (Taken 10/5/2022 2123 by Anna Rojo)  Absence of Physical Injury: Implement safety measures based on patient assessment     Problem: Chronic Conditions and Co-morbidities  Goal: Patient's chronic conditions and co-morbidity symptoms are monitored and maintained or improved  Outcome: Progressing  Flowsheets (Taken 10/6/2022 0720)  Care Plan - Patient's Chronic Conditions and Co-Morbidity Symptoms are Monitored and Maintained or Improved: Monitor and assess patient's chronic conditions and comorbid symptoms for stability, deterioration, or improvement     Problem: Neurosensory - Adult  Goal: Achieves stable or improved neurological status  Outcome: Progressing  Flowsheets (Taken 10/6/2022 0720)  Achieves stable or improved neurological status: Assess for and report changes in neurological status  Goal: Remains free of injury related to seizures activity  Outcome: Progressing  Flowsheets (Taken 10/6/2022 0720)  Remains free of injury related to seizure activity: Maintain airway, patient safety  and administer oxygen as ordered  Goal: Achieves maximal functionality and self care  Outcome: Progressing  Flowsheets (Taken 10/6/2022 0720)  Achieves maximal functionality and self care: Monitor swallowing and airway patency with patient fatigue and changes in neurological status     Problem: Respiratory - Adult  Goal: Achieves optimal ventilation and oxygenation  Outcome: Progressing  Flowsheets (Taken 10/6/2022 0720)  Achieves optimal ventilation and oxygenation: Assess for changes in respiratory status     Problem: Cardiovascular - Adult  Goal: Maintains optimal cardiac output and hemodynamic stability  Outcome: Progressing  Flowsheets (Taken 10/6/2022 0720)  Maintains optimal cardiac output and hemodynamic stability: Monitor blood pressure and heart rate  Goal: Absence of cardiac dysrhythmias or at baseline  Outcome: Progressing  Flowsheets (Taken 10/6/2022 0720)  Absence of cardiac dysrhythmias or at baseline: Monitor cardiac rate and rhythm     Problem: Skin/Tissue Integrity - Adult  Goal: Skin integrity remains intact  Outcome: Progressing  Flowsheets (Taken 10/6/2022 0720)  Skin Integrity Remains Intact: Monitor for areas of redness and/or skin breakdown  Goal: Incisions, wounds, or drain sites healing without S/S of infection  Outcome: Progressing  Flowsheets (Taken 10/6/2022 0720)  Incisions, Wounds, or Drain Sites Healing Without Sign and Symptoms of Infection: TWICE DAILY: Assess and document skin integrity  Goal: Oral mucous membranes remain intact  Outcome: Progressing  Flowsheets (Taken 10/6/2022 0720)  Oral Mucous Membranes Remain Intact: Assess oral mucosa and hygiene practices     Problem: Metabolic/Fluid and Electrolytes - Adult  Goal: Electrolytes maintained within normal limits  Outcome: Progressing  Flowsheets (Taken 10/6/2022 0720)  Electrolytes maintained within normal limits: Monitor labs and assess patient for signs and symptoms of electrolyte imbalances  Goal: Hemodynamic stability and optimal renal function maintained  Outcome: Progressing  Flowsheets (Taken 10/6/2022 0720)  Hemodynamic stability and optimal renal function maintained: Monitor labs and assess for signs and symptoms of volume excess or deficit  Goal: Glucose maintained within prescribed range  Outcome: Progressing  Flowsheets (Taken 10/6/2022 0720)  Glucose maintained within prescribed range: Monitor blood glucose as ordered     Problem: Hematologic - Adult  Goal: Maintains hematologic stability  Outcome: Progressing  Flowsheets (Taken 10/6/2022 0720)  Maintains hematologic stability: Assess for signs and symptoms of bleeding or hemorrhage     Problem: Nutrition Deficit:  Goal: Optimize nutritional status  Outcome: Progressing  Flowsheets (Taken 10/6/2022 1401)  Nutrient intake appropriate for improving, restoring, or maintaining nutritional needs:   Assess nutritional status and recommend course of action   Monitor oral intake, labs, and treatment plans   Recommend appropriate diets, oral nutritional supplements, and vitamin/mineral supplements

## 2022-10-06 NOTE — DISCHARGE INSTR - COC
Continuity of Care Form    Patient Name: Renetta Johnson   :    MRN:  [id number]    [facility] date:  2022  Discharge date:  10-25-22    Code Status Order: Full Code   Advance Directives:     Admitting Physician:  Angela Joseph DO  PCP: Abhilash Maya MD    Discharging Nurse: [facility] Unit/Room#: 4550/7516-I  Discharging Unit Phone Number: [phone]    Emergency Contact:   Extended Emergency Contact Information  Primary Emergency Contact: [address] [place] [address] Phone: [phone]  Relation: Other  Secondary Emergency Contact: [facility]  [employer] Phone: [phone]  Relation: Other    Past Surgical History:  Past Surgical History:   Procedure Laterality Date    FEMORAL ARTERY STENT      IR CAROTID STENT UNI W PROTECTION  2022    IR CAROTID STENT UNI W PROTECTION 2022 Caio Lee MD [facility]       Immunization History: There is no immunization history on file for this patient.     Active Problems:  Patient Active Problem List   Diagnosis Code    Acute cerebrovascular accident (CVA) ([facility] [address] I63.9    Acute respiratory failure with hypoxia (HCC) J96.01    Stenosis of left carotid artery I65.22    Hypoalbuminemia E88.09    Electrolyte imbalance E87.8    Hypernatremia E87.0       Isolation/Infection:   Isolation            No Isolation          Patient Infection Status       Infection Onset Added Last Indicated Last Indicated By Review Planned Expiration Resolved Resolved By    None active    Resolved    COVID-19 (Rule Out) 10/01/22 10/01/22 10/01/22 COVID-19, Rapid (Ordered)   10/01/22 Rule-Out Test Resulted            Nurse Assessment:  Last Vital Signs: BP (!) 148/68   Pulse 66   Temp 97.8 °F (36.6 °C) (Oral)   Resp (!) 0   Ht 5' 8\" (1.727 m)   Wt 260 lb 8 oz (118.2 kg)   SpO2 100%   BMI 39.61 kg/m²     Last documented pain score (0-10 scale): Pain Level: 7  Last Weight:   Wt Readings from Last 1 Encounters:   22 260 lb 8 oz (118.2 kg) Mental Status:  disoriented and alert    IV Access:  - None    Nursing Mobility/ADLs:  Walking   Dependent  Transfer  Dependent  Bathing  Dependent  Dressing  Dependent  Toileting  Dependent  Feeding  Assisted  Med Admin  Dependent  Med Delivery   whole and prefers mixed with applesauce    Wound Care Documentation and Therapy:  Negative Pressure Wound Therapy Foot Right;Plantar;Lateral (Active)   $ Standard NPWT <=50 sq cm PER TX $ Yes 09/28/22 1040   Wound Type Diabetic foot ulcer 10/06/22 0400   Dressing Type Black Foam 10/06/22 0400   Number of pieces used 3 10/06/22 0400   Cycle Continuous 10/06/22 0400   Target Pressure (mmHg) 125 10/06/22 0400   Canister changed? No 10/06/22 0400   Dressing Status Clean, dry & intact 10/06/22 0400   Dressing Changed Other (Comment) 10/06/22 0400   Drainage Amount Scant 10/06/22 0400   Dressing Change Due 10/05/22 10/06/22 0400   Output (ml) 0 ml 10/05/22 1500   Wound Assessment Other (Comment) 10/06/22 0400   Emily-wound Assessment Other (Comment) 10/06/22 0400   Number of days: 8       Wound 09/28/22 Foot Right;Plantar;Lateral (Active)   Wound Image   09/28/22 1040   Wound Etiology Diabetic 10/06/22 0400   Dressing Status Intact; Clean 10/06/22 0400   Wound Cleansed Irrigated with saline 10/05/22 0945   Dressing/Treatment Negative pressure wound therapy 10/06/22 0400   Dressing Change Due 10/03/22 10/06/22 0400   Wound Length (cm) 3.4 cm 10/05/22 0945   Wound Width (cm) 3.4 cm 10/05/22 0945   Wound Depth (cm) 1 cm 10/05/22 0945   Wound Surface Area (cm^2) 11.56 cm^2 10/05/22 0945   Change in Wound Size % (l*w) 40.29 10/05/22 0945   Wound Volume (cm^3) 11.56 cm^3 10/05/22 0945   Wound Healing % 70 10/05/22 0945   Wound Assessment Other (Comment) 10/06/22 0400   Drainage Amount None 10/06/22 0400   Drainage Description Serosanguinous 10/06/22 0400   Odor None 10/06/22 0400   Emily-wound Assessment Dry/flaky;Fragile 10/06/22 0400   Margins Other (Comment) 10/06/22 0400   Number of days: 8        Elimination:  Continence: Bowel: No  Bladder: No  Urinary Catheter: Insertion Date: 09-29-22    Colostomy/Ileostomy/Ileal Conduit: No       Date of Last BM: 10-18-22    Intake/Output Summary (Last 24 hours) at 10/6/2022 1553  Last data filed at 10/6/2022 1400  Gross per 24 hour   Intake 863.5 ml   Output 2250 ml   Net -1386.5 ml     I/O last 3 completed shifts: In: 2665.4 [I.V.:2350.3; IV Piggyback:315.1]  Out: 3800 [Urine:3800]    Safety Concerns: At Risk for Falls    Impairments/Disabilities:      Speech    Nutrition Therapy:  Current Nutrition Therapy:   - Oral Diet:  Dysphagia 2 mechanically altered    Routes of Feeding: Oral  Liquids: Thin Liquids  Daily Fluid Restriction: no  Last Modified Barium Swallow with Video (Video Swallowing Test): done on [id number]/***    Treatments at the Time of Hospital Discharge:   Respiratory Treatments: yes  Oxygen Therapy:  is not on home oxygen therapy.   Ventilator:    - No ventilator support    Rehab Therapies: Physical Therapy and Occupational Therapy  Weight Bearing Status/Restrictions: Non-weight bearing on right leg  Other Medical Equipment (for information only, NOT a DME order):  hospital bed  Other Treatments: wound care rtight foot     Patient's personal belongings (please select all that are sent with patient):  None    RN SIGNATURE:  Electronically signed by Jarek Kruse RN on 10/25/22 at 7:24 PM EDT    CASE MANAGEMENT/SOCIAL WORK SECTION    Inpatient Status Date: 9/27/2022    Readmission Risk Assessment Score:  Readmission Risk              Risk of Unplanned Readmission:  28           Discharging to Facility/ Agency   Name: [facility] [address]    Dialysis Facility (if applicable)   Name:  Address:  Dialysis Schedule:  Phone:  Fax:    / signature: Electronically signed by ERI Pimentel on 10/24/2022 at 11:22 AM      PHYSICIAN SECTION    Prognosis: Good    Condition at Discharge: Stable    Rehab Potential (if transferring to Rehab): Good    Recommended Labs or Other Treatments After Discharge:   Check H&H in 3-5 days  Follow up with nephrology, neurology, and podiatry as soon as possible after discharge. OK to resume Plavix 10/25      Physician Certification: I certify the above information and transfer of Bia Peer  is necessary for the continuing treatment of the diagnosis listed and that he requires [facility] for less 30 days.      Update Admission H&P: No change in H&P    PHYSICIAN SIGNATURE:  Electronically signed by CONSTANTINE Silva NP on 10/24/22 at 11:56 AM EDT

## 2022-10-06 NOTE — PROGRESS NOTES
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 male history of CAD s/p MI, hypertension, chronic kidney disease stage IIIa (baseline creatinine of recent 1.7-1.9) he is followed by our group with Dr. Mary Johns. He initially was admitted to [facility] with right lower extremity wound. .  Patient subsequently developed aphasia noted to facial droop associated with right-sided weakness. He was transferred to [address] [place] [address] for further management. Patient was admitted told the neuro ICU. He required intubation with mechanical ventilation. CT of the head and neck demonstrated bilateral carotid stenosis with the left ICA being dominant. IR performed angiogram with angioplasty of the left ICA. Laboratory data showed progressive increase in his serum creatinine to currently 2.9 mg/dL. Hemoglobin was noted to be 5.6. He has received at least 2 units of packed cells but with further drop in his hemoglobin with requirement for additional transfusion. He had an episode of urinary retention with gross hematuria Mahan catheter was reinserted and the hematuria subsequently cleared. Renal is consulted for LARY.      Subjective     10/1: he denies blurred vision, no headaches  10/2: No new acute issues from overnight; more alert; receiving PRBCs hemoglobin trending low  10/3: pt seen sp egd today, no cp or sob, family and nurse in room, no cp or sob  10/4: pt seen in room, no complaints  10/5: pt seen in icu, feels ok today, no cp or sob  10/6: pt seen in nsicu, no cp or sob, npo, wants to eat         PROBLEM LIST:    Patient Active Problem List   Diagnosis    Acute cerebrovascular accident (CVA) ([address]    Acute respiratory failure with hypoxia ([address]    Stenosis of left carotid artery    Hypoalbuminemia    Electrolyte imbalance    Hypernatremia        PAST MEDICAL HISTORY:    Past Medical History:   Diagnosis Date Chronic back pain     CKD (chronic kidney disease)     CVA (cerebral vascular accident) ([facility]     CVA (cerebral vascular accident) ([facility]     DM (diabetes mellitus) ([facility]     Major depression     MI (myocardial infarction) ([facility]     MATT (obstructive sleep apnea)     Parkinson disease (H[facility])     PVD (peripheral vascular disease) ([facility]     Seizure ([facility]        DIET:    Diet NPO Exceptions are: Sips of Water with Meds     PHYSICAL EXAM:     Patient Vitals for the past 24 hrs:   BP Temp Temp src Pulse Resp SpO2   10/06/22 0800 (!) 131/58 -- -- 66 14 99 %   10/06/22 0400 (!) 120/54 -- -- 58 13 96 %   10/06/22 0009 -- -- -- 67 21 100 %   10/06/22 0000 109/61 -- -- 66 18 100 %   10/05/22 2155 -- -- -- 65 26 100 %   10/05/22 2026 -- -- -- -- 16 --   10/05/22 2000 (!) 113/58 97.9 °F (36.6 °C) Oral 64 19 100 %   10/05/22 1600 130/64 97.5 °F (36.4 °C) Oral 61 14 96 %   10/05/22 1439 (!) 142/72 -- -- 66 25 --   10/05/22 1305 (!) 157/64 -- -- 65 19 --   10/05/22 1207 (!) 126/58 -- -- -- -- --   10/05/22 1200 (!) 126/58 97.3 °F (36.3 °C) Oral 66 19 98 %   @      Intake/Output Summary (Last 24 hours) at 10/6/2022 1055  Last data filed at 10/6/2022 0700  Gross per 24 hour   Intake 2665.36 ml   Output 1975 ml   Net 690.36 ml         Wt Readings from Last 3 Encounters:   09/27/22 260 lb 8 oz (118.2 kg)       Constitutional:  Pt is in no acute distress  Head: normocephalic, atraumatic  Neck: no JVD  Cardiovascular: regular rate and rhythm, no murmurs, gallops, or rubs  Respiratory:  No rales, rhochi, or wheezes  Gastrointestinal:  Soft, nontender, nondistended, bowel sounds x 4  Ext: tr edema  Skin: dry, no rash  Neuro: aaox3    MEDS (scheduled):    miconazole   Topical BID    insulin glargine  32 Units SubCUTAneous QAM    epoetin sharath-epbx  6,000 Units SubCUTAneous Once per day on Mon Wed Fri    amLODIPine  10 mg Per NG tube Daily    piperacillin-tazobactam  3,375 mg IntraVENous Q8H    linezolid  600 mg Oral 2 times per day cloNIDine  0.1 mg Oral Q12H    hydrALAZINE  50 mg Oral 3 times per day    lactulose  20 g Oral BID    insulin lispro  0-16 Units SubCUTAneous 4x Daily AC & HS    HYDROcodone-acetaminophen  1 tablet Oral BID    pantoprazole (PROTONIX) 40 mg injection  40 mg IntraVENous Q12H    metoprolol succinate  25 mg Oral Daily    sennosides  5 mL Oral Nightly    carbidopa-levodopa  1 tablet Per NG tube TID    ipratropium-albuterol  1 ampule Inhalation Q4H WA    collagenase   Topical Q MWF    benzonatate  100 mg Oral TID    cetirizine  10 mg Oral Daily    ezetimibe  10 mg Oral Daily    gabapentin  600 mg Oral 4x Daily    ticagrelor  90 mg Oral BID    aspirin  81 mg Oral Daily    Or    aspirin  300 mg Rectal Daily    atorvastatin  80 mg Oral Nightly       MEDS (infusions):   sodium chloride      sodium chloride      dextrose 100 mL/hr at 10/06/22 0637    sodium chloride      sodium chloride      dextrose         MEDS (prn):  sodium chloride, sodium chloride, sodium chloride, fentanNYL, sodium chloride, labetalol, hydrALAZINE, glucose, dextrose bolus **OR** dextrose bolus, glucagon (rDNA), dextrose, ondansetron **OR** ondansetron    DATA:    Recent Labs     10/04/22  0530 10/05/22  0526 10/06/22  0610 10/06/22  0844   WBC 14.2* 17.1* 13.2*  --    HGB 7.0* 6.8* 6.3* 6.4*   HCT 22.2* 22.0* 20.5* 20.1*   MCV 95.7 97.8 99.5  --     252 220  --      Recent Labs     10/04/22  0530 10/05/22  0526 10/06/22  0610    141 145   K 5.0 4.4 4.5   * 111* 114*   CO2 20* 21* 21*   * 105* 95*   CREATININE 2.6* 2.5* 2.5*   LABGLOM 25 26 26   GLUCOSE 199* 187* 91   CALCIUM 8.3* 8.2* 8.1*   ALT 12 17 <5   AST 27 18 15   BILITOT 0.3 0.2 0.3   ALKPHOS 290* 233* 192*   MG 2.6 2.4 2.6   PHOS 4.5 4.5 4.6*       Lab Results   Component Value Date    LABALBU 2.5 (L) 10/06/2022    LABALBU 2.8 (L) 10/05/2022    LABALBU 2.7 (L) 10/04/2022     No results found for: TSH    Iron Studies  Lab Results   Component Value Date    IRON 24 (L) 10/03/2022    TIBC 195 (L) 10/03/2022    FERRITIN 94 10/03/2022     No results found for: ATXAWZLL01  No results found for: FOLATE    No results found for: VITD25  No results found for: PTH    No components found for: URIC    No results found for: VOL, APPEARANCE, COLORU, LABSPEC, LABPH, LEUKBLD, NITRU, GLUCOSEU, KETUA, UROBILINOGEN, KETUA, UROBILINOGEN, BILIRUBINUR, OCBU    No results found for: LIPIDPAN      IMPRESSION/RECOMMENDATIONS:      1. Acute CVA  S/p IR intervention with angioplasty of the left ICA     2. LARY on CKD stage 3b  Baseline 1.7-1.9  combined effects of ACEI use and contrast nephrotoxicity  Component of urinary retention  nonoliguric at this time  Disproportionate elevation of BUN relative to Cr suspicious for gib  stool heme positive  Worsening renal unction despite being nonoliguric  May require dialysis support  Monitor labs and urine  1.4L  Bun/cr  128/3 >115/2.6> 105/2.5>95/2.5  Uo 1800     3. Hypertensive emergency  presenting  with acute CVA  Adjust meds to target blood pressure control to SBP less than 160  BP with improved control     4. Anemia, chronic with acute worsening  Informed stool heme positive  PRBC transfusion as needed  Sp egd  Trf < 7  On ASHLEY      5. Type 2 diabetes mellitus  Monitor glucose levels     6. Hyperkalemia  Due to combination of LARY and metabolic acidosis; PRBC transfusios  Lokelma  prn     7. Urinary retention  Suspected situational  Now with trejo  Urology following     8. Hypernatremia  Suspect intervascular volume depletion  Started d5  Na 151>145>141>145  Changed to d5        [employer].  Corey Brady MD

## 2022-10-06 NOTE — PROGRESS NOTES
Hospitalist Progress Note      SYNOPSIS: Patient admitted on 2022 for Acute cerebrovascular accident (CVA) [facility])  Patient presented to [facility] with strokelike symptoms, right-sided paralysis and aphasia. Was found to have severe left ICA stenosis and taken to IR for left ICA angioplasty with stent placement. Patient was intubated and sedated and transferred to the ICU. SUBJECTIVE:    Patient is awake and alert   Denies chest pain or shortness of breath  Hemoglobin this morning 6.4  Swallow study was completed yesterday  As patient was n.p.o. due to witnessed episodes of what seemed like aspiration  Speech therapist recommends minced and moist solids consistency  With thin liquids    With the stroke diagnosis confirmed dual anti-platelet therapy has been recommended  In the setting of anemia  Patient underwent EGD earlier this admission October 3  The EGD did not reveal evidence of upper GI bleed    Temp (24hrs), Av.8 °F (36.6 °C), Min:97.5 °F (36.4 °C), Max:97.9 °F (36.6 °C)    DIET: Diet NPO Exceptions are: Sips of Water with Meds  CODE: Full Code    OBJECTIVE:    BP (!) 131/95   Pulse 69   Temp 97.8 °F (36.6 °C) (Oral)   Resp 22   Ht 5' 8\" (1.727 m)   Wt 260 lb 8 oz (118.2 kg)   SpO2 100%   BMI 39.61 kg/m²     General appearance: Not jaundiced not diaphoretic     HEENT: No obvious swelling to lips or tongue  Respiratory: Clear to auscultation bilaterally, unlabored respiratory pattern at rest  Cardiovascular: Audible S1-S2 no lower extremity edema     Musculoskeletal:   Skin: No petechiae or hives on inspection warm to palpation  Neurologic: awake, alert speech is clear  ASSESSMENT:    Anemia normocytic which is required blood transfusion  Acute CVA-   severe left ICA stenosis and taken to IR for left ICA angioplasty with stent placement.     PAD  Acute kidney injury  Hypertensive urgency  Right foot wound infection/diabetic ulcer  Urine retention  dysphagia  Hypernatremia-resolved  Hypokalemia-resolved       PLAN:    Transfuse 1 u prbs  Ppi   Patient can eat with diet consitencies recommended by speech therap    Options seem to be somewhat limited for this patient given acute CVA/persist anemia/recent acute CVA and need for MARY ANN  DISPOSITION: not stable for dc today 2/2 anemia    Medications:  REVIEWED DAILY    Infusion Medications    sodium chloride      sodium chloride      dextrose 100 mL/hr at 10/06/22 0637    sodium chloride      sodium chloride      dextrose       Scheduled Medications    ciprofloxacin  500 mg Oral 2 times per day    miconazole   Topical BID    insulin glargine  32 Units SubCUTAneous QAM    epoetin sharath-epbx  6,000 Units SubCUTAneous Once per day on Mon Wed Fri    amLODIPine  10 mg Per NG tube Daily    linezolid  600 mg Oral 2 times per day    cloNIDine  0.1 mg Oral Q12H    hydrALAZINE  50 mg Oral 3 times per day    lactulose  20 g Oral BID    insulin lispro  0-16 Units SubCUTAneous 4x Daily AC & HS    HYDROcodone-acetaminophen  1 tablet Oral BID    pantoprazole (PROTONIX) 40 mg injection  40 mg IntraVENous Q12H    metoprolol succinate  25 mg Oral Daily    sennosides  5 mL Oral Nightly    carbidopa-levodopa  1 tablet Per NG tube TID    ipratropium-albuterol  1 ampule Inhalation Q4H WA    collagenase   Topical Q MWF    benzonatate  100 mg Oral TID    cetirizine  10 mg Oral Daily    ezetimibe  10 mg Oral Daily    gabapentin  600 mg Oral 4x Daily    ticagrelor  90 mg Oral BID    aspirin  81 mg Oral Daily    Or    aspirin  300 mg Rectal Daily    atorvastatin  80 mg Oral Nightly     PRN Meds: sodium chloride, sodium chloride, sodium chloride, fentanNYL, sodium chloride, labetalol, hydrALAZINE, glucose, dextrose bolus **OR** dextrose bolus, glucagon (rDNA), dextrose, ondansetron **OR** ondansetron    Labs:     Recent Labs     10/04/22  0530 10/05/22  0526 10/06/22  0610 10/06/22  0844   WBC 14.2* 17.1* 13.2*  --    HGB 7.0* 6.8* 6. 3* 6.4*   HCT 22.2* 22.0* 20.5* 20.1*    252 220  --        Recent Labs     10/04/22  0530 10/05/22  0526 10/06/22  0610    141 145   K 5.0 4.4 4.5   * 111* 114*   CO2 20* 21* 21*   * 105* 95*   CREATININE 2.6* 2.5* 2.5*   CALCIUM 8.3* 8.2* 8.1*   PHOS 4.5 4.5 4.6*       Recent Labs     10/04/22  0530 10/05/22  0526 10/06/22  0610   PROT 5.2* 5.3* 4.6*   ALKPHOS 290* 233* 192*   ALT 12 17 <5   AST 27 18 15   BILITOT 0.3 0.2 0.3           Radiology:     +++++++++++++++++++++++++++++++++++++++++++++++++  DO Emil Orozco Physician - [address] [place], [place]  +++++++++++++++++++++++++++++++++++++++++++++++++  NOTE: This report was transcribed using voice recognition software. Every effort was made to ensure accuracy; however, inadvertent computerized transcription errors may be present.

## 2022-10-06 NOTE — PROGRESS NOTES
Department of Internal Medicine  Infectious Diseases   Progress Note      C/C :  Right foot  wound infection     Denies fever or chills  Reports pain   Afebrile      Current Facility-Administered Medications   Medication Dose Route Frequency Provider Last Rate Last Admin    0.9 % sodium chloride infusion   IntraVENous PRN Yolande Hams, DO        miconazole (MICOTIN) 2 % powder   Topical BID Yolande Hams, DO   Given at 10/06/22 0954    insulin glargine-yfgn (SEMGLEE-YFGN) injection vial 32 Units  32 Units SubCUTAneous QAM Yolande Tony, DO   32 Units at 10/06/22 0959    0.9 % sodium chloride infusion   IntraVENous PRN Elmo Anderson DO        dextrose 5 % solution   IntraVENous Continuous Opal MD Yajaira 100 mL/hr at 10/06/22 1883 Rate Verify at 10/06/22 0637    0.9 % sodium chloride infusion   IntraVENous PRN Tita Locks, APRN - CNP        epoetin sharath-epbx (RETACRIT) injection 6,000 Units  6,000 Units SubCUTAneous Once per day on Mon Wed Fri Campos Alonso MD   6,000 Units at 10/05/22 0853    amLODIPine (NORVASC) tablet 10 mg  10 mg Per NG tube Daily Elmo Anderson DO   10 mg at 10/06/22 0959    piperacillin-tazobactam (ZOSYN) 3,375 mg in sodium chloride 0.9 % 50 mL IVPB ([id number])  3,375 mg IntraVENous Q8H Brian Youssef MD   Stopped at 10/06/22 0954    linezolid (ZYVOX) tablet 600 mg  600 mg Oral 2 times per day Rashid Walters MD   600 mg at 10/06/22 0949    cloNIDine (CATAPRES) tablet 0.1 mg  0.1 mg Oral Q12H Tita Locks, APRN - CNP   0.1 mg at 10/05/22 2327    hydrALAZINE (APRESOLINE) tablet 50 mg  50 mg Oral 3 times per day Tita Locks, APRN - CNP   50 mg at 10/06/22 0600    fentaNYL (SUBLIMAZE) injection 50 mcg  50 mcg IntraVENous Q2H PRN Tita Locks, APRN - CNP   50 mcg at 10/05/22 1956    0.9 % sodium chloride infusion   IntraVENous PRN Tita Locks, APRN - CNP        lactulose (CHRONULAC) 10 GM/15ML solution 20 g  20 g Oral BID CONSTANTINE Grant - CNP   20 g at 10/06/22 0948    insulin lispro (HUMALOG) injection vial 0-16 Units  0-16 Units SubCUTAneous 4x Daily AC & HS Divya Larsen APRN - CNP   8 Units at 10/04/22 2220    HYDROcodone-acetaminophen (NORCO) 7.5-325 MG per tablet 1 tablet  1 tablet Oral BID Divya Larsen APRN - CNP   1 tablet at 10/06/22 0950    pantoprazole (PROTONIX) 40 mg in sodium chloride (PF) 10 mL injection  40 mg IntraVENous Q12H Divya Larsen APRN - CNP   40 mg at 10/06/22 0340    metoprolol succinate (TOPROL XL) extended release tablet 25 mg  25 mg Oral Daily Divya Larsen APRN - CNP   25 mg at 10/06/22 0951    sennosides (SENOKOT) 8.8 MG/5ML syrup 5 mL  5 mL Oral Nightly Nani He MD   5 mL at 10/05/22 2103    labetalol (NORMODYNE;TRANDATE) injection 10 mg  10 mg IntraVENous Q30 Min PRN Nani He MD   10 mg at 10/01/22 1115    hydrALAZINE (APRESOLINE) injection 10 mg  10 mg IntraVENous Q30 Min PRN Nani He MD   10 mg at 09/30/22 1627    glucose chewable tablet 16 g  4 tablet Oral PRN Nani He MD        dextrose bolus 10% 125 mL  125 mL IntraVENous PRN Nani He MD        Or    dextrose bolus 10% 250 mL  250 mL IntraVENous PRN Nani He MD        glucagon (rDNA) injection 1 mg  1 mg SubCUTAneous PRN Nani He MD        dextrose 10 % infusion   IntraVENous Continuous PRN Nani He MD        carbidopa-levodopa (SINEMET)  MG per tablet 1 tablet  1 tablet Per NG tube TID Nani He MD   1 tablet at 10/06/22 0951    ipratropium-albuterol (DUONEB) nebulizer solution 1 ampule  1 ampule Inhalation Q4H WA Nani He MD   1 ampule at 10/05/22 2044    collagenase ointment   Topical Q MWF Nani He MD   Given by Other at 10/05/22 1208    benzonatate (TESSALON) capsule 100 mg  100 mg Oral TID Nani He MD   100 mg at 10/06/22 0950    cetirizine (ZYRTEC) tablet 10 mg  10 mg Oral Daily Nani He MD   10 mg at 10/06/22 6894 ezetimibe (ZETIA) tablet 10 mg  10 mg Oral Daily Juan A Smith MD   10 mg at 10/06/22 0952    gabapentin (NEURONTIN) tablet 600 mg  600 mg Oral 4x Daily Juan A Smith MD   600 mg at 10/06/22 0949    ticagrelor (BRILINTA) tablet 90 mg  90 mg Oral BID Juan A Smith MD   90 mg at 10/06/22 0951    ondansetron (ZOFRAN-ODT) disintegrating tablet 4 mg  4 mg Oral Q8H PRN Juan A Smith MD        Or    ondansetron [facility]) injection 4 mg  4 mg IntraVENous Q6H PRN Juan A Smith MD        aspirin EC tablet 81 mg  81 mg Oral Daily Juan A Smith MD   81 mg at 10/06/22 5906    Or    aspirin suppository 300 mg  300 mg Rectal Daily Juan A Smith MD   300 mg at 09/29/22 0809    atorvastatin (LIPITOR) tablet 80 mg  80 mg Oral Nightly Juan A Smith MD   80 mg at 10/05/22 2102           REVIEW OF SYSTEMS:   CONSTITUTIONAL:  Denies fever, chill or rigors. HEENT: denies blurring of vision or double vision, denies hearing problem  RESPIRATORY: denies cough, shortness of breath,  CARDIOVASCULAR:  Denies palpitation  GASTROINTESTINAL:  Denies abdomen pain, diarrhea or constipation. GENITOURINARY:  Denies burning urination or frequency of urination  INTEGUMENT: Right heel wound  HEMATOLOGIC/LYMPHATIC:  Denies lymph node swelling, gum bleeding or easy bruising. MUSCULOSKELETAL:  Denies leg pain , joint pain , joint swelling  NEUROLOGICAL:  weakness right side . PHYSICAL EXAM:      Vitals:     BP (!) 131/58   Pulse 66   Temp 97.9 °F (36.6 °C) (Oral)   Resp 14   Ht 5' 8\" (1.727 m)   Wt 260 lb 8 oz (118.2 kg)   SpO2 99%   BMI 39.61 kg/m²     General Appearance:    Awake, alert , no acute distress. Head:    Normocephalic, atraumatic   Eyes:    No pallor, no icterus,   Ears:    No obvious deformity or drainage.    Nose:   No nasal drainage   Throat:   Mucosa moist, no oral thrush   Neck:   Supple, no lymphadenopathy   Back:     no CVA tenderness   Lungs:     Clear to auscultation bilaterally, no wheeze Heart:    Regular rate and rhythm, no murmur   Abdomen:     Soft, non-tender, bowel sounds present    Extremities:    Right heel wound    Pulses:   Dorsalis pedis palpable - diminished    Skin:   Right heel wound with VAC              CBC with Differential:      Lab Results   Component Value Date/Time    WBC 13.2 10/06/2022 06:10 AM    RBC 2.06 10/06/2022 06:10 AM    HGB 6.4 10/06/2022 08:44 AM    HCT 20.1 10/06/2022 08:44 AM    HCT 15.0 09/27/2022 08:58 PM     10/06/2022 06:10 AM    MCV 99.5 10/06/2022 06:10 AM    MCH 30.6 10/06/2022 06:10 AM    MCHC 30.7 10/06/2022 06:10 AM    RDW 16.8 10/06/2022 06:10 AM    LYMPHOPCT 15.8 10/06/2022 06:10 AM    MONOPCT 7.0 10/06/2022 06:10 AM    BASOPCT 0.2 10/06/2022 06:10 AM    MONOSABS 0.92 10/06/2022 06:10 AM    LYMPHSABS 2.08 10/06/2022 06:10 AM    EOSABS 0.51 10/06/2022 06:10 AM    BASOSABS 0.03 10/06/2022 06:10 AM       CMP     Lab Results   Component Value Date/Time     10/06/2022 06:10 AM    K 4.5 10/06/2022 06:10 AM     10/06/2022 06:10 AM    CO2 21 10/06/2022 06:10 AM    BUN 95 10/06/2022 06:10 AM    CREATININE 2.5 10/06/2022 06:10 AM    GFRAA 31 10/06/2022 06:10 AM    LABGLOM 26 10/06/2022 06:10 AM    GLUCOSE 91 10/06/2022 06:10 AM    PROT 4.6 10/06/2022 06:10 AM    LABALBU 2.5 10/06/2022 06:10 AM    CALCIUM 8.1 10/06/2022 06:10 AM    BILITOT 0.3 10/06/2022 06:10 AM    ALKPHOS 192 10/06/2022 06:10 AM    AST 15 10/06/2022 06:10 AM    ALT <5 10/06/2022 06:10 AM         Hepatic Function Panel:    Lab Results   Component Value Date/Time    ALKPHOS 192 10/06/2022 06:10 AM    ALT <5 10/06/2022 06:10 AM    AST 15 10/06/2022 06:10 AM    PROT 4.6 10/06/2022 06:10 AM    BILITOT 0.3 10/06/2022 06:10 AM    LABALBU 2.5 10/06/2022 06:10 AM       PT/INR:    Lab Results   Component Value Date/Time    PROTIME 23.2 09/27/2022 01:12 PM    INR 2.1 09/27/2022 01:12 PM       TSH:  No results found for: TSH    U/A:  No results found for: NITRITE, COLORU, PHUR, LABCAST, WBCUA, RBCUA, MUCUS, TRICHOMONAS, YEAST, BACTERIA, CLARITYU, SPECGRAV, LEUKOCYTESUR, UROBILINOGEN, BILIRUBINUR, BLOODU, GLUCOSEU, AMORPHOUS    ABG:  No results found for: CDR0GIE, BEART, K6CJEVYB, PHART, THGBART, QKJ2IAD, PO2ART, ECS7MKS    MICROBIOLOGY:    SARS CoV 2 neg     CRP 0.8      Radiology :    Chest X ray : LLL infiltrates, atelectasis       X ray foot :           No evidence of calcaneal osteomyelitis. IMPRESSION:     Right foot  non healing wound  Leukocytosis - was on steroid   ?  Aspiration       RECOMMENDATIONS:      Stop zosyn    Cipro 500 mg po q 12 hrs   Zyvox 600 mg po q 12 hs   Local wound care   CBC with diff

## 2022-10-06 NOTE — PROGRESS NOTES
OCCUPATIONAL THERAPY TREATMENT NOTE   NICHOLAS Lavelle [address] [address] [address]       Date:10/6/2022                                                               Patient Name: Ag Caldera  MRN: [id number]  : 1955  Room: [address] [place] [address]    Evaluating OT: Haydee Maier, ANNED,  OTR/L; [id number]     Referring Provider: Xin Chan DO   Specific Provider Orders/Date: OT eval and treat (10/3/22)        Diagnosis: Acute cerebrovascular accident (CVA) ([facility] [I63.9]      Reason for admission: Pt admitted with Acute CVA, R heel wound. Surgery/Procedures:   :  L ICA angioplasty and stent   10/3: EGD      Pertinent Medical History:    Past Medical History        Past Medical History:   Diagnosis Date    Chronic back pain      CKD (chronic kidney disease)      CVA (cerebral vascular accident) ([facility] [address]      CVA (cerebral vascular accident) ([facility]      DM (diabetes mellitus) ([address]      Major depression      MI (myocardial infarction) ([address]      MATT (obstructive sleep apnea)      Parkinson disease ([address]      PVD (peripheral vascular disease) ([address]      Seizure ([address]              *Precautions:  Fall Risk, O2, R shaista, expressive > receptive aphasia, RLE wound with wound vac, NWB RLE, off-loading shoe to RLE, dysphasia diet (minced/moist)     Assessment of current deficits   [x] Functional mobility          [x]ADLs           [x] Strength                  [x]Cognition   [x] Functional transfers        [x] IADLs         [x] Safety Awareness   [x]Endurance   [x] Fine Coordination           [x] ROM           [x] Vision/perception    []Sensation     [x]Gross Motor Coordination [x] Balance    [] Delirium                  [x]Motor Control     [x] Communication     OT PLAN OF CARE   OT POC based on physician orders, patient diagnosis and results of clinical assessment.         Frequency/Duration: 1-3 days/wk for 1-2 weeks PRN    Specific OT Treatment Interventions to include:   * Instruction/training on adapted ADL techniques and AE recommendations to increase functional independence within precautions       * Training on energy conservation strategies, correct breathing pattern and techniques to improve independence/tolerance for self-care routine  * Functional transfer/mobility training/DME recommendations for increased independence, safety, and fall prevention  * Patient/Family education to increase follow through with safety techniques and functional independence  * Recommendation of environmental modifications for increased safety with functional transfers/mobility and ADLs  * Cognitive retraining/development of therapeutic activities to improve problem solving, judgement, memory, and attention for increased safety/participation in ADL/IADL tasks  * Sensory re-education to improve body/limb awareness, maintain/improve skin integrity, and improve hand/UE motor function  * Visual-perceptual training to improve environmental scanning, visual attention/focus, and oculomotor skills for increased safety/independence with functional transfers/mobility and ADLs  * Splinting/positioning for increased function, prevention of contractures, and improve skin integrity  * Therapeutic exercise to improve motor endurance, ROM, and functional strength for ADLs/functional transfers  * Therapeutic activities to facilitate/challenge dynamic balance, stand tolerance for increased safety and independence with ADLs  * Therapeutic activities to facilitate gross/fine motor skills for increased independence with ADLs  * Neuro-muscular re-education: facilitation of righting/equilibrium reactions, midline orientation, scapular stability/mobility, normalization of muscle tone, and facilitation of volitional active controled movement  * Positioning to improve skin integrity, interaction with environment and functional independence  * Delirium prevention/treatment  * Manual techniques for edema management     Modified [place] Scale   Score     Description  0             No symptoms  1             No significant disability despite symptoms  2             Slight disability; able to look after own affairs  3             Moderate disability; able to ambulate without assist/ requires assist with ADLs  4             Moderate/Severe disability;requires assist to ambulate/assist with ADLs  5             Severe disability;bedridden/incontinent   6               Score:   4     Recommended Adaptive Equipment: tub bench, TBD pending progress      Home Living: Pt lives with family  in a 2 story home with 4+4-5 step(s) to enter and 1 rail(s); bed/bath on 2nd floor  Bathroom setup: tub/shower  Equipment owned: grab bars in shower, quad cane     Prior Level of Function: Ind with ADLs; Ind with IADLs. Quad cane for functional mobility. Driving: Unknown  Occupation: None stated     Pain Level: pt c/o \"a little\" pain in BLE     Cognition: A&O: 2/4; unable to report month/year; Follows 2 step commands; answers questions appropriately ~75% of time. Pt presents with min confusion during session. Memory: G-             Comprehension: F+             Problem solving: F             Judgement/safety: F                Communication skills: Impaired; difficulty with word finding- increased time; easily frustrated. Vision: Pt reports vision is \"less than perfect\" reporting he wears bifocals (not present); Impaired peripherals. Glasses: Yes                                                       Hearing: WFL               RASS: 0  CAM-ICU: (NT) Delirium     UE Assessment:  Hand Dominance: Right []  Left []   *Pt unable to report handedness this date d/t expressive aphasia, inappropriate responses x2. ROM Strength STM goal: PRN   RUE  AROM  Shoulder flx: 0-50  Elbow: ~20 AROM  Wrist: ~10 AROM  Hand: digits 4,5 flx contracture.      PROM  Shoulder: 0-110  Elbow: WFL  Wrist: WFL  Hand: WFL Shoulder: 2+/5  Elbow flx: 3/5; ext: 3/5  Wrist flx: 2-/5; ext: 1/5  Thumb: 2-/5  : Poor+  FMC: Poor  GMC: Fair- Increase RUE strength for participation in ADLs. Demonstrate good knowledge of adaptive techniques for dressing. Good understanding of AAROM exercises. LUE WFL Proximal 4+/5  Elbow: 5/5  : Good  FMC: WFL  GMC: WFL Increase overall LUE strength         Sensation: No c/o numbness/tingling in extremities. Light touch intact to RUE  Tone: Hypertonicity in RUE flexors. Edema: Unremarkable. Functional Assessment:  AM-PAC Daily Activity Raw Score: 12/24    Initial Eval Status  Date: 10/3/22 Treatment Status  Date: 10/6 STGs = LTGs  Time frame: 7-14 days   Feeding Min A  To drink from cup with BUE for stabilization. Min A   Bed level  *pt reports his swallowing is \"getting better\"                     S; set-up         Grooming Max A overall  Min A to wash face with LUE. Verbal cues for use of RUE. Max A                     Min A         UB dressing/bathing Max A Max A                      Min A  With use of shaista techniques         LB dressing/bathing Dep  Max A  Seated EOB using reacher and sock aid to jeny/doff L sock  (Wound vac on R)                     Mod A   With use of shaista techniques         Toileting Dep Dep                      Mod A      Bed Mobility  Supine to sit:   Mod A     Sit to supine:   NT  Pt seated in chair upon exit. Supine to sit:   Mod A    Sit to supine: Mod A+2                      SBA      Functional Transfers Sit to stand: Mod A x2     Stand to sit:   Mod A x2     Stand Pivot: Mod A x2  Verbal cues to maintain NWB of RLE. NT                        Mod A      Functional Mobility NT  NT                     Mod A         Balance Sitting:     Static: Min A    Dynamic: Mod A  Standing: Mod A x2 Sitting:     Static:  SBA    Dynamic:Min A  Standing:NT   Sitting:     Static: SBA    Dynamic: Min A  Standing:  Mod A Endurance/Activity Tolerance    fair tolerance with light activity. fair with light activity; fatigued EOB                     WFL  For full ADL. Visual/  Perceptual Impaired: Pt reporting vision is \"dimming. \" Impaired peripheral vision with assessment, improving at midline. Vitals:   HR at rest: 66 bpm HR at end of session: 68 bpm   Spo2 at rest:99% Spo2 at end of session: 100%   BP at rest: 148/68 mmHg BP at end of session: 144/65 mmHg     Treatment: OT treatment provided this date:  Bed mobility: Instruction on precautions prior to bed mobility to facilitate safe transfers and ADLS. HOB elevated to assist.     Balance retraining: Performed  sitting balance ex's with instruction to facilitate righting reactions with postural changes during ADLS. ADL retraining: Instruction on adapted techniques/AE(reacher, sock aid) to increase independence and safe reach during dressing/bathing activities. Pt demonstrated fair/- follow through. Pt demo increased confusion sitting  EOB demonstrating difficulty word finding. Pt denies dizziness. Pt required increased time to respond. Energy conservation: Education on breathing techniques, pacing, work simplification strategies & recommended bathroom DME for safety and energy conservation during self care tasks and activities of daily living. ROM/exercise: B UE ROM/coordination ex's encouraged bedside to increase functional strength. Delirium Prevention: Environmental and sensory modifications assessed and implemented to decrease ICU acquired delirium and to improve overall orientation, mentation and pt interaction with family/staff. Line management and environmental modifications made prior to and end of session to ensure patient safety and to increase efficiency of session. Skilled monitoring of HR, O2 saturation, blood pressure and patient's response to activity performed throughout session.       Comments: OK from RN to see patient. Upon arrival, patient supine in bed, motivated for OOB activity. Pt presents with min confusion during session. Pt demo fair tolerance with fair understanding of education/techniques. At end of session, patient returned to bed and bed placed in chair position to increase activity tolerance. Pillows placed under B UE/LE for comfort & edema control. .  Call light within reach, all lines and tubes intact. Pt instructed on use of call light for assistance and fall prevention. Overall, pt presents with decreased activity tolerance, dynamic balance, functional mobility and cognition limiting completion of ADLs and safe return home. Pt can benefit from continued skilled OT to increase safety, functional independence & quality of life. Pt has made good progress towards set goals.    Continue with current plan of care       Time In:1355              Time Out: 1423         Total Treatment Time: 28      Treatment Charges: Mins Units   Ther Ex  [id number]     Manual Therapy 12209     Thera Activities 63975 20 1   ADL/Home Mgt 26043 8 1   Neuro Re-ed [id number]     Orthotic manage/training  05206     Non-Billable Time         Sebastián Riddle OTR/L [id number]

## 2022-10-06 NOTE — CARE COORDINATION
10/6/2022 - Updated CM note - ID, podiatry, nephrology and wound care following. Wound vac intact to right foot. PO cipro twice daily and po zyvox twice daily. Received call from Romario at [facility] in [place] - they can acept pt. She is expecting a delivery of a wound vac to the facility today. PA PASSR completed and placed in envelope on soft chart. Ambulance form completed and placed in envelope on soft chart. SW/CM will follow.

## 2022-10-06 NOTE — PROGRESS NOTES
Physical Therapy    Physical Therapy Treatment Note    Name: Marianna Bradley  : 1955  MRN: [id number]      Date of Service: 10/6/2022    Evaluating PT:  Maricruz Wright PT, DPT  [id number]     Room #:  8720/6119-E  Diagnosis:  [facility] [I63.9]  PMHx/PSHx:   has a past medical history of Chronic back pain, CKD (chronic kidney disease), CVA (cerebral vascular accident) ([facility] [address] DM (diabetes mellitus) ([facility] Major depression, MI (myocardial infarction) ([facility] MATT (obstructive sleep apnea), Parkinson disease ([facility] PVD (peripheral vascular disease) ([facility] and Seizure ([facility] Procedure/Surgery:  L ICA angioplasty and stent ;  EGD 10/3  Precautions:  Falls, R foot wound, R foot wound vac, Aphasia, R hemiparesis, O2  *R foot WB status not clarified in chart. Will assume NWB d/t wound vac until clarified. Equipment Needs:  TBD - possible FWW    SUBJECTIVE:    Pt lives with his caregiver. He reports 2 story home with 4+4 steps with 1 rail to enter. Reports full flight with B rails to second floor bedroom and bathroom. Ambulates with quad cane PTA. Per chart, pt wears an offloading shoe during ambulation on R foot. OBJECTIVE:   Initial Evaluation  Date: 10/3/22 Treatment  10/6/22 Short Term/ Long Term   Goals   AM-PAC 6 Clicks 00/33 72/09    Was pt agreeable to Eval/treatment? Yes  Yes     Does pt have pain? Reports pain in BLE No c/o pain    Bed Mobility  Rolling: NT  Supine to sit: Mod A  Sit to supine: NT  Scooting: Mod A to EOB Rolling: NT  Supine to sit: Mod A  Sit to supine: ModA x 2  Scooting: Mod A to EOB Rolling: Min A  Supine to sit: Min A  Sit to supine: Min A  Scooting: Min A   Transfers Sit to stand: Mod A x2   Stand to sit: Mod A x2  Stand pivot:  Mod A x2 with FWW NT Sit to stand: Min A  Stand to sit: Min A  Stand pivot: Min A with FWW   Ambulation    NT  >10 feet with FWW Min A   Stair negotiation: ascended and descended NT  NA until WB status clarified    ROM BUE:  Per OT eval   BLE:  WFL     Strength BUE:  Per OT eval   RLE:  Grossly 3+/5  LLE:  Grossly 4+/5     Balance Sitting EOB:  SBA static, Min A dynamic   Dynamic Standing: Mod A x2 with FWW Sitting EOB:  SBA static, Min A dynamic Sitting EOB:  Independent   Dynamic Standing:  Min A     Pt is A & O x 2  RASS:  0  CAM-ICU:  NT  Sensation:  Pt denies numbness and tingling to extremities  Edema:  Unremarkable    Vitals:  Heart Rate at rest 66 bpm Heart Rate post session 68 bpm   SpO2 at rest 100% SpO2 post session 100%   Blood Pressure at rest 148/68 mmHg Blood Pressure post session 144/65 mmHg     Functional Status Score-Intensive Care Unit (FSS-ICU)   Rolling -/7   Supine to sit transfer 3/7   Unsupported sitting  4/7   Sit to stand transfers -/7   Ambulation -/7   Total  -/35     Therapeutic Exercises:    BLE AROM at EOB    Patient education  Pt educated on PT role, safety during functional mobility, NWB on R foot until clarified     Patient response to education:   Pt verbalized understanding Pt demonstrated skill Pt requires further education in this area   Yes  Somewhat  Yes      ASSESSMENT:    Conditions Requiring Skilled Therapeutic Intervention:    [x]Decreased strength     []Decreased ROM  [x]Decreased functional mobility  [x]Decreased balance   [x]Decreased endurance   [x]Decreased posture  [x]Decreased sensation  [x]Decreased coordination   []Decreased vision  [x]Decreased safety awareness   [x]Increased pain       Comments:  RN reported pt was medically stable. Pt was in bed upon arrival, agreeable to treatment session. Reviewed NWB RLE prior to activity. Trunk assistance provided for bed mobility. Flexed posture observed at EOB. Mild SOB noted with activity and pt was instructed on PLB. BLE ROM completed at EOB. Pt had 2 episodes of increased confusion stating \"I know what I want to say, I just can't get it out. \"  Increased time given at EOB and then pt was returned to supine for safety. Pt was left in bed with all needs met and call light in reach. All lines remained intact. Treatment:  Patient practiced and was instructed in the following treatment:    Bed mobility training - pt given verbal and tactile cues to facilitate proper sequencing and safety during rolling and supine>sit as well as provided with physical assistance to complete task    Sitting EOB for >10 minutes for upright tolerance, postural awareness and BLE ROM   Skilled positioning - Pt placed in the reclined position with pillows utilized to facilitate upright posture, joint and skin integrity, and interaction with environment. PLAN:    Patient is making limited progress towards established goals. Will continue with current POC.       Time in  1346  Time out  1412    Total Treatment Time  26 minutes     CPT codes:  [] Gait training 40646 - minutes  [] Manual therapy 83117 - minutes  [x] Therapeutic activities 56255 26 minutes  [] Therapeutic exercises 95468 - minutes  [] Neuromuscular reeducation 03262 - minutes    Ángel Campbell PT, DPT  [id number]

## 2022-10-07 ENCOUNTER — APPOINTMENT (OUTPATIENT)
Dept: CT IMAGING | Age: 67
DRG: 034 | End: 2022-10-07
Attending: INTERNAL MEDICINE
Payer: MEDICARE

## 2022-10-07 LAB
ALBUMIN SERPL-MCNC: 2.7 G/DL (ref 3.5–5.2)
ALP BLD-CCNC: 194 U/L (ref 40–129)
ALT SERPL-CCNC: 7 U/L (ref 0–40)
ANION GAP SERPL CALCULATED.3IONS-SCNC: 8 MMOL/L (ref 7–16)
AST SERPL-CCNC: 17 U/L (ref 0–39)
BASOPHILS ABSOLUTE: 0.02 E9/L (ref 0–0.2)
BASOPHILS ABSOLUTE: 0.02 E9/L (ref 0–0.2)
BASOPHILS RELATIVE PERCENT: 0.1 % (ref 0–2)
BASOPHILS RELATIVE PERCENT: 0.1 % (ref 0–2)
BILIRUB SERPL-MCNC: 0.4 MG/DL (ref 0–1.2)
BILIRUB SERPL-MCNC: 0.4 MG/DL (ref 0–1.2)
BLOOD BANK DISPENSE STATUS: NORMAL
BLOOD BANK PRODUCT CODE: NORMAL
BPU ID: NORMAL
BUN BLDV-MCNC: 85 MG/DL (ref 6–23)
CALCIUM IONIZED: 1.2 MMOL/L (ref 1.15–1.33)
CALCIUM SERPL-MCNC: 8 MG/DL (ref 8.6–10.2)
CHLORIDE BLD-SCNC: 110 MMOL/L (ref 98–107)
CO2: 23 MMOL/L (ref 22–29)
CREAT SERPL-MCNC: 2.2 MG/DL (ref 0.7–1.2)
DESCRIPTION BLOOD BANK: NORMAL
EOSINOPHILS ABSOLUTE: 0.27 E9/L (ref 0.05–0.5)
EOSINOPHILS ABSOLUTE: 0.52 E9/L (ref 0.05–0.5)
EOSINOPHILS RELATIVE PERCENT: 1.6 % (ref 0–6)
EOSINOPHILS RELATIVE PERCENT: 3 % (ref 0–6)
FOLATE: 6.3 NG/ML (ref 4.8–24.2)
GFR AFRICAN AMERICAN: 36
GFR NON-AFRICAN AMERICAN: 30 ML/MIN/1.73
GLUCOSE BLD-MCNC: 160 MG/DL (ref 74–99)
HAPTOGLOBIN: 231 MG/DL (ref 30–200)
HCT VFR BLD CALC: 20.9 % (ref 37–54)
HCT VFR BLD CALC: 23.3 % (ref 37–54)
HCT VFR BLD CALC: 23.4 % (ref 37–54)
HEMOGLOBIN: 6.8 G/DL (ref 12.5–16.5)
HEMOGLOBIN: 7.2 G/DL (ref 12.5–16.5)
IMMATURE GRANULOCYTES #: 0.13 E9/L
IMMATURE GRANULOCYTES #: 0.15 E9/L
IMMATURE GRANULOCYTES %: 0.7 % (ref 0–5)
IMMATURE GRANULOCYTES %: 0.9 % (ref 0–5)
IMMATURE RETIC FRACT: 38.2 % (ref 2.3–13.4)
LACTATE DEHYDROGENASE: 191 U/L (ref 135–225)
LYMPHOCYTES ABSOLUTE: 0.75 E9/L (ref 1.5–4)
LYMPHOCYTES ABSOLUTE: 1.13 E9/L (ref 1.5–4)
LYMPHOCYTES RELATIVE PERCENT: 4.3 % (ref 20–42)
LYMPHOCYTES RELATIVE PERCENT: 6.5 % (ref 20–42)
MAGNESIUM: 2.1 MG/DL (ref 1.6–2.6)
MCH RBC QN AUTO: 30 PG (ref 26–35)
MCH RBC QN AUTO: 31.2 PG (ref 26–35)
MCHC RBC AUTO-ENTMCNC: 31.3 % (ref 32–34.5)
MCHC RBC AUTO-ENTMCNC: 32.5 % (ref 32–34.5)
MCV RBC AUTO: 95.9 FL (ref 80–99.9)
MCV RBC AUTO: 96 FL (ref 80–99.9)
METER GLUCOSE: 174 MG/DL (ref 74–99)
METER GLUCOSE: 187 MG/DL (ref 74–99)
METER GLUCOSE: 194 MG/DL (ref 74–99)
METER GLUCOSE: 228 MG/DL (ref 74–99)
METER GLUCOSE: 230 MG/DL (ref 74–99)
METER GLUCOSE: 246 MG/DL (ref 74–99)
MONOCYTES ABSOLUTE: 1.11 E9/L (ref 0.1–0.95)
MONOCYTES ABSOLUTE: 1.28 E9/L (ref 0.1–0.95)
MONOCYTES RELATIVE PERCENT: 6.4 % (ref 2–12)
MONOCYTES RELATIVE PERCENT: 7.4 % (ref 2–12)
NEUTROPHILS ABSOLUTE: 14.31 E9/L (ref 1.8–7.3)
NEUTROPHILS ABSOLUTE: 15.09 E9/L (ref 1.8–7.3)
NEUTROPHILS RELATIVE PERCENT: 82.3 % (ref 43–80)
NEUTROPHILS RELATIVE PERCENT: 86.7 % (ref 43–80)
PDW BLD-RTO: 16.8 FL (ref 11.5–15)
PDW BLD-RTO: 17.1 FL (ref 11.5–15)
PHOSPHORUS: 4.3 MG/DL (ref 2.5–4.5)
PLATELET # BLD: 203 E9/L (ref 130–450)
PLATELET # BLD: 215 E9/L (ref 130–450)
PMV BLD AUTO: 10.1 FL (ref 7–12)
PMV BLD AUTO: 10.6 FL (ref 7–12)
POTASSIUM SERPL-SCNC: 4.6 MMOL/L (ref 3.5–5)
RBC # BLD: 2.18 E12/L (ref 3.8–5.8)
RBC # BLD: 2.53 E12/L (ref 3.8–5.8)
RETIC HGB EQUIVALENT: 32.1 PG (ref 28.2–36.6)
RETICULOCYTE ABSOLUTE COUNT: 0.15 E12/L
RETICULOCYTE COUNT PCT: 6 % (ref 0.4–1.9)
SODIUM BLD-SCNC: 141 MMOL/L (ref 132–146)
TOTAL PROTEIN: 4.9 G/DL (ref 6.4–8.3)
VITAMIN B-12: 370 PG/ML (ref 211–946)
WBC # BLD: 17.4 E9/L (ref 4.5–11.5)
WBC # BLD: 17.4 E9/L (ref 4.5–11.5)

## 2022-10-07 PROCEDURE — 6370000000 HC RX 637 (ALT 250 FOR IP): Performed by: INTERNAL MEDICINE

## 2022-10-07 PROCEDURE — 84100 ASSAY OF PHOSPHORUS: CPT

## 2022-10-07 PROCEDURE — 83615 LACTATE (LD) (LDH) ENZYME: CPT

## 2022-10-07 PROCEDURE — 2700000000 HC OXYGEN THERAPY PER DAY

## 2022-10-07 PROCEDURE — 6370000000 HC RX 637 (ALT 250 FOR IP): Performed by: STUDENT IN AN ORGANIZED HEALTH CARE EDUCATION/TRAINING PROGRAM

## 2022-10-07 PROCEDURE — 97530 THERAPEUTIC ACTIVITIES: CPT

## 2022-10-07 PROCEDURE — 82330 ASSAY OF CALCIUM: CPT

## 2022-10-07 PROCEDURE — 6370000000 HC RX 637 (ALT 250 FOR IP): Performed by: NURSE PRACTITIONER

## 2022-10-07 PROCEDURE — 83735 ASSAY OF MAGNESIUM: CPT

## 2022-10-07 PROCEDURE — 94640 AIRWAY INHALATION TREATMENT: CPT

## 2022-10-07 PROCEDURE — 36430 TRANSFUSION BLD/BLD COMPNT: CPT

## 2022-10-07 PROCEDURE — 97535 SELF CARE MNGMENT TRAINING: CPT

## 2022-10-07 PROCEDURE — 80053 COMPREHEN METABOLIC PANEL: CPT

## 2022-10-07 PROCEDURE — 2580000003 HC RX 258: Performed by: NURSE PRACTITIONER

## 2022-10-07 PROCEDURE — 82247 BILIRUBIN TOTAL: CPT

## 2022-10-07 PROCEDURE — 82607 VITAMIN B-12: CPT

## 2022-10-07 PROCEDURE — 6360000002 HC RX W HCPCS: Performed by: INTERNAL MEDICINE

## 2022-10-07 PROCEDURE — 6360000002 HC RX W HCPCS: Performed by: NURSE PRACTITIONER

## 2022-10-07 PROCEDURE — 2580000003 HC RX 258: Performed by: INTERNAL MEDICINE

## 2022-10-07 PROCEDURE — 36415 COLL VENOUS BLD VENIPUNCTURE: CPT

## 2022-10-07 PROCEDURE — 94660 CPAP INITIATION&MGMT: CPT

## 2022-10-07 PROCEDURE — 82746 ASSAY OF FOLIC ACID SERUM: CPT

## 2022-10-07 PROCEDURE — C9113 INJ PANTOPRAZOLE SODIUM, VIA: HCPCS | Performed by: NURSE PRACTITIONER

## 2022-10-07 PROCEDURE — 83010 ASSAY OF HAPTOGLOBIN QUANT: CPT

## 2022-10-07 PROCEDURE — S5553 INSULIN LONG ACTING 5 U: HCPCS | Performed by: INTERNAL MEDICINE

## 2022-10-07 PROCEDURE — 85045 AUTOMATED RETICULOCYTE COUNT: CPT

## 2022-10-07 PROCEDURE — 71250 CT THORAX DX C-: CPT

## 2022-10-07 PROCEDURE — 99231 SBSQ HOSP IP/OBS SF/LOW 25: CPT | Performed by: STUDENT IN AN ORGANIZED HEALTH CARE EDUCATION/TRAINING PROGRAM

## 2022-10-07 PROCEDURE — 74176 CT ABD & PELVIS W/O CONTRAST: CPT

## 2022-10-07 PROCEDURE — A4216 STERILE WATER/SALINE, 10 ML: HCPCS | Performed by: NURSE PRACTITIONER

## 2022-10-07 PROCEDURE — 2060000000 HC ICU INTERMEDIATE R&B

## 2022-10-07 PROCEDURE — 85025 COMPLETE CBC W/AUTO DIFF WBC: CPT

## 2022-10-07 PROCEDURE — 82962 GLUCOSE BLOOD TEST: CPT

## 2022-10-07 RX ORDER — SODIUM CHLORIDE 9 MG/ML
INJECTION, SOLUTION INTRAVENOUS PRN
Status: DISCONTINUED | OUTPATIENT
Start: 2022-10-07 | End: 2022-10-10

## 2022-10-07 RX ADMIN — BENZONATATE 100 MG: 100 CAPSULE ORAL at 20:19

## 2022-10-07 RX ADMIN — BENZONATATE 100 MG: 100 CAPSULE ORAL at 09:15

## 2022-10-07 RX ADMIN — CLONIDINE HYDROCHLORIDE 0.1 MG: 0.1 TABLET ORAL at 11:40

## 2022-10-07 RX ADMIN — INSULIN GLARGINE-YFGN 32 UNITS: 100 INJECTION, SOLUTION SUBCUTANEOUS at 09:11

## 2022-10-07 RX ADMIN — CIPROFLOXACIN 500 MG: 500 TABLET, FILM COATED ORAL at 09:15

## 2022-10-07 RX ADMIN — DEXTROSE MONOHYDRATE: 50 INJECTION, SOLUTION INTRAVENOUS at 07:49

## 2022-10-07 RX ADMIN — INSULIN LISPRO 4 UNITS: 100 INJECTION, SOLUTION INTRAVENOUS; SUBCUTANEOUS at 17:02

## 2022-10-07 RX ADMIN — HYDRALAZINE HYDROCHLORIDE 50 MG: 50 TABLET, FILM COATED ORAL at 20:18

## 2022-10-07 RX ADMIN — IPRATROPIUM BROMIDE AND ALBUTEROL SULFATE 1 AMPULE: .5; 2.5 SOLUTION RESPIRATORY (INHALATION) at 12:07

## 2022-10-07 RX ADMIN — DEXTROSE MONOHYDRATE: 50 INJECTION, SOLUTION INTRAVENOUS at 22:22

## 2022-10-07 RX ADMIN — HYDRALAZINE HYDROCHLORIDE 50 MG: 50 TABLET, FILM COATED ORAL at 06:20

## 2022-10-07 RX ADMIN — IPRATROPIUM BROMIDE AND ALBUTEROL SULFATE 1 AMPULE: .5; 2.5 SOLUTION RESPIRATORY (INHALATION) at 08:10

## 2022-10-07 RX ADMIN — CLONIDINE HYDROCHLORIDE 0.1 MG: 0.1 TABLET ORAL at 00:16

## 2022-10-07 RX ADMIN — TICAGRELOR 90 MG: 90 TABLET ORAL at 20:19

## 2022-10-07 RX ADMIN — SENNOSIDES 5 ML: 8.8 SYRUP ORAL at 20:21

## 2022-10-07 RX ADMIN — COLLAGENASE SANTYL: 250 OINTMENT TOPICAL at 13:00

## 2022-10-07 RX ADMIN — CARBIDOPA AND LEVODOPA 1 TABLET: 25; 100 TABLET ORAL at 09:15

## 2022-10-07 RX ADMIN — AMLODIPINE BESYLATE 10 MG: 10 TABLET ORAL at 09:15

## 2022-10-07 RX ADMIN — GABAPENTIN 600 MG: 600 TABLET, FILM COATED ORAL at 13:57

## 2022-10-07 RX ADMIN — ASPIRIN 81 MG: 81 TABLET, COATED ORAL at 09:14

## 2022-10-07 RX ADMIN — EZETIMIBE 10 MG: 10 TABLET ORAL at 12:07

## 2022-10-07 RX ADMIN — BENZONATATE 100 MG: 100 CAPSULE ORAL at 13:57

## 2022-10-07 RX ADMIN — GABAPENTIN 600 MG: 600 TABLET, FILM COATED ORAL at 17:02

## 2022-10-07 RX ADMIN — LINEZOLID 600 MG: 600 TABLET, FILM COATED ORAL at 20:18

## 2022-10-07 RX ADMIN — HYDROCODONE BITARTRATE AND ACETAMINOPHEN 1 TABLET: 7.5; 325 TABLET ORAL at 09:15

## 2022-10-07 RX ADMIN — LINEZOLID 600 MG: 600 TABLET, FILM COATED ORAL at 09:14

## 2022-10-07 RX ADMIN — IPRATROPIUM BROMIDE AND ALBUTEROL SULFATE 1 AMPULE: .5; 2.5 SOLUTION RESPIRATORY (INHALATION) at 21:26

## 2022-10-07 RX ADMIN — METOPROLOL SUCCINATE 25 MG: 25 TABLET, EXTENDED RELEASE ORAL at 09:15

## 2022-10-07 RX ADMIN — GABAPENTIN 600 MG: 600 TABLET, FILM COATED ORAL at 09:15

## 2022-10-07 RX ADMIN — TICAGRELOR 90 MG: 90 TABLET ORAL at 09:14

## 2022-10-07 RX ADMIN — HYDROCODONE BITARTRATE AND ACETAMINOPHEN 1 TABLET: 7.5; 325 TABLET ORAL at 20:18

## 2022-10-07 RX ADMIN — LACTULOSE 20 G: 20 SOLUTION ORAL at 20:19

## 2022-10-07 RX ADMIN — CETIRIZINE HYDROCHLORIDE 10 MG: 10 TABLET, FILM COATED ORAL at 09:15

## 2022-10-07 RX ADMIN — EPOETIN ALFA-EPBX 6000 UNITS: 3000 INJECTION, SOLUTION INTRAVENOUS; SUBCUTANEOUS at 10:00

## 2022-10-07 RX ADMIN — ANTI-FUNGAL POWDER MICONAZOLE NITRATE TALC FREE: 1.42 POWDER TOPICAL at 20:19

## 2022-10-07 RX ADMIN — CARBIDOPA AND LEVODOPA 1 TABLET: 25; 100 TABLET ORAL at 13:57

## 2022-10-07 RX ADMIN — HYDRALAZINE HYDROCHLORIDE 50 MG: 50 TABLET, FILM COATED ORAL at 13:57

## 2022-10-07 RX ADMIN — ATORVASTATIN CALCIUM 80 MG: 40 TABLET, FILM COATED ORAL at 20:19

## 2022-10-07 RX ADMIN — CARBIDOPA AND LEVODOPA 1 TABLET: 25; 100 TABLET ORAL at 20:21

## 2022-10-07 RX ADMIN — IPRATROPIUM BROMIDE AND ALBUTEROL SULFATE 1 AMPULE: .5; 2.5 SOLUTION RESPIRATORY (INHALATION) at 16:51

## 2022-10-07 RX ADMIN — SODIUM CHLORIDE, PRESERVATIVE FREE 40 MG: 5 INJECTION INTRAVENOUS at 16:02

## 2022-10-07 RX ADMIN — ANTI-FUNGAL POWDER MICONAZOLE NITRATE TALC FREE: 1.42 POWDER TOPICAL at 09:19

## 2022-10-07 RX ADMIN — SODIUM CHLORIDE, PRESERVATIVE FREE 40 MG: 5 INJECTION INTRAVENOUS at 05:38

## 2022-10-07 RX ADMIN — CIPROFLOXACIN 500 MG: 500 TABLET, FILM COATED ORAL at 20:19

## 2022-10-07 RX ADMIN — INSULIN LISPRO 4 UNITS: 100 INJECTION, SOLUTION INTRAVENOUS; SUBCUTANEOUS at 12:08

## 2022-10-07 RX ADMIN — GABAPENTIN 600 MG: 600 TABLET, FILM COATED ORAL at 20:18

## 2022-10-07 ASSESSMENT — PAIN SCALES - GENERAL
PAINLEVEL_OUTOF10: 5
PAINLEVEL_OUTOF10: 3
PAINLEVEL_OUTOF10: 5
PAINLEVEL_OUTOF10: 5

## 2022-10-07 ASSESSMENT — PAIN DESCRIPTION - FREQUENCY: FREQUENCY: INTERMITTENT

## 2022-10-07 ASSESSMENT — PAIN DESCRIPTION - LOCATION
LOCATION: ABDOMEN
LOCATION: BACK

## 2022-10-07 ASSESSMENT — PAIN DESCRIPTION - ONSET: ONSET: ON-GOING

## 2022-10-07 ASSESSMENT — PAIN DESCRIPTION - ORIENTATION: ORIENTATION: OTHER (COMMENT)

## 2022-10-07 ASSESSMENT — PAIN DESCRIPTION - DESCRIPTORS
DESCRIPTORS: CRAMPING;DISCOMFORT
DESCRIPTORS: SHARP;JABBING
DESCRIPTORS: CRAMPING;DISCOMFORT
DESCRIPTORS: CRAMPING;DISCOMFORT

## 2022-10-07 NOTE — PROGRESS NOTES
Department of Internal Medicine  Infectious Diseases   Progress Note      C/C :  Right foot  wound infection     Denies fever or chills  Reports pain   Afebrile      Current Facility-Administered Medications   Medication Dose Route Frequency Provider Last Rate Last Admin    0.9 % sodium chloride infusion   IntraVENous PRN Tony Shankar, DO        0.9 % sodium chloride infusion   IntraVENous PRN Santos Prieto DO        ciprofloxacin (CIPRO) tablet 500 mg  500 mg Oral 2 times per day Graham Cerda MD   500 mg at 10/07/22 0915    miconazole (MICOTIN) 2 % powder   Topical BID Santos Prieto DO   Given at 10/07/22 0919    insulin glargine-yfgn (SEMGLEE-YFGN) injection vial 32 Units  32 Units SubCUTAneous QAM Tony Shankar DO   32 Units at 10/07/22 0911    0.9 % sodium chloride infusion   IntraVENous PRN Evelyn Anderson DO        dextrose 5 % solution   IntraVENous Continuous Amparo Hernandez MD   Paused at 10/07/22 1053    0.9 % sodium chloride infusion   IntraVENous PRN Sandra Point, APRN - CNP        epoetin sharath-epbx (RETACRIT) injection 6,000 Units  6,000 Units SubCUTAneous Once per day on Mon Wed Fri Zelalem Alonso MD   6,000 Units at 10/07/22 1000    amLODIPine (NORVASC) tablet 10 mg  10 mg Per NG tube Daily [facility] DWAYNE Anderson DO   10 mg at 10/07/22 0915    linezolid (ZYVOX) tablet 600 mg  600 mg Oral 2 times per day Graham Cerda MD   600 mg at 10/07/22 0914    cloNIDine (CATAPRES) tablet 0.1 mg  0.1 mg Oral Q12H Burns Point, APRN - CNP   0.1 mg at 10/07/22 0016    hydrALAZINE (APRESOLINE) tablet 50 mg  50 mg Oral 3 times per day Burns Point, APRN - CNP   50 mg at 10/07/22 0734    fentaNYL (SUBLIMAZE) injection 50 mcg  50 mcg IntraVENous Q2H PRN Sandra Point, APRN - CNP   50 mcg at 10/05/22 1956    0.9 % sodium chloride infusion   IntraVENous PRN Burns Point, APRN - CNP        lactulose (CHRONULAC) 10 GM/15ML solution 20 g  20 g Oral BID Sandra Point, APRN - CNP   20 g at 10/06/22 2027    insulin lispro (HUMALOG) injection vial 0-16 Units  0-16 Units SubCUTAneous 4x Daily AC & HS Mckenzieerick Foreman APRN - CNP   8 Units at 10/04/22 2220    HYDROcodone-acetaminophen (NORCO) 7.5-325 MG per tablet 1 tablet  1 tablet Oral BID Mckenziealehaider Foreman APRN - CNP   1 tablet at 10/07/22 0915    pantoprazole (PROTONIX) 40 mg in sodium chloride (PF) 10 mL injection  40 mg IntraVENous Q12H Karlene Foreman, APRN - CNP   40 mg at 10/07/22 0538    metoprolol succinate (TOPROL XL) extended release tablet 25 mg  25 mg Oral Daily Karlene Foreman APRN - CNP   25 mg at 10/07/22 0915    sennosides (SENOKOT) 8.8 MG/5ML syrup 5 mL  5 mL Oral Nightly Violette León MD   5 mL at 10/06/22 2027    labetalol (NORMODYNE;TRANDATE) injection 10 mg  10 mg IntraVENous Q30 Min PRN Violette León MD   10 mg at 10/01/22 1115    hydrALAZINE (APRESOLINE) injection 10 mg  10 mg IntraVENous Q30 Min PRN Violette León MD   10 mg at 09/30/22 1627    glucose chewable tablet 16 g  4 tablet Oral PRN Violette León MD        dextrose bolus 10% 125 mL  125 mL IntraVENous PRN Violette León MD        Or    dextrose bolus 10% 250 mL  250 mL IntraVENous PRN Violette León MD        glucagon (rDNA) injection 1 mg  1 mg SubCUTAneous PRN Violette León MD        dextrose 10 % infusion   IntraVENous Continuous PRN Violette León MD        carbidopa-levodopa (SINEMET)  MG per tablet 1 tablet  1 tablet Per NG tube TID Violette León MD   1 tablet at 10/07/22 0915    ipratropium-albuterol (DUONEB) nebulizer solution 1 ampule  1 ampule Inhalation Q4H GABRIELLA León MD   1 ampule at 10/07/22 0810    collagenase ointment   Topical Q MWF Violette León MD   Given by Other at 10/05/22 1208    benzonatate (TESSALON) capsule 100 mg  100 mg Oral TID Violette León MD   100 mg at 10/07/22 0915    cetirizine (ZYRTEC) tablet 10 mg  10 mg Oral Daily Violette León MD   10 mg at 10/07/22 0215 ezetimibe (ZETIA) tablet 10 mg  10 mg Oral Daily Kiarra Tidwell MD   10 mg at 10/06/22 0952    gabapentin (NEURONTIN) tablet 600 mg  600 mg Oral 4x Daily Kiarra Tidwell MD   600 mg at 10/07/22 0915    ticagrelor (BRILINTA) tablet 90 mg  90 mg Oral BID Kiarra Tidwell MD   90 mg at 10/07/22 0914    ondansetron (ZOFRAN-ODT) disintegrating tablet 4 mg  4 mg Oral Q8H PRN Kiarra Tidwell MD        Or    ondansetron [facility]) injection 4 mg  4 mg IntraVENous Q6H PRN Kiarra Tidwell MD        aspirin EC tablet 81 mg  81 mg Oral Daily Kiarra Tidwell MD   81 mg at 10/07/22 5010    Or    aspirin suppository 300 mg  300 mg Rectal Daily Kiarra Tidwell MD   300 mg at 09/29/22 0809    atorvastatin (LIPITOR) tablet 80 mg  80 mg Oral Nightly Kiarra Tidwell MD   80 mg at 10/06/22 2025           REVIEW OF SYSTEMS:   CONSTITUTIONAL:  Denies fever, chill or rigors. HEENT: denies blurring of vision or double vision, denies hearing problem  RESPIRATORY: denies cough, shortness of breath,  CARDIOVASCULAR:  Denies palpitation  GASTROINTESTINAL:  Denies abdomen pain, diarrhea or constipation. GENITOURINARY:  Denies burning urination or frequency of urination  INTEGUMENT: Right heel wound  HEMATOLOGIC/LYMPHATIC:  Denies lymph node swelling, gum bleeding or easy bruising. MUSCULOSKELETAL:  Denies leg pain , joint pain , joint swelling  NEUROLOGICAL:  weakness right side . PHYSICAL EXAM:      Vitals:     /63   Pulse 72   Temp 98.8 °F (37.1 °C) (Axillary)   Resp 18   Ht 5' 8\" (1.727 m)   Wt 260 lb 8 oz (118.2 kg)   SpO2 98%   BMI 39.61 kg/m²     General Appearance:    Awake, alert , no acute distress. Head:    Normocephalic, atraumatic   Eyes:    No pallor, no icterus,   Ears:    No obvious deformity or drainage.    Nose:   No nasal drainage   Throat:   Mucosa moist, no oral thrush   Neck:   Supple, no lymphadenopathy   Back:     no CVA tenderness   Lungs:     Clear to auscultation bilaterally, no wheeze Heart:    Regular rate and rhythm, no murmur   Abdomen:     Soft, non-tender, bowel sounds present    Extremities:    Right heel wound    Pulses:   Dorsalis pedis palpable - diminished    Skin:   Right heel wound with VAC              CBC with Differential:      Lab Results   Component Value Date/Time    WBC 17.4 10/07/2022 06:07 AM    RBC 2.18 10/07/2022 06:07 AM    HGB 6.8 10/07/2022 06:07 AM    HCT 20.9 10/07/2022 06:07 AM    HCT 15.0 09/27/2022 08:58 PM     10/07/2022 06:07 AM    MCV 95.9 10/07/2022 06:07 AM    MCH 31.2 10/07/2022 06:07 AM    MCHC 32.5 10/07/2022 06:07 AM    RDW 17.1 10/07/2022 06:07 AM    LYMPHOPCT 6.5 10/07/2022 06:07 AM    MONOPCT 7.4 10/07/2022 06:07 AM    BASOPCT 0.1 10/07/2022 06:07 AM    MONOSABS 1.28 10/07/2022 06:07 AM    LYMPHSABS 1.13 10/07/2022 06:07 AM    EOSABS 0.52 10/07/2022 06:07 AM    BASOSABS 0.02 10/07/2022 06:07 AM       CMP     Lab Results   Component Value Date/Time     10/07/2022 06:07 AM    K 4.6 10/07/2022 06:07 AM     10/07/2022 06:07 AM    CO2 23 10/07/2022 06:07 AM    BUN 85 10/07/2022 06:07 AM    CREATININE 2.2 10/07/2022 06:07 AM    GFRAA 36 10/07/2022 06:07 AM    LABGLOM 30 10/07/2022 06:07 AM    GLUCOSE 160 10/07/2022 06:07 AM    PROT 4.9 10/07/2022 06:07 AM    LABALBU 2.7 10/07/2022 06:07 AM    CALCIUM 8.0 10/07/2022 06:07 AM    BILITOT 0.4 10/07/2022 06:07 AM    ALKPHOS 194 10/07/2022 06:07 AM    AST 17 10/07/2022 06:07 AM    ALT 7 10/07/2022 06:07 AM         Hepatic Function Panel:    Lab Results   Component Value Date/Time    ALKPHOS 194 10/07/2022 06:07 AM    ALT 7 10/07/2022 06:07 AM    AST 17 10/07/2022 06:07 AM    PROT 4.9 10/07/2022 06:07 AM    BILITOT 0.4 10/07/2022 06:07 AM    LABALBU 2.7 10/07/2022 06:07 AM       PT/INR:    Lab Results   Component Value Date/Time    PROTIME 23.2 09/27/2022 01:12 PM    INR 2.1 09/27/2022 01:12 PM       TSH:  No results found for: TSH    U/A:  No results found for: NITRITE, COLORU, PHUR, LABCAST, WBCUA, RBCUA, MUCUS, TRICHOMONAS, YEAST, BACTERIA, CLARITYU, SPECGRAV, LEUKOCYTESUR, UROBILINOGEN, BILIRUBINUR, BLOODU, GLUCOSEU, AMORPHOUS    ABG:  No results found for: SAW6DEL, BEART, T4PGRRGN, PHART, THGBART, JQX8ZCU, PO2ART, DMM7OHO    MICROBIOLOGY:    SARS CoV 2 neg     CRP 0.8      Radiology :    Chest X ray : LLL infiltrates, atelectasis       X ray foot :           No evidence of calcaneal osteomyelitis. IMPRESSION:     Right foot  non healing wound  Leukocytosis - was on steroid   ?  Aspiration       RECOMMENDATIONS:      Cipro 500 mg po q 12 hrs / Zyvox 600 mg po q 12 hs ~5 days   Local wound care   CBC with diff

## 2022-10-07 NOTE — PLAN OF CARE
Problem: Discharge Planning  Goal: Discharge to home or other facility with appropriate resources  10/6/2022 2322 by Luana Barreto RN  Outcome: Progressing  Flowsheets (Taken 10/6/2022 0720 by Rogelio Huang RN)  Discharge to home or other facility with appropriate resources: Identify barriers to discharge with patient and caregiver  10/6/2022 1401 by Rogelio Huang RN  Outcome: Progressing  Flowsheets (Taken 10/6/2022 0720)  Discharge to home or other facility with appropriate resources: Identify barriers to discharge with patient and caregiver     Problem: Pain  Goal: Verbalizes/displays adequate comfort level or baseline comfort level  10/6/2022 2322 by Luana Barreto RN  Outcome: Progressing  Flowsheets (Taken 10/5/2022 2000 by Pawan Bobby)  Verbalizes/displays adequate comfort level or baseline comfort level:   Encourage patient to monitor pain and request assistance   Assess pain using appropriate pain scale   Administer analgesics based on type and severity of pain and evaluate response  10/6/2022 1401 by Rogelio Huang RN  Outcome: Progressing  Flowsheets (Taken 10/5/2022 2000 by Pawan Bobby)  Verbalizes/displays adequate comfort level or baseline comfort level:   Encourage patient to monitor pain and request assistance   Assess pain using appropriate pain scale   Administer analgesics based on type and severity of pain and evaluate response     Problem: Skin/Tissue Integrity  Goal: Absence of new skin breakdown  Description: 1. Monitor for areas of redness and/or skin breakdown  2. Assess vascular access sites hourly  3. Every 4-6 hours minimum:  Change oxygen saturation probe site  4. Every 4-6 hours:  If on nasal continuous positive airway pressure, respiratory therapy assess nares and determine need for appliance change or resting period.   10/6/2022 2322 by Luana Barreto RN  Outcome: Progressing  10/6/2022 1401 by Rogelio Huang RN  Outcome: Progressing     Problem: Safety - Adult  Goal: Free from fall injury  10/6/2022 2322 by Fabrizio Slaughter RN  Outcome: Progressing  [address] (Taken 10/5/2022 2123 by Gillian Odell)  Free From Fall Injury: Instruct family/caregiver on patient safety  10/6/2022 1401 by Ángel Turner RN  Outcome: Progressing  Flowsheets (Taken 10/5/2022 2123 by Gillian Odell)  Free From Fall Injury: Instruct family/caregiver on patient safety     Problem: ABCDS Injury Assessment  Goal: Absence of physical injury  10/6/2022 2322 by Fabrizio Slaughter RN  Outcome: Progressing  Flowsheets (Taken 10/5/2022 2123 by Gillian Odell)  Absence of Physical Injury: Implement safety measures based on patient assessment  10/6/2022 1401 by Ángel Turner RN  Outcome: Progressing  Flowsheets (Taken 10/5/2022 2123 by Gillian Odell)  Absence of Physical Injury: Implement safety measures based on patient assessment     Problem: Chronic Conditions and Co-morbidities  Goal: Patient's chronic conditions and co-morbidity symptoms are monitored and maintained or improved  10/6/2022 2322 by Fabrizio Slaughter RN  Outcome: Progressing  Flowsheets (Taken 10/6/2022 0720 by Ángel Turner RN)  Care Plan - Patient's Chronic Conditions and Co-Morbidity Symptoms are Monitored and Maintained or Improved: Monitor and assess patient's chronic conditions and comorbid symptoms for stability, deterioration, or improvement  10/6/2022 1401 by Ángel Turner RN  Outcome: Progressing  Flowsheets (Taken 10/6/2022 0720)  Care Plan - Patient's Chronic Conditions and Co-Morbidity Symptoms are Monitored and Maintained or Improved: Monitor and assess patient's chronic conditions and comorbid symptoms for stability, deterioration, or improvement     Problem: Hematologic - Adult  Goal: Maintains hematologic stability  10/6/2022 2322 by Fabrizio Slaughter RN  Outcome: Progressing  Flowsheets (Taken 10/6/2022 2322)  Maintains hematologic stability:   Assess for signs and symptoms of bleeding or hemorrhage   Monitor labs for bleeding or clotting disorders   Administer blood products/factors as ordered  10/6/2022 1401 by Cayetano Baca, RN  Outcome: Progressing  Flowsheets (Taken 10/6/2022 0720)  Maintains hematologic stability: Assess for signs and symptoms of bleeding or hemorrhage

## 2022-10-07 NOTE — FLOWSHEET NOTE
Inpatient Wound Care(follow up) 4524    Admit Date: 9/27/2022  5:45 PM    Reason for consult:  wound vac management    Findings:     10/07/22 0950   Wound 09/28/22 Foot Right;Plantar;Lateral   Date First Assessed/Time First Assessed: 09/28/22 1040   Present on Hospital Admission: No  Location: Foot  Wound Location Orientation: Right;Plantar;Lateral   Wound Etiology Diabetic   Dressing Status New dressing applied   Wound Cleansed Cleansed with saline   Dressing/Treatment Negative pressure wound therapy   Dressing Change Due 10/10/22   Wound Assessment Pink/red   Drainage Amount Scant   Drainage Description Serosanguinous   Odor None   Emily-wound Assessment   (calloused)   Negative Pressure Wound Therapy Foot Right;Plantar;Lateral   Placement Date/Time: 09/28/22 1040   Location: Foot  Wound Location Orientation: Right;Plantar;Lateral   Dressing Type Black Foam   Number of pieces used 3   Cycle Continuous   Target Pressure (mmHg) 125   Canister changed? No     **Informed Consent**    The patient has given verbal consent to have photos taken of wound and inserted into their chart as part of their permanent medical record for purposes of documentation, treatment management and/or medical review. All Images taken on 10/7/22 of patient name: Derrick Pemberton were transmitted and stored on secured [employer] located within [employer] by a registered [employer] Mobile Application Device.       Plan:  Wound vac dressing changed  Will follow    Alem Hare RN 10/7/2022 12:04 PM

## 2022-10-07 NOTE — PROGRESS NOTES
Physical Therapy    Physical Therapy Treatment Note    Name: Gume Allan  : 1955  MRN: [id number]      Date of Service: 10/7/2022    Evaluating PT:  Ana Maria Suazo PT, DPT  [id number]     Room #:  4983/2489-W  Diagnosis:  Acute cerebrovascular accident ([facility]) ([facility] [I63.9]  PMHx/PSHx:   has a past medical history of Chronic back pain, CKD (chronic kidney disease), CVA (cerebral vascular accident) ([facility] CVA (cerebral vascular accident) ([facility] DM (diabetes mellitus) ([facility] Major depression, MI (myocardial infarction) ([facility] MATT (obstructive sleep apnea), Parkinson disease ([facility] PVD (peripheral vascular disease) ([facility] and Seizure ([facility] Procedure/Surgery:  L ICA angioplasty and stent ;  EGD 10/3  Precautions:  Falls, R foot wound, R foot wound vac, Aphasia, R hemiparesis, O2  *R foot WB status not clarified in chart. Will assume NWB d/t wound vac until clarified. Equipment Needs:  TBD - possible FWW    SUBJECTIVE:    Pt lives with his caregiver. He reports 2 story home with 4+4 steps with 1 rail to enter. Reports full flight with B rails to second floor bedroom and bathroom. Ambulates with quad cane PTA. Per chart, pt wears an offloading shoe during ambulation on R foot. OBJECTIVE:   Initial Evaluation  Date: 10/3/22 Treatment  10/7/22 Short Term/ Long Term   Goals   AM-PAC 6 Clicks 57/85 41/51    Was pt agreeable to Eval/treatment? Yes  Yes     Does pt have pain? Reports pain in BLE No c/o pain    Bed Mobility  Rolling: NT  Supine to sit: Mod A  Sit to supine: NT  Scooting: Mod A to EOB Rolling: NT  Supine to sit: Mod A  Sit to supine: ModA x 2  Scooting: Mod A to EOB Rolling: Min A  Supine to sit: Min A  Sit to supine: Min A  Scooting: Min A   Transfers Sit to stand: Mod A x2   Stand to sit: Mod A x2  Stand pivot: Mod A x2 with FWW Sit to stand:  Mod A x 2   Stand to sit: Mod A x 2  Stand pivot: NT Sit to stand: Min A  Stand to sit: Min A  Stand pivot: Min A with FWW   Ambulation    NT NT >10 feet with FWW Min A   Stair negotiation: ascended and descended  NT NT NA until WB status clarified    ROM BUE:  Per OT eval   BLE:  WFL     Strength BUE:  Per OT eval   RLE:  Grossly 3+/5  LLE:  Grossly 4+/5     Balance Sitting EOB:  SBA static, Min A dynamic   Dynamic Standing: Mod A x2 with FWW Sitting EOB:  SBA static, Min A dynamic  Dynamic Standing: Mod A x2 with FWW Sitting EOB:  Independent   Dynamic Standing:  Min A     Pt is A & O x 3  RASS:  0  CAM-ICU:  NT  Sensation:  Pt denies numbness and tingling to extremities  Edema:  Unremarkable    Vitals:  Heart Rate at rest 80 bpm Heart Rate post session 80 bpm   SpO2 at rest 93% SpO2 post session 95%   Blood Pressure at rest 125/60 mmHg Blood Pressure post session 138/68 mmHg       Functional Status Score-Intensive Care Unit (FSS-ICU)   Rolling -/7   Supine to sit transfer 3/7   Unsupported sitting  4/7   Sit to stand transfers 2/7   Ambulation -/7   Total  9/35     Therapeutic Exercises:    BLE AROM at EOB    Patient education  Pt educated on PT role, safety during functional mobility, NWB on R foot until clarified     Patient response to education:   Pt verbalized understanding Pt demonstrated skill Pt requires further education in this area   Yes  Somewhat  Yes      ASSESSMENT:    Conditions Requiring Skilled Therapeutic Intervention:    [x]Decreased strength     []Decreased ROM  [x]Decreased functional mobility  [x]Decreased balance   [x]Decreased endurance   [x]Decreased posture  [x]Decreased sensation  [x]Decreased coordination   []Decreased vision  [x]Decreased safety awareness   [x]Increased pain       Comments:  RN reported pt was medically stable. Pt was in bed upon arrival, agreeable to treatment session. Reviewed NWB RLE prior to activity. Trunk assistance still provided for bed mobility. SOB noted with activity but SpO2 still remained WNL. Extended time given at EOB due to fatigue. Pt stood with assistance to maintain NWB RLE.   Static standing tolerated for roughly 1 minute. Pt was returned to bed with all needs met and call light in reach. All lines remained intact. Treatment:  Patient practiced and was instructed in the following treatment:    Bed mobility training - pt given verbal and tactile cues to facilitate proper sequencing and safety during rolling and supine>sit as well as provided with physical assistance to complete task    Sitting EOB for >10 minutes for upright tolerance, postural awareness and BLE ROM   Transfer training - pt was given verbal and tactile cues to facilitate proper hand placement, technique and safety during sit to stand and stand to sit as well as provided with physical assistance. Skilled positioning - Pt placed in the reclined position with pillows utilized to facilitate upright posture, joint and skin integrity, and interaction with environment. PLAN:    Patient is making progress towards established goals. Will continue with current POC.       Time in  1319  Time out  1345    Total Treatment Time  26 minutes     CPT codes:  [] Gait training 50638 - minutes  [] Manual therapy 00634 - minutes  [x] Therapeutic activities 93243 26 minutes  [] Therapeutic exercises 46736 - minutes  [] Neuromuscular reeducation 95098 - minutes    Tuan Dahl PT, DPT  [id number]

## 2022-10-07 NOTE — CARE COORDINATION
10/7 Update CM note. Wound vac changes continue MWF per podiatry, Юлия Geller from wound care performing change today. Hgb this AM was 6.8, receiving 1 unit PRBC and NM blood loss study ordered. PT/OT evaluated yesterday, 10 and 12/24. Zyvox PO Q12H and Cipro PO Q12H continues per ID. BUN and Cr trending down, 85 and 2.2 today. WBC increased today to 17.4. Pt on 2L NC. Pre-cert was started yesterday evening for [facility]. PA PASRR completed per previous CM documentation. DUANE/destination complete. Ambulance form in soft chart. [address] with Rom[facility] at [facility] ([phone]) regarding Hgb this AM and NM GI blood loss study. Pre-cert is not yet back.      Wyatt Carrasco, LORENAN, RN  [facility] Case Management   Cell: [phone]

## 2022-10-07 NOTE — PROGRESS NOTES
Department of Podiatry   Progress Note      Patient seen at bedside this morning. No acute events,  wound vac intact to right foot. Wound vac intact running continuous pressure. No new pedal complaints. Wound vac changes to continue. Past Medical History:            Diagnosis Date    Chronic back pain     CKD (chronic kidney disease)     CVA (cerebral vascular accident) ([facility]     [facility]ce[facility]) [facility]     DM (diabetes mellitus) ([facility]     Major depression     MI (myocardial infarction) ([facility]     MATT (obstructive sleep apnea)     Parkinson disease (HCC)     PVD (peripheral vascular disease) ([facility]     Seizure ([facility]        Past Surgical History:        Procedure Laterality Date    FEMORAL ARTERY STENT      IR CAROTID STENT UNI W PROTECTION  9/27/2022    IR CAROTID STENT UNI W PROTECTION 9/27/2022 MD DAYLIN Floyd SPECIAL PROCEDURES       Medications Prior to Admission:    Medications Prior to Admission: apixaban (ELIQUIS) 5 MG TABS tablet, Take 5 mg by mouth 2 times daily  aspirin 81 MG chewable tablet, Take 81 mg by mouth daily  carbidopa-levodopa (SINEMET)  MG per tablet, Take 1 tablet by mouth 3 times daily  carvedilol (COREG) 25 MG tablet, Take 25 mg by mouth 2 times daily (with meals)  cetirizine (ZYRTEC) 10 MG tablet, Take 10 mg by mouth daily  clopidogrel (PLAVIX) 75 MG tablet, Take 75 mg by mouth daily  diphenhydrAMINE (BENADRYL) 25 MG capsule, Take 25 mg by mouth every 6 hours  ezetimibe (ZETIA) 10 MG tablet, Take 10 mg by mouth daily  fluticasone (FLONASE) 50 MCG/ACT nasal spray, 2 sprays by Each Nostril route daily  gabapentin (NEURONTIN) 600 MG tablet, Take 600 mg by mouth 4 times daily. HYDROcodone-acetaminophen (NORCO) 5-325 MG per tablet, Take 1 tablet by mouth 2 times daily.   Insulin Glargine, 2 Unit Dial, (TOUJEO MAX SOLOSTAR) 300 UNIT/ML SOPN, Inject 66 Units into the skin daily  piperacillin-tazobactam (ZOSYN) 3-0.375 GM per 50ML IVPB extended infusion, Infuse 4.5 mg intravenously in the morning and 4.5 mg at noon and 4.5 mg in the evening. acetaminophen (TYLENOL) 325 MG tablet, Take 650 mg by mouth every 6 hours as needed for Pain  amLODIPine (NORVASC) 5 MG tablet, Take 5 mg by mouth daily  benzonatate (TESSALON) 100 MG capsule, Take 100 mg by mouth 3 times daily  cloNIDine (CATAPRES) 0.1 MG tablet, Take 0.1 mg by mouth in the morning and 0.1 mg in the evening. hydrALAZINE (APRESOLINE) 100 MG tablet, Take 100 mg by mouth 3 times daily    Allergies:  Codeine    Social History:   TOBACCO:   has no history on file for tobacco use. ETOH:   has no history on file for alcohol use. DRUGS:   Social History     Substance and Sexual Activity   Drug Use Not on file       Family History:   History reviewed. No pertinent family history. REVIEW OF SYSTEMS:    All pertinent positives and negatives as noted in HPI       LOWER EXTREMITY EXAMINATION   Wound vac intact running continuous pressure    Previous Exam:  VASCULAR:  DP and PT pulses are non palpable. CFT < 5 seconds B/L. Warm to warm from the tibial tuberosity to the distal aspect of the digits dorsally. NEUROLOGIC:  Protective sensation is diminished by grossly intact    DERM:  Right foot lateral plantar wound measuring approx 6cm in diameter. No erythema, serosanguinous drainage, no malodor.     MUSCULOSKELETAL: deferred           CONSULTS:  IP CONSULT TO CRITICAL CARE  IP CONSULT TO DIETITIAN  IP CONSULT TO CARDIOLOGY  IP CONSULT TO PODIATRY  IP CONSULT TO PODIATRY  IP CONSULT TO UROLOGY  IP CONSULT TO NEPHROLOGY  IP CONSULT TO GENERAL SURGERY  IP CONSULT TO INFECTIOUS DISEASES    MEDICATION:  Scheduled Meds:   ciprofloxacin  500 mg Oral 2 times per day    miconazole   Topical BID    insulin glargine  32 Units SubCUTAneous QAM    epoetin sharath-epbx  6,000 Units SubCUTAneous Once per day on Mon Wed Fri    amLODIPine  10 mg Per NG tube Daily    linezolid  600 mg Oral 2 times per day    cloNIDine  0.1 mg Oral Q12H hydrALAZINE  50 mg Oral 3 times per day    lactulose  20 g Oral BID    insulin lispro  0-16 Units SubCUTAneous 4x Daily AC & HS    HYDROcodone-acetaminophen  1 tablet Oral BID    pantoprazole (PROTONIX) 40 mg injection  40 mg IntraVENous Q12H    metoprolol succinate  25 mg Oral Daily    sennosides  5 mL Oral Nightly    carbidopa-levodopa  1 tablet Per NG tube TID    ipratropium-albuterol  1 ampule Inhalation Q4H WA    collagenase   Topical Q MWF    benzonatate  100 mg Oral TID    cetirizine  10 mg Oral Daily    ezetimibe  10 mg Oral Daily    gabapentin  600 mg Oral 4x Daily    ticagrelor  90 mg Oral BID    aspirin  81 mg Oral Daily    Or    aspirin  300 mg Rectal Daily    atorvastatin  80 mg Oral Nightly     Continuous Infusions:   sodium chloride      sodium chloride      sodium chloride      dextrose 100 mL/hr at 10/07/22 0749    sodium chloride      sodium chloride      dextrose       PRN Meds:.sodium chloride, sodium chloride, sodium chloride, sodium chloride, fentanNYL, sodium chloride, labetalol, hydrALAZINE, glucose, dextrose bolus **OR** dextrose bolus, glucagon (rDNA), dextrose, ondansetron **OR** ondansetron    RADIOLOGY:  FL MODIFIED BARIUM SWALLOW W VIDEO   Final Result   1. Mildly impaired swallowing mechanism with swallowing delay and followed   by laryngeal penetration with thin liquid barium when using a straw. No   barium aspiration      2. Please see separate speech pathology report for full discussion of   findings and recommendations. RECOMMENDATIONS:   Unavailable         XR ABDOMEN (KUB) (SINGLE AP VIEW)   Final Result   Somewhat greater than average quantity of retained fecal material thin the   lower GI tract suggesting dysfunction with evacuation. XR CHEST PORTABLE   Final Result   1. Atherosclerotic disease and mild cardiomegaly. 2.  Persistent but improved opacities in the bilateral lungs most pronounced   in the mid and lower lungs.   There appears to be a small left and trace right   pleural effusion         XR FOOT RIGHT (2 VIEWS)   Final Result   No evidence of calcaneal osteomyelitis. CT HEAD WO CONTRAST   Final Result   Parenchymal volume loss and chronic microvascular ischemic changes. No acute intracranial abnormality. Ethmoid and sphenoid sinusitis. Right mastoid effusion. US RETROPERITONEAL COMPLETE   Final Result   1. Normal appearance of the bilateral kidneys. No hydronephrosis. 2.  A Mahan catheter is decompressing the bladder. XR CHEST PORTABLE   Final Result   Worsening infiltrate and or atelectasis on the left, stable on the right with   suspected layering pleural effusion. XR CHEST PORTABLE   Final Result   Unchanged bilateral atelectasis and or infiltrate as well as small right   pleural effusion. MRI BRAIN WO CONTRAST   Final Result   There are 2 small regions of petechial infarcts one in the left   posterior frontal lobe and a second in the left parietal lobe not   exceeding 3 mm. There is otherwise extensive small vessel ischemic   changes         XR CHEST PORTABLE   Final Result   1. Multifocal bilateral airspace disease more prominent within the lower   lobes. The airspace disease is unchanged when compared with the prior study. CT HEAD WO CONTRAST   Final Result   Diffuse atrophy likely age related   Findings compatible with small vessel ischemic changes. XR CHEST PORTABLE   Final Result   1. Pulmonary opacities present favoring edema and atelectasis, also small   pleural effusions, but nonspecific with some additional considerations noted   above   2. Support devices present as described above   3. Heart size appears borderline enlarged         XR ABDOMEN FOR NG/OG/NE TUBE PLACEMENT   Final Result   NG/OG is in the stomach. IR CAROTID STENT W PROTECTION   Final Result   1.  Angiogram demonstrates successful placement of a carotid stent ,   post procedure images demonstrate a widely patent stent and internal   carotid         CT HEAD WO CONTRAST   Final Result   Diffuse atrophy likely age related   Findings compatible with small vessel ischemic changes. Findings were called at the time of dictation         CT BRAIN PERFUSION   Final Result      No significant ischemic penumbra identified      This study was analyzed by the [employer]. ai algorithm. CTA NECK W CONTRAST   Final Result   1. Estimated stenosis of the proximal right and left internal carotid   artery by NASCET criteria is greater than 90% on the left   2. Severe atherosclerotic disease . 3. No large vessel occlusion identified            This study was analyzed by the [employer] ai algorithm. CTA HEAD W CONTRAST   Final Result   1. Estimated stenosis of the proximal right and left internal carotid   artery by NASCET criteria is greater than 90% on the left   2. Severe atherosclerotic disease . 3. No large vessel occlusion identified            This study was analyzed by the [employer] ai algorithm. NM GI BLOOD LOSS    (Results Pending)       Vitals:    BP (!) 144/65   Pulse 79   Temp 97.9 °F (36.6 °C) (Oral)   Resp 25   Ht 5' 8\" (1.727 m)   Wt 260 lb 8 oz (118.2 kg)   SpO2 99%   BMI 39.61 kg/m²     LABS:   Recent Labs     10/06/22  0610 10/06/22  0844 10/07/22  0607   WBC 13.2*  --  17.4*   HGB 6.3* 6.4* 6.8*   HCT 20.5* 20.1* 20.9*     --  215     Recent Labs     10/07/22  0607      K 4.6   *   CO2 23   PHOS 4.3   BUN 85*   CREATININE 2.2*     Recent Labs     10/06/22  0610 10/07/22  0607   PROT 4.6* 4.9*       ASSESSMENTS:   1. Right foot wound diabetic ulcer  2. DM  3. Pain right foot  Acute cerebrovascular accident (CVA) ([address]            PLAN:    - Patient was examined and evaluated. -Reviewed patient's recent lab results, charts and pertinent diagnostic imaging. Reviewed ancillary service notes.  -Wound cx:Growth present, Corynebacteria  - Continue Wound vac with santyl right foot. MWF. Scheduled for change today  - Will follow      Thank you for the opportunity to take part in the patient's care. Please do not hesitate to call for any questions or concerns.

## 2022-10-07 NOTE — PROGRESS NOTES
[facility]  Nephrology Attending Progress Note  Prabhakar Quiñonez. MD Judith        SUBJECTIVE:     History of Present Ilness:    Evelia Shankar is a 74 y.o. male history of CAD s/p MI, hypertension, chronic kidney disease stage IIIa (baseline creatinine of recent 1.7-1.9) he is followed by our group with Dr. Toyin Brennan. He initially was admitted to [facility] with right lower extremity wound. .  Patient subsequently developed aphasia noted to facial droop associated with right-sided weakness. He was transferred to [address] [place] [address] for further management. Patient was admitted told the neuro ICU. He required intubation with mechanical ventilation. CT of the head and neck demonstrated bilateral carotid stenosis with the left ICA being dominant. IR performed angiogram with angioplasty of the left ICA. Laboratory data showed progressive increase in his serum creatinine to currently 2.9 mg/dL. Hemoglobin was noted to be 5.6. He has received at least 2 units of packed cells but with further drop in his hemoglobin with requirement for additional transfusion. He had an episode of urinary retention with gross hematuria Mahan catheter was reinserted and the hematuria subsequently cleared. Renal is consulted for LARY.      Subjective     10/1: he denies blurred vision, no headaches  10/2: No new acute issues from overnight; more alert; receiving PRBCs hemoglobin trending low  10/3: pt seen sp egd today, no cp or sob, family and nurse in room, no cp or sob  10/4: pt seen in room, no complaints  10/5: pt seen in icu, feels ok today, no cp or sob  10/6: pt seen in nsicu, no cp or sob, npo, wants to eat  10/7: pt seen in nsicu, no cp or sob, starting dysphagia diet         PROBLEM LIST:    Patient Active Problem List   Diagnosis    Acute cerebrovascular accident (CVA) ([address]    Acute respiratory failure with hypoxia ([address]    Stenosis of left carotid artery    Hypoalbuminemia    Electrolyte imbalance    Hypernatremia        PAST MEDICAL HISTORY:    Past Medical History:   Diagnosis Date    Chronic back pain     CKD (chronic kidney disease)     CVA (cerebral vascular accident) ([place] [address]     CVA (cerebral vascular accident) ([place] [address]     DM (diabetes mellitus) ([place] [address]     Major depression     MI (myocardial infarction) ([place] [address]     MATT (obstructive sleep apnea)     Parkinson disease (HCC)     PVD (peripheral vascular disease) ([place] [address]     Seizure ([place] [address]        DIET:    ADULT DIET;  Dysphagia - Minced and Moist     PHYSICAL EXAM:     Patient Vitals for the past 24 hrs:   BP Temp Temp src Pulse Resp SpO2   10/07/22 1207 -- -- -- 73 23 98 %   10/07/22 1100 137/63 98.8 °F (37.1 °C) Axillary 72 18 98 %   10/07/22 1045 128/65 -- -- 73 18 98 %   10/07/22 1044 128/65 98.5 °F (36.9 °C) -- 73 19 98 %   10/07/22 0810 -- -- -- 79 25 99 %   10/07/22 0800 (!) 144/65 97.9 °F (36.6 °C) Oral 78 26 99 %   10/07/22 0400 (!) 155/64 -- -- 72 19 100 %   10/07/22 0053 -- -- -- 72 21 99 %   10/07/22 0000 (!) 161/71 97.8 °F (36.6 °C) Oral 71 22 100 %   10/06/22 2056 -- -- -- -- 16 --   10/06/22 2000 (!) 141/70 97.6 °F (36.4 °C) Oral 70 19 100 %   10/06/22 1934 -- -- -- 71 26 100 %   10/06/22 1600 135/64 -- -- 66 18 100 %   10/06/22 1546 -- -- -- 66 (!) 0 100 %   10/06/22 1400 (!) 148/68 -- -- 66 24 93 %   10/06/22 1327 (!) 131/95 -- -- -- -- --   10/06/22 1249 (!) 131/95 97.8 °F (36.6 °C) Oral 69 22 100 %   10/06/22 1245 (!) 131/95 97.8 °F (36.6 °C) -- 67 11 100 %   10/06/22 1229 138/65 97.8 °F (36.6 °C) Oral 66 18 97 %   @      Intake/Output Summary (Last 24 hours) at 10/7/2022 1218  Last data filed at 10/7/2022 3387  Gross per 24 hour   Intake 2006.44 ml   Output 2475 ml   Net -468.56 ml         Wt Readings from Last 3 Encounters:   09/27/22 260 lb 8 oz (118.2 kg)       Constitutional:  Pt is in no acute distress  Head: normocephalic, atraumatic  Neck: no JVD  Cardiovascular: regular rate and rhythm, no murmurs, gallops, or rubs  Respiratory:  No rales, rhochi, or wheezes  Gastrointestinal:  Soft, nontender, nondistended, bowel sounds x 4  Ext: tr edema  Skin: dry, no rash  Neuro: aaox3    MEDS (scheduled):    ciprofloxacin  500 mg Oral 2 times per day    miconazole   Topical BID    insulin glargine  32 Units SubCUTAneous QAM    epoetin sharath-epbx  6,000 Units SubCUTAneous Once per day on Mon Wed Fri    amLODIPine  10 mg Per NG tube Daily    linezolid  600 mg Oral 2 times per day    cloNIDine  0.1 mg Oral Q12H    hydrALAZINE  50 mg Oral 3 times per day    lactulose  20 g Oral BID    insulin lispro  0-16 Units SubCUTAneous 4x Daily AC & HS    HYDROcodone-acetaminophen  1 tablet Oral BID    pantoprazole (PROTONIX) 40 mg injection  40 mg IntraVENous Q12H    metoprolol succinate  25 mg Oral Daily    sennosides  5 mL Oral Nightly    carbidopa-levodopa  1 tablet Per NG tube TID    ipratropium-albuterol  1 ampule Inhalation Q4H WA    collagenase   Topical Q MWF    benzonatate  100 mg Oral TID    cetirizine  10 mg Oral Daily    ezetimibe  10 mg Oral Daily    gabapentin  600 mg Oral 4x Daily    ticagrelor  90 mg Oral BID    aspirin  81 mg Oral Daily    Or    aspirin  300 mg Rectal Daily    atorvastatin  80 mg Oral Nightly       MEDS (infusions):   sodium chloride      sodium chloride      sodium chloride      dextrose Stopped (10/07/22 1053)    sodium chloride      sodium chloride      dextrose         MEDS (prn):  sodium chloride, sodium chloride, sodium chloride, sodium chloride, fentanNYL, sodium chloride, labetalol, hydrALAZINE, glucose, dextrose bolus **OR** dextrose bolus, glucagon (rDNA), dextrose, ondansetron **OR** ondansetron    DATA:    Recent Labs     10/05/22  0526 10/06/22  0610 10/06/22  0844 10/07/22  0607   WBC 17.1* 13.2*  --  17.4*   HGB 6.8* 6.3* 6.4* 6.8*   HCT 22.0* 20.5* 20.1* 20.9*   MCV 97.8 99.5  --  95.9    220  --  215     Recent Labs     10/05/22  0526 10/06/22  0610 10/07/22  0607    145 141   K 4.4 4.5 4.6   * 114* 110*   CO2 21* 21* 23 * 95* 85*   CREATININE 2.5* 2.5* 2.2*   LABGLOM 26 26 30   GLUCOSE 187* 91 160*   CALCIUM 8.2* 8.1* 8.0*   ALT 17 <5 7   AST 18 15 17   BILITOT 0.2 0.3 0.4   ALKPHOS 233* 192* 194*   MG 2.4 2.6 2.1   PHOS 4.5 4.6* 4.3       Lab Results   Component Value Date    LABALBU 2.7 (L) 10/07/2022    LABALBU 2.5 (L) 10/06/2022    LABALBU 2.8 (L) 10/05/2022     No results found for: TSH    Iron Studies  Lab Results   Component Value Date    IRON 24 (L) 10/03/2022    TIBC 195 (L) 10/03/2022    FERRITIN 94 10/03/2022     No results found for: LUMTEOLP33  No results found for: FOLATE    No results found for: VITD25  No results found for: PTH    No components found for: URIC    No results found for: VOL, APPEARANCE, COLORU, LABSPEC, LABPH, LEUKBLD, NITRU, GLUCOSEU, KETUA, UROBILINOGEN, KETUA, UROBILINOGEN, BILIRUBINUR, OCBU    No results found for: LIPIDPAN      IMPRESSION/RECOMMENDATIONS:      1. Acute CVA  S/p IR intervention with angioplasty of the left ICA     2. LARY on CKD stage 3b  Baseline 1.7-1.9  combined effects of ACEI use and contrast nephrotoxicity  Component of urinary retention  nonoliguric at this time  Disproportionate elevation of BUN relative to Cr suspicious for gib  stool heme positive  Worsening renal unction despite being nonoliguric  May require dialysis support  Monitor labs and urine  1.4L  Bun/cr  128/3 >115/2.6> 105/2.5>95/2.5> 85/2.2  Uo 2.4L     3. Hypertensive emergency  presenting  with acute CVA  Adjust meds to target blood pressure control to SBP less than 160  BP with improved control     4. Anemia, chronic with acute worsening  Informed stool heme positive  PRBC transfusion as needed  Sp egd  Trf < 7  On ASHLEY      5. Type 2 diabetes mellitus  Monitor glucose levels     6. Hyperkalemia  Due to combination of LARY and metabolic acidosis; PRBC transfusios  Lokelma  prn     7. Urinary retention  Suspected situational  Now with trejo  Urology following     8.  Hypernatremia  Suspect intervascular volume depletion  Na 151>145>141>145>141  Continue d5        Lucila Flower.  Olivia Delatorre MD

## 2022-10-07 NOTE — PROGRESS NOTES
OCCUPATIONAL THERAPY TREATMENT NOTE   NICHOLAS Lavelle [facility]ley [employer] [id number] [address]       Date:10/7/2022                                                               Patient Name: Evelia Shankar  MRN: [id number]  : 1955  Room: [address] [place] [address]    Evaluating OT: MAICO Chen,  OTR/L; [id number]     Referring Provider: Alec Zamora DO   Specific Provider Orders/Date: OT eval and treat (10/3/22)        Diagnosis: Acute cerebrovascular accident (CVA) ([address] [I63.9]      Reason for admission: Pt admitted with Acute CVA, R heel wound. Surgery/Procedures:   :  L ICA angioplasty and stent   10/3: EGD      Pertinent Medical History:    Past Medical History        Past Medical History:   Diagnosis Date    Chronic back pain      CKD (chronic kidney disease)      CVA (cerebral vascular accident) ([address]      CVA (cerebral vascular accident) ([address]      DM (diabetes mellitus) ([address]      Major depression      MI (myocardial infarction) ([address]      MATT (obstructive sleep apnea)      Parkinson disease ([address]      PVD (peripheral vascular disease) ([address]      Seizure ([address]              *Precautions:  Fall Risk, O2, R shaista, expressive > receptive aphasia, RLE wound with wound vac, NWB RLE, off-loading shoe to RLE, dysphasia diet (minced/moist)     Assessment of current deficits   [x] Functional mobility          [x]ADLs           [x] Strength                  [x]Cognition   [x] Functional transfers        [x] IADLs         [x] Safety Awareness   [x]Endurance   [x] Fine Coordination           [x] ROM           [x] Vision/perception    [x]Sensation     [x]Gross Motor Coordination [x] Balance    [] Delirium                  [x]Motor Control     [x] Communication     OT PLAN OF CARE   OT POC based on physician orders, patient diagnosis and results of clinical assessment.         Frequency/Duration: 1-3 days/wk for 1-2 weeks PRN    Specific OT Treatment Interventions to include:   * Instruction/training on adapted ADL techniques and AE recommendations to increase functional independence within precautions       * Training on energy conservation strategies, correct breathing pattern and techniques to improve independence/tolerance for self-care routine  * Functional transfer/mobility training/DME recommendations for increased independence, safety, and fall prevention  * Patient/Family education to increase follow through with safety techniques and functional independence  * Recommendation of environmental modifications for increased safety with functional transfers/mobility and ADLs  * Cognitive retraining/development of therapeutic activities to improve problem solving, judgement, memory, and attention for increased safety/participation in ADL/IADL tasks  * Sensory re-education to improve body/limb awareness, maintain/improve skin integrity, and improve hand/UE motor function  * Visual-perceptual training to improve environmental scanning, visual attention/focus, and oculomotor skills for increased safety/independence with functional transfers/mobility and ADLs  * Splinting/positioning for increased function, prevention of contractures, and improve skin integrity  * Therapeutic exercise to improve motor endurance, ROM, and functional strength for ADLs/functional transfers  * Therapeutic activities to facilitate/challenge dynamic balance, stand tolerance for increased safety and independence with ADLs  * Therapeutic activities to facilitate gross/fine motor skills for increased independence with ADLs  * Neuro-muscular re-education: facilitation of righting/equilibrium reactions, midline orientation, scapular stability/mobility, normalization of muscle tone, and facilitation of volitional active controled movement  * Positioning to improve skin integrity, interaction with environment and functional independence  * Delirium prevention/treatment  * Manual techniques for edema management     Modified Marin Scale   Score     Description  0             No symptoms  1             No significant disability despite symptoms  2             Slight disability; able to look after own affairs  3             Moderate disability; able to ambulate without assist/ requires assist with ADLs  4             Moderate/Severe disability;requires assist to ambulate/assist with ADLs  5             Severe disability;bedridden/incontinent   6               Score:   4     Recommended Adaptive Equipment: tub bench, TBD pending progress      Home Living: Pt lives with family  in a 2 story home with 4+4-5 step(s) to enter and 1 rail(s); bed/bath on 2nd floor  Bathroom setup: tub/shower  Equipment owned: grab bars in shower, quad cane     Prior Level of Function: Ind with ADLs; Ind with IADLs. Quad cane for functional mobility. Driving: Unknown  Occupation: None stated     Pain Level: pt c/o \"a little\" pain in BLE     Cognition: A&O: 2/4; unable to report month/year; Follows 2 step commands; answers questions appropriately ~75% of time. Pt presents with min confusion during session. Memory: G-             Comprehension: F+             Problem solving: F             Judgement/safety: F- (pt unrealistic regarding his current functional abilities)                 Communication skills: Impaired; increased time for word finding             Vision: Pt reports vision is \"less than perfect\" reporting he wears bifocals (not present); Impaired peripherals. Glasses: Yes                                                       Hearing: WFL               RASS: 0  CAM-ICU: (NT) Delirium     UE Assessment:  Hand Dominance: Right [x]  Left []          ROM Strength STM goal: PRN   RUE  AROM  Shoulder flx: 0-50  Elbow: ~20 AROM  Wrist: ~10 AROM  Hand: digits 4,5 flx contracture.      PROM  Shoulder: 0-110  Elbow: WFL  Wrist: WFL  Hand: WFL Shoulder: 2+/5  Elbow flx: 3/5; ext: 3/5  Wrist flx: 2-/5; ext: 1/5  Thumb: 2-/5  : Poor+  FMC: Poor  GMC: Fair- Increase RUE strength for participation in ADLs. Demonstrate good knowledge of adaptive techniques for dressing. Good understanding of AAROM exercises. LUE WFL Proximal 4+/5  Elbow: 5/5  : Good  FMC: WFL  GMC: WFL Increase overall LUE strength         Sensation: No c/o numbness/tingling in extremities. Light touch intact to RUE  Tone: Hypertonicity in RUE flexors. Edema: Unremarkable. Functional Assessment:  AM-PAC Daily Activity Raw Score: 12/24    Initial Eval Status  Date: 10/3/22 Treatment Status  Date: 10/7 STGs = LTGs  Time frame: 7-14 days   Feeding Min A  To drink from cup with BUE for stabilization. S; set up  Bed level                     S; set-up         Grooming Max A overall  Min A to wash face with LUE. Verbal cues for use of RUE. Max A                     Min A         UB dressing/bathing Max A Max A                      Min A  With use of shaista techniques         LB dressing/bathing Dep  Max A  Seated EOB using reacher and sock aid to jeny/doff L sock  (Wound vac on R)                     Mod A   With use of shaista techniques         Toileting Dep Dep                      Mod A      Bed Mobility  Supine to sit:   Mod A     Sit to supine:   NT  Pt seated in chair upon exit. Supine to sit:   Mod A    Sit to supine: Mod A+2                      SBA      Functional Transfers Sit to stand: Mod A x2     Stand to sit:   Mod A x2     Stand Pivot: Mod A x2  Verbal cues to maintain NWB of RLE. NT                        Mod A      Functional Mobility NT  NT                     Mod A         Balance Sitting:     Static: Min A    Dynamic: Mod A  Standing: Mod A x2 Sitting:     Static:  SBA    Dynamic:Min A  Standing:NT   Sitting:     Static: SBA    Dynamic: Min A  Standing: Mod A                   Endurance/Activity Tolerance    fair tolerance with light activity.    fair with light activity; fatigued EOB WFL  For full ADL. Visual/  Perceptual Impaired: Pt reporting vision is \"dimming. \" Impaired peripheral vision with assessment, improving at midline. Vitals:  Heart Rate at rest 80 bpm Heart Rate post session 80 bpm   SpO2 at rest 93% SpO2 post session 95%   Blood Pressure at rest 125/60 mmHg (EOB) Blood Pressure post session 138/68 mmHg     Treatment: OT treatment provided this date:  Bed mobility: Instruction on precautions prior to bed mobility to facilitate safe transfers and ADLS. HOB elevated to assist. Pt with SOB requiring time to recover (coughing episodes.)    Balance retraining: Performed  sitting/standing balance ex's with instruction to facilitate righting reactions with postural changes during ADLS. ADL retraining: Instruction on adapted techniques/AE(reacher, sock aid) to increase independence and safe reach during dressing/bathing activities. Pt requiring increased assist today. Energy conservation: Education on breathing techniques, pacing, work simplification strategies & recommended bathroom DME for safety and energy conservation during self care tasks and activities of daily living. ROM/exercise: B UE ROM/coordination ex's encouraged bedside to increase functional strength. Delirium Prevention: Environmental and sensory modifications assessed and implemented to decrease ICU acquired delirium and to improve overall orientation, mentation and pt interaction with family/staff. Line management and environmental modifications made prior to and end of session to ensure patient safety and to increase efficiency of session. Skilled monitoring of HR, O2 saturation, blood pressure and patient's response to activity performed throughout session. Comments: OK from RN to see patient. Upon arrival, patient supine in bed, motivated for OOB activity. Pt demo fair tolerance with fair understanding of education/techniques.   At end of session, patient returned to bed and bed placed in chair position to increase activity tolerance. Pillows placed under B UE/LE for comfort & edema control. .  Call light within reach, all lines and tubes intact. Pt instructed on use of call light for assistance and fall prevention. Overall, pt presents with decreased activity tolerance, dynamic balance, functional mobility and SOB limiting completion of ADLs and safe return home. Pt can benefit from continued skilled OT to increase safety, functional independence & quality of life. Pt has made good progress towards set goals.    Continue with current plan of care       Time In:1328              Time Out: 1355       Total Treatment Time: 27      Treatment Charges: Mins Units   Ther Ex  [id number]     Manual Therapy [id number]     Thera Activities [id number] 19 1   ADL/Home Mgt [id number] 8 1   Neuro Re-ed 05930     Orthotic manage/training  81630     Non-Billable Time         Swathi Cardenas OTR/L [id number]

## 2022-10-07 NOTE — CONSULTS
[facility] ([facility])   Gastroenterology, Hepatology, &  Advanced Endoscopy    Consult       ASSESSMENT AND PLAN:    67y/M w/ CKD, DMII, and MATT who presents w/ acute CVA now on DAPT and R foot infection s/p debridement who has been having issues with persistent anemia requiring several transfusions. He has had no melena/hematochezia. PLAN:  - Agree with serologic workup for anemia  - Agree with imaging including tagged RBC scan  - Continue to trend and transfuse to Hgb >7. - I will monitor labs and imaging results. - Unlikely that further endoscopic evaluation will be helpful but will consider based on the above findings and continued clinical status. I will follow. Thank you for including us in the care of this patient. Please do not hesitate to contact us with any additional questions or concerns. Armando Valentine MD  Gastroenterology/Hepatology  Advanced Endoscopy          HISTORY OF PRESENT ILLNESS:      Mr. Shaneka Rosa is a 67y/M w/ history of CKD, DMII, MATT who presented with new CVA and is now on DAPT. He also recently had a R foot infection s/p debridement as well as new productive cough with concern for PNA. He has had recurrent anemia requiring frequent transfusion. He has had previous imaging of his abdomen with no concern for bleeding process. He had an EGD on admission which was negative. Lab workup has been sent and is pending. The patient has not had melena or hematochezia. He has been HDS.       Past Medical History:        Diagnosis Date    Chronic back pain     CKD (chronic kidney disease)     CVA (cerebral vascular accident) ([address]     CVA (cerebral vascular accident) ([address]     DM (diabetes mellitus) ([address]     Major depression     MI (myocardial infarction) ([address]     MATT (obstructive sleep apnea)     Parkinson disease ([facility])     PVD (peripheral vascular disease) ([address]     Seizure ([address]      Past Surgical History:        Procedure Laterality Date    FEMORAL ARTERY STENT      IR CAROTID STENT [facility] W PROTECTION  9/27/2022    IR CAROTID STENT [facility] PROTECTION 9/27/2022 Lisa Starr MD [facility] SPECIAL PROCEDURES     Social History:    TOBACCO:   has no history on file for tobacco use. ETOH:   has no history on file for alcohol use. DRUGS:   has no history on file for drug use. Family History:   History reviewed. No pertinent family history.       Current Facility-Administered Medications:     0.9 % sodium chloride infusion, , IntraVENous, PRN, Tony Shankar, DO    0.9 % sodium chloride infusion, , IntraVENous, PRN, Frederich Ebenezer Shankar, DO    ciprofloxacin (CIPRO) tablet 500 mg, 500 mg, Oral, 2 times per day, Brian Youssef MD, 500 mg at 10/07/22 0915    miconazole (MICOTIN) 2 % powder, , Topical, BID, Keke Lozada DO, Given at 10/07/22 0919    insulin glargine-yfgn (SEMGLEE-YFGN) injection vial 32 Units, 32 Units, SubCUTAneous, QAM, Tony Shankar DO, 32 Units at 10/07/22 0911    0.9 % sodium chloride infusion, , IntraVENous, PRN, Evelyn Anderson DO    dextrose 5 % solution, , IntraVENous, Continuous, Clayton Lucio MD, Last Rate: 100 mL/hr at 10/07/22 1808, Rate Verify at 10/07/22 1808    0.9 % sodium chloride infusion, , IntraVENous, PRN, Emmalene Kellen, APRN - CNP    epoetin sharath-epbx (RETACRIT) injection 6,000 Units, 6,000 Units, SubCUTAneous, Once per day on Mon Wed Fri, Dinh Alonso MD, 6,000 Units at 10/07/22 1000    amLODIPine (NORVASC) tablet 10 mg, 10 mg, Per NG tube, Daily, Evelyn Anderson DO, 10 mg at 10/07/22 0915    linezolid (ZYVOX) tablet 600 mg, 600 mg, Oral, 2 times per day, Hammad Solorzano MD, 600 mg at 10/07/22 0914    cloNIDine (CATAPRES) tablet 0.1 mg, 0.1 mg, Oral, Q12H, Karlene Foreman, APRN - CNP, 0.1 mg at 10/07/22 1140    hydrALAZINE (APRESOLINE) tablet 50 mg, 50 mg, Oral, 3 times per day, Emmalene Kellen APRN - CNP, 50 mg at 10/07/22 1357    fentaNYL (SUBLIMAZE) injection 50 mcg, 50 mcg, IntraVENous, Q2H PRN, Emmalene Kellen, APRN - CNP, 50 mcg at 10/05/22 1956    0.9 % sodium chloride infusion, , IntraVENous, PRN, CONSTANTINE Conley - CNP    lactulose (CHRONULAC) 10 GM/15ML solution 20 g, 20 g, Oral, BID, CONSTANTINE Conley - CNP, 20 g at 10/06/22 2027    insulin lispro (HUMALOG) injection vial 0-16 Units, 0-16 Units, SubCUTAneous, 4x Daily AC & HS, CONSTANTINE Conley - CNP, 4 Units at 10/07/22 1702    HYDROcodone-acetaminophen ([facility]) 7.5-325 MG per tablet 1 tablet, 1 tablet, Oral, BID, CONSTANTINE Conley CNP, 1 tablet at 10/07/22 0915    pantoprazole (PROTONIX) 40 mg in sodium chloride (PF) 10 mL injection, 40 mg, IntraVENous, Q12H, CONSTANTINE Conley CNP, 40 mg at 10/07/22 1602    metoprolol succinate (TOPROL XL) extended release tablet 25 mg, 25 mg, Oral, Daily, CONSTANTINE Conley CNP, 25 mg at 10/07/22 0915    sennosides (SENOKOT) 8.8 MG/5ML syrup 5 mL, 5 mL, Oral, Nightly, Audrey Curry MD, 5 mL at 10/06/22 2027    labetalol (NORMODYNE;TRANDATE) injection 10 mg, 10 mg, IntraVENous, Q30 Min PRN, Audrey Curry MD, 10 mg at 10/01/22 1115    hydrALAZINE (APRESOLINE) injection 10 mg, 10 mg, IntraVENous, Q30 Min PRN, Audrey Curry MD, 10 mg at 09/30/22 1627    glucose chewable tablet 16 g, 4 tablet, Oral, PRN, Audrey Curry MD    dextrose bolus 10% 125 mL, 125 mL, IntraVENous, PRN **OR** dextrose bolus 10% 250 mL, 250 mL, IntraVENous, PRN, Audrey Curry MD    glucagon (rDNA) injection 1 mg, 1 mg, SubCUTAneous, PRN, Audrey Curry MD    dextrose 10 % infusion, , IntraVENous, Continuous PRN, Audrey Curry MD    carbidopa-levodopa (SINEMET)  MG per tablet 1 tablet, 1 tablet, Per NG tube, TID, Audrey Curry MD, 1 tablet at 10/07/22 1357    ipratropium-albuterol (DUONEB) nebulizer solution 1 ampule, 1 ampule, Inhalation, Q4H WA, uAdrey Curry MD, 1 ampule at 10/07/22 1651    collagenase ointment, , Topical, Q MWF, Audrey Curry MD, Given by Other at 10/05/22 1208    benzonatate (TESSALON) capsule 100 mg, 100 mg, Oral, TID, Kiarra Tidwell MD, 100 mg at 10/07/22 1357    cetirizine (ZYRTEC) tablet 10 mg, 10 mg, Oral, Daily, Kiarra Tidwell MD, 10 mg at 10/07/22 0915    ezetimibe (ZETIA) tablet 10 mg, 10 mg, Oral, Daily, Kiarra Tidwell MD, 10 mg at 10/07/22 1207    gabapentin (NEURONTIN) tablet 600 mg, 600 mg, Oral, 4x Daily, Kiarra Tidwell MD, 600 mg at 10/07/22 1702    ticagrelor (BRILINTA) tablet 90 mg, 90 mg, Oral, BID, Kiarra Tidwell MD, 90 mg at 10/07/22 0914    ondansetron (ZOFRAN-ODT) disintegrating tablet 4 mg, 4 mg, Oral, Q8H PRN **OR** ondansetron (ZOFRAN) injection 4 mg, 4 mg, IntraVENous, Q6H PRN, Kiarra Tidwell MD    aspirin EC tablet 81 mg, 81 mg, Oral, Daily, 81 mg at 10/07/22 0914 **OR** aspirin suppository 300 mg, 300 mg, Rectal, Daily, Kiarra Tidwell MD, 300 mg at 09/29/22 0809    atorvastatin (LIPITOR) tablet 80 mg, 80 mg, Oral, Nightly, Kiarra Tidwell MD, 80 mg at 10/06/22 2025     Allergies:  Codeine    ROS:  General: Patient denies n/v/f/c or weight loss. HEENT: Patient denies persistent postnasal drip, scleral icterus, drooling, persistent bleeding from nose/mouth. Resp: Patient has SOB and productive cough. Cards: Patient denies CP, palpitations, significant edema  GI: As above. Derm: Patient denies jaundice/rashes. Musc: Patient denies diffuse/irregular joint swelling or myalgias. PHYSICAL EXAM:  BP (!) 146/74   Pulse 82   Temp 98.5 °F (36.9 °C) (Oral)   Resp 25   Ht 5' 8\" (1.727 m)   Wt 260 lb 8 oz (118.2 kg)   SpO2 98%   BMI 39.61 kg/m²   Physical Exam:  General: Chronically ill-appearing, NAD  HEENT: PERRLA, EOMI, Anicteric sclera, MMM, no rhinorrhea  Cards: RRR, mild LE edema  Resp: Saturating well on RA, + productive cough, + auditory wheezing, shortened inspiration phase  Abdomen: soft, NT, ND. Extremities: Moves all extremities, no effusions or bruising. Dressing on R foot.    Skin: No rashes or jaundice  Neuro: A&O x 3, CN grossly intact, non-focal exam               Electronically signed by Rossi Brown MD on 10/7/2022 at 6:17 PM

## 2022-10-07 NOTE — PROGRESS NOTES
Hospitalist Progress Note      SYNOPSIS: Patient admitted on 2022 for Acute cerebrovascular accident (CVA) [facility])    Patient presented to [facility] with strokelike symptoms, right-sided paralysis and aphasia. Trnsfrd to [facility]  Was found to have severe left ICA stenosis and taken to IR for left ICA angioplasty with stent placement. Patient was intubated and sedated and transferred to the ICU early portion of the admission. Blood transfus ordered on  for hgb 5. Hematuria on admiss     SUBJECTIVE:      Denies chest pain or shortness of breath  Hgb 6.8 this am   Tot bili 0.4  Ldh/haptoglob/periph smear pending  On DAPT 2 to recent acute cva  Patient underwent EGD earlier this admission October 3  The EGD did not reveal evidence of upper GI bleed  And due to serum cr reluctnace to pursue ct scan bleeding scan     Temp (24hrs), Av °F (36.7 °C), Min:97.6 °F (36.4 °C), Max:98.8 °F (37.1 °C)    DIET: ADULT DIET; Dysphagia - Minced and Moist  CODE: Full Code        OBJECTIVE:    /63   Pulse 72   Temp 98.8 °F (37.1 °C) (Axillary)   Resp 18   Ht 5' 8\" (1.727 m)   Wt 260 lb 8 oz (118.2 kg)   SpO2 98%   BMI 39.61 kg/m²   General appearance: Not jaundiced not diaphoretic     HEENT: No obvious swelling to lips or tongue  Respiratory: Clear to auscultation bilaterally, unlabored respiratory pattern at rest  Cardiovascular: Audible S1-S2 no lower extremity edema     Musculoskeletal:   Skin: No petechiae or hives on inspection warm to palpation  Neurologic: awake, alert speech is clear  ASSESSMENT:  Anemia normocytic which is required blood transfusion- negat egd on 0ct 3  Acute CVA-   severe left ICA stenosis and taken to IR for left ICA angioplasty with stent placement.   now on DAPT  PAD  Acute kidney injury serum cr 2.2  Hypertensive urgency- 137/63  Right foot wound infection/diabetic ulcer  Urine retention  dysphagia  Hypernatremia-resolved na 141  Hypokalemia-resolved k 4.6 PLAN:  Blood transfus  Consult gi  Check hemolys labs - perph smear/retic count/ldh  pending hwevr bili tot is normal    Tagged rbc bleeding scan     In meantime ct chest /abd Jackie Coma NC for malignancy   Avoid nephrotox agents    DISPOSITION: unable to dc to home today    Medications:  REVIEWED DAILY    Infusion Medications    sodium chloride      sodium chloride      sodium chloride      dextrose Stopped (10/07/22 1053)    sodium chloride      sodium chloride      dextrose       Scheduled Medications    ciprofloxacin  500 mg Oral 2 times per day    miconazole   Topical BID    insulin glargine  32 Units SubCUTAneous QAM    epoetin sharath-epbx  6,000 Units SubCUTAneous Once per day on Mon Wed Fri    amLODIPine  10 mg Per NG tube Daily    linezolid  600 mg Oral 2 times per day    cloNIDine  0.1 mg Oral Q12H    hydrALAZINE  50 mg Oral 3 times per day    lactulose  20 g Oral BID    insulin lispro  0-16 Units SubCUTAneous 4x Daily AC & HS    HYDROcodone-acetaminophen  1 tablet Oral BID    pantoprazole (PROTONIX) 40 mg injection  40 mg IntraVENous Q12H    metoprolol succinate  25 mg Oral Daily    sennosides  5 mL Oral Nightly    carbidopa-levodopa  1 tablet Per NG tube TID    ipratropium-albuterol  1 ampule Inhalation Q4H WA    collagenase   Topical Q MWF    benzonatate  100 mg Oral TID    cetirizine  10 mg Oral Daily    ezetimibe  10 mg Oral Daily    gabapentin  600 mg Oral 4x Daily    ticagrelor  90 mg Oral BID    aspirin  81 mg Oral Daily    Or    aspirin  300 mg Rectal Daily    atorvastatin  80 mg Oral Nightly     PRN Meds: sodium chloride, sodium chloride, sodium chloride, sodium chloride, fentanNYL, sodium chloride, labetalol, hydrALAZINE, glucose, dextrose bolus **OR** dextrose bolus, glucagon (rDNA), dextrose, ondansetron **OR** ondansetron    Labs:     Recent Labs     10/05/22  0526 10/06/22  0610 10/06/22  0844 10/07/22  0607   WBC 17.1* 13.2*  --  17.4*   HGB 6.8* 6.3* 6.4* 6.8*   HCT 22.0* 20.5* 20.1* 20.9*  220  --  215       Recent Labs     10/05/22  0526 10/06/22  0610 10/07/22  0607    145 141   K 4.4 4.5 4.6   * 114* 110*   CO2 21* 21* 23   * 95* 85*   CREATININE 2.5* 2.5* 2.2*   CALCIUM 8.2* 8.1* 8.0*   PHOS 4.5 4.6* 4.3       Recent Labs     10/05/22  0526 10/06/22  0610 10/07/22  0607   PROT 5.3* 4.6* 4.9*   ALKPHOS 233* 192* 194*   ALT 17 <5 7   AST 18 15 17   BILITOT 0.2 0.3 0.4       Radiology:     +++++++++++++++++++++++++++++++++++++++++++++++++  DO Emil Patterson Physician - [address] [place] [address], [facility]  +++++++++++++++++++++++++++++++++++++++++++++++++  NOTE: This report was transcribed using voice recognition software. Every effort was made to ensure accuracy; however, inadvertent computerized transcription errors may be present.

## 2022-10-07 NOTE — PROGRESS NOTES
Attempted to place pt on bipap tonight, pt appeared he may get sick/vomit, removed mask. Asked pt if he felt like he was going to vomit. Pt stated \"I dont know\". RN notified and stated the pt has had stomach pain tonight. Bipap off and remains at pts bedside currently.

## 2022-10-08 ENCOUNTER — APPOINTMENT (OUTPATIENT)
Dept: NUCLEAR MEDICINE | Age: 67
DRG: 034 | End: 2022-10-08
Attending: INTERNAL MEDICINE
Payer: MEDICARE

## 2022-10-08 ENCOUNTER — APPOINTMENT (OUTPATIENT)
Dept: GENERAL RADIOLOGY | Age: 67
DRG: 034 | End: 2022-10-08
Attending: INTERNAL MEDICINE
Payer: MEDICARE

## 2022-10-08 LAB
ALBUMIN SERPL-MCNC: 2.5 G/DL (ref 3.5–5.2)
ALP BLD-CCNC: 193 U/L (ref 40–129)
ALT SERPL-CCNC: 5 U/L (ref 0–40)
ANION GAP SERPL CALCULATED.3IONS-SCNC: 10 MMOL/L (ref 7–16)
AST SERPL-CCNC: 15 U/L (ref 0–39)
BASOPHILS ABSOLUTE: 0.01 E9/L (ref 0–0.2)
BASOPHILS RELATIVE PERCENT: 0.1 % (ref 0–2)
BILIRUB SERPL-MCNC: 0.4 MG/DL (ref 0–1.2)
BUN BLDV-MCNC: 84 MG/DL (ref 6–23)
CALCIUM IONIZED: 1.21 MMOL/L (ref 1.15–1.33)
CALCIUM SERPL-MCNC: 8.1 MG/DL (ref 8.6–10.2)
CHLORIDE BLD-SCNC: 108 MMOL/L (ref 98–107)
CO2: 20 MMOL/L (ref 22–29)
CREAT SERPL-MCNC: 2.2 MG/DL (ref 0.7–1.2)
EOSINOPHILS ABSOLUTE: 0.1 E9/L (ref 0.05–0.5)
EOSINOPHILS RELATIVE PERCENT: 0.6 % (ref 0–6)
GFR AFRICAN AMERICAN: 36
GFR NON-AFRICAN AMERICAN: 30 ML/MIN/1.73
GLUCOSE BLD-MCNC: 151 MG/DL (ref 74–99)
HCT VFR BLD CALC: 19.5 % (ref 37–54)
HCT VFR BLD CALC: 21.2 % (ref 37–54)
HEMOGLOBIN: 6.1 G/DL (ref 12.5–16.5)
HEMOGLOBIN: 6.9 G/DL (ref 12.5–16.5)
IMMATURE GRANULOCYTES #: 0.11 E9/L
IMMATURE GRANULOCYTES %: 0.7 % (ref 0–5)
LYMPHOCYTES ABSOLUTE: 0.87 E9/L (ref 1.5–4)
LYMPHOCYTES RELATIVE PERCENT: 5.6 % (ref 20–42)
MAGNESIUM: 2.1 MG/DL (ref 1.6–2.6)
MCH RBC QN AUTO: 29.8 PG (ref 26–35)
MCHC RBC AUTO-ENTMCNC: 31.3 % (ref 32–34.5)
MCV RBC AUTO: 95.1 FL (ref 80–99.9)
METER GLUCOSE: 117 MG/DL (ref 74–99)
METER GLUCOSE: 121 MG/DL (ref 74–99)
METER GLUCOSE: 124 MG/DL (ref 74–99)
METER GLUCOSE: 158 MG/DL (ref 74–99)
MONOCYTES ABSOLUTE: 1.02 E9/L (ref 0.1–0.95)
MONOCYTES RELATIVE PERCENT: 6.5 % (ref 2–12)
NEUTROPHILS ABSOLUTE: 13.49 E9/L (ref 1.8–7.3)
NEUTROPHILS RELATIVE PERCENT: 86.5 % (ref 43–80)
PDW BLD-RTO: 17.1 FL (ref 11.5–15)
PHOSPHORUS: 4.5 MG/DL (ref 2.5–4.5)
PLATELET # BLD: 185 E9/L (ref 130–450)
PMV BLD AUTO: 10 FL (ref 7–12)
POTASSIUM SERPL-SCNC: 4.8 MMOL/L (ref 3.5–5)
RBC # BLD: 2.05 E12/L (ref 3.8–5.8)
SODIUM BLD-SCNC: 138 MMOL/L (ref 132–146)
TOTAL PROTEIN: 4.8 G/DL (ref 6.4–8.3)
WBC # BLD: 15.6 E9/L (ref 4.5–11.5)

## 2022-10-08 PROCEDURE — 83735 ASSAY OF MAGNESIUM: CPT

## 2022-10-08 PROCEDURE — 76937 US GUIDE VASCULAR ACCESS: CPT

## 2022-10-08 PROCEDURE — 3430000000 HC RX DIAGNOSTIC RADIOPHARMACEUTICAL: Performed by: RADIOLOGY

## 2022-10-08 PROCEDURE — 36415 COLL VENOUS BLD VENIPUNCTURE: CPT

## 2022-10-08 PROCEDURE — 82330 ASSAY OF CALCIUM: CPT

## 2022-10-08 PROCEDURE — C9113 INJ PANTOPRAZOLE SODIUM, VIA: HCPCS | Performed by: NURSE PRACTITIONER

## 2022-10-08 PROCEDURE — 85014 HEMATOCRIT: CPT

## 2022-10-08 PROCEDURE — A4216 STERILE WATER/SALINE, 10 ML: HCPCS | Performed by: NURSE PRACTITIONER

## 2022-10-08 PROCEDURE — 36410 VNPNXR 3YR/> PHY/QHP DX/THER: CPT

## 2022-10-08 PROCEDURE — 85018 HEMOGLOBIN: CPT

## 2022-10-08 PROCEDURE — 6370000000 HC RX 637 (ALT 250 FOR IP)

## 2022-10-08 PROCEDURE — 2580000003 HC RX 258: Performed by: NURSE PRACTITIONER

## 2022-10-08 PROCEDURE — 84100 ASSAY OF PHOSPHORUS: CPT

## 2022-10-08 PROCEDURE — 6360000002 HC RX W HCPCS: Performed by: NURSE PRACTITIONER

## 2022-10-08 PROCEDURE — 6370000000 HC RX 637 (ALT 250 FOR IP): Performed by: STUDENT IN AN ORGANIZED HEALTH CARE EDUCATION/TRAINING PROGRAM

## 2022-10-08 PROCEDURE — 6370000000 HC RX 637 (ALT 250 FOR IP): Performed by: INTERNAL MEDICINE

## 2022-10-08 PROCEDURE — 2060000000 HC ICU INTERMEDIATE R&B

## 2022-10-08 PROCEDURE — 2700000000 HC OXYGEN THERAPY PER DAY

## 2022-10-08 PROCEDURE — 94640 AIRWAY INHALATION TREATMENT: CPT

## 2022-10-08 PROCEDURE — 6370000000 HC RX 637 (ALT 250 FOR IP): Performed by: NURSE PRACTITIONER

## 2022-10-08 PROCEDURE — 94660 CPAP INITIATION&MGMT: CPT

## 2022-10-08 PROCEDURE — 2580000003 HC RX 258: Performed by: INTERNAL MEDICINE

## 2022-10-08 PROCEDURE — 78278 ACUTE GI BLOOD LOSS IMAGING: CPT

## 2022-10-08 PROCEDURE — C1751 CATH, INF, PER/CENT/MIDLINE: HCPCS

## 2022-10-08 PROCEDURE — 71045 X-RAY EXAM CHEST 1 VIEW: CPT

## 2022-10-08 PROCEDURE — 2500000003 HC RX 250 WO HCPCS: Performed by: INTERNAL MEDICINE

## 2022-10-08 PROCEDURE — A9538 TC99M PYROPHOSPHATE: HCPCS | Performed by: RADIOLOGY

## 2022-10-08 PROCEDURE — 85025 COMPLETE CBC W/AUTO DIFF WBC: CPT

## 2022-10-08 PROCEDURE — 80053 COMPREHEN METABOLIC PANEL: CPT

## 2022-10-08 PROCEDURE — 36430 TRANSFUSION BLD/BLD COMPNT: CPT

## 2022-10-08 PROCEDURE — 82962 GLUCOSE BLOOD TEST: CPT

## 2022-10-08 RX ORDER — SODIUM CHLORIDE 9 MG/ML
INJECTION, SOLUTION INTRAVENOUS CONTINUOUS
Status: DISCONTINUED | OUTPATIENT
Start: 2022-10-08 | End: 2022-10-10

## 2022-10-08 RX ORDER — SODIUM CHLORIDE 0.9 % (FLUSH) 0.9 %
5-40 SYRINGE (ML) INJECTION PRN
Status: DISCONTINUED | OUTPATIENT
Start: 2022-10-08 | End: 2022-10-19 | Stop reason: SDUPTHER

## 2022-10-08 RX ORDER — SODIUM CHLORIDE 9 MG/ML
INJECTION, SOLUTION INTRAVENOUS PRN
Status: DISCONTINUED | OUTPATIENT
Start: 2022-10-08 | End: 2022-10-10

## 2022-10-08 RX ORDER — SODIUM CHLORIDE 9 MG/ML
INJECTION, SOLUTION INTRAVENOUS PRN
Status: DISCONTINUED | OUTPATIENT
Start: 2022-10-08 | End: 2022-10-26 | Stop reason: HOSPADM

## 2022-10-08 RX ORDER — SODIUM CHLORIDE 0.9 % (FLUSH) 0.9 %
5-40 SYRINGE (ML) INJECTION EVERY 12 HOURS SCHEDULED
Status: DISCONTINUED | OUTPATIENT
Start: 2022-10-08 | End: 2022-10-26 | Stop reason: HOSPADM

## 2022-10-08 RX ADMIN — LINEZOLID 600 MG: 600 TABLET, FILM COATED ORAL at 09:30

## 2022-10-08 RX ADMIN — SODIUM CHLORIDE, PRESERVATIVE FREE 40 MG: 5 INJECTION INTRAVENOUS at 17:00

## 2022-10-08 RX ADMIN — GABAPENTIN 600 MG: 600 TABLET, FILM COATED ORAL at 17:30

## 2022-10-08 RX ADMIN — ASPIRIN 81 MG: 81 TABLET, COATED ORAL at 09:23

## 2022-10-08 RX ADMIN — CIPROFLOXACIN 500 MG: 500 TABLET, FILM COATED ORAL at 09:25

## 2022-10-08 RX ADMIN — SODIUM CHLORIDE: 9 INJECTION, SOLUTION INTRAVENOUS at 23:52

## 2022-10-08 RX ADMIN — CARBIDOPA AND LEVODOPA 1 TABLET: 25; 100 TABLET ORAL at 09:30

## 2022-10-08 RX ADMIN — GABAPENTIN 600 MG: 600 TABLET, FILM COATED ORAL at 09:30

## 2022-10-08 RX ADMIN — CARBIDOPA AND LEVODOPA 1 TABLET: 25; 100 TABLET ORAL at 22:48

## 2022-10-08 RX ADMIN — Medication 25 MILLICURIE: at 11:20

## 2022-10-08 RX ADMIN — IPRATROPIUM BROMIDE AND ALBUTEROL SULFATE 1 AMPULE: .5; 2.5 SOLUTION RESPIRATORY (INHALATION) at 16:19

## 2022-10-08 RX ADMIN — IPRATROPIUM BROMIDE AND ALBUTEROL SULFATE 1 AMPULE: .5; 2.5 SOLUTION RESPIRATORY (INHALATION) at 20:23

## 2022-10-08 RX ADMIN — AMLODIPINE BESYLATE 10 MG: 10 TABLET ORAL at 09:32

## 2022-10-08 RX ADMIN — ATORVASTATIN CALCIUM 80 MG: 40 TABLET, FILM COATED ORAL at 22:48

## 2022-10-08 RX ADMIN — TICAGRELOR 90 MG: 90 TABLET ORAL at 22:48

## 2022-10-08 RX ADMIN — CARBIDOPA AND LEVODOPA 1 TABLET: 25; 100 TABLET ORAL at 13:43

## 2022-10-08 RX ADMIN — GABAPENTIN 600 MG: 600 TABLET, FILM COATED ORAL at 13:43

## 2022-10-08 RX ADMIN — LIDOCAINE HYDROCHLORIDE 5 ML: 10 INJECTION, SOLUTION EPIDURAL; INFILTRATION; INTRACAUDAL; PERINEURAL at 16:58

## 2022-10-08 RX ADMIN — POLYETHYLENE GLYCOL-3350 AND ELECTROLYTES 4000 ML: 236; 6.74; 5.86; 2.97; 22.74 POWDER, FOR SOLUTION ORAL at 18:08

## 2022-10-08 RX ADMIN — PETROLATUM: 420 OINTMENT TOPICAL at 22:50

## 2022-10-08 RX ADMIN — SENNOSIDES 5 ML: 8.8 SYRUP ORAL at 22:47

## 2022-10-08 RX ADMIN — IPRATROPIUM BROMIDE AND ALBUTEROL SULFATE 1 AMPULE: .5; 2.5 SOLUTION RESPIRATORY (INHALATION) at 08:17

## 2022-10-08 RX ADMIN — CETIRIZINE HYDROCHLORIDE 10 MG: 10 TABLET, FILM COATED ORAL at 09:23

## 2022-10-08 RX ADMIN — CLONIDINE HYDROCHLORIDE 0.1 MG: 0.1 TABLET ORAL at 00:30

## 2022-10-08 RX ADMIN — ANTI-FUNGAL POWDER MICONAZOLE NITRATE TALC FREE: 1.42 POWDER TOPICAL at 22:47

## 2022-10-08 RX ADMIN — BENZONATATE 100 MG: 100 CAPSULE ORAL at 22:50

## 2022-10-08 RX ADMIN — BENZONATATE 100 MG: 100 CAPSULE ORAL at 09:23

## 2022-10-08 RX ADMIN — EZETIMIBE 10 MG: 10 TABLET ORAL at 09:26

## 2022-10-08 RX ADMIN — HYDROCODONE BITARTRATE AND ACETAMINOPHEN 1 TABLET: 7.5; 325 TABLET ORAL at 22:48

## 2022-10-08 RX ADMIN — HYDRALAZINE HYDROCHLORIDE 50 MG: 50 TABLET, FILM COATED ORAL at 06:19

## 2022-10-08 RX ADMIN — BENZONATATE 100 MG: 100 CAPSULE ORAL at 13:42

## 2022-10-08 RX ADMIN — CIPROFLOXACIN 500 MG: 500 TABLET, FILM COATED ORAL at 22:49

## 2022-10-08 RX ADMIN — TICAGRELOR 90 MG: 90 TABLET ORAL at 09:22

## 2022-10-08 RX ADMIN — GABAPENTIN 600 MG: 600 TABLET, FILM COATED ORAL at 22:48

## 2022-10-08 RX ADMIN — ANTI-FUNGAL POWDER MICONAZOLE NITRATE TALC FREE: 1.42 POWDER TOPICAL at 09:44

## 2022-10-08 RX ADMIN — PETROLATUM: 420 OINTMENT TOPICAL at 14:00

## 2022-10-08 RX ADMIN — LINEZOLID 600 MG: 600 TABLET, FILM COATED ORAL at 22:48

## 2022-10-08 RX ADMIN — Medication 10 ML: at 22:50

## 2022-10-08 RX ADMIN — LACTULOSE 20 G: 20 SOLUTION ORAL at 22:47

## 2022-10-08 RX ADMIN — METOPROLOL SUCCINATE 25 MG: 25 TABLET, EXTENDED RELEASE ORAL at 13:39

## 2022-10-08 RX ADMIN — SODIUM CHLORIDE, PRESERVATIVE FREE 40 MG: 5 INJECTION INTRAVENOUS at 04:28

## 2022-10-08 RX ADMIN — SODIUM CHLORIDE: 9 INJECTION, SOLUTION INTRAVENOUS at 08:50

## 2022-10-08 ASSESSMENT — PAIN SCALES - GENERAL
PAINLEVEL_OUTOF10: 0

## 2022-10-08 NOTE — PROGRESS NOTES
Hospitalist Progress Note      SYNOPSIS: Patient admitted on 9/27/2022 for Acute cerebrovascular accident (CVA) [facility])  Patient presented to [facility] with strokelike symptoms, right-sided paralysis and aphasia. Trnsfrd to [facility]  Was found to have severe left ICA stenosis and taken to IR for left ICA angioplasty with stent placement. Patient was intubated and sedated and transferred to the ICU early portion of the admission. Blood transfus ordered on 09/27 for hgb 5. Hematuria noted on admiss    SUBJECTIVE:  overnight.-melanic stools reported  Hgb this am 6.1  Has been receiving daily prbc transfusion for anemia suspected acute blood loss form gi tract despite recent unrevealing egd  Bp and hr have been holding and steady he has been maintaining adequate 02 sats on room air- hemodynamically stable    Despite hospit stay melena had not been reported until last evening  Up to now the source of anemia had not been clarified( unrevealing egd oct 4)  To assisst with diagnostic eval CTS Chst/abd/Plvs NC ordered to eval for possibility of malign/tumor-  scans were unrevealing  A number of serologies ordered to eval for sources of anemia    Recent LMCA stroke 2/2 symptomatic LICA stenosis s/p IR angioplasty and stent placement      Significant vascular history with prior hx of stroke, MI, PVD  On DAPT 2/2 to recent acute cva  Last neuro note 09/29 sign off   Described as aphasic with R side weakness  Neuro exam from 09/29 by neuro team  Neurologic Examination     Mental Status  Alert, oriented to self, says he is in the emergency room, perseverates on some questions. Mild to moderate expressive aphasia noted. Able to name pen and glove, but not watch. Able to repeat a phrase. Follows simple commands pretty well, but does require some additional prompting. Cranial Nerves  II. Visual fields b/l threat   III, IV, VI: Pupils equally round and reactive to light, 3 to 2 mm bilaterally. EOMs: full, no nystagmus. V. Facial sensation intact to light touch bilaterally  VII: Facial movements appear symmetric around ETT  VIII: Hearing intact to voice  IX,X: Palate elevates symmetrically. XI: Sternocleidomastoid and trapezius 5/5 bilaterally   XII: Tongue is midline     Motor     R hemiparesis leg appears more so than arm   5/5 on L   Normal bulk   No abnormal movements      Sensation  Responds to pain in all limbs      Reflexes     No babinski L, not tested R (foot wrapped)      Coordination  No resting tremors observed      FFM intact L, impaired R r/t weakness      Gait  Deferred for safety/fall consideration    Temp (24hrs), Av.8 °F (37.1 °C), Min:98.5 °F (36.9 °C), Max:99.6 °F (37.6 °C)    DIET: Diet NPO Exceptions are: Sips of Water with Meds  CODE: Full Code        OBJECTIVE:    BP (!) 146/67   Pulse 84   Temp 98.7 °F (37.1 °C) (Oral)   Resp (!) 33   Ht 5' 8\" (1.727 m)   Wt 260 lb 8 oz (118.2 kg)   SpO2 98%   BMI 39.61 kg/m²     General appearance: Not jaundiced not diaphoretic     HEENT: No obvious swelling to lips or tongue  Respiratory: Clear to auscultation bilaterally, unlabored respiratory pattern at rest  Cardiovascular: Audible S1-S2 pulse regular extremit warm to palpation  Adequate capillary refill < 2 sec       Skin: No petechiae or hives on inspection warm to palpation  Neurologic: awake, alert speech is clear not aphasic  ASSESSMENT:      Acute gib /acute blood loss anemia requires blood transfusion     Possibly colon divertic bleed as receng egd was not able to localize a source of active bleed   currently hemodynamc stable    Acute CVA-   severe left ICA stenosis and taken to IR for left ICA angioplasty with stent placement.   now on DAPT  PAD  Acute kidney injury serum cr 2.2/serum cr holding at 2.2  Hypertensive urgency- 134/73 bp holding steady in light of gib      With potential for hypotension if further volumne depletion evolves  Right foot wound infection/diabetic ulcer  Urine retention  dysphagia   Dm2 bs 158 - currently npo  PLAN:    Blood transfuse 1 unit prbc  Consult gen surg   bleeding scan ordered     In lieu of cta bleed out of consideration for serum cr and contrast requiremnt   Contin DAPT for now 2/2 recent acute cva and stent/as these have high risk for reocclussion    Risk benefit ratio will need to be balanced with current conflicting simulatan scenarios-  acute hemmorrhage vs need for DAPT  Stop long acting insulin in light of npo status    DISPOSITION: not stable for discharge 2/2 to acute gib    Medications:  REVIEWED DAILY    Infusion Medications    sodium chloride 100 mL/hr at 10/08/22 0850    sodium chloride      sodium chloride      sodium chloride      sodium chloride      sodium chloride      sodium chloride      dextrose       Scheduled Medications    ciprofloxacin  500 mg Oral 2 times per day    miconazole   Topical BID    insulin glargine  32 Units SubCUTAneous QAM    epoetin sharath-epbx  6,000 Units SubCUTAneous Once per day on Mon Wed Fri    amLODIPine  10 mg Per NG tube Daily    linezolid  600 mg Oral 2 times per day    [Held by provider] cloNIDine  0.1 mg Oral Q12H    [Held by provider] hydrALAZINE  50 mg Oral 3 times per day    lactulose  20 g Oral BID    insulin lispro  0-16 Units SubCUTAneous 4x Daily AC & HS    HYDROcodone-acetaminophen  1 tablet Oral BID    pantoprazole (PROTONIX) 40 mg injection  40 mg IntraVENous Q12H    metoprolol succinate  25 mg Oral Daily    sennosides  5 mL Oral Nightly    carbidopa-levodopa  1 tablet Per NG tube TID    ipratropium-albuterol  1 ampule Inhalation Q4H WA    collagenase   Topical Q MWF    benzonatate  100 mg Oral TID    cetirizine  10 mg Oral Daily    ezetimibe  10 mg Oral Daily    gabapentin  600 mg Oral 4x Daily    ticagrelor  90 mg Oral BID    aspirin  81 mg Oral Daily    Or    aspirin  300 mg Rectal Daily    atorvastatin  80 mg Oral Nightly     PRN Meds: sodium chloride, sodium chloride, sodium chloride, sodium chloride, sodium chloride, fentanNYL, sodium chloride, labetalol, hydrALAZINE, glucose, dextrose bolus **OR** dextrose bolus, glucagon (rDNA), dextrose, ondansetron **OR** ondansetron    Labs:     Recent Labs     10/07/22  0607 10/07/22  1440 10/07/22  1500 10/08/22  0558   WBC 17.4* 17.4*  --  15.6*   HGB 6.8* 7.2*  --  6.1*   HCT 20.9* 23.3* 23.4* 19.5*    203  --  185       Recent Labs     10/06/22  0610 10/07/22  0607 10/08/22  0558    141 138   K 4.5 4.6 4.8   * 110* 108*   CO2 21* 23 20*   BUN 95* 85* 84*   CREATININE 2.5* 2.2* 2.2*   CALCIUM 8.1* 8.0* 8.1*   PHOS 4.6* 4.3 4.5       Recent Labs     10/06/22  0610 10/07/22  0607 10/08/22  0558   PROT 4.6* 4.9* 4.8*   ALKPHOS 192* 194* 193*   ALT <5 7 5   AST 15 17 15   BILITOT 0.3 0.4  0.4 0.4             Radiology:   +++++++++++++++++++++++++++++++++++++++++++++++++  DO Emil Solis Physician - [address], [place]  +++++++++++++++++++++++++++++++++++++++++++++++++  NOTE: This report was transcribed using voice recognition software. Every effort was made to ensure accuracy; however, inadvertent computerized transcription errors may be present.

## 2022-10-08 NOTE — PROGRESS NOTES
Department of Podiatry   Progress Note      Patient seen at bedside this morning. No acute overnight events reported, wound vac intact to right foot running without issue. No new pedal questions or complaints at this time. MWF wound vac changes. Pt denies any NVFC. Past Medical History:            Diagnosis Date    Chronic back pain     CKD (chronic kidney disease)     CVA (cerebral vascular accident) ([facility]     CVA (cerebral vascular accident) ([facility]     DM [facility]diabetes mellitus[facility]     Major depression     MI (myocardial infarction) ([facility]     MATT (obstructive sleep apnea)     Parkinson disease ([facility])     PVD (peripheral vascular disease) ([facility]     Seizure ([facility]        Past Surgical History:        Procedure Laterality Date    FEMORAL ARTERY STENT      IR CAROTID STENT UNI W PROTECTION  9/27/2022    IR CAROTID STENT UNI W PROTECTION 9/27/2022 Jeanette Lugo MD [facility]       Medications Prior to Admission:    Medications Prior to Admission: apixaban (ELIQUIS) 5 MG TABS tablet, Take 5 mg by mouth 2 times daily  aspirin 81 MG chewable tablet, Take 81 mg by mouth daily  carbidopa-levodopa (SINEMET)  MG per tablet, Take 1 tablet by mouth 3 times daily  carvedilol (COREG) 25 MG tablet, Take 25 mg by mouth 2 times daily (with meals)  cetirizine (ZYRTEC) 10 MG tablet, Take 10 mg by mouth daily  clopidogrel (PLAVIX) 75 MG tablet, Take 75 mg by mouth daily  diphenhydrAMINE (BENADRYL) 25 MG capsule, Take 25 mg by mouth every 6 hours  ezetimibe (ZETIA) 10 MG tablet, Take 10 mg by mouth daily  fluticasone (FLONASE) 50 MCG/ACT nasal spray, 2 sprays by Each Nostril route daily  gabapentin (NEURONTIN) 600 MG tablet, Take 600 mg by mouth 4 times daily. HYDROcodone-acetaminophen (NORCO) 5-325 MG per tablet, Take 1 tablet by mouth 2 times daily.   Insulin Glargine, 2 Unit Dial, (TOUJEO MAX SOLOSTAR) 300 UNIT/ML SOPN, Inject 66 Units into the skin daily  piperacillin-tazobactam (ZOSYN) 3-0.375 GM per 50ML IVPB extended infusion, Infuse 4.5 mg intravenously in the morning and 4.5 mg at noon and 4.5 mg in the evening. acetaminophen (TYLENOL) 325 MG tablet, Take 650 mg by mouth every 6 hours as needed for Pain  amLODIPine (NORVASC) 5 MG tablet, Take 5 mg by mouth daily  benzonatate (TESSALON) 100 MG capsule, Take 100 mg by mouth 3 times daily  cloNIDine (CATAPRES) 0.1 MG tablet, Take 0.1 mg by mouth in the morning and 0.1 mg in the evening. hydrALAZINE (APRESOLINE) 100 MG tablet, Take 100 mg by mouth 3 times daily    Allergies:  Codeine    Social History:   TOBACCO:   has no history on file for tobacco use. ETOH:   has no history on file for alcohol use. DRUGS:   Social History     Substance and Sexual Activity   Drug Use Not on file       Family History:   History reviewed. No pertinent family history. REVIEW OF SYSTEMS:    All pertinent positives and negatives as noted in HPI       LOWER EXTREMITY EXAMINATION   Wound vac intact running continuous pressure    Previous Exam:  VASCULAR:  DP and PT pulses are non palpable. CFT < 5 seconds B/L. Warm to warm from the tibial tuberosity to the distal aspect of the digits dorsally. NEUROLOGIC:  Protective sensation is diminished by grossly intact    DERM:  Right foot lateral plantar wound measuring approx 6cm in diameter. No erythema, serosanguinous drainage, no malodor.     MUSCULOSKELETAL: deferred           CONSULTS:  IP CONSULT TO CRITICAL CARE  IP CONSULT TO DIETITIAN  IP CONSULT TO CARDIOLOGY  IP CONSULT TO PODIATRY  IP CONSULT TO PODIATRY  IP CONSULT TO UROLOGY  IP CONSULT TO NEPHROLOGY  IP CONSULT TO GENERAL SURGERY  IP CONSULT TO INFECTIOUS DISEASES  IP CONSULT TO GI  IP CONSULT TO GENERAL SURGERY  IP CONSULT TO GENERAL SURGERY    MEDICATION:  Scheduled Meds:   ciprofloxacin  500 mg Oral 2 times per day    miconazole   Topical BID    [Held by provider] insulin glargine  32 Units SubCUTAneous QAM    epoetin sharath-epbx  6,000 Units SubCUTAneous Once per day on Mon Wed Fri    amLODIPine  10 mg Per NG tube Daily    linezolid  600 mg Oral 2 times per day    [Held by provider] cloNIDine  0.1 mg Oral Q12H    [Held by provider] hydrALAZINE  50 mg Oral 3 times per day    lactulose  20 g Oral BID    insulin lispro  0-16 Units SubCUTAneous 4x Daily AC & HS    HYDROcodone-acetaminophen  1 tablet Oral BID    pantoprazole (PROTONIX) 40 mg injection  40 mg IntraVENous Q12H    metoprolol succinate  25 mg Oral Daily    sennosides  5 mL Oral Nightly    carbidopa-levodopa  1 tablet Per NG tube TID    ipratropium-albuterol  1 ampule Inhalation Q4H WA    collagenase   Topical Q MWF    benzonatate  100 mg Oral TID    cetirizine  10 mg Oral Daily    ezetimibe  10 mg Oral Daily    gabapentin  600 mg Oral 4x Daily    ticagrelor  90 mg Oral BID    aspirin  81 mg Oral Daily    Or    aspirin  300 mg Rectal Daily    atorvastatin  80 mg Oral Nightly     Continuous Infusions:   sodium chloride 100 mL/hr at 10/08/22 0850    sodium chloride      sodium chloride      sodium chloride      sodium chloride      sodium chloride      sodium chloride      dextrose       PRN Meds:.sodium chloride, sodium chloride, sodium chloride, sodium chloride, sodium chloride, fentanNYL, sodium chloride, labetalol, hydrALAZINE, glucose, dextrose bolus **OR** dextrose bolus, glucagon (rDNA), dextrose, ondansetron **OR** ondansetron    RADIOLOGY:  CT ABDOMEN PELVIS WO CONTRAST   Final Result   CHEST:      No evidence of neoplastic disease, allowing for limitations of noncontrast   technique. Moderate bilateral pleural effusions. Nonemergent incidental findings as above. ABDOMEN/PELVIS:      No evidence of neoplastic disease, allowing for limitations of noncontrast   technique. Bladder wall thickening is nonspecific given under distension; recommend   correlation urinalysis to evaluate for UTI. Nonemergent incidental findings as above.          CT CHEST WO CONTRAST   Final Result   CHEST:      No evidence of neoplastic disease, allowing for limitations of noncontrast   technique. Moderate bilateral pleural effusions. Nonemergent incidental findings as above. ABDOMEN/PELVIS:      No evidence of neoplastic disease, allowing for limitations of noncontrast   technique. Bladder wall thickening is nonspecific given under distension; recommend   correlation urinalysis to evaluate for UTI. Nonemergent incidental findings as above. FL MODIFIED BARIUM SWALLOW W VIDEO   Final Result   1. Mildly impaired swallowing mechanism with swallowing delay and followed   by laryngeal penetration with thin liquid barium when using a straw. No   barium aspiration      2. Please see separate speech pathology report for full discussion of   findings and recommendations. RECOMMENDATIONS:   Unavailable         XR ABDOMEN (KUB) (SINGLE AP VIEW)   Final Result   Somewhat greater than average quantity of retained fecal material thin the   lower GI tract suggesting dysfunction with evacuation. XR CHEST PORTABLE   Final Result   1. Atherosclerotic disease and mild cardiomegaly. 2.  Persistent but improved opacities in the bilateral lungs most pronounced   in the mid and lower lungs. There appears to be a small left and trace right   pleural effusion         XR FOOT RIGHT (2 VIEWS)   Final Result   No evidence of calcaneal osteomyelitis. CT HEAD WO CONTRAST   Final Result   Parenchymal volume loss and chronic microvascular ischemic changes. No acute intracranial abnormality. Ethmoid and sphenoid sinusitis. Right mastoid effusion. US RETROPERITONEAL COMPLETE   Final Result   1. Normal appearance of the bilateral kidneys. No hydronephrosis. 2.  A Mahan catheter is decompressing the bladder. XR CHEST PORTABLE   Final Result   Worsening infiltrate and or atelectasis on the left, stable on the right with   suspected layering pleural effusion.

## 2022-10-08 NOTE — PROGRESS NOTES
Perfect served Dr. Evert Garnica: Dr. Carl Barrera is not on this weekend. He is actively having tarry stools. HGN is 6.1. Shankar orderd 1 unit. Patient is ordered a bleeding scan. I'm not comfortable taking him down activedly bleeding and HGN 6.1. His breathing is labored. On N/C.  BP stable.

## 2022-10-08 NOTE — PLAN OF CARE
Problem: Discharge Planning  Goal: Discharge to home or other facility with appropriate resources  Outcome: Progressing  Flowsheets (Taken 10/6/2022 0720 by Liv Georges RN)  Discharge to home or other facility with appropriate resources: Identify barriers to discharge with patient and caregiver     Problem: Skin/Tissue Integrity  Goal: Absence of new skin breakdown  Description: 1. Monitor for areas of redness and/or skin breakdown  2. Assess vascular access sites hourly  3. Every 4-6 hours minimum:  Change oxygen saturation probe site  4. Every 4-6 hours:  If on nasal continuous positive airway pressure, respiratory therapy assess nares and determine need for appliance change or resting period.   Outcome: Progressing     Problem: Cardiovascular - Adult  Goal: Maintains optimal cardiac output and hemodynamic stability  Outcome: Progressing  Flowsheets (Taken 10/8/2022 0627)  Maintains optimal cardiac output and hemodynamic stability: Monitor blood pressure and heart rate     Problem: Skin/Tissue Integrity - Adult  Goal: Skin integrity remains intact  Outcome: Progressing  Flowsheets (Taken 10/6/2022 0720 by Liv Georges RN)  Skin Integrity Remains Intact: Monitor for areas of redness and/or skin breakdown

## 2022-10-08 NOTE — PROGRESS NOTES
Transport here. Patient to [facility] at this time with portable O2 and monitor. Wound vac attached to right foot.

## 2022-10-08 NOTE — PROGRESS NOTES
Perfect served Dr. Gray Lopez: active gi bleed at splenic flexure on nm bleeding scan. Dr. Roni Dukes is scheduling him for colonoscopy tomorrow with golytely today. Can we put a FMS in him?   He also has slight hematuria

## 2022-10-08 NOTE — PROGRESS NOTES
Jalil served Dr. Rj Hensley  Bleeding scan results. Dr. Linda Ward consulted earlier. Active GI bleeding identified during acquisition in the splenic flexure with  peristalsis identified into the proximal descending colon.

## 2022-10-08 NOTE — PROGRESS NOTES
Pt had multiple large black/ tarry soft stools overnight. Dr. Darryl Prader and Dr. Corinne Squires notified via [employer]. A sample was saved for testing if ordered.

## 2022-10-08 NOTE — PROGRESS NOTES
GENERAL SURGERY  DAILY PROGRESS NOTE  10/8/2022    CHIEF COMPLAINT:  Cerebrovascular accident    SUBJECTIVE:  Patient resting comfortably in bed, denies any abdominal pain or nausea or vomiting. Patient has multiple dark black stools. Patient's baseline hemoglobin near 7. AM hemoglobin was 6.1. Currently receiving transfusion when I was in the room. OBJECTIVE:  /73   Pulse 81   Temp 98.8 °F (37.1 °C) (Oral)   Resp 27   Ht 5' 8\" (1.727 m)   Wt 260 lb 8 oz (118.2 kg)   SpO2 91%   BMI 39.61 kg/m²     GENERAL: Comfortable, confusion  LUNGS:  No increased work of breathing on room air  CARDIOVASCULAR: RR, normotensive  ABDOMEN:  Soft, non-distended, mildly tender in upper quadrant. No guarding, rigidity, rebound. ASSESSMENT/PLAN:  74 y.o. male with diabetes, CKD, MI admitted for CVA and is now having multiple episodes of melena. Patient is on Brilinta.     Plan:  - Continue supportive care  - Follow-up NM bleeding scan  - Monitor hemoglobin transfuse above 7  - No immediate plans for emergent scopes at this time    Plan discussed with Dr. Kandace Juarez, DO  Surgery Resident PGY-1  10/8/2022  9:58 AM

## 2022-10-08 NOTE — PROGRESS NOTES
midline   p lacement 10/8/2022    Product number: [id number]   Lot Number: [id number]      Ultrasound: yes   Left Basilic vein:                Upper Arm Circumference: 30cm    Size:4.5fr    Exposed Length: 2cm    Internal Length: 13cm   Cut: 0cm   Vein Measurement: 0.54cm    Marlon Joseph RN  10/8/2022  3:22 PM

## 2022-10-08 NOTE — PROGRESS NOTES
Jalil served Dr. Kyle Chan HGN 6.1. Actively having tarry stools. Bleeding scan scheduled for now but didnt feel comfortable taking him down with HGN that low and no orders for blood. Also he is on anticoagulation if you can review his meds.

## 2022-10-08 NOTE — PLAN OF CARE
Problem: Pain  Goal: Verbalizes/displays adequate comfort level or baseline comfort level  Outcome: Progressing     Problem: Skin/Tissue Integrity  Goal: Absence of new skin breakdown  Description: 1. Monitor for areas of redness and/or skin breakdown  2. Assess vascular access sites hourly  3. Every 4-6 hours minimum:  Change oxygen saturation probe site  4. Every 4-6 hours:  If on nasal continuous positive airway pressure, respiratory therapy assess nares and determine need for appliance change or resting period.   10/8/2022 1055 by Candelario Bautista RN  Outcome: Progressing  10/8/2022 0627 by Su Hartley RN  Outcome: Progressing     Problem: Safety - Adult  Goal: Free from fall injury  Outcome: Progressing     Problem: ABCDS Injury Assessment  Goal: Absence of physical injury  Outcome: Progressing     Problem: Chronic Conditions and Co-morbidities  Goal: Patient's chronic conditions and co-morbidity symptoms are monitored and maintained or improved  Outcome: Progressing     Problem: Neurosensory - Adult  Goal: Achieves stable or improved neurological status  Outcome: Progressing  Goal: Remains free of injury related to seizures activity  Outcome: Progressing  Goal: Achieves maximal functionality and self care  Outcome: Progressing     Problem: Respiratory - Adult  Goal: Achieves optimal ventilation and oxygenation  Outcome: Progressing     Problem: Cardiovascular - Adult  Goal: Maintains optimal cardiac output and hemodynamic stability  10/8/2022 1055 by Candelario Bautista RN  Outcome: Progressing  10/8/2022 0627 by Su Hartley RN  Outcome: Progressing  Flowsheets (Taken 10/8/2022 0183)  Maintains optimal cardiac output and hemodynamic stability: Monitor blood pressure and heart rate  Goal: Absence of cardiac dysrhythmias or at baseline  Outcome: Progressing     Problem: Skin/Tissue Integrity - Adult  Goal: Skin integrity remains intact  10/8/2022 1055 by Candelario Bautista RN  Outcome: Progressing  10/8/2022 0627 by Maynor Booker RN  Outcome: Progressing  Flowsheets (Taken 10/6/2022 0720 by Afia Arellano, RN)  Skin Integrity Remains Intact: Monitor for areas of redness and/or skin breakdown  Goal: Incisions, wounds, or drain sites healing without S/S of infection  Outcome: Progressing  Goal: Oral mucous membranes remain intact  Outcome: Progressing     Problem: Metabolic/Fluid and Electrolytes - Adult  Goal: Electrolytes maintained within normal limits  Outcome: Progressing  Goal: Hemodynamic stability and optimal renal function maintained  Outcome: Progressing  Goal: Glucose maintained within prescribed range  Outcome: Progressing     Problem: Hematologic - Adult  Goal: Maintains hematologic stability  Outcome: Progressing

## 2022-10-08 NOTE — CARE COORDINATION
Social Work Discharge Planning:  Per [address] approved for patient to go to [facility] in [place] Ref #[id number] 10/7-10/11. The patient has to admit by the end of day on 10/11, fax#[phone] phone: [phone]. SW/FARHAT following.   Electronically signed by ERI Hsu on 10/7/2022 at 8:37 PM

## 2022-10-08 NOTE — PROGRESS NOTES
[facility]  Nephrology Attending Progress Note  Catherine Wong MD        SUBJECTIVE:     History of Present Ilness:    Amanda Pascal is a 74 y.o. male history of CAD s/p MI, hypertension, chronic kidney disease stage IIIa (baseline creatinine of recent 1.7-1.9) he is followed by our group with Dr. Wong Booker. He initially was admitted to [facility] with right lower extremity wound. .  Patient subsequently developed aphasia noted to facial droop associated with right-sided weakness. He was transferred to [address] [place] [address] for further management. Patient was admitted told the neuro ICU. He required intubation with mechanical ventilation. CT of the head and neck demonstrated bilateral carotid stenosis with the left ICA being dominant. IR performed angiogram with angioplasty of the left ICA. Laboratory data showed progressive increase in his serum creatinine to currently 2.9 mg/dL. Hemoglobin was noted to be 5.6. He has received at least 2 units of packed cells but with further drop in his hemoglobin with requirement for additional transfusion. He had an episode of urinary retention with gross hematuria Mahan catheter was reinserted and the hematuria subsequently cleared. Renal is consulted for LARY.      Subjective     10/1: he denies blurred vision, no headaches  10/2: No new acute issues from overnight; more alert; receiving PRBCs hemoglobin trending low  10/3: pt seen sp egd today, no cp or sob, family and nurse in room, no cp or sob  10/4: pt seen in room, no complaints  10/5: pt seen in icu, feels ok today, no cp or sob  10/6: pt seen in nsicu, no cp or sob, npo, wants to eat  10/7: pt seen in nsicu, no cp or sob, starting dysphagia diet  10/8: pt without complaint, eating better dysphagia  diet         PROBLEM LIST:    Patient Active Problem List   Diagnosis    Acute cerebrovascular accident (CVA) ([address]    Acute respiratory failure with hypoxia ([address]    Stenosis of left carotid artery Hypoalbuminemia    Electrolyte imbalance    Hypernatremia        PAST MEDICAL HISTORY:    Past Medical History:   Diagnosis Date    Chronic back pain     CKD (chronic kidney disease)     CVA (cerebral vascular accident) ([place] [address]     CVA (cerebral vascular accident) ([place] [address]     DM (diabetes mellitus) ([place] [address]     Major depression     MI (myocardial infarction) ([place] [address]     MATT (obstructive sleep apnea)     Parkinson disease ([place] [address]     PVD (peripheral vascular disease) ([place] [address]     Seizure ([place] [address].)        DIET:    Diet NPO Exceptions are: Sips of Water with Meds     PHYSICAL EXAM:     Patient Vitals for the past 24 hrs:   BP Temp Temp src Pulse Resp SpO2   10/08/22 1009 (!) 144/75 98.9 °F (37.2 °C) Oral 80 22 98 %   10/08/22 1000 (!) 144/75 -- -- 81 30 99 %   10/08/22 0934 134/73 98.8 °F (37.1 °C) Oral 81 27 91 %   10/08/22 0932 134/73 -- -- -- -- --   10/08/22 0910 (!) 146/67 98.7 °F (37.1 °C) Oral 84 (!) 33 98 %   10/08/22 0900 (!) 146/67 -- -- 84 (!) 32 (!) 81 %   10/08/22 0817 -- -- -- 80 30 99 %   10/08/22 0800 (!) 140/62 -- -- 80 28 98 %   10/08/22 0700 -- -- -- 79 30 99 %   10/08/22 0600 (!) 129/59 -- -- 78 24 (!) 80 %   10/08/22 0400 (!) 132/57 99.6 °F (37.6 °C) Oral 75 20 98 %   10/08/22 0200 (!) 126/59 -- -- 79 24 97 %   10/08/22 0000 (!) 140/76 -- -- 84 20 98 %   10/07/22 2200 (!) 157/71 -- -- 88 24 93 %   10/07/22 2048 -- -- -- -- 16 --   10/07/22 2000 (!) 156/81 98.9 °F (37.2 °C) Oral 82 25 98 %   10/07/22 1800 (!) 146/74 -- -- 82 25 98 %   10/07/22 1655 136/63 -- -- 79 -- 100 %   10/07/22 1651 -- -- -- 78 -- 96 %   10/07/22 1600 136/69 98.5 °F (36.9 °C) Oral 76 23 100 %   10/07/22 1400 136/62 -- -- 77 (!) 31 94 %   10/07/22 1330 125/60 98.8 °F (37.1 °C) Axillary 78 27 93 %   10/07/22 1207 -- -- -- 73 23 98 %   10/07/22 1200 (!) 140/70 -- -- 73 19 98 %   @      Intake/Output Summary (Last 24 hours) at 10/8/2022 1146  Last data filed at 10/8/2022 1009  Gross per 24 hour   Intake 1662.14 ml   Output 2200 ml   Net -537.86 ml Wt Readings from Last 3 Encounters:   09/27/22 260 lb 8 oz (118.2 kg)       Constitutional:  Pt is in no acute distress  Head: normocephalic, atraumatic  Neck: no JVD  Cardiovascular: regular rate and rhythm, no murmurs, gallops, or rubs  Respiratory:  No rales, rhochi, or wheezes  Gastrointestinal:  Soft, nontender, nondistended, bowel sounds x 4  Ext: tr edema  Skin: dry, no rash  Neuro: aaox3    MEDS (scheduled):    white petrolatum   Topical BID    ciprofloxacin  500 mg Oral 2 times per day    miconazole   Topical BID    [Held by provider] insulin glargine  32 Units SubCUTAneous QAM    epoetin sharath-epbx  6,000 Units SubCUTAneous Once per day on Mon Wed Fri    amLODIPine  10 mg Per NG tube Daily    linezolid  600 mg Oral 2 times per day    [Held by provider] cloNIDine  0.1 mg Oral Q12H    [Held by provider] hydrALAZINE  50 mg Oral 3 times per day    lactulose  20 g Oral BID    insulin lispro  0-16 Units SubCUTAneous 4x Daily AC & HS    HYDROcodone-acetaminophen  1 tablet Oral BID    pantoprazole (PROTONIX) 40 mg injection  40 mg IntraVENous Q12H    metoprolol succinate  25 mg Oral Daily    sennosides  5 mL Oral Nightly    carbidopa-levodopa  1 tablet Per NG tube TID    ipratropium-albuterol  1 ampule Inhalation Q4H WA    collagenase   Topical Q MWF    benzonatate  100 mg Oral TID    cetirizine  10 mg Oral Daily    ezetimibe  10 mg Oral Daily    gabapentin  600 mg Oral 4x Daily    ticagrelor  90 mg Oral BID    aspirin  81 mg Oral Daily    Or    aspirin  300 mg Rectal Daily    atorvastatin  80 mg Oral Nightly       MEDS (infusions):   sodium chloride 100 mL/hr at 10/08/22 0850    sodium chloride      sodium chloride      sodium chloride      sodium chloride      sodium chloride      sodium chloride      dextrose         MEDS (prn):  sodium chloride, sodium chloride, sodium chloride, sodium chloride, sodium chloride, fentanNYL, sodium chloride, labetalol, hydrALAZINE, glucose, dextrose bolus **OR** dextrose bolus, glucagon (rDNA), dextrose, ondansetron **OR** ondansetron    DATA:    Recent Labs     10/07/22  0607 10/07/22  1440 10/07/22  1500 10/08/22  0558   WBC 17.4* 17.4*  --  15.6*   HGB 6.8* 7.2*  --  6.1*   HCT 20.9* 23.3* 23.4* 19.5*   MCV 95.9 96.0  --  95.1    203  --  185     Recent Labs     10/06/22  0610 10/07/22  0607 10/08/22  0558    141 138   K 4.5 4.6 4.8   * 110* 108*   CO2 21* 23 20*   BUN 95* 85* 84*   CREATININE 2.5* 2.2* 2.2*   LABGLOM 26 30 30   GLUCOSE 91 160* 151*   CALCIUM 8.1* 8.0* 8.1*   ALT <5 7 5   AST 15 17 15   BILITOT 0.3 0.4  0.4 0.4   ALKPHOS 192* 194* 193*   MG 2.6 2.1 2.1   PHOS 4.6* 4.3 4.5       Lab Results   Component Value Date    LABALBU 2.5 (L) 10/08/2022    LABALBU 2.7 (L) 10/07/2022    LABALBU 2.5 (L) 10/06/2022     No results found for: TSH    Iron Studies  Lab Results   Component Value Date    IRON 24 (L) 10/03/2022    TIBC 195 (L) 10/03/2022    FERRITIN 94 10/03/2022     Vitamin B-12   Date Value Ref Range Status   10/07/2022 370 211 - 946 pg/mL Final     Folate   Date Value Ref Range Status   10/07/2022 6.3 4.8 - 24.2 ng/mL Final       No results found for: VITD25  No results found for: PTH    No components found for: URIC    No results found for: VOL, APPEARANCE, COLORU, LABSPEC, LABPH, LEUKBLD, NITRU, GLUCOSEU, KETUA, UROBILINOGEN, KETUA, UROBILINOGEN, BILIRUBINUR, OCBU    No results found for: LIPIDPAN      IMPRESSION/RECOMMENDATIONS:      1. Acute CVA  S/p IR intervention with angioplasty of the left ICA     2. LARY on CKD stage 3b  Baseline 1.7-1.9  combined effects of ACEI use and contrast nephrotoxicity  Component of urinary retention  nonoliguric at this time  Disproportionate elevation of BUN relative to Cr suspicious for gib  stool heme positive  Worsening renal unction despite being nonoliguric  May require dialysis support  Monitor labs and urine  1.4L  Bun/cr  128/3 >115/2.6> 105/2.5>95/2.5> 85/2.2>84/2.2  Uo 2.4L     3.  Hypertensive emergency  presenting  with acute CVA  Adjust meds to target blood pressure control to SBP less than 160  BP with improved control     4. Anemia, chronic with acute worsening  Informed stool heme positive  PRBC transfusion as needed  Sp egd  Trf < 7  On ASHLEY      5. Type 2 diabetes mellitus  Monitor glucose levels     6. Hyperkalemia  Due to combination of LARY and metabolic acidosis; PRBC transfusios  Lokelma  prn     7. Urinary retention  Suspected situational  Now with trejo  Urology following     8. Hypernatremia  Suspect intervascular volume depletion  Na 151>145>141>145>141>138  D5 change to ns        Lelo Woodard.  Sukhdeep Farley MD

## 2022-10-08 NOTE — PROGRESS NOTES
Jalil served Dr. Jesus Martinez due to multiple tarry stools noted on night shift, HGN 6.1, labored breathing.

## 2022-10-09 ENCOUNTER — ANESTHESIA EVENT (OUTPATIENT)
Dept: ENDOSCOPY | Age: 67
DRG: 034 | End: 2022-10-09
Payer: COMMERCIAL

## 2022-10-09 ENCOUNTER — ANESTHESIA (OUTPATIENT)
Dept: ENDOSCOPY | Age: 67
DRG: 034 | End: 2022-10-09
Payer: COMMERCIAL

## 2022-10-09 ENCOUNTER — APPOINTMENT (OUTPATIENT)
Dept: CT IMAGING | Age: 67
DRG: 034 | End: 2022-10-09
Attending: INTERNAL MEDICINE
Payer: MEDICARE

## 2022-10-09 LAB
ALBUMIN SERPL-MCNC: 2.4 G/DL (ref 3.5–5.2)
ALP BLD-CCNC: 299 U/L (ref 40–129)
ALT SERPL-CCNC: <5 U/L (ref 0–40)
ANION GAP SERPL CALCULATED.3IONS-SCNC: 10 MMOL/L (ref 7–16)
AST SERPL-CCNC: 34 U/L (ref 0–39)
BASOPHILS ABSOLUTE: 0.03 E9/L (ref 0–0.2)
BASOPHILS RELATIVE PERCENT: 0.2 % (ref 0–2)
BILIRUB SERPL-MCNC: 0.3 MG/DL (ref 0–1.2)
BLOOD BANK DISPENSE STATUS: NORMAL
BLOOD BANK PRODUCT CODE: NORMAL
BPU ID: NORMAL
BUN BLDV-MCNC: 69 MG/DL (ref 6–23)
CALCIUM IONIZED: 1.21 MMOL/L (ref 1.15–1.33)
CALCIUM SERPL-MCNC: 7.8 MG/DL (ref 8.6–10.2)
CHLORIDE BLD-SCNC: 111 MMOL/L (ref 98–107)
CO2: 20 MMOL/L (ref 22–29)
CREAT SERPL-MCNC: 2.1 MG/DL (ref 0.7–1.2)
DESCRIPTION BLOOD BANK: NORMAL
EOSINOPHILS ABSOLUTE: 0.12 E9/L (ref 0.05–0.5)
EOSINOPHILS RELATIVE PERCENT: 0.9 % (ref 0–6)
GFR AFRICAN AMERICAN: 39
GFR NON-AFRICAN AMERICAN: 32 ML/MIN/1.73
GLUCOSE BLD-MCNC: 108 MG/DL (ref 74–99)
HCT VFR BLD CALC: 19.9 % (ref 37–54)
HCT VFR BLD CALC: 21.9 % (ref 37–54)
HCT VFR BLD CALC: 22.4 % (ref 37–54)
HEMOGLOBIN: 6.2 G/DL (ref 12.5–16.5)
HEMOGLOBIN: 6.9 G/DL (ref 12.5–16.5)
HEMOGLOBIN: 7.1 G/DL (ref 12.5–16.5)
IMMATURE GRANULOCYTES #: 0.08 E9/L
IMMATURE GRANULOCYTES %: 0.6 % (ref 0–5)
INR BLD: 2.1
LYMPHOCYTES ABSOLUTE: 1.03 E9/L (ref 1.5–4)
LYMPHOCYTES RELATIVE PERCENT: 7.7 % (ref 20–42)
MAGNESIUM: 2.1 MG/DL (ref 1.6–2.6)
MCH RBC QN AUTO: 30 PG (ref 26–35)
MCHC RBC AUTO-ENTMCNC: 31.2 % (ref 32–34.5)
MCV RBC AUTO: 96.1 FL (ref 80–99.9)
METER GLUCOSE: 140 MG/DL (ref 74–99)
METER GLUCOSE: 151 MG/DL (ref 74–99)
METER GLUCOSE: 197 MG/DL (ref 74–99)
METER GLUCOSE: 75 MG/DL (ref 74–99)
MONOCYTES ABSOLUTE: 0.79 E9/L (ref 0.1–0.95)
MONOCYTES RELATIVE PERCENT: 5.9 % (ref 2–12)
NEUTROPHILS ABSOLUTE: 11.28 E9/L (ref 1.8–7.3)
NEUTROPHILS RELATIVE PERCENT: 84.7 % (ref 43–80)
PDW BLD-RTO: 16.8 FL (ref 11.5–15)
PHOSPHORUS: 4 MG/DL (ref 2.5–4.5)
PLATELET # BLD: 162 E9/L (ref 130–450)
PMV BLD AUTO: 10.1 FL (ref 7–12)
POTASSIUM SERPL-SCNC: 4.6 MMOL/L (ref 3.5–5)
PRO-BNP: 2649 PG/ML (ref 0–125)
PROCALCITONIN: 0.36 NG/ML (ref 0–0.08)
PROTHROMBIN TIME: 23 SEC (ref 9.3–12.4)
RBC # BLD: 2.07 E12/L (ref 3.8–5.8)
SODIUM BLD-SCNC: 141 MMOL/L (ref 132–146)
TOTAL PROTEIN: 4.9 G/DL (ref 6.4–8.3)
TROPONIN, HIGH SENSITIVITY: 118 NG/L (ref 0–11)
WBC # BLD: 13.3 E9/L (ref 4.5–11.5)

## 2022-10-09 PROCEDURE — 36415 COLL VENOUS BLD VENIPUNCTURE: CPT

## 2022-10-09 PROCEDURE — 82330 ASSAY OF CALCIUM: CPT

## 2022-10-09 PROCEDURE — 36430 TRANSFUSION BLD/BLD COMPNT: CPT

## 2022-10-09 PROCEDURE — 74174 CTA ABD&PLVS W/CONTRAST: CPT

## 2022-10-09 PROCEDURE — 84145 PROCALCITONIN (PCT): CPT

## 2022-10-09 PROCEDURE — 6360000002 HC RX W HCPCS: Performed by: NURSE PRACTITIONER

## 2022-10-09 PROCEDURE — 94660 CPAP INITIATION&MGMT: CPT

## 2022-10-09 PROCEDURE — C9113 INJ PANTOPRAZOLE SODIUM, VIA: HCPCS | Performed by: NURSE PRACTITIONER

## 2022-10-09 PROCEDURE — 6370000000 HC RX 637 (ALT 250 FOR IP): Performed by: STUDENT IN AN ORGANIZED HEALTH CARE EDUCATION/TRAINING PROGRAM

## 2022-10-09 PROCEDURE — 45378 DIAGNOSTIC COLONOSCOPY: CPT | Performed by: SURGERY

## 2022-10-09 PROCEDURE — 85018 HEMOGLOBIN: CPT

## 2022-10-09 PROCEDURE — A4216 STERILE WATER/SALINE, 10 ML: HCPCS | Performed by: NURSE PRACTITIONER

## 2022-10-09 PROCEDURE — 0DJD8ZZ INSPECTION OF LOWER INTESTINAL TRACT, VIA NATURAL OR ARTIFICIAL OPENING ENDOSCOPIC: ICD-10-PCS | Performed by: SURGERY

## 2022-10-09 PROCEDURE — 6370000000 HC RX 637 (ALT 250 FOR IP): Performed by: INTERNAL MEDICINE

## 2022-10-09 PROCEDURE — 2700000000 HC OXYGEN THERAPY PER DAY

## 2022-10-09 PROCEDURE — 85610 PROTHROMBIN TIME: CPT

## 2022-10-09 PROCEDURE — 82962 GLUCOSE BLOOD TEST: CPT

## 2022-10-09 PROCEDURE — 94640 AIRWAY INHALATION TREATMENT: CPT

## 2022-10-09 PROCEDURE — 6370000000 HC RX 637 (ALT 250 FOR IP): Performed by: NURSE PRACTITIONER

## 2022-10-09 PROCEDURE — 83880 ASSAY OF NATRIURETIC PEPTIDE: CPT

## 2022-10-09 PROCEDURE — 6360000004 HC RX CONTRAST MEDICATION: Performed by: RADIOLOGY

## 2022-10-09 PROCEDURE — 3609027000 HC COLONOSCOPY: Performed by: SURGERY

## 2022-10-09 PROCEDURE — 83735 ASSAY OF MAGNESIUM: CPT

## 2022-10-09 PROCEDURE — 3700000001 HC ADD 15 MINUTES (ANESTHESIA): Performed by: SURGERY

## 2022-10-09 PROCEDURE — 2709999900 HC NON-CHARGEABLE SUPPLY: Performed by: SURGERY

## 2022-10-09 PROCEDURE — 2580000003 HC RX 258: Performed by: NURSE PRACTITIONER

## 2022-10-09 PROCEDURE — 2580000003 HC RX 258: Performed by: STUDENT IN AN ORGANIZED HEALTH CARE EDUCATION/TRAINING PROGRAM

## 2022-10-09 PROCEDURE — 2580000003 HC RX 258

## 2022-10-09 PROCEDURE — 80053 COMPREHEN METABOLIC PANEL: CPT

## 2022-10-09 PROCEDURE — 85025 COMPLETE CBC W/AUTO DIFF WBC: CPT

## 2022-10-09 PROCEDURE — 2580000003 HC RX 258: Performed by: INTERNAL MEDICINE

## 2022-10-09 PROCEDURE — 85014 HEMATOCRIT: CPT

## 2022-10-09 PROCEDURE — 84100 ASSAY OF PHOSPHORUS: CPT

## 2022-10-09 PROCEDURE — 2060000000 HC ICU INTERMEDIATE R&B

## 2022-10-09 PROCEDURE — 84484 ASSAY OF TROPONIN QUANT: CPT

## 2022-10-09 PROCEDURE — 6360000002 HC RX W HCPCS

## 2022-10-09 PROCEDURE — 3700000000 HC ANESTHESIA ATTENDED CARE: Performed by: SURGERY

## 2022-10-09 RX ORDER — PROPOFOL 10 MG/ML
INJECTION, EMULSION INTRAVENOUS PRN
Status: DISCONTINUED | OUTPATIENT
Start: 2022-10-09 | End: 2022-10-09 | Stop reason: SDUPTHER

## 2022-10-09 RX ORDER — SODIUM CHLORIDE 9 MG/ML
INJECTION, SOLUTION INTRAVENOUS PRN
Status: DISCONTINUED | OUTPATIENT
Start: 2022-10-09 | End: 2022-10-10

## 2022-10-09 RX ORDER — SODIUM CHLORIDE 9 MG/ML
1000 INJECTION, SOLUTION INTRAVENOUS ONCE
Status: COMPLETED | OUTPATIENT
Start: 2022-10-09 | End: 2022-10-09

## 2022-10-09 RX ORDER — SODIUM CHLORIDE 9 MG/ML
INJECTION, SOLUTION INTRAVENOUS CONTINUOUS PRN
Status: DISCONTINUED | OUTPATIENT
Start: 2022-10-09 | End: 2022-10-09 | Stop reason: SDUPTHER

## 2022-10-09 RX ADMIN — ATORVASTATIN CALCIUM 80 MG: 40 TABLET, FILM COATED ORAL at 22:59

## 2022-10-09 RX ADMIN — Medication 10 ML: at 23:00

## 2022-10-09 RX ADMIN — CARBIDOPA AND LEVODOPA 1 TABLET: 25; 100 TABLET ORAL at 09:50

## 2022-10-09 RX ADMIN — SODIUM CHLORIDE: 9 INJECTION, SOLUTION INTRAVENOUS at 16:12

## 2022-10-09 RX ADMIN — CIPROFLOXACIN 500 MG: 500 TABLET, FILM COATED ORAL at 09:48

## 2022-10-09 RX ADMIN — GABAPENTIN 600 MG: 600 TABLET, FILM COATED ORAL at 14:27

## 2022-10-09 RX ADMIN — SODIUM CHLORIDE: 9 INJECTION, SOLUTION INTRAVENOUS at 13:05

## 2022-10-09 RX ADMIN — Medication 10 ML: at 09:54

## 2022-10-09 RX ADMIN — IOPAMIDOL 90 ML: 755 INJECTION, SOLUTION INTRAVENOUS at 16:37

## 2022-10-09 RX ADMIN — IPRATROPIUM BROMIDE AND ALBUTEROL SULFATE 1 AMPULE: .5; 2.5 SOLUTION RESPIRATORY (INHALATION) at 20:01

## 2022-10-09 RX ADMIN — GABAPENTIN 600 MG: 600 TABLET, FILM COATED ORAL at 17:08

## 2022-10-09 RX ADMIN — SODIUM CHLORIDE, PRESERVATIVE FREE 40 MG: 5 INJECTION INTRAVENOUS at 05:43

## 2022-10-09 RX ADMIN — IPRATROPIUM BROMIDE AND ALBUTEROL SULFATE 1 AMPULE: .5; 2.5 SOLUTION RESPIRATORY (INHALATION) at 08:28

## 2022-10-09 RX ADMIN — SODIUM CHLORIDE: 9 INJECTION, SOLUTION INTRAVENOUS at 14:03

## 2022-10-09 RX ADMIN — EZETIMIBE 10 MG: 10 TABLET ORAL at 09:49

## 2022-10-09 RX ADMIN — LINEZOLID 600 MG: 600 TABLET, FILM COATED ORAL at 09:59

## 2022-10-09 RX ADMIN — TICAGRELOR 90 MG: 90 TABLET ORAL at 09:50

## 2022-10-09 RX ADMIN — ANTI-FUNGAL POWDER MICONAZOLE NITRATE TALC FREE: 1.42 POWDER TOPICAL at 22:00

## 2022-10-09 RX ADMIN — LACTULOSE 20 G: 20 SOLUTION ORAL at 09:52

## 2022-10-09 RX ADMIN — LACTULOSE 20 G: 20 SOLUTION ORAL at 23:00

## 2022-10-09 RX ADMIN — GABAPENTIN 600 MG: 600 TABLET, FILM COATED ORAL at 09:50

## 2022-10-09 RX ADMIN — CARBIDOPA AND LEVODOPA 1 TABLET: 25; 100 TABLET ORAL at 14:59

## 2022-10-09 RX ADMIN — METOPROLOL SUCCINATE 25 MG: 25 TABLET, EXTENDED RELEASE ORAL at 09:51

## 2022-10-09 RX ADMIN — AMLODIPINE BESYLATE 10 MG: 10 TABLET ORAL at 09:51

## 2022-10-09 RX ADMIN — SENNOSIDES 5 ML: 8.8 SYRUP ORAL at 23:00

## 2022-10-09 RX ADMIN — SODIUM CHLORIDE 1000 ML: 9 INJECTION, SOLUTION INTRAVENOUS at 15:09

## 2022-10-09 RX ADMIN — SODIUM CHLORIDE, PRESERVATIVE FREE 40 MG: 5 INJECTION INTRAVENOUS at 15:07

## 2022-10-09 RX ADMIN — PETROLATUM: 420 OINTMENT TOPICAL at 09:56

## 2022-10-09 RX ADMIN — BENZONATATE 100 MG: 100 CAPSULE ORAL at 22:59

## 2022-10-09 RX ADMIN — HYDROCODONE BITARTRATE AND ACETAMINOPHEN 1 TABLET: 7.5; 325 TABLET ORAL at 22:59

## 2022-10-09 RX ADMIN — PETROLATUM: 420 OINTMENT TOPICAL at 23:00

## 2022-10-09 RX ADMIN — HYDROCODONE BITARTRATE AND ACETAMINOPHEN 1 TABLET: 7.5; 325 TABLET ORAL at 09:51

## 2022-10-09 RX ADMIN — BENZONATATE 100 MG: 100 CAPSULE ORAL at 14:59

## 2022-10-09 RX ADMIN — ANTI-FUNGAL POWDER MICONAZOLE NITRATE TALC FREE: 1.42 POWDER TOPICAL at 09:56

## 2022-10-09 RX ADMIN — TICAGRELOR 90 MG: 90 TABLET ORAL at 23:00

## 2022-10-09 RX ADMIN — CARBIDOPA AND LEVODOPA 1 TABLET: 25; 100 TABLET ORAL at 22:00

## 2022-10-09 RX ADMIN — BENZONATATE 100 MG: 100 CAPSULE ORAL at 09:52

## 2022-10-09 RX ADMIN — PROPOFOL 170 MG: 10 INJECTION, EMULSION INTRAVENOUS at 13:14

## 2022-10-09 RX ADMIN — ASPIRIN 81 MG: 81 TABLET, COATED ORAL at 09:51

## 2022-10-09 RX ADMIN — CETIRIZINE HYDROCHLORIDE 10 MG: 10 TABLET, FILM COATED ORAL at 09:52

## 2022-10-09 RX ADMIN — GABAPENTIN 600 MG: 600 TABLET, FILM COATED ORAL at 22:00

## 2022-10-09 ASSESSMENT — PAIN SCALES - GENERAL
PAINLEVEL_OUTOF10: 7
PAINLEVEL_OUTOF10: 0
PAINLEVEL_OUTOF10: 3
PAINLEVEL_OUTOF10: 0

## 2022-10-09 ASSESSMENT — PAIN DESCRIPTION - DESCRIPTORS: DESCRIPTORS: PRESSURE

## 2022-10-09 ASSESSMENT — PAIN DESCRIPTION - ONSET: ONSET: ON-GOING

## 2022-10-09 ASSESSMENT — PAIN DESCRIPTION - LOCATION: LOCATION: FOOT;LEG

## 2022-10-09 ASSESSMENT — PAIN DESCRIPTION - FREQUENCY: FREQUENCY: INTERMITTENT

## 2022-10-09 ASSESSMENT — PAIN DESCRIPTION - ORIENTATION: ORIENTATION: RIGHT;LEFT

## 2022-10-09 ASSESSMENT — PAIN DESCRIPTION - PAIN TYPE: TYPE: CHRONIC PAIN

## 2022-10-09 ASSESSMENT — PAIN - FUNCTIONAL ASSESSMENT: PAIN_FUNCTIONAL_ASSESSMENT: ACTIVITIES ARE NOT PREVENTED

## 2022-10-09 NOTE — ANESTHESIA PRE PROCEDURE
Department of Anesthesiology  Preprocedure Note       Name:  Eleno Cushing   Age:  74 y.o.  :  1955                                          MRN:  [id number]         Date:  10/9/2022      Surgeon: Sivan Brennan):  Imelda Rosen MD    Procedure: Procedure(s):  COLONOSCOPY    Medications prior to admission:   Prior to Admission medications    Medication Sig Start Date End Date Taking? Authorizing Provider   apixaban (ELIQUIS) 5 MG TABS tablet Take 5 mg by mouth 2 times daily   Yes Historical Provider, MD   aspirin 81 MG chewable tablet Take 81 mg by mouth daily   Yes Historical Provider, MD   carbidopa-levodopa (SINEMET)  MG per tablet Take 1 tablet by mouth 3 times daily   Yes Historical Provider, MD   carvedilol (COREG) 25 MG tablet Take 25 mg by mouth 2 times daily (with meals)   Yes Historical Provider, MD   cetirizine (ZYRTEC) 10 MG tablet Take 10 mg by mouth daily   Yes Historical Provider, MD   clopidogrel (PLAVIX) 75 MG tablet Take 75 mg by mouth daily   Yes Historical Provider, MD   diphenhydrAMINE (BENADRYL) 25 MG capsule Take 25 mg by mouth every 6 hours   Yes Historical Provider, MD   ezetimibe (ZETIA) 10 MG tablet Take 10 mg by mouth daily   Yes Historical Provider, MD   fluticasone (FLONASE) 50 MCG/ACT nasal spray 2 sprays by Each Nostril route daily   Yes Historical Provider, MD   gabapentin (NEURONTIN) 600 MG tablet Take 600 mg by mouth 4 times daily. Yes Historical Provider, MD   HYDROcodone-acetaminophen (NORCO) 5-325 MG per tablet Take 1 tablet by mouth 2 times daily. Yes Historical Provider, MD   Insulin Glargine, 2 Unit Dial, (TOUJEO MAX SOLOSTAR) 300 UNIT/ML SOPN Inject 66 Units into the skin daily   Yes Historical Provider, MD   piperacillin-tazobactam (ZOSYN) 3-0.375 GM per 50ML IVPB extended infusion Infuse 4.5 mg intravenously in the morning and 4.5 mg at noon and 4.5 mg in the evening.    Yes Historical Provider, MD   acetaminophen (TYLENOL) 325 MG tablet Take 650 mg by mouth every 6 hours as needed for Pain   Yes Historical Provider, MD   amLODIPine (NORVASC) 5 MG tablet Take 5 mg by mouth daily   Yes Historical Provider, MD   benzonatate (TESSALON) 100 MG capsule Take 100 mg by mouth 3 times daily   Yes Historical Provider, MD   cloNIDine (CATAPRES) 0.1 MG tablet Take 0.1 mg by mouth in the morning and 0.1 mg in the evening.    Yes Historical Provider, MD   hydrALAZINE (APRESOLINE) 100 MG tablet Take 100 mg by mouth 3 times daily   Yes Historical Provider, MD       Current medications:    Current Facility-Administered Medications   Medication Dose Route Frequency Provider Last Rate Last Admin    0.9 % sodium chloride infusion   IntraVENous PRN Onofre , DO        0.9 % sodium chloride infusion   IntraVENous Continuous Charles Emily,  mL/hr at 10/09/22 1229 Rate Verify at 10/09/22 1229    0.9 % sodium chloride infusion   IntraVENous PRN Charles Emily, DO        white petrolatum ointment   Topical BID Charles Emily, DO   Given at 10/09/22 0956    sodium chloride flush 0.9 % injection 5-40 mL  5-40 mL IntraVENous 2 times per day Charles Emily, DO   10 mL at 10/09/22 0954    sodium chloride flush 0.9 % injection 5-40 mL  5-40 mL IntraVENous PRN Charles Emily, DO        0.9 % sodium chloride infusion   IntraVENous PRN Charles Eimly, DO        0.9 % sodium chloride infusion   IntraVENous PRN Charles Emily, DO        0.9 % sodium chloride infusion   IntraVENous PRN Charles Emily, DO        ciprofloxacin (CIPRO) tablet 500 mg  500 mg Oral 2 times per day Viky Gardner MD   500 mg at 10/09/22 0948    miconazole (MICOTIN) 2 % powder   Topical BID Charles Emily, DO   Given at 10/09/22 0956    [Held by provider] insulin glargine-yfgn (SEMGLEE-YFGN) injection vial 32 Units  32 Units SubCUTAneous QAM Tony Shankar, DO   32 Units at 10/07/22 0911    0.9 % sodium chloride infusion   IntraVENous PRN Evelyn Anderson, DO        0.9 % sodium chloride infusion   IntraVENous PRN Divya Christinao, APRN - CNP        epoetin sharath-epbx (RETACRIT) injection 6,000 Units  6,000 Units SubCUTAneous Once per day on Mon Wed Fri Zelalem Alonso MD   6,000 Units at 10/07/22 1000    amLODIPine (NORVASC) tablet 10 mg  10 mg Per NG tube Daily Armin Anderson DO   10 mg at 10/09/22 0408    linezolid (ZYVOX) tablet 600 mg  600 mg Oral 2 times per day Elian Sim MD   600 mg at 10/09/22 0959    [Held by provider] cloNIDine (CATAPRES) tablet 0.1 mg  0.1 mg Oral Q12H Divyapraveen Vasquezo, APRN - CNP   0.1 mg at 10/08/22 0030    [Held by provider] hydrALAZINE (APRESOLINE) tablet 50 mg  50 mg Oral 3 times per day Divya Christinao, APRN - CNP   50 mg at 10/08/22 9442    fentaNYL (SUBLIMAZE) injection 50 mcg  50 mcg IntraVENous Q2H PRN Divya Christinao, APRN - CNP   50 mcg at 10/05/22 1956    0.9 % sodium chloride infusion   IntraVENous PRN Divya Lingo, APRN - CNP        lactulose (CHRONULAC) 10 GM/15ML solution 20 g  20 g Oral BID Divya Lingo, APRN - CNP   20 g at 10/09/22 0952    insulin lispro (HUMALOG) injection vial 0-16 Units  0-16 Units SubCUTAneous 4x Daily AC & HS Divya Lingo, APRN - CNP   4 Units at 10/07/22 1702    HYDROcodone-acetaminophen ([facility]) 7.5-325 MG per tablet 1 tablet  1 tablet Oral BID Divya Lingo, APRN - CNP   1 tablet at 10/09/22 0951    pantoprazole (PROTONIX) 40 mg in sodium chloride (PF) 10 mL injection  40 mg IntraVENous Q12H Divya Christinao, APRN - CNP   40 mg at 10/09/22 0543    metoprolol succinate (TOPROL XL) extended release tablet 25 mg  25 mg Oral Daily Divya Christinao, APRN - CNP   25 mg at 10/09/22 0951    sennosides (SENOKOT) 8.8 MG/5ML syrup 5 mL  5 mL Oral Nightly Nani He MD   5 mL at 10/08/22 4372    labetalol (NORMODYNE;TRANDATE) injection 10 mg  10 mg IntraVENous Q30 Min PRN Nani He MD   10 mg at 10/01/22 1115    hydrALAZINE (APRESOLINE) injection 10 mg  10 mg IntraVENous Q30 Min PRN Deidra Moffett MD   10 mg at 09/30/22 1627    glucose chewable tablet 16 g  4 tablet Oral PRN Deidra Moffett MD        dextrose bolus 10% 125 mL  125 mL IntraVENous PRN Deidra Moffett MD        Or    dextrose bolus 10% 250 mL  250 mL IntraVENous PRN Deidra Moffett MD        glucagon (rDNA) injection 1 mg  1 mg SubCUTAneous PRN Deidra Moffett MD        dextrose 10 % infusion   IntraVENous Continuous PRN Deidra Moffett MD        carbidopa-levodopa (SINEMET)  MG per tablet 1 tablet  1 tablet Per NG tube TID Deidra Moffett MD   1 tablet at 10/09/22 0950    ipratropium-albuterol (DUONEB) nebulizer solution 1 ampule  1 ampule Inhalation Q4H WA Deidra Moffett MD   1 ampule at 10/09/22 0828    collagenase ointment   Topical Q MWF Deidra Moffett MD   Given at 10/07/22 1300    benzonatate (TESSALON) capsule 100 mg  100 mg Oral TID Deidra Moffett MD   100 mg at 10/09/22 0952    cetirizine (ZYRTEC) tablet 10 mg  10 mg Oral Daily Deidra Moffett MD   10 mg at 10/09/22 8938    ezetimibe (ZETIA) tablet 10 mg  10 mg Oral Daily Deidra Moffett MD   10 mg at 10/09/22 0949    gabapentin (NEURONTIN) tablet 600 mg  600 mg Oral 4x Daily Deidra Moffett MD   600 mg at 10/09/22 0950    ticagrelor (BRILINTA) tablet 90 mg  90 mg Oral BID Deidra Moffett MD   90 mg at 10/09/22 0950    ondansetron (ZOFRAN-ODT) disintegrating tablet 4 mg  4 mg Oral Q8H PRN Deidra Moffett MD        Or    ondansetron [facility]) injection 4 mg  4 mg IntraVENous Q6H PRN Deidra Moffett MD        aspirin EC tablet 81 mg  81 mg Oral Daily Deidra Moffett MD   81 mg at 10/09/22 8274    Or    aspirin suppository 300 mg  300 mg Rectal Daily Deidra Moffett MD   300 mg at 09/29/22 0809    atorvastatin (LIPITOR) tablet 80 mg  80 mg Oral Nightly Deidra Moffett MD   80 mg at 10/08/22 2248       Allergies:     Allergies   Allergen Reactions    Codeine Other (See Comments)     blisters       Problem List:    Patient Active Problem List   Diagnosis Code    Acute cerebrovascular accident (CVA) ([facility] I63.9    Acute respiratory failure with hypoxia ([facility] J96.01    Stenosis of left carotid artery I65.22    Hypoalbuminemia E88.09    Electrolyte imbalance E87.8    Hypernatremia E87.0       Past Medical History:        Diagnosis Date    Chronic back pain     CKD (chronic kidney disease)     CVA (cerebral vascular accident) ([facility]     CVA (cerebral vascular accident) ([facility]     DM (diabetes mellitus) ([facility]     Major depression     MI (myocardial infarction) ([facility]     MATT (obstructive sleep apnea)     Parkinson disease ([facility]     PVD (peripheral vascular disease) ([facility]     Seizure ([facility]        Past Surgical History:        Procedure Laterality Date    FEMORAL ARTERY STENT      IR CAROTID STENT UNI W PROTECTION  9/27/2022    IR CAROTID STENT UNI W PROTECTION 9/27/2022 Jasiel Dahl MD [facility]       Social History:    Social History     Tobacco Use    Smoking status: Not on file    Smokeless tobacco: Not on file   Substance Use Topics    Alcohol use: Not on file                                Counseling given: Not Answered      Vital Signs (Current):   Vitals:    10/09/22 0832 10/09/22 0951 10/09/22 1021 10/09/22 1029   BP:  (!) 155/81 (!) 161/82 (!) 161/82   Pulse: 90 95  95   Resp: 25  20 20   Temp:   36.9 °C (98.4 °F) 36.9 °C (98.4 °F)   TempSrc:   Oral    SpO2: 100%   100%   Weight:       Height:                                                  BP Readings from Last 3 Encounters:   10/09/22 (!) 161/82       NPO Status:                                                                                 BMI:   Wt Readings from Last 3 Encounters:   09/27/22 260 lb 8 oz (118.2 kg)     Body mass index is 39.61 kg/m².     CBC:   Lab Results   Component Value Date/Time    WBC 13.3 10/09/2022 05:05 AM    RBC 2.07 10/09/2022 05:05 AM    HGB 6.2 10/09/2022 05:05 AM    HCT 19.9 10/09/2022 05:05 AM    HCT 15.0 09/27/2022 08:58 PM    MCV 96.1 10/09/2022 05:05 AM    RDW 16.8 10/09/2022 05:05 AM     10/09/2022 05:05 AM       CMP:   Lab Results   Component Value Date/Time     10/09/2022 05:05 AM    K 4.6 10/09/2022 05:05 AM     10/09/2022 05:05 AM    CO2 20 10/09/2022 05:05 AM    BUN 69 10/09/2022 05:05 AM    CREATININE 2.1 10/09/2022 05:05 AM    GFRAA 39 10/09/2022 05:05 AM    LABGLOM 32 10/09/2022 05:05 AM    GLUCOSE 108 10/09/2022 05:05 AM    PROT 4.9 10/09/2022 05:05 AM    CALCIUM 7.8 10/09/2022 05:05 AM    BILITOT 0.3 10/09/2022 05:05 AM    ALKPHOS 299 10/09/2022 05:05 AM    AST 34 10/09/2022 05:05 AM    ALT <5 10/09/2022 05:05 AM       POC Tests: No results for input(s): POCGLU, POCNA, POCK, POCCL, POCBUN, POCHEMO, POCHCT in the last 72 hours.     Coags:   Lab Results   Component Value Date/Time    PROTIME 23.0 10/09/2022 05:05 AM    INR 2.1 10/09/2022 05:05 AM       HCG (If Applicable): No results found for: PREGTESTUR, PREGSERUM, HCG, HCGQUANT     ABGs: No results found for: PHART, PO2ART, GEE0QGN, JIJ7BZK, BEART, H7EMFRBJ     Type & Screen (If Applicable):  No results found for: LABABO, LABRH    Drug/Infectious Status (If Applicable):  No results found for: HIV, HEPCAB    COVID-19 Screening (If Applicable):   Lab Results   Component Value Date/Time    COVID19 Not Detected 10/01/2022 08:23 AM     Echo Complete    Result Date: 9/28/2022  Transthoracic Echocardiography Report (TTE)  Demographics   Patient Name    Seldovia Heater  Gender            Male                  L   Medical Record  [id number]     Room Number       4703  Number   Account #       [de-identified]    Procedure Date    09/28/2022   Corporate ID                 Ordering                               Physician   Accession       [id number]   Referring  Number                       Physician   Date of Birth   1955   Sonographer       Todd Payton RDCS   Age             74 year(s)   Interpreting      [address] Physician         Physician Cardiology                                                 Rico Dickens MD                                Any Other  Procedure Type of Study   TTE procedure  Procedure Date Date: 09/28/2022 Start: 08:56 AM Study Location: Portable Technical Quality: Adequate visualization Indications:CVA. Patient Status: Routine Contrast Medium: Definity and Bubble Study. Height: 69 inches Weight: 260 pounds BSA: 2.31 m^2 BMI: 38.39 kg/m^2 HR: 66 bpm BP: 167/61 mmHg  Findings   Left Ventricle  Severe concentric left ventricular hypertrophy. Ejection fraction is visually estimated at 60 to 65%. Left ventricular diastolic filling is elevated . Right Ventricle  Mildly dilated right ventricle with normal systolic function. Left Atrium  Normal sized left atrium. Right Atrium  Agitated saline injected for shunt evaluation was technically difficult  due to pt being on vent and lack of corperation. Please obtain limited  echo with Bubble study once pt is extubated and cooperative. Mitral Valve  Mild mitral regurgitation is present. Tricuspid Valve  Mild tricuspid regurgitation. RVSP is 42 mmHg. Aortic Valve  The aortic valve is trileaflet. Aortic valve opens well. Pulmonic Valve  Physiologic and/or trace, Mild pulmonic regurgitation present. Aorta  Aortic root dimension within normal limits. Conclusions   Summary  Severe concentric left ventricular hypertrophy. Ejection fraction is visually estimated at 60 to 65%. Left ventricular diastolic filling is elevated . Mildly dilated right ventricle with normal systolic function. Agitated saline injected for shunt evaluation was technically difficult  due to pt being on vent and lack of corperation. Please obtain limited  echo with Bubble study once pt is extubated and cooperative. Mild mitral regurgitation is present. Mild tricuspid regurgitation. RVSP is 42 mmHg.   Physiologic and/or trace, Mild pulmonic regurgitation present.    Signature   ----------------------------------------------------------------  Electronically signed by Rico LLOYDInterpreting  physician) on 09/28/2022 02:36 PM  ----------------------------------------------------------------  M-Mode/2D Measurements & Calculations   LV Diastolic    LV Systolic Dimension: 2.4   AV Cusp Separation: 1.9 cmLA  Dimension: 3.9  cm                           Dimension: 4.2 cmAO Root  cm              LV Volume Diastolic: 73.5 ml Dimension: 2.8 cm  LV FS:38.5 %    LV Volume Systolic: 27.3 ml  LV PW           LV EDV/LV EDV Index: 23.3  Diastolic: 1.6  ZN/71 WK/H^2IW ESV/LV ESV  cm              Index: 21.2 ml/9ml/ m^2      RV Diastolic Dimension: 2.7  LV PW Systolic: EF Calculated: 11.8 %        cm  2.2 cm          LV Mass Index: 119 l/min*m^2  Septum          LV Length: 9.1 cm            LA/Aorta: 1.5  Diastolic: 1.8                               Ascending Aorta: 2.8 cm  cm              LVOT: 2 cm                   LA volume/Index: 41.5 ml  Septum                                       /57.45FI/Y^5  Systolic: 2 cm                               RA Area: 19.5 cm^2  CO: 4.97 l/min  CI: 2.15                                     IVC Expiration: 2.4 cm  l/m*m^2  LV Mass: 274.91  g  Doppler Measurements & Calculations   MV Peak E-Wave:   AV Peak Velocity: 1.54 m/s    LVOT Peak Velocity: 0.9  1.12 m/s          AV Peak Gradient: 9.45 mmHg   m/s  MV Peak A-Wave:   AV Mean Velocity: 1.03 m/s    LVOT Mean Velocity: 0.66  0.91 m/s          AV Mean Gradient: 5 mmHg      m/s  MV E/A Ratio:     AV VTI: 33.4 cm               LVOT Peak Gradient: 3.2  1.24              AV Area (Continuity):2.26     mmHgLVOT Mean Gradient:  MV Peak Gradient: cm^2                          1.9 mmHg  9.2 mmHg                                        Estimated RVSP: 67.3 mmHg  MV Mean Gradient: LVOT VTI: 24 cm               Estimated RAP:15 mmHg  3.8 mmHg          IVRT: 73.8 msec  MV Mean Velocity: Estimated PASP: 67.33 mmHg  0.9 m/s           Pulm. Vein A Reversal         TR Velocity:3.62 m/s  MV Deceleration   Duration:133.8 msec           TR Gradient:52.33 mmHg  Time: 235.5 msec  Pulm. Vein D Velocity:0.55    PV Peak Velocity: 1.05 m/s  MV P1/2t: 70.9    m/sPulm. Vein A Reversal      PV Peak Gradient: 4.37  msec              Velocity:0.26 m/s             mmHg  MVA by PHT:3.1    Pulm. Vein S Velocity: 0.29   PV Mean Velocity: 0.76 m/s  cm^2              m/s                           PV Mean Gradient: 2.6 mmHg  MV Area  (continuity): 1.7  cm^2  MV E' Septal  Velocity: 0.07  m/s  MV E' Lateral  Velocity: 11 m/s  [email or web address] [id number]    CT ABDOMEN PELVIS WO CONTRAST    Result Date: 10/7/2022  EXAMINATION: CT OF THE ABDOMEN AND PELVIS WITHOUT CONTRAST; CT OF THE CHEST WITHOUT CONTRAST 10/7/2022 1:37 pm TECHNIQUE: CT of the abdomen and pelvis was performed without the administration of intravenous contrast. Multiplanar reformatted images are provided for review. Automated exposure control, iterative reconstruction, and/or weight based adjustment of the mA/kV was utilized to reduce the radiation dose to as low as reasonably achievable.; CT of the chest was performed without the administration of intravenous contrast. Multiplanar reformatted images are provided for review. Automated exposure control, iterative reconstruction, and/or weight based adjustment of the mA/kV was utilized to reduce the radiation dose to as low as reasonably achievable. COMPARISON: None. HISTORY: ORDERING SYSTEM PROVIDED HISTORY: anemia eval for malignancy TECHNOLOGIST PROVIDED HISTORY: Reason for exam:->anemia eval for malignancy What reading provider will be dictating this exam?->CRC FINDINGS: CHEST: No cardiomegaly or pericardial effusion. Atherosclerotic disease. No thoracic aortic aneurysm. Nondilated pulmonary trunk.   Subcentimeter short axis lymph nodes. Unremarkable thyroid and esophagus. Moderate bilateral pleural effusions. No pneumothorax. The central airways are patent. Dependent atelectasis in the bilateral lower lobes. Bands of linear atelectasis versus scarring elsewhere in the lungs. There is a calcification within the right lower lobe, as can be seen with calcified granuloma or sequela prior aspiration. No pneumonia. No pulmonary mass or suspicious pulmonary nodule. Low lung volumes. Degenerative changes of the spine. Partially imaged cervical spinal fusion hardware. Schmorl's nodes. Bilateral gynecomastia. No lytic blastic osseous metastases. Anasarca. ABDOMEN/PELVIS: No solid organ mass, allowing for limitations of noncontrast technique. No acute abnormality of the solid organs. No free air. Trace perihepatic and pelvic free fluid. No bowel obstruction. Nonvisualized appendix; no secondary signs to suggest appendicitis. The large bowel is opacified with enteric contrast. Bladder wall thickening is nonspecific given under distension. Mahan catheter. Bilateral scrotal edema, partially imaged. No abdominal aortic aneurysm. Atherosclerotic disease. No retroperitoneal no mesenteric. No pelvic lymphadenopathy. Degenerative changes of the spine. Schmorl's nodes. No lytic or blastic osseous metastases. Anasarca. CHEST: No evidence of neoplastic disease, allowing for limitations of noncontrast technique. Moderate bilateral pleural effusions. Nonemergent incidental findings as above. ABDOMEN/PELVIS: No evidence of neoplastic disease, allowing for limitations of noncontrast technique. Bladder wall thickening is nonspecific given under distension; recommend correlation urinalysis to evaluate for UTI. Nonemergent incidental findings as above. XR FOOT RIGHT (2 VIEWS)    Result Date: 10/2/2022  EXAMINATION: TWO XRAY VIEWS OF THE RIGHT FOOT 10/2/2022 5:21 pm COMPARISON: None.  HISTORY: ORDERING SYSTEM PROVIDED HISTORY: non healing wound r/o osteomyelitis ( calceneous ) TECHNOLOGIST PROVIDED HISTORY: Reason for exam:->non healing wound r/o osteomyelitis ( calceneous ) What reading provider will be dictating this exam?->CRC FINDINGS: The calcaneus appears intact with no osseous destruction seen. 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 There is soft tissue swelling and a wound VAC in place. No acute fracture seen. No evidence of calcaneal osteomyelitis. XR ABDOMEN (KUB) (SINGLE AP VIEW)    Result Date: 10/4/2022  EXAMINATION: ONE SUPINE XRAY VIEW(S) OF THE ABDOMEN 10/4/2022 3:14 pm COMPARISON: 28 September 2022 HISTORY: ORDERING SYSTEM PROVIDED HISTORY: abd discomfort TECHNOLOGIST PROVIDED HISTORY: Reason for exam:->abd discomfort What reading provider will be dictating this exam?->CRC FINDINGS: No free air, bowel wall pneumatosis or pathologically dilated bowel. There is somewhat greater than average retained fecal material within the lower GI tract which could indicate some evacuation dysfunction. No abnormal calcifications are evident. Somewhat greater than average quantity of retained fecal material thin the lower GI tract suggesting dysfunction with evacuation. CT HEAD WO CONTRAST    Result Date: 9/30/2022  EXAMINATION: CT OF THE HEAD WITHOUT CONTRAST  9/30/2022 2:22 pm TECHNIQUE: CT of the head was performed without the administration of intravenous contrast. Automated exposure control, iterative reconstruction, and/or weight based adjustment of the mA/kV was utilized to reduce the radiation dose to as low as reasonably achievable. COMPARISON: MRI head 09/28/2022. CT head 09/28/2022. HISTORY: ORDERING SYSTEM PROVIDED HISTORY: [employer] TECHNOLOGIST PROVIDED HISTORY: Reason for exam:->[employer] Has a \"code stroke\" or \"stroke alert\" been called? ->No What reading provider will be dictating this exam?->CRC FINDINGS: There are parenchymal volume loss and chronic microvascular ischemic changes. There are no findings to suggest an acute large vessel infarct. There is no acute intracranial hemorrhage. No intracranial mass or mass effect is identified. No abnormal extra-axial fluid collection is seen. There is mucoperiosteal thickening in the sphenoid sinus and in the ethmoid air cells. There is fluid in the right mastoid. Parenchymal volume loss and chronic microvascular ischemic changes. No acute intracranial abnormality. Ethmoid and sphenoid sinusitis. Right mastoid effusion. CT HEAD WO CONTRAST    Result Date: 2022  Patient MRN:  [id number] : 1955 Age: 74 years Gender: Male Order Date:  2022 5:35 AM EXAM: CT HEAD WO CONTRAST NUMBER OF IMAGES:  200 INDICATION:  stroke evaluation after mechanical thrombectomy Please assign to read to Dr. Yanet Guevara in [facility] stroke evaluation after mechanical thrombectomy What reading provider will be dictating this exam?->[facility] COMPARISON: None Technique: Low-dose CT  acquisition technique included one of following options; 1 . Automated exposure control, 2. Adjustment of MA and or KV according to patient's size or 3. Use of iterative reconstruction. Multiple CT sections were obtained with sagittal and coronal MPR reconstructions. The ventricles are prominent. The gyri and sulci appear  prominent. The white matter appears  prominent. There is no evidence for hemorrhage. There is no infarct identified. There is no mass effect identified. There is no mass identified. Diffuse atrophy likely age related Findings compatible with small vessel ischemic changes. CT HEAD WO CONTRAST    Result Date: 2022  Patient MRN:  [id number] : 1955 Age: 74 years Gender: Male Order Date:  2022 EXAM: CT HEAD WO CONTRAST NUMBER OF IMAGES:  357 INDICATION:  cva cva COMPARISON: None Technique: Low-dose CT  acquisition technique included one of following options; 1 . Automated exposure control, 2. Adjustment of MA and or KV according to patient's size or 3. Use of iterative reconstruction. Multiple CT sections were obtained with sagittal and coronal MPR reconstructions. The ventricles are prominent. The gyri and sulci appear  prominent. The white matter appears  prominent. There is no evidence for hemorrhage. There is no infarct identified. There is no mass effect identified. There is no mass identified. Diffuse atrophy likely age related Findings compatible with small vessel ischemic changes. Findings were called at the time of dictation     CT CHEST WO CONTRAST    Result Date: 10/7/2022  EXAMINATION: CT OF THE ABDOMEN AND PELVIS WITHOUT CONTRAST; CT OF THE CHEST WITHOUT CONTRAST 10/7/2022 1:37 pm TECHNIQUE: CT of the abdomen and pelvis was performed without the administration of intravenous contrast. Multiplanar reformatted images are provided for review. Automated exposure control, iterative reconstruction, and/or weight based adjustment of the mA/kV was utilized to reduce the radiation dose to as low as reasonably achievable.; CT of the chest was performed without the administration of intravenous contrast. Multiplanar reformatted images are provided for review. Automated exposure control, iterative reconstruction, and/or weight based adjustment of the mA/kV was utilized to reduce the radiation dose to as low as reasonably achievable. COMPARISON: None. HISTORY: ORDERING SYSTEM PROVIDED HISTORY: anemia eval for malignancy TECHNOLOGIST PROVIDED HISTORY: Reason for exam:->anemia eval for malignancy What reading provider will be dictating this exam?->[facility] FINDINGS: CHEST: No cardiomegaly or pericardial effusion. Atherosclerotic disease. No thoracic aortic aneurysm. Nondilated pulmonary trunk. Subcentimeter short axis lymph nodes. Unremarkable thyroid and esophagus. Moderate bilateral pleural effusions. No pneumothorax. The central airways are patent.   Dependent atelectasis in the bilateral lower lobes. Bands of linear atelectasis versus scarring elsewhere in the lungs. There is a calcification within the right lower lobe, as can be seen with calcified granuloma or sequela prior aspiration. No pneumonia. No pulmonary mass or suspicious pulmonary nodule. Low lung volumes. Degenerative changes of the spine. Partially imaged cervical spinal fusion hardware. Schmorl's nodes. Bilateral gynecomastia. No lytic blastic osseous metastases. Anasarca. ABDOMEN/PELVIS: No solid organ mass, allowing for limitations of noncontrast technique. No acute abnormality of the solid organs. No free air. Trace perihepatic and pelvic free fluid. No bowel obstruction. Nonvisualized appendix; no secondary signs to suggest appendicitis. The large bowel is opacified with enteric contrast. Bladder wall thickening is nonspecific given under distension. Mahan catheter. Bilateral scrotal edema, partially imaged. No abdominal aortic aneurysm. Atherosclerotic disease. No retroperitoneal no mesenteric. No pelvic lymphadenopathy. Degenerative changes of the spine. Schmorl's nodes. No lytic or blastic osseous metastases. Anasarca. CHEST: No evidence of neoplastic disease, allowing for limitations of noncontrast technique. Moderate bilateral pleural effusions. Nonemergent incidental findings as above. ABDOMEN/PELVIS: No evidence of neoplastic disease, allowing for limitations of noncontrast technique. Bladder wall thickening is nonspecific given under distension; recommend correlation urinalysis to evaluate for UTI. Nonemergent incidental findings as above. NM GI BLOOD LOSS    Result Date: 10/8/2022  EXAMINATION: NUCLEAR MEDICINE GASTRIC BLEEDING STUDY 10/8/2022 TECHNIQUE: Following the intravenous injection of 25 mCi of 99 mTc-labeled RBC's, a flow study and standard images of the abdomen was obtained over a total period of 60 minutes COMPARISON: None.  HISTORY: ORDERING SYSTEM PROVIDED HISTORY: active bleeding TECHNOLOGIST PROVIDED HISTORY: Reason for exam:->active bleeding What reading provider will be dictating this exam?->CRC FINDINGS: Activity is seen within the blood pool, including the great vessels, heart, liver, and spleen. A small amount of activity accumulates in the bladder over the course of the study. There was  abnormal accumulation of radioactivity splenic flexure with peristalsis identified of the tracer distally to suggest active bleeding during study acquisition. Active GI bleeding identified during acquisition in the splenic flexure with peristalsis identified into the proximal descending colon. RECOMMENDATIONS: Unavailable     XR CHEST PORTABLE    Result Date: 10/8/2022  EXAMINATION: ONE XRAY VIEW OF THE CHEST 10/8/2022 3:16 pm COMPARISON: CT chest 7 October 2022 HISTORY: ORDERING SYSTEM PROVIDED HISTORY: cough productive  eval for HAP TECHNOLOGIST PROVIDED HISTORY: Reason for exam:->cough productive  eval for HAP What reading provider will be dictating this exam?->CRC FINDINGS: Single AP upright portable chest demonstrate bilateral pleural effusions most pronounced on the right. There is moderate cardiomegaly with increased perihilar markings in Kerley B lines. There is increased markings at the lung bases. Bilateral pleural effusions with bibasilar patchy infiltrates and mild cardiomegaly. XR CHEST PORTABLE    Result Date: 10/4/2022  EXAMINATION: ONE XRAY VIEW OF THE CHEST 10/4/2022 4:13 pm COMPARISON: Chest series from September 30, 2022 HISTORY: ORDERING SYSTEM PROVIDED HISTORY: cough aspiration TECHNOLOGIST PROVIDED HISTORY: Reason for exam:->cough aspiration What reading provider will be dictating this exam?->CRC FINDINGS: There appear to be symmetric low lung volumes. Persistent but improved ill-defined opacities in the bilateral lungs most pronounced in the mid and lower lungs. There appears to be a small left and trace right pleural effusion. No obvious pneumothorax. Atherosclerotic disease and prominence of the cardiac silhouette. Difficult to assess pulmonary vascularity. Osseous and thoracic soft tissue structures demonstrate no acute findings. 1.  Atherosclerotic disease and mild cardiomegaly. 2.  Persistent but improved opacities in the bilateral lungs most pronounced in the mid and lower lungs. There appears to be a small left and trace right pleural effusion     XR CHEST PORTABLE    Result Date: 9/30/2022  EXAMINATION: ONE XRAY VIEW OF THE CHEST 9/30/2022 9:47 am COMPARISON: 29 September 2022 HISTORY: ORDERING SYSTEM PROVIDED HISTORY: sob TECHNOLOGIST PROVIDED HISTORY: Reason for exam:->sob What reading provider will be dictating this exam?->CRC FINDINGS: Worsening infiltrate and or atelectasis on the left. Stable right-sided infiltrate and or atelectasis with small pleural effusion suggested. Endotracheal and nasogastric tubes have been removed. Worsening infiltrate and or atelectasis on the left, stable on the right with suspected layering pleural effusion. XR CHEST PORTABLE    Result Date: 9/29/2022  EXAMINATION: ONE XRAY VIEW OF THE CHEST 9/29/2022 6:01 am COMPARISON: 28 September 2022 HISTORY: ORDERING SYSTEM PROVIDED HISTORY: intubated TECHNOLOGIST PROVIDED HISTORY: Reason for exam:->intubated What reading provider will be dictating this exam?->CRC FINDINGS: Unchanged support lines. Bilateral infiltrate and or atelectasis is unchanged. Small right pleural effusion is not clearly changed as well. No new abnormal findings. Unchanged bilateral atelectasis and or infiltrate as well as small right pleural effusion. XR CHEST PORTABLE    Result Date: 9/28/2022  EXAMINATION: ONE XRAY VIEW OF THE CHEST 9/28/2022 6:57 am COMPARISON: None.  HISTORY: ORDERING SYSTEM PROVIDED HISTORY: intubated TECHNOLOGIST PROVIDED HISTORY: Reason for exam:->intubated What reading provider will be dictating this exam?->CRC FINDINGS: There is stable position of the endotracheal tube and NG tube Multifocal bilateral airspace disease is again noted unchanged compared to prior study and most prominent within the lower lobes. There trace bilateral pleural effusions. 1. Multifocal bilateral airspace disease more prominent within the lower lobes. The airspace disease is unchanged when compared with the prior study. XR CHEST PORTABLE    Result Date: 2022  EXAMINATION: ONE XRAY VIEW OF THE CHEST 2022 12:41 am COMPARISON: None. HISTORY: ORDERING SYSTEM PROVIDED HISTORY: intuabted TECHNOLOGIST PROVIDED HISTORY: Reason for exam:->intuabted What reading provider will be dictating this exam?->CRC FINDINGS: Indistinct pulmonary opacification rather diffusely most aggregated in mid to lower locations. The may represent pulmonary edema but nonspecific probably includes atelectasis at least in the bases other etiologies such is infiltrates, ARDS, etc.  Are possible with no comparison available. Advise clinical correlation Esophageal route catheter is into the stomach. ET tube has tip approximately 4.1 cm above the mark. Heart size around upper normal limits. Small pleural effusions suggested The detail of evaluation on the exam is suboptimal due to technique and body habitus. Further imaging may be helpful. 1. Pulmonary opacities present favoring edema and atelectasis, also small pleural effusions, but nonspecific with some additional considerations noted above 2. Support devices present as described above 3. Heart size appears borderline enlarged     CTA NECK W CONTRAST    Result Date: 2022  Patient MRN:  [id number] : 1955 Age: 74 years Gender: Male Order Date:  2022 6:24 PM EXAM: CTA NECK W CONTRAST, CTA HEAD W CONTRAST NUMBER OF IMAGES:  36 INDICATION:  cva cva What reading provider will be dictating this exam?->[facility] COMPARISON: None Technique: Low-dose CT  acquisition technique included one of following options; 1 .  Automated exposure control, 2. Adjustment of MA and or KV according to patient's size or 3. Use of iterative reconstruction. Contiguous spiral images were obtained in the axial plane, following the administration of intravenous contrast using CT angiographic protocol. Sagittal and coronal images were reconstructed from the axial plane acquisition. Additional MIP reconstructions were presented to aid in the interpretation of this study. Images were obtained from the skull base cranially. There is mild calcified plaque identified in the vessels compatible with atherosclerotic disease. The right carotid is moderately atherosclerotic without significant stenosis The left carotid is abnormal. There is  evidence for hemodynamically significant stenosis at the level the proximal internal carotid artery. By NASCET criteria estimated stenosis is 90% or greater The right vertebral artery is mildly atherosclerotic without significant stenosis The left vertebral artery is mildly atherosclerotic without significant stenosis The basilar artery is unremarkable The middle cerebral arteries are unremarkable The anterior cerebral arteries are unremarkable The posterior cerebral arteries are unremarkable     1. Estimated stenosis of the proximal right and left internal carotid artery by NASCET criteria is greater than 90% on the left 2. Severe atherosclerotic disease . 3. No large vessel occlusion identified This study was analyzed by the Viz. ai algorithm. CT BRAIN PERFUSION    Result Date: 2022  Patient MRN: [id number] : 1955 Age:  74 years Gender: Male Order Date: 2022 Exam: CT BRAIN PERFUSION Number of Images: 333 views Indication:   cva cva What reading provider will be dictating this exam?->[facility] Comparison: None. Findings: Perfusion images demonstrate symmetric blood volume Blood flow images demonstrate symmetric blood flow There is no significant ischemic penumbra identified. There is no significant core infarct identified. No significant ischemic penumbra identified This study was analyzed by the Viz. ai algorithm. XR ABDOMEN FOR NG/OG/NE TUBE PLACEMENT    Result Date: 2022  EXAMINATION: ONE SUPINE XRAY VIEW(S) OF THE ABDOMEN 2022 12:40 am COMPARISON: None. HISTORY: ORDERING SYSTEM PROVIDED HISTORY: Confirmation of course of NG/OG/NE tube and location of tip of tube TECHNOLOGIST PROVIDED HISTORY: Reason for exam:->Confirmation of course of NG/OG/NE tube and location of tip of tube Portable? ->Yes What reading provider will be dictating this exam?->CRC FINDINGS: Esophageal route catheter is into the left upper quadrant expected stomach location. No clear bowel obstruction identified. Possible constipation. Limited detailed evaluation present on exam.  Chest evaluation is done separately. NG/OG is in the stomach. IR CAROTID STENT W PROTECTION    Result Date: 2022  Patient MRN:  [id number] : 1955 Age: 74 years Gender: Male Order Date:  2022 7:00 PM Examination angiogram and carotid stent NUMBER OF IMAGES:  12 INDICATION: I65.22 Stenosis of left carotid artery left carotid stenosis What reading provider will be dictating this exam?->[facility] COMPARISON: None FINDINGS:  After obtaining informed consent and following the routine sterile prep and drape after administration of local anesthesia and following a time out a needle was inserted in the right common femoral artery and guidewire and catheter were advanced into the abdominal aorta and subsequently into the thoracic aorta. Subsequently selective catheterization of the left common carotid was performed and angiogram was performed . Images demonstrate 80 stenosis of the proximal internal carotid artery by NASCET criteria on the  left prior to stent placement The external carotid artery is patent. The catheter was then exchanged for a guide catheter The distal protection device was placed. A filter wire was utilized Angioplasty was performed. Subsequently a carotid stent was placed Subsequently angioplasty of the stent was performed Repeat images demonstrate A patent stent with a patent distal internal carotid artery. Bola Klever TICI 3 0 no perfusion 1 Penetration but no distal branch filling 2a perfusion with incomplete distal branch filling, less than 50% 2b  perfusion with incomplete distal branch filling greater than 50% 3  Full perfusion with filling of distal branches The patient tolerated the procedure well. Angiogram of the right common femoral artery demonstrates a patent vessel and Angio-Seal was utilized. The procedure was performed with general anesthesia. 12 minutes 13 seconds of fluoroscopy was utilized. Time out occurred at 1920 hours. 1. Angiogram demonstrates successful placement of a carotid stent , post procedure images demonstrate a widely patent stent and internal carotid     US RETROPERITONEAL COMPLETE    Result Date: 2022  EXAMINATION: RETROPERITONEAL ULTRASOUND OF THE KIDNEYS AND URINARY BLADDER 2022 COMPARISON: None HISTORY: ORDERING SYSTEM PROVIDED HISTORY: Azotemia assess for hydronephrosis TECHNOLOGIST PROVIDED HISTORY: Reason for exam:->Azotemia assess for hydronephrosis What reading provider will be dictating this exam?->CRC FINDINGS: Kidneys: The right kidney measures 12.4 cm in length and the left kidney measures 12.1 cm in length. The corticomedullary differentiation and cortical thickness is maintained bilaterally. No renal mass, renal cysts, nor intrarenal calcification. There is no hydronephrosis on either side. Bladder: A Mahan catheter is present and is decompressing the bladder. 1.  Normal appearance of the bilateral kidneys. No hydronephrosis. 2.  A Mahan catheter is decompressing the bladder.      CTA HEAD W CONTRAST    Result Date: 2022  Patient MRN:  [id number] : 1955 Age: 74 years Gender: Male Order Date:  2022 6:24 PM EXAM: CTA NECK W CONTRAST, CTA HEAD W CONTRAST NUMBER OF IMAGES: 36 INDICATION:  cva cva What reading provider will be dictating this exam?->[facility] COMPARISON: None Technique: Low-dose CT  acquisition technique included one of following options; 1 . Automated exposure control, 2. Adjustment of MA and or KV according to patient's size or 3. Use of iterative reconstruction. Contiguous spiral images were obtained in the axial plane, following the administration of intravenous contrast using CT angiographic protocol. Sagittal and coronal images were reconstructed from the axial plane acquisition. Additional MIP reconstructions were presented to aid in the interpretation of this study. Images were obtained from the skull base cranially. There is mild calcified plaque identified in the vessels compatible with atherosclerotic disease. The right carotid is moderately atherosclerotic without significant stenosis The left carotid is abnormal. There is  evidence for hemodynamically significant stenosis at the level the proximal internal carotid artery. By NASCET criteria estimated stenosis is 90% or greater The right vertebral artery is mildly atherosclerotic without significant stenosis The left vertebral artery is mildly atherosclerotic without significant stenosis The basilar artery is unremarkable The middle cerebral arteries are unremarkable The anterior cerebral arteries are unremarkable The posterior cerebral arteries are unremarkable     1. Estimated stenosis of the proximal right and left internal carotid artery by NASCET criteria is greater than 90% on the left 2. Severe atherosclerotic disease . 3. No large vessel occlusion identified This study was analyzed by the Viz. ai algorithm.      MRI BRAIN WO CONTRAST    Result Date: 2022  Patient MRN:  [id number] : 1955 Age: 74 years Gender: Male Order Date:  2022 3:24 PM EXAM: MRI BRAIN WO CONTRAST NUMBER OF IMAGES:  605 INDICATION:  Stroke Evaluation Mechanical Thrombectomy MRI of the brain 24 hours following mechanical thrombectomy. Discontinue order after first follow up. Please assign to Dr. Sara Pandey to read in [facility] Stroke Evaluation Mechanical Thrombectomy What reading provider will be dictating this exam?->[facility] COMPARISON: CT scan 9/20/2022 Total sequences obtained: 9 The ventricles are prominent. The gyri and sulci appear  prominent. The white matter appears  prominent. No convincing hemorrhage identified. There is no mass effect identified. There is no mass identified. There is a small region of restricted diffusion measuring approximately 3 mm seen in the left parietal lobe and a similar finding present in the left posterior frontal lobe. No other restricted diffusion is seen to suggest acute infarct. There is a small region of susceptibility infarct in the left frontal lobe. There are 2 small regions of petechial infarcts one in the left posterior frontal lobe and a second in the left parietal lobe not exceeding 3 mm. There is otherwise extensive small vessel ischemic changes     FL MODIFIED BARIUM SWALLOW W VIDEO    Result Date: 10/5/2022  EXAMINATION: MODIFIED BARIUM SWALLOW WAS PERFORMED IN CONJUNCTION WITH SPEECH PATHOLOGY SERVICES WITH [facility] UNIT DICOM DISPLAY TECHNIQUE: Fluoroscopic evaluation of the swallowing mechanism was performed using cineradiography with multiple consistency of barium product in conjunction with speech pathology services. FLUOROSCOPY DOSE AND TYPE OR TIME AND EXPOSURES: Images: Cine fluoroscopy Fluoroscopic time: 1.3 minutes Total dose: 12 mGy COMPARISON: None HISTORY: ORDERING SYSTEM PROVIDED HISTORY: aspiration risk evaluation TECHNOLOGIST PROVIDED HISTORY: Reason for exam:->aspiration risk evaluation What reading provider will be dictating this exam?->[facility] FINDINGS: --The patient is edentulous. Combined oral and pharyngeal phase swallowing delay. No significant barium residuals. --Pharyngeal phase laryngeal penetration with thin liquid barium when using a straw. No barium aspiration. 1.  Mildly impaired swallowing mechanism with swallowing delay and followed by laryngeal penetration with thin liquid barium when using a straw. No barium aspiration 2. Please see separate speech pathology report for full discussion of findings and recommendations. RECOMMENDATIONS: Unavailable       Anesthesia Evaluation  Patient summary reviewed and Nursing notes reviewed no history of anesthetic complications:   Airway: Mallampati: II  TM distance: >3 FB   Neck ROM: full  Mouth opening: > = 3 FB   Dental:          Pulmonary:   (+) pneumonia:  sleep apnea: on CPAP,  decreased breath sounds: bilateral                            Cardiovascular:  Exercise tolerance: poor (<4 METS),   (+) hypertension:, valvular problems/murmurs:, past MI:, CAD:, hyperlipidemia      ECG reviewed  Rhythm: regular  Rate: normal  Echocardiogram reviewed         Beta Blocker:  Dose within 24 Hrs         Neuro/Psych:   (+) seizures:, CVA:, psychiatric history:depression/anxiety             GI/Hepatic/Renal:   (+) renal disease: CRI, bowel prep, morbid obesity         ROS comment: GI bleed. Endo/Other:    (+) Diabetesusing insulin, blood dyscrasia: anemia:., electrolyte abnormalities, . Pt had no PAT visit       Abdominal:   (+) obese,           Vascular:   + PVD, aortic or cerebral, . Other Findings:           Anesthesia Plan      MAC     ASA 4 - emergent       Induction: intravenous. Anesthetic plan and risks discussed with patient. Use of blood products discussed with patient whom consented to blood products. Plan discussed with attending.                     CONSTANTINE Biswas - CRNA   10/9/2022

## 2022-10-09 NOTE — OP NOTE
Colonoscopy Op Note    DATE OF PROCEDURE: 10/9/2022    SURGEON: Ilya Urbina MD    PREOPERATIVE DIAGNOSIS: GIB     POSTOPERATIVE DIAGNOSIS: Same, blood in R colon,     OPERATION: Procedure(s):  COLONOSCOPY DIAGNOSTIC    ANESTHESIA: Local monitored anesthesia. ESTIMATED BLOOD LOSS: nil     COMPLICATIONS: None. SPECIMENS:   * No specimens in log *    HISTORY: The patient is a 74y.o. year old male with history of above preop diagnosis. I recommended colonoscopy with possible biopsy or polypectomy and I explained the risk, benefits, expected outcome, and alternatives to the procedure. Risks included but are not limited to bleeding, infection, respiratory distress, hypotension, and perforation of the colon. The patient understands and is in agreement. PROCEDURE: The patient was given IV conscious sedation per anesthesia. The patient was given supplemental oxygen by nasal cannula. The colonoscope was inserted per rectum and advanced under direct vision to the cecum without difficulty, identified by ileocecal valve. The prep was poor so exam was suboptimal and a small mucosal lesion could have been missed. FINDINGS:    Blood throughout the colon old or dark blood in L colon and bright red blood in R colon TI could not be intubated source of bleeding not determined. He had a few small polyps noted in sigmoid and cecum however given current bleeding and hx anticoagulation I did not resect these. ASSESSMENT/PLAN:   Monitor exam closely, consider CTA to evaluate bleeding source location   Will need repeat c scope for polyps in the future if he is able to recover.       Ilya Urbina MD  10/09/22  1:33 PM

## 2022-10-09 NOTE — PROGRESS NOTES
Hospitalist Progress Note      SYNOPSIS: Patient admitted on 2022 for Acute cerebrovascular accident (CVA) [facility])  Patient presented to [facility] with strokelike symptoms, right-sided paralysis and aphasia. Trnsfrd to [facility]  Was found to have severe left ICA stenosis and taken to IR for left ICA angioplasty with stent placement. Patient was intubated and sedated and transferred to the ICU early portion of the admission. Blood transfus ordered on  for hgb 5. Hematuria noted on admiss    SUBJECTIVE:    Receiving blood transfusion  hgb 6.2  NMS bleed scan +  Active GI bleeding identified during acquisition in the splenic flexure with   peristalsis identified into the proximal descending colon. Rev of sytems  Are you having sob- responds \" I think so\"  Are you having any sensation of pressure in your chest= responds \" I think so\"  Rr 20  bp 161/82  Cxr yesterd oct 2022-portable   Bila pleural effusions with patchy bila infiltrates    Temp (24hrs), Av.3 °F (36.8 °C), Min:97.6 °F (36.4 °C), Max:98.6 °F (37 °C)    DIET: Diet NPO Exceptions are: Sips of Water with Meds  CODE: Full Code      OBJECTIVE:on     BP (!) 161/82   Pulse 95   Temp 98.4 °F (36.9 °C)   Resp 20   Ht 5' 8\" (1.727 m)   Wt 260 lb 8 oz (118.2 kg)   SpO2 100%   BMI 39.61 kg/m²   General appearance: Not jaundiced not diaphoretic     HEENT: No obvious swelling to lips or tongue male pattern baldness  Respiratory: Clear to auscultation bilaterally, unlabored respiratory pattern at rest  Cardiovascular:  Audible S1-S2 pulse regular extremit warm to palpation  Adequate capillary refill < 2 sec        Skin: No petechiae or hives on inspection warm to palpation  Neurologic: awake, alert speech is dysarthtic  not aphasic    ASSESSMENT:    Acute gib /acute blood loss anemia requires blood transfusion= + melena    + NM bleed scan suggestive of bleeb around the splenic flexure   Acute CVA-   severe left ICA stenosis and taken to IR for left ICA angioplasty with stent placement.   now on DAPT  PAD  Acute kidney injury serum cr 2.2/serum cr holding at 2.1  Hypertensive urgency- on admission  bp levels had been lower  bp holding steady in light of gib      With potential for hypotension if further volumne depletion evolves  Htn essent- bp reading in office 08/29/2022  162/84  and in dec 21/2021  200/110    Suggesting his bp was not well controlled in op setting  Right foot wound infection/diabetic ulcer  Urine retention  dysphagia   Dm2 bs 75  - currently npo    Dm prev documented as uncontrolled based on office notes 06/22/ from endocrinology  Leukocytosis- in setting of acute hemmorrhage/foot wound to wnd vac-receiving cipro abx    And no fever  PLAN:    Blood transfusion  Monit hgb  Prevent as best as possible drops in bp or hgb   Resume catapress  Check ekg and troponin 2/2 chest pain  Incentive spirometry  Follow up cxr in am  Check procalcitonin      DISPOSITION: not stable for discharge    Medications:  REVIEWED DAILY    Infusion Medications    sodium chloride      sodium chloride 100 mL/hr at 10/09/22 0600    sodium chloride      sodium chloride      sodium chloride      sodium chloride      sodium chloride      sodium chloride      sodium chloride      dextrose       Scheduled Medications    white petrolatum   Topical BID    sodium chloride flush  5-40 mL IntraVENous 2 times per day    ciprofloxacin  500 mg Oral 2 times per day    miconazole   Topical BID    [Held by provider] insulin glargine  32 Units SubCUTAneous QAM    epoetin sharath-epbx  6,000 Units SubCUTAneous Once per day on Mon Wed Fri    amLODIPine  10 mg Per NG tube Daily    linezolid  600 mg Oral 2 times per day    [Held by provider] cloNIDine  0.1 mg Oral Q12H    [Held by provider] hydrALAZINE  50 mg Oral 3 times per day    lactulose  20 g Oral BID    insulin lispro  0-16 Units SubCUTAneous 4x Daily AC & HS    HYDROcodone-acetaminophen  1 tablet Oral BID    pantoprazole (PROTONIX) 40 mg injection  40 mg IntraVENous Q12H    metoprolol succinate  25 mg Oral Daily    sennosides  5 mL Oral Nightly    carbidopa-levodopa  1 tablet Per NG tube TID    ipratropium-albuterol  1 ampule Inhalation Q4H WA    collagenase   Topical Q MWF    benzonatate  100 mg Oral TID    cetirizine  10 mg Oral Daily    ezetimibe  10 mg Oral Daily    gabapentin  600 mg Oral 4x Daily    ticagrelor  90 mg Oral BID    aspirin  81 mg Oral Daily    Or    aspirin  300 mg Rectal Daily    atorvastatin  80 mg Oral Nightly     PRN Meds: sodium chloride, sodium chloride, sodium chloride flush, sodium chloride, sodium chloride, sodium chloride, sodium chloride, sodium chloride, fentanNYL, sodium chloride, labetalol, hydrALAZINE, glucose, dextrose bolus **OR** dextrose bolus, glucagon (rDNA), dextrose, ondansetron **OR** ondansetron    Labs:     Recent Labs     10/07/22  1440 10/07/22  1500 10/08/22  0558 10/08/22  1530 10/09/22  0505   WBC 17.4*  --  15.6*  --  13.3*   HGB 7.2*  --  6.1* 6.9* 6.2*   HCT 23.3*   < > 19.5* 21.2* 19.9*     --  185  --  162    < > = values in this interval not displayed. Recent Labs     10/07/22  0607 10/08/22  0558 10/09/22  0505    138 141   K 4.6 4.8 4.6   * 108* 111*   CO2 23 20* 20*   BUN 85* 84* 69*   CREATININE 2.2* 2.2* 2.1*   CALCIUM 8.0* 8.1* 7.8*   PHOS 4.3 4.5 4.0       Recent Labs     10/07/22  0607 10/08/22  0558 10/09/22  0505   PROT 4.9* 4.8* 4.9*   ALKPHOS 194* 193* 299*   ALT 7 5 <5   AST 17 15 34   BILITOT 0.4  0.4 0.4 0.3       Recent Labs     10/09/22  0505   INR 2.1           Radiology:   +++++++++++++++++++++++++++++++++++++++++++++++++  Lugene Bamberger, DO [employer] - [address], [place]  +++++++++++++++++++++++++++++++++++++++++++++++++  NOTE: This report was transcribed using voice recognition software.  Every effort was made to ensure accuracy; however, inadvertent computerized transcription errors may be present.

## 2022-10-09 NOTE — PROGRESS NOTES
[facility]  Nephrology Attending Progress Note  Pernell Cabrales. MD Judith        SUBJECTIVE:     History of Present Ilness:    Elsie Hernandez is a 74 y.o. male history of CAD s/p MI, hypertension, chronic kidney disease stage IIIa (baseline creatinine of recent 1.7-1.9) he is followed by our group with Dr. Lopez Dunn. He initially was admitted to [facility] with right lower extremity wound. .  Patient subsequently developed aphasia noted to facial droop associated with right-sided weakness. He was transferred to [address] [place] [address] for further management. Patient was admitted told the neuro ICU. He required intubation with mechanical ventilation. CT of the head and neck demonstrated bilateral carotid stenosis with the left ICA being dominant. IR performed angiogram with angioplasty of the left ICA. Laboratory data showed progressive increase in his serum creatinine to currently 2.9 mg/dL. Hemoglobin was noted to be 5.6. He has received at least 2 units of packed cells but with further drop in his hemoglobin with requirement for additional transfusion. He had an episode of urinary retention with gross hematuria Mahan catheter was reinserted and the hematuria subsequently cleared. Renal is consulted for LARY.      Subjective     10/1: he denies blurred vision, no headaches  10/2: No new acute issues from overnight; more alert; receiving PRBCs hemoglobin trending low  10/3: pt seen sp egd today, no cp or sob, family and nurse in room, no cp or sob  10/4: pt seen in room, no complaints  10/5: pt seen in icu, feels ok today, no cp or sob  10/6: pt seen in nsicu, no cp or sob, npo, wants to eat  10/7: pt seen in nsicu, no cp or sob, starting dysphagia diet  10/8: pt without complaint, eating better dysphagia  diet  10/9 : pt seen in room, no cp or sob, just got back from c scope, groggy, on ivf         PROBLEM LIST:    Patient Active Problem List   Diagnosis    Acute cerebrovascular accident (CVA) ([facility]    Acute respiratory failure with hypoxia (HCC)    Stenosis of left carotid artery    Hypoalbuminemia    Electrolyte imbalance    Hypernatremia        PAST MEDICAL HISTORY:    Past Medical History:   Diagnosis Date    Chronic back pain     CKD (chronic kidney disease)     CVA (cerebral vascular accident) ([facility]     CVA (cerebral vascular accident) ([facility]     DM (diabetes mellitus) ([facility]     Major depression     MI (myocardial infarction) ([facility]     MATT (obstructive sleep apnea)     Parkinson disease ([facility]     PVD (peripheral vascular disease) ([facility]     Seizure ([place] [address].)        DIET:    Diet NPO Exceptions are: Sips of Water with Meds     PHYSICAL EXAM:     Patient Vitals for the past 24 hrs:   BP Temp Temp src Pulse Resp SpO2   10/09/22 1029 (!) 161/82 98.4 °F (36.9 °C) -- 95 20 100 %   10/09/22 1021 (!) 161/82 98.4 °F (36.9 °C) Oral -- 20 --   10/09/22 0951 (!) 155/81 -- -- 95 -- --   10/09/22 0832 -- -- -- 90 25 100 %   10/09/22 0829 -- -- -- 90 21 100 %   10/09/22 0805 (!) 155/81 98.3 °F (36.8 °C) Oral -- 25 --   10/09/22 0804 -- -- -- -- 25 --   10/09/22 0800 (!) 161/82 98.4 °F (36.9 °C) -- 95 25 100 %   10/09/22 0745 (!) 169/82 98.3 °F (36.8 °C) -- 89 25 100 %   10/09/22 0600 (!) 157/76 -- -- 87 (!) 36 96 %   10/09/22 0400 137/62 98.6 °F (37 °C) Oral 76 14 100 %   10/09/22 0200 (!) 158/84 -- -- 85 23 100 %   10/09/22 0100 -- -- -- 84 16 94 %   10/09/22 0000 (!) 185/87 -- -- 90 (!) 31 --   10/08/22 2318 -- -- -- -- 18 --   10/08/22 2300 (!) 170/82 -- -- 84 27 99 %   10/08/22 2100 (!) 150/81 -- -- 78 26 100 %   10/08/22 2000 (!) 165/72 97.6 °F (36.4 °C) Oral 77 28 100 %   10/08/22 1800 (!) 140/60 -- -- 76 29 100 %   10/08/22 1700 127/63 -- -- 74 17 --   10/08/22 1619 -- -- -- 76 28 92 %   10/08/22 1600 139/71 98.2 °F (36.8 °C) Oral 77 (!) 32 100 %   10/08/22 1500 (!) 143/72 -- -- 75 27 99 %   10/08/22 1400 (!) 143/65 -- -- 76 25 99 %   10/08/22 1339 134/62 98.4 °F (36.9 °C) Oral 78 -- --   10/08/22 1300 -- -- -- 78 24 98 % @      Intake/Output Summary (Last 24 hours) at 10/9/2022 1105  Last data filed at 10/9/2022 1021  Gross per 24 hour   Intake 5770 ml   Output 3320 ml   Net 2450 ml         Wt Readings from Last 3 Encounters:   09/27/22 260 lb 8 oz (118.2 kg)       Constitutional:  Pt is in no acute distress  Head: normocephalic, atraumatic  Neck: no JVD  Cardiovascular: regular rate and rhythm, no murmurs, gallops, or rubs  Respiratory:  No rales, rhochi, or wheezes  Gastrointestinal:  Soft, nontender, nondistended, bowel sounds x 4  Ext: tr edema  Skin: dry, no rash  Neuro: aaox3    MEDS (scheduled):    white petrolatum   Topical BID    sodium chloride flush  5-40 mL IntraVENous 2 times per day    ciprofloxacin  500 mg Oral 2 times per day    miconazole   Topical BID    [Held by provider] insulin glargine  32 Units SubCUTAneous QAM    epoetin sharath-epbx  6,000 Units SubCUTAneous Once per day on Mon Wed Fri    amLODIPine  10 mg Per NG tube Daily    linezolid  600 mg Oral 2 times per day    [Held by provider] cloNIDine  0.1 mg Oral Q12H    [Held by provider] hydrALAZINE  50 mg Oral 3 times per day    lactulose  20 g Oral BID    insulin lispro  0-16 Units SubCUTAneous 4x Daily AC & HS    HYDROcodone-acetaminophen  1 tablet Oral BID    pantoprazole (PROTONIX) 40 mg injection  40 mg IntraVENous Q12H    metoprolol succinate  25 mg Oral Daily    sennosides  5 mL Oral Nightly    carbidopa-levodopa  1 tablet Per NG tube TID    ipratropium-albuterol  1 ampule Inhalation Q4H WA    collagenase   Topical Q MWF    benzonatate  100 mg Oral TID    cetirizine  10 mg Oral Daily    ezetimibe  10 mg Oral Daily    gabapentin  600 mg Oral 4x Daily    ticagrelor  90 mg Oral BID    aspirin  81 mg Oral Daily    Or    aspirin  300 mg Rectal Daily    atorvastatin  80 mg Oral Nightly       MEDS (infusions):   sodium chloride      sodium chloride 100 mL/hr at 10/09/22 0600    sodium chloride      sodium chloride      sodium chloride      sodium chloride sodium chloride      sodium chloride      sodium chloride      dextrose         MEDS (prn):  sodium chloride, sodium chloride, sodium chloride flush, sodium chloride, sodium chloride, sodium chloride, sodium chloride, sodium chloride, fentanNYL, sodium chloride, labetalol, hydrALAZINE, glucose, dextrose bolus **OR** dextrose bolus, glucagon (rDNA), dextrose, ondansetron **OR** ondansetron    DATA:    Recent Labs     10/07/22  1440 10/07/22  1500 10/08/22  0558 10/08/22  1530 10/09/22  0505   WBC 17.4*  --  15.6*  --  13.3*   HGB 7.2*  --  6.1* 6.9* 6.2*   HCT 23.3*   < > 19.5* 21.2* 19.9*   MCV 96.0  --  95.1  --  96.1     --  185  --  162    < > = values in this interval not displayed. Recent Labs     10/07/22  0607 10/08/22  0558 10/09/22  0505    138 141   K 4.6 4.8 4.6   * 108* 111*   CO2 23 20* 20*   BUN 85* 84* 69*   CREATININE 2.2* 2.2* 2.1*   LABGLOM 30 30 32   GLUCOSE 160* 151* 108*   CALCIUM 8.0* 8.1* 7.8*   ALT 7 5 <5   AST 17 15 34   BILITOT 0.4  0.4 0.4 0.3   ALKPHOS 194* 193* 299*   MG 2.1 2.1 2.1   PHOS 4.3 4.5 4.0       Lab Results   Component Value Date    LABALBU 2.4 (L) 10/09/2022    LABALBU 2.5 (L) 10/08/2022    LABALBU 2.7 (L) 10/07/2022     No results found for: TSH    Iron Studies  Lab Results   Component Value Date    IRON 24 (L) 10/03/2022    TIBC 195 (L) 10/03/2022    FERRITIN 94 10/03/2022     Vitamin B-12   Date Value Ref Range Status   10/07/2022 370 211 - 946 pg/mL Final     Folate   Date Value Ref Range Status   10/07/2022 6.3 4.8 - 24.2 ng/mL Final       No results found for: VITD25  No results found for: PTH    No components found for: URIC    No results found for: VOL, APPEARANCE, COLORU, LABSPEC, LABPH, LEUKBLD, NITRU, GLUCOSEU, KETUA, UROBILINOGEN, KETUA, UROBILINOGEN, BILIRUBINUR, OCBU    No results found for: LIPIDPAN      IMPRESSION/RECOMMENDATIONS:      1. Acute CVA  S/p IR intervention with angioplasty of the left ICA     2.  LARY on CKD stage 3b  Baseline 1.7-1.9  combined effects of ACEI use and contrast nephrotoxicity  Component of urinary retention  nonoliguric at this time  Disproportionate elevation of BUN relative to Cr suspicious for gib  stool heme positive  Worsening renal unction despite being nonoliguric  May require dialysis support  Monitor labs and urine  1.4L  Bun/cr  128/3 >115/2.6> 105/2.5>95/2.5> 85/2.2>84/2.2>69/2.1  Uo 2.3L     3. Hypertensive emergency  presenting  with acute CVA  Adjust meds to target blood pressure control to SBP less than 160  BP with improved control     4. Anemia, chronic with acute worsening  Informed stool heme positive  PRBC transfusion as needed  Sp egd  Trf < 7  On ASHLEY      5. Type 2 diabetes mellitus  Monitor glucose levels     6. Hyperkalemia  Due to combination of LARY and metabolic acidosis; PRBC transfusios  Lokelma  prn     7. Urinary retention  Suspected situational  Now with trejo  Urology following     8. Hypernatremia  Suspect intervascular volume depletion  Na 151>145>141>145>141>138>141  D5 changed to venessa Juarez.  Freddy Tena MD

## 2022-10-09 NOTE — PLAN OF CARE
Problem: Discharge Planning  Goal: Discharge to home or other facility with appropriate resources  Outcome: Progressing  Flowsheets (Taken 10/6/2022 0720 by Radha Guevara RN)  Discharge to home or other facility with appropriate resources: Identify barriers to discharge with patient and caregiver     Problem: Pain  Goal: Verbalizes/displays adequate comfort level or baseline comfort level  Outcome: Progressing  Flowsheets (Taken 10/5/2022 2000 by Geena Burrows)  Verbalizes/displays adequate comfort level or baseline comfort level:   Encourage patient to monitor pain and request assistance   Assess pain using appropriate pain scale   Administer analgesics based on type and severity of pain and evaluate response     Problem: Skin/Tissue Integrity  Goal: Absence of new skin breakdown  Description: 1. Monitor for areas of redness and/or skin breakdown  2. Assess vascular access sites hourly  3. Every 4-6 hours minimum:  Change oxygen saturation probe site  4. Every 4-6 hours:  If on nasal continuous positive airway pressure, respiratory therapy assess nares and determine need for appliance change or resting period.   Outcome: Progressing     Problem: Neurosensory - Adult  Goal: Achieves stable or improved neurological status  Outcome: Progressing  Flowsheets (Taken 10/6/2022 0720 by Radha Guevara RN)  Achieves stable or improved neurological status: Assess for and report changes in neurological status     Problem: Skin/Tissue Integrity - Adult  Goal: Skin integrity remains intact  Outcome: Progressing  Flowsheets (Taken 10/6/2022 0720 by Radha Guevara RN)  Skin Integrity Remains Intact: Monitor for areas of redness and/or skin breakdown     Problem: Skin/Tissue Integrity - Adult  Goal: Incisions, wounds, or drain sites healing without S/S of infection  Outcome: Progressing  Flowsheets (Taken 10/6/2022 0720 by Radha Guevara RN)  Incisions, Wounds, or Drain Sites Healing Without Sign and Symptoms of Infection: TWICE DAILY: Assess and document skin integrity     Problem: Hematologic - Adult  Goal: Maintains hematologic stability  Outcome: Progressing  Flowsheets (Taken 10/6/2022 2322)  Maintains hematologic stability:   Assess for signs and symptoms of bleeding or hemorrhage   Monitor labs for bleeding or clotting disorders   Administer blood products/factors as ordered

## 2022-10-09 NOTE — ANESTHESIA POSTPROCEDURE EVALUATION
Department of Anesthesiology  Postprocedure Note    Patient: Lidya Grider  MRN: [id number]  YOB: 1955  Date of evaluation: 10/9/2022      Procedure Summary     Date: 10/09/22 Room / Location: [address] [place] [address] / [facility]    Anesthesia Start: 8856 Anesthesia Stop: 6258    Procedure: COLONOSCOPY DIAGNOSTIC (Rectum) Diagnosis:       Gastrointestinal hemorrhage, unspecified gastrointestinal hemorrhage type      (GI BLEED)    Surgeons: Natasha Gordon MD Responsible Provider: Micheal Granados MD    Anesthesia Type: MAC ASA Status: 4 - Emergent          Anesthesia Type: MAC    Amy Phase I: Amy Score: 9    Amy Phase II: Amy Score: 9      Anesthesia Post Evaluation    Patient location during evaluation: PACU  Patient participation: complete - patient participated  Level of consciousness: awake and alert  Airway patency: patent  Nausea & Vomiting: no nausea and no vomiting  Complications: no  Cardiovascular status: hemodynamically stable  Respiratory status: acceptable  Hydration status: euvolemic

## 2022-10-09 NOTE — PROGRESS NOTES
Department of Podiatry   Progress Note      Patient seen at bedside this AM with wound vac intact to right foot running without issue. No new pedal questions or complaints at this time. MWF wound vac changes. Pt denies any NVFC. Past Medical History:            Diagnosis Date    Chronic back pain     CKD (chronic kidney disease)     CVA (cerebral vascular accident) ([facility] [address]     CVA (cerebral vascular accident) ([facility] [address]     DM (diabetes mellitus) ([facility] [address]     Major depression     MI (myocardial infarction) ([facility] [address]     MATT (obstructive sleep apnea)     Parkinson disease (HCC)     PVD (peripheral vascular disease) ([facility] [address]     Seizure ([facility] [address]        Past Surgical History:        Procedure Laterality Date    FEMORAL ARTERY STENT      IR CAROTID STENT UNI W PROTECTION  9/27/2022    IR CAROTID STENT UNI W PROTECTION 9/27/2022 Panchito Lam MD [facility]       Medications Prior to Admission:    Medications Prior to Admission: apixaban (ELIQUIS) 5 MG TABS tablet, Take 5 mg by mouth 2 times daily  aspirin 81 MG chewable tablet, Take 81 mg by mouth daily  carbidopa-levodopa (SINEMET)  MG per tablet, Take 1 tablet by mouth 3 times daily  carvedilol (COREG) 25 MG tablet, Take 25 mg by mouth 2 times daily (with meals)  cetirizine (ZYRTEC) 10 MG tablet, Take 10 mg by mouth daily  clopidogrel (PLAVIX) 75 MG tablet, Take 75 mg by mouth daily  diphenhydrAMINE (BENADRYL) 25 MG capsule, Take 25 mg by mouth every 6 hours  ezetimibe (ZETIA) 10 MG tablet, Take 10 mg by mouth daily  fluticasone (FLONASE) 50 MCG/ACT nasal spray, 2 sprays by Each Nostril route daily  gabapentin (NEURONTIN) 600 MG tablet, Take 600 mg by mouth 4 times daily. HYDROcodone-acetaminophen (NORCO) 5-325 MG per tablet, Take 1 tablet by mouth 2 times daily.   Insulin Glargine, 2 Unit Dial, (TOUJEO MAX SOLOSTAR) 300 UNIT/ML SOPN, Inject 66 Units into the skin daily  piperacillin-tazobactam (ZOSYN) 3-0.375 GM per 50ML IVPB extended infusion, Infuse 4.5 mg intravenously in the morning and 4.5 mg at noon and 4.5 mg in the evening. acetaminophen (TYLENOL) 325 MG tablet, Take 650 mg by mouth every 6 hours as needed for Pain  amLODIPine (NORVASC) 5 MG tablet, Take 5 mg by mouth daily  benzonatate (TESSALON) 100 MG capsule, Take 100 mg by mouth 3 times daily  cloNIDine (CATAPRES) 0.1 MG tablet, Take 0.1 mg by mouth in the morning and 0.1 mg in the evening. hydrALAZINE (APRESOLINE) 100 MG tablet, Take 100 mg by mouth 3 times daily    Allergies:  Codeine    Social History:   TOBACCO:   has no history on file for tobacco use. ETOH:   has no history on file for alcohol use. DRUGS:   Social History     Substance and Sexual Activity   Drug Use Not on file       Family History:   History reviewed. No pertinent family history. REVIEW OF SYSTEMS:    All pertinent positives and negatives as noted in HPI       LOWER EXTREMITY EXAMINATION   Wound vac intact running continuous pressure    Previous Exam:  VASCULAR:  DP and PT pulses are non palpable. CFT < 5 seconds B/L. Warm to warm from the tibial tuberosity to the distal aspect of the digits dorsally. NEUROLOGIC:  Protective sensation is diminished by grossly intact    DERM:  Right foot lateral plantar wound measuring approx 6cm in diameter. No erythema, serosanguinous drainage, no malodor.     MUSCULOSKELETAL: deferred           CONSULTS:  IP CONSULT TO CRITICAL CARE  IP CONSULT TO DIETITIAN  IP CONSULT TO CARDIOLOGY  IP CONSULT TO PODIATRY  IP CONSULT TO PODIATRY  IP CONSULT TO UROLOGY  IP CONSULT TO NEPHROLOGY  IP CONSULT TO GENERAL SURGERY  IP CONSULT TO INFECTIOUS DISEASES  IP CONSULT TO GI  IP CONSULT TO GENERAL SURGERY  IP CONSULT TO GENERAL SURGERY  IP CONSULT TO INTERVENTIONAL RADIOLOGY    MEDICATION:  Scheduled Meds:   white petrolatum   Topical BID    sodium chloride flush  5-40 mL IntraVENous 2 times per day    ciprofloxacin  500 mg Oral 2 times per day    miconazole   Topical BID    [Held by provider] insulin glargine  32 Units SubCUTAneous QAM    epoetin sharath-epbx  6,000 Units SubCUTAneous Once per day on Mon Wed Fri    amLODIPine  10 mg Per NG tube Daily    linezolid  600 mg Oral 2 times per day    [Held by provider] cloNIDine  0.1 mg Oral Q12H    [Held by provider] hydrALAZINE  50 mg Oral 3 times per day    lactulose  20 g Oral BID    insulin lispro  0-16 Units SubCUTAneous 4x Daily AC & HS    HYDROcodone-acetaminophen  1 tablet Oral BID    pantoprazole (PROTONIX) 40 mg injection  40 mg IntraVENous Q12H    metoprolol succinate  25 mg Oral Daily    sennosides  5 mL Oral Nightly    carbidopa-levodopa  1 tablet Per NG tube TID    ipratropium-albuterol  1 ampule Inhalation Q4H WA    collagenase   Topical Q MWF    benzonatate  100 mg Oral TID    cetirizine  10 mg Oral Daily    ezetimibe  10 mg Oral Daily    gabapentin  600 mg Oral 4x Daily    ticagrelor  90 mg Oral BID    aspirin  81 mg Oral Daily    Or    aspirin  300 mg Rectal Daily    atorvastatin  80 mg Oral Nightly     Continuous Infusions:   sodium chloride      sodium chloride 100 mL/hr at 10/09/22 0600    sodium chloride      sodium chloride      sodium chloride      sodium chloride      sodium chloride      sodium chloride      sodium chloride      dextrose       PRN Meds:.sodium chloride, sodium chloride, sodium chloride flush, sodium chloride, sodium chloride, sodium chloride, sodium chloride, sodium chloride, fentanNYL, sodium chloride, labetalol, hydrALAZINE, glucose, dextrose bolus **OR** dextrose bolus, glucagon (rDNA), dextrose, ondansetron **OR** ondansetron    RADIOLOGY:  XR CHEST PORTABLE   Final Result   Bilateral pleural effusions with bibasilar patchy infiltrates and mild   cardiomegaly. NM GI BLOOD LOSS   Final Result   Active GI bleeding identified during acquisition in the splenic flexure with   peristalsis identified into the proximal descending colon.       RECOMMENDATIONS:   Unavailable         CT ABDOMEN PELVIS WO CONTRAST CT HEAD WO CONTRAST   Final Result   Parenchymal volume loss and chronic microvascular ischemic changes. No acute intracranial abnormality. Ethmoid and sphenoid sinusitis. Right mastoid effusion. US RETROPERITONEAL COMPLETE   Final Result   1. Normal appearance of the bilateral kidneys. No hydronephrosis. 2.  A Mahan catheter is decompressing the bladder. XR CHEST PORTABLE   Final Result   Worsening infiltrate and or atelectasis on the left, stable on the right with   suspected layering pleural effusion. XR CHEST PORTABLE   Final Result   Unchanged bilateral atelectasis and or infiltrate as well as small right   pleural effusion. MRI BRAIN WO CONTRAST   Final Result   There are 2 small regions of petechial infarcts one in the left   posterior frontal lobe and a second in the left parietal lobe not   exceeding 3 mm. There is otherwise extensive small vessel ischemic   changes         XR CHEST PORTABLE   Final Result   1. Multifocal bilateral airspace disease more prominent within the lower   lobes. The airspace disease is unchanged when compared with the prior study. CT HEAD WO CONTRAST   Final Result   Diffuse atrophy likely age related   Findings compatible with small vessel ischemic changes. XR CHEST PORTABLE   Final Result   1. Pulmonary opacities present favoring edema and atelectasis, also small   pleural effusions, but nonspecific with some additional considerations noted   above   2. Support devices present as described above   3. Heart size appears borderline enlarged         XR ABDOMEN FOR NG/OG/NE TUBE PLACEMENT   Final Result   NG/OG is in the stomach. IR CAROTID STENT W PROTECTION   Final Result   1.  Angiogram demonstrates successful placement of a carotid stent ,   post procedure images demonstrate a widely patent stent and internal   carotid         CT HEAD WO CONTRAST   Final Result   Diffuse atrophy likely age related Findings compatible with small vessel ischemic changes. Findings were called at the time of dictation         CT BRAIN PERFUSION   Final Result      No significant ischemic penumbra identified      This study was analyzed by the [employer] ai algorithm. CTA NECK W CONTRAST   Final Result   1. Estimated stenosis of the proximal right and left internal carotid   artery by NASCET criteria is greater than 90% on the left   2. Severe atherosclerotic disease . 3. No large vessel occlusion identified            This study was analyzed by the [employer] ai algorithm. CTA HEAD W CONTRAST   Final Result   1. Estimated stenosis of the proximal right and left internal carotid   artery by NASCET criteria is greater than 90% on the left   2. Severe atherosclerotic disease . 3. No large vessel occlusion identified            This study was analyzed by the [employer] ai algorithm. Vitals:    BP (!) 155/81   Pulse 95   Temp 98.3 °F (36.8 °C) (Oral)   Resp 25   Ht 5' 8\" (1.727 m)   Wt 260 lb 8 oz (118.2 kg)   SpO2 100%   BMI 39.61 kg/m²     LABS:   Recent Labs     10/08/22  0558 10/08/22  1530 10/09/22  0505   WBC 15.6*  --  13.3*   HGB 6.1* 6.9* 6.2*   HCT 19.5* 21.2* 19.9*     --  162     Recent Labs     10/09/22  0505      K 4.6   *   CO2 20*   PHOS 4.0   BUN 69*   CREATININE 2.1*     Recent Labs     10/08/22  0558 10/09/22  0505   PROT 4.8* 4.9*   INR  --  2.1       ASSESSMENTS:   1. Right foot wound diabetic ulcer  2. DM  3. Pain right foot  Acute cerebrovascular accident (CVA) ([facility] [address]            PLAN:  -Patient examined and evaluated at bedside  -All pertinent labs, charts, and imaging reviewed prior to encounter   -Wound cx: Growth present, Corynebacteria  -Abx per ID  - Continue Wound vac with santyl right foot.  MWF vac changes  -R foot x rays: No evidence of osteomyelitis  - Will continue to follow while in house  -Pt discussed with Dr. Venkatesh Collins      Thank you for the opportunity to take part in the patient's care. Please do not hesitate to call for any questions or concerns.

## 2022-10-09 NOTE — PROGRESS NOTES
Radiology Procedure Waiver   Name: Tamika Hilario  : 1955  MRN: [id number]    Date:  10/9/22    Time: 3:21 PM EDT    Benefits of immediately proceeding with Radiology exam(s) without pre-testing outweigh the risks or are not indicated as specified below and therefore the following is/are being waived:    [] Pregnancy test   [] Patients LMP on-time and regular.   [] Patient had Tubal Ligation or has other Contraception Device. [] Patient  is Menopausal or Premenarcheal.    [] Patient had Full or Partial Hysterectomy. [] Protocol for Iodine allergy    [] MRI Questionnaire     [x] BUN/Creatinine   [] Patient age w/no hx of renal dysfunction. [] Patient on Dialysis. [] Recent Normal Labs.   Electronically signed by Yves Ren DO on 10/9/22 at 3:21 PM EDT          Patient pre-hydrated with IV fluids    Electronically signed by Yves Ren DO on 10/9/2022 at 3:21 PM

## 2022-10-10 ENCOUNTER — APPOINTMENT (OUTPATIENT)
Dept: ULTRASOUND IMAGING | Age: 67
DRG: 034 | End: 2022-10-10
Attending: INTERNAL MEDICINE
Payer: MEDICARE

## 2022-10-10 ENCOUNTER — APPOINTMENT (OUTPATIENT)
Dept: GENERAL RADIOLOGY | Age: 67
DRG: 034 | End: 2022-10-10
Attending: INTERNAL MEDICINE
Payer: MEDICARE

## 2022-10-10 LAB
ABO/RH: NORMAL
ALBUMIN SERPL-MCNC: 2.1 G/DL (ref 3.5–5.2)
ALBUMIN SERPL-MCNC: 2.4 G/DL (ref 3.5–5.2)
ALP BLD-CCNC: 206 U/L (ref 40–129)
ALP BLD-CCNC: 223 U/L (ref 40–129)
ALT SERPL-CCNC: 5 U/L (ref 0–40)
ALT SERPL-CCNC: <5 U/L (ref 0–40)
ANGLE (CLOT STRENGTH): 73.9 DEGREE (ref 59–74)
ANION GAP SERPL CALCULATED.3IONS-SCNC: 11 MMOL/L (ref 7–16)
ANION GAP SERPL CALCULATED.3IONS-SCNC: 14 MMOL/L (ref 7–16)
ANTIBODY SCREEN: NORMAL
AST SERPL-CCNC: 19 U/L (ref 0–39)
AST SERPL-CCNC: 21 U/L (ref 0–39)
B.E.: -6.8 MMOL/L (ref -3–3)
BASOPHILS ABSOLUTE: 0.02 E9/L (ref 0–0.2)
BASOPHILS ABSOLUTE: 0.02 E9/L (ref 0–0.2)
BASOPHILS RELATIVE PERCENT: 0.1 % (ref 0–2)
BASOPHILS RELATIVE PERCENT: 0.2 % (ref 0–2)
BILIRUB SERPL-MCNC: 0.2 MG/DL (ref 0–1.2)
BILIRUB SERPL-MCNC: 0.4 MG/DL (ref 0–1.2)
BLOOD BANK DISPENSE STATUS: NORMAL
BLOOD BANK PRODUCT CODE: NORMAL
BPU ID: NORMAL
BUN BLDV-MCNC: 71 MG/DL (ref 6–23)
BUN BLDV-MCNC: 75 MG/DL (ref 6–23)
CALCIUM IONIZED: 1.21 MMOL/L (ref 1.15–1.33)
CALCIUM IONIZED: 1.3 MMOL/L (ref 1.15–1.33)
CALCIUM SERPL-MCNC: 7.9 MG/DL (ref 8.6–10.2)
CALCIUM SERPL-MCNC: 8.3 MG/DL (ref 8.6–10.2)
CHLORIDE BLD-SCNC: 117 MMOL/L (ref 98–107)
CHLORIDE BLD-SCNC: 119 MMOL/L (ref 98–107)
CO2: 16 MMOL/L (ref 22–29)
CO2: 17 MMOL/L (ref 22–29)
COHB: 1.3 % (ref 0–1.5)
CREAT SERPL-MCNC: 2 MG/DL (ref 0.7–1.2)
CREAT SERPL-MCNC: 2.1 MG/DL (ref 0.7–1.2)
CRITICAL: ABNORMAL
DATE ANALYZED: ABNORMAL
DATE OF COLLECTION: ABNORMAL
DESCRIPTION BLOOD BANK: NORMAL
EKG ATRIAL RATE: 84 BPM
EKG P AXIS: 49 DEGREES
EKG P-R INTERVAL: 138 MS
EKG Q-T INTERVAL: 360 MS
EKG QRS DURATION: 102 MS
EKG QTC CALCULATION (BAZETT): 425 MS
EKG R AXIS: 12 DEGREES
EKG T AXIS: 50 DEGREES
EKG VENTRICULAR RATE: 84 BPM
EOSINOPHILS ABSOLUTE: 0.01 E9/L (ref 0.05–0.5)
EOSINOPHILS ABSOLUTE: 0.3 E9/L (ref 0.05–0.5)
EOSINOPHILS RELATIVE PERCENT: 0.1 % (ref 0–6)
EOSINOPHILS RELATIVE PERCENT: 2.2 % (ref 0–6)
EPL-TEG: 0 % (ref 0–15)
G-TEG: 16.2 K D/SC (ref 4.5–11)
GFR AFRICAN AMERICAN: 39
GFR AFRICAN AMERICAN: 41
GFR NON-AFRICAN AMERICAN: 32 ML/MIN/1.73
GFR NON-AFRICAN AMERICAN: 34 ML/MIN/1.73
GLUCOSE BLD-MCNC: 179 MG/DL (ref 74–99)
GLUCOSE BLD-MCNC: 186 MG/DL (ref 74–99)
HCO3: 19.1 MMOL/L (ref 22–26)
HCT VFR BLD CALC: 17.6 % (ref 37–54)
HCT VFR BLD CALC: 21.2 % (ref 37–54)
HEMOGLOBIN: 5.6 G/DL (ref 12.5–16.5)
HEMOGLOBIN: 6.5 G/DL (ref 12.5–16.5)
HHB: 1.8 % (ref 0–5)
IMMATURE GRANULOCYTES #: 0.08 E9/L
IMMATURE GRANULOCYTES #: 0.09 E9/L
IMMATURE GRANULOCYTES %: 0.6 % (ref 0–5)
IMMATURE GRANULOCYTES %: 0.8 % (ref 0–5)
INR BLD: 2.2
K (CLOTTING TIME): 1.2 MIN (ref 1–3)
LAB: ABNORMAL
LACTIC ACID: 0.8 MMOL/L (ref 0.5–2.2)
LY30 (FIBRINOLYSIS): 0 % (ref 0–8)
LYMPHOCYTES ABSOLUTE: 0.65 E9/L (ref 1.5–4)
LYMPHOCYTES ABSOLUTE: 0.92 E9/L (ref 1.5–4)
LYMPHOCYTES RELATIVE PERCENT: 5.9 % (ref 20–42)
LYMPHOCYTES RELATIVE PERCENT: 6.6 % (ref 20–42)
Lab: ABNORMAL
MA (MAX AMPLITUDE): 76.5 MM (ref 50–70)
MAGNESIUM: 2 MG/DL (ref 1.6–2.6)
MAGNESIUM: 2.1 MG/DL (ref 1.6–2.6)
MCH RBC QN AUTO: 29 PG (ref 26–35)
MCH RBC QN AUTO: 30.8 PG (ref 26–35)
MCHC RBC AUTO-ENTMCNC: 30.7 % (ref 32–34.5)
MCHC RBC AUTO-ENTMCNC: 31.8 % (ref 32–34.5)
MCV RBC AUTO: 94.6 FL (ref 80–99.9)
MCV RBC AUTO: 96.7 FL (ref 80–99.9)
METER GLUCOSE: 172 MG/DL (ref 74–99)
METER GLUCOSE: 186 MG/DL (ref 74–99)
METHB: 0.7 % (ref 0–1.5)
MODE: ABNORMAL
MONOCYTES ABSOLUTE: 0.56 E9/L (ref 0.1–0.95)
MONOCYTES ABSOLUTE: 0.76 E9/L (ref 0.1–0.95)
MONOCYTES RELATIVE PERCENT: 5.1 % (ref 2–12)
MONOCYTES RELATIVE PERCENT: 5.5 % (ref 2–12)
NEUTROPHILS ABSOLUTE: 11.86 E9/L (ref 1.8–7.3)
NEUTROPHILS ABSOLUTE: 9.64 E9/L (ref 1.8–7.3)
NEUTROPHILS RELATIVE PERCENT: 85 % (ref 43–80)
NEUTROPHILS RELATIVE PERCENT: 87.9 % (ref 43–80)
O2 SATURATION: 98.3 % (ref 92–98.5)
O2HB: 96.2 % (ref 94–97)
OPERATOR ID: 7490
PATHOLOGIST REVIEW: NORMAL
PATIENT TEMP: 37 C
PCO2: 40.3 MMHG (ref 35–45)
PDW BLD-RTO: 16.2 FL (ref 11.5–15)
PDW BLD-RTO: 16.8 FL (ref 11.5–15)
PH BLOOD GAS: 7.29 (ref 7.35–7.45)
PHOSPHORUS: 4 MG/DL (ref 2.5–4.5)
PHOSPHORUS: 4.6 MG/DL (ref 2.5–4.5)
PLATELET # BLD: 146 E9/L (ref 130–450)
PLATELET # BLD: 165 E9/L (ref 130–450)
PMV BLD AUTO: 10 FL (ref 7–12)
PMV BLD AUTO: 10.1 FL (ref 7–12)
PO2: 131.9 MMHG (ref 75–100)
POTASSIUM REFLEX MAGNESIUM: 4.7 MMOL/L (ref 3.5–5)
POTASSIUM SERPL-SCNC: 5.1 MMOL/L (ref 3.5–5)
PROCALCITONIN: 0.38 NG/ML (ref 0–0.08)
PROTHROMBIN TIME: 23.9 SEC (ref 9.3–12.4)
R (REACTION TIME): 7 MIN (ref 5–10)
RBC # BLD: 1.82 E12/L (ref 3.8–5.8)
RBC # BLD: 2.24 E12/L (ref 3.8–5.8)
SODIUM BLD-SCNC: 147 MMOL/L (ref 132–146)
SODIUM BLD-SCNC: 147 MMOL/L (ref 132–146)
SOURCE, BLOOD GAS: ABNORMAL
THB: 7.2 G/DL (ref 11.5–16.5)
TIME ANALYZED: 1739
TOTAL PROTEIN: 4.4 G/DL (ref 6.4–8.3)
TOTAL PROTEIN: 4.9 G/DL (ref 6.4–8.3)
TROPONIN, HIGH SENSITIVITY: 117 NG/L (ref 0–11)
WBC # BLD: 11 E9/L (ref 4.5–11.5)
WBC # BLD: 13.9 E9/L (ref 4.5–11.5)

## 2022-10-10 PROCEDURE — 82330 ASSAY OF CALCIUM: CPT

## 2022-10-10 PROCEDURE — 82962 GLUCOSE BLOOD TEST: CPT

## 2022-10-10 PROCEDURE — 85384 FIBRINOGEN ACTIVITY: CPT

## 2022-10-10 PROCEDURE — 6360000002 HC RX W HCPCS: Performed by: FAMILY MEDICINE

## 2022-10-10 PROCEDURE — 2580000003 HC RX 258: Performed by: INTERNAL MEDICINE

## 2022-10-10 PROCEDURE — 76870 US EXAM SCROTUM: CPT

## 2022-10-10 PROCEDURE — 86900 BLOOD TYPING SEROLOGIC ABO: CPT

## 2022-10-10 PROCEDURE — 93005 ELECTROCARDIOGRAM TRACING: CPT | Performed by: SURGERY

## 2022-10-10 PROCEDURE — 84100 ASSAY OF PHOSPHORUS: CPT

## 2022-10-10 PROCEDURE — 97129 THER IVNTJ 1ST 15 MIN: CPT

## 2022-10-10 PROCEDURE — 86850 RBC ANTIBODY SCREEN: CPT

## 2022-10-10 PROCEDURE — 6360000002 HC RX W HCPCS: Performed by: SURGERY

## 2022-10-10 PROCEDURE — 2700000000 HC OXYGEN THERAPY PER DAY

## 2022-10-10 PROCEDURE — 92507 TX SP LANG VOICE COMM INDIV: CPT

## 2022-10-10 PROCEDURE — 71045 X-RAY EXAM CHEST 1 VIEW: CPT

## 2022-10-10 PROCEDURE — 6370000000 HC RX 637 (ALT 250 FOR IP): Performed by: SURGERY

## 2022-10-10 PROCEDURE — 84484 ASSAY OF TROPONIN QUANT: CPT

## 2022-10-10 PROCEDURE — 82805 BLOOD GASES W/O2 SATURATION: CPT

## 2022-10-10 PROCEDURE — 93975 VASCULAR STUDY: CPT

## 2022-10-10 PROCEDURE — 2580000003 HC RX 258: Performed by: SURGERY

## 2022-10-10 PROCEDURE — 84145 PROCALCITONIN (PCT): CPT

## 2022-10-10 PROCEDURE — P9016 RBC LEUKOCYTES REDUCED: HCPCS

## 2022-10-10 PROCEDURE — 99232 SBSQ HOSP IP/OBS MODERATE 35: CPT | Performed by: SURGERY

## 2022-10-10 PROCEDURE — 86901 BLOOD TYPING SEROLOGIC RH(D): CPT

## 2022-10-10 PROCEDURE — 92526 ORAL FUNCTION THERAPY: CPT

## 2022-10-10 PROCEDURE — 83735 ASSAY OF MAGNESIUM: CPT

## 2022-10-10 PROCEDURE — P9059 PLASMA, FRZ BETWEEN 8-24HOUR: HCPCS

## 2022-10-10 PROCEDURE — 94660 CPAP INITIATION&MGMT: CPT

## 2022-10-10 PROCEDURE — 2580000003 HC RX 258: Performed by: NURSE PRACTITIONER

## 2022-10-10 PROCEDURE — 86923 COMPATIBILITY TEST ELECTRIC: CPT

## 2022-10-10 PROCEDURE — 85610 PROTHROMBIN TIME: CPT

## 2022-10-10 PROCEDURE — 83605 ASSAY OF LACTIC ACID: CPT

## 2022-10-10 PROCEDURE — 85576 BLOOD PLATELET AGGREGATION: CPT

## 2022-10-10 PROCEDURE — A4216 STERILE WATER/SALINE, 10 ML: HCPCS | Performed by: SURGERY

## 2022-10-10 PROCEDURE — 2500000003 HC RX 250 WO HCPCS

## 2022-10-10 PROCEDURE — 2500000003 HC RX 250 WO HCPCS: Performed by: NURSE PRACTITIONER

## 2022-10-10 PROCEDURE — 94640 AIRWAY INHALATION TREATMENT: CPT

## 2022-10-10 PROCEDURE — 2000000000 HC ICU R&B

## 2022-10-10 PROCEDURE — C9113 INJ PANTOPRAZOLE SODIUM, VIA: HCPCS | Performed by: SURGERY

## 2022-10-10 PROCEDURE — 85025 COMPLETE CBC W/AUTO DIFF WBC: CPT

## 2022-10-10 PROCEDURE — 85347 COAGULATION TIME ACTIVATED: CPT

## 2022-10-10 PROCEDURE — 36415 COLL VENOUS BLD VENIPUNCTURE: CPT

## 2022-10-10 PROCEDURE — 36430 TRANSFUSION BLD/BLD COMPNT: CPT

## 2022-10-10 PROCEDURE — 6360000002 HC RX W HCPCS: Performed by: NURSE PRACTITIONER

## 2022-10-10 PROCEDURE — 80053 COMPREHEN METABOLIC PANEL: CPT

## 2022-10-10 RX ORDER — SODIUM CHLORIDE 9 MG/ML
INJECTION, SOLUTION INTRAVENOUS PRN
Status: DISCONTINUED | OUTPATIENT
Start: 2022-10-10 | End: 2022-10-14

## 2022-10-10 RX ORDER — LORAZEPAM 2 MG/ML
1 INJECTION INTRAMUSCULAR ONCE
Status: COMPLETED | OUTPATIENT
Start: 2022-10-10 | End: 2022-10-10

## 2022-10-10 RX ORDER — BUMETANIDE 0.25 MG/ML
1 INJECTION, SOLUTION INTRAMUSCULAR; INTRAVENOUS ONCE
Status: COMPLETED | OUTPATIENT
Start: 2022-10-10 | End: 2022-10-10

## 2022-10-10 RX ORDER — AMLODIPINE BESYLATE 10 MG/1
10 TABLET ORAL DAILY
Status: DISCONTINUED | OUTPATIENT
Start: 2022-10-11 | End: 2022-10-20

## 2022-10-10 RX ORDER — SODIUM CHLORIDE 9 MG/ML
INJECTION, SOLUTION INTRAVENOUS PRN
Status: DISCONTINUED | OUTPATIENT
Start: 2022-10-10 | End: 2022-10-10

## 2022-10-10 RX ORDER — GABAPENTIN 300 MG/1
600 CAPSULE ORAL 4 TIMES DAILY
Status: DISCONTINUED | OUTPATIENT
Start: 2022-10-10 | End: 2022-10-26 | Stop reason: HOSPADM

## 2022-10-10 RX ORDER — SODIUM CHLORIDE 450 MG/100ML
INJECTION, SOLUTION INTRAVENOUS CONTINUOUS
Status: DISCONTINUED | OUTPATIENT
Start: 2022-10-10 | End: 2022-10-10

## 2022-10-10 RX ADMIN — HYDROCODONE BITARTRATE AND ACETAMINOPHEN 1 TABLET: 7.5; 325 TABLET ORAL at 21:02

## 2022-10-10 RX ADMIN — SODIUM CHLORIDE: 4.5 INJECTION, SOLUTION INTRAVENOUS at 16:46

## 2022-10-10 RX ADMIN — EZETIMIBE 10 MG: 10 TABLET ORAL at 11:53

## 2022-10-10 RX ADMIN — IPRATROPIUM BROMIDE AND ALBUTEROL SULFATE 1 AMPULE: .5; 2.5 SOLUTION RESPIRATORY (INHALATION) at 12:10

## 2022-10-10 RX ADMIN — ANTI-FUNGAL POWDER MICONAZOLE NITRATE TALC FREE: 1.42 POWDER TOPICAL at 21:15

## 2022-10-10 RX ADMIN — IPRATROPIUM BROMIDE AND ALBUTEROL SULFATE 1 AMPULE: .5; 2.5 SOLUTION RESPIRATORY (INHALATION) at 19:40

## 2022-10-10 RX ADMIN — Medication 10 ML: at 20:55

## 2022-10-10 RX ADMIN — PETROLATUM: 420 OINTMENT TOPICAL at 09:00

## 2022-10-10 RX ADMIN — COLLAGENASE SANTYL: 250 OINTMENT TOPICAL at 11:51

## 2022-10-10 RX ADMIN — AMLODIPINE BESYLATE 10 MG: 10 TABLET ORAL at 11:53

## 2022-10-10 RX ADMIN — BENZONATATE 100 MG: 100 CAPSULE ORAL at 11:53

## 2022-10-10 RX ADMIN — PETROLATUM: 420 OINTMENT TOPICAL at 21:15

## 2022-10-10 RX ADMIN — EPOETIN ALFA-EPBX 6000 UNITS: 3000 INJECTION, SOLUTION INTRAVENOUS; SUBCUTANEOUS at 12:07

## 2022-10-10 RX ADMIN — SODIUM CHLORIDE, PRESERVATIVE FREE 40 MG: 5 INJECTION INTRAVENOUS at 15:47

## 2022-10-10 RX ADMIN — CARBIDOPA AND LEVODOPA 1 TABLET: 25; 100 TABLET ORAL at 21:02

## 2022-10-10 RX ADMIN — METOPROLOL SUCCINATE 25 MG: 25 TABLET, EXTENDED RELEASE ORAL at 11:54

## 2022-10-10 RX ADMIN — GABAPENTIN 600 MG: 600 TABLET, FILM COATED ORAL at 11:54

## 2022-10-10 RX ADMIN — CIPROFLOXACIN 500 MG: 500 TABLET, FILM COATED ORAL at 22:10

## 2022-10-10 RX ADMIN — SODIUM CHLORIDE: 9 INJECTION, SOLUTION INTRAVENOUS at 05:49

## 2022-10-10 RX ADMIN — IPRATROPIUM BROMIDE AND ALBUTEROL SULFATE 1 AMPULE: .5; 2.5 SOLUTION RESPIRATORY (INHALATION) at 08:18

## 2022-10-10 RX ADMIN — SODIUM BICARBONATE: 84 INJECTION, SOLUTION INTRAVENOUS at 22:29

## 2022-10-10 RX ADMIN — CETIRIZINE HYDROCHLORIDE 10 MG: 10 TABLET, FILM COATED ORAL at 11:54

## 2022-10-10 RX ADMIN — GABAPENTIN 600 MG: 600 TABLET, FILM COATED ORAL at 15:46

## 2022-10-10 RX ADMIN — CARBIDOPA AND LEVODOPA 1 TABLET: 25; 100 TABLET ORAL at 11:53

## 2022-10-10 RX ADMIN — ATORVASTATIN CALCIUM 80 MG: 40 TABLET, FILM COATED ORAL at 21:15

## 2022-10-10 RX ADMIN — GABAPENTIN 600 MG: 300 CAPSULE ORAL at 21:02

## 2022-10-10 RX ADMIN — LORAZEPAM 1 MG: 2 INJECTION INTRAMUSCULAR; INTRAVENOUS at 05:22

## 2022-10-10 RX ADMIN — CARBIDOPA AND LEVODOPA 1 TABLET: 25; 100 TABLET ORAL at 15:47

## 2022-10-10 RX ADMIN — ANTI-FUNGAL POWDER MICONAZOLE NITRATE TALC FREE: 1.42 POWDER TOPICAL at 09:00

## 2022-10-10 RX ADMIN — HYDROCODONE BITARTRATE AND ACETAMINOPHEN 1 TABLET: 7.5; 325 TABLET ORAL at 11:53

## 2022-10-10 RX ADMIN — CIPROFLOXACIN 500 MG: 500 TABLET, FILM COATED ORAL at 11:53

## 2022-10-10 RX ADMIN — BENZONATATE 100 MG: 100 CAPSULE ORAL at 21:02

## 2022-10-10 RX ADMIN — CALCIUM GLUCONATE 1000 MG: 98 INJECTION, SOLUTION INTRAVENOUS at 20:54

## 2022-10-10 RX ADMIN — IPRATROPIUM BROMIDE AND ALBUTEROL SULFATE 1 AMPULE: .5; 2.5 SOLUTION RESPIRATORY (INHALATION) at 15:52

## 2022-10-10 RX ADMIN — BENZONATATE 100 MG: 100 CAPSULE ORAL at 15:47

## 2022-10-10 RX ADMIN — BUMETANIDE 1 MG: 0.25 INJECTION, SOLUTION INTRAMUSCULAR; INTRAVENOUS at 20:54

## 2022-10-10 RX ADMIN — SODIUM CHLORIDE, PRESERVATIVE FREE 40 MG: 5 INJECTION INTRAVENOUS at 05:48

## 2022-10-10 ASSESSMENT — PAIN SCALES - GENERAL
PAINLEVEL_OUTOF10: 0

## 2022-10-10 NOTE — PROGRESS NOTES
Physical Therapy    Pt on PT caseload for updated treat. Pt with medical instability this date. Will hold PT treatment at this time. Will follow and reattempt as appropriate.     Goldie Coronados PT, DPT  [id number]

## 2022-10-10 NOTE — PROGRESS NOTES
[facility]  ATTENDING PHYSICIAN PROGRESS NOTE      I personally saw, examined and provided care for the patient. Radiographs, labs and medications were reviewed by me independently. The case was discussed in detail and plans for care were established. Review of Residents documentation was conducted and revisions were made as appropriate. I agree with the above documented exam, problem list and plan of care. The following summarizes my clinical findings and independent assessment. CC: GI bleeding    S. Patient is currently on BiPAP. He underwent colonoscopy that showed blood in his right colon. O.  On BiPAP, no apparent distress  Lungs clear  Heart regular rate rhythm  Abdomen soft nontender nondistended    ASSESSMENT:  Principal Problem:    Acute cerebrovascular accident (CVA) ([address]  Active Problems:    Acute respiratory failure with hypoxia (HCC)    Stenosis of left carotid artery    Hypoalbuminemia    Electrolyte imbalance    Hypernatremia  Resolved Problems:    * No resolved hospital problems. *       PLAN:  GI bleeding   Acute CVA-recent left ICA angioplasty with stent placement for severe left internal carotid artery stenosis-now on dual antiplatelet therapy  Nuclear medicine scan which is positive for bleeding around the splenic flexure  Colonoscopy on 10/9 showed the colon was full of blood   CT angio of the abdomen pelvis shows no focal area arterial enhancement or extra blush to localize acute hemorrhage. Continue supportive care  Continue transfuse  My recommendation would be to hold the dual antiplatelet therapy. We will need to discuss with IR since the stents have a high rate of occlusion if the dual antiplatelet therapy is stopped. I have spoken to the IM attending regarding the patient's care-    DVT Proph: Rafael Liriano MD, FACS  10/10/2022  2:31 PM    NOTE: This report was transcribed using voice recognition software.  Every effort was made to ensure accuracy; however, inadvertent computerized transcription errors may be present.

## 2022-10-10 NOTE — PROGRESS NOTES
extubation 9/29, BSE w/ mild-mod Dysphagia 9/29, EGD w/ no bleed 10/3. Pt now w/ LARY and ongoing GIB, s/p MBS w/ mild OP dysphagia 10/5, +multiple black BM's 10/8, s/p c-scope 10/9 w/ possible plan for GI embo once more stable. Remains at risk d/t ongoing poor prosper/intake x past 7d now 2/2 CVA/AMS and altered GI w/ ongoing GIB as well as w/ increased needs for wound healing. Will Re-Start ONS once diet advanced and monitor. Nutrition Related Findings:    A&Ox3, confused at times, poor dentition, tender/non-distended Abd, +BS, +2/3 edema, +I/O's, +elevated Renal/BGL labs Wound Type: Diabetic Ulcer, Wound Vac       Current Nutrition Intake & Therapies:    Average Meal Intake: NPO, 1-25%  Average Supplements Intake: NPO  Diet NPO Exceptions are: Sips of Water with Meds    Anthropometric Measures:  Height: 5' 8\" (172.7 cm)  Ideal Body Weight (IBW): 154 lbs (70 kg)    Admission Body Weight: 260 lb (117.9 kg) (bed 9/27)  Current Body Weight: 260 lb (117.9 kg) (bed 9/27), 168.8 % IBW.  Weight Source: Bed Scale  Current BMI (kg/m2): 39.5  Usual Body Weight:  (UTO UBW 2/2 poor EMR wt hx pta)     Weight Adjustment For: No Adjustment                 BMI Categories: Obese Class 2 (BMI 35.0 -39.9)    Estimated Daily Nutrient Needs:  Energy Requirements Based On: Formula  Weight Used for Energy Requirements: Current  Energy (kcal/day):   Weight Used for Protein Requirements: Ideal  Protein (g/day): 1.3-1.5gm/kg IBW= ; as tolerated w/ current renal labs/CKD w/ increased needs  Method Used for Fluid Requirements: Other (Comment)  Fluid (ml/day): per critical care mgmt    Nutrition Diagnosis:   Inadequate oral intake related to cognitive or neurological impairment (2/2 CVA w/ Ongoing GIB) as evidenced by NPO or clear liquid status due to medical condition, intake 0-25%, poor intake prior to admission, swallow study results, GI abnormality    Nutrition Interventions:   Food and/or Nutrient Delivery: Continue NPO, Start Oral Diet, Start Oral Nutrition Supplement (Continue NPO and ADAT once med appropriate. Will Start ONS once diet advanced and monitor. If intake remains minimal x next ~1-2d, would then highly recommend to consider EN vs PN. Please consult for updated rec's if nutrition support needed.)  Nutrition Education/Counseling: Education not indicated  Coordination of Nutrition Care: Continue to monitor while inpatient       Goals:  Previous Goal Met: Progress towards Goal(s) Declining  Goals: Initiate PO diet, within 2 days       Nutrition Monitoring and Evaluation:   Behavioral-Environmental Outcomes: None Identified  Food/Nutrient Intake Outcomes: Diet Advancement/Tolerance, Food and Nutrient Intake, Supplement Intake  Physical Signs/Symptoms Outcomes: Biochemical Data, Chewing or Swallowing, GI Status, Fluid Status or Edema, Hemodynamic Status, Nutrition Focused Physical Findings, Skin, Weight    Discharge Planning:     Too soon to determine     Jerica Stubbs RD, LD  Contact: ext [phone]

## 2022-10-10 NOTE — PROGRESS NOTES
Date: 10/9/2022    Time: 11:44 PM    Patient Placed On BIPAP/CPAP/ Non-Invasive Ventilation? No    If no must comment. Facial area red/color change? No           If YES are Blister/Lesion present? No   If yes must notify nursing staff  BIPAP/CPAP skin barrier? Yes    Skin barrier type:mepilexlite       Comments:Already on.          Etelvina Posey RCP

## 2022-10-10 NOTE — PROGRESS NOTES
Dr. Jenelle Oreilly notified BP 88/26, tachypneic 32-40, labored respirations, and mews 4. See orders.

## 2022-10-10 NOTE — PROGRESS NOTES
Hospitalist Progress Note      SYNOPSIS: Patient admitted on 2022 for Acute cerebrovascular accident (CVA) [facility])  Patient presented to [facility] with strokelike symptoms, right-sided paralysis and aphasia. Trnsfrd to [facility]  Was found to have severe left ICA stenosis and taken to IR for left ICA angioplasty with stent placement. Patient was intubated and sedated and transferred to the ICU early portion of the admission. Blood transfus ordered on  for hgb 5. Hematuria noted on admiss    SUBJECTIVE:    Receiving blood transfusion  hgb 6.2  NMS bleed scan +  Colonsocopy  completed yesterd   This am -melena   Rr 16 bp 142/73  On 5 L nc    Cxr has revealed patchy bilat infiltrates  Temp (24hrs), Av.7 °F (37.1 °C), Min:98.4 °F (36.9 °C), Max:98.8 °F (37.1 °C)    DIET: Diet NPO Exceptions are: Sips of Water with Meds  CODE: Full Code      OBJECTIVE:on nc 02    BP (!) 142/73   Pulse 92   Temp 98.8 °F (37.1 °C) (Oral)   Resp 16   Ht 5' 8\" (1.727 m)   Wt 260 lb 8 oz (118.2 kg)   SpO2 100%   BMI 39.61 kg/m²   General appearance: Not jaundiced not diaphoretic     HEENT: No obvious swelling to lips or tongue male pattern baldness  Respiratory: Clear to auscultation bilaterally, unlabored respiratory pattern at rest  Cardiovascular: Audible S1-S2 pulse regular         Skin: No petechiae or hives on inspection warm to palpation  Neurologic: awake, alert speech is dysarhtric not aphasic   With weakness pronat drift RUE    ASSESSMENT:    Acute gib /acute blood loss anemia requires blood transfusion= + melena    + NM bleed scan and colonscopy + bleed in R colon   Acute CVA-   severe left ICA stenosis and taken to IR for left ICA angioplasty with stent placement.   now on DAPT  PAD  Acute kidney injury serum cr 2.2/serum cr holding at 2.1  Hypertensive urgency- on admission  bp levels had been lower  bp holding steady in light of gib      With potential for hypotension if further volumne depletion evolves  Htn essent- bp reading in office 08/29/2022  162/84  and in dec 21/2021  200/110    Suggesting his bp was not well controlled in op setting  Right foot wound infection/diabetic ulcer  Urine retention  dysphagia   Dm2 bs 197  - in anticipation of proced  npo with sips of water for meds    Dm prev documented as uncontrolled based on office notes 06/22/ from endocrinology  Tropon elevation in setting of active hemmorrhage  anemia blood loss acute  Leukocytosis-resolved   Foot wound  with wound vac-receiving cipro abx    PLAN:consult neurology for recommendation regarding APA/DAPT in setting of active GIB     Blood transfusion  Monit hgb  Prevent as best as possible drops in bp or hgb   Resume catapress  Incentive spirometry  Follow up cxr in am      DISPOSITION: not stable for discharge    Medications:  REVIEWED DAILY    Infusion Medications    sodium chloride      sodium chloride      sodium chloride 100 mL/hr at 10/10/22 0549    sodium chloride      sodium chloride      sodium chloride      sodium chloride      sodium chloride      sodium chloride      sodium chloride      dextrose       Scheduled Medications    white petrolatum   Topical BID    sodium chloride flush  5-40 mL IntraVENous 2 times per day    ciprofloxacin  500 mg Oral 2 times per day    miconazole   Topical BID    [Held by provider] insulin glargine  32 Units SubCUTAneous QAM    epoetin sharath-epbx  6,000 Units SubCUTAneous Once per day on Mon Wed Fri    amLODIPine  10 mg Per NG tube Daily    [Held by provider] cloNIDine  0.1 mg Oral Q12H    [Held by provider] hydrALAZINE  50 mg Oral 3 times per day    lactulose  20 g Oral BID    insulin lispro  0-16 Units SubCUTAneous 4x Daily AC & HS    HYDROcodone-acetaminophen  1 tablet Oral BID    pantoprazole (PROTONIX) 40 mg injection  40 mg IntraVENous Q12H    metoprolol succinate  25 mg Oral Daily    sennosides  5 mL Oral Nightly    carbidopa-levodopa  1 tablet Per NG tube TID ipratropium-albuterol  1 ampule Inhalation Q4H WA    collagenase   Topical Q MWF    benzonatate  100 mg Oral TID    cetirizine  10 mg Oral Daily    ezetimibe  10 mg Oral Daily    gabapentin  600 mg Oral 4x Daily    ticagrelor  90 mg Oral BID    aspirin  81 mg Oral Daily    Or    aspirin  300 mg Rectal Daily    atorvastatin  80 mg Oral Nightly     PRN Meds: sodium chloride, sodium chloride, sodium chloride, sodium chloride flush, sodium chloride, sodium chloride, sodium chloride, sodium chloride, sodium chloride, fentanNYL, sodium chloride, labetalol, hydrALAZINE, glucose, dextrose bolus **OR** dextrose bolus, glucagon (rDNA), dextrose, ondansetron **OR** ondansetron    Labs:     Recent Labs     10/08/22  0558 10/08/22  1530 10/09/22  0505 10/09/22  1411 10/09/22  2238 10/10/22  0555   WBC 15.6*  --  13.3*  --   --  11.0   HGB 6.1*   < > 6.2* 6.9* 7.1* 5.6*   HCT 19.5*   < > 19.9* 21.9* 22.4* 17.6*     --  162  --   --  146    < > = values in this interval not displayed. Recent Labs     10/08/22  0558 10/09/22  0505 10/10/22  0555    141 147*   K 4.8 4.6 5.1*   * 111* 117*   CO2 20* 20* 16*   BUN 84* 69* 75*   CREATININE 2.2* 2.1* 2.1*   CALCIUM 8.1* 7.8* 7.9*   PHOS 4.5 4.0 4.6*       Recent Labs     10/08/22  0558 10/09/22  0505 10/10/22  0555   PROT 4.8* 4.9* 4.4*   ALKPHOS 193* 299* 206*   ALT 5 <5 5   AST 15 34 21   BILITOT 0.4 0.3 0.2       Recent Labs     10/09/22  0505   INR 2.1           Radiology:   +++++++++++++++++++++++++++++++++++++++++++++++++  DO TON Dixon/ [facility] [address]  +++++++++++++++++++++++++++++++++++++++++++++++++  NOTE: This report was transcribed using voice recognition software. Every effort was made to ensure accuracy; however, inadvertent computerized transcription errors may be present.

## 2022-10-10 NOTE — PROGRESS NOTES
Scrotal ultrasound will be done at bedside, per department. No time known yet.  Electronically signed by Catia Meneses RN on 10/10/2022 at 12:15 PM

## 2022-10-10 NOTE — CONSULTS
Critical Care Consult Note    Patient - Justice Bernardo   MRN -  [id number]   Omar # - [de-identified]   - 1955      Date of Admission -  2022  5:45 PM  Date of evaluation -  10/10/2022  4405/4405-A   Hospital Day - 13          ADMIT/CONSULT DETAILS     Reason for Admit/Consult   Hypotensive, GI bleed    Consulting Service/Physician   Consulting - Heaven Rodriguez DO  Primary Care Physician - Shane Santoyo MD         [facility]   The patient is a 74 y.o. male with significant past medical history of CKD, DM type II, MATT, presented 22 2 with stroke and GI bleed. Patient was admitted to [facility] with strokelike symptoms, right-sided paralysis and aphasia, found to have severe left ICA stenosis and was transfer to [facility] [place] [place],  taken to IR for left ICA angioplasty with stent placement. Patient was intubated and sedated and transferred to ICU. Patient was started on dual antiplatelet therapy of Brilinta/aspirin after stent placement. Patient has a history of being on Eliquis which was stopped this hospital admission. General surgery was consulted and following patient. She received total of 12 units of PRBC throughout this admission. EGD on 10/3/2022 showed no evidence of upper GI bleed. Colonoscopy  on 10/9/2022 showed full of blood. CT angio of the abdomen pelvis showed no focal area of arterial enhancement or extra blush to localize acute hemorrhage, nuclear medicine scan was positive for bleeding around the splenic flexure. At this time was to continue supportive care, recommendation was to hold dual antiplatelet therapy, and to discuss IR since the stent was a high rate of occlusion if dual therapy was stopped. He was transferred to PICU. This morning patient had a hemoglobin of 5.6. Received 1 unit of PRBC. He was in RRT secondary to hypotension and low hemoglobin actively bleeding therefore was transferred to MICU for close monitoring.   Patient had a plan for IR embolization for colonic bleeding, but due to recent stroke on dual antiplatelet therapy the risk of bleeding is higher and re occulusion on stent if stopped. Given low hgb, bleeding through gums, will hold anti-platelet therapy tonight. Will obtain new lab work and transfuse as needed. Patient is awake, alert and oriented, with garble speech, weakness 2/2 stroke. Patient is intermittently confuse, alert and oriented to 1-2, not to place. Patient is labor breathing ,will obtain stat abg and cxr. Patient c/o dizziness, lightheadedness, shortness of breath, chest pain, nausea vomiting diarrhea. Patient is unreliable with review of system. A normotensive, with normal heart rate. Patient is a full code. Past Medical History         Diagnosis Date    Chronic back pain     CKD (chronic kidney disease)     CVA (cerebral vascular accident) ([address]     CVA (cerebral vascular accident) ([address]     DM (diabetes mellitus) ([address]     Major depression     MI (myocardial infarction) ([address]     MATT (obstructive sleep apnea)     Parkinson disease ([address]     PVD (peripheral vascular disease) ([address]     Seizure ([address]         Past Surgical History           Procedure Laterality Date    COLONOSCOPY N/A 10/9/2022    COLONOSCOPY DIAGNOSTIC performed by Stacy Matthews MD at [place] [address]      IR CAROTID STENT UNI W PROTECTION  9/27/2022    IR CAROTID STENT UNI W PROTECTION 9/27/2022 Marvel Baumann MD [facility]    UPPER GASTROINTESTINAL ENDOSCOPY N/A 10/3/2022    EGD DIAGNOSTIC ONLY performed by Alvarado Vivas MD at [facility] ENDOSCOPY         Influenza:   Indicated for current flu vaccination season Oct. to Feb.  Pneumococcal Polysaccharide:  Indicated for current flu vaccination season Oct. to Feb.    Current Medications   Current Medications    miconazole nitrate   Topical BID    gabapentin  600 mg Oral 4x daily    white petrolatum   Topical BID    sodium chloride flush  5-40 mL IntraVENous 2 times per day ciprofloxacin  500 mg Oral 2 times per day    miconazole   Topical BID    [Held by provider] insulin glargine  32 Units SubCUTAneous QAM    epoetin sharath-epbx  6,000 Units SubCUTAneous Once per day on Mon Wed Fri    amLODIPine  10 mg Per NG tube Daily    [Held by provider] cloNIDine  0.1 mg Oral Q12H    [Held by provider] hydrALAZINE  50 mg Oral 3 times per day    lactulose  20 g Oral BID    insulin lispro  0-16 Units SubCUTAneous 4x Daily AC & HS    HYDROcodone-acetaminophen  1 tablet Oral BID    pantoprazole (PROTONIX) 40 mg injection  40 mg IntraVENous Q12H    metoprolol succinate  25 mg Oral Daily    sennosides  5 mL Oral Nightly    carbidopa-levodopa  1 tablet Per NG tube TID    ipratropium-albuterol  1 ampule Inhalation Q4H WA    collagenase   Topical Q MWF    benzonatate  100 mg Oral TID    cetirizine  10 mg Oral Daily    ezetimibe  10 mg Oral Daily    [Held by provider] ticagrelor  90 mg Oral BID    [Held by provider] aspirin  81 mg Oral Daily    Or    [Held by provider] aspirin  300 mg Rectal Daily    atorvastatin  80 mg Oral Nightly     sodium chloride, miconazole nitrate **AND** miconazole nitrate, sodium chloride, sodium chloride, sodium chloride, sodium chloride flush, sodium chloride, sodium chloride, sodium chloride, sodium chloride, sodium chloride, fentanNYL, sodium chloride, labetalol, hydrALAZINE, glucose, dextrose bolus **OR** dextrose bolus, glucagon (rDNA), dextrose, ondansetron **OR** ondansetron  IV Drips/Infusions   sodium chloride      sodium chloride 100 mL/hr at 10/10/22 1646    sodium chloride      sodium chloride      sodium chloride      sodium chloride      sodium chloride      sodium chloride      sodium chloride      sodium chloride      sodium chloride      dextrose       Home Medications  Medications Prior to Admission: apixaban (ELIQUIS) 5 MG TABS tablet, Take 5 mg by mouth 2 times daily  aspirin 81 MG chewable tablet, Take 81 mg by mouth daily  carbidopa-levodopa (SINEMET)  MG per tablet, Take 1 tablet by mouth 3 times daily  carvedilol (COREG) 25 MG tablet, Take 25 mg by mouth 2 times daily (with meals)  cetirizine (ZYRTEC) 10 MG tablet, Take 10 mg by mouth daily  clopidogrel (PLAVIX) 75 MG tablet, Take 75 mg by mouth daily  diphenhydrAMINE (BENADRYL) 25 MG capsule, Take 25 mg by mouth every 6 hours  ezetimibe (ZETIA) 10 MG tablet, Take 10 mg by mouth daily  fluticasone (FLONASE) 50 MCG/ACT nasal spray, 2 sprays by Each Nostril route daily  gabapentin (NEURONTIN) 600 MG tablet, Take 600 mg by mouth 4 times daily. HYDROcodone-acetaminophen (NORCO) 5-325 MG per tablet, Take 1 tablet by mouth 2 times daily. Insulin Glargine, 2 Unit Dial, (TOUJEO MAX SOLOSTAR) 300 UNIT/ML SOPN, Inject 66 Units into the skin daily  piperacillin-tazobactam (ZOSYN) 3-0.375 GM per 50ML IVPB extended infusion, Infuse 4.5 mg intravenously in the morning and 4.5 mg at noon and 4.5 mg in the evening. acetaminophen (TYLENOL) 325 MG tablet, Take 650 mg by mouth every 6 hours as needed for Pain  amLODIPine (NORVASC) 5 MG tablet, Take 5 mg by mouth daily  benzonatate (TESSALON) 100 MG capsule, Take 100 mg by mouth 3 times daily  cloNIDine (CATAPRES) 0.1 MG tablet, Take 0.1 mg by mouth in the morning and 0.1 mg in the evening. hydrALAZINE (APRESOLINE) 100 MG tablet, Take 100 mg by mouth 3 times daily    Diet/Nutrition   Diet NPO Exceptions are: Sips of Water with Meds    Allergies   Codeine    Social History   Tobacco   has no history on file for tobacco use. Alcohol     has no history on file for alcohol use. SOCIAL AND OCCUPATIONAL HEALTH:    Currently confused therefore not obtained. Occupational history :    Family History   History reviewed. No pertinent family history. Sleep History   Sleep apnea on CPAP.   ROS   REVIEW OF SYSTEMS:  See above HPI    Lines and Devices   PICC line left upper arm    Mechanical Ventilation Data   VENT SETTINGS (Comprehensive)  Vent Information  Ventilator ID: [id number]  Additional Respiratory Assessments  Heart Rate: 87  Resp: 28  SpO2: 99 %  Position: Semi-Sarabia's  Humidification Source: Heated wire  Humidification Temp: 36.2  Circuit Condensation: Drained    ABG  Lab Results   Component Value Date/Time    PH 7.354 2022 10:17 AM    PCO2 40.1 2022 10:17 AM    PO2 125.4 2022 10:17 AM    HCO3 21.8 2022 10:17 AM    O2SAT 98.4 2022 10:17 AM     Lab Results   Component Value Date/Time    MODE NC-3  L 2022 10:17 AM           Vitals    height is 5' 8\" (1.727 m) and weight is 260 lb 8 oz (118.2 kg). His oral temperature is 98.2 °F (36.8 °C). His blood pressure is 112/63 and his pulse is 87. His respiration is 28 and oxygen saturation is 99%.        Temperature Range: Temp: 98.2 °F (36.8 °C) Temp  Av.8 °F (37.1 °C)  Min: 98.2 °F (36.8 °C)  Max: 99.3 °F (37.4 °C)  BP Range:  Systolic (53JAF), HEL:620 , Min:88 , TTP:693     Diastolic (41EZM), RMR:34, Min:26, Max:95    Pulse Range: Pulse  Av.1  Min: 82  Max: 121  Respiration Range: Resp  Av.8  Min: 16  Max: 41  Current Pulse Ox[de-identified]  SpO2: 99 %  24HR Pulse Ox Range:  SpO2  Av %  Min: 98 %  Max: 100 %  Oxygen Amount and Delivery: O2 Flow Rate (L/min): 5 L/min      I/O (24 Hours)    Patient Vitals for the past 8 hrs:   BP Temp Temp src Pulse Resp SpO2   10/10/22 1615 112/63 98.2 °F (36.8 °C) Oral 87 28 99 %   10/10/22 1553 -- -- -- -- -- 100 %   10/10/22 1435 (!) 102/43 99.3 °F (37.4 °C) Oral (!) 105 (!) 32 --   10/10/22 1415 (!) 88/26 99.3 °F (37.4 °C) Oral 83 (!) 32 --   10/10/22 1345 (!) 97/47 99.3 °F (37.4 °C) -- 84 (!) 32 99 %   10/10/22 1324 (!) 119/58 -- -- -- -- --   10/10/22 1321 -- 98.6 °F (37 °C) Oral (!) 121 20 98 %   10/10/22 1223 -- -- -- -- 22 --   10/10/22 1211 -- -- -- -- -- 100 %   10/10/22 1153 (!) 115/44 -- -- 82 -- --   10/10/22 1030 (!) 119/58 98.6 °F (37 °C) Oral (!) 121 20 98 %       Intake/Output Summary (Last 24 hours) at 10/10/2022 1716  Last data filed at 10/10/2022 1615  Gross per 24 hour   Intake 51107.41 ml   Output 2475 ml   Net 9167.41 ml     I/O last 3 completed shifts: In: 89503.2 [Q.Z.:2691; I.V.:3643.7; Blood:8895.4]  Out: 5280 [Urine:4280; Stool:1000]   Date 10/10/22 0000 - 10/10/22 2359   Shift 1450-6312 4623-0240 5350-7722 24 Hour Total   INTAKE   I.V.(mL/kg/hr) 681.6(0.7) 887.6(0.9)  1569.1   Blood   171 171   Shift Total(mL/kg) 681. 6(5.8) 887.6(7.5) 171(1.4) 1740. 1(14.7)   OUTPUT   Urine(mL/kg/hr) 550(0.6) 750(0.8)  1300   Shift Total(mL/kg) 550(4.7) 750(6.3)  1300(11)   Weight (kg) 118.2 118.2 118.2 118.2     No data found. Drains/Tubes Outputs      Exam   PHYSICAL EXAM:  CONSTITUTIONAL: Awake, morbid obesity, alert to 1-2, intermittently confused, labored breathing. EYES:  Lids and lashes normal, pupils equal, round and reactive to light, extra ocular muscles intact, sclera clear, conjunctiva normal  ENT:  Normocephalic, without obvious abnormality, atraumatic, sinuses nontender on palpation, noted mucous membrane dry, picking on skin of the mouth that noted to be bloody. CARDIOVASCULAR:  Normal apical impulse, regular rate and rhythm, normal S1 and S2, no S3 or S4, and no murmur noted  Lung Diminished lung sounds throughout lung fields, fine crackles noted on the bases, no wheezing noted, labor breathing, no use of accessory muscle at this time, on supplement oxygen. MUSCULOSKELETAL: weakness in extremities, echo noted, 2+ pitting edema on lower extremities. All extremities,  NEUROLOGIC: Awake, alert oriented 2, weak on extremities in Silvio to stroke. SKIN: Right foot nonhealing wound.     Data   Old records and images have been reviewed    Lab Results   CBC     Lab Results   Component Value Date/Time    WBC 11.0 10/10/2022 05:55 AM    RBC 1.82 10/10/2022 05:55 AM    HGB 5.6 10/10/2022 05:55 AM    HCT 17.6 10/10/2022 05:55 AM    HCT 15.0 09/27/2022 08:58 PM     10/10/2022 05:55 AM    MCV 96.7 10/10/2022 05:55 AM    MCH 30.8 10/10/2022 05:55 AM    MCHC 31.8 10/10/2022 05:55 AM    RDW 16.2 10/10/2022 05:55 AM    LYMPHOPCT 5.9 10/10/2022 05:55 AM    MONOPCT 5.1 10/10/2022 05:55 AM    BASOPCT 0.2 10/10/2022 05:55 AM    MONOSABS 0.56 10/10/2022 05:55 AM    LYMPHSABS 0.65 10/10/2022 05:55 AM    EOSABS 0.01 10/10/2022 05:55 AM    BASOSABS 0.02 10/10/2022 05:55 AM       BMP   Lab Results   Component Value Date/Time     10/10/2022 05:55 AM    K 5.1 10/10/2022 05:55 AM     10/10/2022 05:55 AM    CO2 16 10/10/2022 05:55 AM    BUN 75 10/10/2022 05:55 AM    CREATININE 2.1 10/10/2022 05:55 AM    GLUCOSE 186 10/10/2022 05:55 AM    CALCIUM 7.9 10/10/2022 05:55 AM       LFTS  Lab Results   Component Value Date/Time    ALKPHOS 206 10/10/2022 05:55 AM    ALT 5 10/10/2022 05:55 AM    AST 21 10/10/2022 05:55 AM    PROT 4.4 10/10/2022 05:55 AM    BILITOT 0.2 10/10/2022 05:55 AM    LABALBU 2.1 10/10/2022 05:55 AM       INR  Recent Labs     10/09/22  0505   PROTIME 23.0*   INR 2.1       APTT  No results for input(s): APTT in the last 72 hours. Lactic Acid  Lab Results   Component Value Date/Time    LACTA 0.7 09/28/2022 12:10 AM        BNP   No results for input(s): BNP in the last 72 hours. Cultures   No results for input(s): BC in the last 72 hours. No results for input(s): Alton Joyce in the last 72 hours. No results for input(s): LABURIN in the last 72 hours. Imaging Study      XR CHEST PORTABLE   Final Result   1. No significant interval change in the appearance of the chest.      2.  Bilateral pleural effusions and areas of atelectasis or multifocal   pneumonia. CTA ABDOMEN PELVIS W CONTRAST   Final Result   Mixed densities throughout the colonic segments including in the ascending   colon and rectum could represent mixed densities of blood products although   no focal area of arterial enhancement or extravasation/blushing is observed   to localize acute hemorrhage. No evidence for perforation or abscess.   No pneumatosis intestinalis or portal venous gas evident. 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 No adjacent hematoma with only minimal   adjacent stranding. Bilateral pleural effusions moderate to large right and moderate left   effusions with adjacent atelectasis. Anasarca. XR CHEST PORTABLE   Final Result   Bilateral pleural effusions with bibasilar patchy infiltrates and mild   cardiomegaly. NM GI BLOOD LOSS   Final Result   Active GI bleeding identified during acquisition in the splenic flexure with   peristalsis identified into the proximal descending colon. RECOMMENDATIONS:   Unavailable         CT ABDOMEN PELVIS WO CONTRAST   Final Result   CHEST:      No evidence of neoplastic disease, allowing for limitations of noncontrast   technique. Moderate bilateral pleural effusions. Nonemergent incidental findings as above. ABDOMEN/PELVIS:      No evidence of neoplastic disease, allowing for limitations of noncontrast   technique. Bladder wall thickening is nonspecific given under distension; recommend   correlation urinalysis to evaluate for UTI. Nonemergent incidental findings as above. CT CHEST WO CONTRAST   Final Result   CHEST:      No evidence of neoplastic disease, allowing for limitations of noncontrast   technique. Moderate bilateral pleural effusions. Nonemergent incidental findings as above. ABDOMEN/PELVIS:      No evidence of neoplastic disease, allowing for limitations of noncontrast   technique. Bladder wall thickening is nonspecific given under distension; recommend   correlation urinalysis to evaluate for UTI. Nonemergent incidental findings as above. FL MODIFIED BARIUM SWALLOW W VIDEO   Final Result   1.   Mildly impaired swallowing mechanism with swallowing delay and followed   by laryngeal penetration with thin liquid barium when using a straw. No   barium aspiration      2. Please see separate speech pathology report for full discussion of   findings and recommendations. RECOMMENDATIONS:   Unavailable         XR ABDOMEN (KUB) (SINGLE AP VIEW)   Final Result   Somewhat greater than average quantity of retained fecal material thin the   lower GI tract suggesting dysfunction with evacuation. XR CHEST PORTABLE   Final Result   1. Atherosclerotic disease and mild cardiomegaly. 2.  Persistent but improved opacities in the bilateral lungs most pronounced   in the mid and lower lungs. There appears to be a small left and trace right   pleural effusion         XR FOOT RIGHT (2 VIEWS)   Final Result   No evidence of calcaneal osteomyelitis. CT HEAD WO CONTRAST   Final Result   Parenchymal volume loss and chronic microvascular ischemic changes. No acute intracranial abnormality. Ethmoid and sphenoid sinusitis. Right mastoid effusion. US RETROPERITONEAL COMPLETE   Final Result   1. Normal appearance of the bilateral kidneys. No hydronephrosis. 2.  A Mahan catheter is decompressing the bladder. XR CHEST PORTABLE   Final Result   Worsening infiltrate and or atelectasis on the left, stable on the right with   suspected layering pleural effusion. XR CHEST PORTABLE   Final Result   Unchanged bilateral atelectasis and or infiltrate as well as small right   pleural effusion. MRI BRAIN WO CONTRAST   Final Result   There are 2 small regions of petechial infarcts one in the left   posterior frontal lobe and a second in the left parietal lobe not   exceeding 3 mm. There is otherwise extensive small vessel ischemic   changes         XR CHEST PORTABLE   Final Result   1. Multifocal bilateral airspace disease more prominent within the lower   lobes.   The airspace disease is unchanged when compared with the prior study. CT HEAD WO CONTRAST   Final Result   Diffuse atrophy likely age related   Findings compatible with small vessel ischemic changes. XR CHEST PORTABLE   Final Result   1. Pulmonary opacities present favoring edema and atelectasis, also small   pleural effusions, but nonspecific with some additional considerations noted   above   2. Support devices present as described above   3. Heart size appears borderline enlarged         XR ABDOMEN FOR NG/OG/NE TUBE PLACEMENT   Final Result   NG/OG is in the stomach. IR CAROTID STENT W PROTECTION   Final Result   1. Angiogram demonstrates successful placement of a carotid stent ,   post procedure images demonstrate a widely patent stent and internal   carotid         CT HEAD WO CONTRAST   Final Result   Diffuse atrophy likely age related   Findings compatible with small vessel ischemic changes. Findings were called at the time of dictation         CT BRAIN PERFUSION   Final Result      No significant ischemic penumbra identified      This study was analyzed by the [employer]. ai algorithm. CTA NECK W CONTRAST   Final Result   1. Estimated stenosis of the proximal right and left internal carotid   artery by NASCET criteria is greater than 90% on the left   2. Severe atherosclerotic disease . 3. No large vessel occlusion identified            This study was analyzed by the [employer]. ai algorithm. CTA HEAD W CONTRAST   Final Result   1. Estimated stenosis of the proximal right and left internal carotid   artery by NASCET criteria is greater than 90% on the left   2. Severe atherosclerotic disease . 3. No large vessel occlusion identified            This study was analyzed by the [employer]. ai algorithm.                IR INTERVENTIONAL RADIOLOGY PROCEDURE REQUEST    (Results Pending)   US NAVEEN ABD PEL RETRO SCROT COMPLETE    (Results Pending)   [address]    (Results Pending)         APRN- CNP Assessment and PLan In summary, 74 y.o. male with significant past medical history of CKD, DM type II, MATT, presented 9/26/22 2 with stroke and GI bleed. Patient was admitted to [facility] with strokelike symptoms, right-sided paralysis and aphasia, found to have severe left ICA stenosis and was transfer to [facility] [place],  taken to IR for left ICA angioplasty with stent placement. Patient had GI Bleed, following general surgery, received total of 12 units of PRBC since admission. EGD on 10/3/22 showed no evidence of upper GI bleed, CT a/p showed showed no focal area of arterial enhancement or extra blush to localize acute hemorrhage, nuclear medicine scan was positive for bleeding around the splenic flexure. Colonoscopy showed full of clots. Transfer to MICU from PICU with low hgb, labor breathing and hypotensive. Assessment:  Left ICA stenosis that is post left ICA angioplasty with stent placement on 9/26/2022 on dual antiplatelet therapy. Acute hypoxemic and hypercapnic respiratory failure intubated/extubated, currently on supplemental oxygen  Acute GI bleed/acute blood loss anemia s/p 12 units of PRBC s/p EGD and Colonoscopy, and bleeding scan. bleeding around the splenic flexure.    LARY   Metabolic acidosis   Hypernatremia/ hyperchloremia  Hyperglycemia  Leukocytosis  Hx DM type 2  Hx of MATT on CPAP    Plan:  - Currently on supplement oxygen, titrate to keep SPO2 goal above 92%  - bronchodilator eran and PRN  - Bipap qhs and PRN   - keep MAP >65mmHg  - neurology consulted for stroke, on dual antiplatelet therapy  - trend troponin, pro BNP  -Wound culture culture wound culture on foot showed Corynebacteria (Mixed morphologies, COVI 19 negative,   - ID following, on  Cipro and linezolid  - General surgery following for GI bleed, input appreciated  - s/p 12 units of PBRC  -h/h q6H  - transfuse if less than 7  -hgb 6.5- will transfuse 1 unit of PRBC  - PPI BID  - EGD, colonoscopy and bleeding scan done, recommended IR for embolization- on dural antiplatelet therapy- high risk of bleeding- unable to reach family  - supportive care  - will hold brilinta and Asprin for tonight  - stat labs now.  Abg and cxr  - metabolic acidosis, spoke with nephrology, will start with bicarb gtt  - trend Bmp  - may need diuresis after transfusion given vascular congestion in cxr   - nephrology,following ,input appreciated  - Podiatry and wound care following  - labs in AM  - F/E/N bicarb gtt/replace lytes/ NPO  - DVT/GI scds/ no anticoagulation given GI bleed/ Protonix BID  - Code status: full code          CONSTANTINE Rivas-CNP   Critical Care     Discussed case with Attending Physician: Dr. Dominic Lr

## 2022-10-10 NOTE — PROGRESS NOTES
Occupational Therapy  OT SESSION ATTEMPT     Date:10/10/2022  Patient Name: Suzie Huggins  MRN: [id number]  : 1955  Room: [address] [place] [address]-A     Attempted OT session this date:    [] unavailable due to other medical staff currently with pt   [] on hold per nursing staff   [] on hold per nursing staff secondary to lab / radiology results    [] Jose Fore declined treatment this date due to   [] off unit    [x] Other: Reviewed chart and spoke with nursing to clear patient however Hgb is 5.6 this AM. Nursing approved minimal movement. Attempted to work with patient however patient asleep with BiPap on. Will try again at a later time.          Giselle Ang [address], [address]

## 2022-10-10 NOTE — CARE COORDINATION
Care Coordination   The patient was admitted with hypovolumic shock. His blood pressure now is 88/26,121,32,99. 3. has rt foot infection  Positive for OM with a wound vac. The patient is on po cipro 500 mg po q12 hrs, po zyvox 600 mg po q12 hrs x 2 days. He was also admitted for a new acute stroke. His h/h is 5.6/17.6 he had a bleeding scan ++ for bleeding. The patient is on 5 liters n/c. Mpo with sips. The patient was approved to go to [facility]. Ref #[id number] 10/7-10/11. The patient has to admit by the end of day on 10/11, fax#[phone] phone: [phone]. Envelope done , passer completed. DUANE/destination complete. Will need a covid test done on day of discharge.

## 2022-10-10 NOTE — PROGRESS NOTES
Per surgery request, notified Dr Anel Catalan of hem 5.6. Updated Dr Anel Catalan of surgery order for IR procedure.

## 2022-10-10 NOTE — PLAN OF CARE
I have discussed with gen surgery attending the clinical scenario with regards to this patient  It would seem reasonable to consider discontinuation of the ticagrelor in the face of active gi hemmorrhage which has the potential to destabilize the patient.   The theoretical risk of discontinuation of ticagrelor is occlussion of stent in LICA    I have reached out to POA on 2 separate occassions and have left voice mail to request her input

## 2022-10-10 NOTE — PROGRESS NOTES
[facility]  Nephrology Attending Progress Note  Ricardo Wong MD        SUBJECTIVE:     History of Present Ilness:    Marianna Bradley is a 74 y.o. male history of CAD s/p MI, hypertension, chronic kidney disease stage IIIa (baseline creatinine of recent 1.7-1.9) he is followed by our group with Dr. Leonardo Castillo. He initially was admitted to [facility] with right lower extremity wound. .  Patient subsequently developed aphasia noted to facial droop associated with right-sided weakness. He was transferred to [address] [place] [address] for further management. Patient was admitted told the neuro ICU. He required intubation with mechanical ventilation. CT of the head and neck demonstrated bilateral carotid stenosis with the left ICA being dominant. IR performed angiogram with angioplasty of the left ICA. Laboratory data showed progressive increase in his serum creatinine to currently 2.9 mg/dL. Hemoglobin was noted to be 5.6. He has received at least 2 units of packed cells but with further drop in his hemoglobin with requirement for additional transfusion. He had an episode of urinary retention with gross hematuria Mahan catheter was reinserted and the hematuria subsequently cleared. Renal is consulted for LARY. Subjective     10/1: he denies blurred vision, no headaches  10/2: No new acute issues from overnight; more alert; receiving PRBCs hemoglobin trending low  10/3: pt seen sp egd today, no cp or sob, family and nurse in room, no cp or sob  10/4: pt seen in room, no complaints  10/5: pt seen in icu, feels ok today, no cp or sob  10/6: pt seen in nsicu, no cp or sob, npo, wants to eat  10/7: pt seen in nsicu, no cp or sob, starting dysphagia diet  10/8: pt without complaint, eating better dysphagia  diet  10/9 : pt seen in room, no cp or sob, just got back from c scope, cliveggvania, on ivf  10/10: pt seen in room, sob, sbp in 80s, getting unit of prbc, wheezing, had breathing tx.  Rrt to be called for resp distress and low bp.  Passing melena and clots, on brilinita, to go to IR for embiolization, frank RN at bedside         PROBLEM LIST:    Patient Active Problem List   Diagnosis    Acute cerebrovascular accident (CVA) ([facility]    Acute respiratory failure with hypoxia ([facility]    Stenosis of left carotid artery    Hypoalbuminemia    Electrolyte imbalance    Hypernatremia        PAST MEDICAL HISTORY:    Past Medical History:   Diagnosis Date    Chronic back pain     CKD (chronic kidney disease)     CVA (cerebral vascular accident) ([facility]     CVA (cerebral vascular accident) ([facility]     DM (diabetes mellitus) ([facility]     Major depression     MI [facility]     MATT (obstructive sleep apnea)     Parkinson disease ([facility]     PVD (peripheral vascular disease) ([facility]     Seizure ([facility]        DIET:    Diet NPO Exceptions are: Sips of Water with Meds     PHYSICAL EXAM:     Patient Vitals for the past 24 hrs:   BP Temp Temp src Pulse Resp SpO2   10/10/22 1211 -- -- -- -- -- 100 %   10/10/22 1153 (!) 115/44 -- -- 82 -- --   10/10/22 0818 -- -- -- -- 16 --   10/10/22 0318 -- -- -- -- (!) 32 --   10/10/22 0217 (!) 142/73 98.8 °F (37.1 °C) Oral 92 (!) 36 100 %   10/10/22 0032 -- -- -- 90 -- --   10/10/22 0000 (!) 140/70 98.7 °F (37.1 °C) Oral 84 18 100 %   10/09/22 2329 -- -- -- -- 23 --   10/09/22 2300 -- -- -- 93 (!) 41 99 %   10/09/22 2200 (!) 160/80 -- -- 93 25 --   10/09/22 2000 (!) 161/72 98.8 °F (37.1 °C) Oral 89 (!) 34 98 %   10/09/22 1800 (!) 166/95 -- -- 85 21 100 %   10/09/22 1745 (!) 166/95 98.6 °F (37 °C) -- 90 25 98 %   10/09/22 1718 (!) 149/78 -- -- 90 25 98 %   10/09/22 1714 (!) 149/78 98.6 °F (37 °C) Oral 90 25 98 %   10/09/22 1701 -- -- -- -- -- 92 %   10/09/22 1700 -- -- -- 92 28 (!) 86 %   10/09/22 1600 (!) 151/86 -- -- 85 (!) 37 --   10/09/22 1500 -- -- -- 91 (!) 36 --   10/09/22 1353 137/65 -- -- 90 22 97 %   @      Intake/Output Summary (Last 24 hours) at 10/10/2022 1322  Last data filed at 10/10/2022 1154  Gross per 24 hour   Intake 55819.86 ml   Output 1725 ml   Net 8958.86 ml         Wt Readings from Last 3 Encounters:   09/27/22 260 lb 8 oz (118.2 kg)       Constitutional:  Pt is in no acute distress  Head: normocephalic, atraumatic  Neck: no JVD  Cardiovascular: regular rate and rhythm, no murmurs, gallops, or rubs  Respiratory:  No rales, rhochi, or wheezes  Gastrointestinal:  Soft, nontender, nondistended, bowel sounds x 4  Ext: tr edema  Skin: dry, no rash  Neuro: aaox3    MEDS (scheduled):    miconazole   Topical BID    white petrolatum   Topical BID    sodium chloride flush  5-40 mL IntraVENous 2 times per day    ciprofloxacin  500 mg Oral 2 times per day    miconazole   Topical BID    [Held by provider] insulin glargine  32 Units SubCUTAneous QAM    epoetin sharath-epbx  6,000 Units SubCUTAneous Once per day on Mon Wed Fri    amLODIPine  10 mg Per NG tube Daily    [Held by provider] cloNIDine  0.1 mg Oral Q12H    [Held by provider] hydrALAZINE  50 mg Oral 3 times per day    lactulose  20 g Oral BID    insulin lispro  0-16 Units SubCUTAneous 4x Daily AC & HS    HYDROcodone-acetaminophen  1 tablet Oral BID    pantoprazole (PROTONIX) 40 mg injection  40 mg IntraVENous Q12H    metoprolol succinate  25 mg Oral Daily    sennosides  5 mL Oral Nightly    carbidopa-levodopa  1 tablet Per NG tube TID    ipratropium-albuterol  1 ampule Inhalation Q4H WA    collagenase   Topical Q MWF    benzonatate  100 mg Oral TID    cetirizine  10 mg Oral Daily    ezetimibe  10 mg Oral Daily    gabapentin  600 mg Oral 4x Daily    ticagrelor  90 mg Oral BID    aspirin  81 mg Oral Daily    Or    aspirin  300 mg Rectal Daily    atorvastatin  80 mg Oral Nightly       MEDS (infusions):   sodium chloride      sodium chloride      sodium chloride      sodium chloride 100 mL/hr at 10/10/22 0549    sodium chloride      sodium chloride      sodium chloride      sodium chloride      sodium chloride      sodium chloride      sodium chloride dextrose         MEDS (prn):  sodium chloride, sodium chloride, sodium chloride, sodium chloride, sodium chloride flush, sodium chloride, sodium chloride, sodium chloride, sodium chloride, sodium chloride, fentanNYL, sodium chloride, labetalol, hydrALAZINE, glucose, dextrose bolus **OR** dextrose bolus, glucagon (rDNA), dextrose, ondansetron **OR** ondansetron    DATA:    Recent Labs     10/08/22  0558 10/08/22  1530 10/09/22  0505 10/09/22  1411 10/09/22  2238 10/10/22  0555   WBC 15.6*  --  13.3*  --   --  11.0   HGB 6.1*   < > 6.2* 6.9* 7.1* 5.6*   HCT 19.5*   < > 19.9* 21.9* 22.4* 17.6*   MCV 95.1  --  96.1  --   --  96.7     --  162  --   --  146    < > = values in this interval not displayed. Recent Labs     10/08/22  0558 10/09/22  0505 10/10/22  0555    141 147*   K 4.8 4.6 5.1*   * 111* 117*   CO2 20* 20* 16*   BUN 84* 69* 75*   CREATININE 2.2* 2.1* 2.1*   LABGLOM 30 32 32   GLUCOSE 151* 108* 186*   CALCIUM 8.1* 7.8* 7.9*   ALT 5 <5 5   AST 15 34 21   BILITOT 0.4 0.3 0.2   ALKPHOS 193* 299* 206*   MG 2.1 2.1 2.0   PHOS 4.5 4.0 4.6*       Lab Results   Component Value Date    LABALBU 2.1 (L) 10/10/2022    LABALBU 2.4 (L) 10/09/2022    LABALBU 2.5 (L) 10/08/2022     No results found for: TSH    Iron Studies  Lab Results   Component Value Date    IRON 24 (L) 10/03/2022    TIBC 195 (L) 10/03/2022    FERRITIN 94 10/03/2022     Vitamin B-12   Date Value Ref Range Status   10/07/2022 370 211 - 946 pg/mL Final     Folate   Date Value Ref Range Status   10/07/2022 6.3 4.8 - 24.2 ng/mL Final       No results found for: VITD25  No results found for: PTH    No components found for: URIC    No results found for: VOL, APPEARANCE, COLORU, LABSPEC, LABPH, LEUKBLD, NITRU, GLUCOSEU, KETUA, UROBILINOGEN, KETUA, UROBILINOGEN, BILIRUBINUR, OCBU    No results found for: LIPIDPAN      IMPRESSION/RECOMMENDATIONS:      1. Acute CVA  S/p IR intervention with angioplasty of the left ICA     2.  LARY on CKD stage 3b  Baseline 1.7-1.9  combined effects of ACEI use and contrast nephrotoxicity  Component of urinary retention  nonoliguric at this time  Disproportionate elevation of BUN relative to Cr suspicious for gib  stool heme positive  Worsening renal unction despite being nonoliguric  May require dialysis support  Monitor labs and urine  1.4L  Bun/cr  128/3 >115/2.6> 105/2.5>95/2.5> 85/2.2>84/2.2>69/2.1>2.1  Uo 2.7L     3. Hypertensive emergency  presenting  with acute CVA  Adjust meds to target blood pressure control to SBP less than 160  BP with improved control  Now with gi bleed, may need levo     4. Anemia, chronic with acute worsening  Informed stool heme positive  PRBC transfusion as needed  Sp egd/c scope  Trf < 7  On ASHLEY   To go for embolization     5. Type 2 diabetes mellitus  Monitor glucose levels     6. Hyperkalemia  Due to combination of LARY and metabolic acidosis; PRBC transfusios  Lokelma  prn     7. Urinary retention  Suspected situational  Now with trejo  Urology following     8. Hypernatremia  Suspect intervascular volume depletion  Na 151>145>141>145>141>138>141>147  D5 changed to ns to 1/2 ns    Pt for rrtm needs sicu and pressor, prbc support        Sharee Angelucci.  Ernestine Pickens MD

## 2022-10-10 NOTE — ACP (ADVANCE CARE PLANNING)
Advance Care Planning     Advance Care Planning Activator (Inpatient)  Conversation Note      Date of ACP Conversation: 10/10/2022   Conversation Conducted with:  Healthcare Decision Maker: Named in Advance Directive or Healthcare Power of  (name) 2nd lives with  Titus Goetz [phone] (Mushtaq Dennis  ACP Activator: [address], [address] [address] diagnoses warranting ACP:  Principal Problem:    Acute cerebrovascular accident (CVA) ([facility]  Active Problems:    Acute respiratory failure with hypoxia ([facility]    Stenosis of left carotid artery    Hypoalbuminemia    Electrolyte imbalance    Hypernatremia  Resolved Problems:    * No resolved hospital problems. *        Health Care Decision Maker:   Current Designated Health Care Decision Maker:     Primary Decision Maker: Yanelis Giron - [phone]  [address] [place], [place] [address]is maker Marco A Postal (local [address] [place] [address]) she is available local for questions and concerns. Mr Heriberto Aleman has lived with her and she is familiar with his medical condition and his llist of diagnosis and has been providing consent for blood transfusions. Care Preferences    Ventilation: \"If you were in your present state of health and suddenly became very ill and were unable to breathe on your own, what would your preference be about the use of a ventilator (breathing machine) if it were available to you? \"      Would the patient desire the use of ventilator (breathing machine)?: yes    \"If your health worsens and it becomes clear that your chance of recovery is unlikely, what would your preference be about the use of a ventilator (breathing machine) if it were available to you? \"     Would the patient desire the use of ventilator (breathing machine)?: Yes      Resuscitation  \"CPR works best to restart the heart when there is a sudden event, like a heart attack, in someone who is otherwise healthy.  Unfortunately, CPR does not typically restart the heart for people who have serious health conditions or who are very sick. \"    \"In the event your heart stopped as a result of an underlying serious health condition, would you want attempts to be made to restart your heart (answer \"yes\" for attempt to resuscitate) or would you prefer a natural death (answer \"no\" for do not attempt to resuscitate)? \" yes       [] Yes   [] No   Educated Patient / Poonam Martinez regarding differences between Advance Directives and portable DNR orders.         Conversation Outcomes:  [x] ACP discussion completed  [] Existing advance directive reviewed with patient; no changes to patient's previously recorded wishes  [] New Advance Directive completed  [] Portable Do Not Rescitate prepared for Provider review and signature  [] POLST/POST/MOLST/MOST prepared for Provider review and signature    Details of ACP discussion:  full code   Length of ACP Conversation in minutes: 18 min   Code status following completion of discussion: Full Code     Follow-up plan:    [] Schedule follow-up conversation to continue planning  [] Referred individual to Provider for additional questions/concerns   [] Advised patient/agent/surrogate to review completed ACP document and update if needed with changes in condition, patient preferences or care setting  [] This note routed to one or more involved healthcare providers  [] None    Electronically signed by Kaleb Guillory DO on 10/10/2022 at 11:08 AM

## 2022-10-10 NOTE — PROGRESS NOTES
Department of Podiatry   Progress Note      Patient seen and evaluated at bedside this morning. Wound vac remains intact running continuous pressure. No new pedal questions or complaints at this time. MWF wound vac changes. Patient denies any n/v/d/f/c. Past Medical History:            Diagnosis Date    Chronic back pain     CKD (chronic kidney disease)     CVA (cerebral vascular accident) ([facility] [address]     CVA (cerebral vascular accident) ([facility] [address]     DM (diabetes mellitus) ([facility] [address]     Major depression     MI (myocardial infarction) ([facility] [address]     MATT (obstructive sleep apnea)     Parkinson disease ([facility] [address]     PVD (peripheral vascular disease) ([facility] [address]     Seizure ([facility] [address]        Past Surgical History:        Procedure Laterality Date    COLONOSCOPY N/A 10/9/2022    COLONOSCOPY DIAGNOSTIC performed by Imelda Rosen MD at [place] [address]      IR CAROTID STENT UNI W PROTECTION  9/27/2022    IR CAROTID STENT UNI W PROTECTION 9/27/2022 Rodriguez Wallace MD [facility]    UPPER GASTROINTESTINAL ENDOSCOPY N/A 10/3/2022    EGD DIAGNOSTIC ONLY performed by Nawaf Nance MD at [facility] ENDOSCOPY       Medications Prior to Admission:    Medications Prior to Admission: apixaban (ELIQUIS) 5 MG TABS tablet, Take 5 mg by mouth 2 times daily  aspirin 81 MG chewable tablet, Take 81 mg by mouth daily  carbidopa-levodopa (SINEMET)  MG per tablet, Take 1 tablet by mouth 3 times daily  carvedilol (COREG) 25 MG tablet, Take 25 mg by mouth 2 times daily (with meals)  cetirizine (ZYRTEC) 10 MG tablet, Take 10 mg by mouth daily  clopidogrel (PLAVIX) 75 MG tablet, Take 75 mg by mouth daily  diphenhydrAMINE (BENADRYL) 25 MG capsule, Take 25 mg by mouth every 6 hours  ezetimibe (ZETIA) 10 MG tablet, Take 10 mg by mouth daily  fluticasone (FLONASE) 50 MCG/ACT nasal spray, 2 sprays by Each Nostril route daily  gabapentin (NEURONTIN) 600 MG tablet, Take 600 mg by mouth 4 times daily.   HYDROcodone-acetaminophen (Mathieu Barthel) 5-325 MG per tablet, Take 1 tablet by mouth 2 times daily. Insulin Glargine, 2 Unit Dial, (TOUJEO MAX SOLOSTAR) 300 UNIT/ML SOPN, Inject 66 Units into the skin daily  piperacillin-tazobactam (ZOSYN) 3-0.375 GM per 50ML IVPB extended infusion, Infuse 4.5 mg intravenously in the morning and 4.5 mg at noon and 4.5 mg in the evening. acetaminophen (TYLENOL) 325 MG tablet, Take 650 mg by mouth every 6 hours as needed for Pain  amLODIPine (NORVASC) 5 MG tablet, Take 5 mg by mouth daily  benzonatate (TESSALON) 100 MG capsule, Take 100 mg by mouth 3 times daily  cloNIDine (CATAPRES) 0.1 MG tablet, Take 0.1 mg by mouth in the morning and 0.1 mg in the evening. hydrALAZINE (APRESOLINE) 100 MG tablet, Take 100 mg by mouth 3 times daily    Allergies:  Codeine    Social History:   TOBACCO:   has no history on file for tobacco use. ETOH:   has no history on file for alcohol use. DRUGS:   Social History     Substance and Sexual Activity   Drug Use Not on file       Family History:   History reviewed. No pertinent family history. REVIEW OF SYSTEMS:    All pertinent positives and negatives as noted in HPI       LOWER EXTREMITY EXAMINATION   Wound vac intact running continuous pressure    Previous Exam:  VASCULAR:  DP and PT pulses are non palpable. CFT < 5 seconds B/L. Warm to warm from the tibial tuberosity to the distal aspect of the digits dorsally. NEUROLOGIC:  Protective sensation is diminished but grossly intact    DERM:  Right foot lateral plantar wound measuring approx 6cm in diameter. No erythema, serosanguinous drainage, no malodor.     MUSCULOSKELETAL: deferred           CONSULTS:  IP CONSULT TO CRITICAL CARE  IP CONSULT TO DIETITIAN  IP CONSULT TO CARDIOLOGY  IP CONSULT TO PODIATRY  IP CONSULT TO PODIATRY  IP CONSULT TO UROLOGY  IP CONSULT TO NEPHROLOGY  IP CONSULT TO GENERAL SURGERY  IP CONSULT TO INFECTIOUS DISEASES  IP CONSULT TO GI  IP CONSULT TO GENERAL SURGERY  IP CONSULT TO GENERAL SURGERY  IP CONSULT TO INTERVENTIONAL RADIOLOGY    MEDICATION:  Scheduled Meds:   white petrolatum   Topical BID    sodium chloride flush  5-40 mL IntraVENous 2 times per day    ciprofloxacin  500 mg Oral 2 times per day    miconazole   Topical BID    [Held by provider] insulin glargine  32 Units SubCUTAneous QAM    epoetin sharath-epbx  6,000 Units SubCUTAneous Once per day on Mon Wed Fri    amLODIPine  10 mg Per NG tube Daily    [Held by provider] cloNIDine  0.1 mg Oral Q12H    [Held by provider] hydrALAZINE  50 mg Oral 3 times per day    lactulose  20 g Oral BID    insulin lispro  0-16 Units SubCUTAneous 4x Daily AC & HS    HYDROcodone-acetaminophen  1 tablet Oral BID    pantoprazole (PROTONIX) 40 mg injection  40 mg IntraVENous Q12H    metoprolol succinate  25 mg Oral Daily    sennosides  5 mL Oral Nightly    carbidopa-levodopa  1 tablet Per NG tube TID    ipratropium-albuterol  1 ampule Inhalation Q4H WA    collagenase   Topical Q MWF    benzonatate  100 mg Oral TID    cetirizine  10 mg Oral Daily    ezetimibe  10 mg Oral Daily    gabapentin  600 mg Oral 4x Daily    ticagrelor  90 mg Oral BID    aspirin  81 mg Oral Daily    Or    aspirin  300 mg Rectal Daily    atorvastatin  80 mg Oral Nightly     Continuous Infusions:   sodium chloride      sodium chloride      sodium chloride 100 mL/hr at 10/10/22 0549    sodium chloride      sodium chloride      sodium chloride      sodium chloride      sodium chloride      sodium chloride      sodium chloride      dextrose       PRN Meds:.sodium chloride, sodium chloride, sodium chloride, sodium chloride flush, sodium chloride, sodium chloride, sodium chloride, sodium chloride, sodium chloride, fentanNYL, sodium chloride, labetalol, hydrALAZINE, glucose, dextrose bolus **OR** dextrose bolus, glucagon (rDNA), dextrose, ondansetron **OR** ondansetron    RADIOLOGY:  CTA ABDOMEN PELVIS W CONTRAST   Final Result   Mixed densities throughout the colonic segments including in the ascending   colon and rectum could represent mixed densities of blood products although   no focal area of arterial enhancement or extravasation/blushing is observed   to localize acute hemorrhage. No evidence for perforation or abscess. No   pneumatosis intestinalis or portal venous gas evident. 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 No adjacent hematoma with only minimal   adjacent stranding. Bilateral pleural effusions moderate to large right and moderate left   effusions with adjacent atelectasis. Anasarca. XR CHEST PORTABLE   Final Result   Bilateral pleural effusions with bibasilar patchy infiltrates and mild   cardiomegaly. NM GI BLOOD LOSS   Final Result   Active GI bleeding identified during acquisition in the splenic flexure with   peristalsis identified into the proximal descending colon. RECOMMENDATIONS:   Unavailable         CT ABDOMEN PELVIS WO CONTRAST   Final Result   CHEST:      No evidence of neoplastic disease, allowing for limitations of noncontrast   technique. Moderate bilateral pleural effusions. Nonemergent incidental findings as above. ABDOMEN/PELVIS:      No evidence of neoplastic disease, allowing for limitations of noncontrast   technique. Bladder wall thickening is nonspecific given under distension; recommend   correlation urinalysis to evaluate for UTI. Nonemergent incidental findings as above. CT CHEST WO CONTRAST   Final Result   CHEST:      No evidence of neoplastic disease, allowing for limitations of noncontrast   technique. Moderate bilateral pleural effusions. Nonemergent incidental findings as above. ABDOMEN/PELVIS:      No evidence of neoplastic disease, allowing for limitations of noncontrast   technique.       Bladder wall thickening is nonspecific given under distension; recommend   correlation urinalysis to evaluate for UTI. Nonemergent incidental findings as above. FL MODIFIED BARIUM SWALLOW W VIDEO   Final Result   1. Mildly impaired swallowing mechanism with swallowing delay and followed   by laryngeal penetration with thin liquid barium when using a straw. No   barium aspiration      2. Please see separate speech pathology report for full discussion of   findings and recommendations. RECOMMENDATIONS:   Unavailable         XR ABDOMEN (KUB) (SINGLE AP VIEW)   Final Result   Somewhat greater than average quantity of retained fecal material thin the   lower GI tract suggesting dysfunction with evacuation. XR CHEST PORTABLE   Final Result   1. Atherosclerotic disease and mild cardiomegaly. 2.  Persistent but improved opacities in the bilateral lungs most pronounced   in the mid and lower lungs. There appears to be a small left and trace right   pleural effusion         XR FOOT RIGHT (2 VIEWS)   Final Result   No evidence of calcaneal osteomyelitis. CT HEAD WO CONTRAST   Final Result   Parenchymal volume loss and chronic microvascular ischemic changes. No acute intracranial abnormality. Ethmoid and sphenoid sinusitis. Right mastoid effusion. US RETROPERITONEAL COMPLETE   Final Result   1. Normal appearance of the bilateral kidneys. No hydronephrosis. 2.  A Mahan catheter is decompressing the bladder. XR CHEST PORTABLE   Final Result   Worsening infiltrate and or atelectasis on the left, stable on the right with   suspected layering pleural effusion. XR CHEST PORTABLE   Final Result   Unchanged bilateral atelectasis and or infiltrate as well as small right   pleural effusion. MRI BRAIN WO CONTRAST   Final Result   There are 2 small regions of petechial infarcts one in the left   posterior frontal lobe and a second in the left parietal lobe not   exceeding 3 mm.  There is otherwise extensive small vessel ischemic   changes         XR CHEST PORTABLE   Final Result   1. Multifocal bilateral airspace disease more prominent within the lower   lobes. The airspace disease is unchanged when compared with the prior study. CT HEAD WO CONTRAST   Final Result   Diffuse atrophy likely age related   Findings compatible with small vessel ischemic changes. XR CHEST PORTABLE   Final Result   1. Pulmonary opacities present favoring edema and atelectasis, also small   pleural effusions, but nonspecific with some additional considerations noted   above   2. Support devices present as described above   3. Heart size appears borderline enlarged         XR ABDOMEN FOR NG/OG/NE TUBE PLACEMENT   Final Result   NG/OG is in the stomach. IR CAROTID STENT W PROTECTION   Final Result   1. Angiogram demonstrates successful placement of a carotid stent ,   post procedure images demonstrate a widely patent stent and internal   carotid         CT HEAD WO CONTRAST   Final Result   Diffuse atrophy likely age related   Findings compatible with small vessel ischemic changes. Findings were called at the time of dictation         CT BRAIN PERFUSION   Final Result      No significant ischemic penumbra identified      This study was analyzed by the [employer] algorithm. CTA NECK W CONTRAST   Final Result   1. Estimated stenosis of the proximal right and left internal carotid   artery by NASCET criteria is greater than 90% on the left   2. Severe atherosclerotic disease . 3. No large vessel occlusion identified            This study was analyzed by the [employer] algorithm. CTA HEAD W CONTRAST   Final Result   1. Estimated stenosis of the proximal right and left internal carotid   artery by NASCET criteria is greater than 90% on the left   2. Severe atherosclerotic disease . 3. No large vessel occlusion identified            This study was analyzed by the [employer] ai algorithm. IR INTERVENTIONAL RADIOLOGY PROCEDURE REQUEST    (Results Pending)       Vitals:    BP (!) 142/73   Pulse 92   Temp 98.8 °F (37.1 °C) (Oral)   Resp 16   Ht 5' 8\" (1.727 m)   Wt 260 lb 8 oz (118.2 kg)   SpO2 100%   BMI 39.61 kg/m²     LABS:   Recent Labs     10/09/22  0505 10/09/22  1411 10/09/22  2238 10/10/22  0555   WBC 13.3*  --   --  11.0   HGB 6.2*   < > 7.1* 5.6*   HCT 19.9*   < > 22.4* 17.6*     --   --  146    < > = values in this interval not displayed. Recent Labs     10/10/22  0555   *   K 5.1*   *   CO2 16*   PHOS 4.6*   BUN 75*   CREATININE 2.1*     Recent Labs     10/09/22  0505 10/10/22  0555   PROT 4.9* 4.4*   INR 2.1  --        ASSESSMENTS:   1. Right foot wound diabetic ulcer  2. DM  3. Pain right foot  Acute cerebrovascular accident (CVA) ([facility] [address]            PLAN:  -Patient examined and evaluated at bedside  -All pertinent labs, charts, and imaging reviewed prior to encounter   -Wound cx: Growth present, Corynebacteria  -Abx per ID  - Continue Wound vac with santyl right foot. MWF vac changes  -R foot x rays: No evidence of osteomyelitis  - Will continue to follow while in house  -Patient discussed with Dr. Louis Barroso DPM    Thank you for the opportunity to take part in the patient's care. Please do not hesitate to call for any questions or concerns.

## 2022-10-10 NOTE — PROGRESS NOTES
Department of Internal Medicine  Infectious Diseases   Progress Note      C/C :  Right foot  wound infection     Denies fever or chills  Reports pain   Afebrile      Current Facility-Administered Medications   Medication Dose Route Frequency Provider Last Rate Last Admin    0.9 % sodium chloride infusion   IntraVENous PRN Tony Shankar, DO        miconazole (MICOTIN) 2 % powder   Topical BID Tony Shankar, DO        0.9 % sodium chloride infusion   IntraVENous PRN Dayana Lama MD        0.9 % sodium chloride infusion   IntraVENous PRN Tony Gonzalezfuentes, DO        0.9 % sodium chloride infusion   IntraVENous Continuous Dayana Lama  mL/hr at 10/10/22 0549 New Bag at 10/10/22 0549    0.9 % sodium chloride infusion   IntraVENous PRN Dayana Lama MD        white petrolatum ointment   Topical BID Dayana Lama MD   Given at 10/10/22 0900    sodium chloride flush 0.9 % injection 5-40 mL  5-40 mL IntraVENous 2 times per day Dayana Lama MD   10 mL at 10/09/22 2300    sodium chloride flush 0.9 % injection 5-40 mL  5-40 mL IntraVENous PRN Dayana Lama MD        0.9 % sodium chloride infusion   IntraVENous PRN Dayana Lama MD        0.9 % sodium chloride infusion   IntraVENous PRN Dayana Lama MD        0.9 % sodium chloride infusion   IntraVENous PRN Dayana Lama MD        ciprofloxacin (CIPRO) tablet 500 mg  500 mg Oral 2 times per day Dayana Lama MD   500 mg at 10/10/22 1153    miconazole (MICOTIN) 2 % powder   Topical BID Dayana Lama MD   Given at 10/10/22 0900    [Held by provider] insulin glargine-yfgn (SEMGLEE-YFGN) injection vial 32 Units  32 Units SubCUTAneous QAM Tony Shankar, DO   32 Units at 10/07/22 0911    0.9 % sodium chloride infusion   IntraVENous FRAN Lama MD        0.9 % sodium chloride infusion   IntraVENous FRAN Lama MD        epoetin sharath-epbx (RETACRIT) injection 6,000 Units  6,000 Units SubCUTAneous Once per day on Mon Wed Fri Dayana Lama MD   6,000 Units at 10/10/22 1207    amLODIPine (NORVASC) tablet 10 mg  10 mg Per NG tube Daily Bianka Morgan MD   10 mg at 10/10/22 1153    [Held by provider] cloNIDine (CATAPRES) tablet 0.1 mg  0.1 mg Oral Q12H Grey Kalata, APRN - CNP   0.1 mg at 10/08/22 0030    [Held by provider] hydrALAZINE (APRESOLINE) tablet 50 mg  50 mg Oral 3 times per day Grey Kalata, APRN - CNP   50 mg at 10/08/22 0619    fentaNYL (SUBLIMAZE) injection 50 mcg  50 mcg IntraVENous Q2H PRN Bianka Morgan MD   50 mcg at 10/05/22 1956    0.9 % sodium chloride infusion   IntraVENous PRN Bianka Morgan MD        lactulose (CHRONULAC) 10 GM/15ML solution 20 g  20 g Oral BID Bianka Morgan MD   20 g at 10/09/22 2300    insulin lispro (HUMALOG) injection vial 0-16 Units  0-16 Units SubCUTAneous 4x Daily AC & HS Bianka Morgan MD   4 Units at 10/07/22 1702    HYDROcodone-acetaminophen ([facility]) 7.5-325 MG per tablet 1 tablet  1 tablet Oral BID Bianka Morgan MD   1 tablet at 10/10/22 1153    pantoprazole (PROTONIX) 40 mg in sodium chloride (PF) 10 mL injection  40 mg IntraVENous Q12H Bianka Morgan MD   40 mg at 10/10/22 0548    metoprolol succinate (TOPROL XL) extended release tablet 25 mg  25 mg Oral Daily Bianka Morgan MD   25 mg at 10/10/22 1154    sennosides (SENOKOT) 8.8 MG/5ML syrup 5 mL  5 mL Oral Nightly Bianka Morgan MD   5 mL at 10/09/22 2300    labetalol (NORMODYNE;TRANDATE) injection 10 mg  10 mg IntraVENous Q30 Min PRN Bianka Morgan MD   10 mg at 10/01/22 1115    hydrALAZINE (APRESOLINE) injection 10 mg  10 mg IntraVENous Q30 Min PRN Bianka Morgan MD   10 mg at 09/30/22 1627    glucose chewable tablet 16 g  4 tablet Oral PRN Bianka Morgan MD        dextrose bolus 10% 125 mL  125 mL IntraVENous PRN Bianka Morgan MD        Or    dextrose bolus 10% 250 mL  250 mL IntraVENous PRN Bianka Morgan MD        glucagon (rDNA) injection 1 mg  1 mg SubCUTAneous PRN Bianka Morgan MD        dextrose 10 % infusion   IntraVENous Continuous PRN Lukasz Sampson MD        carbidopa-levodopa (SINEMET)  MG per tablet 1 tablet  1 tablet Per NG tube TID Lukasz Sampson MD   1 tablet at 10/10/22 1153    ipratropium-albuterol (DUONEB) nebulizer solution 1 ampule  1 ampule Inhalation Q4H WA Lukasz Sampson MD   1 ampule at 10/10/22 1210    collagenase ointment   Topical Q MWF Lukasz Sampson MD   Given at 10/10/22 1151    benzonatate (TESSALON) capsule 100 mg  100 mg Oral TID Lukasz Sampson MD   100 mg at 10/10/22 1153    cetirizine (ZYRTEC) tablet 10 mg  10 mg Oral Daily Lukasz Sampson MD   10 mg at 10/10/22 1154    ezetimibe (ZETIA) tablet 10 mg  10 mg Oral Daily Lukasz Sampson MD   10 mg at 10/10/22 1153    gabapentin (NEURONTIN) tablet 600 mg  600 mg Oral 4x Daily Lukasz Sampson MD   600 mg at 10/10/22 1154    ticagrelor (BRILINTA) tablet 90 mg  90 mg Oral BID Lukasz Sampson MD   90 mg at 10/09/22 2300    ondansetron (ZOFRAN-ODT) disintegrating tablet 4 mg  4 mg Oral Q8H PRN Lukasz Sampson MD        Or    ondansetron (ZOFRAN) injection 4 mg  4 mg IntraVENous Q6H PRN Lukasz Sampson MD        aspirin EC tablet 81 mg  81 mg Oral Daily Lukasz Sampson MD   81 mg at 10/09/22 5552    Or    aspirin suppository 300 mg  300 mg Rectal Daily Lukasz Sampson MD   300 mg at 09/29/22 0809    atorvastatin (LIPITOR) tablet 80 mg  80 mg Oral Nightly Lukasz Sampson MD   80 mg at 10/09/22 2259           REVIEW OF SYSTEMS:   CONSTITUTIONAL:  Denies fever, chill or rigors. HEENT: denies blurring of vision or double vision, denies hearing problem  RESPIRATORY: denies cough, shortness of breath,  CARDIOVASCULAR:  Denies palpitation  GASTROINTESTINAL:  Denies abdomen pain, diarrhea or constipation. GENITOURINARY:  Denies burning urination or frequency of urination  INTEGUMENT: Right heel wound  HEMATOLOGIC/LYMPHATIC:  Denies lymph node swelling, gum bleeding or easy bruising.   MUSCULOSKELETAL:  Denies leg pain , joint pain , joint swelling  NEUROLOGICAL:  weakness right side . PHYSICAL EXAM:      Vitals:     BP (!) 115/44   Pulse 82   Temp 98.8 °F (37.1 °C) (Oral)   Resp 16   Ht 5' 8\" (1.727 m)   Wt 260 lb 8 oz (118.2 kg)   SpO2 100%   BMI 39.61 kg/m²     General Appearance:    Awake, alert , no acute distress. Head:    Normocephalic, atraumatic   Eyes:    No pallor, no icterus,   Ears:    No obvious deformity or drainage.    Nose:   No nasal drainage   Throat:   Mucosa moist, no oral thrush   Neck:   Supple, no lymphadenopathy   Lungs:     Scattered  wheeze    Heart:    Regular rate and rhythm, no murmur   Abdomen:     Soft, non-tender, bowel sounds present    Extremities:    Right heel wound    Pulses:   Dorsalis pedis palpable - diminished    Skin:   Right heel wound with VAC              CBC with Differential:      Lab Results   Component Value Date/Time    WBC 11.0 10/10/2022 05:55 AM    RBC 1.82 10/10/2022 05:55 AM    HGB 5.6 10/10/2022 05:55 AM    HCT 17.6 10/10/2022 05:55 AM    HCT 15.0 09/27/2022 08:58 PM     10/10/2022 05:55 AM    MCV 96.7 10/10/2022 05:55 AM    MCH 30.8 10/10/2022 05:55 AM    MCHC 31.8 10/10/2022 05:55 AM    RDW 16.2 10/10/2022 05:55 AM    LYMPHOPCT 5.9 10/10/2022 05:55 AM    MONOPCT 5.1 10/10/2022 05:55 AM    BASOPCT 0.2 10/10/2022 05:55 AM    MONOSABS 0.56 10/10/2022 05:55 AM    LYMPHSABS 0.65 10/10/2022 05:55 AM    EOSABS 0.01 10/10/2022 05:55 AM    BASOSABS 0.02 10/10/2022 05:55 AM       CMP     Lab Results   Component Value Date/Time     10/10/2022 05:55 AM    K 5.1 10/10/2022 05:55 AM     10/10/2022 05:55 AM    CO2 16 10/10/2022 05:55 AM    BUN 75 10/10/2022 05:55 AM    CREATININE 2.1 10/10/2022 05:55 AM    GFRAA 39 10/10/2022 05:55 AM    LABGLOM 32 10/10/2022 05:55 AM    GLUCOSE 186 10/10/2022 05:55 AM    PROT 4.4 10/10/2022 05:55 AM    LABALBU 2.1 10/10/2022 05:55 AM    CALCIUM 7.9 10/10/2022 05:55 AM    BILITOT 0.2 10/10/2022 05:55 AM    ALKPHOS 206 10/10/2022 05:55 AM    AST 21 10/10/2022 05:55 AM    ALT 5 10/10/2022 05:55 AM         Hepatic Function Panel:    Lab Results   Component Value Date/Time    ALKPHOS 206 10/10/2022 05:55 AM    ALT 5 10/10/2022 05:55 AM    AST 21 10/10/2022 05:55 AM    PROT 4.4 10/10/2022 05:55 AM    BILITOT 0.2 10/10/2022 05:55 AM    LABALBU 2.1 10/10/2022 05:55 AM       PT/INR:    Lab Results   Component Value Date/Time    PROTIME 23.0 10/09/2022 05:05 AM    INR 2.1 10/09/2022 05:05 AM       TSH:  No results found for: TSH    U/A:  No results found for: NITRITE, COLORU, PHUR, LABCAST, WBCUA, RBCUA, MUCUS, TRICHOMONAS, YEAST, BACTERIA, CLARITYU, SPECGRAV, LEUKOCYTESUR, UROBILINOGEN, BILIRUBINUR, BLOODU, GLUCOSEU, AMORPHOUS    ABG:  No results found for: WVF7BWJ, BEART, O8LFZIVL, PHART, THGBART, WFY6KMD, PO2ART, GRC1XMV    MICROBIOLOGY:    SARS CoV 2 neg     CRP 0.8      Radiology :    Chest X ray : LLL infiltrates, atelectasis       X ray foot :           No evidence of calcaneal osteomyelitis. IMPRESSION:     Right foot  non healing wound  Leukocytosis - improved   ?  Aspiration       RECOMMENDATIONS:      Cipro 500 mg po q 12 hrs / Zyvox 600 mg po q 12 hs ~2 days   Local wound care

## 2022-10-10 NOTE — PROGRESS NOTES
After case review by IR physician and staff. Cannot move forward with GI Embo until blood thinners are held.

## 2022-10-11 ENCOUNTER — APPOINTMENT (OUTPATIENT)
Dept: GENERAL RADIOLOGY | Age: 67
DRG: 034 | End: 2022-10-11
Attending: INTERNAL MEDICINE
Payer: MEDICARE

## 2022-10-11 ENCOUNTER — APPOINTMENT (OUTPATIENT)
Dept: NUCLEAR MEDICINE | Age: 67
DRG: 034 | End: 2022-10-11
Attending: INTERNAL MEDICINE
Payer: MEDICARE

## 2022-10-11 PROBLEM — D64.9 ANEMIA: Status: ACTIVE | Noted: 2022-10-11

## 2022-10-11 LAB
AADO2: 96.8 MMHG
ALBUMIN SERPL-MCNC: 2.2 G/DL (ref 3.5–5.2)
ALP BLD-CCNC: 193 U/L (ref 40–129)
ALT SERPL-CCNC: <5 U/L (ref 0–40)
AMMONIA: <10 UMOL/L (ref 16–60)
ANION GAP SERPL CALCULATED.3IONS-SCNC: 10 MMOL/L (ref 7–16)
ANION GAP SERPL CALCULATED.3IONS-SCNC: 11 MMOL/L (ref 7–16)
ANION GAP SERPL CALCULATED.3IONS-SCNC: 11 MMOL/L (ref 7–16)
ANION GAP SERPL CALCULATED.3IONS-SCNC: 9 MMOL/L (ref 7–16)
AST SERPL-CCNC: 17 U/L (ref 0–39)
B.E.: -3.2 MMOL/L (ref -3–3)
B.E.: -6 MMOL/L (ref -3–3)
BASOPHILS ABSOLUTE: 0.03 E9/L (ref 0–0.2)
BASOPHILS RELATIVE PERCENT: 0.3 % (ref 0–2)
BILIRUB SERPL-MCNC: 0.3 MG/DL (ref 0–1.2)
BLOOD BANK DISPENSE STATUS: NORMAL
BLOOD BANK PRODUCT CODE: NORMAL
BPU ID: NORMAL
BUN BLDV-MCNC: 57 MG/DL (ref 6–23)
BUN BLDV-MCNC: 61 MG/DL (ref 6–23)
BUN BLDV-MCNC: 63 MG/DL (ref 6–23)
BUN BLDV-MCNC: 67 MG/DL (ref 6–23)
CALCIUM IONIZED: 1.31 MMOL/L (ref 1.15–1.33)
CALCIUM SERPL-MCNC: 8.2 MG/DL (ref 8.6–10.2)
CALCIUM SERPL-MCNC: 8.2 MG/DL (ref 8.6–10.2)
CALCIUM SERPL-MCNC: 8.5 MG/DL (ref 8.6–10.2)
CALCIUM SERPL-MCNC: 8.5 MG/DL (ref 8.6–10.2)
CHLORIDE BLD-SCNC: 116 MMOL/L (ref 98–107)
CHLORIDE BLD-SCNC: 116 MMOL/L (ref 98–107)
CHLORIDE BLD-SCNC: 117 MMOL/L (ref 98–107)
CHLORIDE BLD-SCNC: 118 MMOL/L (ref 98–107)
CO2: 20 MMOL/L (ref 22–29)
CO2: 20 MMOL/L (ref 22–29)
CO2: 21 MMOL/L (ref 22–29)
CO2: 22 MMOL/L (ref 22–29)
COHB: 0.9 % (ref 0–1.5)
COHB: 1.9 % (ref 0–1.5)
CREAT SERPL-MCNC: 1.8 MG/DL (ref 0.7–1.2)
CREAT SERPL-MCNC: 1.8 MG/DL (ref 0.7–1.2)
CREAT SERPL-MCNC: 1.9 MG/DL (ref 0.7–1.2)
CREAT SERPL-MCNC: 1.9 MG/DL (ref 0.7–1.2)
CRITICAL: ABNORMAL
CRITICAL: ABNORMAL
DATE ANALYZED: ABNORMAL
DATE ANALYZED: ABNORMAL
DATE OF COLLECTION: ABNORMAL
DATE OF COLLECTION: ABNORMAL
DESCRIPTION BLOOD BANK: NORMAL
EOSINOPHILS ABSOLUTE: 0.49 E9/L (ref 0.05–0.5)
EOSINOPHILS RELATIVE PERCENT: 4.7 % (ref 0–6)
FIO2: 35 %
GFR AFRICAN AMERICAN: 43
GFR AFRICAN AMERICAN: 43
GFR AFRICAN AMERICAN: 46
GFR AFRICAN AMERICAN: 46
GFR NON-AFRICAN AMERICAN: 36 ML/MIN/1.73
GFR NON-AFRICAN AMERICAN: 36 ML/MIN/1.73
GFR NON-AFRICAN AMERICAN: 38 ML/MIN/1.73
GFR NON-AFRICAN AMERICAN: 38 ML/MIN/1.73
GLUCOSE BLD-MCNC: 145 MG/DL (ref 74–99)
GLUCOSE BLD-MCNC: 147 MG/DL (ref 74–99)
GLUCOSE BLD-MCNC: 150 MG/DL (ref 74–99)
GLUCOSE BLD-MCNC: 173 MG/DL (ref 74–99)
HCO3: 19.5 MMOL/L (ref 22–26)
HCO3: 22.5 MMOL/L (ref 22–26)
HCT VFR BLD CALC: 21.1 % (ref 37–54)
HCT VFR BLD CALC: 22.2 % (ref 37–54)
HCT VFR BLD CALC: 23.6 % (ref 37–54)
HEMOGLOBIN: 6.8 G/DL (ref 12.5–16.5)
HEMOGLOBIN: 7.1 G/DL (ref 12.5–16.5)
HEMOGLOBIN: 7.5 G/DL (ref 12.5–16.5)
HHB: 3 % (ref 0–5)
HHB: 6.4 % (ref 0–5)
IMMATURE GRANULOCYTES #: 0.05 E9/L
IMMATURE GRANULOCYTES %: 0.5 % (ref 0–5)
INR BLD: 2.6
LAB: ABNORMAL
LAB: ABNORMAL
LYMPHOCYTES ABSOLUTE: 0.82 E9/L (ref 1.5–4)
LYMPHOCYTES RELATIVE PERCENT: 7.8 % (ref 20–42)
Lab: ABNORMAL
Lab: ABNORMAL
MAGNESIUM: 2 MG/DL (ref 1.6–2.6)
MCH RBC QN AUTO: 29.7 PG (ref 26–35)
MCHC RBC AUTO-ENTMCNC: 32.2 % (ref 32–34.5)
MCV RBC AUTO: 92.1 FL (ref 80–99.9)
METER GLUCOSE: 146 MG/DL (ref 74–99)
METER GLUCOSE: 155 MG/DL (ref 74–99)
METER GLUCOSE: 165 MG/DL (ref 74–99)
METHB: 0.4 % (ref 0–1.5)
METHB: 0.5 % (ref 0–1.5)
MODE: ABNORMAL
MODE: ABNORMAL
MONOCYTES ABSOLUTE: 0.59 E9/L (ref 0.1–0.95)
MONOCYTES RELATIVE PERCENT: 5.6 % (ref 2–12)
NEUTROPHILS ABSOLUTE: 8.47 E9/L (ref 1.8–7.3)
NEUTROPHILS RELATIVE PERCENT: 81.1 % (ref 43–80)
O2 SATURATION: 93.7 % (ref 92–98.5)
O2 SATURATION: 97.1 % (ref 92–98.5)
O2HB: 92.3 % (ref 94–97)
O2HB: 94.6 % (ref 94–97)
OPERATOR ID: 1210
OPERATOR ID: 2593
PATIENT TEMP: 37 C
PATIENT TEMP: 37 C
PCO2: 38.5 MMHG (ref 35–45)
PCO2: 43.8 MMHG (ref 35–45)
PDW BLD-RTO: 17.2 FL (ref 11.5–15)
PEEP/CPAP: 6 CMH2O
PFO2: 2.83 MMHG/%
PH BLOOD GAS: 7.32 (ref 7.35–7.45)
PH BLOOD GAS: 7.33 (ref 7.35–7.45)
PHOSPHORUS: 3.8 MG/DL (ref 2.5–4.5)
PIP: 14 CMH2O
PLATELET # BLD: 129 E9/L (ref 130–450)
PMV BLD AUTO: 9.7 FL (ref 7–12)
PO2: 73.4 MMHG (ref 75–100)
PO2: 99.2 MMHG (ref 75–100)
POTASSIUM SERPL-SCNC: 4.4 MMOL/L (ref 3.5–5)
POTASSIUM SERPL-SCNC: 4.44 MMOL/L (ref 3.5–5)
POTASSIUM SERPL-SCNC: 4.5 MMOL/L (ref 3.5–5)
POTASSIUM SERPL-SCNC: 4.6 MMOL/L (ref 3.5–5)
POTASSIUM SERPL-SCNC: 4.6 MMOL/L (ref 3.5–5)
PRO-BNP: 2806 PG/ML (ref 0–125)
PROTHROMBIN TIME: 28.4 SEC (ref 9.3–12.4)
RBC # BLD: 2.29 E12/L (ref 3.8–5.8)
RI(T): 0.98
SODIUM BLD-SCNC: 146 MMOL/L (ref 132–146)
SODIUM BLD-SCNC: 148 MMOL/L (ref 132–146)
SODIUM BLD-SCNC: 148 MMOL/L (ref 132–146)
SODIUM BLD-SCNC: 149 MMOL/L (ref 132–146)
SOURCE, BLOOD GAS: ABNORMAL
SOURCE, BLOOD GAS: ABNORMAL
THB: 7.3 G/DL (ref 11.5–16.5)
THB: 8.4 G/DL (ref 11.5–16.5)
TIME ANALYZED: 1550
TIME ANALYZED: 502
TOTAL PROTEIN: 4.9 G/DL (ref 6.4–8.3)
TROPONIN, HIGH SENSITIVITY: 113 NG/L (ref 0–11)
TROPONIN, HIGH SENSITIVITY: 93 NG/L (ref 0–11)
TROPONIN, HIGH SENSITIVITY: 99 NG/L (ref 0–11)
WBC # BLD: 10.5 E9/L (ref 4.5–11.5)

## 2022-10-11 PROCEDURE — 82962 GLUCOSE BLOOD TEST: CPT

## 2022-10-11 PROCEDURE — 3430000000 HC RX DIAGNOSTIC RADIOPHARMACEUTICAL: Performed by: RADIOLOGY

## 2022-10-11 PROCEDURE — 94660 CPAP INITIATION&MGMT: CPT

## 2022-10-11 PROCEDURE — 80048 BASIC METABOLIC PNL TOTAL CA: CPT

## 2022-10-11 PROCEDURE — 6370000000 HC RX 637 (ALT 250 FOR IP): Performed by: SURGERY

## 2022-10-11 PROCEDURE — 82330 ASSAY OF CALCIUM: CPT

## 2022-10-11 PROCEDURE — 85018 HEMOGLOBIN: CPT

## 2022-10-11 PROCEDURE — 2000000000 HC ICU R&B

## 2022-10-11 PROCEDURE — 78278 ACUTE GI BLOOD LOSS IMAGING: CPT

## 2022-10-11 PROCEDURE — 6370000000 HC RX 637 (ALT 250 FOR IP): Performed by: INTERNAL MEDICINE

## 2022-10-11 PROCEDURE — 6360000002 HC RX W HCPCS: Performed by: SURGERY

## 2022-10-11 PROCEDURE — 6370000000 HC RX 637 (ALT 250 FOR IP): Performed by: NURSE PRACTITIONER

## 2022-10-11 PROCEDURE — 85014 HEMATOCRIT: CPT

## 2022-10-11 PROCEDURE — 2500000003 HC RX 250 WO HCPCS: Performed by: NURSE PRACTITIONER

## 2022-10-11 PROCEDURE — 80053 COMPREHEN METABOLIC PANEL: CPT

## 2022-10-11 PROCEDURE — 6360000002 HC RX W HCPCS

## 2022-10-11 PROCEDURE — 2580000003 HC RX 258: Performed by: SURGERY

## 2022-10-11 PROCEDURE — 6360000002 HC RX W HCPCS: Performed by: NURSE PRACTITIONER

## 2022-10-11 PROCEDURE — 99233 SBSQ HOSP IP/OBS HIGH 50: CPT | Performed by: NURSE PRACTITIONER

## 2022-10-11 PROCEDURE — 82805 BLOOD GASES W/O2 SATURATION: CPT

## 2022-10-11 PROCEDURE — 85610 PROTHROMBIN TIME: CPT

## 2022-10-11 PROCEDURE — 84484 ASSAY OF TROPONIN QUANT: CPT

## 2022-10-11 PROCEDURE — 36415 COLL VENOUS BLD VENIPUNCTURE: CPT

## 2022-10-11 PROCEDURE — 82140 ASSAY OF AMMONIA: CPT

## 2022-10-11 PROCEDURE — 71045 X-RAY EXAM CHEST 1 VIEW: CPT

## 2022-10-11 PROCEDURE — 83880 ASSAY OF NATRIURETIC PEPTIDE: CPT

## 2022-10-11 PROCEDURE — C9113 INJ PANTOPRAZOLE SODIUM, VIA: HCPCS | Performed by: SURGERY

## 2022-10-11 PROCEDURE — 36430 TRANSFUSION BLD/BLD COMPNT: CPT

## 2022-10-11 PROCEDURE — 84100 ASSAY OF PHOSPHORUS: CPT

## 2022-10-11 PROCEDURE — A9538 TC99M PYROPHOSPHATE: HCPCS | Performed by: RADIOLOGY

## 2022-10-11 PROCEDURE — 94640 AIRWAY INHALATION TREATMENT: CPT

## 2022-10-11 PROCEDURE — A4216 STERILE WATER/SALINE, 10 ML: HCPCS | Performed by: SURGERY

## 2022-10-11 PROCEDURE — 6370000000 HC RX 637 (ALT 250 FOR IP)

## 2022-10-11 PROCEDURE — 2580000003 HC RX 258

## 2022-10-11 PROCEDURE — 85025 COMPLETE CBC W/AUTO DIFF WBC: CPT

## 2022-10-11 PROCEDURE — 2700000000 HC OXYGEN THERAPY PER DAY

## 2022-10-11 PROCEDURE — 2500000003 HC RX 250 WO HCPCS

## 2022-10-11 PROCEDURE — 83735 ASSAY OF MAGNESIUM: CPT

## 2022-10-11 PROCEDURE — 84132 ASSAY OF SERUM POTASSIUM: CPT

## 2022-10-11 RX ORDER — BUMETANIDE 0.25 MG/ML
1 INJECTION, SOLUTION INTRAMUSCULAR; INTRAVENOUS ONCE
Status: COMPLETED | OUTPATIENT
Start: 2022-10-11 | End: 2022-10-11

## 2022-10-11 RX ORDER — FENTANYL CITRATE 50 UG/ML
25 INJECTION, SOLUTION INTRAMUSCULAR; INTRAVENOUS ONCE
Status: COMPLETED | OUTPATIENT
Start: 2022-10-11 | End: 2022-10-11

## 2022-10-11 RX ORDER — SODIUM CHLORIDE 9 MG/ML
INJECTION, SOLUTION INTRAVENOUS PRN
Status: DISCONTINUED | OUTPATIENT
Start: 2022-10-11 | End: 2022-10-14

## 2022-10-11 RX ORDER — MECOBALAMIN 5000 MCG
5 TABLET,DISINTEGRATING ORAL ONCE
Status: COMPLETED | OUTPATIENT
Start: 2022-10-11 | End: 2022-10-11

## 2022-10-11 RX ORDER — SODIUM CHLORIDE 9 MG/ML
INJECTION, SOLUTION INTRAVENOUS PRN
Status: DISCONTINUED | OUTPATIENT
Start: 2022-10-11 | End: 2022-10-16

## 2022-10-11 RX ORDER — MECOBALAMIN 5000 MCG
5 TABLET,DISINTEGRATING ORAL NIGHTLY
Status: DISCONTINUED | OUTPATIENT
Start: 2022-10-11 | End: 2022-10-11

## 2022-10-11 RX ADMIN — ATORVASTATIN CALCIUM 80 MG: 40 TABLET, FILM COATED ORAL at 20:13

## 2022-10-11 RX ADMIN — CARBIDOPA AND LEVODOPA 1 TABLET: 25; 100 TABLET ORAL at 20:14

## 2022-10-11 RX ADMIN — MICONAZOLE NITRATE: 2 OINTMENT TOPICAL at 20:54

## 2022-10-11 RX ADMIN — SODIUM CHLORIDE, PRESERVATIVE FREE 40 MG: 5 INJECTION INTRAVENOUS at 14:33

## 2022-10-11 RX ADMIN — Medication 29 MILLICURIE: at 11:20

## 2022-10-11 RX ADMIN — DEXMEDETOMIDINE 0.2 MCG/KG/HR: 100 INJECTION, SOLUTION, CONCENTRATE INTRAVENOUS at 06:48

## 2022-10-11 RX ADMIN — ANTI-FUNGAL POWDER MICONAZOLE NITRATE TALC FREE: 1.42 POWDER TOPICAL at 09:14

## 2022-10-11 RX ADMIN — SODIUM BICARBONATE: 84 INJECTION, SOLUTION INTRAVENOUS at 12:03

## 2022-10-11 RX ADMIN — Medication 10 ML: at 09:15

## 2022-10-11 RX ADMIN — CETIRIZINE HYDROCHLORIDE 10 MG: 10 TABLET, FILM COATED ORAL at 09:12

## 2022-10-11 RX ADMIN — EZETIMIBE 10 MG: 10 TABLET ORAL at 09:10

## 2022-10-11 RX ADMIN — METOPROLOL SUCCINATE 25 MG: 25 TABLET, EXTENDED RELEASE ORAL at 09:10

## 2022-10-11 RX ADMIN — CARBIDOPA AND LEVODOPA 1 TABLET: 25; 100 TABLET ORAL at 09:18

## 2022-10-11 RX ADMIN — CIPROFLOXACIN 500 MG: 500 TABLET, FILM COATED ORAL at 09:12

## 2022-10-11 RX ADMIN — HYDROCODONE BITARTRATE AND ACETAMINOPHEN 1 TABLET: 7.5; 325 TABLET ORAL at 20:13

## 2022-10-11 RX ADMIN — IPRATROPIUM BROMIDE AND ALBUTEROL SULFATE 1 AMPULE: .5; 2.5 SOLUTION RESPIRATORY (INHALATION) at 07:59

## 2022-10-11 RX ADMIN — Medication 10 ML: at 20:16

## 2022-10-11 RX ADMIN — MICONAZOLE NITRATE: 2 OINTMENT TOPICAL at 09:15

## 2022-10-11 RX ADMIN — Medication 5 MG: at 02:35

## 2022-10-11 RX ADMIN — PETROLATUM: 420 OINTMENT TOPICAL at 20:16

## 2022-10-11 RX ADMIN — ANTI-FUNGAL POWDER MICONAZOLE NITRATE TALC FREE: 1.42 POWDER TOPICAL at 20:15

## 2022-10-11 RX ADMIN — BUMETANIDE 1 MG: 0.25 INJECTION, SOLUTION INTRAMUSCULAR; INTRAVENOUS at 13:18

## 2022-10-11 RX ADMIN — MICONAZOLE NITRATE: 2 OINTMENT TOPICAL at 09:19

## 2022-10-11 RX ADMIN — CARBIDOPA AND LEVODOPA 1 TABLET: 25; 100 TABLET ORAL at 14:33

## 2022-10-11 RX ADMIN — SODIUM CHLORIDE, PRESERVATIVE FREE 40 MG: 5 INJECTION INTRAVENOUS at 02:37

## 2022-10-11 RX ADMIN — FENTANYL CITRATE 25 MCG: 50 INJECTION, SOLUTION INTRAMUSCULAR; INTRAVENOUS at 02:25

## 2022-10-11 RX ADMIN — CIPROFLOXACIN 500 MG: 500 TABLET, FILM COATED ORAL at 20:13

## 2022-10-11 RX ADMIN — HYDRALAZINE HYDROCHLORIDE 10 MG: 20 INJECTION INTRAMUSCULAR; INTRAVENOUS at 23:20

## 2022-10-11 RX ADMIN — AMLODIPINE BESYLATE 10 MG: 10 TABLET ORAL at 09:12

## 2022-10-11 RX ADMIN — FENTANYL CITRATE 25 MCG: 50 INJECTION, SOLUTION INTRAMUSCULAR; INTRAVENOUS at 09:13

## 2022-10-11 RX ADMIN — IPRATROPIUM BROMIDE AND ALBUTEROL SULFATE 1 AMPULE: .5; 2.5 SOLUTION RESPIRATORY (INHALATION) at 19:01

## 2022-10-11 RX ADMIN — BENZONATATE 100 MG: 100 CAPSULE ORAL at 20:14

## 2022-10-11 RX ADMIN — HYDROCODONE BITARTRATE AND ACETAMINOPHEN 1 TABLET: 7.5; 325 TABLET ORAL at 09:18

## 2022-10-11 RX ADMIN — PETROLATUM: 420 OINTMENT TOPICAL at 09:13

## 2022-10-11 RX ADMIN — BENZONATATE 100 MG: 100 CAPSULE ORAL at 14:33

## 2022-10-11 RX ADMIN — HYDRALAZINE HYDROCHLORIDE 10 MG: 20 INJECTION INTRAMUSCULAR; INTRAVENOUS at 18:09

## 2022-10-11 RX ADMIN — IPRATROPIUM BROMIDE AND ALBUTEROL SULFATE 1 AMPULE: .5; 2.5 SOLUTION RESPIRATORY (INHALATION) at 16:18

## 2022-10-11 ASSESSMENT — PAIN DESCRIPTION - DESCRIPTORS: DESCRIPTORS: ACHING;DISCOMFORT;SORE

## 2022-10-11 ASSESSMENT — PAIN SCALES - GENERAL
PAINLEVEL_OUTOF10: 6
PAINLEVEL_OUTOF10: 0

## 2022-10-11 ASSESSMENT — PAIN DESCRIPTION - ORIENTATION: ORIENTATION: MID

## 2022-10-11 ASSESSMENT — PAIN DESCRIPTION - LOCATION: LOCATION: ABDOMEN

## 2022-10-11 NOTE — PROGRESS NOTES
Spoke with Blossom Molina RN regarding patient's ordered embolization. Ordering provider will need to order new INR, states patient has been NPO, thinners continuing to be held. Patient is not able to sign consent, will obtain telephone consent from [address] [place] [address].

## 2022-10-11 NOTE — PROGRESS NOTES
OT consult received and appreciated. Chart reviewed. Will hold evaluation per nursing d/t medical status. Will evaluate at a later time. Thank you.  Soledad Gaspar, OTR/L # [id number]

## 2022-10-11 NOTE — PROGRESS NOTES
Date: 10/11/2022    Time: 12:40 AM    Patient Placed On BIPAP/CPAP/ Non-Invasive Ventilation? No    If no must comment. Facial area red/color change? No           If YES are Blister/Lesion present? No   If yes must notify nursing staff  BIPAP/CPAP skin barrier? Yes    Skin barrier type:mepilexlite       Comments: Remains on from previous shift.          Gilberto Ayoub RCP

## 2022-10-11 NOTE — PROGRESS NOTES
Entered pt room to find pt with bipap ripped apart, IV pulled out, and leads off; confused and yelling out. Pt has been consistently pulling off bipap and equipment throughout the night despite multiple attempts to reorient pt. Two point soft wrist restraints applied and NP notified. Order and request placed for sitter.

## 2022-10-11 NOTE — PROGRESS NOTES
[facility]  Neurology follow up     Date:  10/11/2022  Patient Name:  Ya Victor  YOB: 1955  MRN: [id number]     PMH: Recent CVA, Parkinson's disease, seizure history, diabetes, history of MI, MATT, PVD, chronic back pain, chronic kidney disease, depression    Vital signs at present time are stable on O2 via nasal cannula    There is a sitter present at bedside currently. 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 s/p IR angioplasty and stent placement  Unfortunately due to GI bleed both aspirin and Brilinta have been discontinued    Large GI bleed   Status post 12 units of PRBCs--- last H&H 6.8/21.1   Embolization with IR pending    Hx of PD   On sinemet     Plan  Continue supportive care  Discontinue ASA and Brilinta due to GI bleed  Once able, Plavix should be started until patient is able to resume DAPT to prevent clotting of his stent  Continue high intensity statin   A1c 6.4, fasting lipid panel ordered  Stroke risk factor modification  SCDs  Continue PT/OT/ST as able  Continue telemetry  Stroke education    Neurology will be available as needed. Please call with additional questions or concerns. Anticipate stroke clinic follow-up at discharge. History of Present Illness:  Ya Victor is a 74 y.o. male presenting for evaluation of stroke. He presented here as a transfer from [facility] on 9/27. With R side weakness and aphasia. Imaging revealed significant LICA stenosis; greater than 90% with no LVO or other areas of high-grade stenosis. CT perfusion showed no significant ischemic penumbra. CT head showed no acute pathology or hemorrhage. He underwent IR angioplasty with stent placement. MRI brain showed two tiny foci of acute stroke on the L. Echo was unrevealing, though bubble study was suboptimal.     Hx of R side weakness from prior stroke. Unsure if his weakness is much different from baseline. No family at bedside today     Review of Systems:  ROS limited due to aphasia and lethargy  + R side weakness     Allergies: Allergies   Allergen Reactions    Codeine Other (See Comments)     blisters        Physical Examination  Vitals   Vitals:    10/11/22 0810 10/11/22 0815 10/11/22 0900 10/11/22 0948   BP: (!) 149/82  (!) 149/83    Pulse: 83  82    Resp: 19  14 13   Temp: 98.6 °F (37 °C) 98.6 °F (37 °C)     TempSrc: Temporal      SpO2: 100%  100%    Weight:       Height:            General: Lethargic, does not open eyes to voice or noxious stimuli  HEENT: Normocephalic, atraumatic  Lungs: Unlabored breaths on O2 via nasal cannula  Heart: NSR on tele   Extremities/Peripheral vascular: L foot wrapped due to wound. Severe PVD. Neurologic Examination    Mental Status  Lethargic, does not open eyes to voice or noxious stimuli, does not follow commands for me; remains aphasic and disoriented when up    Cranial Nerves  II. Visual fields b/l threat   III, IV, VI: Pupils equally round and reactive to light, 3 to 2 mm bilaterally. Pupils are midline; no nystagmus or gaze preference appreciated  V. Facial sensation intact to light touch bilaterally  VII: Facial movements appear symmetric around ETT  VIII: Hearing intact to voice  IX,X: Palate elevates symmetrically.   XI: Sternocleidomastoid and trapezius 5/5 bilaterally   XII: Tongue is midline    Motor  Limited motor exam due to lethargy  Right hemiparesis  Normal bulk and tone  No abnormal movements     Sensation  Very minimal response to pain    Coordination  Limited due to lethargy    Gait  Deferred for safety/fall consideration    Reflexes  No reflexes    (+) Left Babinski; unable to test right due to Ace wrap      Labs  Lab Results   Component Value Date    WBC 10.5 10/11/2022    HGB 6.8 (LL) 10/11/2022    HCT 21.1 (L) 10/11/2022    MCV 92.1 10/11/2022     (L) 10/11/2022    LYMPHOPCT 7.8 (L) 10/11/2022    RBC 2.29 (L) 10/11/2022    MCH 29.7 10/11/2022    MCHC 32.2 10/11/2022    RDW 17.2 (H) 10/11/2022     Lab Results   Component Value Date     (H) 10/11/2022    K 4.6 10/11/2022     (H) 10/11/2022    CO2 20 (L) 10/11/2022    BUN 63 (H) 10/11/2022    CREATININE 1.9 (H) 10/11/2022    GLUCOSE 150 (H) 10/11/2022    CALCIUM 8.2 (L) 10/11/2022    PROT 4.9 (L) 10/11/2022    LABALBU 2.2 (L) 10/11/2022    BILITOT 0.3 10/11/2022    ALKPHOS 193 (H) 10/11/2022    AST 17 10/11/2022    ALT <5 10/11/2022    LABGLOM 36 10/11/2022    GFRAA 43 10/11/2022     Hemoglobin A1C   Date Value Ref Range Status   09/28/2022 6.4 (H) 4.0 - 5.6 % Final       Imaging  XR CHEST PORTABLE   Final Result   1. Persistent bilateral patchy parenchymal densities concerning for pneumonia   and or atelectasis   2. Persistent small bilateral pleural effusions, left greater than right   3. Cardiomegaly, stable         XR CHEST PORTABLE   Final Result   No significant change compared to the prior exam with cardiomegaly, bilateral   airspace opacities and bilateral pleural effusions seen most suggestive of   congestive heart failure, but superimposed pneumonia not excluded. US DUP ABD PEL RETRO SCROT COMPLETE   Final Result   1. No evidence of testicular torsion      2. Severe scrotal skin thickening. 3.  Trace hydroceles. 4.  No evidence of bowel containing inguinal hernia. US SCROTUM AND TESTICLES   Final Result   1. No evidence of testicular torsion      2. Severe scrotal skin thickening. 3.  Trace hydroceles. 4.  No evidence of bowel containing inguinal hernia. XR CHEST PORTABLE   Final Result   1. No significant interval change in the appearance of the chest.      2.  Bilateral pleural effusions and areas of atelectasis or multifocal   pneumonia.          CTA ABDOMEN PELVIS W CONTRAST   Final Result   Mixed densities throughout the colonic segments including in the ascending   colon and rectum could represent mixed densities of blood products although   no focal area of arterial enhancement or extravasation/blushing is observed   to localize acute hemorrhage. No evidence for perforation or abscess. No   pneumatosis intestinalis or portal venous gas evident. 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 No adjacent hematoma with only minimal   adjacent stranding. Bilateral pleural effusions moderate to large right and moderate left   effusions with adjacent atelectasis. Anasarca. XR CHEST PORTABLE   Final Result   Bilateral pleural effusions with bibasilar patchy infiltrates and mild   cardiomegaly. NM GI BLOOD LOSS   Final Result   Active GI bleeding identified during acquisition in the splenic flexure with   peristalsis identified into the proximal descending colon. RECOMMENDATIONS:   Unavailable         CT ABDOMEN PELVIS WO CONTRAST   Final Result   CHEST:      No evidence of neoplastic disease, allowing for limitations of noncontrast   technique. Moderate bilateral pleural effusions. Nonemergent incidental findings as above. ABDOMEN/PELVIS:      No evidence of neoplastic disease, allowing for limitations of noncontrast   technique. Bladder wall thickening is nonspecific given under distension; recommend   correlation urinalysis to evaluate for UTI. Nonemergent incidental findings as above. CT CHEST WO CONTRAST   Final Result   CHEST:      No evidence of neoplastic disease, allowing for limitations of noncontrast   technique. Moderate bilateral pleural effusions. Nonemergent incidental findings as above. ABDOMEN/PELVIS:      No evidence of neoplastic disease, allowing for limitations of noncontrast   technique.       Bladder wall thickening is nonspecific given under distension; recommend   correlation urinalysis to evaluate for UTI. Nonemergent incidental findings as above. FL MODIFIED BARIUM SWALLOW W VIDEO   Final Result   1. Mildly impaired swallowing mechanism with swallowing delay and followed   by laryngeal penetration with thin liquid barium when using a straw. No   barium aspiration      2. Please see separate speech pathology report for full discussion of   findings and recommendations. RECOMMENDATIONS:   Unavailable         XR ABDOMEN (KUB) (SINGLE AP VIEW)   Final Result   Somewhat greater than average quantity of retained fecal material thin the   lower GI tract suggesting dysfunction with evacuation. XR CHEST PORTABLE   Final Result   1. Atherosclerotic disease and mild cardiomegaly. 2.  Persistent but improved opacities in the bilateral lungs most pronounced   in the mid and lower lungs. There appears to be a small left and trace right   pleural effusion         XR FOOT RIGHT (2 VIEWS)   Final Result   No evidence of calcaneal osteomyelitis. CT HEAD WO CONTRAST   Final Result   Parenchymal volume loss and chronic microvascular ischemic changes. No acute intracranial abnormality. Ethmoid and sphenoid sinusitis. Right mastoid effusion. US RETROPERITONEAL COMPLETE   Final Result   1. Normal appearance of the bilateral kidneys. No hydronephrosis. 2.  A Mahan catheter is decompressing the bladder. XR CHEST PORTABLE   Final Result   Worsening infiltrate and or atelectasis on the left, stable on the right with   suspected layering pleural effusion. XR CHEST PORTABLE   Final Result   Unchanged bilateral atelectasis and or infiltrate as well as small right   pleural effusion. MRI BRAIN WO CONTRAST   Final Result   There are 2 small regions of petechial infarcts one in the left   posterior frontal lobe and a second in the left parietal lobe not   exceeding 3 mm.  There is otherwise extensive small vessel ischemic   changes XR CHEST PORTABLE   Final Result   1. Multifocal bilateral airspace disease more prominent within the lower   lobes. The airspace disease is unchanged when compared with the prior study. CT HEAD WO CONTRAST   Final Result   Diffuse atrophy likely age related   Findings compatible with small vessel ischemic changes. XR CHEST PORTABLE   Final Result   1. Pulmonary opacities present favoring edema and atelectasis, also small   pleural effusions, but nonspecific with some additional considerations noted   above   2. Support devices present as described above   3. Heart size appears borderline enlarged         XR ABDOMEN FOR NG/OG/NE TUBE PLACEMENT   Final Result   NG/OG is in the stomach. IR CAROTID STENT W PROTECTION   Final Result   1. Angiogram demonstrates successful placement of a carotid stent ,   post procedure images demonstrate a widely patent stent and internal   carotid         CT HEAD WO CONTRAST   Final Result   Diffuse atrophy likely age related   Findings compatible with small vessel ischemic changes. Findings were called at the time of dictation         CT BRAIN PERFUSION   Final Result      No significant ischemic penumbra identified      This study was analyzed by the [employer] [place]. ai algorithm. CTA NECK W CONTRAST   Final Result   1. Estimated stenosis of the proximal right and left internal carotid   artery by NASCET criteria is greater than 90% on the left   2. Severe atherosclerotic disease . 3. No large vessel occlusion identified            This study was analyzed by the [employer] [place]. ai algorithm. CTA HEAD W CONTRAST   Final Result   1. Estimated stenosis of the proximal right and left internal carotid   artery by NASCET criteria is greater than 90% on the left   2. Severe atherosclerotic disease . 3. No large vessel occlusion identified            This study was analyzed by the [employer] [place]. ai algorithm.                IR INTERVENTIONAL RADIOLOGY PROCEDURE REQUEST    (Results Pending)   XR CHEST PORTABLE    (Results Pending)             Echo    Severe concentric left ventricular hypertrophy. Ejection fraction is visually estimated at 60 to 65%. Left ventricular diastolic filling is elevated . Mildly dilated right ventricle with normal systolic function. Agitated saline injected for shunt evaluation was technically difficult   due to pt being on vent and lack of corperation. Please obtain limited   echo with Bubble study once pt is extubated and cooperative. Mild mitral regurgitation is present. Mild tricuspid regurgitation. RVSP is 42 mmHg. Physiologic and/or trace, Mild pulmonic regurgitation present. I have personally reviewed all pertinent labs and neuroimaging today.     Electronically signed by CONSTANTINE Skelton NP on 10/11/2022 at 10:16 AM

## 2022-10-11 NOTE — PLAN OF CARE
Problem: Skin/Tissue Integrity  Goal: Absence of new skin breakdown  Description: 1. Monitor for areas of redness and/or skin breakdown  2. Assess vascular access sites hourly  3. Every 4-6 hours minimum:  Change oxygen saturation probe site  4. Every 4-6 hours:  If on nasal continuous positive airway pressure, respiratory therapy assess nares and determine need for appliance change or resting period. Outcome: Progressing     Problem: Safety - Adult  Goal: Free from fall injury  Outcome: Progressing     Problem: ABCDS Injury Assessment  Goal: Absence of physical injury  Outcome: Progressing     Problem: Respiratory - Adult  Goal: Achieves optimal ventilation and oxygenation  Outcome: Progressing     Problem: Skin/Tissue Integrity - Adult  Goal: Skin integrity remains intact  Outcome: Progressing  Flowsheets (Taken 10/11/2022 0800)  Skin Integrity Remains Intact: Monitor for areas of redness and/or skin breakdown  Goal: Incisions, wounds, or drain sites healing without S/S of infection  Outcome: Progressing  Goal: Oral mucous membranes remain intact  Outcome: Progressing     Problem: Hematologic - Adult  Goal: Maintains hematologic stability  Outcome: Progressing  Flowsheets (Taken 10/11/2022 0800)  Maintains hematologic stability: Assess for signs and symptoms of bleeding or hemorrhage     Problem: Nutrition Deficit:  Goal: Optimize nutritional status  Outcome: Progressing     Problem: Safety - Medical Restraint  Goal: Remains free of injury from restraints (Restraint for Interference with Medical Device)  Description: INTERVENTIONS:  1. Determine that other, less restrictive measures have been tried or would not be effective before applying the restraint  2. Evaluate the patient's condition at the time of restraint application  3. Inform patient/family regarding the reason for restraint  4.  Q2H: Monitor safety, psychosocial status, comfort, nutrition and hydration  10/11/2022 1126 by Na Herbert RN  Outcome: Progressing  10/11/2022 0813 by Kasia Rodriguez RN  Outcome: Progressing     Problem: Discharge Planning  Goal: Discharge to home or other facility with appropriate resources  Recent Flowsheet Documentation  Taken 10/11/2022 0800 by Audrey Mahoney RN  Discharge to home or other facility with appropriate resources: Identify barriers to discharge with patient and caregiver     Problem: Chronic Conditions and Co-morbidities  Goal: Patient's chronic conditions and co-morbidity symptoms are monitored and maintained or improved  Recent Flowsheet Documentation  Taken 10/11/2022 0800 by Alexia Greene 34 - Patient's Chronic Conditions and Co-Morbidity Symptoms are Monitored and Maintained or Improved: Monitor and assess patient's chronic conditions and comorbid symptoms for stability, deterioration, or improvement     Problem: Metabolic/Fluid and Electrolytes - Adult  Goal: Electrolytes maintained within normal limits  Recent Flowsheet Documentation  Taken 10/11/2022 0800 by Audrey Mahoney RN  Electrolytes maintained within normal limits: Monitor labs and assess patient for signs and symptoms of electrolyte imbalances  Goal: Hemodynamic stability and optimal renal function maintained  Recent Flowsheet Documentation  Taken 10/11/2022 0800 by Audrey Mahoney RN  Hemodynamic stability and optimal renal function maintained: Monitor labs and assess for signs and symptoms of volume excess or deficit  Goal: Glucose maintained within prescribed range  Recent Flowsheet Documentation  Taken 10/11/2022 0800 by Audrey Mahoney RN  Glucose maintained within prescribed range: Monitor blood glucose as ordered

## 2022-10-11 NOTE — PROGRESS NOTES
Department of Internal Medicine  Infectious Diseases   Progress Note      C/C :  Right foot  wound infection     Denies fever or chills  Reports pain   Afebrile  Transferred to MICU for low H/H ( GI bleeding )       Current Facility-Administered Medications   Medication Dose Route Frequency Provider Last Rate Last Admin    0.9 % sodium chloride infusion   IntraVENous PRN Shilpa Alvin, APRN - CNP        dexmedetomidine (PRECEDEX) 1,000 mcg in sodium chloride 0.9 % 250 mL infusion  0.1-1.5 mcg/kg/hr IntraVENous Continuous Shilpa Alvin APRN - CNP 5.91 mL/hr at 10/11/22 0700 0.2 mcg/kg/hr at 10/11/22 0700    0.9 % sodium chloride infusion   IntraVENous PRN Earle Rodriguezof, APRN - CNP        miconazole nitrate 2 % ointment   Topical BID Daniela Dole, DO   Given at 10/11/22 3538    And    miconazole nitrate 2 % ointment   Topical TID PRN Daniela Dole, DO   Given at 10/11/22 0915    [Held by provider] gabapentin (NEURONTIN) capsule 600 mg  600 mg Oral 4x daily Lolly Skiff, MD   600 mg at 10/10/22 2102    amLODIPine (NORVASC) tablet 10 mg  10 mg Oral Daily Earle Hoof, APRN - CNP   10 mg at 10/11/22 0912    0.9 % sodium chloride infusion   IntraVENous PRN Earle Guevara APRN - CNP        sodium bicarbonate 150 mEq in sterile water 1,000 mL infusion   IntraVENous Continuous Earle Rodriguezof, APRN - CNP 75 mL/hr at 10/11/22 0700 Rate Verify at 10/11/22 0700    white petrolatum ointment   Topical BID Daniela Dole, DO   Given at 10/11/22 0913    sodium chloride flush 0.9 % injection 5-40 mL  5-40 mL IntraVENous 2 times per day Lolly Skiff, MD   10 mL at 10/11/22 0915    sodium chloride flush 0.9 % injection 5-40 mL  5-40 mL IntraVENous PRN Lolly Skiff, MD        0.9 % sodium chloride infusion   IntraVENous PRN Lolly Skiff, MD        ciprofloxacin (CIPRO) tablet 500 mg  500 mg Oral 2 times per day Lolly Skiff, MD   500 mg at 10/11/22 0912    miconazole (MICOTIN) 2 % powder   Topical BID Blanche Barley Shankar, DO   Given at 10/11/22 0914    [Held by provider] insulin glargine-yfgn (SEMGLEE-YFGN) injection vial 32 Units  32 Units SubCUTAneous MAJOR MarcDO   32 Units at 10/07/22 0911    epoetin sharath-epbx (RETACRIT) injection 6,000 Units  6,000 Units SubCUTAneous Once per day on Mon Wed Fri Jareth Dumas MD   6,000 Units at 10/10/22 1207    [Held by provider] cloNIDine (CATAPRES) tablet 0.1 mg  0.1 mg Oral Q12H CONSTANTINE Villegas - CNP   0.1 mg at 10/08/22 0030    [Held by provider] hydrALAZINE (APRESOLINE) tablet 50 mg  50 mg Oral 3 times per day Lelo Becker APRN - CNP   50 mg at 10/08/22 0634    [Held by provider] lactulose (CHRONULAC) 10 GM/15ML solution 20 g  20 g Oral BID Jareth Dumas MD   20 g at 10/09/22 2300    insulin lispro (HUMALOG) injection vial 0-16 Units  0-16 Units SubCUTAneous 4x Daily AC & HS Jareth Dumas MD   4 Units at 10/07/22 1702    HYDROcodone-acetaminophen (Jonetta Floss) 7.5-325 MG per tablet 1 tablet  1 tablet Oral BID Jareth Dumas MD   1 tablet at 10/11/22 0918    pantoprazole (PROTONIX) 40 mg in sodium chloride (PF) 10 mL injection  40 mg IntraVENous Q12H Jareth Dumas MD   40 mg at 10/11/22 0237    metoprolol succinate (TOPROL XL) extended release tablet 25 mg  25 mg Oral Daily Jareth Dumas MD   25 mg at 10/11/22 0910    [Held by provider] sennosides (SENOKOT) 8.8 MG/5ML syrup 5 mL  5 mL Oral Nightly Jareth Dumas MD   5 mL at 10/09/22 2300    labetalol (NORMODYNE;TRANDATE) injection 10 mg  10 mg IntraVENous Q30 Min PRN Jareth Dumas MD   10 mg at 10/01/22 1115    hydrALAZINE (APRESOLINE) injection 10 mg  10 mg IntraVENous Q30 Min PRN Jareth Dumas MD   10 mg at 09/30/22 1627    glucose chewable tablet 16 g  4 tablet Oral PRN Jareth Dumas MD        dextrose bolus 10% 125 mL  125 mL IntraVENous PRN Jareth Dumas MD        Or    dextrose bolus 10% 250 mL  250 mL IntraVENous PRN Jareth Dumas MD        glucagon (rDNA) injection 1 mg  1 mg SubCUTAneous PRN Alexander Chavis MD        dextrose 10 % infusion   IntraVENous Continuous PRN Alexander Chavis MD        carbidopa-levodopa (SINEMET)  MG per tablet 1 tablet  1 tablet Per NG tube TID Alexander Chavis MD   1 tablet at 10/11/22 0918    ipratropium-albuterol (DUONEB) nebulizer solution 1 ampule  1 ampule Inhalation Q4H WA Alexander Chavis MD   1 ampule at 10/11/22 0759    collagenase ointment   Topical Q MWF Alexander Chavis MD   Given at 10/10/22 1151    benzonatate (TESSALON) capsule 100 mg  100 mg Oral TID Alexander Chavis MD   100 mg at 10/10/22 2102    cetirizine (ZYRTEC) tablet 10 mg  10 mg Oral Daily Alexander Chavis MD   10 mg at 10/11/22 0912    ezetimibe (ZETIA) tablet 10 mg  10 mg Oral Daily Alexander Chavis MD   10 mg at 10/11/22 0910    [Held by provider] ticagrelor (BRILINTA) tablet 90 mg  90 mg Oral BID Alexander Chavis MD   90 mg at 10/09/22 2300    ondansetron (ZOFRAN-ODT) disintegrating tablet 4 mg  4 mg Oral Q8H PRN Alexander Chavis MD        Or    ondansetron (ZOFRAN) injection 4 mg  4 mg IntraVENous Q6H PRN Alexander Chavis MD        [Held by provider] aspirin EC tablet 81 mg  81 mg Oral Daily Alexander Chavis MD   81 mg at 10/09/22 7605    Or    [Held by provider] aspirin suppository 300 mg  300 mg Rectal Daily Alexander Chavis MD   300 mg at 09/29/22 0809    atorvastatin (LIPITOR) tablet 80 mg  80 mg Oral Nightly Alexander Chavis MD   80 mg at 10/10/22 2115           REVIEW OF SYSTEMS:   CONSTITUTIONAL:  Denies fever, chill or rigors. HEENT: denies blurring of vision or double vision, denies hearing problem  RESPIRATORY: denies cough, shortness of breath,  CARDIOVASCULAR:  Denies palpitation  GASTROINTESTINAL:  Denies abdomen pain, diarrhea or constipation. GENITOURINARY:  Denies burning urination or frequency of urination  INTEGUMENT: Right heel wound  HEMATOLOGIC/LYMPHATIC:  Denies lymph node swelling, gum bleeding or easy bruising.   MUSCULOSKELETAL:  Denies leg pain , joint pain , joint swelling  NEUROLOGICAL:  weakness right side . PHYSICAL EXAM:      Vitals:     BP (!) 147/65   Pulse 75   Temp 98.2 °F (36.8 °C) (Temporal)   Resp 13   Ht 5' 8\" (1.727 m)   Wt 260 lb 8 oz (118.2 kg)   SpO2 100%   BMI 39.61 kg/m²     General Appearance:    Awake, alert , no acute distress. Head:    Normocephalic, atraumatic   Eyes:    No pallor, no icterus,   Ears:    No obvious deformity or drainage.    Nose:   No nasal drainage   Throat:   Mucosa moist, no oral thrush   Neck:   Supple, no lymphadenopathy   Lungs:     Scattered  wheeze    Heart:    Regular rate and rhythm, no murmur   Abdomen:     Soft, non-tender, bowel sounds present    Extremities:    Right heel wound    Pulses:   Dorsalis pedis palpable - diminished    Skin:   Right foot  wound with VAC              CBC with Differential:      Lab Results   Component Value Date/Time    WBC 10.5 10/11/2022 04:59 AM    RBC 2.29 10/11/2022 04:59 AM    HGB 6.8 10/11/2022 04:59 AM    HCT 21.1 10/11/2022 04:59 AM    HCT 15.0 09/27/2022 08:58 PM     10/11/2022 04:59 AM    MCV 92.1 10/11/2022 04:59 AM    MCH 29.7 10/11/2022 04:59 AM    MCHC 32.2 10/11/2022 04:59 AM    RDW 17.2 10/11/2022 04:59 AM    LYMPHOPCT 7.8 10/11/2022 04:59 AM    MONOPCT 5.6 10/11/2022 04:59 AM    BASOPCT 0.3 10/11/2022 04:59 AM    MONOSABS 0.59 10/11/2022 04:59 AM    LYMPHSABS 0.82 10/11/2022 04:59 AM    EOSABS 0.49 10/11/2022 04:59 AM    BASOSABS 0.03 10/11/2022 04:59 AM       CMP     Lab Results   Component Value Date/Time     10/11/2022 04:59 AM    K 4.6 10/11/2022 04:59 AM    K 4.7 10/10/2022 05:21 PM     10/11/2022 04:59 AM    CO2 20 10/11/2022 04:59 AM    BUN 63 10/11/2022 04:59 AM    CREATININE 1.9 10/11/2022 04:59 AM    GFRAA 43 10/11/2022 04:59 AM    LABGLOM 36 10/11/2022 04:59 AM    GLUCOSE 150 10/11/2022 04:59 AM    PROT 4.9 10/11/2022 04:59 AM    LABALBU 2.2 10/11/2022 04:59 AM    CALCIUM 8.2 10/11/2022 04:59 AM    BILITOT 0.3 10/11/2022 04:59 AM ALKPHOS 193 10/11/2022 04:59 AM    AST 17 10/11/2022 04:59 AM    ALT <5 10/11/2022 04:59 AM         Hepatic Function Panel:    Lab Results   Component Value Date/Time    ALKPHOS 193 10/11/2022 04:59 AM    ALT <5 10/11/2022 04:59 AM    AST 17 10/11/2022 04:59 AM    PROT 4.9 10/11/2022 04:59 AM    BILITOT 0.3 10/11/2022 04:59 AM    LABALBU 2.2 10/11/2022 04:59 AM       PT/INR:    Lab Results   Component Value Date/Time    PROTIME 23.9 10/10/2022 05:21 PM    INR 2.2 10/10/2022 05:21 PM       TSH:  No results found for: TSH    U/A:  No results found for: NITRITE, COLORU, PHUR, LABCAST, WBCUA, RBCUA, MUCUS, TRICHOMONAS, YEAST, BACTERIA, CLARITYU, SPECGRAV, LEUKOCYTESUR, UROBILINOGEN, BILIRUBINUR, BLOODU, GLUCOSEU, AMORPHOUS    ABG:  No results found for: [id number], BEART, C6HDJWOY, PHART, THGBART, BEM1WXV, PO2ART, ZRZ5DSH    MICROBIOLOGY:    SARS CoV 2 neg     CRP 0.8      Radiology :    Chest X ray : LLL infiltrates, atelectasis       X ray foot :           No evidence of calcaneal osteomyelitis. IMPRESSION:     Right foot  non healing wound  Leukocytosis - improved   ?  Aspiration       RECOMMENDATIONS:      Cipro 500 mg po q 12 hrs / Zyvox 600 mg po q 12 hs ~1 day   Local wound care

## 2022-10-11 NOTE — PROGRESS NOTES
[address] [facility]   Department of Internal Medicine   MICU Progress Note    Patient:  Christa Cooper 74 y.o. male   MRN: [id number]       Date of Service: 10/11/2022    Allergy: Codeine  CC strokelike symptoms  Subjective   Alert oriented 2-3 confused, follows commands. Supplement oxygen, requires noninvasive, was pulling telemetry cords and noninvasive ventilator and therefore was restrained and bedside sitter. Hemoglobin this morning was given 1 unit of PRBC  For IR for embolization-mended another bleeding scan  Blood products on hold, 2 PRBC, 1 platelets and 2 FFP if does go for IR procedure. Currently not on pressors, started on Precedex overnight. Complains of abdominal pain and shortness of breath, denies of fevers, chills, chest pain, nausea vomiting or diarrhea. No temps  Objective     TEMPERATURE:  Current - Temp: 98.4 °F (36.9 °C); Max - Temp  Av.4 °F (36.9 °C)  Min: 97.7 °F (36.5 °C)  Max: 99.3 °F (37.4 °C)    RESPIRATIONS RANGE: Resp  Av.3  Min: 14  Max: 32    PULSE RANGE: Pulse  Av.6  Min: 79  Max: 121    BLOOD PRESSURE RANGE:  Systolic (33WJT), WLP:945 , Min:88 , VKC:116   ; Diastolic (57NUE), NYZ:34, Min:26, Max:102      PULSE OXIMETRY RANGE: SpO2  Av.3 %  Min: 97 %  Max: 100 %    I & O - 24hr:    Intake/Output Summary (Last 24 hours) at 10/11/2022 0840  Last data filed at 10/11/2022 0700  Gross per 24 hour   Intake 3195.28 ml   Output 3750 ml   Net -554.72 ml     I/O last 3 completed shifts: In: 17171.3 [P.O.:820; I.V.:2195.9; Blood:9451.4; IV Piggyback:100]  Out: 1687 [Urine:4855; Drains:20] No intake/output data recorded. Weight change:     Physical Exam:  CONSTITUTIONAL: Awake, morbid obesity, alert to 1-2, intermittently confused, labored breathing.   EYES:  Lids and lashes normal, pupils equal, round and reactive to light, extra ocular muscles intact, sclera clear, conjunctiva normal  ENT:  Normocephalic, without obvious abnormality, atraumatic, sinuses nontender on palpation, noted mucous membrane dry, picking on skin of the mouth that noted to be bloody. CARDIOVASCULAR:  Normal apical impulse, regular rate and rhythm, normal S1 and S2, no S3 or S4, and no murmur noted  Lung Diminished lung sounds throughout lung fields, fine crackles noted on the bases, no wheezing noted, labor breathing, no use of accessory muscle at this time, on supplement oxygen. MUSCULOSKELETAL: weakness in extremities, echo noted, 2+ pitting edema on lower extremities. All extremities,  NEUROLOGIC: Awake, alert oriented 2, weakness on the right upper and lower extremities due to stroke. SKIN: Right foot nonhealing wound.     Medications     Continuous Infusions:   sodium chloride      dexmedetomidine (PRECEDEX) IV infusion 0.2 mcg/kg/hr (10/11/22 0700)    sodium chloride      sodium chloride      sodium bicarbonate infusion 75 mL/hr at 10/11/22 0700    sodium chloride      dextrose       Scheduled Meds:   fentanNYL  25 mcg IntraVENous Once    miconazole nitrate   Topical BID    [Held by provider] gabapentin  600 mg Oral 4x daily    amLODIPine  10 mg Oral Daily    white petrolatum   Topical BID    sodium chloride flush  5-40 mL IntraVENous 2 times per day    ciprofloxacin  500 mg Oral 2 times per day    miconazole   Topical BID    [Held by provider] insulin glargine  32 Units SubCUTAneous QAM    epoetin sharath-epbx  6,000 Units SubCUTAneous Once per day on Mon Wed Fri    [Held by provider] cloNIDine  0.1 mg Oral Q12H    [Held by provider] hydrALAZINE  50 mg Oral 3 times per day    [Held by provider] lactulose  20 g Oral BID    insulin lispro  0-16 Units SubCUTAneous 4x Daily AC & HS    HYDROcodone-acetaminophen  1 tablet Oral BID    pantoprazole (PROTONIX) 40 mg injection  40 mg IntraVENous Q12H    metoprolol succinate  25 mg Oral Daily    [Held by provider] sennosides  5 mL Oral Nightly    carbidopa-levodopa  1 tablet Per NG tube TID    ipratropium-albuterol  1 ampule Inhalation Q4H WA collagenase   Topical Q MWF    benzonatate  100 mg Oral TID    cetirizine  10 mg Oral Daily    ezetimibe  10 mg Oral Daily    [Held by provider] ticagrelor  90 mg Oral BID    [Held by provider] aspirin  81 mg Oral Daily    Or    [Held by provider] aspirin  300 mg Rectal Daily    atorvastatin  80 mg Oral Nightly     PRN Meds: sodium chloride, sodium chloride, miconazole nitrate **AND** miconazole nitrate, sodium chloride, sodium chloride flush, sodium chloride, labetalol, hydrALAZINE, glucose, dextrose bolus **OR** dextrose bolus, glucagon (rDNA), dextrose, ondansetron **OR** ondansetron  Nutrition:   N.p.o.  Labs and Imaging Studies     CBC:   Recent Labs     10/10/22  0555 10/10/22  1721 10/11/22  0038 10/11/22  0459   WBC 11.0 13.9*  --  10.5   RBC 1.82* 2.24*  --  2.29*   HGB 5.6* 6.5* 7.5* 6.8*   HCT 17.6* 21.2* 23.6* 21.1*   MCV 96.7 94.6  --  92.1   MCH 30.8 29.0  --  29.7   MCHC 31.8* 30.7*  --  32.2   RDW 16.2* 16.8*  --  17.2*    165  --  129*   MPV 10.0 10.1  --  9.7       BMP:    Recent Labs     10/10/22  0555 10/10/22  1721 10/11/22  0038 10/11/22  0459   * 147* 146 148*   K 5.1* 4.7 4.5 4.6   * 119* 116* 117*   CO2 16* 17* 20* 20*   BUN 75* 71* 67* 63*   CREATININE 2.1* 2.0* 1.9* 1.9*   GLUCOSE 186* 179* 173* 150*   CALCIUM 7.9* 8.3* 8.5* 8.2*   PROT 4.4* 4.9*  --  4.9*   LABALBU 2.1* 2.4*  --  2.2*   BILITOT 0.2 0.4  --  0.3   ALKPHOS 206* 223*  --  193*   AST 21 19  --  17   ALT 5 <5  --  <5       LIVER PROFILE:   Recent Labs     10/10/22  0555 10/10/22  1721 10/11/22  0459   AST 21 19 17   ALT 5 <5 <5   BILITOT 0.2 0.4 0.3   ALKPHOS 206* 223* 193*       PT/INR:   Recent Labs     10/09/22  0505 10/10/22  1721   PROTIME 23.0* 23.9*   INR 2.1 2.2       APTT:   No results for input(s): APTT in the last 72 hours.     Fasting Lipid Panel:    No results found for: CHOL, TRIG, HDL    Cardiac Enzymes:    No results found for: CKTOTAL, CKMB, CKMBINDEX, TROPONINI    Notable Cultures: Blood cultures No results found for: BC  Respiratory cultures No results found for: RESPCULTURE No results found for: LABGRAM  Urine No results found for: LABURIN  Legionella No results found for: LABLEGI  C Diff PCR No results found for: CDIFPCR  Wound culture/abscess: No results for input(s): WNDABS in the last 72 hours. Tip culture:No results for input(s): CXCATHTIP in the last 72 hours. Antibiotic  Days  Day started   Cipro     Linezolid                      Oxygen:     Vent Information  Ventilator ID: [id number]    Additional Respiratory Assessments  Heart Rate: 82  Resp: 23  SpO2: 100 %  Position: Semi-Sarabia's  Humidification Source: Heated wire  Humidification Temp: 36.2  Circuit Condensation: Drained     Nasal cannula L/min     Face mask %     Reservoirs mask %       ABG     PH  7.32   PCO2  38.5   PO2  99.2   HCO3  19.5   Sat%  94   FIO2     DATES        Lines:  Site  Day  Date inserted     TLC              PICC              Arterial line              Peripheral line              HD cath            Urinary Catheter 09/29/22 Mahan-Output (mL): 140 mL  [REMOVED] Urinary Catheter 09/27/22-Output (mL): 100 mL    Imaging Studies:    XR CHEST PORTABLE   Final Result   No significant change compared to the prior exam with cardiomegaly, bilateral   airspace opacities and bilateral pleural effusions seen most suggestive of   congestive heart failure, but superimposed pneumonia not excluded. US DUP ABD PEL RETRO SCROT COMPLETE   Final Result   1. No evidence of testicular torsion      2. Severe scrotal skin thickening. 3.  Trace hydroceles. 4.  No evidence of bowel containing inguinal hernia. US SCROTUM AND TESTICLES   Final Result   1. No evidence of testicular torsion      2. Severe scrotal skin thickening. 3.  Trace hydroceles. 4.  No evidence of bowel containing inguinal hernia. XR CHEST PORTABLE   Final Result   1.   No significant interval change in the appearance of the chest.      2.  Bilateral pleural effusions and areas of atelectasis or multifocal   pneumonia. CTA ABDOMEN PELVIS W CONTRAST   Final Result   Mixed densities throughout the colonic segments including in the ascending   colon and rectum could represent mixed densities of blood products although   no focal area of arterial enhancement or extravasation/blushing is observed   to localize acute hemorrhage. No evidence for perforation or abscess. No   pneumatosis intestinalis or portal venous gas evident. 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 No adjacent hematoma with only minimal   adjacent stranding. Bilateral pleural effusions moderate to large right and moderate left   effusions with adjacent atelectasis. Anasarca. XR CHEST PORTABLE   Final Result   Bilateral pleural effusions with bibasilar patchy infiltrates and mild   cardiomegaly. NM GI BLOOD LOSS   Final Result   Active GI bleeding identified during acquisition in the splenic flexure with   peristalsis identified into the proximal descending colon. RECOMMENDATIONS:   Unavailable         CT ABDOMEN PELVIS WO CONTRAST   Final Result   CHEST:      No evidence of neoplastic disease, allowing for limitations of noncontrast   technique. Moderate bilateral pleural effusions. Nonemergent incidental findings as above. ABDOMEN/PELVIS:      No evidence of neoplastic disease, allowing for limitations of noncontrast   technique. Bladder wall thickening is nonspecific given under distension; recommend   correlation urinalysis to evaluate for UTI. Nonemergent incidental findings as above. CT CHEST WO CONTRAST   Final Result   CHEST:      No evidence of neoplastic disease, allowing for limitations of noncontrast   technique. Moderate bilateral pleural effusions. Nonemergent incidental findings as above. ABDOMEN/PELVIS:      No evidence of neoplastic disease, allowing for limitations of noncontrast   technique. Bladder wall thickening is nonspecific given under distension; recommend   correlation urinalysis to evaluate for UTI. Nonemergent incidental findings as above. FL MODIFIED BARIUM SWALLOW W VIDEO   Final Result   1. Mildly impaired swallowing mechanism with swallowing delay and followed   by laryngeal penetration with thin liquid barium when using a straw. No   barium aspiration      2. Please see separate speech pathology report for full discussion of   findings and recommendations. RECOMMENDATIONS:   Unavailable         XR ABDOMEN (KUB) (SINGLE AP VIEW)   Final Result   Somewhat greater than average quantity of retained fecal material thin the   lower GI tract suggesting dysfunction with evacuation. XR CHEST PORTABLE   Final Result   1. Atherosclerotic disease and mild cardiomegaly. 2.  Persistent but improved opacities in the bilateral lungs most pronounced   in the mid and lower lungs. There appears to be a small left and trace right   pleural effusion         XR FOOT RIGHT (2 VIEWS)   Final Result   No evidence of calcaneal osteomyelitis. CT HEAD WO CONTRAST   Final Result   Parenchymal volume loss and chronic microvascular ischemic changes. No acute intracranial abnormality. Ethmoid and sphenoid sinusitis. Right mastoid effusion. US RETROPERITONEAL COMPLETE   Final Result   1. Normal appearance of the bilateral kidneys. No hydronephrosis. 2.  A Mahan catheter is decompressing the bladder. XR CHEST PORTABLE   Final Result   Worsening infiltrate and or atelectasis on the left, stable on the right with   suspected layering pleural effusion.          XR CHEST PORTABLE   Final Result   Unchanged bilateral atelectasis and or infiltrate as well as small right   pleural effusion. MRI BRAIN WO CONTRAST   Final Result   There are 2 small regions of petechial infarcts one in the left   posterior frontal lobe and a second in the left parietal lobe not   exceeding 3 mm. There is otherwise extensive small vessel ischemic   changes         XR CHEST PORTABLE   Final Result   1. Multifocal bilateral airspace disease more prominent within the lower   lobes. The airspace disease is unchanged when compared with the prior study. CT HEAD WO CONTRAST   Final Result   Diffuse atrophy likely age related   Findings compatible with small vessel ischemic changes. XR CHEST PORTABLE   Final Result   1. Pulmonary opacities present favoring edema and atelectasis, also small   pleural effusions, but nonspecific with some additional considerations noted   above   2. Support devices present as described above   3. Heart size appears borderline enlarged         XR ABDOMEN FOR NG/OG/NE TUBE PLACEMENT   Final Result   NG/OG is in the stomach. IR CAROTID STENT W PROTECTION   Final Result   1. Angiogram demonstrates successful placement of a carotid stent ,   post procedure images demonstrate a widely patent stent and internal   carotid         CT HEAD WO CONTRAST   Final Result   Diffuse atrophy likely age related   Findings compatible with small vessel ischemic changes. Findings were called at the time of dictation         CT BRAIN PERFUSION   Final Result      No significant ischemic penumbra identified      This study was analyzed by the [employer]. ai algorithm. CTA NECK W CONTRAST   Final Result   1. Estimated stenosis of the proximal right and left internal carotid   artery by NASCET criteria is greater than 90% on the left   2. Severe atherosclerotic disease . 3. No large vessel occlusion identified            This study was analyzed by the [employer]. ai algorithm. CTA HEAD W CONTRAST   Final Result   1. Estimated stenosis of the proximal right and left internal carotid   artery by NASCET criteria is greater than 90% on the left   2. Severe atherosclerotic disease . 3. No large vessel occlusion identified            This study was analyzed by the [employer]. abimael algorithm. IR INTERVENTIONAL RADIOLOGY PROCEDURE REQUEST    (Results Pending)   XR CHEST PORTABLE    (Results Pending)   XR CHEST PORTABLE    (Results Pending)          APRN- CNP Assessment and PLan   In summary, 74 y.o. male with significant past medical history of CKD, DM type II, MATT, presented 9/26/22 2 with stroke and GI bleed. Patient was admitted to [facility] with strokelike symptoms, right-sided paralysis and aphasia, found to have severe left ICA stenosis and was transfer to [facility] [place],  taken to IR for left ICA angioplasty with stent placement. Patient had GI Bleed, following general surgery, received total of 12 units of PRBC since admission. EGD on 10/3/22 showed no evidence of upper GI bleed, CT a/p showed showed no focal area of arterial enhancement or extra blush to localize acute hemorrhage, nuclear medicine scan was positive for bleeding around the splenic flexure. Colonoscopy showed full of clots. Transfer to MICU from PICU with low hgb, labor breathing and hypotensive. Assessment:  LMCA  stroke 2/2 symptomatic LICA stenosis s/p IR angioplasty and stent placement 9/26/2022 on dual antiplatelet therapy- off now   Acute hypoxemic and hypercapnic respiratory failure intubated/extubated, currently on supplemental oxygen  Acute  large GI bleed/acute blood loss anemia s/p 15 units of PRBC s/p EGD and Colonoscopy, and bleeding scan. bleeding around the splenic flexure.    LARY   Metabolic acidosis   Hypernatremia/ hyperchloremia  Hyperglycemia  Leukocytosis  Right foot ulcer    Hx DM type 2  Hx of MATT on CPAP  Hx of parkinson disease  Hx of seizure  Hx of PVD  Hx of depression      Plan:  - Currently on supplement oxygen, titrate to keep SPO2 goal above 92%  - bronchodilator eran and PRN  - Bipap qhs and PRN   - precedex gtt   - keep MAP >65mmHg  - neurology consulted for stroke, on dual antiplatelet therapy  - trend troponin, pro BNP  -Wound culture culture wound culture on foot showed Corynebacteria (Mixed morphologies, COVID 19 negative,   - ID following, on  Cipro and linezolid  - General surgery following for GI bleed, input appreciated  - s/p 15 units of PBRC  - hgb 6.8 this am received 1 unit of PRBC  -h/h q6H  - transfuse if less than 7  - PPI BID  - EGD, colonoscopy and bleeding scan done, recommended IR for embolization- on dural antiplatelet therapy- high risk of bleeding- unable to reach family  - IR for embolization today- hold 2 units off PRBC, 2 FFP, 1 plt, awaiting for INR- now recommend repeat bleeding scan  - Stat nuclear bleeding scan. - will hold brilinta and Asprin for now given a bleed   - metabolic acidosis, spoke with nephrology, will start with bicarb gtt  - trend Bmp  - Bumex 0 mg x 1, urine output 3.7L; I/o Since admission (+6.3L)   - nephrology,following ,input appreciated  - Podiatry and wound care following  - labs in AM  - F/E/N bicarb gtt/replace lytes/ NPO  - DVT/GI scds/ no anticoagulation given GI bleed/ Protonix BID  - Code status: full code         CONSTANTNIE Escobar-CNP   Critical Care     Discussed case with Attending Physician: Dr. Schuyler Cardenas    Discussed cased with general surgery and stated that nothing to offer from his standpoint, given patient has been on dual therapy and held yesterday, would recommend IR for embolization or transfer to [facility] care- transfer to [facility] was initiated.

## 2022-10-11 NOTE — PROGRESS NOTES
Department of Podiatry   Progress Note      Patient seen and evaluated at bedside today. No new pedal questions or complaints at this time. [facility] wound vac changes to continue. Past Medical History:            Diagnosis Date    Chronic back pain     CKD (chronic kidney disease)     CVA (cerebral vascular accident) ([facility]     CVA (cerebral vascular accident) ([facility]     DM (diabetes mellitus) ([facility]     Major depression     MI (myocardial infarction) ([facility]     MATT (obstructive sleep apnea)     Parkinson disease ([facility]     PVD (peripheral vascular disease) ([facility]     Seizure ([facility]        Past Surgical History:        Procedure Laterality Date    COLONOSCOPY N/A 10/9/2022    COLONOSCOPY DIAGNOSTIC performed by Norberto Waters MD at [place] [id number]      IR CAROTID STENT UNI W PROTECTION  9/27/2022    IR CAROTID STENT UNI W PROTECTION 9/27/2022 Jonas Clarso MD [facility]    UPPER GASTROINTESTINAL ENDOSCOPY N/A 10/3/2022    EGD DIAGNOSTIC ONLY performed by Dayana Du MD at [facility] ENDOSCOPY       Medications Prior to Admission:    Medications Prior to Admission: apixaban (ELIQUIS) 5 MG TABS tablet, Take 5 mg by mouth 2 times daily  aspirin 81 MG chewable tablet, Take 81 mg by mouth daily  carbidopa-levodopa (SINEMET)  MG per tablet, Take 1 tablet by mouth 3 times daily  carvedilol (COREG) 25 MG tablet, Take 25 mg by mouth 2 times daily (with meals)  cetirizine (ZYRTEC) 10 MG tablet, Take 10 mg by mouth daily  clopidogrel (PLAVIX) 75 MG tablet, Take 75 mg by mouth daily  diphenhydrAMINE (BENADRYL) 25 MG capsule, Take 25 mg by mouth every 6 hours  ezetimibe (ZETIA) 10 MG tablet, Take 10 mg by mouth daily  fluticasone (FLONASE) 50 MCG/ACT nasal spray, 2 sprays by Each Nostril route daily  gabapentin (NEURONTIN) 600 MG tablet, Take 600 mg by mouth 4 times daily. HYDROcodone-acetaminophen (NORCO) 5-325 MG per tablet, Take 1 tablet by mouth 2 times daily.   Insulin Glargine, 2 Unit Dial, (TOUJEO MAX SOLOSTAR) 300 UNIT/ML SOPN, Inject 66 Units into the skin daily  piperacillin-tazobactam (ZOSYN) 3-0.375 GM per 50ML IVPB extended infusion, Infuse 4.5 mg intravenously in the morning and 4.5 mg at noon and 4.5 mg in the evening. acetaminophen (TYLENOL) 325 MG tablet, Take 650 mg by mouth every 6 hours as needed for Pain  amLODIPine (NORVASC) 5 MG tablet, Take 5 mg by mouth daily  benzonatate (TESSALON) 100 MG capsule, Take 100 mg by mouth 3 times daily  cloNIDine (CATAPRES) 0.1 MG tablet, Take 0.1 mg by mouth in the morning and 0.1 mg in the evening. hydrALAZINE (APRESOLINE) 100 MG tablet, Take 100 mg by mouth 3 times daily    Allergies:  Codeine    Social History:   TOBACCO:   has no history on file for tobacco use. ETOH:   has no history on file for alcohol use. DRUGS:   Social History     Substance and Sexual Activity   Drug Use Not on file       Family History:   History reviewed. No pertinent family history. REVIEW OF SYSTEMS:    All pertinent positives and negatives as noted in HPI       LOWER EXTREMITY EXAMINATION   Wound vac intact running continuous pressure    Previous Exam:  VASCULAR:  DP and PT pulses are non palpable. CFT < 5 seconds B/L. Warm to warm from the tibial tuberosity to the distal aspect of the digits dorsally. NEUROLOGIC:  Protective sensation is diminished but grossly intact    DERM:  Right foot lateral plantar wound measuring approx 6cm in diameter. No erythema, serosanguinous drainage, no malodor.     MUSCULOSKELETAL: deferred           CONSULTS:  IP CONSULT TO CRITICAL CARE  IP CONSULT TO DIETITIAN  IP CONSULT TO CARDIOLOGY  IP CONSULT TO PODIATRY  IP CONSULT TO PODIATRY  IP CONSULT TO UROLOGY  IP CONSULT TO NEPHROLOGY  IP CONSULT TO GENERAL SURGERY  IP CONSULT TO INFECTIOUS DISEASES  IP CONSULT TO GI  IP CONSULT TO GENERAL SURGERY  IP CONSULT TO GENERAL SURGERY    MEDICATION:  Scheduled Meds:   miconazole nitrate   Topical BID    [Held by provider] gabapentin  600 mg Oral 4x daily    amLODIPine  10 mg Oral Daily    white petrolatum   Topical BID    sodium chloride flush  5-40 mL IntraVENous 2 times per day    ciprofloxacin  500 mg Oral 2 times per day    miconazole   Topical BID    [Held by provider] insulin glargine  32 Units SubCUTAneous QAM    epoetin sharath-epbx  6,000 Units SubCUTAneous Once per day on Mon Wed Fri    [Held by provider] cloNIDine  0.1 mg Oral Q12H    [Held by provider] hydrALAZINE  50 mg Oral 3 times per day    [Held by provider] lactulose  20 g Oral BID    insulin lispro  0-16 Units SubCUTAneous 4x Daily AC & HS    HYDROcodone-acetaminophen  1 tablet Oral BID    pantoprazole (PROTONIX) 40 mg injection  40 mg IntraVENous Q12H    metoprolol succinate  25 mg Oral Daily    [Held by provider] sennosides  5 mL Oral Nightly    carbidopa-levodopa  1 tablet Per NG tube TID    ipratropium-albuterol  1 ampule Inhalation Q4H WA    collagenase   Topical Q MWF    benzonatate  100 mg Oral TID    cetirizine  10 mg Oral Daily    ezetimibe  10 mg Oral Daily    [Held by provider] ticagrelor  90 mg Oral BID    [Held by provider] aspirin  81 mg Oral Daily    Or    [Held by provider] aspirin  300 mg Rectal Daily    atorvastatin  80 mg Oral Nightly     Continuous Infusions:   sodium chloride      dexmedetomidine (PRECEDEX) IV infusion 0.2 mcg/kg/hr (10/11/22 0700)    sodium chloride      sodium chloride      sodium bicarbonate infusion 75 mL/hr at 10/11/22 0700    sodium chloride      dextrose       PRN Meds:.sodium chloride, sodium chloride, miconazole nitrate **AND** miconazole nitrate, sodium chloride, sodium chloride flush, sodium chloride, labetalol, hydrALAZINE, glucose, dextrose bolus **OR** dextrose bolus, glucagon (rDNA), dextrose, ondansetron **OR** ondansetron    RADIOLOGY:  NM GI BLOOD LOSS   Final Result   No evidence of active GI bleeding during acquisition. XR CHEST PORTABLE   Final Result   1.  Persistent bilateral patchy parenchymal densities concerning for pneumonia   and or atelectasis   2. Persistent small bilateral pleural effusions, left greater than right   3. Cardiomegaly, stable         XR CHEST PORTABLE   Final Result   No significant change compared to the prior exam with cardiomegaly, bilateral   airspace opacities and bilateral pleural effusions seen most suggestive of   congestive heart failure, but superimposed pneumonia not excluded. US DUP ABD PEL RETRO SCROT COMPLETE   Final Result   1. No evidence of testicular torsion      2. Severe scrotal skin thickening. 3.  Trace hydroceles. 4.  No evidence of bowel containing inguinal hernia. US SCROTUM AND TESTICLES   Final Result   1. No evidence of testicular torsion      2. Severe scrotal skin thickening. 3.  Trace hydroceles. 4.  No evidence of bowel containing inguinal hernia. XR CHEST PORTABLE   Final Result   1. No significant interval change in the appearance of the chest.      2.  Bilateral pleural effusions and areas of atelectasis or multifocal   pneumonia. CTA ABDOMEN PELVIS W CONTRAST   Final Result   Mixed densities throughout the colonic segments including in the ascending   colon and rectum could represent mixed densities of blood products although   no focal area of arterial enhancement or extravasation/blushing is observed   to localize acute hemorrhage. No evidence for perforation or abscess. No   pneumatosis intestinalis or portal venous gas evident. 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 No adjacent hematoma with only minimal   adjacent stranding. Bilateral pleural effusions moderate to large right and moderate left   effusions with adjacent atelectasis. Anasarca.          XR CHEST PORTABLE   Final Result   Bilateral pleural effusions with bibasilar patchy infiltrates and mild   cardiomegaly. NM GI BLOOD LOSS   Final Result   Active GI bleeding identified during acquisition in the splenic flexure with   peristalsis identified into the proximal descending colon. RECOMMENDATIONS:   Unavailable         CT ABDOMEN PELVIS WO CONTRAST   Final Result   CHEST:      No evidence of neoplastic disease, allowing for limitations of noncontrast   technique. Moderate bilateral pleural effusions. Nonemergent incidental findings as above. ABDOMEN/PELVIS:      No evidence of neoplastic disease, allowing for limitations of noncontrast   technique. Bladder wall thickening is nonspecific given under distension; recommend   correlation urinalysis to evaluate for UTI. Nonemergent incidental findings as above. CT CHEST WO CONTRAST   Final Result   CHEST:      No evidence of neoplastic disease, allowing for limitations of noncontrast   technique. Moderate bilateral pleural effusions. Nonemergent incidental findings as above. ABDOMEN/PELVIS:      No evidence of neoplastic disease, allowing for limitations of noncontrast   technique. Bladder wall thickening is nonspecific given under distension; recommend   correlation urinalysis to evaluate for UTI. Nonemergent incidental findings as above. FL MODIFIED BARIUM SWALLOW W VIDEO   Final Result   1. Mildly impaired swallowing mechanism with swallowing delay and followed   by laryngeal penetration with thin liquid barium when using a straw. No   barium aspiration      2. Please see separate speech pathology report for full discussion of   findings and recommendations. RECOMMENDATIONS:   Unavailable         XR ABDOMEN (KUB) (SINGLE AP VIEW)   Final Result   Somewhat greater than average quantity of retained fecal material thin the   lower GI tract suggesting dysfunction with evacuation. XR CHEST PORTABLE   Final Result   1.   Atherosclerotic disease and mild cardiomegaly. 2.  Persistent but improved opacities in the bilateral lungs most pronounced   in the mid and lower lungs. There appears to be a small left and trace right   pleural effusion         XR FOOT RIGHT (2 VIEWS)   Final Result   No evidence of calcaneal osteomyelitis. CT HEAD WO CONTRAST   Final Result   Parenchymal volume loss and chronic microvascular ischemic changes. No acute intracranial abnormality. Ethmoid and sphenoid sinusitis. Right mastoid effusion. US RETROPERITONEAL COMPLETE   Final Result   1. Normal appearance of the bilateral kidneys. No hydronephrosis. 2.  A Mahan catheter is decompressing the bladder. XR CHEST PORTABLE   Final Result   Worsening infiltrate and or atelectasis on the left, stable on the right with   suspected layering pleural effusion. XR CHEST PORTABLE   Final Result   Unchanged bilateral atelectasis and or infiltrate as well as small right   pleural effusion. MRI BRAIN WO CONTRAST   Final Result   There are 2 small regions of petechial infarcts one in the left   posterior frontal lobe and a second in the left parietal lobe not   exceeding 3 mm. There is otherwise extensive small vessel ischemic   changes         XR CHEST PORTABLE   Final Result   1. Multifocal bilateral airspace disease more prominent within the lower   lobes. The airspace disease is unchanged when compared with the prior study. CT HEAD WO CONTRAST   Final Result   Diffuse atrophy likely age related   Findings compatible with small vessel ischemic changes. XR CHEST PORTABLE   Final Result   1. Pulmonary opacities present favoring edema and atelectasis, also small   pleural effusions, but nonspecific with some additional considerations noted   above   2. Support devices present as described above   3.  Heart size appears borderline enlarged         XR ABDOMEN FOR NG/OG/NE TUBE PLACEMENT   Final Result   NG/OG is in the stomach. IR CAROTID STENT W PROTECTION   Final Result   1. Angiogram demonstrates successful placement of a carotid stent ,   post procedure images demonstrate a widely patent stent and internal   carotid         CT HEAD WO CONTRAST   Final Result   Diffuse atrophy likely age related   Findings compatible with small vessel ischemic changes. Findings were called at the time of dictation         CT BRAIN PERFUSION   Final Result      No significant ischemic penumbra identified      This study was analyzed by the [employer] ai algorithm. CTA NECK W CONTRAST   Final Result   1. Estimated stenosis of the proximal right and left internal carotid   artery by NASCET criteria is greater than 90% on the left   2. Severe atherosclerotic disease . 3. No large vessel occlusion identified            This study was analyzed by the [employer] ai algorithm. CTA HEAD W CONTRAST   Final Result   1. Estimated stenosis of the proximal right and left internal carotid   artery by NASCET criteria is greater than 90% on the left   2. Severe atherosclerotic disease . 3. No large vessel occlusion identified            This study was analyzed by the [employer] ai algorithm. IR INTERVENTIONAL RADIOLOGY PROCEDURE REQUEST    (Results Pending)   XR CHEST PORTABLE    (Results Pending)       Vitals:    BP (!) 147/65   Pulse 75   Temp 98.2 °F (36.8 °C) (Temporal)   Resp 13   Ht 5' 8\" (1.727 m)   Wt 260 lb 8 oz (118.2 kg)   SpO2 100%   BMI 39.61 kg/m²     LABS:   Recent Labs     10/10/22  1721 10/11/22  0038 10/11/22  0459 10/11/22  1043   WBC 13.9*  --  10.5  --    HGB 6.5*   < > 6.8* 7.1*   HCT 21.2*   < > 21.1* 22.2*     --  129*  --     < > = values in this interval not displayed.      Recent Labs     10/11/22  0459 10/11/22  1043   * 148*   K 4.6 4.6   * 118*   CO2 20* 21*   PHOS 3.8  --    BUN 63* 61*   CREATININE 1.9* 1.8*     Recent Labs     10/10/22  1721 10/11/22  0459 10/11/22  1043   PROT 4.9* 4.9*  --    INR 2.2  --  2.6       ASSESSMENTS:   1. Right foot wound diabetic ulcer  2. DM  3. Pain right foot  Acute cerebrovascular accident (CVA) ([facility]            PLAN:  -Patient examined and evaluated at bedside  -All pertinent labs, charts, and imaging reviewed prior to encounter   -Abx per ID  -Continue Wound vac changes with santyl to the right foot. MWF vac changes  -Previous foot x rays: No acute osseous abnormalities  - Will continue to follow while in house  -Patient discussed with Dr. Jane Solo, DPM    Thank you for the opportunity to take part in the patient's care. Please do not hesitate to call for any questions or concerns.

## 2022-10-11 NOTE — PLAN OF CARE
Discussed via phone call with [facility] Radiologist Dr Roxanne Langley  Reviewed clinical events and current scenario  Given active GI hemmorrhage it is reasonable to withold ticagrelor at this time  I am in agreement with this recommendation. and would hold antiplatelet therapy Ticagrelor at this time

## 2022-10-11 NOTE — PROGRESS NOTES
Hospitalist Progress Note      PCP: Shun Roca MD    Date of Admission: 9/27/2022  Days in the hospital: [address] Course:   Patient is a 62-year-old male with history of CKD, diabetes mellitus, obstructive sleep apnea who initially presented to [facility] with strokelike symptoms and right-sided weakness along with aphasia. Patient was noted to have severe left ICA stenosis. He was transferred to [facility] and patient underwent left ICA angioplasty and stent placement by IR. He was initially admitted to the neuro ICU. He was placed on dual antiplatelet therapy with Brilinta and aspirin. Also was noted to have GI bleed and received 12 units of PRBC. EGD was done on 10/3/2022 and no evidence of upper GI bleed noted. Colonoscopy on 10/9/2022 showed large amount of blood. CT of the abdomen did not show any localization of hemorrhage. Nuclear medicine scan was done which showed bleeding around the splenic flexure. RRT was called due to hypotension and hemorrhagic shock and patient was transferred to ICU. Due to diffuse bleeding patient was taken off dual antiplatelet therapy. Subjective  Patient seen and examined at bedside. Forgetful, in restraints, chart reviewed, overnight events reviewed. Exam:    BP (!) 140/90   Pulse 82   Temp 98.4 °F (36.9 °C)   Resp 23   Ht 5' 8\" (1.727 m)   Wt 260 lb 8 oz (118.2 kg)   SpO2 100%   BMI 39.61 kg/m²     HEENT: + Pallor, no icterus. Respiratory:  CTA, good air entry. Cardiovascular: RRR, no murmur. Abdomen: Soft, non-tender, BS noted. Musculoskeletal: Right foot dressing and wound VAC noted. Neurologic: awake, alert and following commands       Assessment/Plan:  Acute CVA with left ICA stenosis s/p angioplasty and stent placement, dual antiplatelet therapy on hold, continue serial neurochecks, follow-up with neurology    Acute lower GI bleed s/p multiple units of PRBC transfusion.   Bleeding noted from Oral Q12H    [Held by provider] hydrALAZINE  50 mg Oral 3 times per day    [Held by provider] lactulose  20 g Oral BID    insulin lispro  0-16 Units SubCUTAneous 4x Daily AC & HS    [Held by provider] HYDROcodone-acetaminophen  1 tablet Oral BID    pantoprazole (PROTONIX) 40 mg injection  40 mg IntraVENous Q12H    metoprolol succinate  25 mg Oral Daily    [Held by provider] sennosides  5 mL Oral Nightly    carbidopa-levodopa  1 tablet Per NG tube TID    ipratropium-albuterol  1 ampule Inhalation Q4H WA    collagenase   Topical Q MWF    benzonatate  100 mg Oral TID    cetirizine  10 mg Oral Daily    ezetimibe  10 mg Oral Daily    [Held by provider] ticagrelor  90 mg Oral BID    [Held by provider] aspirin  81 mg Oral Daily    Or    [Held by provider] aspirin  300 mg Rectal Daily    atorvastatin  80 mg Oral Nightly     PRN Meds: sodium chloride, miconazole nitrate **AND** miconazole nitrate, sodium chloride, sodium chloride flush, sodium chloride, labetalol, hydrALAZINE, glucose, dextrose bolus **OR** dextrose bolus, glucagon (rDNA), dextrose, ondansetron **OR** ondansetron      Intake/Output Summary (Last 24 hours) at 10/11/2022 3508  Last data filed at 10/11/2022 0700  Gross per 24 hour   Intake 3195.28 ml   Output 3750 ml   Net -554.72 ml     Body mass index is 39.61 kg/m². Diet  Diet NPO Exceptions are: Sips of Water with Meds    Code Status  Full Code       Electronically signed by Larry Fox MD on 10/11/2022 at [facility]   Please contact me through perfect serve    NOTE: This report was transcribed using voice recognition software. Every effort was made to ensure accuracy; however, inadvertent computerized transcription errors may be present.

## 2022-10-11 NOTE — PROGRESS NOTES
[facility]  Nephrology Attending Progress Note  Mallory Myers. MD Judith        SUBJECTIVE:     History of Present Ilness:    Rachael Odonnell is a 74 y.o. male history of CAD s/p MI, hypertension, chronic kidney disease stage IIIa (baseline creatinine of recent 1.7-1.9) he is followed by our group with Dr. Dmitriy Reid. He initially was admitted to [facility] with right lower extremity wound. .  Patient subsequently developed aphasia noted to facial droop associated with right-sided weakness. He was transferred to [address] [place] [address] for further management. Patient was admitted told the neuro ICU. He required intubation with mechanical ventilation. CT of the head and neck demonstrated bilateral carotid stenosis with the left ICA being dominant. IR performed angiogram with angioplasty of the left ICA. Laboratory data showed progressive increase in his serum creatinine to currently 2.9 mg/dL. Hemoglobin was noted to be 5.6. He has received at least 2 units of packed cells but with further drop in his hemoglobin with requirement for additional transfusion. He had an episode of urinary retention with gross hematuria Mahan catheter was reinserted and the hematuria subsequently cleared. Renal is consulted for LARY. Subjective     10/1: he denies blurred vision, no headaches  10/2: No new acute issues from overnight; more alert; receiving PRBCs hemoglobin trending low  10/3: pt seen sp egd today, no cp or sob, family and nurse in room, no cp or sob  10/4: pt seen in room, no complaints  10/5: pt seen in icu, feels ok today, no cp or sob  10/6: pt seen in nsicu, no cp or sob, npo, wants to eat  10/7: pt seen in nsicu, no cp or sob, starting dysphagia diet  10/8: pt without complaint, eating better dysphagia  diet  10/9 : pt seen in room, no cp or sob, just got back from c scope, groggy, on ivf  10/10: pt seen in room, sob, sbp in 80s, getting unit of prbc, wheezing, had breathing tx.  Rrt to be called for resp distress and low bp. Passing melena and clots, on brilinita, to go to IR for embiolization, frank RN at bedside  10/11 :pt transferred to icu for continued gi bleeding and hypotension.  Brilinta now being held and awaits IR for possible embolization         PROBLEM LIST:    Patient Active Problem List   Diagnosis    Acute cerebrovascular accident (CVA) ([facility]    Acute respiratory failure with hypoxia (HCC)    Stenosis of left carotid artery    Hypoalbuminemia    Electrolyte imbalance    Hypernatremia        PAST MEDICAL HISTORY:    Past Medical History:   Diagnosis Date    Chronic back pain     CKD (chronic kidney disease)     CVA (cerebral vascular accident) ([facility] [address]     CVA (cerebral vascular accident) ([facility] [facility]     DM (diabetes mellitus) ([facility] [address] [facility]     Major depression     MI (myocardial infarction) ([facility] [address]     MATT (obstructive sleep apnea)     Parkinson disease (HCC)     PVD (peripheral vascular disease) ([facility] [address]     Seizure ([facility] [address]        DIET:    Diet NPO Exceptions are: Sips of Water with Meds     PHYSICAL EXAM:     Patient Vitals for the past 24 hrs:   BP Temp Temp src Pulse Resp SpO2   10/11/22 0948 -- -- -- -- 13 --   10/11/22 0900 (!) 149/83 -- -- 82 14 100 %   10/11/22 0815 -- 98.6 °F (37 °C) -- -- -- --   10/11/22 0810 (!) 149/82 98.6 °F (37 °C) Temporal 83 19 100 %   10/11/22 0800 -- -- -- -- -- 100 %   10/11/22 0759 -- -- -- 82 23 100 %   10/11/22 0755 (!) 140/90 98.4 °F (36.9 °C) -- 79 14 99 %   10/11/22 0700 (!) 179/59 -- -- 85 15 99 %   10/11/22 0600 95/73 -- -- 88 27 99 %   10/11/22 0545 -- -- -- 87 25 97 %   10/11/22 0530 -- 98 °F (36.7 °C) Temporal 86 21 99 %   10/11/22 0500 135/70 -- -- 83 22 99 %   10/11/22 0400 130/77 98.2 °F (36.8 °C) Temporal 84 15 100 %   10/11/22 0300 (!) 153/79 -- -- 86 23 100 %   10/11/22 0200 (!) 148/76 -- -- 86 16 100 %   10/11/22 0100 (!) 125/97 -- -- 84 19 99 %   10/11/22 0028 -- -- -- 84 16 --   10/11/22 0000 134/69 97.7 °F (36.5 °C) Axillary 86 17 99 %   10/10/22 2300 (!) 156/85 -- -- 87 23 --   10/10/22 2235 (!) 175/74 98 °F (36.7 °C) Axillary 86 15 100 %   10/10/22 2200 -- -- -- 87 19 100 %   10/10/22 2100 (!) 154/102 98.2 °F (36.8 °C) Axillary 89 28 100 %   10/10/22 2025 (!) 144/73 98.2 °F (36.8 °C) Axillary 90 24 97 %   10/10/22 2011 (!) 156/79 98 °F (36.7 °C) Axillary 89 24 100 %   10/10/22 2000 (!) 156/79 -- -- 88 17 100 %   10/10/22 1900 (!) 159/85 -- -- 88 15 100 %   10/10/22 1800 (!) 171/88 -- -- 87 (!) 31 100 %   10/10/22 1704 (!) 151/69 98.5 °F (36.9 °C) Temporal 87 29 99 %   10/10/22 1615 112/63 98.2 °F (36.8 °C) Oral 87 28 99 %   10/10/22 1605 112/63 98.2 °F (36.8 °C) -- 87 28 99 %   10/10/22 1553 -- -- -- -- -- 100 %   10/10/22 1435 (!) 102/43 99.3 °F (37.4 °C) Oral (!) 105 (!) 32 --   10/10/22 1415 (!) 88/26 99.3 °F (37.4 °C) Oral 83 (!) 32 --   10/10/22 1345 (!) 97/47 99.3 °F (37.4 °C) -- 84 (!) 32 99 %   10/10/22 1324 (!) 119/58 -- -- -- -- --   10/10/22 1321 -- 98.6 °F (37 °C) Oral (!) 121 20 98 %   10/10/22 1223 -- -- -- -- 22 --   10/10/22 1211 -- -- -- -- -- 100 %   10/10/22 1153 (!) 115/44 -- -- 82 -- --   10/10/22 1030 (!) 119/58 98.6 °F (37 °C) Oral (!) 121 20 98 %   @      Intake/Output Summary (Last 24 hours) at 10/11/2022 1013  Last data filed at 10/11/2022 0955  Gross per 24 hour   Intake 3495.28 ml   Output 3750 ml   Net -254.72 ml         Wt Readings from Last 3 Encounters:   09/27/22 260 lb 8 oz (118.2 kg)       Constitutional:  Pt is in no acute distress  Head: normocephalic, atraumatic  Neck: no JVD  Cardiovascular: regular rate and rhythm, no murmurs, gallops, or rubs  Respiratory:  No rales, rhochi, or wheezes  Gastrointestinal:  Soft, nontender, nondistended, bowel sounds x 4  Ext: tr edema  Skin: dry, no rash  Neuro: aaox3    MEDS (scheduled):    miconazole nitrate   Topical BID    [Held by provider] gabapentin  600 mg Oral 4x daily    amLODIPine  10 mg Oral Daily    white petrolatum   Topical BID    sodium chloride flush  5-40 mL IntraVENous 2 times per day ciprofloxacin  500 mg Oral 2 times per day    miconazole   Topical BID    [Held by provider] insulin glargine  32 Units SubCUTAneous QAM    epoetin sharath-epbx  6,000 Units SubCUTAneous Once per day on Mon Wed Fri    [Held by provider] cloNIDine  0.1 mg Oral Q12H    [Held by provider] hydrALAZINE  50 mg Oral 3 times per day    [Held by provider] lactulose  20 g Oral BID    insulin lispro  0-16 Units SubCUTAneous 4x Daily AC & HS    HYDROcodone-acetaminophen  1 tablet Oral BID    pantoprazole (PROTONIX) 40 mg injection  40 mg IntraVENous Q12H    metoprolol succinate  25 mg Oral Daily    [Held by provider] sennosides  5 mL Oral Nightly    carbidopa-levodopa  1 tablet Per NG tube TID    ipratropium-albuterol  1 ampule Inhalation Q4H WA    collagenase   Topical Q MWF    benzonatate  100 mg Oral TID    cetirizine  10 mg Oral Daily    ezetimibe  10 mg Oral Daily    [Held by provider] ticagrelor  90 mg Oral BID    [Held by provider] aspirin  81 mg Oral Daily    Or    [Held by provider] aspirin  300 mg Rectal Daily    atorvastatin  80 mg Oral Nightly       MEDS (infusions):   sodium chloride      dexmedetomidine (PRECEDEX) IV infusion 0.2 mcg/kg/hr (10/11/22 0700)    sodium chloride      sodium chloride      sodium bicarbonate infusion 75 mL/hr at 10/11/22 0700    sodium chloride      dextrose         MEDS (prn):  sodium chloride, sodium chloride, miconazole nitrate **AND** miconazole nitrate, sodium chloride, sodium chloride flush, sodium chloride, labetalol, hydrALAZINE, glucose, dextrose bolus **OR** dextrose bolus, glucagon (rDNA), dextrose, ondansetron **OR** ondansetron    DATA:    Recent Labs     10/10/22  0555 10/10/22  1721 10/11/22  0038 10/11/22  0459   WBC 11.0 13.9*  --  10.5   HGB 5.6* 6.5* 7.5* 6.8*   HCT 17.6* 21.2* 23.6* 21.1*   MCV 96.7 94.6  --  92.1    165  --  129*     Recent Labs     10/10/22  0555 10/10/22  1721 10/11/22  0038 10/11/22  0459   * 147* 146 148*   K 5.1* 4.7 4.5 4.6   * 119* 116* 117*   CO2 16* 17* 20* 20*   BUN 75* 71* 67* 63*   CREATININE 2.1* 2.0* 1.9* 1.9*   LABGLOM 32 34 36 36   GLUCOSE 186* 179* 173* 150*   CALCIUM 7.9* 8.3* 8.5* 8.2*   ALT 5 <5  --  <5   AST 21 19  --  17   BILITOT 0.2 0.4  --  0.3   ALKPHOS 206* 223*  --  193*   MG 2.0 2.1  --  2.0   PHOS 4.6* 4.0  --  3.8       Lab Results   Component Value Date    LABALBU 2.2 (L) 10/11/2022    LABALBU 2.4 (L) 10/10/2022    LABALBU 2.1 (L) 10/10/2022     No results found for: TSH    Iron Studies  Lab Results   Component Value Date    IRON 24 (L) 10/03/2022    TIBC 195 (L) 10/03/2022    FERRITIN 94 10/03/2022     Vitamin B-12   Date Value Ref Range Status   10/07/2022 370 211 - 946 pg/mL Final     Folate   Date Value Ref Range Status   10/07/2022 6.3 4.8 - 24.2 ng/mL Final       No results found for: VITD25  No results found for: PTH    No components found for: URIC    No results found for: VOL, APPEARANCE, COLORU, LABSPEC, LABPH, LEUKBLD, NITRU, GLUCOSEU, KETUA, UROBILINOGEN, KETUA, UROBILINOGEN, BILIRUBINUR, OCBU    No results found for: LIPIDPAN      IMPRESSION/RECOMMENDATIONS:      1. Acute CVA  S/p IR intervention with angioplasty of the left ICA     2. LARY on CKD stage 3b  Baseline 1.7-1.9  combined effects of ACEI use and contrast nephrotoxicity  Component of urinary retention  nonoliguric at this time  Disproportionate elevation of BUN relative to Cr c/w gib  stool heme positive  Monitor labs and urine  3.7 L  Bun/cr  128/3 >115/2.6> 105/2.5>95/2.5> 85/2.2>84/2.2>69/2.1>2.1> 63/1.9     3. Hypertensive emergency  presenting  with acute CVA  Adjust meds to target blood pressure control to SBP less than 160  BP with improved control  Now with gi bleed, may need levo     4. Anemia, chronic with acute worsening  Informed stool heme positive  PRBC transfusion as needed  Sp egd/c scope  Trf < 7  On ASHLEY   To go for embolization     5. Type 2 diabetes mellitus  Monitor glucose levels     6.  Hyperkalemia  Due to combination of LAYR and metabolic acidosis; PRBC transfusios  Lokelma  prn     7. Urinary retention  Suspected situational  Now with trejo  Urology following     8. Hypernatremia  Suspect intervascular volume depletion  Na 151>145>141>145>141>138>141>147>148  Follow on hco3 drip    9. Met acidosis  On hco3 drip  Improving  Ckd/hypotension          Chad Failing.  Paul Zurita MD

## 2022-10-11 NOTE — CARE COORDINATION
10/11:  Update CM Note:  Pt presented to the [facility] ER for stroke-like symptoms - right side weakness & aphasic. Pt was found to have severe left ICA stenosis. Pt was transferred to [facility]. Pt had a left ICA angioplasty & stent placement by IR. Pt was placed on dual antiplatelet therapy with Brilinta & aspirin. Pt was found to have a GI bleed & has recd 12 units of PRBC. 10/3- EGD no evidence of upper GI bleed noted. 10/9-Colonoscopy showed large amount of blood. Bleeding Scan showed bleeding around the splenic flexure. 10/10- RRT was called due to hypotension & low Hgb. Pt was transferred to MICU. Pt is on 6L/NC at 99%, Iv Precedex, Iv Bicarb gtt & Iv Protonix, in bilateral wrist restraints. IR consulted for possible embolization for colonic bleed. Pt has a wound vac to right foot. Pt has a sitter at bedside. Cm left message for [employer] [phone]. [facility] fax [phone] Ref [id number] good til 10/11. DUANE, passar & envelope in soft chart. Will need covid test done on day of dc. Sw/CM will continue to follow for dc planning.  Electronically signed by Marcso Ireland RN on 10/11/2022 at 9:29 AM

## 2022-10-12 ENCOUNTER — APPOINTMENT (OUTPATIENT)
Dept: GENERAL RADIOLOGY | Age: 67
DRG: 034 | End: 2022-10-12
Attending: INTERNAL MEDICINE
Payer: MEDICARE

## 2022-10-12 PROBLEM — K92.2 GI BLEEDING: Status: ACTIVE | Noted: 2022-09-27

## 2022-10-12 LAB
AADO2: 121.3 MMHG
AADO2: 97.9 MMHG
ALBUMIN SERPL-MCNC: 2.2 G/DL (ref 3.5–5.2)
ALP BLD-CCNC: 160 U/L (ref 40–129)
ALT SERPL-CCNC: <5 U/L (ref 0–40)
ANION GAP SERPL CALCULATED.3IONS-SCNC: 10 MMOL/L (ref 7–16)
ANISOCYTOSIS: ABNORMAL
APTT: 37.8 SEC (ref 24.5–35.1)
AST SERPL-CCNC: 15 U/L (ref 0–39)
B.E.: -4.4 MMOL/L (ref -3–3)
B.E.: -4.5 MMOL/L (ref -3–3)
BASOPHILS ABSOLUTE: 0.07 E9/L (ref 0–0.2)
BASOPHILS RELATIVE PERCENT: 0.9 % (ref 0–2)
BILIRUB SERPL-MCNC: 0.3 MG/DL (ref 0–1.2)
BLOOD BANK DISPENSE STATUS: NORMAL
BLOOD BANK PRODUCT CODE: NORMAL
BPU ID: NORMAL
BUN BLDV-MCNC: 53 MG/DL (ref 6–23)
CALCIUM IONIZED: 1.31 MMOL/L (ref 1.15–1.33)
CALCIUM SERPL-MCNC: 8.3 MG/DL (ref 8.6–10.2)
CHLORIDE BLD-SCNC: 115 MMOL/L (ref 98–107)
CO2: 22 MMOL/L (ref 22–29)
COHB: 0.7 % (ref 0–1.5)
COHB: 1.5 % (ref 0–1.5)
CREAT SERPL-MCNC: 1.7 MG/DL (ref 0.7–1.2)
CRITICAL: ABNORMAL
CRITICAL: ABNORMAL
DATE ANALYZED: ABNORMAL
DATE ANALYZED: ABNORMAL
DATE OF COLLECTION: ABNORMAL
DATE OF COLLECTION: ABNORMAL
DESCRIPTION BLOOD BANK: NORMAL
EOSINOPHILS ABSOLUTE: 0.31 E9/L (ref 0.05–0.5)
EOSINOPHILS RELATIVE PERCENT: 4.3 % (ref 0–6)
FIO2: 35 %
FIO2: 35 %
GFR AFRICAN AMERICAN: 49
GFR NON-AFRICAN AMERICAN: 41 ML/MIN/1.73
GLUCOSE BLD-MCNC: 139 MG/DL (ref 74–99)
HCO3: 20.6 MMOL/L (ref 22–26)
HCO3: 20.7 MMOL/L (ref 22–26)
HCT VFR BLD CALC: 22.2 % (ref 37–54)
HCT VFR BLD CALC: 23.4 % (ref 37–54)
HCT VFR BLD CALC: 24.8 % (ref 37–54)
HEMOGLOBIN: 7.2 G/DL (ref 12.5–16.5)
HEMOGLOBIN: 7.6 G/DL (ref 12.5–16.5)
HEMOGLOBIN: 8.1 G/DL (ref 12.5–16.5)
HHB: 3.1 % (ref 0–5)
HHB: 5.9 % (ref 0–5)
INR BLD: 2.4
INR BLD: 3.2
LAB: ABNORMAL
LAB: ABNORMAL
LYMPHOCYTES ABSOLUTE: 0.29 E9/L (ref 1.5–4)
LYMPHOCYTES RELATIVE PERCENT: 3.5 % (ref 20–42)
Lab: ABNORMAL
Lab: ABNORMAL
MAGNESIUM: 1.8 MG/DL (ref 1.6–2.6)
MCH RBC QN AUTO: 30.1 PG (ref 26–35)
MCHC RBC AUTO-ENTMCNC: 32.4 % (ref 32–34.5)
MCV RBC AUTO: 92.9 FL (ref 80–99.9)
METER GLUCOSE: 150 MG/DL (ref 74–99)
METER GLUCOSE: 164 MG/DL (ref 74–99)
METER GLUCOSE: 171 MG/DL (ref 74–99)
METER GLUCOSE: 184 MG/DL (ref 74–99)
METER GLUCOSE: 236 MG/DL (ref 74–99)
METHB: 0.3 % (ref 0–1.5)
METHB: 0.4 % (ref 0–1.5)
MODE: ABNORMAL
MODE: ABNORMAL
MONOCYTES ABSOLUTE: 0.22 E9/L (ref 0.1–0.95)
MONOCYTES RELATIVE PERCENT: 2.6 % (ref 2–12)
NEUTROPHILS ABSOLUTE: 6.5 E9/L (ref 1.8–7.3)
NEUTROPHILS RELATIVE PERCENT: 88.7 % (ref 43–80)
NUCLEATED RED BLOOD CELLS: 1.7 /100 WBC
O2 SATURATION: 94.4 % (ref 92–98.5)
O2 SATURATION: 97.3 % (ref 92–98.5)
O2HB: 93.1 % (ref 94–97)
O2HB: 95 % (ref 94–97)
OPERATOR ID: 1721
OPERATOR ID: 359
PATIENT TEMP: 37 C
PATIENT TEMP: 37 C
PCO2: 37.4 MMHG (ref 35–45)
PCO2: 38.5 MMHG (ref 35–45)
PDW BLD-RTO: 16.6 FL (ref 11.5–15)
PEEP/CPAP: 6 CMH2O
PEEP/CPAP: 6 CMH2O
PFO2: 2.13 MMHG/%
PFO2: 2.84 MMHG/%
PH BLOOD GAS: 7.35 (ref 7.35–7.45)
PH BLOOD GAS: 7.36 (ref 7.35–7.45)
PHOSPHORUS: 3.9 MG/DL (ref 2.5–4.5)
PIP: 14 CMH2O
PLATELET # BLD: 99 E9/L (ref 130–450)
PLATELET CONFIRMATION: NORMAL
PMV BLD AUTO: 9.8 FL (ref 7–12)
PO2: 74.7 MMHG (ref 75–100)
PO2: 99.4 MMHG (ref 75–100)
POIKILOCYTES: ABNORMAL
POLYCHROMASIA: ABNORMAL
POTASSIUM SERPL-SCNC: 4.4 MMOL/L (ref 3.5–5)
PROTHROMBIN TIME: 25.9 SEC (ref 9.3–12.4)
PROTHROMBIN TIME: 34.6 SEC (ref 9.3–12.4)
PS: 14 CMH20
RBC # BLD: 2.39 E12/L (ref 3.8–5.8)
RI(T): 0.99
RI(T): 1.62
ROULEAUX: ABNORMAL
RR MECHANICAL: 14 B/MIN
SODIUM BLD-SCNC: 147 MMOL/L (ref 132–146)
SOURCE, BLOOD GAS: ABNORMAL
SOURCE, BLOOD GAS: ABNORMAL
THB: 8.5 G/DL (ref 11.5–16.5)
THB: 8.6 G/DL (ref 11.5–16.5)
TIME ANALYZED: 1254
TIME ANALYZED: 646
TOTAL PROTEIN: 4.8 G/DL (ref 6.4–8.3)
WBC # BLD: 7.3 E9/L (ref 4.5–11.5)

## 2022-10-12 PROCEDURE — 80053 COMPREHEN METABOLIC PANEL: CPT

## 2022-10-12 PROCEDURE — 84100 ASSAY OF PHOSPHORUS: CPT

## 2022-10-12 PROCEDURE — 2580000003 HC RX 258: Performed by: SURGERY

## 2022-10-12 PROCEDURE — 6370000000 HC RX 637 (ALT 250 FOR IP): Performed by: SURGERY

## 2022-10-12 PROCEDURE — C9113 INJ PANTOPRAZOLE SODIUM, VIA: HCPCS | Performed by: SURGERY

## 2022-10-12 PROCEDURE — 71045 X-RAY EXAM CHEST 1 VIEW: CPT

## 2022-10-12 PROCEDURE — 6370000000 HC RX 637 (ALT 250 FOR IP): Performed by: NURSE PRACTITIONER

## 2022-10-12 PROCEDURE — 94640 AIRWAY INHALATION TREATMENT: CPT

## 2022-10-12 PROCEDURE — 82330 ASSAY OF CALCIUM: CPT

## 2022-10-12 PROCEDURE — 2500000003 HC RX 250 WO HCPCS: Performed by: SURGERY

## 2022-10-12 PROCEDURE — 82962 GLUCOSE BLOOD TEST: CPT

## 2022-10-12 PROCEDURE — 2580000003 HC RX 258: Performed by: INTERNAL MEDICINE

## 2022-10-12 PROCEDURE — 6360000002 HC RX W HCPCS

## 2022-10-12 PROCEDURE — 85730 THROMBOPLASTIN TIME PARTIAL: CPT

## 2022-10-12 PROCEDURE — 2580000003 HC RX 258: Performed by: STUDENT IN AN ORGANIZED HEALTH CARE EDUCATION/TRAINING PROGRAM

## 2022-10-12 PROCEDURE — 6360000002 HC RX W HCPCS: Performed by: SURGERY

## 2022-10-12 PROCEDURE — 2000000000 HC ICU R&B

## 2022-10-12 PROCEDURE — 85025 COMPLETE CBC W/AUTO DIFF WBC: CPT

## 2022-10-12 PROCEDURE — 36415 COLL VENOUS BLD VENIPUNCTURE: CPT

## 2022-10-12 PROCEDURE — 85018 HEMOGLOBIN: CPT

## 2022-10-12 PROCEDURE — 82805 BLOOD GASES W/O2 SATURATION: CPT

## 2022-10-12 PROCEDURE — 6360000002 HC RX W HCPCS: Performed by: STUDENT IN AN ORGANIZED HEALTH CARE EDUCATION/TRAINING PROGRAM

## 2022-10-12 PROCEDURE — 94660 CPAP INITIATION&MGMT: CPT

## 2022-10-12 PROCEDURE — 85014 HEMATOCRIT: CPT

## 2022-10-12 PROCEDURE — 36430 TRANSFUSION BLD/BLD COMPNT: CPT

## 2022-10-12 PROCEDURE — 6360000002 HC RX W HCPCS: Performed by: NURSE PRACTITIONER

## 2022-10-12 PROCEDURE — 83735 ASSAY OF MAGNESIUM: CPT

## 2022-10-12 PROCEDURE — 2700000000 HC OXYGEN THERAPY PER DAY

## 2022-10-12 PROCEDURE — 2500000003 HC RX 250 WO HCPCS: Performed by: NURSE PRACTITIONER

## 2022-10-12 PROCEDURE — A4216 STERILE WATER/SALINE, 10 ML: HCPCS | Performed by: SURGERY

## 2022-10-12 PROCEDURE — 99232 SBSQ HOSP IP/OBS MODERATE 35: CPT | Performed by: SURGERY

## 2022-10-12 PROCEDURE — 85610 PROTHROMBIN TIME: CPT

## 2022-10-12 RX ORDER — QUETIAPINE FUMARATE 25 MG/1
25 TABLET, FILM COATED ORAL EVERY 8 HOURS
Status: DISCONTINUED | OUTPATIENT
Start: 2022-10-12 | End: 2022-10-12

## 2022-10-12 RX ORDER — SODIUM CHLORIDE 450 MG/100ML
INJECTION, SOLUTION INTRAVENOUS CONTINUOUS
Status: DISCONTINUED | OUTPATIENT
Start: 2022-10-12 | End: 2022-10-13

## 2022-10-12 RX ORDER — SODIUM CHLORIDE 9 MG/ML
25 INJECTION, SOLUTION INTRAVENOUS PRN
Status: DISCONTINUED | OUTPATIENT
Start: 2022-10-12 | End: 2022-10-19 | Stop reason: SDUPTHER

## 2022-10-12 RX ORDER — MORPHINE SULFATE 2 MG/ML
2 INJECTION, SOLUTION INTRAMUSCULAR; INTRAVENOUS ONCE
Status: COMPLETED | OUTPATIENT
Start: 2022-10-12 | End: 2022-10-12

## 2022-10-12 RX ORDER — MORPHINE SULFATE 2 MG/ML
INJECTION, SOLUTION INTRAMUSCULAR; INTRAVENOUS
Status: COMPLETED
Start: 2022-10-12 | End: 2022-10-12

## 2022-10-12 RX ORDER — MAGNESIUM SULFATE 1 G/100ML
1000 INJECTION INTRAVENOUS ONCE
Status: COMPLETED | OUTPATIENT
Start: 2022-10-12 | End: 2022-10-12

## 2022-10-12 RX ORDER — SODIUM CHLORIDE 9 MG/ML
INJECTION, SOLUTION INTRAVENOUS PRN
Status: DISCONTINUED | OUTPATIENT
Start: 2022-10-12 | End: 2022-10-14

## 2022-10-12 RX ORDER — SODIUM CHLORIDE 0.9 % (FLUSH) 0.9 %
5-40 SYRINGE (ML) INJECTION PRN
Status: DISCONTINUED | OUTPATIENT
Start: 2022-10-12 | End: 2022-10-19 | Stop reason: SDUPTHER

## 2022-10-12 RX ORDER — BUMETANIDE 0.25 MG/ML
1 INJECTION, SOLUTION INTRAMUSCULAR; INTRAVENOUS ONCE
Status: COMPLETED | OUTPATIENT
Start: 2022-10-12 | End: 2022-10-12

## 2022-10-12 RX ORDER — QUETIAPINE FUMARATE 25 MG/1
25 TABLET, FILM COATED ORAL 2 TIMES DAILY
Status: DISCONTINUED | OUTPATIENT
Start: 2022-10-12 | End: 2022-10-12

## 2022-10-12 RX ORDER — LORAZEPAM 2 MG/ML
0.5 INJECTION INTRAMUSCULAR EVERY 8 HOURS
Status: DISCONTINUED | OUTPATIENT
Start: 2022-10-12 | End: 2022-10-12

## 2022-10-12 RX ORDER — SODIUM CHLORIDE 0.9 % (FLUSH) 0.9 %
5-40 SYRINGE (ML) INJECTION EVERY 12 HOURS SCHEDULED
Status: DISCONTINUED | OUTPATIENT
Start: 2022-10-12 | End: 2022-10-19 | Stop reason: SDUPTHER

## 2022-10-12 RX ORDER — QUETIAPINE FUMARATE 25 MG/1
50 TABLET, FILM COATED ORAL 2 TIMES DAILY
Status: DISCONTINUED | OUTPATIENT
Start: 2022-10-12 | End: 2022-10-26 | Stop reason: HOSPADM

## 2022-10-12 RX ADMIN — ATORVASTATIN CALCIUM 80 MG: 40 TABLET, FILM COATED ORAL at 20:04

## 2022-10-12 RX ADMIN — PETROLATUM: 420 OINTMENT TOPICAL at 20:06

## 2022-10-12 RX ADMIN — MICONAZOLE NITRATE: 2 OINTMENT TOPICAL at 20:06

## 2022-10-12 RX ADMIN — IPRATROPIUM BROMIDE AND ALBUTEROL SULFATE 1 AMPULE: .5; 2.5 SOLUTION RESPIRATORY (INHALATION) at 16:53

## 2022-10-12 RX ADMIN — AMLODIPINE BESYLATE 10 MG: 10 TABLET ORAL at 08:01

## 2022-10-12 RX ADMIN — CARBIDOPA AND LEVODOPA 1 TABLET: 25; 100 TABLET ORAL at 20:05

## 2022-10-12 RX ADMIN — ANTI-FUNGAL POWDER MICONAZOLE NITRATE TALC FREE: 1.42 POWDER TOPICAL at 07:52

## 2022-10-12 RX ADMIN — COLLAGENASE SANTYL: 250 OINTMENT TOPICAL at 09:10

## 2022-10-12 RX ADMIN — LABETALOL HYDROCHLORIDE 10 MG: 5 INJECTION, SOLUTION INTRAVENOUS at 12:59

## 2022-10-12 RX ADMIN — IPRATROPIUM BROMIDE AND ALBUTEROL SULFATE 1 AMPULE: .5; 2.5 SOLUTION RESPIRATORY (INHALATION) at 12:32

## 2022-10-12 RX ADMIN — IPRATROPIUM BROMIDE AND ALBUTEROL SULFATE 1 AMPULE: .5; 2.5 SOLUTION RESPIRATORY (INHALATION) at 08:41

## 2022-10-12 RX ADMIN — PETROLATUM: 420 OINTMENT TOPICAL at 07:52

## 2022-10-12 RX ADMIN — MORPHINE SULFATE 2 MG: 2 INJECTION, SOLUTION INTRAMUSCULAR; INTRAVENOUS at 12:55

## 2022-10-12 RX ADMIN — MORPHINE SULFATE 2 MG: 2 INJECTION, SOLUTION INTRAMUSCULAR; INTRAVENOUS at 12:44

## 2022-10-12 RX ADMIN — CIPROFLOXACIN 500 MG: 500 TABLET, FILM COATED ORAL at 08:02

## 2022-10-12 RX ADMIN — CARBIDOPA AND LEVODOPA 1 TABLET: 25; 100 TABLET ORAL at 09:08

## 2022-10-12 RX ADMIN — EPOETIN ALFA-EPBX 6000 UNITS: 3000 INJECTION, SOLUTION INTRAVENOUS; SUBCUTANEOUS at 07:53

## 2022-10-12 RX ADMIN — BUMETANIDE 1 MG: 0.25 INJECTION, SOLUTION INTRAMUSCULAR; INTRAVENOUS at 09:07

## 2022-10-12 RX ADMIN — ANTI-FUNGAL POWDER MICONAZOLE NITRATE TALC FREE: 1.42 POWDER TOPICAL at 20:05

## 2022-10-12 RX ADMIN — CARBIDOPA AND LEVODOPA 1 TABLET: 25; 100 TABLET ORAL at 14:36

## 2022-10-12 RX ADMIN — INSULIN LISPRO 4 UNITS: 100 INJECTION, SOLUTION INTRAVENOUS; SUBCUTANEOUS at 17:30

## 2022-10-12 RX ADMIN — MICONAZOLE NITRATE: 2 OINTMENT TOPICAL at 07:51

## 2022-10-12 RX ADMIN — SODIUM CHLORIDE, PRESERVATIVE FREE 40 MG: 5 INJECTION INTRAVENOUS at 14:36

## 2022-10-12 RX ADMIN — HYDROCODONE BITARTRATE AND ACETAMINOPHEN 1 TABLET: 7.5; 325 TABLET ORAL at 20:04

## 2022-10-12 RX ADMIN — EZETIMIBE 10 MG: 10 TABLET ORAL at 07:53

## 2022-10-12 RX ADMIN — QUETIAPINE FUMARATE 25 MG: 25 TABLET ORAL at 12:03

## 2022-10-12 RX ADMIN — PHYTONADIONE 10 MG: 10 INJECTION, EMULSION INTRAMUSCULAR; INTRAVENOUS; SUBCUTANEOUS at 07:54

## 2022-10-12 RX ADMIN — QUETIAPINE 50 MG: 25 TABLET ORAL at 20:04

## 2022-10-12 RX ADMIN — HYDROCODONE BITARTRATE AND ACETAMINOPHEN 1 TABLET: 7.5; 325 TABLET ORAL at 07:50

## 2022-10-12 RX ADMIN — SODIUM CHLORIDE, PRESERVATIVE FREE 40 MG: 5 INJECTION INTRAVENOUS at 03:26

## 2022-10-12 RX ADMIN — CETIRIZINE HYDROCHLORIDE 10 MG: 10 TABLET, FILM COATED ORAL at 07:50

## 2022-10-12 RX ADMIN — SODIUM CHLORIDE: 4.5 INJECTION, SOLUTION INTRAVENOUS at 12:56

## 2022-10-12 RX ADMIN — MICONAZOLE NITRATE: 2 OINTMENT TOPICAL at 20:07

## 2022-10-12 RX ADMIN — MAGNESIUM SULFATE HEPTAHYDRATE 1000 MG: 1 INJECTION, SOLUTION INTRAVENOUS at 06:49

## 2022-10-12 RX ADMIN — BENZONATATE 100 MG: 100 CAPSULE ORAL at 20:06

## 2022-10-12 RX ADMIN — BENZONATATE 100 MG: 100 CAPSULE ORAL at 14:35

## 2022-10-12 RX ADMIN — IPRATROPIUM BROMIDE AND ALBUTEROL SULFATE 1 AMPULE: .5; 2.5 SOLUTION RESPIRATORY (INHALATION) at 20:34

## 2022-10-12 RX ADMIN — DEXMEDETOMIDINE 0.6 MCG/KG/HR: 100 INJECTION, SOLUTION, CONCENTRATE INTRAVENOUS at 07:40

## 2022-10-12 RX ADMIN — Medication 10 ML: at 20:05

## 2022-10-12 RX ADMIN — METOPROLOL SUCCINATE 25 MG: 25 TABLET, EXTENDED RELEASE ORAL at 07:48

## 2022-10-12 ASSESSMENT — PAIN - FUNCTIONAL ASSESSMENT
PAIN_FUNCTIONAL_ASSESSMENT: PREVENTS OR INTERFERES SOME ACTIVE ACTIVITIES AND ADLS
PAIN_FUNCTIONAL_ASSESSMENT: PREVENTS OR INTERFERES SOME ACTIVE ACTIVITIES AND ADLS
PAIN_FUNCTIONAL_ASSESSMENT: PREVENTS OR INTERFERES WITH MANY ACTIVE NOT PASSIVE ACTIVITIES
PAIN_FUNCTIONAL_ASSESSMENT: PREVENTS OR INTERFERES SOME ACTIVE ACTIVITIES AND ADLS
PAIN_FUNCTIONAL_ASSESSMENT: PREVENTS OR INTERFERES SOME ACTIVE ACTIVITIES AND ADLS

## 2022-10-12 ASSESSMENT — PAIN SCALES - GENERAL
PAINLEVEL_OUTOF10: 10
PAINLEVEL_OUTOF10: 0
PAINLEVEL_OUTOF10: 3
PAINLEVEL_OUTOF10: 7
PAINLEVEL_OUTOF10: 0
PAINLEVEL_OUTOF10: 7
PAINLEVEL_OUTOF10: 0
PAINLEVEL_OUTOF10: 7
PAINLEVEL_OUTOF10: 0

## 2022-10-12 ASSESSMENT — PAIN SCALES - WONG BAKER
WONGBAKER_NUMERICALRESPONSE: 0

## 2022-10-12 ASSESSMENT — PAIN DESCRIPTION - PAIN TYPE
TYPE: CHRONIC PAIN

## 2022-10-12 ASSESSMENT — PAIN DESCRIPTION - DESCRIPTORS
DESCRIPTORS: BURNING;ACHING
DESCRIPTORS: STABBING;SHOOTING;SORE
DESCRIPTORS: ACHING

## 2022-10-12 ASSESSMENT — PAIN DESCRIPTION - ORIENTATION
ORIENTATION: RIGHT

## 2022-10-12 ASSESSMENT — PAIN DESCRIPTION - FREQUENCY
FREQUENCY: INTERMITTENT

## 2022-10-12 ASSESSMENT — PAIN DESCRIPTION - LOCATION
LOCATION: FOOT

## 2022-10-12 ASSESSMENT — PAIN DESCRIPTION - ONSET
ONSET: ON-GOING
ONSET: GRADUAL
ONSET: GRADUAL

## 2022-10-12 NOTE — PROGRESS NOTES
[facility]  Nephrology Attending Progress Note  Prabhakar Quiñonez. MD Judith        SUBJECTIVE:     History of Present Ilness:    Evelia Shankar is a 74 y.o. male history of CAD s/p MI, hypertension, chronic kidney disease stage IIIa (baseline creatinine of recent 1.7-1.9) he is followed by our group with Dr. Toyin Brennan. He initially was admitted to [facility] with right lower extremity wound. .  Patient subsequently developed aphasia noted to facial droop associated with right-sided weakness. He was transferred to [address] [place] [address] for further management. Patient was admitted told the neuro ICU. He required intubation with mechanical ventilation. CT of the head and neck demonstrated bilateral carotid stenosis with the left ICA being dominant. IR performed angiogram with angioplasty of the left ICA. Laboratory data showed progressive increase in his serum creatinine to currently 2.9 mg/dL. Hemoglobin was noted to be 5.6. He has received at least 2 units of packed cells but with further drop in his hemoglobin with requirement for additional transfusion. He had an episode of urinary retention with gross hematuria Mahan catheter was reinserted and the hematuria subsequently cleared. Renal is consulted for LARY. Subjective     10/1: he denies blurred vision, no headaches  10/2: No new acute issues from overnight; more alert; receiving PRBCs hemoglobin trending low  10/3: pt seen sp egd today, no cp or sob, family and nurse in room, no cp or sob  10/4: pt seen in room, no complaints  10/5: pt seen in icu, feels ok today, no cp or sob  10/6: pt seen in nsicu, no cp or sob, npo, wants to eat  10/7: pt seen in nsicu, no cp or sob, starting dysphagia diet  10/8: pt without complaint, eating better dysphagia  diet  10/9 : pt seen in room, no cp or sob, just got back from c scope, kleber, on ivf  10/10: pt seen in room, sob, sbp in 80s, getting unit of prbc, wheezing, had breathing tx.  Rrt to be called for resp distress and low bp. Passing melena and clots, on brilinita, to go to IR for embiolization, frank RN at bedside  10/11 :pt transferred to icu for continued gi bleeding and hypotension. Ashley Saucer now being held and awaits IR for possible embolization  10/12: pt seen in icu, awaits colonoscopy Friday. Getting vit k and dapt held. PROBLEM LIST:    Patient Active Problem List   Diagnosis    Acute cerebrovascular accident (CVA) ([facility] [address]    Acute respiratory failure with hypoxia (HCC)    Stenosis of left carotid artery    Hypoalbuminemia    Electrolyte imbalance    Hypernatremia    Anemia        PAST MEDICAL HISTORY:    Past Medical History:   Diagnosis Date    Chronic back pain     CKD (chronic kidney disease)     CVA (cerebral vascular accident) ([facility] [address]     CVA (cerebral vascular accident) ([facility] [address]     DM (diabetes mellitus) ([facility] [address]     Major depression     MI (myocardial infarction) ([facility] [address]     MATT (obstructive sleep apnea)     Parkinson disease (HCC)     PVD (peripheral vascular disease) ([facility] [address]     Seizure ([facility] [address]        DIET:    ADULT DIET;  Clear Liquid; 5 carb choices (75 gm/meal)     PHYSICAL EXAM:     Patient Vitals for the past 24 hrs:   BP Temp Temp src Pulse Resp SpO2   10/12/22 1035 -- -- -- 82 (!) 40 --   10/12/22 1034 135/72 98.7 °F (37.1 °C) Axillary 81 (!) 40 (!) 86 %   10/12/22 1033 -- -- -- 80 (!) 37 --   10/12/22 1032 -- -- -- 79 (!) 31 --   10/12/22 1023 -- -- -- 73 27 --   10/12/22 1022 -- -- -- 71 17 --   10/12/22 1021 -- -- -- 71 17 --   10/12/22 1020 -- -- -- 71 17 --   10/12/22 1015 -- -- -- 71 18 --   10/12/22 1014 -- -- -- 72 17 --   10/12/22 1013 -- -- -- 72 17 --   10/12/22 1012 -- -- -- 72 18 --   10/12/22 1011 -- -- -- 72 17 --   10/12/22 1010 -- -- -- 72 18 --   10/12/22 0912 -- -- -- 78 29 --   10/12/22 0911 -- -- -- 78 (!) 40 --   10/12/22 0910 -- -- -- 78 (!) 37 --   10/12/22 0909 -- -- -- 77 (!) 32 --   10/12/22 0908 -- -- -- 76 29 --   10/12/22 0907 -- -- -- 75 (!) 31 --   10/12/22 0906 -- -- -- 74 26 --   10/12/22 0905 -- -- -- 74 29 --   10/12/22 0904 -- -- -- 74 24 --   10/12/22 0903 135/72 -- -- 74 26 --   10/12/22 0902 -- -- -- 74 19 --   10/12/22 0901 -- -- -- 74 22 --   10/12/22 0900 135/72 98.7 °F (37.1 °C) -- 75 28 (!) 86 %   10/12/22 0859 -- -- -- 75 (!) 37 --   10/12/22 0858 (!) 140/63 -- -- 72 16 --   10/12/22 0857 -- -- -- 72 17 --   10/12/22 0856 -- 98.7 °F (37.1 °C) Axillary 73 25 --   10/12/22 0855 -- -- -- 73 23 100 %   10/12/22 0854 (!) 159/67 98.7 °F (37.1 °C) Axillary 80 18 100 %   10/12/22 0852 -- -- -- 73 17 --   10/12/22 0851 -- -- -- 73 16 --   10/12/22 0850 -- -- -- 75 16 --   10/12/22 0849 (!) 159/67 -- -- 76 15 --   10/12/22 0848 -- -- -- 77 21 --   10/12/22 0847 -- -- -- 80 24 100 %   10/12/22 0844 -- -- -- 84 (!) 46 (!) 85 %   10/12/22 0843 -- -- -- 83 (!) 51 (!) 84 %   10/12/22 0842 (!) 144/82 98.9 °F (37.2 °C) Axillary 80 (!) 31 (!) 84 %   10/12/22 0841 -- -- -- 82 (!) 38 --   10/12/22 0840 -- -- -- 82 (!) 32 --   10/12/22 0839 -- -- -- 81 (!) 47 --   10/12/22 0838 -- -- -- 81 (!) 39 --   10/12/22 0835 -- -- -- 80 -- --   10/12/22 0833 -- -- -- 76 (!) 33 --   10/12/22 0832 -- -- -- 76 (!) 32 --   10/12/22 0831 -- -- -- 76 28 --   10/12/22 0830 -- -- -- 77 22 --   10/12/22 0823 (!) 144/82 -- -- 79 29 --   10/12/22 0822 -- -- -- 80 (!) 36 96 %   10/12/22 0821 -- -- -- 81 30 98 %   10/12/22 0820 (!) 128/52 98.9 °F (37.2 °C) Temporal 82 (!) 38 97 %   10/12/22 0819 -- -- -- 83 (!) 31 --   10/12/22 0818 (!) 128/52 98.9 °F (37.2 °C) Temporal 82 (!) 33 --   10/12/22 0815 -- -- -- 87 (!) 34 --   10/12/22 0814 -- -- -- 84 27 --   10/12/22 0813 -- -- -- 84 25 --   10/12/22 0812 -- -- -- 88 (!) 45 --   10/12/22 0807 -- -- -- 85 (!) 43 --   10/12/22 0806 -- -- -- 90 (!) 42 --   10/12/22 0805 -- -- -- 87 (!) 31 --   10/12/22 0804 -- -- -- 86 (!) 48 --   10/12/22 0801 (!) 128/52 -- -- -- -- --   10/12/22 0750 -- -- -- 83 (!) 2 --   10/12/22 0749 -- -- -- 83 (!) 39 --   10/12/22 0748 (!) 128/52 -- -- 82 (!) 44 --   10/12/22 0747 -- -- -- 82 (!) 48 --   10/12/22 0736 -- -- -- 75 (!) 38 --   10/12/22 0735 -- -- -- 75 (!) 44 --   10/12/22 0734 -- -- -- 75 (!) 49 --   10/12/22 0733 -- -- -- 75 (!) 42 --   10/12/22 0728 -- -- -- 73 29 --   10/12/22 0727 -- -- -- 73 (!) 33 --   10/12/22 0726 -- -- -- 73 27 --   10/12/22 0725 -- -- -- 74 23 --   10/12/22 0711 -- -- -- 73 27 --   10/12/22 0710 -- -- -- 72 24 --   10/12/22 0709 -- -- -- 72 30 --   10/12/22 0708 -- -- -- 69 16 --   10/12/22 0703 (!) 128/52 -- -- 70 15 --   10/12/22 0702 (!) 128/52 -- -- 70 16 --   10/12/22 0701 -- -- -- 70 17 --   10/12/22 0700 (!) 128/52 -- -- 70 16 99 %   10/12/22 0600 (!) 120/56 -- -- 76 24 99 %   10/12/22 0500 -- -- -- 81 (!) 42 97 %   10/12/22 0444 -- -- -- 75 -- --   10/12/22 0400 -- 98.2 °F (36.8 °C) Temporal 70 (!) 0 99 %   10/12/22 0309 -- -- -- 77 20 99 %   10/12/22 0300 130/66 -- -- 88 29 98 %   10/12/22 0200 123/81 -- -- 75 19 99 %   10/12/22 0100 (!) 142/69 -- -- 84 (!) 31 98 %   10/12/22 0035 -- -- -- 88 (!) 45 97 %   10/12/22 0000 (!) 158/92 97.9 °F (36.6 °C) Temporal 87 23 98 %   10/11/22 2320 (!) 190/64 -- -- -- -- --   10/11/22 2300 -- -- -- 78 27 100 %   10/11/22 2200 (!) 147/51 -- -- 77 13 100 %   10/11/22 2100 (!) 152/71 -- -- 82 15 99 %   10/11/22 2043 -- -- -- -- 26 --   10/11/22 2038 -- -- -- -- (!) 31 --   10/11/22 2000 134/85 98.3 °F (36.8 °C) Temporal 86 (!) 32 97 %   10/11/22 1901 -- -- -- 81 26 100 %   10/11/22 1900 (!) 144/104 -- -- 81 18 99 %   10/11/22 1800 (!) 187/92 -- -- 79 15 99 %   10/11/22 1700 (!) 168/75 -- -- 81 18 97 %   10/11/22 1618 -- -- -- 78 28 98 %   10/11/22 1600 (!) 145/103 -- -- 76 28 94 %   10/11/22 1500 (!) 167/63 -- -- 71 13 100 %   10/11/22 1400 (!) 147/74 -- -- 72 14 100 %   10/11/22 1300 (!) 141/60 -- -- 71 13 100 %   @      Intake/Output Summary (Last 24 hours) at 10/12/2022 1114  Last data filed at 10/12/2022 0900  Gross per 24 hour   Intake 1869.84 ml   Output 2570 ml Net -700.16 ml         Wt Readings from Last 3 Encounters:   09/27/22 260 lb 8 oz (118.2 kg)       Constitutional:  Pt is in no acute distress  Head: normocephalic, atraumatic  Neck: no JVD  Cardiovascular: regular rate and rhythm, no murmurs, gallops, or rubs  Respiratory:  No rales, rhochi, or wheezes  Gastrointestinal:  Soft, nontender, nondistended, bowel sounds x 4  Ext: tr edema  Skin: dry, no rash  Neuro: aaox3    MEDS (scheduled):    phytonadione (VITAMIN K)  IVPB  10 mg IntraVENous Daily    QUEtiapine  25 mg Oral BID    miconazole nitrate   Topical BID    [Held by provider] gabapentin  600 mg Oral 4x daily    amLODIPine  10 mg Oral Daily    white petrolatum   Topical BID    sodium chloride flush  5-40 mL IntraVENous 2 times per day    ciprofloxacin  500 mg Oral 2 times per day    miconazole   Topical BID    [Held by provider] insulin glargine  32 Units SubCUTAneous QAM    epoetin sharath-epbx  6,000 Units SubCUTAneous Once per day on Mon Wed Fri    [Held by provider] cloNIDine  0.1 mg Oral Q12H    [Held by provider] hydrALAZINE  50 mg Oral 3 times per day    [Held by provider] lactulose  20 g Oral BID    insulin lispro  0-16 Units SubCUTAneous 4x Daily AC & HS    HYDROcodone-acetaminophen  1 tablet Oral BID    pantoprazole (PROTONIX) 40 mg injection  40 mg IntraVENous Q12H    metoprolol succinate  25 mg Oral Daily    [Held by provider] sennosides  5 mL Oral Nightly    carbidopa-levodopa  1 tablet Per NG tube TID    ipratropium-albuterol  1 ampule Inhalation Q4H WA    collagenase   Topical Q MWF    benzonatate  100 mg Oral TID    cetirizine  10 mg Oral Daily    ezetimibe  10 mg Oral Daily    [Held by provider] ticagrelor  90 mg Oral BID    [Held by provider] aspirin  81 mg Oral Daily    Or    [Held by provider] aspirin  300 mg Rectal Daily    atorvastatin  80 mg Oral Nightly       MEDS (infusions):   sodium chloride      sodium chloride      sodium chloride      sodium chloride      sodium chloride dextrose         MEDS (prn):  sodium chloride, sodium chloride, sodium chloride, miconazole nitrate **AND** miconazole nitrate, sodium chloride, sodium chloride flush, sodium chloride, labetalol, hydrALAZINE, glucose, dextrose bolus **OR** dextrose bolus, glucagon (rDNA), dextrose, ondansetron **OR** ondansetron    DATA:    Recent Labs     10/10/22  1721 10/11/22  0038 10/11/22  0459 10/11/22  1043 10/12/22  0031 10/12/22  0411   WBC 13.9*  --  10.5  --   --  7.3   HGB 6.5*   < > 6.8* 7.1* 8.1* 7.2*   HCT 21.2*   < > 21.1* 22.2* 24.8* 22.2*   MCV 94.6  --  92.1  --   --  92.9     --  129*  --   --  99*    < > = values in this interval not displayed. Recent Labs     10/10/22  1721 10/11/22  0038 10/11/22  0459 10/11/22  1043 10/11/22  1550 10/11/22  1902 10/12/22  0411   *   < > 148* 148*  --  149* 147*   K 4.7   < > 4.6 4.6 4.44 4.4 4.4   *   < > 117* 118*  --  116* 115*   CO2 17*   < > 20* 21*  --  22 22   BUN 71*   < > 63* 61*  --  57* 53*   CREATININE 2.0*   < > 1.9* 1.8*  --  1.8* 1.7*   LABGLOM 34   < > 36 38  --  38 41   GLUCOSE 179*   < > 150* 147*  --  145* 139*   CALCIUM 8.3*   < > 8.2* 8.2*  --  8.5* 8.3*   ALT <5  --  <5  --   --   --  <5   AST 19  --  17  --   --   --  15   BILITOT 0.4  --  0.3  --   --   --  0.3   ALKPHOS 223*  --  193*  --   --   --  160*   MG 2.1  --  2.0  --   --   --  1.8   PHOS 4.0  --  3.8  --   --   --  3.9    < > = values in this interval not displayed.        Lab Results   Component Value Date    LABALBU 2.2 (L) 10/12/2022    LABALBU 2.2 (L) 10/11/2022    LABALBU 2.4 (L) 10/10/2022     No results found for: TSH    Iron Studies  Lab Results   Component Value Date    IRON 24 (L) 10/03/2022    TIBC 195 (L) 10/03/2022    FERRITIN 94 10/03/2022     Vitamin B-12   Date Value Ref Range Status   10/07/2022 370 211 - 946 pg/mL Final     Folate   Date Value Ref Range Status   10/07/2022 6.3 4.8 - 24.2 ng/mL Final       No results found for: VITD25  No results found for: PTH    No components found for: URIC    No results found for: VOL, APPEARANCE, COLORU, LABSPEC, LABPH, LEUKBLD, NITRU, GLUCOSEU, KETUA, UROBILINOGEN, KETUA, UROBILINOGEN, BILIRUBINUR, OCBU    No results found for: LIPIDPAN      IMPRESSION/RECOMMENDATIONS:      1. Acute CVA  S/p IR intervention with angioplasty of the left ICA     2. LARY on CKD stage 3b  Baseline 1.7-1.9  combined effects of ACEI use and contrast nephrotoxicity  Component of urinary retention  nonoliguric at this time  Disproportionate elevation of BUN relative to Cr c/w gib  stool heme positive  Monitor labs and urine  2.3  L  Bun/cr  128/3 >115/2.6> 105/2.5>95/2.5> 85/2.2>84/2.2>69/2.1>2.1> 63/1.9> 53/1.7     3. Hypertensive emergency  presenting  with acute CVA  Adjust meds to target blood pressure control to SBP less than 160  BP with improved control  Now with gi bleed, may need levo     4. Anemia, chronic with acute worsening  Informed stool heme positive  PRBC transfusion as needed  Sp egd/c scope  Trf < 7  On ASHLEY   To go for embolization if worsens     5. Type 2 diabetes mellitus  Monitor glucose levels     6. Hyperkalemia  Due to combination of LARY and metabolic acidosis; PRBC transfusios  Lokelma  prn     7. Urinary retention  Suspected situational  Now with trejo  Urology following     8. Hypernatremia  Suspect intervascular volume depletion  Na 151>145>141>145>141>138>141>147>148>147  Dc hco3 drip  Start 1/2 ns    9. Met acidosis  Off  hco3 drip  Improving  Ckd/hypotension          Prabhakar Quiñonez.  Eitan Nagy MD

## 2022-10-12 NOTE — PLAN OF CARE
Adult  Goal: Achieves stable or improved neurological status  Outcome: Progressing  Flowsheets (Taken 10/10/2022 0220 by René Redding RN)  Achieves stable or improved neurological status: Assess for and report changes in neurological status  Goal: Remains free of injury related to seizures activity  Outcome: Progressing  Goal: Achieves maximal functionality and self care  Outcome: Progressing     Problem: Respiratory - Adult  Goal: Achieves optimal ventilation and oxygenation  10/11/2022 2103 by Luz Painting RN  Outcome: Progressing  10/11/2022 1126 by Mi Grimes RN  Outcome: Progressing     Problem: Cardiovascular - Adult  Goal: Maintains optimal cardiac output and hemodynamic stability  Outcome: Progressing  Goal: Absence of cardiac dysrhythmias or at baseline  Outcome: Progressing     Problem: Skin/Tissue Integrity - Adult  Goal: Skin integrity remains intact  10/11/2022 2103 by Luz Painting RN  Outcome: Progressing  Flowsheets (Taken 10/11/2022 0800 by Mi Grimes RN)  Skin Integrity Remains Intact: Monitor for areas of redness and/or skin breakdown  10/11/2022 1126 by Mi Grimes RN  Outcome: Progressing  Flowsheets (Taken 10/11/2022 0800)  Skin Integrity Remains Intact: Monitor for areas of redness and/or skin breakdown  Goal: Incisions, wounds, or drain sites healing without S/S of infection  10/11/2022 2103 by Luz Painting RN  Outcome: Progressing  10/11/2022 1126 by Mi Grimes RN  Outcome: Progressing  Goal: Oral mucous membranes remain intact  10/11/2022 2103 by Luz Painting RN  Outcome: Progressing  10/11/2022 1126 by Mi Grimes RN  Outcome: Progressing     Problem: Metabolic/Fluid and Electrolytes - Adult  Goal: Electrolytes maintained within normal limits  Outcome: Progressing  Flowsheets (Taken 10/11/2022 0800 by Mi Grimes RN)  Electrolytes maintained within normal limits: Monitor labs and assess patient for signs and symptoms of electrolyte imbalances  Goal: Hemodynamic stability and optimal renal function maintained  Outcome: Progressing  Flowsheets (Taken 10/11/2022 0800 by Carmen Trujillo RN)  Hemodynamic stability and optimal renal function maintained: Monitor labs and assess for signs and symptoms of volume excess or deficit  Goal: Glucose maintained within prescribed range  Outcome: Progressing  Flowsheets (Taken 10/11/2022 0800 by Carmen Trujillo RN)  Glucose maintained within prescribed range: Monitor blood glucose as ordered     Problem: Hematologic - Adult  Goal: Maintains hematologic stability  10/11/2022 2103 by Kim Diaz RN  Outcome: Progressing  Flowsheets (Taken 10/11/2022 0800 by Carmen Trujillo RN)  Maintains hematologic stability: Assess for signs and symptoms of bleeding or hemorrhage  10/11/2022 1126 by Carmen Trujillo RN  Outcome: Progressing  Flowsheets (Taken 10/11/2022 0800)  Maintains hematologic stability: Assess for signs and symptoms of bleeding or hemorrhage     Problem: Nutrition Deficit:  Goal: Optimize nutritional status  10/11/2022 2103 by Kim Diaz RN  Outcome: Progressing  10/11/2022 1126 by Cramen Trujillo RN  Outcome: Progressing     Problem: Safety - Medical Restraint  Goal: Remains free of injury from restraints (Restraint for Interference with Medical Device)  Description: INTERVENTIONS:  1. Determine that other, less restrictive measures have been tried or would not be effective before applying the restraint  2. Evaluate the patient's condition at the time of restraint application  3. Inform patient/family regarding the reason for restraint  4.  Q2H: Monitor safety, psychosocial status, comfort, nutrition and hydration  10/11/2022 2103 by Kim Diaz RN  Outcome: Progressing  10/11/2022 1126 by Carmen Trujillo RN  Outcome: Progressing  10/11/2022 0813 by Princella Crigler, RN  Outcome: Progressing

## 2022-10-12 NOTE — PROGRESS NOTES
[facility] ([facility])  Gastroenterology, Hepatology &  Advanced Endoscopy     Progress Note    SUBJECTIVE:      Patient with active bleeding over the weekend. Tagged RBC scan showed active bleeding within the colon. Colonoscopy was performed which showed old blood throughout the colon and red blood within the R colon with no active bleeding or high risk lesions appreciated. The terminal ileum was not intubated. CTA performed following colonoscopy which did not show active extravasation. DAPT has since been d/c. The patient continues with the need for transfusion. He had one bowel movement with blood noted today. He remains HDS. OBJECTIVE      Physical    VITALS:  /66   Pulse 75   Temp 97.9 °F (36.6 °C) (Temporal)   Resp (!) 0   Ht 5' 8\" (1.727 m)   Wt 260 lb 8 oz (118.2 kg)   SpO2 99%   BMI 39.61 kg/m²   Physical Exam:  General: Chronically ill-appearing, NAD  HEENT: PERRLA, EOMI, Anicteric sclera, MMM, no rhinorrhea  Cards: RRR, + LE edema  Resp: On BiPAP, synchronous with BiPAP, adequate air movement  Abdomen: soft, NT, ND. Extremities: Moves all extremities, no effusions or bruising. RLE remains dressed w/ wound vac. Skin: No rashes or jaundice  Neuro: A&O x 3, CN grossly intact,      ASSESSMENT AND PLAN      67y/M w/ CKD, DMII, and MATT who presents w/ acute CVA now on DAPT and R foot infection s/p debridement who has been having issues with persistent anemia requiring several transfusions. Over the weekend, he showed active signs of bleeding with positive tagged RBC scan. EGD negative, Colonoscopy with BRB in R colon and no actively bleeding lesions. CTA w/ no active extravasation. DAPT has since been d/c. PLAN:  - Agree w/ IV Vit K for elevated INR  - Agree w/ holding DAPT despite known risk in the setting of active bleeding requiring transfusion.  - Continue to trend and transfuse to Hgb >7. - I will plan on repeat Colonoscopy with intubation of terminal ileum on Friday.    - Adequate prep will be necessary which will likely require placement of NG tube and 6L GoLytely to be given Thursday. - If unable to find source on terminal ileum intubation, may require transfer to center with inpatient capsule endoscopy capability or transfer to [address] [place] [address] where balloon-assisted enteroscopy can be performed. I will follow. Thank you for including us in the care of this patient. Please do not hesitate to contact us with any additional questions or concerns.      Travis Nina MD  Gastroenterology/Hepatology  Advanced Endoscopy

## 2022-10-12 NOTE — FLOWSHEET NOTE
10/12/2022 Updated Georgette Herron in regard to patient status. Dr Gilmar Christianson referred for call back to  Neva Kayser to explain reason for possible transfer to [facility] [place] for follow up.

## 2022-10-12 NOTE — FLOWSHEET NOTE
Inpatient Wound Care (Follow up) 4405    Admit Date: 9/27/2022  5:45 PM    Reason for consult:  Right lateral foot: wound vac dressing      Findings:     10/12/22 0944   Wound 09/28/22 Foot Right;Lateral   Date First Assessed/Time First Assessed: 09/28/22 0944   Present on Hospital Admission: No  Location: Foot  Wound Location Orientation: Right;Lateral   Wound Image    Wound Etiology Diabetic   Dressing Status New dressing applied   Wound Cleansed Cleansed with saline   Dressing/Treatment Pharmaceutical agent (see MAR); Negative pressure wound therapy   Wound Length (cm) 3.3 cm   Wound Width (cm) 2.8 cm   Wound Depth (cm) 0.8 cm   Wound Surface Area (cm^2) 9.24 cm^2   Wound Volume (cm^3) 7.392 cm^3   Wound Assessment Pink/red  (yellow)   Drainage Amount Scant   Drainage Description Serosanguinous   Odor None   Emily-wound Assessment Dry/flaky; Hyperkeratosis (callous)   Negative Pressure Wound Therapy Foot Right;Plantar;Lateral   Placement Date/Time: 09/28/22 1040   Location: Foot  Wound Location Orientation: Right;Plantar;Lateral   Wound Type Diabetic foot ulcer   Dressing Type Black Foam   Number of pieces used 2   Number of pieces removed 3   Cycle Continuous   Target Pressure (mmHg) 125   Canister changed? Yes   Dressing Change Due 10/14/22     **Informed Consent**     photos taken of wound and inserted into their chart as part of their permanent medical record for purposes of documentation, treatment management and/or medical review. All Images taken on 10/12/22 of patient name: Levon Ortega were transmitted and stored on [employer] located within [facility] by a registered [employer] Application Device. Plan: Wound Vac dressing change  Next dressing change: 10/14  Will follow.       Brittany Dial RN 10/12/2022 9:46 AM

## 2022-10-12 NOTE — PROGRESS NOTES
Department of Podiatry   Progress Note      Patient seen and evaluated at bedside this morning. No new pedal questions or complaints at this time. [facility] wound vac changes to continue. Changed today by wound care. Past Medical History:            Diagnosis Date    Chronic back pain     CKD (chronic kidney disease)     CVA (cerebral vascular accident) ([facility] [address]     CVA (cerebral vascular accident) ([facility] [address]     DM (diabetes mellitus) ([facility] [address]     Major depression     MI (myocardial infarction) ([facility] [address]     MATT (obstructive sleep apnea)     Parkinson disease ([facility] [address]     PVD (peripheral vascular disease) ([facility] [address]     Seizure ([facility] [address]        Past Surgical History:        Procedure Laterality Date    COLONOSCOPY N/A 10/9/2022    COLONOSCOPY DIAGNOSTIC performed by Brigitte Montero MD at [place] [address]      IR CAROTID STENT UNI W PROTECTION  9/27/2022    IR CAROTID STENT UNI W PROTECTION 9/27/2022 Jeanette Lugo MD [facility]    UPPER GASTROINTESTINAL ENDOSCOPY N/A 10/3/2022    EGD DIAGNOSTIC ONLY performed by Babita Bennett MD at [facility] ENDOSCOPY       Medications Prior to Admission:    Medications Prior to Admission: apixaban (ELIQUIS) 5 MG TABS tablet, Take 5 mg by mouth 2 times daily  aspirin 81 MG chewable tablet, Take 81 mg by mouth daily  carbidopa-levodopa (SINEMET)  MG per tablet, Take 1 tablet by mouth 3 times daily  carvedilol (COREG) 25 MG tablet, Take 25 mg by mouth 2 times daily (with meals)  cetirizine (ZYRTEC) 10 MG tablet, Take 10 mg by mouth daily  clopidogrel (PLAVIX) 75 MG tablet, Take 75 mg by mouth daily  diphenhydrAMINE (BENADRYL) 25 MG capsule, Take 25 mg by mouth every 6 hours  ezetimibe (ZETIA) 10 MG tablet, Take 10 mg by mouth daily  fluticasone (FLONASE) 50 MCG/ACT nasal spray, 2 sprays by Each Nostril route daily  gabapentin (NEURONTIN) 600 MG tablet, Take 600 mg by mouth 4 times daily.   HYDROcodone-acetaminophen (NORCO) 5-325 MG per tablet, Take 1 tablet by mouth 2 times daily. Insulin Glargine, 2 Unit Dial, (TOUJEO MAX SOLOSTAR) 300 UNIT/ML SOPN, Inject 66 Units into the skin daily  piperacillin-tazobactam (ZOSYN) 3-0.375 GM per 50ML IVPB extended infusion, Infuse 4.5 mg intravenously in the morning and 4.5 mg at noon and 4.5 mg in the evening. acetaminophen (TYLENOL) 325 MG tablet, Take 650 mg by mouth every 6 hours as needed for Pain  amLODIPine (NORVASC) 5 MG tablet, Take 5 mg by mouth daily  benzonatate (TESSALON) 100 MG capsule, Take 100 mg by mouth 3 times daily  cloNIDine (CATAPRES) 0.1 MG tablet, Take 0.1 mg by mouth in the morning and 0.1 mg in the evening. hydrALAZINE (APRESOLINE) 100 MG tablet, Take 100 mg by mouth 3 times daily    Allergies:  Codeine    Social History:   TOBACCO:   has no history on file for tobacco use. ETOH:   has no history on file for alcohol use. DRUGS:   Social History     Substance and Sexual Activity   Drug Use Not on file       Family History:   History reviewed. No pertinent family history. REVIEW OF SYSTEMS:    All pertinent positives and negatives as noted in HPI       LOWER EXTREMITY EXAMINATION   Wound vac intact running continuous pressure    Previous Exam:  VASCULAR:  DP and PT pulses are non palpable. CFT < 5 seconds B/L. Warm to warm from the tibial tuberosity to the distal aspect of the digits dorsally. NEUROLOGIC:  Protective sensation is diminished but grossly intact    DERM:  Right foot lateral plantar wound measuring approx 6cm in diameter. No erythema, serosanguinous drainage, no malodor.     MUSCULOSKELETAL: deferred                 CONSULTS:  IP CONSULT TO CRITICAL CARE  IP CONSULT TO DIETITIAN  IP CONSULT TO CARDIOLOGY  IP CONSULT TO PODIATRY  IP CONSULT TO PODIATRY  IP CONSULT TO UROLOGY  IP CONSULT TO NEPHROLOGY  IP CONSULT TO GENERAL SURGERY  IP CONSULT TO INFECTIOUS DISEASES  IP CONSULT TO GI  IP CONSULT TO GENERAL SURGERY  IP CONSULT TO GENERAL SURGERY    MEDICATION:  Scheduled Meds:   phytonadione (VITAMIN K)  IVPB  10 mg IntraVENous Daily    QUEtiapine  25 mg Oral BID    miconazole nitrate   Topical BID    [Held by provider] gabapentin  600 mg Oral 4x daily    amLODIPine  10 mg Oral Daily    white petrolatum   Topical BID    sodium chloride flush  5-40 mL IntraVENous 2 times per day    ciprofloxacin  500 mg Oral 2 times per day    miconazole   Topical BID    [Held by provider] insulin glargine  32 Units SubCUTAneous QAM    epoetin sharath-epbx  6,000 Units SubCUTAneous Once per day on Mon Wed Fri    [Held by provider] cloNIDine  0.1 mg Oral Q12H    [Held by provider] hydrALAZINE  50 mg Oral 3 times per day    [Held by provider] lactulose  20 g Oral BID    insulin lispro  0-16 Units SubCUTAneous 4x Daily AC & HS    HYDROcodone-acetaminophen  1 tablet Oral BID    pantoprazole (PROTONIX) 40 mg injection  40 mg IntraVENous Q12H    metoprolol succinate  25 mg Oral Daily    [Held by provider] sennosides  5 mL Oral Nightly    carbidopa-levodopa  1 tablet Per NG tube TID    ipratropium-albuterol  1 ampule Inhalation Q4H WA    collagenase   Topical Q MWF    benzonatate  100 mg Oral TID    cetirizine  10 mg Oral Daily    ezetimibe  10 mg Oral Daily    [Held by provider] ticagrelor  90 mg Oral BID    [Held by provider] aspirin  81 mg Oral Daily    Or    [Held by provider] aspirin  300 mg Rectal Daily    atorvastatin  80 mg Oral Nightly     Continuous Infusions:   sodium chloride      sodium chloride      sodium chloride      sodium chloride      sodium chloride      dextrose       PRN Meds:.sodium chloride, sodium chloride, sodium chloride, miconazole nitrate **AND** miconazole nitrate, sodium chloride, sodium chloride flush, sodium chloride, labetalol, hydrALAZINE, glucose, dextrose bolus **OR** dextrose bolus, glucagon (rDNA), dextrose, ondansetron **OR** ondansetron    RADIOLOGY:  XR CHEST PORTABLE   Final Result   Unchanged bilateral chest opacities with right pleural effusion evident. See   above.          NM GI BLOOD LOSS   Final Result   No evidence of active GI bleeding during acquisition. XR CHEST PORTABLE   Final Result   1. Persistent bilateral patchy parenchymal densities concerning for pneumonia   and or atelectasis   2. Persistent small bilateral pleural effusions, left greater than right   3. Cardiomegaly, stable         XR CHEST PORTABLE   Final Result   No significant change compared to the prior exam with cardiomegaly, bilateral   airspace opacities and bilateral pleural effusions seen most suggestive of   congestive heart failure, but superimposed pneumonia not excluded. US DUP ABD PEL RETRO SCROT COMPLETE   Final Result   1. No evidence of testicular torsion      2. Severe scrotal skin thickening. 3.  Trace hydroceles. 4.  No evidence of bowel containing inguinal hernia. US SCROTUM AND TESTICLES   Final Result   1. No evidence of testicular torsion      2. Severe scrotal skin thickening. 3.  Trace hydroceles. 4.  No evidence of bowel containing inguinal hernia. XR CHEST PORTABLE   Final Result   1. No significant interval change in the appearance of the chest.      2.  Bilateral pleural effusions and areas of atelectasis or multifocal   pneumonia. CTA ABDOMEN PELVIS W CONTRAST   Final Result   Mixed densities throughout the colonic segments including in the ascending   colon and rectum could represent mixed densities of blood products although   no focal area of arterial enhancement or extravasation/blushing is observed   to localize acute hemorrhage. No evidence for perforation or abscess. No   pneumatosis intestinalis or portal venous gas evident. 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 No adjacent hematoma with only minimal   adjacent stranding.       Bilateral pleural effusions moderate to large right and moderate left   effusions with adjacent atelectasis. Anasarca. XR CHEST PORTABLE   Final Result   Bilateral pleural effusions with bibasilar patchy infiltrates and mild   cardiomegaly. NM GI BLOOD LOSS   Final Result   Active GI bleeding identified during acquisition in the splenic flexure with   peristalsis identified into the proximal descending colon. RECOMMENDATIONS:   Unavailable         CT ABDOMEN PELVIS WO CONTRAST   Final Result   CHEST:      No evidence of neoplastic disease, allowing for limitations of noncontrast   technique. Moderate bilateral pleural effusions. Nonemergent incidental findings as above. ABDOMEN/PELVIS:      No evidence of neoplastic disease, allowing for limitations of noncontrast   technique. Bladder wall thickening is nonspecific given under distension; recommend   correlation urinalysis to evaluate for UTI. Nonemergent incidental findings as above. CT CHEST WO CONTRAST   Final Result   CHEST:      No evidence of neoplastic disease, allowing for limitations of noncontrast   technique. Moderate bilateral pleural effusions. Nonemergent incidental findings as above. ABDOMEN/PELVIS:      No evidence of neoplastic disease, allowing for limitations of noncontrast   technique. Bladder wall thickening is nonspecific given under distension; recommend   correlation urinalysis to evaluate for UTI. Nonemergent incidental findings as above. FL MODIFIED BARIUM SWALLOW W VIDEO   Final Result   1. Mildly impaired swallowing mechanism with swallowing delay and followed   by laryngeal penetration with thin liquid barium when using a straw. No   barium aspiration      2. Please see separate speech pathology report for full discussion of   findings and recommendations.       RECOMMENDATIONS:   Unavailable         XR ABDOMEN (KUB) (SINGLE AP VIEW)   Final Result   Somewhat greater than average quantity of retained fecal material thin the   lower GI tract suggesting dysfunction with evacuation. XR CHEST PORTABLE   Final Result   1. Atherosclerotic disease and mild cardiomegaly. 2.  Persistent but improved opacities in the bilateral lungs most pronounced   in the mid and lower lungs. There appears to be a small left and trace right   pleural effusion         XR FOOT RIGHT (2 VIEWS)   Final Result   No evidence of calcaneal osteomyelitis. CT HEAD WO CONTRAST   Final Result   Parenchymal volume loss and chronic microvascular ischemic changes. No acute intracranial abnormality. Ethmoid and sphenoid sinusitis. Right mastoid effusion. US RETROPERITONEAL COMPLETE   Final Result   1. Normal appearance of the bilateral kidneys. No hydronephrosis. 2.  A Mahan catheter is decompressing the bladder. XR CHEST PORTABLE   Final Result   Worsening infiltrate and or atelectasis on the left, stable on the right with   suspected layering pleural effusion. XR CHEST PORTABLE   Final Result   Unchanged bilateral atelectasis and or infiltrate as well as small right   pleural effusion. MRI BRAIN WO CONTRAST   Final Result   There are 2 small regions of petechial infarcts one in the left   posterior frontal lobe and a second in the left parietal lobe not   exceeding 3 mm. There is otherwise extensive small vessel ischemic   changes         XR CHEST PORTABLE   Final Result   1. Multifocal bilateral airspace disease more prominent within the lower   lobes. The airspace disease is unchanged when compared with the prior study. CT HEAD WO CONTRAST   Final Result   Diffuse atrophy likely age related   Findings compatible with small vessel ischemic changes. XR CHEST PORTABLE   Final Result   1.  Pulmonary opacities present favoring edema and atelectasis, also small   pleural effusions, but nonspecific with some additional considerations noted above   2. Support devices present as described above   3. Heart size appears borderline enlarged         XR ABDOMEN FOR NG/OG/NE TUBE PLACEMENT   Final Result   NG/OG is in the stomach. IR CAROTID STENT W PROTECTION   Final Result   1. Angiogram demonstrates successful placement of a carotid stent ,   post procedure images demonstrate a widely patent stent and internal   carotid         CT HEAD WO CONTRAST   Final Result   Diffuse atrophy likely age related   Findings compatible with small vessel ischemic changes. Findings were called at the time of dictation         CT BRAIN PERFUSION   Final Result      No significant ischemic penumbra identified      This study was analyzed by the [employer] ai algorithm. CTA NECK W CONTRAST   Final Result   1. Estimated stenosis of the proximal right and left internal carotid   artery by NASCET criteria is greater than 90% on the left   2. Severe atherosclerotic disease . 3. No large vessel occlusion identified            This study was analyzed by the [employer] ai algorithm. CTA HEAD W CONTRAST   Final Result   1. Estimated stenosis of the proximal right and left internal carotid   artery by NASCET criteria is greater than 90% on the left   2. Severe atherosclerotic disease . 3. No large vessel occlusion identified            This study was analyzed by the [employer] ai algorithm. IR INTERVENTIONAL RADIOLOGY PROCEDURE REQUEST    (Results Pending)   XR CHEST PORTABLE    (Results Pending)       Vitals:    /72   Pulse 82   Temp 98.7 °F (37.1 °C) (Axillary)   Resp (!) 40   Ht 5' 8\" (1.727 m)   Wt 260 lb 8 oz (118.2 kg)   SpO2 100%   BMI 39.61 kg/m²     LABS:   Recent Labs     10/11/22  0459 10/11/22  1043 10/12/22  0031 10/12/22  0411   WBC 10.5  --   --  7.3   HGB 6.8*   < > 8.1* 7.2*   HCT 21.1*   < > 24.8* 22.2*   *  --   --  99*    < > = values in this interval not displayed.      Recent Labs     10/12/22  0411   * K 4.4   *   CO2 22   PHOS 3.9   BUN 53*   CREATININE 1.7*     Recent Labs     10/11/22  0459 10/11/22  1043 10/12/22  0411   PROT 4.9*  --  4.8*   INR  --  2.6 3.2       ASSESSMENTS:   1. Right foot wound diabetic ulcer  2. DM  3. Pain right foot  Acute cerebrovascular accident (CVA) ([facility] [address]            PLAN:  -Patient examined and evaluated at bedside  -All pertinent labs, charts, and imaging reviewed prior to encounter   -Abx per ID: Cipro  -Continue Wound vac changes to the right foot. MWF vac changes. Changed today  -Previous foot x rays: No acute abnormalities  - Will follow  -Patient discussed with Dr. Jonas Marquez DPM    Thank you for the opportunity to take part in the patient's care. Please do not hesitate to call for any questions or concerns.

## 2022-10-12 NOTE — PROGRESS NOTES
Physical Therapy    Pt in need of PT re-evaluation. On hold this date d/t confusion/agitation per nursing. Will attempt back as able.       Alma Bobby, PT, DPT  [id number]

## 2022-10-12 NOTE — PLAN OF CARE
Problem: Discharge Planning  Goal: Discharge to home or other facility with appropriate resources  10/12/2022 0911 by Nandini Easley RN  Outcome: Progressing  10/11/2022 2103 by Arturo Flores RN  Outcome: Progressing  Flowsheets (Taken 10/11/2022 0800 by Lisseth Mahoney RN)  Discharge to home or other facility with appropriate resources: Identify barriers to discharge with patient and caregiver     Problem: Pain  Goal: Verbalizes/displays adequate comfort level or baseline comfort level  10/12/2022 0911 by Nandini Easley RN  Outcome: Progressing  10/11/2022 2103 by Arturo Flores RN  Outcome: Progressing     Problem: Skin/Tissue Integrity  Goal: Absence of new skin breakdown  Description: 1. Monitor for areas of redness and/or skin breakdown  2. Assess vascular access sites hourly  3. Every 4-6 hours minimum:  Change oxygen saturation probe site  4. Every 4-6 hours:  If on nasal continuous positive airway pressure, respiratory therapy assess nares and determine need for appliance change or resting period.   10/12/2022 0911 by Nandini Easley RN  Outcome: Progressing  10/11/2022 2103 by Arturo Flores RN  Outcome: Progressing     Problem: Safety - Adult  Goal: Free from fall injury  10/12/2022 0911 by Nandini Easley RN  Outcome: Progressing  10/11/2022 2103 by Arturo Flores RN  Outcome: Hans Issa (Taken 10/5/2022 2123 by Bronwyn Wolfe)  Free From Fall Injury: Instruct family/caregiver on patient safety     Problem: ABCDS Injury Assessment  Goal: Absence of physical injury  10/12/2022 0911 by Nandini Easley RN  Outcome: Progressing  10/11/2022 2103 by Arturo Flores RN  Outcome: Progressing     Problem: Chronic Conditions and Co-morbidities  Goal: Patient's chronic conditions and co-morbidity symptoms are monitored and maintained or improved  10/12/2022 0911 by Nandini Easley RN  Outcome: Progressing  10/11/2022 2103 by Arturo Flores RN  Outcome: Progressing  Flowsheets (Taken 10/11/2022 0800 by Tab Robbins RN)  Care Plan - Patient's Chronic Conditions and Co-Morbidity Symptoms are Monitored and Maintained or Improved: Monitor and assess patient's chronic conditions and comorbid symptoms for stability, deterioration, or improvement     Problem: Neurosensory - Adult  Goal: Achieves stable or improved neurological status  10/12/2022 0911 by Anne Thompson RN  Outcome: Progressing  Flowsheets (Taken 10/12/2022 0835)  Achieves stable or improved neurological status: Assess for and report changes in neurological status  10/11/2022 2103 by Maira Salazar RN  Outcome: Progressing  [address] (Taken 10/10/2022 0220 by Navdeep Encarnacion RN)  Achieves stable or improved neurological status: Assess for and report changes in neurological status  Goal: Remains free of injury related to seizures activity  10/12/2022 0911 by Anne Thompson RN  Outcome: Progressing  Flowsheets (Taken 10/12/2022 0379)  Remains free of injury related to seizure activity: Maintain airway, patient safety  and administer oxygen as ordered  10/11/2022 2103 by Maira Salazar RN  Outcome: Progressing  Goal: Achieves maximal functionality and self care  10/12/2022 0911 by Anne Thompson RN  Outcome: Progressing  Flowsheets (Taken 10/12/2022 0835)  Achieves maximal functionality and self care: Monitor swallowing and airway patency with patient fatigue and changes in neurological status  10/11/2022 2103 by Maira Salazar RN  Outcome: Progressing     Problem: Respiratory - Adult  Goal: Achieves optimal ventilation and oxygenation  10/12/2022 0911 by Anne Thompson RN  Outcome: Progressing  Flowsheets (Taken 10/12/2022 0835)  Achieves optimal ventilation and oxygenation: Assess for changes in respiratory status  10/11/2022 2103 by Maira Salazar RN  Outcome: Progressing     Problem: Cardiovascular - Adult  Goal: Maintains optimal cardiac output and hemodynamic stability  10/12/2022 0911 by Verner Chum, RN  Outcome: Progressing  Flowsheets (Taken 10/12/2022 3515)  Maintains optimal cardiac output and hemodynamic stability: Monitor blood pressure and heart rate  10/11/2022 2103 by Lulu Sarabia RN  Outcome: Progressing  Goal: Absence of cardiac dysrhythmias or at baseline  10/12/2022 0911 by Verner Chum, RN  Outcome: Progressing  Flowsheets (Taken 10/12/2022 8473)  Absence of cardiac dysrhythmias or at baseline: Monitor cardiac rate and rhythm  10/11/2022 2103 by Lulu Sarabia RN  Outcome: Progressing     Problem: Skin/Tissue Integrity - Adult  Goal: Skin integrity remains intact  10/12/2022 0911 by Verner Chum, RN  Outcome: Progressing  10/11/2022 2103 by Lulu Sarabia RN  Outcome: Progressing  Flowsheets (Taken 10/11/2022 0800 by Laureen Jones RN)  Skin Integrity Remains Intact: Monitor for areas of redness and/or skin breakdown  Goal: Incisions, wounds, or drain sites healing without S/S of infection  10/12/2022 0911 by Verner Chum, RN  Outcome: Progressing  10/11/2022 2103 by Lulu Sarabia RN  Outcome: Progressing  Goal: Oral mucous membranes remain intact  10/12/2022 0911 by Verner Chum, RN  Outcome: Progressing  10/11/2022 2103 by Lulu Sarabia RN  Outcome: Progressing     Problem: Metabolic/Fluid and Electrolytes - Adult  Goal: Electrolytes maintained within normal limits  10/12/2022 0911 by Verner Chum, RN  Outcome: Progressing  10/11/2022 2103 by Lulu Sarabia RN  Outcome: Progressing  [address] (Taken 10/11/2022 0800 by Laureen Jones RN)  Electrolytes maintained within normal limits: Monitor labs and assess patient for signs and symptoms of electrolyte imbalances  Goal: Hemodynamic stability and optimal renal function maintained  10/12/2022 0911 by Verner Chum, RN  Outcome: Progressing  10/11/2022 2103 by Lulu Sarabia RN  Outcome: Progressing  Flowsheets (Taken 10/11/2022 0800 by Laureen Jones RN)  Hemodynamic stability and optimal renal function maintained: Monitor labs and assess for signs and symptoms of volume excess or deficit  Goal: Glucose maintained within prescribed range  10/12/2022 0911 by Uri Huang RN  Outcome: Progressing  10/11/2022 2103 by Dilshad Ruiz RN  Outcome: Progressing  Flowsheets (Taken 10/11/2022 0800 by Mayelin Edouard RN)  Glucose maintained within prescribed range: Monitor blood glucose as ordered     Problem: Hematologic - Adult  Goal: Maintains hematologic stability  10/12/2022 0911 by Uri Huang RN  Outcome: Progressing  10/11/2022 2103 by Dilshad Ruiz RN  Outcome: Progressing  Flowsheets (Taken 10/11/2022 0800 by Mayelin Edouard RN)  Maintains hematologic stability: Assess for signs and symptoms of bleeding or hemorrhage     Problem: Nutrition Deficit:  Goal: Optimize nutritional status  10/12/2022 0911 by Uri Huang RN  Outcome: Progressing  10/11/2022 2103 by Dilshad Ruiz RN  Outcome: Progressing     Problem: Safety - Medical Restraint  Goal: Remains free of injury from restraints (Restraint for Interference with Medical Device)  Description: INTERVENTIONS:  1. Determine that other, less restrictive measures have been tried or would not be effective before applying the restraint  2. Evaluate the patient's condition at the time of restraint application  3. Inform patient/family regarding the reason for restraint  4.  Q2H: Monitor safety, psychosocial status, comfort, nutrition and hydration  10/12/2022 0911 by Uri Huang RN  Outcome: Progressing  10/11/2022 2103 by Dilshad Ruiz RN  Outcome: Progressing

## 2022-10-12 NOTE — PROGRESS NOTES
GENERAL SURGERY  DAILY PROGRESS NOTE  10/12/2022    Subjective:  No events overnight, 2 bloody bowel movements yesterday    Objective:  /66   Pulse 75   Temp 97.9 °F (36.6 °C) (Temporal)   Resp (!) 0   Ht 5' 8\" (1.727 m)   Wt 260 lb 8 oz (118.2 kg)   SpO2 99%   BMI 39.61 kg/m²     Gen: Alert, following commands  HEENT: NCAT, anicteric,  CV: RR  Pulm: On BiPAP  Abdomen: soft, nontender, nondistended  Extremities: left basilic midline, right lower extremity with wound VAC and splinted  Skin: warm and dry  Mahan catheter with clear yellow urine    Assessment/Plan:  74 y.o. male with acute blood loss anemia secondary to lower GI bleed in the setting of recent CVA with left ICA angioplasty and stent placement on dual antiplatelet therapy which is since been held. INR 2.6 yesterday has never received vitamin K-a.m. INR pending will need INR corrected prior to interventional radiology  Monitor H&H  Agree with IR angioembolization for bleeding if rebleeds however patient has had no episodes of bleeding overnight and bleeding scan 10/11 showed no active bleeding. Continue to hold dual antiplatelet therapy  Plan will be discussed with Dr. Linda Ward on a.m. rounds for questions after 6 AM please reach out to the surgical resident on-call. Electronically signed by Pilar Holley MD on 10/12/2022 at 4:54 AM     [facility] Surgery   Attending Physician Statement:    I personally saw, examined and provided care for the patient. Radiographs, labs and medication list were reviewed by me independently. The case was discussed in detail and plans for care were established. Review of Residents documentation was conducted and revisions were made as appropriate. I agree with the above documented exam, problem list and plan of care.     GI bleeding   Acute CVA-recent left ICA angioplasty with stent placement for severe left internal carotid artery stenosis-now on dual antiplatelet therapy  Colonoscopy on 10/9 showed the colon was full of blood   CT angio of the abdomen pelvis shows no focal area arterial enhancement or extra blush to localize acute hemorrhage. Repeat nuclear medicine scan from 10/11 negative  Continue supportive care  Continue transfuse  My recommendation would be to hold the dual antiplatelet therapy. We will need to discuss with IR since the stents have a high rate of occlusion if the dual antiplatelet therapy is stopped. Coreg coagulopathy-elevated INR with 2 units of FFP and vitamin K    Gerhardt Raja, MD, FACS  10/12/2022  4:41 PM      NOTE: This report was transcribed using voice recognition software. Every effort was made to ensure accuracy; however, inadvertent computerized transcription errors may be present.

## 2022-10-12 NOTE — PROGRESS NOTES
OT consult received and appreciated. Chart reviewed. Will hold evaluation per nursing as pt is confused and combative. Will evaluate at a later time. Thank you.  Jan Hayes, OTR/L # [id number]

## 2022-10-12 NOTE — PROGRESS NOTES
[address] [facility]   Department of Internal Medicine   MICU Progress Note    Patient:  Lidya Grider 74 y.o. male   MRN: [id number]       Date of Service: 10/12/2022    Allergy: Codeine  CC strokelike symptoms  Subjective   Alert oriented 2-3 confused, follows commands. Supplement oxygen  Hemoglobin 7.2 this AM  INR 3.2- vitamin K and FFP   Start clear liquid diet  No on pressors, precedex-- will d/c and start Seroquel   Agitated today, due to BIAP, calm down. C/o fever, chills, sob, chest pan, denies n,v,d   No bowel movement overnight   No temps  Objective     TEMPERATURE:  Current - Temp: 98.9 °F (37.2 °C); Max - Temp  Av.4 °F (36.9 °C)  Min: 97.9 °F (36.6 °C)  Max: 98.9 °F (37.2 °C)    RESPIRATIONS RANGE: Resp  Av.3  Min: 0  Max: 45    PULSE RANGE: Pulse  Av  Min: 70  Max: 88    BLOOD PRESSURE RANGE:  Systolic (46LSI), ZOX:293 , Min:120 , RUB:670   ; Diastolic (88LYV), SSM:43, Min:51, Max:104      PULSE OXIMETRY RANGE: SpO2  Av.6 %  Min: 94 %  Max: 100 %    I & O - 24hr:    Intake/Output Summary (Last 24 hours) at 10/12/2022 0824  Last data filed at 10/12/2022 0600  Gross per 24 hour   Intake 1949.84 ml   Output 2260 ml   Net -310.16 ml     I/O last 3 completed shifts: In: 4086.6 [P.O.:700; I.V.:2276.6; Blood:1010; IV Piggyback:100]  Out: 1026 [Urine:4380; Drains:30] No intake/output data recorded. Weight change:     Physical Exam:  CONSTITUTIONAL: Awake, morbid obesity, alert to 1-2, intermittently confused, labored breathing. EYES:  Lids and lashes normal, pupils equal, round and reactive to light, extra ocular muscles intact, sclera clear, conjunctiva normal  ENT:  Normocephalic, without obvious abnormality, atraumatic, sinuses nontender on palpation, noted mucous membrane dry, picking on skin of the mouth that noted to be bloody.   CARDIOVASCULAR:  Normal apical impulse, regular rate and rhythm, normal S1 and S2, no S3 or S4, and no murmur noted  Lung Diminished lung sounds [Held by provider] aspirin  300 mg Rectal Daily    atorvastatin  80 mg Oral Nightly     PRN Meds: sodium chloride, sodium chloride, sodium chloride, miconazole nitrate **AND** miconazole nitrate, sodium chloride, sodium chloride flush, sodium chloride, labetalol, hydrALAZINE, glucose, dextrose bolus **OR** dextrose bolus, glucagon (rDNA), dextrose, ondansetron **OR** ondansetron  Nutrition:   Clear liquid diet   Labs and Imaging Studies     CBC:   Recent Labs     10/10/22  1721 10/11/22  0038 10/11/22  0459 10/11/22  1043 10/12/22  0031 10/12/22  0411   WBC 13.9*  --  10.5  --   --  7.3   RBC 2.24*  --  2.29*  --   --  2.39*   HGB 6.5*   < > 6.8* 7.1* 8.1* 7.2*   HCT 21.2*   < > 21.1* 22.2* 24.8* 22.2*   MCV 94.6  --  92.1  --   --  92.9   MCH 29.0  --  29.7  --   --  30.1   MCHC 30.7*  --  32.2  --   --  32.4   RDW 16.8*  --  17.2*  --   --  16.6*     --  129*  --   --  99*   MPV 10.1  --  9.7  --   --  9.8    < > = values in this interval not displayed. BMP:    Recent Labs     10/10/22  1721 10/11/22  0038 10/11/22  0459 10/11/22  1043 10/11/22  1550 10/11/22  1902 10/12/22  0411   *   < > 148* 148*  --  149* 147*   K 4.7   < > 4.6 4.6 4.44 4.4 4.4   *   < > 117* 118*  --  116* 115*   CO2 17*   < > 20* 21*  --  22 22   BUN 71*   < > 63* 61*  --  57* 53*   CREATININE 2.0*   < > 1.9* 1.8*  --  1.8* 1.7*   GLUCOSE 179*   < > 150* 147*  --  145* 139*   CALCIUM 8.3*   < > 8.2* 8.2*  --  8.5* 8.3*   PROT 4.9*  --  4.9*  --   --   --  4.8*   LABALBU 2.4*  --  2.2*  --   --   --  2.2*   BILITOT 0.4  --  0.3  --   --   --  0.3   ALKPHOS 223*  --  193*  --   --   --  160*   AST 19  --  17  --   --   --  15   ALT <5  --  <5  --   --   --  <5    < > = values in this interval not displayed.        LIVER PROFILE:   Recent Labs     10/10/22  1721 10/11/22  0459 10/12/22  0411   AST 19 17 15   ALT <5 <5 <5   BILITOT 0.4 0.3 0.3   ALKPHOS 223* 193* 160*       PT/INR:   Recent Labs     10/10/22  1721 10/11/22  1043 10/12/22  0411   PROTIME 23.9* 28.4* 34.6*   INR 2.2 2.6 3.2       APTT:   No results for input(s): APTT in the last 72 hours. Fasting Lipid Panel:    No results found for: CHOL, TRIG, HDL    Cardiac Enzymes:    No results found for: CKTOTAL, CKMB, CKMBINDEX, TROPONINI    Notable Cultures:      Blood cultures No results found for: BC  Respiratory cultures No results found for: RESPCULTURE No results found for: LABGRAM  Urine No results found for: LABURIN  Legionella No results found for: LABLEGI  C Diff PCR No results found for: CDIFPCR  Wound culture/abscess: No results for input(s): WNDABS in the last 72 hours. Tip culture:No results for input(s): CXCATHTIP in the last 72 hours. Antibiotic  Days  Day started   Cipro     Linezolid                      Oxygen:     Vent Information  Ventilator ID: [id number]    Additional Respiratory Assessments  Heart Rate: 80  Resp: (!) 36  SpO2: 96 %  Position: Semi-Sarabia's  Humidification Source: Heated wire  Humidification Temp: 36.2  Circuit Condensation: Drained     Nasal cannula L/min     Face mask %     Reservoirs mask %       ABG     PH  7.35   PCO2  37   PO2  99   HCO3  20   Sat%  95   FIO2     DATES 10/12/22       Lines:  Site  Day  Date inserted     TLC              PICC              Arterial line              Peripheral line              HD cath            Urinary Catheter 09/29/22 Mahan-Output (mL): 75 mL  [REMOVED] Urinary Catheter 09/27/22-Output (mL): 100 mL    Imaging Studies:    NM GI BLOOD LOSS   Final Result   No evidence of active GI bleeding during acquisition. XR CHEST PORTABLE   Final Result   1. Persistent bilateral patchy parenchymal densities concerning for pneumonia   and or atelectasis   2. Persistent small bilateral pleural effusions, left greater than right   3.  Cardiomegaly, stable         XR CHEST PORTABLE   Final Result   No significant change compared to the prior exam with cardiomegaly, bilateral   airspace opacities and bilateral pleural effusions seen most suggestive of   congestive heart failure, but superimposed pneumonia not excluded. US DUP ABD PEL RETRO SCROT COMPLETE   Final Result   1. No evidence of testicular torsion      2. Severe scrotal skin thickening. 3.  Trace hydroceles. 4.  No evidence of bowel containing inguinal hernia. US SCROTUM AND TESTICLES   Final Result   1. No evidence of testicular torsion      2. Severe scrotal skin thickening. 3.  Trace hydroceles. 4.  No evidence of bowel containing inguinal hernia. XR CHEST PORTABLE   Final Result   1. No significant interval change in the appearance of the chest.      2.  Bilateral pleural effusions and areas of atelectasis or multifocal   pneumonia. CTA ABDOMEN PELVIS W CONTRAST   Final Result   Mixed densities throughout the colonic segments including in the ascending   colon and rectum could represent mixed densities of blood products although   no focal area of arterial enhancement or extravasation/blushing is observed   to localize acute hemorrhage. No evidence for perforation or abscess. No   pneumatosis intestinalis or portal venous gas evident. 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 No adjacent hematoma with only minimal   adjacent stranding. Bilateral pleural effusions moderate to large right and moderate left   effusions with adjacent atelectasis. Anasarca. XR CHEST PORTABLE   Final Result   Bilateral pleural effusions with bibasilar patchy infiltrates and mild   cardiomegaly. NM GI BLOOD LOSS   Final Result   Active GI bleeding identified during acquisition in the splenic flexure with   peristalsis identified into the proximal descending colon.       RECOMMENDATIONS:   Unavailable         CT ABDOMEN PELVIS WO CONTRAST   Final Result   CHEST:      No evidence of neoplastic disease, allowing for limitations of noncontrast   technique. Moderate bilateral pleural effusions. Nonemergent incidental findings as above. ABDOMEN/PELVIS:      No evidence of neoplastic disease, allowing for limitations of noncontrast   technique. Bladder wall thickening is nonspecific given under distension; recommend   correlation urinalysis to evaluate for UTI. Nonemergent incidental findings as above. CT CHEST WO CONTRAST   Final Result   CHEST:      No evidence of neoplastic disease, allowing for limitations of noncontrast   technique. Moderate bilateral pleural effusions. Nonemergent incidental findings as above. ABDOMEN/PELVIS:      No evidence of neoplastic disease, allowing for limitations of noncontrast   technique. Bladder wall thickening is nonspecific given under distension; recommend   correlation urinalysis to evaluate for UTI. Nonemergent incidental findings as above. FL MODIFIED BARIUM SWALLOW W VIDEO   Final Result   1. Mildly impaired swallowing mechanism with swallowing delay and followed   by laryngeal penetration with thin liquid barium when using a straw. No   barium aspiration      2. Please see separate speech pathology report for full discussion of   findings and recommendations. RECOMMENDATIONS:   Unavailable         XR ABDOMEN (KUB) (SINGLE AP VIEW)   Final Result   Somewhat greater than average quantity of retained fecal material thin the   lower GI tract suggesting dysfunction with evacuation. XR CHEST PORTABLE   Final Result   1. Atherosclerotic disease and mild cardiomegaly. 2.  Persistent but improved opacities in the bilateral lungs most pronounced   in the mid and lower lungs.   There appears to be a small left and trace right   pleural effusion         XR FOOT RIGHT (2 VIEWS)   Final Result   No evidence of calcaneal osteomyelitis. CT HEAD WO CONTRAST   Final Result   Parenchymal volume loss and chronic microvascular ischemic changes. No acute intracranial abnormality. Ethmoid and sphenoid sinusitis. Right mastoid effusion. US RETROPERITONEAL COMPLETE   Final Result   1. Normal appearance of the bilateral kidneys. No hydronephrosis. 2.  A Mahan catheter is decompressing the bladder. XR CHEST PORTABLE   Final Result   Worsening infiltrate and or atelectasis on the left, stable on the right with   suspected layering pleural effusion. XR CHEST PORTABLE   Final Result   Unchanged bilateral atelectasis and or infiltrate as well as small right   pleural effusion. MRI BRAIN WO CONTRAST   Final Result   There are 2 small regions of petechial infarcts one in the left   posterior frontal lobe and a second in the left parietal lobe not   exceeding 3 mm. There is otherwise extensive small vessel ischemic   changes         XR CHEST PORTABLE   Final Result   1. Multifocal bilateral airspace disease more prominent within the lower   lobes. The airspace disease is unchanged when compared with the prior study. CT HEAD WO CONTRAST   Final Result   Diffuse atrophy likely age related   Findings compatible with small vessel ischemic changes. XR CHEST PORTABLE   Final Result   1. Pulmonary opacities present favoring edema and atelectasis, also small   pleural effusions, but nonspecific with some additional considerations noted   above   2. Support devices present as described above   3. Heart size appears borderline enlarged         XR ABDOMEN FOR NG/OG/NE TUBE PLACEMENT   Final Result   NG/OG is in the stomach. IR CAROTID STENT W PROTECTION   Final Result   1.  Angiogram demonstrates successful placement of a carotid stent ,   post procedure images demonstrate a widely patent stent and internal   carotid         CT HEAD WO CONTRAST   Final Result   Diffuse atrophy likely age related   Findings compatible with small vessel ischemic changes. Findings were called at the time of dictation         CT BRAIN PERFUSION   Final Result      No significant ischemic penumbra identified      This study was analyzed by the Viz. ai algorithm. CTA NECK W CONTRAST   Final Result   1. Estimated stenosis of the proximal right and left internal carotid   artery by NASCET criteria is greater than 90% on the left   2. Severe atherosclerotic disease . 3. No large vessel occlusion identified            This study was analyzed by the Viz. ai algorithm. CTA HEAD W CONTRAST   Final Result   1. Estimated stenosis of the proximal right and left internal carotid   artery by NASCET criteria is greater than 90% on the left   2. Severe atherosclerotic disease . 3. No large vessel occlusion identified            This study was analyzed by the Viz. ai algorithm. IR INTERVENTIONAL RADIOLOGY PROCEDURE REQUEST    (Results Pending)   XR CHEST PORTABLE    (Results Pending)   XR CHEST PORTABLE    (Results Pending)          APRN- CNP Assessment and PLan   In summary, 74 y.o. male with significant past medical history of CKD, DM type II, MATT, presented 9/26/22 2 with stroke and GI bleed. Patient was admitted to [facility] with strokelike symptoms, right-sided paralysis and aphasia, found to have severe left ICA stenosis and was transfer to [facility],  taken to IR for left ICA angioplasty with stent placement. Patient had GI Bleed, following general surgery, received total of 12 units of PRBC since admission. EGD on 10/3/22 showed no evidence of upper GI bleed, CT a/p showed showed no focal area of arterial enhancement or extra blush to localize acute hemorrhage, nuclear medicine scan was positive for bleeding around the splenic flexure. Colonoscopy showed full of clots. Transfer to MICU from PICU with low hgb, labor breathing and hypotensive. Assessment:  LMCA  stroke 2/2 symptomatic LICA stenosis s/p IR angioplasty and stent placement 9/26/2022 on dual antiplatelet therapy- off now   Acute hypoxemic and hypercapnic respiratory failure intubated/extubated, currently on supplemental oxygen  Acute  large GI bleed/acute blood loss anemia s/p 15 units of PRBC s/p EGD and Colonoscopy, and bleeding scan. bleeding around the splenic flexure.    LARY   Metabolic acidosis   Hypernatremia/ hyperchloremia  Hyperglycemia  Leukocytosis  Right foot ulcer    Hx DM type 2  Hx of MATT on CPAP  Hx of parkinson disease  Hx of seizure  Hx of PVD  Hx of depression      Plan:  - Currently on supplement oxygen, titrate to keep SPO2 goal above 92%  - bronchodilator eran and PRN  - Bipap qhs and PRN   - precedex gtt -- will d/c and start on Seroquel 25 BID    - keep MAP >65mmHg  - neurology consulted for stroke, on dual antiplatelet therapy- recommended Plavix once stable from bleeding standpoint until able to start dual therapy   - trend troponin, pro BNP  -Wound culture culture wound culture on foot showed Corynebacteria (Mixed morphologies, COVID 19 negative,   - ID following, on  Cipro and linezolid  - General surgery following for GI bleed, input appreciated  - s/p 15 units of PBRC  - hgb 7.1 today  - INR 3.2-- vitamin K and  1 FFP   - h/h q6H  - transfuse if less than 7  - PPI BID  - EGD, colonoscopy and bleeding scan done, recommended IR for embolization- on dural antiplatelet therapy- high risk of bleeding- unable to reach family  - Stat nuclear bleeding scan negative for acute bleed   - will hold brilinta and Asprin for now given a bleed   - metabolic acidosis, spoke with nephrology, will start with bicarb gtt--d/c bicarb gtt  Lestine Reach   - trend Bmp  - Bumex 1 mg x 1, urine output 2.3L; I/o Since admission (+5.8L)   - nephrology,following ,input appreciated  - Podiatry and wound care following  - labs in AM  - F/E/N Lestine Reach /replace lytes/ tube feeds   - DVT/GI scds/ no anticoagulation given GI bleed/ Protonix BID  - Code status: full code   - Transfer to [facility]- accepted, awaiting for a bed.          Eunice Setting, APRN-CNP   Critical Care     Discussed case with Attending Physician: Dr. Magdalene Wallace

## 2022-10-12 NOTE — PROGRESS NOTES
Hospitalist Progress Note      PCP: Kimberley Turner MD    Date of Admission: 9/27/2022  Days in the hospital: [address] Course:   Patient is a 72-year-old male with history of CKD, diabetes mellitus, obstructive sleep apnea who initially presented to [facility] with strokelike symptoms and right-sided weakness along with aphasia. Patient was noted to have severe left ICA stenosis. He was transferred to [facility] and patient underwent left ICA angioplasty and stent placement by IR. He was initially admitted to the neuro ICU. He was placed on dual antiplatelet therapy with Brilinta and aspirin. Also was noted to have GI bleed and received 12 units of PRBC. EGD was done on 10/3/2022 and no evidence of upper GI bleed noted. Colonoscopy on 10/9/2022 showed large amount of blood. CT of the abdomen did not show any localization of hemorrhage. Nuclear medicine scan was done which showed bleeding around the splenic flexure. RRT was called due to hypotension and hemorrhagic shock and patient was transferred to ICU. Due to diffuse bleeding patient was taken off dual antiplatelet therapy. Subjective  Patient seen and examined at bedside. Plan is for colonoscopy on Friday. Patient denies any fevers, chills, chest pain, shortness of breath, nausea, vomiting. H&H remains low but stable. Patient sleepy today. Chart reviewed, overnight events reviewed. Exam:    /72   Pulse 82   Temp 98.7 °F (37.1 °C) (Axillary)   Resp (!) 40   Ht 5' 8\" (1.727 m)   Wt 260 lb 8 oz (118.2 kg)   SpO2 (!) 86%   BMI 39.61 kg/m²     HEENT: + Pallor, no icterus. Respiratory:  CTA, good air entry. Cardiovascular: RRR, no murmur. Abdomen: Soft, non-tender, BS noted. Musculoskeletal: Right foot dressing and wound VAC noted.    Neurologic: Sleepy        Assessment/Plan:  Acute CVA with left ICA stenosis s/p angioplasty and stent placement, dual antiplatelet therapy on hold, continue serial neurochecks, follow-up with neurology    Acute lower GI bleed s/p multiple units of PRBC transfusion. Bleeding noted from splenic flexure. To be scheduled for colonoscopy on Friday, follow-up with GI    Acute blood loss anemia status post multiple unit blood transfusion, H&H remains low, monitor need further transfusions    Acute kidney injury, renal function slowly improving, nephrology following    Metabolic acidosis secondary to acute kidney injury, on bicarb drip, follow-up with nephrology    Right foot nonhealing wound, on Cipro and Zyvox per ID, continue with local wound care, follow-up with podiatry    Obstructive sleep apnea on CPAP         Labs:   Recent Labs     10/10/22  1721 10/11/22  0038 10/11/22  0459 10/11/22  1043 10/12/22  0031 10/12/22  0411   WBC 13.9*  --  10.5  --   --  7.3   HGB 6.5*   < > 6.8* 7.1* 8.1* 7.2*   HCT 21.2*   < > 21.1* 22.2* 24.8* 22.2*     --  129*  --   --  99*    < > = values in this interval not displayed. Recent Labs     10/10/22  1721 10/11/22  0038 10/11/22  0459 10/11/22  1043 10/11/22  1550 10/11/22  1902 10/12/22  0411   *   < > 148* 148*  --  149* 147*   K 4.7   < > 4.6 4.6 4.44 4.4 4.4   *   < > 117* 118*  --  116* 115*   CO2 17*   < > 20* 21*  --  22 22   BUN 71*   < > 63* 61*  --  57* 53*   CREATININE 2.0*   < > 1.9* 1.8*  --  1.8* 1.7*   CALCIUM 8.3*   < > 8.2* 8.2*  --  8.5* 8.3*   PHOS 4.0  --  3.8  --   --   --  3.9    < > = values in this interval not displayed. Recent Labs     10/10/22  1721 10/11/22  0459 10/12/22  0411   AST 19 17 15   ALT <5 <5 <5   BILITOT 0.4 0.3 0.3   ALKPHOS 223* 193* 160*     Recent Labs     10/10/22  1721 10/11/22  1043 10/12/22  0411   INR 2.2 2.6 3.2     No results for input(s): Lizbeth Christianson in the last 72 hours.     Medications:  Reviewed    Infusion Medications    sodium chloride      sodium chloride      sodium chloride      sodium chloride      sodium chloride      dextrose Scheduled Medications    phytonadione (VITAMIN K)  IVPB  10 mg IntraVENous Daily    QUEtiapine  25 mg Oral BID    LORazepam  0.5 mg IntraVENous Q8H    miconazole nitrate   Topical BID    [Held by provider] gabapentin  600 mg Oral 4x daily    amLODIPine  10 mg Oral Daily    white petrolatum   Topical BID    sodium chloride flush  5-40 mL IntraVENous 2 times per day    ciprofloxacin  500 mg Oral 2 times per day    miconazole   Topical BID    [Held by provider] insulin glargine  32 Units SubCUTAneous QAM    epoetin sharath-epbx  6,000 Units SubCUTAneous Once per day on Mon Wed Fri    [Held by provider] cloNIDine  0.1 mg Oral Q12H    [Held by provider] hydrALAZINE  50 mg Oral 3 times per day    [Held by provider] lactulose  20 g Oral BID    insulin lispro  0-16 Units SubCUTAneous 4x Daily AC & HS    HYDROcodone-acetaminophen  1 tablet Oral BID    pantoprazole (PROTONIX) 40 mg injection  40 mg IntraVENous Q12H    metoprolol succinate  25 mg Oral Daily    [Held by provider] sennosides  5 mL Oral Nightly    carbidopa-levodopa  1 tablet Per NG tube TID    ipratropium-albuterol  1 ampule Inhalation Q4H WA    collagenase   Topical Q MWF    benzonatate  100 mg Oral TID    cetirizine  10 mg Oral Daily    ezetimibe  10 mg Oral Daily    [Held by provider] ticagrelor  90 mg Oral BID    [Held by provider] aspirin  81 mg Oral Daily    Or    [Held by provider] aspirin  300 mg Rectal Daily    atorvastatin  80 mg Oral Nightly     PRN Meds: sodium chloride, sodium chloride, sodium chloride, miconazole nitrate **AND** miconazole nitrate, sodium chloride, sodium chloride flush, sodium chloride, labetalol, hydrALAZINE, glucose, dextrose bolus **OR** dextrose bolus, glucagon (rDNA), dextrose, ondansetron **OR** ondansetron      Intake/Output Summary (Last 24 hours) at 10/12/2022 1054  Last data filed at 10/12/2022 0900  Gross per 24 hour   Intake 1869.84 ml   Output 2570 ml   Net -700.16 ml     Body mass index is 39.61 kg/m². Diet  ADULT DIET; Clear Liquid; 5 carb choices (75 gm/meal)    Code Status  Full Code       Electronically signed by Allyssa Reynolds MD on 10/12/2022 at 10:54 AM  [employer]   Please contact me through perfect [employer]    NOTE: This report was transcribed using voice recognition software. Every effort was made to ensure accuracy; however, inadvertent computerized transcription errors may be present.

## 2022-10-12 NOTE — PROGRESS NOTES
Department of Internal Medicine  Infectious Diseases   Progress Note      C/C :  Right foot  wound infection     Denies fever or chills  Reports pain   Afebrile  Transferred to MICU for low H/H ( GI bleeding )       Current Facility-Administered Medications   Medication Dose Route Frequency Provider Last Rate Last Admin    phytonadione (ADULT) (VITAMIN K) 10 mg in dextrose 5 % 100 mL IVPB  10 mg IntraVENous Daily Jeremy Rangel MD   Stopped at 10/12/22 0900    0.9 % sodium chloride infusion   IntraVENous PRN Gretel Godoy MD        0.45 % sodium chloride infusion   IntraVENous Continuous Eunice Gaona MD 50 mL/hr at 10/12/22 1256 New Bag at 10/12/22 1256    QUEtiapine (SEROQUEL) tablet 50 mg  50 mg Oral BID Lunette Hum, APRN - CNP        0.9 % sodium chloride infusion   IntraVENous PRN Ulises Reich, APRN - CNP        0.9 % sodium chloride infusion   IntraVENous PRN Lunette Hum, APRN - CNP        miconazole nitrate 2 % ointment   Topical BID Angela Spina, DO   Given at 10/12/22 5924    And    miconazole nitrate 2 % ointment   Topical TID PRN Angela Spina, DO   Given at 10/11/22 0915    [Held by provider] gabapentin (NEURONTIN) capsule 600 mg  600 mg Oral 4x daily Mikki Holter, MD   600 mg at 10/10/22 2102    amLODIPine (NORVASC) tablet 10 mg  10 mg Oral Daily Lunette Hum, APRN - CNP   10 mg at 10/12/22 0801    0.9 % sodium chloride infusion   IntraVENous PRN [facility], APRN - CNP        white petrolatum ointment   Topical BID Angela Spina, DO   Given at 10/12/22 2891    sodium chloride flush 0.9 % injection 5-40 mL  5-40 mL IntraVENous 2 times per day Mikki Holter, MD   10 mL at 10/11/22 2016    sodium chloride flush 0.9 % injection 5-40 mL  5-40 mL IntraVENous PRN Mikki Holter, MD        0.9 % sodium chloride infusion   IntraVENous PRN Mikki Holter, MD        ciprofloxacin (CIPRO) tablet 500 mg  500 mg Oral 2 times per day Mikki Holter, MD   500 mg at 10/12/22 0802    miconazole (MICOTIN) 2 % powder   Topical BID [place], DO   Given at 10/12/22 9071    [Held by provider] insulin glargine-yfgn (SEMGLEE-YFGN) injection vial 32 Units  32 Units SubCUTAneous QAM [place], DO   32 Units at 10/07/22 0911    epoetin sharath-epbx (RETACRIT) injection 6,000 Units  6,000 Units SubCUTAneous Once per day on Mon Wed Fri Brigitte Montero MD   6,000 Units at 10/12/22 0753    [Held by provider] cloNIDine (CATAPRES) tablet 0.1 mg  0.1 mg Oral Q12H Thompson Furry, APRN - CNP   0.1 mg at 10/08/22 0030    [Held by provider] hydrALAZINE (APRESOLINE) tablet 50 mg  50 mg Oral 3 times per day Thompson Furry, APRN - CNP   50 mg at 10/08/22 1586    [Held by provider] lactulose (CHRONULAC) 10 GM/15ML solution 20 g  20 g Oral BID Brigitte Montero MD   20 g at 10/09/22 2300    insulin lispro (HUMALOG) injection vial 0-16 Units  0-16 Units SubCUTAneous 4x Daily AC & HS Brigitte Montero MD   4 Units at 10/07/22 1702    HYDROcodone-acetaminophen ([facility]) 7.5-325 MG per tablet 1 tablet  1 tablet Oral BID Brigitte Montero MD   1 tablet at 10/12/22 0750    pantoprazole (PROTONIX) 40 mg in sodium chloride (PF) 10 mL injection  40 mg IntraVENous Q12H Brigitte Montero MD   40 mg at 10/12/22 0326    metoprolol succinate (TOPROL XL) extended release tablet 25 mg  25 mg Oral Daily Brigitte Montero MD   25 mg at 10/12/22 0748    [Held by provider] sennosides (SENOKOT) 8.8 MG/5ML syrup 5 mL  5 mL Oral Nightly Brigitte Montero MD   5 mL at 10/09/22 2300    labetalol (NORMODYNE;TRANDATE) injection 10 mg  10 mg IntraVENous Q30 Min PRN Brigitte Montero MD   10 mg at 10/12/22 1259    hydrALAZINE (APRESOLINE) injection 10 mg  10 mg IntraVENous Q30 Min PRN Brigitte Montero MD   10 mg at 10/11/22 2150    glucose chewable tablet 16 g  4 tablet Oral PRN Brigitte Montero MD        dextrose bolus 10% 125 mL  125 mL IntraVENous PRN Brigitte Montero MD        Or    dextrose bolus 10% 250 mL  250 mL IntraVENous PRN Brigitte Montero MD        glucagon (rDNA) injection 1 mg  1 mg SubCUTAneous PRN Jareth Dumas MD        dextrose 10 % infusion   IntraVENous Continuous PRN Jareth Dumas MD        carbidopa-levodopa (SINEMET)  MG per tablet 1 tablet  1 tablet Per NG tube TID Jareth Dumas MD   1 tablet at 10/12/22 0908    ipratropium-albuterol (DUONEB) nebulizer solution 1 ampule  1 ampule Inhalation Q4H WA Jareth Dumas MD   1 ampule at 10/12/22 1232    collagenase ointment   Topical Q MWF Jareth Dumas MD   Given at 10/12/22 2375    benzonatate (TESSALON) capsule 100 mg  100 mg Oral TID Jareth Dumas MD   100 mg at 10/11/22 2014    cetirizine (ZYRTEC) tablet 10 mg  10 mg Oral Daily Jareth Dumas MD   10 mg at 10/12/22 0750    ezetimibe (ZETIA) tablet 10 mg  10 mg Oral Daily Jareth Dumas MD   10 mg at 10/12/22 0753    [Held by provider] ticagrelor (BRILINTA) tablet 90 mg  90 mg Oral BID Jareth Dumas MD   90 mg at 10/09/22 2300    ondansetron (ZOFRAN-ODT) disintegrating tablet 4 mg  4 mg Oral Q8H PRN Jareth Dumas MD        Or    ondansetron (ZOFRAN) injection 4 mg  4 mg IntraVENous Q6H PRN Jareth Dumas MD        [Held by provider] aspirin EC tablet 81 mg  81 mg Oral Daily Jareth Dumas MD   81 mg at 10/09/22 1515    Or    [Held by provider] aspirin suppository 300 mg  300 mg Rectal Daily Jareth Dumas MD   300 mg at 09/29/22 0809    atorvastatin (LIPITOR) tablet 80 mg  80 mg Oral Nightly Jareth Dumas MD   80 mg at 10/11/22 2013           REVIEW OF SYSTEMS:   CONSTITUTIONAL:  Denies fever, chill or rigors. HEENT: denies blurring of vision or double vision, denies hearing problem  RESPIRATORY: denies cough, shortness of breath,  CARDIOVASCULAR:  Denies palpitation  GASTROINTESTINAL:  Denies abdomen pain, diarrhea or constipation. GENITOURINARY:  Denies burning urination or frequency of urination  INTEGUMENT: Right heel wound  HEMATOLOGIC/LYMPHATIC:  Denies lymph node swelling, gum bleeding or easy bruising.   MUSCULOSKELETAL:  Denies leg pain , joint pain , joint swelling  NEUROLOGICAL:  weakness right side . PHYSICAL EXAM:      Vitals:     BP (!) 173/102   Pulse 95   Temp 98.7 °F (37.1 °C) (Axillary)   Resp (!) 45   Ht 5' 8\" (1.727 m)   Wt 260 lb 8 oz (118.2 kg)   SpO2 95%   BMI 39.61 kg/m²     General Appearance:    Awake, alert , no acute distress. Head:    Normocephalic, atraumatic   Eyes:    No pallor, no icterus,   Ears:    No obvious deformity or drainage.    Nose:   No nasal drainage   Throat:   Mucosa moist, no oral thrush   Neck:   Supple, no lymphadenopathy   Lungs:     Scattered  wheeze    Heart:    Regular rate and rhythm, no murmur   Abdomen:     Soft, non-tender, bowel sounds present    Extremities:    Right heel wound    Pulses:   Dorsalis pedis palpable - diminished    Skin:   Right foot  wound with VAC              CBC with Differential:      Lab Results   Component Value Date/Time    WBC 7.3 10/12/2022 04:11 AM    RBC 2.39 10/12/2022 04:11 AM    HGB 7.2 10/12/2022 04:11 AM    HCT 22.2 10/12/2022 04:11 AM    HCT 15.0 09/27/2022 08:58 PM    PLT 99 10/12/2022 04:11 AM    MCV 92.9 10/12/2022 04:11 AM    MCH 30.1 10/12/2022 04:11 AM    MCHC 32.4 10/12/2022 04:11 AM    RDW 16.6 10/12/2022 04:11 AM    NRBC 1.7 10/12/2022 04:11 AM    LYMPHOPCT 3.5 10/12/2022 04:11 AM    MONOPCT 2.6 10/12/2022 04:11 AM    BASOPCT 0.9 10/12/2022 04:11 AM    MONOSABS 0.22 10/12/2022 04:11 AM    LYMPHSABS 0.29 10/12/2022 04:11 AM    EOSABS 0.31 10/12/2022 04:11 AM    BASOSABS 0.07 10/12/2022 04:11 AM       CMP     Lab Results   Component Value Date/Time     10/12/2022 04:11 AM    K 4.4 10/12/2022 04:11 AM    K 4.7 10/10/2022 05:21 PM     10/12/2022 04:11 AM    CO2 22 10/12/2022 04:11 AM    BUN 53 10/12/2022 04:11 AM    CREATININE 1.7 10/12/2022 04:11 AM    GFRAA 49 10/12/2022 04:11 AM    LABGLOM 41 10/12/2022 04:11 AM    GLUCOSE 139 10/12/2022 04:11 AM    PROT 4.8 10/12/2022 04:11 AM    LABALBU 2.2 10/12/2022 04:11 AM    CALCIUM 8.3 10/12/2022 04:11 AM    BILITOT 0.3 10/12/2022 04:11 AM    ALKPHOS 160 10/12/2022 04:11 AM    AST 15 10/12/2022 04:11 AM    ALT <5 10/12/2022 04:11 AM         Hepatic Function Panel:    Lab Results   Component Value Date/Time    ALKPHOS 160 10/12/2022 04:11 AM    ALT <5 10/12/2022 04:11 AM    AST 15 10/12/2022 04:11 AM    PROT 4.8 10/12/2022 04:11 AM    BILITOT 0.3 10/12/2022 04:11 AM    LABALBU 2.2 10/12/2022 04:11 AM       PT/INR:    Lab Results   Component Value Date/Time    PROTIME 25.9 10/12/2022 10:44 AM    INR 2.4 10/12/2022 10:44 AM       TSH:  No results found for: TSH    U/A:  No results found for: NITRITE, COLORU, PHUR, LABCAST, WBCUA, RBCUA, MUCUS, TRICHOMONAS, YEAST, BACTERIA, CLARITYU, SPECGRAV, LEUKOCYTESUR, UROBILINOGEN, BILIRUBINUR, BLOODU, GLUCOSEU, AMORPHOUS    ABG:  No results found for: ILZ6JWP, BEART, T8VDQVVA, PHART, THGBART, NSF4ORS, PO2ART, CZQ1YGP    MICROBIOLOGY:    SARS CoV 2 neg     CRP 0.8      Radiology :    Chest X ray : LLL infiltrates, atelectasis       X ray foot :           No evidence of calcaneal osteomyelitis. IMPRESSION:     Right foot  non healing wound  Leukocytosis - improved   ?  Aspiration       RECOMMENDATIONS:      Stop zyvox and cipro   Local wound care

## 2022-10-13 ENCOUNTER — APPOINTMENT (OUTPATIENT)
Dept: GENERAL RADIOLOGY | Age: 67
DRG: 034 | End: 2022-10-13
Attending: INTERNAL MEDICINE
Payer: MEDICARE

## 2022-10-13 LAB
AADO2: 136.1 MMHG
ALBUMIN SERPL-MCNC: 2.5 G/DL (ref 3.5–5.2)
ALP BLD-CCNC: 230 U/L (ref 40–129)
ALT SERPL-CCNC: <5 U/L (ref 0–40)
ANION GAP SERPL CALCULATED.3IONS-SCNC: 13 MMOL/L (ref 7–16)
ANION GAP SERPL CALCULATED.3IONS-SCNC: 13 MMOL/L (ref 7–16)
ANION GAP SERPL CALCULATED.3IONS-SCNC: 14 MMOL/L (ref 7–16)
ANISOCYTOSIS: ABNORMAL
AST SERPL-CCNC: 18 U/L (ref 0–39)
B.E.: -2.6 MMOL/L (ref -3–3)
BASOPHILS ABSOLUTE: 0 E9/L (ref 0–0.2)
BASOPHILS RELATIVE PERCENT: 0.3 % (ref 0–2)
BILIRUB SERPL-MCNC: 0.5 MG/DL (ref 0–1.2)
BUN BLDV-MCNC: 42 MG/DL (ref 6–23)
BUN BLDV-MCNC: 42 MG/DL (ref 6–23)
BUN BLDV-MCNC: 43 MG/DL (ref 6–23)
CALCIUM IONIZED: 1.3 MMOL/L (ref 1.15–1.33)
CALCIUM SERPL-MCNC: 8.3 MG/DL (ref 8.6–10.2)
CALCIUM SERPL-MCNC: 8.7 MG/DL (ref 8.6–10.2)
CALCIUM SERPL-MCNC: 8.7 MG/DL (ref 8.6–10.2)
CHLORIDE BLD-SCNC: 114 MMOL/L (ref 98–107)
CHLORIDE BLD-SCNC: 115 MMOL/L (ref 98–107)
CHLORIDE BLD-SCNC: 115 MMOL/L (ref 98–107)
CO2: 22 MMOL/L (ref 22–29)
CO2: 22 MMOL/L (ref 22–29)
CO2: 24 MMOL/L (ref 22–29)
COHB: 1.2 % (ref 0–1.5)
CREAT SERPL-MCNC: 1.5 MG/DL (ref 0.7–1.2)
CREAT SERPL-MCNC: 1.7 MG/DL (ref 0.7–1.2)
CREAT SERPL-MCNC: 1.7 MG/DL (ref 0.7–1.2)
CRITICAL: ABNORMAL
D DIMER: 487 NG/ML DDU
D DIMER: 605 NG/ML DDU
DATE ANALYZED: ABNORMAL
DATE OF COLLECTION: ABNORMAL
EOSINOPHILS ABSOLUTE: 0.11 E9/L (ref 0.05–0.5)
EOSINOPHILS RELATIVE PERCENT: 0.9 % (ref 0–6)
FIO2: 35 %
GFR AFRICAN AMERICAN: 49
GFR AFRICAN AMERICAN: 49
GFR AFRICAN AMERICAN: 57
GFR NON-AFRICAN AMERICAN: 41 ML/MIN/1.73
GFR NON-AFRICAN AMERICAN: 41 ML/MIN/1.73
GFR NON-AFRICAN AMERICAN: 47 ML/MIN/1.73
GLUCOSE BLD-MCNC: 176 MG/DL (ref 74–99)
GLUCOSE BLD-MCNC: 221 MG/DL (ref 74–99)
GLUCOSE BLD-MCNC: 251 MG/DL (ref 74–99)
HCO3: 21.8 MMOL/L (ref 22–26)
HCT VFR BLD CALC: 20.8 % (ref 37–54)
HCT VFR BLD CALC: 23.4 % (ref 37–54)
HCT VFR BLD CALC: 24.2 % (ref 37–54)
HEMOGLOBIN: 6.8 G/DL (ref 12.5–16.5)
HEMOGLOBIN: 7.5 G/DL (ref 12.5–16.5)
HEMOGLOBIN: 8 G/DL (ref 12.5–16.5)
HHB: 8.9 % (ref 0–5)
INR BLD: 1.4
LAB: ABNORMAL
LYMPHOCYTES ABSOLUTE: 0.24 E9/L (ref 1.5–4)
LYMPHOCYTES RELATIVE PERCENT: 1.7 % (ref 20–42)
Lab: ABNORMAL
MAGNESIUM: 1.6 MG/DL (ref 1.6–2.6)
MCH RBC QN AUTO: 29.6 PG (ref 26–35)
MCHC RBC AUTO-ENTMCNC: 32.1 % (ref 32–34.5)
MCV RBC AUTO: 92.5 FL (ref 80–99.9)
METER GLUCOSE: 191 MG/DL (ref 74–99)
METER GLUCOSE: 215 MG/DL (ref 74–99)
METER GLUCOSE: 221 MG/DL (ref 74–99)
METER GLUCOSE: 242 MG/DL (ref 74–99)
METER GLUCOSE: 250 MG/DL (ref 74–99)
METHB: 0.6 % (ref 0–1.5)
MODE: ABNORMAL
MONOCYTES ABSOLUTE: 0.12 E9/L (ref 0.1–0.95)
MONOCYTES RELATIVE PERCENT: 0.9 % (ref 2–12)
NEUTROPHILS ABSOLUTE: 11.54 E9/L (ref 1.8–7.3)
NEUTROPHILS RELATIVE PERCENT: 96.5 % (ref 43–80)
NUCLEATED RED BLOOD CELLS: 1.7 /100 WBC
O2 SATURATION: 92.5 % (ref 92–98.5)
O2HB: 89.3 % (ref 94–97)
OPERATOR ID: 1721
OVALOCYTES: ABNORMAL
PATIENT TEMP: 37 C
PCO2: 35.7 MMHG (ref 35–45)
PDW BLD-RTO: 16.2 FL (ref 11.5–15)
PEEP/CPAP: 6 CMH2O
PFO2: 1.81 MMHG/%
PH BLOOD GAS: 7.4 (ref 7.35–7.45)
PHOSPHORUS: 3.3 MG/DL (ref 2.5–4.5)
PIP: 14 CMH2O
PLATELET # BLD: 111 E9/L (ref 130–450)
PMV BLD AUTO: 10.1 FL (ref 7–12)
PO2: 63.2 MMHG (ref 75–100)
POIKILOCYTES: ABNORMAL
POLYCHROMASIA: ABNORMAL
POTASSIUM SERPL-SCNC: 3.6 MMOL/L (ref 3.5–5)
POTASSIUM SERPL-SCNC: 4 MMOL/L (ref 3.5–5)
POTASSIUM SERPL-SCNC: 4 MMOL/L (ref 3.5–5)
PRO-BNP: 7044 PG/ML (ref 0–125)
PROCALCITONIN: 0.16 NG/ML (ref 0–0.08)
PROTHROMBIN TIME: 15.6 SEC (ref 9.3–12.4)
RBC # BLD: 2.53 E12/L (ref 3.8–5.8)
RI(T): 2.15
RR MECHANICAL: 12 B/MIN
SODIUM BLD-SCNC: 149 MMOL/L (ref 132–146)
SODIUM BLD-SCNC: 150 MMOL/L (ref 132–146)
SODIUM BLD-SCNC: 153 MMOL/L (ref 132–146)
SOURCE, BLOOD GAS: ABNORMAL
THB: 7.9 G/DL (ref 11.5–16.5)
TIME ANALYZED: 505
TOTAL PROTEIN: 5.4 G/DL (ref 6.4–8.3)
WBC # BLD: 11.9 E9/L (ref 4.5–11.5)

## 2022-10-13 PROCEDURE — 2580000003 HC RX 258: Performed by: INTERNAL MEDICINE

## 2022-10-13 PROCEDURE — 2500000003 HC RX 250 WO HCPCS: Performed by: SURGERY

## 2022-10-13 PROCEDURE — 99232 SBSQ HOSP IP/OBS MODERATE 35: CPT | Performed by: SURGERY

## 2022-10-13 PROCEDURE — 85014 HEMATOCRIT: CPT

## 2022-10-13 PROCEDURE — 36430 TRANSFUSION BLD/BLD COMPNT: CPT

## 2022-10-13 PROCEDURE — 2500000003 HC RX 250 WO HCPCS: Performed by: INTERNAL MEDICINE

## 2022-10-13 PROCEDURE — A4216 STERILE WATER/SALINE, 10 ML: HCPCS | Performed by: SURGERY

## 2022-10-13 PROCEDURE — 6370000000 HC RX 637 (ALT 250 FOR IP): Performed by: STUDENT IN AN ORGANIZED HEALTH CARE EDUCATION/TRAINING PROGRAM

## 2022-10-13 PROCEDURE — 83735 ASSAY OF MAGNESIUM: CPT

## 2022-10-13 PROCEDURE — 6360000002 HC RX W HCPCS: Performed by: SURGERY

## 2022-10-13 PROCEDURE — 80048 BASIC METABOLIC PNL TOTAL CA: CPT

## 2022-10-13 PROCEDURE — 87040 BLOOD CULTURE FOR BACTERIA: CPT

## 2022-10-13 PROCEDURE — 74018 RADEX ABDOMEN 1 VIEW: CPT

## 2022-10-13 PROCEDURE — 6370000000 HC RX 637 (ALT 250 FOR IP): Performed by: SURGERY

## 2022-10-13 PROCEDURE — 2580000003 HC RX 258: Performed by: SURGERY

## 2022-10-13 PROCEDURE — 82330 ASSAY OF CALCIUM: CPT

## 2022-10-13 PROCEDURE — 2000000000 HC ICU R&B

## 2022-10-13 PROCEDURE — 82805 BLOOD GASES W/O2 SATURATION: CPT

## 2022-10-13 PROCEDURE — 6360000002 HC RX W HCPCS

## 2022-10-13 PROCEDURE — 2500000003 HC RX 250 WO HCPCS

## 2022-10-13 PROCEDURE — 85378 FIBRIN DEGRADE SEMIQUANT: CPT

## 2022-10-13 PROCEDURE — C9113 INJ PANTOPRAZOLE SODIUM, VIA: HCPCS | Performed by: SURGERY

## 2022-10-13 PROCEDURE — 85025 COMPLETE CBC W/AUTO DIFF WBC: CPT

## 2022-10-13 PROCEDURE — 36415 COLL VENOUS BLD VENIPUNCTURE: CPT

## 2022-10-13 PROCEDURE — 84145 PROCALCITONIN (PCT): CPT

## 2022-10-13 PROCEDURE — 2580000003 HC RX 258: Performed by: STUDENT IN AN ORGANIZED HEALTH CARE EDUCATION/TRAINING PROGRAM

## 2022-10-13 PROCEDURE — 94660 CPAP INITIATION&MGMT: CPT

## 2022-10-13 PROCEDURE — 71045 X-RAY EXAM CHEST 1 VIEW: CPT

## 2022-10-13 PROCEDURE — 83880 ASSAY OF NATRIURETIC PEPTIDE: CPT

## 2022-10-13 PROCEDURE — 2700000000 HC OXYGEN THERAPY PER DAY

## 2022-10-13 PROCEDURE — 84100 ASSAY OF PHOSPHORUS: CPT

## 2022-10-13 PROCEDURE — 80053 COMPREHEN METABOLIC PANEL: CPT

## 2022-10-13 PROCEDURE — 85018 HEMOGLOBIN: CPT

## 2022-10-13 PROCEDURE — 94640 AIRWAY INHALATION TREATMENT: CPT

## 2022-10-13 PROCEDURE — 99291 CRITICAL CARE FIRST HOUR: CPT | Performed by: INTERNAL MEDICINE

## 2022-10-13 PROCEDURE — 85610 PROTHROMBIN TIME: CPT

## 2022-10-13 PROCEDURE — 82962 GLUCOSE BLOOD TEST: CPT

## 2022-10-13 RX ORDER — MAGNESIUM SULFATE IN WATER 40 MG/ML
2000 INJECTION, SOLUTION INTRAVENOUS ONCE
Status: COMPLETED | OUTPATIENT
Start: 2022-10-13 | End: 2022-10-13

## 2022-10-13 RX ORDER — HALOPERIDOL 5 MG/ML
INJECTION INTRAMUSCULAR
Status: COMPLETED
Start: 2022-10-13 | End: 2022-10-13

## 2022-10-13 RX ORDER — MIDAZOLAM HYDROCHLORIDE 2 MG/2ML
1 INJECTION, SOLUTION INTRAMUSCULAR; INTRAVENOUS ONCE
Status: COMPLETED | OUTPATIENT
Start: 2022-10-13 | End: 2022-10-13

## 2022-10-13 RX ORDER — BUMETANIDE 0.25 MG/ML
1 INJECTION, SOLUTION INTRAMUSCULAR; INTRAVENOUS ONCE
Status: COMPLETED | OUTPATIENT
Start: 2022-10-13 | End: 2022-10-13

## 2022-10-13 RX ORDER — HALOPERIDOL 5 MG/ML
5 INJECTION INTRAMUSCULAR ONCE
Status: COMPLETED | OUTPATIENT
Start: 2022-10-13 | End: 2022-10-13

## 2022-10-13 RX ORDER — FENTANYL CITRATE 50 UG/ML
INJECTION, SOLUTION INTRAMUSCULAR; INTRAVENOUS
Status: COMPLETED
Start: 2022-10-13 | End: 2022-10-13

## 2022-10-13 RX ORDER — FENTANYL CITRATE 50 UG/ML
50 INJECTION, SOLUTION INTRAMUSCULAR; INTRAVENOUS ONCE
Status: COMPLETED | OUTPATIENT
Start: 2022-10-13 | End: 2022-10-13

## 2022-10-13 RX ORDER — HALOPERIDOL 5 MG/ML
2 INJECTION INTRAMUSCULAR ONCE
Status: COMPLETED | OUTPATIENT
Start: 2022-10-13 | End: 2022-10-13

## 2022-10-13 RX ORDER — BUMETANIDE 0.25 MG/ML
2 INJECTION, SOLUTION INTRAMUSCULAR; INTRAVENOUS ONCE
Status: COMPLETED | OUTPATIENT
Start: 2022-10-13 | End: 2022-10-13

## 2022-10-13 RX ORDER — SODIUM CHLORIDE 9 MG/ML
INJECTION, SOLUTION INTRAVENOUS PRN
Status: DISCONTINUED | OUTPATIENT
Start: 2022-10-13 | End: 2022-10-16

## 2022-10-13 RX ORDER — DEXTROSE MONOHYDRATE 50 MG/ML
INJECTION, SOLUTION INTRAVENOUS CONTINUOUS
Status: DISCONTINUED | OUTPATIENT
Start: 2022-10-13 | End: 2022-10-17

## 2022-10-13 RX ADMIN — HYDRALAZINE HYDROCHLORIDE 10 MG: 20 INJECTION INTRAMUSCULAR; INTRAVENOUS at 06:10

## 2022-10-13 RX ADMIN — DEXMEDETOMIDINE 1.5 MCG/KG/HR: 100 INJECTION, SOLUTION, CONCENTRATE INTRAVENOUS at 21:40

## 2022-10-13 RX ADMIN — DEXMEDETOMIDINE 0.2 MCG/KG/HR: 100 INJECTION, SOLUTION, CONCENTRATE INTRAVENOUS at 13:00

## 2022-10-13 RX ADMIN — Medication 50 MCG/HR: at 12:54

## 2022-10-13 RX ADMIN — IPRATROPIUM BROMIDE AND ALBUTEROL SULFATE 1 AMPULE: .5; 2.5 SOLUTION RESPIRATORY (INHALATION) at 12:27

## 2022-10-13 RX ADMIN — FENTANYL CITRATE 50 MCG: 50 INJECTION, SOLUTION INTRAMUSCULAR; INTRAVENOUS at 02:13

## 2022-10-13 RX ADMIN — HALOPERIDOL 5 MG: 5 INJECTION INTRAMUSCULAR at 11:40

## 2022-10-13 RX ADMIN — Medication 10 ML: at 08:40

## 2022-10-13 RX ADMIN — IPRATROPIUM BROMIDE AND ALBUTEROL SULFATE 1 AMPULE: .5; 2.5 SOLUTION RESPIRATORY (INHALATION) at 08:15

## 2022-10-13 RX ADMIN — HYDRALAZINE HYDROCHLORIDE 10 MG: 20 INJECTION INTRAMUSCULAR; INTRAVENOUS at 16:38

## 2022-10-13 RX ADMIN — BUMETANIDE 1 MG: 0.25 INJECTION, SOLUTION INTRAMUSCULAR; INTRAVENOUS at 02:25

## 2022-10-13 RX ADMIN — HALOPERIDOL 2 MG: 5 INJECTION INTRAMUSCULAR at 09:49

## 2022-10-13 RX ADMIN — FENTANYL CITRATE 50 MCG: 50 INJECTION, SOLUTION INTRAMUSCULAR; INTRAVENOUS at 07:40

## 2022-10-13 RX ADMIN — LABETALOL HYDROCHLORIDE 10 MG: 5 INJECTION, SOLUTION INTRAVENOUS at 15:49

## 2022-10-13 RX ADMIN — IPRATROPIUM BROMIDE AND ALBUTEROL SULFATE 1 AMPULE: .5; 2.5 SOLUTION RESPIRATORY (INHALATION) at 16:30

## 2022-10-13 RX ADMIN — PETROLATUM: 420 OINTMENT TOPICAL at 14:24

## 2022-10-13 RX ADMIN — FENTANYL CITRATE 50 MCG: 50 INJECTION, SOLUTION INTRAMUSCULAR; INTRAVENOUS at 20:38

## 2022-10-13 RX ADMIN — MAGNESIUM SULFATE HEPTAHYDRATE 2000 MG: 40 INJECTION, SOLUTION INTRAVENOUS at 06:42

## 2022-10-13 RX ADMIN — PETROLATUM: 420 OINTMENT TOPICAL at 09:55

## 2022-10-13 RX ADMIN — LABETALOL HYDROCHLORIDE 10 MG: 5 INJECTION, SOLUTION INTRAVENOUS at 09:11

## 2022-10-13 RX ADMIN — MICONAZOLE NITRATE: 2 OINTMENT TOPICAL at 09:54

## 2022-10-13 RX ADMIN — INSULIN LISPRO 4 UNITS: 100 INJECTION, SOLUTION INTRAVENOUS; SUBCUTANEOUS at 21:55

## 2022-10-13 RX ADMIN — PETROLATUM: 420 OINTMENT TOPICAL at 19:38

## 2022-10-13 RX ADMIN — ANTI-FUNGAL POWDER MICONAZOLE NITRATE TALC FREE: 1.42 POWDER TOPICAL at 19:38

## 2022-10-13 RX ADMIN — MICONAZOLE NITRATE: 2 OINTMENT TOPICAL at 19:38

## 2022-10-13 RX ADMIN — SODIUM CHLORIDE, PRESERVATIVE FREE 10 ML: 5 INJECTION INTRAVENOUS at 08:39

## 2022-10-13 RX ADMIN — BUMETANIDE 2 MG: 0.25 INJECTION, SOLUTION INTRAMUSCULAR; INTRAVENOUS at 11:51

## 2022-10-13 RX ADMIN — SODIUM CHLORIDE, PRESERVATIVE FREE 40 MG: 5 INJECTION INTRAVENOUS at 15:56

## 2022-10-13 RX ADMIN — POLYETHYLENE GLYCOL-3350 AND ELECTROLYTES 6000 ML: 236; 6.74; 5.86; 2.97; 22.74 POWDER, FOR SOLUTION ORAL at 22:49

## 2022-10-13 RX ADMIN — IPRATROPIUM BROMIDE AND ALBUTEROL SULFATE 1 AMPULE: .5; 2.5 SOLUTION RESPIRATORY (INHALATION) at 02:29

## 2022-10-13 RX ADMIN — LABETALOL HYDROCHLORIDE 10 MG: 5 INJECTION, SOLUTION INTRAVENOUS at 10:41

## 2022-10-13 RX ADMIN — IPRATROPIUM BROMIDE AND ALBUTEROL SULFATE 1 AMPULE: .5; 2.5 SOLUTION RESPIRATORY (INHALATION) at 20:08

## 2022-10-13 RX ADMIN — ANTI-FUNGAL POWDER MICONAZOLE NITRATE TALC FREE: 1.42 POWDER TOPICAL at 09:53

## 2022-10-13 RX ADMIN — HALOPERIDOL LACTATE 5 MG: 5 INJECTION, SOLUTION INTRAMUSCULAR at 11:40

## 2022-10-13 RX ADMIN — MIDAZOLAM 1 MG: 1 INJECTION, SOLUTION INTRAMUSCULAR; INTRAVENOUS at 22:39

## 2022-10-13 RX ADMIN — HALOPERIDOL LACTATE 2 MG: 5 INJECTION, SOLUTION INTRAMUSCULAR at 09:49

## 2022-10-13 RX ADMIN — SODIUM CHLORIDE, PRESERVATIVE FREE 10 ML: 5 INJECTION INTRAVENOUS at 20:39

## 2022-10-13 RX ADMIN — SODIUM CHLORIDE, PRESERVATIVE FREE 40 MG: 5 INJECTION INTRAVENOUS at 02:25

## 2022-10-13 RX ADMIN — BUMETANIDE 1 MG/HR: 0.25 INJECTION, SOLUTION INTRAMUSCULAR; INTRAVENOUS at 12:19

## 2022-10-13 ASSESSMENT — PAIN SCALES - GENERAL
PAINLEVEL_OUTOF10: 0

## 2022-10-13 ASSESSMENT — PAIN SCALES - WONG BAKER
WONGBAKER_NUMERICALRESPONSE: 0

## 2022-10-13 NOTE — PROGRESS NOTES
GENERAL SURGERY  DAILY PROGRESS NOTE  10/13/2022    Subjective:  Received 2 units of FFP yesterday, no signs of bleeding overnight, no bowel movements overnight intermittently on BiPAP. Started on Bumex yesterday    Objective:  BP (!) 172/105   Pulse (!) 110   Temp 98.2 °F (36.8 °C) (Temporal)   Resp (!) 7   Ht 5' 8\" (1.727 m)   Wt 260 lb 8 oz (118.2 kg)   SpO2 90%   BMI 39.61 kg/m²     Gen: Alert, following commands  HEENT: NCAT, anicteric,  CV: Intermittent tachycardia, hypertension  Pulm: On BiPAP  Abdomen: soft, nontender, nondistended  Extremities: left basilic midline, right lower extremity with wound VAC holding suction and splinted  Skin: warm and dry  Mahan catheter with clear yellow urine    Assessment/Plan:  74 y.o. male with acute blood loss anemia secondary to lower GI bleed in the setting of recent CVA with left ICA angioplasty and stent placement on dual antiplatelet therapy which is since been held. Hemoglobin stable  Agree with Dr. Alexandra Dhaliwal for repeat colonoscopy  A.m. INR pending  Plan will be discussed with Dr. Linda Ward on a.m. rounds for questions after 6 AM please reach out to the surgical resident on-call. Electronically signed by Pilar Holley MD on 10/13/2022 at 5:50 AM     I saw and examined the patient. Agree with above assessment and plan  Hemoglobin stable  Dr. Alexandra Dhaliwal repeat colonoscopy and enter the terminal ileum on Friday  Since gastroenterology is planning on the colonoscopy, we will follow peripherally.     Electronically signed by Shakir Ortiz MD on 10/13/2022 at 3:42 PM

## 2022-10-13 NOTE — PROGRESS NOTES
Department of Podiatry   Progress Note      Patient seen and evaluated at bedside today. No acute events to report. MWF wound vac changes to continue. Vac is intact running continuous pressure. No new pedal complaints      Past Medical History:            Diagnosis Date    Chronic back pain     CKD (chronic kidney disease)     CVA (cerebral vascular accident) ([facility] [address]     CVA (cerebral vascular accident) ([facility] [address]     DM (diabetes mellitus) ([facility] [address]     Major depression     MI (myocardial infarction) ([facility] [address]     MATT (obstructive sleep apnea)     Parkinson disease ([facility] [address]     PVD (peripheral vascular disease) ([facility] [address]     Seizure ([facility] [address]        Past Surgical History:        Procedure Laterality Date    COLONOSCOPY N/A 10/9/2022    COLONOSCOPY DIAGNOSTIC performed by Timi Babin MD at [place] [id number]      IR CAROTID STENT UNI W PROTECTION  9/27/2022    IR CAROTID STENT UNI W PROTECTION 9/27/2022 Eliseo Loza MD [facility]    UPPER GASTROINTESTINAL ENDOSCOPY N/A 10/3/2022    EGD DIAGNOSTIC ONLY performed by Iza Downing MD at [facility] ENDOSCOPY       Medications Prior to Admission:    Medications Prior to Admission: apixaban (ELIQUIS) 5 MG TABS tablet, Take 5 mg by mouth 2 times daily  aspirin 81 MG chewable tablet, Take 81 mg by mouth daily  carbidopa-levodopa (SINEMET)  MG per tablet, Take 1 tablet by mouth 3 times daily  carvedilol (COREG) 25 MG tablet, Take 25 mg by mouth 2 times daily (with meals)  cetirizine (ZYRTEC) 10 MG tablet, Take 10 mg by mouth daily  clopidogrel (PLAVIX) 75 MG tablet, Take 75 mg by mouth daily  diphenhydrAMINE (BENADRYL) 25 MG capsule, Take 25 mg by mouth every 6 hours  ezetimibe (ZETIA) 10 MG tablet, Take 10 mg by mouth daily  fluticasone (FLONASE) 50 MCG/ACT nasal spray, 2 sprays by Each Nostril route daily  gabapentin (NEURONTIN) 600 MG tablet, Take 600 mg by mouth 4 times daily.   HYDROcodone-acetaminophen (NORCO) 5-325 MG per tablet, Take 1 tablet by mouth 2 times daily. Insulin Glargine, 2 Unit Dial, (TOUJEO MAX SOLOSTAR) 300 UNIT/ML SOPN, Inject 66 Units into the skin daily  piperacillin-tazobactam (ZOSYN) 3-0.375 GM per 50ML IVPB extended infusion, Infuse 4.5 mg intravenously in the morning and 4.5 mg at noon and 4.5 mg in the evening. acetaminophen (TYLENOL) 325 MG tablet, Take 650 mg by mouth every 6 hours as needed for Pain  amLODIPine (NORVASC) 5 MG tablet, Take 5 mg by mouth daily  benzonatate (TESSALON) 100 MG capsule, Take 100 mg by mouth 3 times daily  cloNIDine (CATAPRES) 0.1 MG tablet, Take 0.1 mg by mouth in the morning and 0.1 mg in the evening. hydrALAZINE (APRESOLINE) 100 MG tablet, Take 100 mg by mouth 3 times daily    Allergies:  Codeine    Social History:   TOBACCO:   has no history on file for tobacco use. ETOH:   has no history on file for alcohol use. DRUGS:   Social History     Substance and Sexual Activity   Drug Use Not on file       Family History:   History reviewed. No pertinent family history. REVIEW OF SYSTEMS:    All pertinent positives and negatives as noted in HPI       LOWER EXTREMITY EXAMINATION   Wound vac intact running continuous pressure    Previous Exam:  VASCULAR:  DP and PT pulses are non palpable. CFT < 5 seconds B/L. Warm to warm from the tibial tuberosity to the distal aspect of the digits dorsally. NEUROLOGIC:  Protective sensation is diminished but grossly intact    DERM:  Right foot lateral plantar wound measuring approx 6cm in diameter. No erythema, serosanguinous drainage, no malodor.     MUSCULOSKELETAL: deferred                 CONSULTS:  IP CONSULT TO CRITICAL CARE  IP CONSULT TO DIETITIAN  IP CONSULT TO CARDIOLOGY  IP CONSULT TO PODIATRY  IP CONSULT TO PODIATRY  IP CONSULT TO UROLOGY  IP CONSULT TO NEPHROLOGY  IP CONSULT TO GENERAL SURGERY  IP CONSULT TO INFECTIOUS DISEASES  IP CONSULT TO GI  IP CONSULT TO GENERAL SURGERY  IP CONSULT TO GENERAL SURGERY    MEDICATION:  Scheduled Meds:   phytonadione (VITAMIN K)  IVPB  10 mg IntraVENous Daily    QUEtiapine  50 mg Oral BID    polyethylene glycol  6,000 mL Oral Once    sodium chloride flush  5-40 mL IntraVENous 2 times per day    miconazole nitrate   Topical BID    [Held by provider] gabapentin  600 mg Oral 4x daily    amLODIPine  10 mg Oral Daily    white petrolatum   Topical BID    sodium chloride flush  5-40 mL IntraVENous 2 times per day    miconazole   Topical BID    [Held by provider] insulin glargine  32 Units SubCUTAneous QAM    epoetin sharath-epbx  6,000 Units SubCUTAneous Once per day on Mon Wed Fri    [Held by provider] cloNIDine  0.1 mg Oral Q12H    [Held by provider] hydrALAZINE  50 mg Oral 3 times per day    [Held by provider] lactulose  20 g Oral BID    insulin lispro  0-16 Units SubCUTAneous 4x Daily AC & HS    HYDROcodone-acetaminophen  1 tablet Oral BID    pantoprazole (PROTONIX) 40 mg injection  40 mg IntraVENous Q12H    metoprolol succinate  25 mg Oral Daily    [Held by provider] sennosides  5 mL Oral Nightly    carbidopa-levodopa  1 tablet Per NG tube TID    ipratropium-albuterol  1 ampule Inhalation Q4H WA    collagenase   Topical Q MWF    benzonatate  100 mg Oral TID    cetirizine  10 mg Oral Daily    ezetimibe  10 mg Oral Daily    [Held by provider] ticagrelor  90 mg Oral BID    [Held by provider] aspirin  81 mg Oral Daily    Or    [Held by provider] aspirin  300 mg Rectal Daily    atorvastatin  80 mg Oral Nightly     Continuous Infusions:   sodium chloride      sodium chloride      sodium chloride      sodium chloride      sodium chloride      sodium chloride      dextrose       PRN Meds:.sodium chloride, sodium chloride flush, sodium chloride, sodium chloride, sodium chloride, miconazole nitrate **AND** miconazole nitrate, sodium chloride, sodium chloride flush, sodium chloride, labetalol, hydrALAZINE, glucose, dextrose bolus **OR** dextrose bolus, glucagon (rDNA), dextrose, ondansetron **OR** ondansetron    RADIOLOGY:  XR CHEST PORTABLE Final Result   Bilateral airspace disease with interval progression on the right. NM GI BLOOD LOSS   Final Result   No evidence of active GI bleeding during acquisition. XR CHEST PORTABLE   Final Result   Unchanged bilateral chest opacities with right pleural effusion evident. See   above. XR CHEST PORTABLE   Final Result   1. Persistent bilateral patchy parenchymal densities concerning for pneumonia   and or atelectasis   2. Persistent small bilateral pleural effusions, left greater than right   3. Cardiomegaly, stable         XR CHEST PORTABLE   Final Result   No significant change compared to the prior exam with cardiomegaly, bilateral   airspace opacities and bilateral pleural effusions seen most suggestive of   congestive heart failure, but superimposed pneumonia not excluded. US DUP ABD PEL RETRO SCROT COMPLETE   Final Result   1. No evidence of testicular torsion      2. Severe scrotal skin thickening. 3.  Trace hydroceles. 4.  No evidence of bowel containing inguinal hernia. US SCROTUM AND TESTICLES   Final Result   1. No evidence of testicular torsion      2. Severe scrotal skin thickening. 3.  Trace hydroceles. 4.  No evidence of bowel containing inguinal hernia. XR CHEST PORTABLE   Final Result   1. No significant interval change in the appearance of the chest.      2.  Bilateral pleural effusions and areas of atelectasis or multifocal   pneumonia. CTA ABDOMEN PELVIS W CONTRAST   Final Result   Mixed densities throughout the colonic segments including in the ascending   colon and rectum could represent mixed densities of blood products although   no focal area of arterial enhancement or extravasation/blushing is observed   to localize acute hemorrhage. No evidence for perforation or abscess. No   pneumatosis intestinalis or portal venous gas evident.       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 No adjacent hematoma with only minimal   adjacent stranding. Bilateral pleural effusions moderate to large right and moderate left   effusions with adjacent atelectasis. Anasarca. XR CHEST PORTABLE   Final Result   Bilateral pleural effusions with bibasilar patchy infiltrates and mild   cardiomegaly. NM GI BLOOD LOSS   Final Result   Active GI bleeding identified during acquisition in the splenic flexure with   peristalsis identified into the proximal descending colon. RECOMMENDATIONS:   Unavailable         CT ABDOMEN PELVIS WO CONTRAST   Final Result   CHEST:      No evidence of neoplastic disease, allowing for limitations of noncontrast   technique. Moderate bilateral pleural effusions. Nonemergent incidental findings as above. ABDOMEN/PELVIS:      No evidence of neoplastic disease, allowing for limitations of noncontrast   technique. Bladder wall thickening is nonspecific given under distension; recommend   correlation urinalysis to evaluate for UTI. Nonemergent incidental findings as above. CT CHEST WO CONTRAST   Final Result   CHEST:      No evidence of neoplastic disease, allowing for limitations of noncontrast   technique. Moderate bilateral pleural effusions. Nonemergent incidental findings as above. ABDOMEN/PELVIS:      No evidence of neoplastic disease, allowing for limitations of noncontrast   technique. Bladder wall thickening is nonspecific given under distension; recommend   correlation urinalysis to evaluate for UTI. Nonemergent incidental findings as above. FL MODIFIED BARIUM SWALLOW W VIDEO   Final Result   1. Mildly impaired swallowing mechanism with swallowing delay and followed   by laryngeal penetration with thin liquid barium when using a straw.   No   barium aspiration 2.  Please see separate speech pathology report for full discussion of   findings and recommendations. RECOMMENDATIONS:   Unavailable         XR ABDOMEN (KUB) (SINGLE AP VIEW)   Final Result   Somewhat greater than average quantity of retained fecal material thin the   lower GI tract suggesting dysfunction with evacuation. XR CHEST PORTABLE   Final Result   1. Atherosclerotic disease and mild cardiomegaly. 2.  Persistent but improved opacities in the bilateral lungs most pronounced   in the mid and lower lungs. There appears to be a small left and trace right   pleural effusion         XR FOOT RIGHT (2 VIEWS)   Final Result   No evidence of calcaneal osteomyelitis. CT HEAD WO CONTRAST   Final Result   Parenchymal volume loss and chronic microvascular ischemic changes. No acute intracranial abnormality. Ethmoid and sphenoid sinusitis. Right mastoid effusion. US RETROPERITONEAL COMPLETE   Final Result   1. Normal appearance of the bilateral kidneys. No hydronephrosis. 2.  A Mahan catheter is decompressing the bladder. XR CHEST PORTABLE   Final Result   Worsening infiltrate and or atelectasis on the left, stable on the right with   suspected layering pleural effusion. XR CHEST PORTABLE   Final Result   Unchanged bilateral atelectasis and or infiltrate as well as small right   pleural effusion. MRI BRAIN WO CONTRAST   Final Result   There are 2 small regions of petechial infarcts one in the left   posterior frontal lobe and a second in the left parietal lobe not   exceeding 3 mm. There is otherwise extensive small vessel ischemic   changes         XR CHEST PORTABLE   Final Result   1. Multifocal bilateral airspace disease more prominent within the lower   lobes. The airspace disease is unchanged when compared with the prior study.          CT HEAD WO CONTRAST   Final Result   Diffuse atrophy likely age related   Findings compatible with small vessel ischemic changes. XR CHEST PORTABLE   Final Result   1. Pulmonary opacities present favoring edema and atelectasis, also small   pleural effusions, but nonspecific with some additional considerations noted   above   2. Support devices present as described above   3. Heart size appears borderline enlarged         XR ABDOMEN FOR NG/OG/NE TUBE PLACEMENT   Final Result   NG/OG is in the stomach. IR CAROTID STENT W PROTECTION   Final Result   1. Angiogram demonstrates successful placement of a carotid stent ,   post procedure images demonstrate a widely patent stent and internal   carotid         CT HEAD WO CONTRAST   Final Result   Diffuse atrophy likely age related   Findings compatible with small vessel ischemic changes. Findings were called at the time of dictation         CT BRAIN PERFUSION   Final Result      No significant ischemic penumbra identified      This study was analyzed by the [email or web address] algorithm. CTA NECK W CONTRAST   Final Result   1. Estimated stenosis of the proximal right and left internal carotid   artery by NASCET criteria is greater than 90% on the left   2. Severe atherosclerotic disease . 3. No large vessel occlusion identified            This study was analyzed by the [email or web address] algorithm. CTA HEAD W CONTRAST   Final Result   1. Estimated stenosis of the proximal right and left internal carotid   artery by NASCET criteria is greater than 90% on the left   2. Severe atherosclerotic disease . 3. No large vessel occlusion identified            This study was analyzed by the [email or web address] algorithm.                IR INTERVENTIONAL RADIOLOGY PROCEDURE REQUEST    (Results Pending)   XR CHEST PORTABLE    (Results Pending)       Vitals:    BP (!) 182/81   Pulse (!) 111   Temp 99.4 °F (37.4 °C) (Temporal)   Resp (!) 48   Ht 5' 8\" (1.727 m)   Wt 260 lb 8 oz (118.2 kg)   SpO2 96%   BMI 39.61 kg/m²     LABS:   Recent Labs     10/12/22  0413 10/12/22  1905 10/13/22  0408   WBC 7.3  --  11.9*   HGB 7.2* 7.6* 7.5*   HCT 22.2* 23.4* 23.4*   PLT 99*  --  111*     Recent Labs     10/13/22  0408   *   K 4.0   *   CO2 22   PHOS 3.3   BUN 43*   CREATININE 1.5*     Recent Labs     10/12/22  0411 10/12/22  1044 10/13/22  0408 10/13/22  0635   PROT 4.8*  --  5.4*  --    INR 3.2 2.4  --  1.4   APTT  --  37.8*  --   --        ASSESSMENTS:   1. Right foot wound diabetic ulcer  2. DM  3. Pain right foot  Acute cerebrovascular accident (CVA) ([facility] [address]            PLAN:  -Patient examined and evaluated at bedside  -All pertinent labs, charts, and imaging reviewed prior to encounter   -Abx per ID:Cipro  -Continue Wound vac changes to the right foot. MWF vac changes. intact today  -Previous foot x rays: No acute abnormalities  -Will follow  -Patient discussed with Dr. Nallely Kapoor      Thank you for the opportunity to take part in the patient's care. Please do not hesitate to call for any questions or concerns.

## 2022-10-13 NOTE — PROGRESS NOTES
Department of Internal Medicine  Infectious Diseases   Progress Note      C/C :  Right foot  wound infection     All above noted   Pt developed increased shortness on breath   Placed on BiPAP   Afebrile       Current Facility-Administered Medications   Medication Dose Route Frequency Provider Last Rate Last Admin    phytonadione (ADULT) (VITAMIN K) 10 mg in dextrose 5 % 100 mL IVPB  10 mg IntraVENous Daily Syl Odell MD   Stopped at 10/12/22 0900    0.9 % sodium chloride infusion   IntraVENous PRN Colten Mora MD        QUEtiapine (SEROQUEL) tablet 50 mg  50 mg Oral BID Doree Ponds, APRN - CNP   50 mg at 10/12/22 2004    polyethylene glycol (GoLYTELY) solution 6,000 mL  6,000 mL Oral Once Syl Odell MD        sodium chloride flush 0.9 % injection 5-40 mL  5-40 mL IntraVENous 2 times per day Syl Odell MD   10 mL at 10/13/22 0839    sodium chloride flush 0.9 % injection 5-40 mL  5-40 mL IntraVENous PRN Syl Odell MD        0.9 % sodium chloride infusion  25 mL IntraVENous PRN Syl Odell MD        0.9 % sodium chloride infusion   IntraVENous PRN CONSTANTINE Barakat - CNP        0.9 % sodium chloride infusion   IntraVENous PRN Fadumo Mohan APRN - CNP        miconazole nitrate 2 % ointment   Topical BID Citizen of [place] Salt[place], DO   Given at 10/13/22 8903    And    miconazole nitrate 2 % ointment   Topical TID PRN Citizen of [place] Salt[place], DO   Given at 10/12/22 2007    [Held by provider] gabapentin (NEURONTIN) capsule 600 mg  600 mg Oral 4x daily Brigitte Montero MD   600 mg at 10/10/22 2102    amLODIPine (NORVASC) tablet 10 mg  10 mg Oral Daily Doree Ponguido, APRN - CNP   10 mg at 10/12/22 0801    0.9 % sodium chloride infusion   IntraVENous PRN Fadumo Mohan, APRN - CNP        white petrolatum ointment   Topical BID Citizen of [place] Salt[place], DO   Given at 10/13/22 0955    sodium chloride flush 0.9 % injection 5-40 mL  5-40 mL IntraVENous 2 times per day Brigitte Montero MD   10 mL at 10/13/22 0840    sodium chloride flush 0.9 % injection 5-40 mL  5-40 mL IntraVENous PRN Guzman Su MD        0.9 % sodium chloride infusion   IntraVENous PRN Guzman Su MD        miconazole (MICOTIN) 2 % powder   Topical BID Leanne Giron DO   Given at 10/13/22 0953    [Held by provider] insulin glargine-yfgn (SEMGLEE-YFGN) injection vial 32 Units  32 Units SubCUTAneous QAM Leanne Giron DO   32 Units at 10/07/22 0911    epoetin sharath-epbx (RETACRIT) injection 6,000 Units  6,000 Units SubCUTAneous Once per day on Mon Wed Fri Guzman Su MD   6,000 Units at 10/12/22 0753    [Held by provider] cloNIDine (CATAPRES) tablet 0.1 mg  0.1 mg Oral Q12H CONSTANTINE Gama - CNP   0.1 mg at 10/08/22 0030    [Held by provider] hydrALAZINE (APRESOLINE) tablet 50 mg  50 mg Oral 3 times per day CONSTANTINE Gmaa - CNP   50 mg at 10/08/22 8545    [Held by provider] lactulose (CHRONULAC) 10 GM/15ML solution 20 g  20 g Oral BID Guzman Su MD   20 g at 10/09/22 2300    insulin lispro (HUMALOG) injection vial 0-16 Units  0-16 Units SubCUTAneous 4x Daily AC & HS Guzman Su MD   4 Units at 10/12/22 1730    HYDROcodone-acetaminophen (NORCO) 7.5-325 MG per tablet 1 tablet  1 tablet Oral BID Guzman Su MD   1 tablet at 10/12/22 2004    pantoprazole (PROTONIX) 40 mg in sodium chloride (PF) 10 mL injection  40 mg IntraVENous Q12H Guzman Su MD   40 mg at 10/13/22 0225    metoprolol succinate (TOPROL XL) extended release tablet 25 mg  25 mg Oral Daily Guzman Su MD   25 mg at 10/12/22 0748    [Held by provider] sennosides (SENOKOT) 8.8 MG/5ML syrup 5 mL  5 mL Oral Nightly Guzman Su MD   5 mL at 10/09/22 2300    labetalol (NORMODYNE;TRANDATE) injection 10 mg  10 mg IntraVENous Q30 Min PRN Guzman Su MD   10 mg at 10/13/22 1041    hydrALAZINE (APRESOLINE) injection 10 mg  10 mg IntraVENous Q30 Min PRN Guzman Su MD   10 mg at 10/13/22 0610    glucose chewable tablet 16 g  4 tablet Oral PRN Guzman Su MD        dextrose bolus 10% 125 mL  125 mL IntraVENous PRN Lidya Arzate MD        Or    dextrose bolus 10% 250 mL  250 mL IntraVENous PRN Lidya Arzate MD        glucagon (rDNA) injection 1 mg  1 mg SubCUTAneous PRN Lidya Arzate MD        dextrose 10 % infusion   IntraVENous Continuous PRN Lidya Arzate MD        carbidopa-levodopa (SINEMET)  MG per tablet 1 tablet  1 tablet Per NG tube TID Lidya Arzate MD   1 tablet at 10/12/22 2005    ipratropium-albuterol (DUONEB) nebulizer solution 1 ampule  1 ampule Inhalation Q4H WA Lidya Arzate MD   1 ampule at 10/13/22 0815    collagenase ointment   Topical Q MWF Lidya Arzate MD   Given at 10/12/22 4310    benzonatate (TESSALON) capsule 100 mg  100 mg Oral TID Lidya Arzate MD   100 mg at 10/12/22 2006    cetirizine (ZYRTEC) tablet 10 mg  10 mg Oral Daily Lidya Arzate MD   10 mg at 10/12/22 0750    ezetimibe (ZETIA) tablet 10 mg  10 mg Oral Daily Lidya Arzate MD   10 mg at 10/12/22 0753    [Held by provider] ticagrelor (BRILINTA) tablet 90 mg  90 mg Oral BID Lidya Arzate MD   90 mg at 10/09/22 2300    ondansetron (ZOFRAN-ODT) disintegrating tablet 4 mg  4 mg Oral Q8H PRN Lidya Arzate MD        Or    ondansetron (ZOFRAN) injection 4 mg  4 mg IntraVENous Q6H PRN Lidya Arzate MD        [Held by provider] aspirin EC tablet 81 mg  81 mg Oral Daily Lidya Arzate MD   81 mg at 10/09/22 5575    Or    [Held by provider] aspirin suppository 300 mg  300 mg Rectal Daily Lidya Arzate MD   300 mg at 09/29/22 0809    atorvastatin (LIPITOR) tablet 80 mg  80 mg Oral Nightly Lidya Arzate MD   80 mg at 10/12/22 2004           REVIEW OF SYSTEMS:   CONSTITUTIONAL:  Denies fever, chill or rigors. HEENT: denies blurring of vision or double vision, denies hearing problem  RESPIRATORY: denies cough, shortness of breath,  CARDIOVASCULAR:  Denies palpitation  GASTROINTESTINAL:  Denies abdomen pain, diarrhea or constipation.   GENITOURINARY:  Denies burning urination or frequency of urination  INTEGUMENT: Right heel wound  HEMATOLOGIC/LYMPHATIC:  Denies lymph node swelling, gum bleeding or easy bruising. MUSCULOSKELETAL:  Denies leg pain , joint pain , joint swelling  NEUROLOGICAL:  weakness right side . PHYSICAL EXAM:      Vitals:     BP (!) 182/81   Pulse (!) 111   Temp 99.4 °F (37.4 °C) (Temporal)   Resp (!) 48   Ht 5' 8\" (1.727 m)   Wt 260 lb 8 oz (118.2 kg)   SpO2 96%   BMI 39.61 kg/m²     General Appearance:    Awake, alert ,in respiratory distress, on BiPAP    Head:    Normocephalic, atraumatic   Eyes:    No pallor, no icterus,   Ears:    No obvious deformity or drainage.    Nose:   No nasal drainage   Throat:   Mucosa moist, no oral thrush   Neck:   Supple, no lymphadenopathy   Lungs:     Bilateral  wheeze    Heart:    Regular rate and rhythm, no murmur   Abdomen:     Soft, LLQ tender , bowel sounds present    Extremities:    Right heel wound    Pulses:   Dorsalis pedis palpable - diminished    Skin:   Right foot  wound with VAC              CBC with Differential:      Lab Results   Component Value Date/Time    WBC 11.9 10/13/2022 04:08 AM    RBC 2.53 10/13/2022 04:08 AM    HGB 7.5 10/13/2022 04:08 AM    HCT 23.4 10/13/2022 04:08 AM    HCT 15.0 09/27/2022 08:58 PM     10/13/2022 04:08 AM    MCV 92.5 10/13/2022 04:08 AM    MCH 29.6 10/13/2022 04:08 AM    MCHC 32.1 10/13/2022 04:08 AM    RDW 16.2 10/13/2022 04:08 AM    NRBC 1.7 10/13/2022 04:08 AM    LYMPHOPCT 1.7 10/13/2022 04:08 AM    MONOPCT 0.9 10/13/2022 04:08 AM    BASOPCT 0.3 10/13/2022 04:08 AM    MONOSABS 0.12 10/13/2022 04:08 AM    LYMPHSABS 0.24 10/13/2022 04:08 AM    EOSABS 0.11 10/13/2022 04:08 AM    BASOSABS 0.00 10/13/2022 04:08 AM       CMP     Lab Results   Component Value Date/Time     10/13/2022 04:08 AM    K 4.0 10/13/2022 04:08 AM    K 4.7 10/10/2022 05:21 PM     10/13/2022 04:08 AM    CO2 22 10/13/2022 04:08 AM    BUN 43 10/13/2022 04:08 AM    CREATININE 1.5 10/13/2022 04:08 AM    GFRAA 57 10/13/2022 04:08 AM    LABGLOM 47 10/13/2022 04:08 AM    GLUCOSE 176 10/13/2022 04:08 AM    PROT 5.4 10/13/2022 04:08 AM    LABALBU 2.5 10/13/2022 04:08 AM    CALCIUM 8.3 10/13/2022 04:08 AM    BILITOT 0.5 10/13/2022 04:08 AM    ALKPHOS 230 10/13/2022 04:08 AM    AST 18 10/13/2022 04:08 AM    ALT <5 10/13/2022 04:08 AM         Hepatic Function Panel:    Lab Results   Component Value Date/Time    ALKPHOS 230 10/13/2022 04:08 AM    ALT <5 10/13/2022 04:08 AM    AST 18 10/13/2022 04:08 AM    PROT 5.4 10/13/2022 04:08 AM    BILITOT 0.5 10/13/2022 04:08 AM    LABALBU 2.5 10/13/2022 04:08 AM       PT/INR:    Lab Results   Component Value Date/Time    PROTIME 15.6 10/13/2022 06:35 AM    INR 1.4 10/13/2022 06:35 AM       TSH:  No results found for: TSH    U/A:  No results found for: NITRITE, COLORU, PHUR, LABCAST, WBCUA, RBCUA, MUCUS, TRICHOMONAS, YEAST, BACTERIA, CLARITYU, SPECGRAV, LEUKOCYTESUR, UROBILINOGEN, BILIRUBINUR, BLOODU, GLUCOSEU, AMORPHOUS    ABG:  No results found for: VRQ1HMR, BEART, D4GKRVYX, PHART, THGBART, LQK6NLQ, PO2ART, ZEV4QPT    MICROBIOLOGY:    SARS CoV 2 neg     CRP 0.8    Pro BNP 2806  Procacitonin 0.36      Radiology :    CT abdomen and pelvis -  Impression:        Mixed densities throughout the colonic segments including in the ascending   colon and rectum could represent mixed densities of blood products although   no focal area of arterial enhancement or extravasation/blushing is observed   to localize acute hemorrhage. No evidence for perforation or abscess. No   pneumatosis intestinalis or portal venous gas evident. 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 No adjacent hematoma with only minimal   adjacent stranding. Bilateral pleural effusions moderate to large right and moderate left   effusions with adjacent atelectasis. Anasarca. X ray foot :           No evidence of calcaneal osteomyelitis. IMPRESSION:     Right foot  non healing wound- cipro and zyvox stopped 10/12   Leukocytosis - improved   ?  Aspiration       RECOMMENDATIONS:      Diuretics    Local wound care

## 2022-10-13 NOTE — PROGRESS NOTES
[address] [facility]   Department of Internal Medicine   MICU Progress Note    Patient:  Isac Nguyen 74 y.o. male   MRN: [id number]       Date of Service: 10/13/2022    Allergy: Codeine  CC strokelike symptoms  Subjective   Alert oriented 2-3 confused, follows commands. Supplement oxygen on BiPAP with respiratory distress  INR 1.4  --> S/P vitamin K and FFP   Start clear liquid diet  Not on pressors, will start precedex  and Fentanyl for severe agitation, -- will  start Seroquel   Agitated today, due to BIAP,  No bowel movement overnight   No temps  Objective     TEMPERATURE:  Current - Temp: 98.5 °F (36.9 °C); Max - Temp  Av.4 °F (36.9 °C)  Min: 98.2 °F (36.8 °C)  Max: 99.4 °F (37.4 °C)    RESPIRATIONS RANGE: Resp  Av.9  Min: 7  Max: 110    PULSE RANGE: Pulse  Av.5  Min: 85  Max: 115    BLOOD PRESSURE RANGE:  Systolic (39GNL), PBE:117 , Min:91 , OYL:627   ; Diastolic (35EME), XIJ:39, Min:52, Max:160      PULSE OXIMETRY RANGE: SpO2  Av.8 %  Min: 90 %  Max: 98 %    I & O - 24hr:    Intake/Output Summary (Last 24 hours) at 10/13/2022 1527  Last data filed at 10/13/2022 1415  Gross per 24 hour   Intake 483.7 ml   Output 2790 ml   Net -2306.3 ml     I/O last 3 completed shifts: In: 2478 [P.O.:800; I.V.:1238; Blood:440]  Out: 3835 [RCUEF:0818; Drains:20] I/O this shift:  In: 133.7 [I.V.:83.7; IV Piggyback:50]  Out: 9298 [Urine:1480]   Weight change:     Physical Exam:  CONSTITUTIONAL: Awake, morbid obesity, alert to 1-2, intermittently confused, labored breathing. EYES:  Lids and lashes normal, pupils equal, round and reactive to light, extra ocular muscles intact, sclera clear, conjunctiva normal  ENT:  Normocephalic, without obvious abnormality, atraumatic, sinuses nontender on palpation, noted mucous membrane dry, picking on skin of the mouth that noted to be bloody.  NV ++  CARDIOVASCULAR:  Normal apical impulse, regular rate and rhythm, normal S1 and S2, no S3 or S4, and no murmur noted  Lung Diminished lung sounds throughout lung fields, fine crackles noted on the bases, no wheezing noted, labor breathing, no use of accessory muscle at this time, on supplement oxygen. MUSCULOSKELETAL: weakness in extremities, echo noted, 2+ pitting edema on lower extremities. All extremities,  NEUROLOGIC: Awake, alert oriented 2, weakness on the right upper and lower extremities due to stroke. SKIN: Right foot nonhealing wound.  2+ Edema sacrum & belly    Medications     Continuous Infusions:   dextrose Stopped (10/13/22 1138)    bumetanide 0.1 mg/mL infusion 1 mg/hr (10/13/22 1219)    dexmedetomidine (PRECEDEX) IV infusion 1 mcg/kg/hr (10/13/22 1502)    fentaNYL 100 mcg/hr (10/13/22 1400)    sodium chloride      sodium chloride      sodium chloride      sodium chloride      sodium chloride      sodium chloride      dextrose       Scheduled Meds:   QUEtiapine  50 mg Oral BID    polyethylene glycol  6,000 mL Oral Once    sodium chloride flush  5-40 mL IntraVENous 2 times per day    miconazole nitrate   Topical BID    [Held by provider] gabapentin  600 mg Oral 4x daily    amLODIPine  10 mg Oral Daily    white petrolatum   Topical BID    sodium chloride flush  5-40 mL IntraVENous 2 times per day    miconazole   Topical BID    [Held by provider] insulin glargine  32 Units SubCUTAneous QAM    epoetin sharath-epbx  6,000 Units SubCUTAneous Once per day on Mon Wed Fri    [Held by provider] cloNIDine  0.1 mg Oral Q12H    hydrALAZINE  50 mg Oral 3 times per day    [Held by provider] lactulose  20 g Oral BID    insulin lispro  0-16 Units SubCUTAneous 4x Daily AC & HS    HYDROcodone-acetaminophen  1 tablet Oral BID    pantoprazole (PROTONIX) 40 mg injection  40 mg IntraVENous Q12H    metoprolol succinate  25 mg Oral Daily    [Held by provider] sennosides  5 mL Oral Nightly    carbidopa-levodopa  1 tablet Per NG tube TID    ipratropium-albuterol  1 ampule Inhalation Q4H WA    collagenase   Topical Q MWF    benzonatate 100 mg Oral TID    cetirizine  10 mg Oral Daily    ezetimibe  10 mg Oral Daily    [Held by provider] ticagrelor  90 mg Oral BID    [Held by provider] aspirin  81 mg Oral Daily    Or    [Held by provider] aspirin  300 mg Rectal Daily    atorvastatin  80 mg Oral Nightly     PRN Meds: sodium chloride, sodium chloride flush, sodium chloride, sodium chloride, sodium chloride, miconazole nitrate **AND** miconazole nitrate, sodium chloride, sodium chloride flush, sodium chloride, labetalol, hydrALAZINE, glucose, dextrose bolus **OR** dextrose bolus, glucagon (rDNA), dextrose, ondansetron **OR** ondansetron  Nutrition:   Clear liquid diet   Labs and Imaging Studies     CBC:   Recent Labs     10/11/22  0459 10/11/22  1043 10/12/22  0411 10/12/22  1905 10/13/22  0408   WBC 10.5  --  7.3  --  11.9*   RBC 2.29*  --  2.39*  --  2.53*   HGB 6.8*   < > 7.2* 7.6* 7.5*   HCT 21.1*   < > 22.2* 23.4* 23.4*   MCV 92.1  --  92.9  --  92.5   MCH 29.7  --  30.1  --  29.6   MCHC 32.2  --  32.4  --  32.1   RDW 17.2*  --  16.6*  --  16.2*   *  --  99*  --  111*   MPV 9.7  --  9.8  --  10.1    < > = values in this interval not displayed. BMP:    Recent Labs     10/11/22  0459 10/11/22  1043 10/11/22  1902 10/12/22  0411 10/13/22  0408   *   < > 149* 147* 149*   K 4.6   < > 4.4 4.4 4.0   *   < > 116* 115* 114*   CO2 20*   < > 22 22 22   BUN 63*   < > 57* 53* 43*   CREATININE 1.9*   < > 1.8* 1.7* 1.5*   GLUCOSE 150*   < > 145* 139* 176*   CALCIUM 8.2*   < > 8.5* 8.3* 8.3*   PROT 4.9*  --   --  4.8* 5.4*   LABALBU 2.2*  --   --  2.2* 2.5*   BILITOT 0.3  --   --  0.3 0.5   ALKPHOS 193*  --   --  160* 230*   AST 17  --   --  15 18   ALT <5  --   --  <5 <5    < > = values in this interval not displayed.        LIVER PROFILE:   Recent Labs     10/11/22  0459 10/12/22  0411 10/13/22  0408   AST 17 15 18   ALT <5 <5 <5   BILITOT 0.3 0.3 0.5   ALKPHOS 193* 160* 230*       PT/INR:   Recent Labs     10/12/22  0411 10/12/22  1044 10/13/22  0635   PROTIME 34.6* 25.9* 15.6*   INR 3.2 2.4 1.4       APTT:   Recent Labs     10/12/22  1044   APTT 37.8*       Fasting Lipid Panel:    No results found for: CHOL, TRIG, HDL    Cardiac Enzymes:    No results found for: CKTOTAL, CKMB, CKMBINDEX, TROPONINI    Notable Cultures:      Blood cultures No results found for: BC  Respiratory cultures No results found for: RESPCULTURE No results found for: LABGRAM  Urine No results found for: LABURIN  Legionella No results found for: LABLEGI  C Diff PCR No results found for: CDIFPCR  Wound culture/abscess: No results for input(s): WNDABS in the last 72 hours. Tip culture:No results for input(s): CXCATHTIP in the last 72 hours. Antibiotic  Days  Day started   Cipro     Linezolid                      Oxygen:     Vent Information  Ventilator ID: [id number]    Additional Respiratory Assessments  Heart Rate: 85  Resp: 21  SpO2: 95 %  Position: Semi-Sarabia's  Humidification Source: Heated wire  Humidification Temp: 36.2  Circuit Condensation: Drained     Nasal cannula L/min     Face mask %     Reservoirs mask %       ABG     PH  7.35   PCO2  37   PO2  99   HCO3  20   Sat%  95   FIO2     DATES 10/12/22       Lines:  Site  Day  Date inserted     TLC              PICC              Arterial line              Peripheral line              HD cath            Urinary Catheter 09/29/22 Mahan-Output (mL): 75 mL  [REMOVED] Urinary Catheter 09/27/22-Output (mL): 100 mL    Imaging Studies:  CXR  10/13 when compared to previous CXR 10/8-10/12 suggest & is consistent with CHF     Assessment and PLan   In summary, 74 y.o. male with significant past medical history of CKD, DM type II, MATT, presented 9/26/22 2 with stroke and GI bleed. Patient was admitted to [facility] with strokelike symptoms, right-sided paralysis and aphasia, found to have severe left ICA stenosis and was transfer [place] [facility]  taken to IR for left ICA angioplasty with stent placement.  Patient had GI Bleed, following general surgery, received total of 12 units of PRBC since admission. EGD on 10/3/22 showed no evidence of upper GI bleed, CT a/p showed showed no focal area of arterial enhancement or extra blush to localize acute hemorrhage, nuclear medicine scan was positive for bleeding around the splenic flexure. Colonoscopy showed full of clots. Transfer to MICU from PICU with low hgb, labor breathing and hypotensive. Assessment:  CHF VS aspiration  but more consistent with CHF  LMCA  stroke 2/2 symptomatic LICA stenosis s/p IR angioplasty and stent placement 9/26/2022 on dual antiplatelet therapy- off now   Acute hypoxemic and hypercapnic respiratory failure intubated/extubated, currently on supplemental oxygen  Acute  large GI bleed/acute blood loss anemia s/p 15 units of PRBC s/p EGD and Colonoscopy, and bleeding scan. bleeding around the splenic flexure.    LARY   Metabolic acidosis   Hypernatremia/ hyperchloremia  Hyperglycemia  Leukocytosis  Right foot ulcer    Hx DM type 2  Hx of MATT on CPAP  Hx of parkinson disease  Hx of seizure  Hx of PVD  Hx of depression      Plan:  -  Despite of CKD & high creatinine , will start Bumex drip to avoid intubation, if possible     And also start Percedex & Fentanyl drip  - Currently on supplement oxygen, titrate to keep SPO2 goal above 92%  - bronchodilator eran and PRN  - Bipap qhs and PRN   - precedex gtt -- will start on Seroquel 25 BID    - keep MAP >65mmHg  - neurology consulted for stroke, on dual antiplatelet therapy- recommended Plavix once stable from bleeding standpoint until able to start dual therapy   - trend troponin, pro BNP  -Wound culture culture wound culture on foot showed Corynebacteria (Mixed morphologies, COVID 19 negative,   - ID following, on  Cipro and linezolid  - General surgery following for GI bleed, input appreciated  - s/p 15 units of PBRC  - hgb 7.1 today  - INR 1.4-- S/P vitamin K and  1 FFP   - h/h q6H  - transfuse if less than 7  - PPI BID  - EGD, colonoscopy and bleeding scan done, recommended IR for embolization- on dural antiplatelet therapy- high risk of bleeding- unable to reach family  - Stat nuclear bleeding scan negative for acute bleed   - will hold brilinta and Asprin for now given a bleed   - metabolic acidosis, spoke with nephrology, will start with bicarb gtt--d/c bicarb gtt  [place] Old   - trend Bmp  - Bumex 1 mg x 1, urine output 2.3L; I/o Since admission (+5.8L)   - nephrology,following ,input appreciated  - Podiatry and wound care following  - labs in AM  - F/E/N Wong Old /replace lytes/ tube feeds   - DVT/GI scds/ no anticoagulation given GI bleed/ Protonix BID  - Code status: full code        Jenna Zapata MD., [facility]  Pulmonary & Critical Care Medicine    During multidisciplinary team rounds pt Torie Herrera, male , was seen, examined and discussed. This is confirmation that I have personally seen and examined the patient and that the key elements of the encounter were performed by me (> 85 % time) including HPI, social history, family history, ROS, and physical examination have been done by me. The medications & laboratory data and imagery was discussed and adjusted where necessary. Key issues of the case were discussed among consultants. End organ damage assessment was performed on all organs and aggressive measures wee taken to prevent or improve them. This patient has a high probability of sudden clinically significant deterioration. I managed/supervised life or organ supporting interventions that required frequent physician assessment. I devoted my full attention to the direct care of this patient for the period of time indicated below. In addition to above, time was devoted to teaching and to any procedure. NOTE: This report, in part or full, may have been transcribed using voice recognition software. Every effort was made to ensure accuracy; however, inadvertent computerized transcription errors may be present.  Please excuse any transcriptional grammatical or spelling errors that may have escaped my editorial review. Total critical care time caring for this patient with life threatening, unstable organ failure, including direct patient contact, management of life support systems, review of data including imaging and labs, discussions with other team members and physicians is at least 61 Min so far today, excluding procedures. Family is updated at the bedside as available. Key issues of the case were discussed among consultants.       Alfredo Wu MD., [facility]  Pulmonary & Critical Care Medicine

## 2022-10-13 NOTE — PROGRESS NOTES
Spoke to [facility] regarding a bed and placement for patient at their MICU. There is still no bed available, asked for updates.

## 2022-10-13 NOTE — PROGRESS NOTES
Hospitalist Progress Note      PCP: Haley Weems MD    Date of Admission: 9/27/2022  Days in the hospital: [address] Course:   Patient is a 62-year-old male with history of CKD, diabetes mellitus, obstructive sleep apnea who initially presented to [facility] with strokelike symptoms and right-sided weakness along with aphasia. Patient was noted to have severe left ICA stenosis. He was transferred to [facility] and patient underwent left ICA angioplasty and stent placement by IR. He was initially admitted to the neuro ICU. He was placed on dual antiplatelet therapy with Brilinta and aspirin. Also was noted to have GI bleed and received 12 units of PRBC. EGD was done on 10/3/2022 and no evidence of upper GI bleed noted. Colonoscopy on 10/9/2022 showed large amount of blood. CT of the abdomen did not show any localization of hemorrhage. Nuclear medicine scan was done which showed bleeding around the splenic flexure. RRT was called due to hypotension and hemorrhagic shock and patient was transferred to ICU. Due to diffuse bleeding patient was taken off dual antiplatelet therapy. Plan remains for colonoscopy on Friday. Subjective  Patient seen and examined at bedside. Patient in respiratory distress today, placed on BiPAP. Discussed with nursing staff. Chest x-ray from today showed bilateral airspace disease with progression on the right. Discussed with ID. Exam:    BP (!) 172/82   Pulse 96   Temp 99.4 °F (37.4 °C) (Temporal)   Resp 22   Ht 5' 8\" (1.727 m)   Wt 260 lb 8 oz (118.2 kg)   SpO2 96%   BMI 39.61 kg/m²     HEENT: + Pallor, no icterus. Respiratory:  CTA, decreased air entry  Cardiovascular: RRR, no murmur. Abdomen: Soft, non-tender, BS noted. Musculoskeletal: Right foot dressing and wound VAC noted.    Neurologic: Alert, oriented        Assessment/Plan:  Acute CVA with left ICA stenosis s/p angioplasty and stent placement, dual antiplatelet therapy on hold, continue serial neurochecks, follow-up with neurology    Acute lower GI bleed s/p multiple units of PRBC transfusion. Bleeding noted from splenic flexure. To be scheduled for colonoscopy on Friday, follow-up with GI    Acute respiratory failure/distress, continue BiPAP, follow-up with critical care team, chest x-ray reviewed    Acute blood loss anemia status post multiple unit blood transfusion, H&H remains low, monitor need further transfusions    Acute kidney injury, renal function slowly improving, nephrology following    Metabolic acidosis secondary to acute kidney injury, on bicarb drip, follow-up with nephrology    Right foot nonhealing wound, on Cipro and Zyvox per ID, continue with local wound care, follow-up with podiatry    Obstructive sleep apnea on CPAP    Overall prognosis remains guarded      Labs:   Recent Labs     10/11/22  0459 10/11/22  1043 10/12/22  0411 10/12/22  1905 10/13/22  0408   WBC 10.5  --  7.3  --  11.9*   HGB 6.8*   < > 7.2* 7.6* 7.5*   HCT 21.1*   < > 22.2* 23.4* 23.4*   *  --  99*  --  111*    < > = values in this interval not displayed. Recent Labs     10/11/22  0459 10/11/22  1043 10/11/22  1902 10/12/22  0411 10/13/22  0408   *   < > 149* 147* 149*   K 4.6   < > 4.4 4.4 4.0   *   < > 116* 115* 114*   CO2 20*   < > 22 22 22   BUN 63*   < > 57* 53* 43*   CREATININE 1.9*   < > 1.8* 1.7* 1.5*   CALCIUM 8.2*   < > 8.5* 8.3* 8.3*   PHOS 3.8  --   --  3.9 3.3    < > = values in this interval not displayed. Recent Labs     10/11/22  0459 10/12/22  0411 10/13/22  0408   AST 17 15 18   ALT <5 <5 <5   BILITOT 0.3 0.3 0.5   ALKPHOS 193* 160* 230*     Recent Labs     10/12/22  0411 10/12/22  1044 10/13/22  0635   INR 3.2 2.4 1.4     No results for input(s): Beena Armando in the last 72 hours.     Medications:  Reviewed    Infusion Medications    dextrose      sodium chloride      sodium chloride      sodium chloride      sodium chloride      sodium chloride      sodium chloride      dextrose       Scheduled Medications    haloperidol lactate  5 mg IntraVENous Once    haloperidol lactate        phytonadione (VITAMIN K)  IVPB  10 mg IntraVENous Daily    QUEtiapine  50 mg Oral BID    polyethylene glycol  6,000 mL Oral Once    sodium chloride flush  5-40 mL IntraVENous 2 times per day    miconazole nitrate   Topical BID    [Held by provider] gabapentin  600 mg Oral 4x daily    amLODIPine  10 mg Oral Daily    white petrolatum   Topical BID    sodium chloride flush  5-40 mL IntraVENous 2 times per day    miconazole   Topical BID    [Held by provider] insulin glargine  32 Units SubCUTAneous QAM    epoetin sharath-epbx  6,000 Units SubCUTAneous Once per day on Mon Wed Fri    [Held by provider] cloNIDine  0.1 mg Oral Q12H    hydrALAZINE  50 mg Oral 3 times per day    [Held by provider] lactulose  20 g Oral BID    insulin lispro  0-16 Units SubCUTAneous 4x Daily AC & HS    HYDROcodone-acetaminophen  1 tablet Oral BID    pantoprazole (PROTONIX) 40 mg injection  40 mg IntraVENous Q12H    metoprolol succinate  25 mg Oral Daily    [Held by provider] sennosides  5 mL Oral Nightly    carbidopa-levodopa  1 tablet Per NG tube TID    ipratropium-albuterol  1 ampule Inhalation Q4H WA    collagenase   Topical Q MWF    benzonatate  100 mg Oral TID    cetirizine  10 mg Oral Daily    ezetimibe  10 mg Oral Daily    [Held by provider] ticagrelor  90 mg Oral BID    [Held by provider] aspirin  81 mg Oral Daily    Or    [Held by provider] aspirin  300 mg Rectal Daily    atorvastatin  80 mg Oral Nightly     PRN Meds: sodium chloride, sodium chloride flush, sodium chloride, sodium chloride, sodium chloride, miconazole nitrate **AND** miconazole nitrate, sodium chloride, sodium chloride flush, sodium chloride, labetalol, hydrALAZINE, glucose, dextrose bolus **OR** dextrose bolus, glucagon (rDNA), dextrose, ondansetron **OR** ondansetron      Intake/Output Summary (Last 24 hours) at 10/13/2022 1138  Last data filed at 10/13/2022 1052  Gross per 24 hour   Intake 1319.15 ml   Output 2875 ml   Net -1555.85 ml     Body mass index is 39.61 kg/m². Diet  Diet NPO Exceptions are: Sips of Water with Meds    Code Status  Full Code       Electronically signed by Allyssa Reynolds MD on 10/13/2022 at 11:38 AM  [employer]   Please contact me through perfect serve    NOTE: This report was transcribed using voice recognition software. Every effort was made to ensure accuracy; however, inadvertent computerized transcription errors may be present.

## 2022-10-13 NOTE — PLAN OF CARE
Problem: Discharge Planning  Goal: Discharge to home or other facility with appropriate resources  Outcome: Progressing  Flowsheets (Taken 10/12/2022 2000)  Discharge to home or other facility with appropriate resources: Identify barriers to discharge with patient and caregiver     Problem: Pain  Goal: Verbalizes/displays adequate comfort level or baseline comfort level  Outcome: Progressing  Flowsheets  Taken 10/13/2022 0000 by Higinio Kaiser RN  Verbalizes/displays adequate comfort level or baseline comfort level: Encourage patient to monitor pain and request assistance  Taken 10/12/2022 2200 by Higinio Kaiser RN  Verbalizes/displays adequate comfort level or baseline comfort level: Encourage patient to monitor pain and request assistance  Taken 10/12/2022 1617 by Barbie Bustos RN  Verbalizes/displays adequate comfort level or baseline comfort level: Encourage patient to monitor pain and request assistance     Problem: Skin/Tissue Integrity  Goal: Absence of new skin breakdown  Description: 1. Monitor for areas of redness and/or skin breakdown  2. Assess vascular access sites hourly  3. Every 4-6 hours minimum:  Change oxygen saturation probe site  4. Every 4-6 hours:  If on nasal continuous positive airway pressure, respiratory therapy assess nares and determine need for appliance change or resting period.   Outcome: Progressing     Problem: Safety - Adult  Goal: Free from fall injury  Outcome: Progressing     Problem: Chronic Conditions and Co-morbidities  Goal: Patient's chronic conditions and co-morbidity symptoms are monitored and maintained or improved  Outcome: Progressing  Flowsheets (Taken 10/12/2022 2000)  Care Plan - Patient's Chronic Conditions and Co-Morbidity Symptoms are Monitored and Maintained or Improved: Monitor and assess patient's chronic conditions and comorbid symptoms for stability, deterioration, or improvement     Problem: Respiratory - Adult  Goal: Achieves optimal ventilation and oxygenation  Outcome: Progressing  Flowsheets (Taken 10/12/2022 2000)  Achieves optimal ventilation and oxygenation: Assess for changes in respiratory status     Problem: Cardiovascular - Adult  Goal: Maintains optimal cardiac output and hemodynamic stability  Outcome: Progressing  Goal: Absence of cardiac dysrhythmias or at baseline  Outcome: Progressing

## 2022-10-13 NOTE — PROGRESS NOTES
Notified Dr. Mariann Chun that patient is unable to drink golytle and we were unsuccessful getting an NG tube in.

## 2022-10-13 NOTE — PROGRESS NOTES
[facility]  Nephrology Attending Progress Note  Lelo Woodard. MD Judith        SUBJECTIVE:     History of Present Ilness:    Ninoska Hogan is a 74 y.o. male history of CAD s/p MI, hypertension, chronic kidney disease stage IIIa (baseline creatinine of recent 1.7-1.9) he is followed by our group with Dr. Vidhya Su. He initially was admitted to [facility] with right lower extremity wound. .  Patient subsequently developed aphasia noted to facial droop associated with right-sided weakness. He was transferred to [address] [place] [address] for further management. Patient was admitted told the neuro ICU. He required intubation with mechanical ventilation. CT of the head and neck demonstrated bilateral carotid stenosis with the left ICA being dominant. IR performed angiogram with angioplasty of the left ICA. Laboratory data showed progressive increase in his serum creatinine to currently 2.9 mg/dL. Hemoglobin was noted to be 5.6. He has received at least 2 units of packed cells but with further drop in his hemoglobin with requirement for additional transfusion. He had an episode of urinary retention with gross hematuria Mahan catheter was reinserted and the hematuria subsequently cleared. Renal is consulted for LARY. Subjective     10/1: he denies blurred vision, no headaches  10/2: No new acute issues from overnight; more alert; receiving PRBCs hemoglobin trending low  10/3: pt seen sp egd today, no cp or sob, family and nurse in room, no cp or sob  10/4: pt seen in room, no complaints  10/5: pt seen in icu, feels ok today, no cp or sob  10/6: pt seen in nsicu, no cp or sob, npo, wants to eat  10/7: pt seen in nsicu, no cp or sob, starting dysphagia diet  10/8: pt without complaint, eating better dysphagia  diet  10/9 : pt seen in room, no cp or sob, just got back from c scope, cliveggvania, on ivf  10/10: pt seen in room, sob, sbp in 80s, getting unit of prbc, wheezing, had breathing tx.  Rrt to be called for resp distress and low bp. Passing melena and clots, on brilinita, to go to IR for embiolization, frank RN at bedside  10/11 :pt transferred to icu for continued gi bleeding and hypotension. Earle Delon now being held and awaits IR for possible embolization  10/12: pt seen in icu, awaits colonoscopy Friday. Getting vit k and dapt held. 10/13: pt seen in icu. Awaiting transfer to [facility], awaits colonoscopy, on bipap and started bumex drip for sob.           PROBLEM LIST:    Patient Active Problem List   Diagnosis    Acute cerebrovascular accident (CVA) ([facility]    Acute respiratory failure with hypoxia (HCC)    Stenosis of left carotid artery    Hypoalbuminemia    Electrolyte imbalance    Hypernatremia    Anemia    GI bleeding        PAST MEDICAL HISTORY:    Past Medical History:   Diagnosis Date    Chronic back pain     CKD (chronic kidney disease)     CVA (cerebral vascular accident) ([facility]     CVA (cerebral vascular accident) ([facility]     DM (diabetes mellitus) ([facility]     Major depression     MI (myocardial infarction) ([facility]     MATT (obstructive sleep apnea)     Parkinson disease (HCC)     PVD (peripheral vascular disease) ([facility]     Seizure ([facility]        DIET:    Diet NPO Exceptions are: Sips of Water with Meds     PHYSICAL EXAM:     Patient Vitals for the past 24 hrs:   BP Temp Temp src Pulse Resp SpO2   10/13/22 0900 (!) 182/81 -- -- (!) 111 (!) 48 96 %   10/13/22 0822 -- -- -- (!) 115 (!) 52 91 %   10/13/22 0815 -- -- -- (!) 113 (!) 41 94 %   10/13/22 0800 (!) 159/103 99.4 °F (37.4 °C) Temporal (!) 107 (!) 44 96 %   10/13/22 0700 (!) 187/91 -- -- (!) 112 (!) 45 96 %   10/13/22 0600 (!) 183/96 98.2 °F (36.8 °C) Temporal (!) 110 (!) 36 94 %   10/13/22 0500 (!) 172/105 -- -- (!) 110 (!) 7 90 %   10/13/22 0417 -- -- -- (!) 108 30 92 %   10/13/22 0400 (!) 179/160 98.2 °F (36.8 °C) Temporal (!) 109 (!) 37 95 %   10/13/22 0300 (!) 155/79 -- -- (!) 112 (!) 41 --   10/13/22 0243 -- -- -- -- (!) 34 --   10/13/22 0230 -- -- -- (!) 106 (!) 34 98 %   10/13/22 0229 -- -- -- (!) 107 (!) 37 97 %   10/13/22 0200 (!) 159/91 98.2 °F (36.8 °C) Temporal (!) 108 (!) 110 95 %   10/13/22 0100 (!) 164/52 -- -- 96 23 96 %   10/13/22 0000 (!) 154/59 98.2 °F (36.8 °C) Temporal 93 (!) 32 95 %   10/12/22 2300 (!) 148/87 -- -- 92 18 96 %   10/12/22 2200 91/75 98.2 °F (36.8 °C) Temporal 92 19 97 %   10/12/22 2100 (!) 153/129 -- -- 93 19 97 %   10/12/22 2045 -- -- -- 93 20 97 %   10/12/22 2034 -- -- -- 97 26 --   10/12/22 2004 -- -- -- -- (!) 53 --   10/12/22 2000 (!) 164/70 98.6 °F (37 °C) Temporal 98 (!) 40 96 %   10/12/22 1800 (!) 142/95 -- -- 97 (!) 32 --   10/12/22 1715 -- -- -- 99 (!) 40 --   10/12/22 1709 -- -- -- 98 (!) 45 --   10/12/22 1700 (!) 150/76 -- -- 95 (!) 56 --   10/12/22 1600 (!) 173/72 98.4 °F (36.9 °C) Axillary 87 20 --   10/12/22 1500 (!) 172/128 -- -- 89 18 --   10/12/22 1417 -- -- -- 85 12 98 %   10/12/22 1416 -- -- -- 86 18 97 %   10/12/22 1415 (!) 144/85 98.8 °F (37.1 °C) Axillary 86 (!) 34 95 %   10/12/22 1414 -- -- -- 87 23 97 %   10/12/22 1403 -- -- -- 86 (!) 35 --   10/12/22 1402 -- -- -- 85 27 --   10/12/22 1401 -- -- -- 85 (!) 37 --   10/12/22 1400 (!) 144/85 -- -- 85 (!) 40 96 %   10/12/22 1359 (!) 144/85 98.8 °F (37.1 °C) Axillary 85 21 95 %   10/12/22 1358 (!) 139/101 98.8 °F (37.1 °C) Axillary 85 (!) 34 96 %   10/12/22 1357 -- -- -- 85 29 96 %   10/12/22 1354 -- -- -- 85 23 --   10/12/22 1353 -- -- -- 83 21 --   10/12/22 1352 -- -- -- 83 18 --   10/12/22 1351 -- -- -- 82 17 --   10/12/22 1350 -- -- -- 82 17 97 %   10/12/22 1349 -- -- -- 83 19 96 %   10/12/22 1348 (!) 139/101 98.8 °F (37.1 °C) Axillary 83 20 97 %   10/12/22 1347 -- -- -- 84 18 96 %   10/12/22 1346 -- -- -- 84 18 96 %   10/12/22 1345 -- -- -- 84 30 --   10/12/22 1344 -- -- -- 84 18 --   10/12/22 1343 -- -- -- 84 22 --   10/12/22 1342 -- -- -- 85 26 --   10/12/22 1314 -- -- -- -- (!) 45 --   10/12/22 1300 (!) 139/101 -- -- 91 24 --   10/12/22 1244 -- -- -- -- (!) 45 -- 10/12/22 1233 -- -- -- 95 (!) 44 95 %   10/12/22 1226 -- -- -- 97 (!) 41 --   10/12/22 1225 -- -- -- 98 (!) 45 --   10/12/22 1224 -- -- -- 98 (!) 58 --   10/12/22 1223 -- -- -- 97 (!) 48 --   10/12/22 1222 -- -- -- 98 (!) 51 --   10/12/22 1221 -- -- -- 98 (!) 53 --   10/12/22 1220 -- -- -- 97 (!) 48 --   10/12/22 1219 -- -- -- 98 (!) 48 --   10/12/22 1218 -- -- -- 98 (!) 43 --   10/12/22 1217 -- -- -- 99 (!) 43 --   10/12/22 1216 -- -- -- 100 (!) 46 --   10/12/22 1215 -- -- -- (!) 102 (!) 46 --   10/12/22 1214 -- -- -- (!) 103 (!) 39 --   10/12/22 1213 -- -- -- (!) 107 (!) 55 --   10/12/22 1212 -- -- -- (!) 108 (!) 59 --   10/12/22 1211 -- -- -- (!) 102 (!) 46 --   10/12/22 1210 -- -- -- 100 (!) 60 --   10/12/22 1207 -- -- -- 95 (!) 53 --   10/12/22 1206 -- -- -- 93 (!) 41 --   10/12/22 1205 -- -- -- 93 (!) 48 --   10/12/22 1204 (!) 173/102 -- -- 92 (!) 41 --   10/12/22 1203 -- -- -- 92 23 --   10/12/22 1202 -- -- -- 91 23 --   10/12/22 1201 -- -- -- 91 (!) 47 --   10/12/22 1200 (!) 173/102 98.7 °F (37.1 °C) Axillary 92 30 --   10/12/22 1159 -- -- -- 92 (!) 32 --   10/12/22 1158 -- -- -- 91 (!) 32 --   @      Intake/Output Summary (Last 24 hours) at 10/13/2022 1040  Last data filed at 10/13/2022 0900  Gross per 24 hour   Intake 1319.15 ml   Output 3280 ml   Net -1960.85 ml         Wt Readings from Last 3 Encounters:   09/27/22 260 lb 8 oz (118.2 kg)       Constitutional:  Pt is in no acute distress  Head: normocephalic, atraumatic  Neck: no JVD  Cardiovascular: regular rate and rhythm, no murmurs, gallops, or rubs  Respiratory:  No rales, rhochi, or wheezes  Gastrointestinal:  Soft, nontender, nondistended, bowel sounds x 4  Ext: tr edema  Skin: dry, no rash  Neuro: aaox3    MEDS (scheduled):    phytonadione (VITAMIN K)  IVPB  10 mg IntraVENous Daily    QUEtiapine  50 mg Oral BID    polyethylene glycol  6,000 mL Oral Once    sodium chloride flush  5-40 mL IntraVENous 2 times per day    miconazole nitrate   Topical BID    [Held by provider] gabapentin  600 mg Oral 4x daily    amLODIPine  10 mg Oral Daily    white petrolatum   Topical BID    sodium chloride flush  5-40 mL IntraVENous 2 times per day    miconazole   Topical BID    [Held by provider] insulin glargine  32 Units SubCUTAneous QAM    epoetin sharath-epbx  6,000 Units SubCUTAneous Once per day on Mon Wed Fri    [Held by provider] cloNIDine  0.1 mg Oral Q12H    [Held by provider] hydrALAZINE  50 mg Oral 3 times per day    [Held by provider] lactulose  20 g Oral BID    insulin lispro  0-16 Units SubCUTAneous 4x Daily AC & HS    HYDROcodone-acetaminophen  1 tablet Oral BID    pantoprazole (PROTONIX) 40 mg injection  40 mg IntraVENous Q12H    metoprolol succinate  25 mg Oral Daily    [Held by provider] sennosides  5 mL Oral Nightly    carbidopa-levodopa  1 tablet Per NG tube TID    ipratropium-albuterol  1 ampule Inhalation Q4H WA    collagenase   Topical Q MWF    benzonatate  100 mg Oral TID    cetirizine  10 mg Oral Daily    ezetimibe  10 mg Oral Daily    [Held by provider] ticagrelor  90 mg Oral BID    [Held by provider] aspirin  81 mg Oral Daily    Or    [Held by provider] aspirin  300 mg Rectal Daily    atorvastatin  80 mg Oral Nightly       MEDS (infusions):   sodium chloride      sodium chloride      sodium chloride      sodium chloride      sodium chloride      sodium chloride      dextrose         MEDS (prn):  sodium chloride, sodium chloride flush, sodium chloride, sodium chloride, sodium chloride, miconazole nitrate **AND** miconazole nitrate, sodium chloride, sodium chloride flush, sodium chloride, labetalol, hydrALAZINE, glucose, dextrose bolus **OR** dextrose bolus, glucagon (rDNA), dextrose, ondansetron **OR** ondansetron    DATA:    Recent Labs     10/11/22  0459 10/11/22  1043 10/12/22  0411 10/12/22  1905 10/13/22  0408   WBC 10.5  --  7.3  --  11.9*   HGB 6.8*   < > 7.2* 7.6* 7.5*   HCT 21.1*   < > 22.2* 23.4* 23.4*   MCV 92.1  --  92.9  --  92.5   PLT 129*  --  99*  --  111*    < > = values in this interval not displayed. Recent Labs     10/11/22  0459 10/11/22  1043 10/11/22  1902 10/12/22  0411 10/13/22  0408   *   < > 149* 147* 149*   K 4.6   < > 4.4 4.4 4.0   *   < > 116* 115* 114*   CO2 20*   < > 22 22 22   BUN 63*   < > 57* 53* 43*   CREATININE 1.9*   < > 1.8* 1.7* 1.5*   LABGLOM 36   < > 38 41 47   GLUCOSE 150*   < > 145* 139* 176*   CALCIUM 8.2*   < > 8.5* 8.3* 8.3*   ALT <5  --   --  <5 <5   AST 17  --   --  15 18   BILITOT 0.3  --   --  0.3 0.5   ALKPHOS 193*  --   --  160* 230*   MG 2.0  --   --  1.8 1.6   PHOS 3.8  --   --  3.9 3.3    < > = values in this interval not displayed. Lab Results   Component Value Date    LABALBU 2.5 (L) 10/13/2022    LABALBU 2.2 (L) 10/12/2022    LABALBU 2.2 (L) 10/11/2022     No results found for: TSH    Iron Studies  Lab Results   Component Value Date    IRON 24 (L) 10/03/2022    TIBC 195 (L) 10/03/2022    FERRITIN 94 10/03/2022     Vitamin B-12   Date Value Ref Range Status   10/07/2022 370 211 - 946 pg/mL Final     Folate   Date Value Ref Range Status   10/07/2022 6.3 4.8 - 24.2 ng/mL Final       No results found for: VITD25  No results found for: PTH    No components found for: URIC    No results found for: VOL, APPEARANCE, COLORU, LABSPEC, LABPH, LEUKBLD, NITRU, GLUCOSEU, KETUA, UROBILINOGEN, KETUA, UROBILINOGEN, BILIRUBINUR, OCBU    No results found for: LIPIDPAN      IMPRESSION/RECOMMENDATIONS:      1. Acute CVA  S/p IR intervention with angioplasty of the left ICA     2. LARY on CKD stage 3b  Baseline 1.7-1.9  combined effects of ACEI use and contrast nephrotoxicity  Component of urinary retention  nonoliguric at this time  Disproportionate elevation of BUN relative to Cr c/w gib  stool heme positive  Monitor labs and urine  3.4  L  Bun/cr  128/3 >115/2.6> 105/2.5>95/2.5> 85/2.2>84/2.2>69/2.1>2.1> 63/1.9> 53/1.7> 43/1.5     3.  Hypertensive emergency  presenting  with acute CVA  Adjust meds to target blood pressure control to SBP less than 160  BP with improved control  Was low in setting of gib and now high again     4. Anemia, chronic with acute worsening  Informed stool heme positive  PRBC transfusion as needed  Sp egd/c scope  Trf < 7  On ASHLEY   To go for embolization if worsens     5. Type 2 diabetes mellitus  Monitor glucose levels     6. Hyperkalemia  Due to combination of LAYR and metabolic acidosis; PRBC transfusios  Lokelma  prn     7. Urinary retention  Suspected situational  Now with trejo  Urology following     8. Hypernatremia  Suspect intervascular volume depletion  Na 151>145>141>145>141>138>141>147>148>147>150  Dc hco3 drip  Start d5    9. Met acidosis  Off  hco3 drip  Improving  Ckd/hypotension    10. resp distress  Bumex drip at this time  On bipap          Maribel Nav.  Salo Haley MD

## 2022-10-14 ENCOUNTER — APPOINTMENT (OUTPATIENT)
Dept: GENERAL RADIOLOGY | Age: 67
DRG: 034 | End: 2022-10-14
Attending: INTERNAL MEDICINE
Payer: MEDICARE

## 2022-10-14 LAB
AADO2: 86.8 MMHG
ALBUMIN SERPL-MCNC: 2.5 G/DL (ref 3.5–5.2)
ALP BLD-CCNC: 523 U/L (ref 40–129)
ALT SERPL-CCNC: 22 U/L (ref 0–40)
ANION GAP SERPL CALCULATED.3IONS-SCNC: 11 MMOL/L (ref 7–16)
ANION GAP SERPL CALCULATED.3IONS-SCNC: 12 MMOL/L (ref 7–16)
ANION GAP SERPL CALCULATED.3IONS-SCNC: 15 MMOL/L (ref 7–16)
AST SERPL-CCNC: 101 U/L (ref 0–39)
B.E.: 2.9 MMOL/L (ref -3–3)
BASOPHILS ABSOLUTE: 0.05 E9/L (ref 0–0.2)
BASOPHILS RELATIVE PERCENT: 0.6 % (ref 0–2)
BILIRUB SERPL-MCNC: 0.6 MG/DL (ref 0–1.2)
BLOOD BANK DISPENSE STATUS: NORMAL
BLOOD BANK PRODUCT CODE: NORMAL
BPU ID: NORMAL
BUN BLDV-MCNC: 31 MG/DL (ref 6–23)
BUN BLDV-MCNC: 35 MG/DL (ref 6–23)
BUN BLDV-MCNC: 37 MG/DL (ref 6–23)
CALCIUM IONIZED: 1.26 MMOL/L (ref 1.15–1.33)
CALCIUM SERPL-MCNC: 8.3 MG/DL (ref 8.6–10.2)
CALCIUM SERPL-MCNC: 8.5 MG/DL (ref 8.6–10.2)
CALCIUM SERPL-MCNC: 8.6 MG/DL (ref 8.6–10.2)
CHLORIDE BLD-SCNC: 109 MMOL/L (ref 98–107)
CHLORIDE BLD-SCNC: 111 MMOL/L (ref 98–107)
CHLORIDE BLD-SCNC: 112 MMOL/L (ref 98–107)
CHLORIDE URINE RANDOM: 133 MMOL/L
CO2: 26 MMOL/L (ref 22–29)
CO2: 28 MMOL/L (ref 22–29)
CO2: 29 MMOL/L (ref 22–29)
COHB: 1.9 % (ref 0–1.5)
CREAT SERPL-MCNC: 1.4 MG/DL (ref 0.7–1.2)
CREAT SERPL-MCNC: 1.5 MG/DL (ref 0.7–1.2)
CREAT SERPL-MCNC: 1.5 MG/DL (ref 0.7–1.2)
CRITICAL: ABNORMAL
DATE ANALYZED: ABNORMAL
DATE OF COLLECTION: ABNORMAL
DESCRIPTION BLOOD BANK: NORMAL
EOSINOPHILS ABSOLUTE: 0.14 E9/L (ref 0.05–0.5)
EOSINOPHILS RELATIVE PERCENT: 1.6 % (ref 0–6)
FIO2: 35 %
GFR AFRICAN AMERICAN: 57
GFR AFRICAN AMERICAN: 57
GFR AFRICAN AMERICAN: >60
GFR NON-AFRICAN AMERICAN: 47 ML/MIN/1.73
GFR NON-AFRICAN AMERICAN: 47 ML/MIN/1.73
GFR NON-AFRICAN AMERICAN: 51 ML/MIN/1.73
GLUCOSE BLD-MCNC: 192 MG/DL (ref 74–99)
GLUCOSE BLD-MCNC: 212 MG/DL (ref 74–99)
GLUCOSE BLD-MCNC: 212 MG/DL (ref 74–99)
HCO3: 26.5 MMOL/L (ref 22–26)
HCT VFR BLD CALC: 24 % (ref 37–54)
HCT VFR BLD CALC: 24.3 % (ref 37–54)
HEMOGLOBIN: 8 G/DL (ref 12.5–16.5)
HEMOGLOBIN: 8 G/DL (ref 12.5–16.5)
HHB: 1.8 % (ref 0–5)
IMMATURE GRANULOCYTES #: 0.09 E9/L
IMMATURE GRANULOCYTES %: 1 % (ref 0–5)
LAB: ABNORMAL
LYMPHOCYTES ABSOLUTE: 0.76 E9/L (ref 1.5–4)
LYMPHOCYTES RELATIVE PERCENT: 8.8 % (ref 20–42)
Lab: ABNORMAL
MAGNESIUM: 1.7 MG/DL (ref 1.6–2.6)
MAGNESIUM: 1.8 MG/DL (ref 1.6–2.6)
MCH RBC QN AUTO: 29.6 PG (ref 26–35)
MCHC RBC AUTO-ENTMCNC: 32.9 % (ref 32–34.5)
MCV RBC AUTO: 90 FL (ref 80–99.9)
METER GLUCOSE: 188 MG/DL (ref 74–99)
METER GLUCOSE: 199 MG/DL (ref 74–99)
METER GLUCOSE: 200 MG/DL (ref 74–99)
METER GLUCOSE: 226 MG/DL (ref 74–99)
METER GLUCOSE: 234 MG/DL (ref 74–99)
METHB: 0.4 % (ref 0–1.5)
MODE: ABNORMAL
MONOCYTES ABSOLUTE: 0.88 E9/L (ref 0.1–0.95)
MONOCYTES RELATIVE PERCENT: 10.2 % (ref 2–12)
NEUTROPHILS ABSOLUTE: 6.71 E9/L (ref 1.8–7.3)
NEUTROPHILS RELATIVE PERCENT: 77.8 % (ref 43–80)
O2 SATURATION: 98.4 % (ref 92–98.5)
O2HB: 95.9 % (ref 94–97)
OPERATOR ID: 1721
OSMOLALITY URINE: 338 MOSM/KG (ref 300–900)
OSMOLALITY: 326 MOSM/KG (ref 285–310)
PATIENT TEMP: 37 C
PCO2: 36.3 MMHG (ref 35–45)
PDW BLD-RTO: 15.7 FL (ref 11.5–15)
PEEP/CPAP: 6 CMH2O
PFO2: 3.19 MMHG/%
PH BLOOD GAS: 7.48 (ref 7.35–7.45)
PHOSPHORUS: 3.4 MG/DL (ref 2.5–4.5)
PIP: 14 CMH2O
PLATELET # BLD: 116 E9/L (ref 130–450)
PMV BLD AUTO: 10.7 FL (ref 7–12)
PO2: 111.8 MMHG (ref 75–100)
POTASSIUM SERPL-SCNC: 3.5 MMOL/L (ref 3.5–5)
POTASSIUM SERPL-SCNC: 3.7 MMOL/L (ref 3.5–5)
POTASSIUM SERPL-SCNC: 3.9 MMOL/L (ref 3.5–5)
POTASSIUM, UR: 13.2 MMOL/L
PRO-BNP: ABNORMAL PG/ML (ref 0–125)
RBC # BLD: 2.7 E12/L (ref 3.8–5.8)
RI(T): 0.78
RR MECHANICAL: 12 B/MIN
SODIUM BLD-SCNC: 149 MMOL/L (ref 132–146)
SODIUM BLD-SCNC: 151 MMOL/L (ref 132–146)
SODIUM BLD-SCNC: 153 MMOL/L (ref 132–146)
SODIUM URINE: 138 MMOL/L
SOURCE, BLOOD GAS: ABNORMAL
THB: 8.8 G/DL (ref 11.5–16.5)
TIME ANALYZED: 425
TOTAL PROTEIN: 5.2 G/DL (ref 6.4–8.3)
WBC # BLD: 8.6 E9/L (ref 4.5–11.5)

## 2022-10-14 PROCEDURE — 2580000003 HC RX 258: Performed by: STUDENT IN AN ORGANIZED HEALTH CARE EDUCATION/TRAINING PROGRAM

## 2022-10-14 PROCEDURE — 6360000002 HC RX W HCPCS: Performed by: SURGERY

## 2022-10-14 PROCEDURE — 82330 ASSAY OF CALCIUM: CPT

## 2022-10-14 PROCEDURE — 2580000003 HC RX 258: Performed by: SURGERY

## 2022-10-14 PROCEDURE — 2700000000 HC OXYGEN THERAPY PER DAY

## 2022-10-14 PROCEDURE — 2500000003 HC RX 250 WO HCPCS: Performed by: INTERNAL MEDICINE

## 2022-10-14 PROCEDURE — 99291 CRITICAL CARE FIRST HOUR: CPT | Performed by: INTERNAL MEDICINE

## 2022-10-14 PROCEDURE — 84100 ASSAY OF PHOSPHORUS: CPT

## 2022-10-14 PROCEDURE — 6360000002 HC RX W HCPCS

## 2022-10-14 PROCEDURE — 85014 HEMATOCRIT: CPT

## 2022-10-14 PROCEDURE — 85025 COMPLETE CBC W/AUTO DIFF WBC: CPT

## 2022-10-14 PROCEDURE — 2500000003 HC RX 250 WO HCPCS: Performed by: SURGERY

## 2022-10-14 PROCEDURE — 80053 COMPREHEN METABOLIC PANEL: CPT

## 2022-10-14 PROCEDURE — 2000000000 HC ICU R&B

## 2022-10-14 PROCEDURE — 83935 ASSAY OF URINE OSMOLALITY: CPT

## 2022-10-14 PROCEDURE — 83880 ASSAY OF NATRIURETIC PEPTIDE: CPT

## 2022-10-14 PROCEDURE — 6370000000 HC RX 637 (ALT 250 FOR IP): Performed by: STUDENT IN AN ORGANIZED HEALTH CARE EDUCATION/TRAINING PROGRAM

## 2022-10-14 PROCEDURE — 83930 ASSAY OF BLOOD OSMOLALITY: CPT

## 2022-10-14 PROCEDURE — P9047 ALBUMIN (HUMAN), 25%, 50ML: HCPCS

## 2022-10-14 PROCEDURE — 84133 ASSAY OF URINE POTASSIUM: CPT

## 2022-10-14 PROCEDURE — A4216 STERILE WATER/SALINE, 10 ML: HCPCS | Performed by: SURGERY

## 2022-10-14 PROCEDURE — 6370000000 HC RX 637 (ALT 250 FOR IP): Performed by: SURGERY

## 2022-10-14 PROCEDURE — 82805 BLOOD GASES W/O2 SATURATION: CPT

## 2022-10-14 PROCEDURE — 82436 ASSAY OF URINE CHLORIDE: CPT

## 2022-10-14 PROCEDURE — 80048 BASIC METABOLIC PNL TOTAL CA: CPT

## 2022-10-14 PROCEDURE — 83735 ASSAY OF MAGNESIUM: CPT

## 2022-10-14 PROCEDURE — C9113 INJ PANTOPRAZOLE SODIUM, VIA: HCPCS | Performed by: SURGERY

## 2022-10-14 PROCEDURE — 82962 GLUCOSE BLOOD TEST: CPT

## 2022-10-14 PROCEDURE — 85018 HEMOGLOBIN: CPT

## 2022-10-14 PROCEDURE — 94640 AIRWAY INHALATION TREATMENT: CPT

## 2022-10-14 PROCEDURE — 71045 X-RAY EXAM CHEST 1 VIEW: CPT

## 2022-10-14 PROCEDURE — 84300 ASSAY OF URINE SODIUM: CPT

## 2022-10-14 PROCEDURE — 2580000003 HC RX 258: Performed by: INTERNAL MEDICINE

## 2022-10-14 PROCEDURE — 94660 CPAP INITIATION&MGMT: CPT

## 2022-10-14 RX ORDER — ALBUMIN (HUMAN) 12.5 G/50ML
25 SOLUTION INTRAVENOUS EVERY 8 HOURS
Status: COMPLETED | OUTPATIENT
Start: 2022-10-14 | End: 2022-10-15

## 2022-10-14 RX ORDER — POTASSIUM CHLORIDE 29.8 MG/ML
40 INJECTION INTRAVENOUS ONCE
Status: COMPLETED | OUTPATIENT
Start: 2022-10-14 | End: 2022-10-14

## 2022-10-14 RX ORDER — POTASSIUM CHLORIDE 29.8 MG/ML
20 INJECTION INTRAVENOUS ONCE
Status: COMPLETED | OUTPATIENT
Start: 2022-10-14 | End: 2022-10-14

## 2022-10-14 RX ORDER — MAGNESIUM SULFATE IN WATER 40 MG/ML
2000 INJECTION, SOLUTION INTRAVENOUS ONCE
Status: COMPLETED | OUTPATIENT
Start: 2022-10-14 | End: 2022-10-14

## 2022-10-14 RX ORDER — CLOPIDOGREL BISULFATE 75 MG/1
75 TABLET ORAL DAILY
Status: DISCONTINUED | OUTPATIENT
Start: 2022-10-15 | End: 2022-10-19

## 2022-10-14 RX ADMIN — HYDRALAZINE HYDROCHLORIDE 10 MG: 20 INJECTION INTRAMUSCULAR; INTRAVENOUS at 19:05

## 2022-10-14 RX ADMIN — SODIUM CHLORIDE, PRESERVATIVE FREE 10 ML: 5 INJECTION INTRAVENOUS at 07:41

## 2022-10-14 RX ADMIN — LABETALOL HYDROCHLORIDE 10 MG: 5 INJECTION, SOLUTION INTRAVENOUS at 10:05

## 2022-10-14 RX ADMIN — HYDRALAZINE HYDROCHLORIDE 10 MG: 20 INJECTION INTRAMUSCULAR; INTRAVENOUS at 12:41

## 2022-10-14 RX ADMIN — MICONAZOLE NITRATE: 2 OINTMENT TOPICAL at 20:02

## 2022-10-14 RX ADMIN — POTASSIUM CHLORIDE 40 MEQ: 29.8 INJECTION, SOLUTION INTRAVENOUS at 01:23

## 2022-10-14 RX ADMIN — MICONAZOLE NITRATE: 2 OINTMENT TOPICAL at 07:52

## 2022-10-14 RX ADMIN — DEXMEDETOMIDINE 1.5 MCG/KG/HR: 100 INJECTION, SOLUTION, CONCENTRATE INTRAVENOUS at 15:26

## 2022-10-14 RX ADMIN — PETROLATUM: 420 OINTMENT TOPICAL at 20:00

## 2022-10-14 RX ADMIN — HYDRALAZINE HYDROCHLORIDE 10 MG: 20 INJECTION INTRAMUSCULAR; INTRAVENOUS at 15:40

## 2022-10-14 RX ADMIN — HYDRALAZINE HYDROCHLORIDE 10 MG: 20 INJECTION INTRAMUSCULAR; INTRAVENOUS at 07:39

## 2022-10-14 RX ADMIN — Medication 10 ML: at 07:52

## 2022-10-14 RX ADMIN — HYDRALAZINE HYDROCHLORIDE 10 MG: 20 INJECTION INTRAMUSCULAR; INTRAVENOUS at 13:56

## 2022-10-14 RX ADMIN — DEXMEDETOMIDINE 1.5 MCG/KG/HR: 100 INJECTION, SOLUTION, CONCENTRATE INTRAVENOUS at 03:38

## 2022-10-14 RX ADMIN — IPRATROPIUM BROMIDE AND ALBUTEROL SULFATE 1 AMPULE: .5; 2.5 SOLUTION RESPIRATORY (INHALATION) at 08:21

## 2022-10-14 RX ADMIN — HYDRALAZINE HYDROCHLORIDE 10 MG: 20 INJECTION INTRAMUSCULAR; INTRAVENOUS at 18:22

## 2022-10-14 RX ADMIN — ANTI-FUNGAL POWDER MICONAZOLE NITRATE TALC FREE: 1.42 POWDER TOPICAL at 20:00

## 2022-10-14 RX ADMIN — EPOETIN ALFA-EPBX 6000 UNITS: 3000 INJECTION, SOLUTION INTRAVENOUS; SUBCUTANEOUS at 07:57

## 2022-10-14 RX ADMIN — IPRATROPIUM BROMIDE AND ALBUTEROL SULFATE 1 AMPULE: .5; 2.5 SOLUTION RESPIRATORY (INHALATION) at 12:49

## 2022-10-14 RX ADMIN — INSULIN LISPRO 4 UNITS: 100 INJECTION, SOLUTION INTRAVENOUS; SUBCUTANEOUS at 05:59

## 2022-10-14 RX ADMIN — ANTI-FUNGAL POWDER MICONAZOLE NITRATE TALC FREE: 1.42 POWDER TOPICAL at 07:50

## 2022-10-14 RX ADMIN — PETROLATUM: 420 OINTMENT TOPICAL at 07:49

## 2022-10-14 RX ADMIN — MAGNESIUM SULFATE HEPTAHYDRATE 2000 MG: 40 INJECTION, SOLUTION INTRAVENOUS at 02:45

## 2022-10-14 RX ADMIN — HYDRALAZINE HYDROCHLORIDE 10 MG: 20 INJECTION INTRAMUSCULAR; INTRAVENOUS at 20:26

## 2022-10-14 RX ADMIN — IPRATROPIUM BROMIDE AND ALBUTEROL SULFATE 1 AMPULE: .5; 2.5 SOLUTION RESPIRATORY (INHALATION) at 16:37

## 2022-10-14 RX ADMIN — IPRATROPIUM BROMIDE AND ALBUTEROL SULFATE 1 AMPULE: .5; 2.5 SOLUTION RESPIRATORY (INHALATION) at 19:47

## 2022-10-14 RX ADMIN — ALBUMIN (HUMAN) 25 G: 12.5 SOLUTION INTRAVENOUS at 20:13

## 2022-10-14 RX ADMIN — DEXMEDETOMIDINE 1.5 MCG/KG/HR: 100 INJECTION, SOLUTION, CONCENTRATE INTRAVENOUS at 09:21

## 2022-10-14 RX ADMIN — SODIUM CHLORIDE, PRESERVATIVE FREE 40 MG: 5 INJECTION INTRAVENOUS at 15:25

## 2022-10-14 RX ADMIN — HYDRALAZINE HYDROCHLORIDE 10 MG: 20 INJECTION INTRAMUSCULAR; INTRAVENOUS at 01:14

## 2022-10-14 RX ADMIN — POTASSIUM CHLORIDE 20 MEQ: 29.8 INJECTION, SOLUTION INTRAVENOUS at 07:47

## 2022-10-14 RX ADMIN — MAGNESIUM SULFATE HEPTAHYDRATE 2000 MG: 40 INJECTION, SOLUTION INTRAVENOUS at 07:47

## 2022-10-14 RX ADMIN — BUMETANIDE 0.5 MG/HR: 0.25 INJECTION, SOLUTION INTRAMUSCULAR; INTRAVENOUS at 06:09

## 2022-10-14 RX ADMIN — COLLAGENASE SANTYL: 250 OINTMENT TOPICAL at 12:00

## 2022-10-14 RX ADMIN — POLYETHYLENE GLYCOL-3350 AND ELECTROLYTES 4000 ML: 236; 6.74; 5.86; 2.97; 22.74 POWDER, FOR SOLUTION ORAL at 20:51

## 2022-10-14 RX ADMIN — SODIUM CHLORIDE, PRESERVATIVE FREE 10 ML: 5 INJECTION INTRAVENOUS at 20:00

## 2022-10-14 RX ADMIN — LABETALOL HYDROCHLORIDE 10 MG: 5 INJECTION, SOLUTION INTRAVENOUS at 00:05

## 2022-10-14 RX ADMIN — SODIUM CHLORIDE, PRESERVATIVE FREE 40 MG: 5 INJECTION INTRAVENOUS at 03:26

## 2022-10-14 RX ADMIN — HYDRALAZINE HYDROCHLORIDE 10 MG: 20 INJECTION INTRAMUSCULAR; INTRAVENOUS at 16:37

## 2022-10-14 RX ADMIN — MICONAZOLE NITRATE: 2 OINTMENT TOPICAL at 20:00

## 2022-10-14 RX ADMIN — DEXMEDETOMIDINE 1.5 MCG/KG/HR: 100 INJECTION, SOLUTION, CONCENTRATE INTRAVENOUS at 21:40

## 2022-10-14 RX ADMIN — INSULIN LISPRO 4 UNITS: 100 INJECTION, SOLUTION INTRAVENOUS; SUBCUTANEOUS at 11:34

## 2022-10-14 RX ADMIN — LABETALOL HYDROCHLORIDE 10 MG: 5 INJECTION, SOLUTION INTRAVENOUS at 03:34

## 2022-10-14 ASSESSMENT — PAIN SCALES - WONG BAKER
WONGBAKER_NUMERICALRESPONSE: 0

## 2022-10-14 ASSESSMENT — PAIN SCALES - GENERAL
PAINLEVEL_OUTOF10: 0

## 2022-10-14 NOTE — PLAN OF CARE
Problem: Discharge Planning  Goal: Discharge to home or other facility with appropriate resources  Outcome: Progressing  Flowsheets (Taken 10/14/2022 0800 by Fredy Schultz)  Discharge to home or other facility with appropriate resources: Identify barriers to discharge with patient and caregiver     Problem: Pain  Goal: Verbalizes/displays adequate comfort level or baseline comfort level  Outcome: Progressing     Problem: Skin/Tissue Integrity  Goal: Absence of new skin breakdown  Description: 1. Monitor for areas of redness and/or skin breakdown  2. Assess vascular access sites hourly  3. Every 4-6 hours minimum:  Change oxygen saturation probe site  4. Every 4-6 hours:  If on nasal continuous positive airway pressure, respiratory therapy assess nares and determine need for appliance change or resting period.   Outcome: Progressing     Problem: Safety - Adult  Goal: Free from fall injury  Outcome: Progressing  Flowsheets (Taken 10/14/2022 0934 by Fredy Schultz)  Free From Fall Injury: Instruct family/caregiver on patient safety     Problem: ABCDS Injury Assessment  Goal: Absence of physical injury  Outcome: Progressing  Flowsheets (Taken 10/14/2022 0934 by Fredy Schultz)  Absence of Physical Injury: Implement safety measures based on patient assessment     Problem: Chronic Conditions and Co-morbidities  Goal: Patient's chronic conditions and co-morbidity symptoms are monitored and maintained or improved  Outcome: Progressing  Flowsheets (Taken 10/14/2022 0800 by Fredy Schultz)  Care Plan - Patient's Chronic Conditions and Co-Morbidity Symptoms are Monitored and Maintained or Improved: Monitor and assess patient's chronic conditions and comorbid symptoms for stability, deterioration, or improvement     Problem: Neurosensory - Adult  Goal: Achieves stable or improved neurological status  Outcome: Progressing  Flowsheets (Taken 10/14/2022 0800 by Fredy Schultz)  Achieves stable or improved neurological status:   Assess for and report changes in neurological status   Maintain blood pressure and fluid volume within ordered parameters to optimize cerebral perfusion and minimize risk of hemorrhage  Goal: Remains free of injury related to seizures activity  Outcome: Progressing  Flowsheets (Taken 10/14/2022 0800 by Felicitas Solano)  Remains free of injury related to seizure activity: Maintain airway, patient safety  and administer oxygen as ordered  Goal: Achieves maximal functionality and self care  Outcome: Progressing  Flowsheets (Taken 10/14/2022 0800 by Felicitas Solano)  Achieves maximal functionality and self care: Monitor swallowing and airway patency with patient fatigue and changes in neurological status     Problem: Respiratory - Adult  Goal: Achieves optimal ventilation and oxygenation  Outcome: Progressing  Flowsheets (Taken 10/14/2022 0800 by Felicitas Solano)  Achieves optimal ventilation and oxygenation:   Assess for changes in respiratory status   Assess for changes in mentation and behavior   Position to facilitate oxygenation and minimize respiratory effort     Problem: Cardiovascular - Adult  Goal: Maintains optimal cardiac output and hemodynamic stability  Outcome: Progressing  Flowsheets (Taken 10/14/2022 0800 by Felicitas Solano)  Maintains optimal cardiac output and hemodynamic stability:   Monitor blood pressure and heart rate   Assess for signs of decreased cardiac output  Goal: Absence of cardiac dysrhythmias or at baseline  Outcome: Progressing  Flowsheets (Taken 10/14/2022 0800 by Felicitas Solano)  Absence of cardiac dysrhythmias or at baseline: Monitor cardiac rate and rhythm     Problem: Skin/Tissue Integrity - Adult  Goal: Skin integrity remains intact  Outcome: Progressing  Flowsheets (Taken 10/14/2022 0800 by Felicitas Solano)  Skin Integrity Remains Intact:   Monitor for areas of redness and/or skin breakdown   Assess vascular access sites hourly   Every 4-6 hours minimum: Change oxygen saturation probe site   Every 4-6 hours: If on nasal continuous positive airway pressure, respiratory therapy assesses nares and determine need for appliance change or resting period  Goal: Incisions, wounds, or drain sites healing without S/S of infection  Outcome: Progressing  Goal: Oral mucous membranes remain intact  Outcome: Progressing     Problem: Metabolic/Fluid and Electrolytes - Adult  Goal: Electrolytes maintained within normal limits  Outcome: Progressing  Flowsheets (Taken 10/14/2022 0800 by Benny Mendiola)  Electrolytes maintained within normal limits:   Monitor labs and assess patient for signs and symptoms of electrolyte imbalances   Administer electrolyte replacement as ordered   Monitor response to electrolyte replacements, including repeat lab results as appropriate  Goal: Hemodynamic stability and optimal renal function maintained  Outcome: Progressing  Flowsheets (Taken 10/14/2022 0800 by Benny Mendiola)  Hemodynamic stability and optimal renal function maintained:   Monitor labs and assess for signs and symptoms of volume excess or deficit   Monitor intake, output and patient weight   Monitor response to interventions for patient's volume status, including labs, urine output, blood pressure (other measures as available)  Goal: Glucose maintained within prescribed range  Outcome: Progressing  Flowsheets (Taken 10/14/2022 0800 by Benny Mendiola)  Glucose maintained within prescribed range: Monitor blood glucose as ordered     Problem: Hematologic - Adult  Goal: Maintains hematologic stability  Outcome: Progressing  Flowsheets (Taken 10/14/2022 0800 by Benny Mendiola)  Maintains hematologic stability:   Assess for signs and symptoms of bleeding or hemorrhage   Administer blood products/factors as ordered     Problem: Nutrition Deficit:  Goal: Optimize nutritional status  Outcome: Progressing  Flowsheets (Taken 10/14/2022 1158 by Juli Guo, RD, LD)  Nutrient intake appropriate for improving, restoring, or maintaining nutritional needs: Assess nutritional status and recommend course of action     Problem: Safety - Medical Restraint  Goal: Remains free of injury from restraints (Restraint for Interference with Medical Device)  Description: INTERVENTIONS:  1. Determine that other, less restrictive measures have been tried or would not be effective before applying the restraint  2. Evaluate the patient's condition at the time of restraint application  3. Inform patient/family regarding the reason for restraint  4.  Q2H: Monitor safety, psychosocial status, comfort, nutrition and hydration  Outcome: Progressing

## 2022-10-14 NOTE — CARE COORDINATION
10/14:  Update CM Note:  Pt presented to the [facility] ER for stroke-like symptoms - right side weakness & aphasic. Pt was found to have severe left ICA stenosis. Pt was transferred to [facility]. Pt had a left ICA angioplasty & stent placement by IR. Pt was placed on dual antiplatelet therapy with Brilinta & aspirin. Pt was found to have a GI bleed & has recd 12 units of PRBC. 10/3- EGD no evidence of upper GI bleed noted. 10/9-Colonoscopy showed large amount of blood. Bleeding Scan showed bleeding around the splenic flexure. 10/10- RRT was called due to hypotension & low Hgb. Pt was transferred to MICU. Pt is on 6L/NC at 96%, Iv Precedex, Iv Bumex gtt & Iv Protonix. Pt has a sitter at bedside. Pt has a wound vac to right foot changed M-W-F. Pt's was transferring to [facility] but this has been cancelled per Dr. Dominic Lr today. Cm explained cost for transfer in regards to ambulance. Pt is for a colonoscopy with TI intubation for today with Dr. Denice Brooks. Pt's dc plan is for the [facility]. Cm spoke with Denise Lieberman at [phone]. She will need updates clinical notes & PT/OT faxed to [phone]. Cm sent today's notes & PASSAR. [address] [place] [address] fax [phone] Ref [id number] good til 10/11. DUANE, passar completed & envelope in soft chart. Will need covid test done on day of dc. Sw/FARHAT will continue to follow for dc planning. Electronically signed by Mani Floyd RN on 10/14/2022 at 11:08 AM    The Plan for Transition of Care is related to the following treatment goals: SNF    The Patient and/or patient representative  was provided with a choice of provider and agrees   with the discharge plan. [x] Yes [] No    Freedom of choice list was provided with basic dialogue that supports the patient's individualized plan of care/goals, treatment preferences and shares the quality data associated with the providers.  [x] Yes [] No

## 2022-10-14 NOTE — FLOWSHEET NOTE
Inpatient Wound Care (Follow up) 4405    Admit Date: 9/27/2022  5:45 PM    Reason for consult:  Right lateral foot: wound vac dressing    Findings:     10/14/22 0958   Wound 09/28/22 Foot Right;Lateral   Date First Assessed/Time First Assessed: 09/28/22 0944   Present on Hospital Admission: No  Location: Foot  Wound Location Orientation: Right;Lateral   Wound Image    Wound Etiology Diabetic   Dressing Status New dressing applied   Wound Cleansed Cleansed with saline   Dressing/Treatment Pharmaceutical agent (see MAR); Negative pressure wound therapy   Wound Length (cm) 3 cm   Wound Width (cm) 2.8 cm   Wound Depth (cm) 0.5 cm   Wound Surface Area (cm^2) 8.4 cm^2   Change in Wound Size % (l*w) 9.09   Wound Volume (cm^3) 4.2 cm^3   Wound Healing % 43   Wound Assessment Pink/red   Drainage Amount None   Odor None   Emily-wound Assessment Dry/flaky; Hyperkeratosis (callous)   Negative Pressure Wound Therapy Foot Right;Plantar;Lateral   Placement Date/Time: 09/28/22 1040   Location: Foot  Wound Location Orientation: Right;Plantar;Lateral   Wound Type Diabetic foot ulcer   Dressing Type Black Foam   Number of pieces used 2   Number of pieces removed 2   Cycle Continuous   Target Pressure (mmHg) 125   Canister changed? No   Dressing Change Due 10/17/22     **Informed Consent**     photos taken of wound and inserted into their chart as part of their permanent medical record for purposes of documentation, treatment management and/or medical review. All Images taken on 10/14/22 of patient name: Marcella Rivero were transmitted and stored on secured Walkbase located within [facility] by a registered [employer] Mobile Application Device.        Plan: Wound vac dressing change  Next dressing change: 10/14      Colonel Donald RN 10/14/2022 10:00 AM

## 2022-10-14 NOTE — PROGRESS NOTES
Date: 10/14/2022    Time: 8:25 AM    Patient Placed On BIPAP/CPAP/ Non-Invasive Ventilation? Yes    If no must comment. Facial area red/color change? No           If YES are Blister/Lesion present? No   If yes must notify nursing staff    BIPAP/CPAP skin barrier?   Yes    Skin barrier type:mepilexlite       Comments:     10/14/22 0824   NIV Type   Skin Assessment Clean, dry, & intact   Skin Protection for O2 Device Yes   Orientation Middle   Location Nose   Intervention(s) Skin Barrier   NIV Started/Stopped On   Equipment Type v60   Mode Bilevel   Mask Type Full face mask   Mask Size Medium   Settings/Measurements   PIP Observed 15 cm H20   IPAP 14 cmH20   CPAP/EPAP 6 cmH2O   Vt (Measured) 526 mL   Rate Ordered 12   Resp 21   FiO2  35 %   I Time/ I Time % 0.9 s   Minute Volume (L/min) 7.7 Liters   Mask Leak (lpm) 69 lpm   Comfort Level Good   Using Accessory Muscles No   SpO2 97   Alarm Settings   Alarms On Y   Oxygen Therapy/Pulse Ox   Heart Rate 69   SpO2 97 %         Janay Caballero RCP

## 2022-10-14 NOTE — PROGRESS NOTES
Department of Internal Medicine  Infectious Diseases   Progress Note      C/C :  Right foot  wound infection     All above noted   Reports SOB   Afebrile       Current Facility-Administered Medications   Medication Dose Route Frequency Provider Last Rate Last Admin    [START ON 10/15/2022] clopidogrel (PLAVIX) tablet 75 mg  75 mg Oral Daily Ankit Kenney MD        dextrose 5 % solution   IntraVENous Continuous Chuyita Davis MD   Held at 10/13/22 1138    bumetanide (BUMEX) 12.5 mg in sodium chloride 0.9 % 125 mL infusion  0.5 mg/hr IntraVENous Continuous Aknit Kenney MD 5 mL/hr at 10/14/22 0609 0.5 mg/hr at 10/14/22 0609    dexmedetomidine (PRECEDEX) 1,000 mcg in sodium chloride 0.9 % 250 mL infusion  0.1-1.5 mcg/kg/hr IntraVENous Continuous Ankit Kenney MD 44.33 mL/hr at 10/14/22 0921 1.5 mcg/kg/hr at 10/14/22 0921    0.9 % sodium chloride infusion   IntraVENous PRN CONSTANTINE Patino CNP        QUEtiapine (SEROQUEL) tablet 50 mg  50 mg Oral BID CONSTANTINE Marino CNP   50 mg at 10/12/22 2004    sodium chloride flush 0.9 % injection 5-40 mL  5-40 mL IntraVENous 2 times per day Werner Disla MD   10 mL at 10/14/22 0741    sodium chloride flush 0.9 % injection 5-40 mL  5-40 mL IntraVENous PRN Werner Disla MD        0.9 % sodium chloride infusion  25 mL IntraVENous PRN Werner Disla MD        0.9 % sodium chloride infusion   IntraVENous PRN CONSTANTINE Marino CNP        miconazole nitrate 2 % ointment   Topical BID Charles Emily, DO   Given at 10/13/22 3255    And    miconazole nitrate 2 % ointment   Topical TID PRN Charles Emily, DO   Given at 10/14/22 8238    [Held by provider] gabapentin (NEURONTIN) capsule 600 mg  600 mg Oral 4x daily Georges Suazo MD   600 mg at 10/10/22 2102    amLODIPine (NORVASC) tablet 10 mg  10 mg Oral Daily CONSTANTINE Marino CNP   10 mg at 10/12/22 0801    white petrolatum ointment   Topical BID Charles Diaz DO   Given at 10/14/22 6017    sodium chloride flush 0.9 % injection 5-40 mL  5-40 mL IntraVENous 2 times per day Jeannie Peralta MD   10 mL at 10/14/22 0752    sodium chloride flush 0.9 % injection 5-40 mL  5-40 mL IntraVENous PRN Jeannie Peralta MD        0.9 % sodium chloride infusion   IntraVENous PRN Jeannie Peralta MD        miconazole (MICOTIN) 2 % powder   Topical BID Santos Prieto DO   Given at 10/14/22 0750    [Held by provider] insulin glargine-yfgn (SEMGLEE-YFGN) injection vial 32 Units  32 Units SubCUTAneous QAM Santos Prieto DO   32 Units at 10/07/22 0911    epoetin sharath-epbx (RETACRIT) injection 6,000 Units  6,000 Units SubCUTAneous Once per day on Mon Wed Fri Jeannie Peralta MD   6,000 Units at 10/14/22 0757    [Held by provider] cloNIDine (CATAPRES) tablet 0.1 mg  0.1 mg Oral Q12H [place], APRN - CNP   0.1 mg at 10/08/22 0030    hydrALAZINE (APRESOLINE) tablet 50 mg  50 mg Oral 3 times per day [place], APRN - CNP   50 mg at 10/08/22 8639    [Held by provider] lactulose (CHRONULAC) 10 GM/15ML solution 20 g  20 g Oral BID Jeannie Peralta MD   20 g at 10/09/22 2300    insulin lispro (HUMALOG) injection vial 0-16 Units  0-16 Units SubCUTAneous 4x Daily AC & HS Jeannie Peralta MD   4 Units at 10/14/22 0559    HYDROcodone-acetaminophen (Martha Lennox) 7.5-325 MG per tablet 1 tablet  1 tablet Oral BID Jeannie Peralta MD   1 tablet at 10/12/22 2004    pantoprazole (PROTONIX) 40 mg in sodium chloride (PF) 10 mL injection  40 mg IntraVENous Q12H Jeannie Peralta MD   40 mg at 10/14/22 0326    metoprolol succinate (TOPROL XL) extended release tablet 25 mg  25 mg Oral Daily Jeannie Peralta MD   25 mg at 10/12/22 0748    [Held by provider] sennosides (SENOKOT) 8.8 MG/5ML syrup 5 mL  5 mL Oral Nightly Jeannie Peralta MD   5 mL at 10/09/22 2300    labetalol (NORMODYNE;TRANDATE) injection 10 mg  10 mg IntraVENous Q30 Min PRN Jeannie Peralta MD   10 mg at 10/14/22 1005    hydrALAZINE (APRESOLINE) injection 10 mg  10 mg IntraVENous Q30 Min PRN Jeannie Peralta, MD   10 mg at 10/14/22 0739    glucose chewable tablet 16 g  4 tablet Oral PRN India Russo MD        dextrose bolus 10% 125 mL  125 mL IntraVENous PRN India Russo MD        Or    dextrose bolus 10% 250 mL  250 mL IntraVENous PRN India Russo MD        glucagon (rDNA) injection 1 mg  1 mg SubCUTAneous PRN India Russo MD        dextrose 10 % infusion   IntraVENous Continuous PRN India Russo MD        carbidopa-levodopa (SINEMET)  MG per tablet 1 tablet  1 tablet Per NG tube TID India Russo MD   1 tablet at 10/12/22 2005    ipratropium-albuterol (DUONEB) nebulizer solution 1 ampule  1 ampule Inhalation Q4H WA India Russo MD   1 ampule at 10/14/22 4938    collagenase ointment   Topical Q MWF India Russo MD   Given at 10/12/22 7402    benzonatate (TESSALON) capsule 100 mg  100 mg Oral TID India Russo MD   100 mg at 10/12/22 2006    cetirizine (ZYRTEC) tablet 10 mg  10 mg Oral Daily India Russo MD   10 mg at 10/12/22 0750    ezetimibe (ZETIA) tablet 10 mg  10 mg Oral Daily India Russo MD   10 mg at 10/12/22 0753    [Held by provider] ticagrelor (BRILINTA) tablet 90 mg  90 mg Oral BID India Russo MD   90 mg at 10/09/22 2300    ondansetron (ZOFRAN-ODT) disintegrating tablet 4 mg  4 mg Oral Q8H PRN India Russo MD        Or    ondansetron (ZOFRAN) injection 4 mg  4 mg IntraVENous Q6H PRN India Russo MD        [Held by provider] aspirin EC tablet 81 mg  81 mg Oral Daily India Russo MD   81 mg at 10/09/22 9220    Or    [Held by provider] aspirin suppository 300 mg  300 mg Rectal Daily India Russo MD   300 mg at 09/29/22 0809    atorvastatin (LIPITOR) tablet 80 mg  80 mg Oral Nightly India Russo MD   80 mg at 10/12/22 2004           REVIEW OF SYSTEMS:   CONSTITUTIONAL:  Denies fever, chill or rigors.   HEENT: denies blurring of vision or double vision, denies hearing problem  RESPIRATORY: denies cough, shortness of breath,  CARDIOVASCULAR:  Denies palpitation  GASTROINTESTINAL:  Denies abdomen pain, diarrhea or constipation. GENITOURINARY:  Denies burning urination or frequency of urination  INTEGUMENT: Right heel wound  HEMATOLOGIC/LYMPHATIC:  Denies lymph node swelling, gum bleeding or easy bruising. MUSCULOSKELETAL:  Denies leg pain , joint pain , joint swelling  NEUROLOGICAL:  weakness right side . PHYSICAL EXAM:      Vitals:     BP (!) 162/126   Pulse 73   Temp 98.9 °F (37.2 °C) (Axillary)   Resp 28   Ht 5' 8\" (1.727 m)   Wt 260 lb 8 oz (118.2 kg)   SpO2 97%   BMI 39.61 kg/m²     General Appearance:    Awake, alert ,in respiratory distress, on BiPAP    Head:    Normocephalic, atraumatic   Eyes:    No pallor, no icterus,   Ears:    No obvious deformity or drainage.    Nose:   No nasal drainage   Throat:   Mucosa moist, no oral thrush   Neck:   Supple, no lymphadenopathy   Lungs:     Bilateral  wheeze    Heart:    Regular rate and rhythm, no murmur   Abdomen:     Soft, LLQ tender , bowel sounds present    Extremities:    Right heel wound    Pulses:   Dorsalis pedis palpable - diminished    Skin:   Right foot  wound with VAC                CBC with Differential:      Lab Results   Component Value Date/Time    WBC 8.6 10/14/2022 04:13 AM    RBC 2.70 10/14/2022 04:13 AM    HGB 8.0 10/14/2022 04:13 AM    HCT 24.3 10/14/2022 04:13 AM    HCT 15.0 09/27/2022 08:58 PM     10/14/2022 04:13 AM    MCV 90.0 10/14/2022 04:13 AM    MCH 29.6 10/14/2022 04:13 AM    MCHC 32.9 10/14/2022 04:13 AM    RDW 15.7 10/14/2022 04:13 AM    NRBC 1.7 10/13/2022 04:08 AM    LYMPHOPCT 8.8 10/14/2022 04:13 AM    MONOPCT 10.2 10/14/2022 04:13 AM    BASOPCT 0.6 10/14/2022 04:13 AM    MONOSABS 0.88 10/14/2022 04:13 AM    LYMPHSABS 0.76 10/14/2022 04:13 AM    EOSABS 0.14 10/14/2022 04:13 AM    BASOSABS 0.05 10/14/2022 04:13 AM       CMP     Lab Results   Component Value Date/Time     10/14/2022 09:48 AM    K 3.7 10/14/2022 09:48 AM    K 4.7 10/10/2022 05:21 PM    CL 111 10/14/2022 09:48 AM    CO2 28 10/14/2022 09:48 AM    BUN 35 10/14/2022 09:48 AM    CREATININE 1.5 10/14/2022 09:48 AM    GFRAA 57 10/14/2022 09:48 AM    LABGLOM 47 10/14/2022 09:48 AM    GLUCOSE 212 10/14/2022 09:48 AM    PROT 5.2 10/14/2022 04:13 AM    LABALBU 2.5 10/14/2022 04:13 AM    CALCIUM 8.6 10/14/2022 09:48 AM    BILITOT 0.6 10/14/2022 04:13 AM    ALKPHOS 523 10/14/2022 04:13 AM     10/14/2022 04:13 AM    ALT 22 10/14/2022 04:13 AM         Hepatic Function Panel:    Lab Results   Component Value Date/Time    ALKPHOS 523 10/14/2022 04:13 AM    ALT 22 10/14/2022 04:13 AM     10/14/2022 04:13 AM    PROT 5.2 10/14/2022 04:13 AM    BILITOT 0.6 10/14/2022 04:13 AM    LABALBU 2.5 10/14/2022 04:13 AM       PT/INR:    Lab Results   Component Value Date/Time    PROTIME 15.6 10/13/2022 06:35 AM    INR 1.4 10/13/2022 06:35 AM        Pro-BNP 12,808 High  pg/mL   Magnesium [[id number]]        MICROBIOLOGY:    SARS CoV 2 neg     CRP 0.8    Pro BNP 2806  Procacitonin 0.36      Radiology :    CT abdomen and pelvis -  Impression:        Mixed densities throughout the colonic segments including in the ascending   colon and rectum could represent mixed densities of blood products although   no focal area of arterial enhancement or extravasation/blushing is observed   to localize acute hemorrhage. No evidence for perforation or abscess. No   pneumatosis intestinalis or portal venous gas evident. 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 No adjacent hematoma with only minimal   adjacent stranding. Bilateral pleural effusions moderate to large right and moderate left   effusions with adjacent atelectasis. Anasarca. X ray foot :           No evidence of calcaneal osteomyelitis.         IMPRESSION:     Right foot  non healing wound- cipro and zyvox stopped 10/12 - wound healing slowly   Leukocytosis - improved   ?  Aspiration       RECOMMENDATIONS:      Diuretics    Local wound care   Monitor off abx

## 2022-10-14 NOTE — PROGRESS NOTES
Date: 10/14/2022    Time: 1:50 PM    Patient Placed On BIPAP/CPAP/ Non-Invasive Ventilation? Yes    If no must comment. Facial area red/color change? Yes           If YES are Blister/Lesion present? No   If yes must notify nursing staff  BIPAP/CPAP skin barrier? Yes    Skin barrier type:mepilexlite       Comments:  Patient is developing redness on his nose.  Mepilexlite is still being used while on BiPAP     10/14/22 1348   NIV Type   Skin Assessment Clean, dry, & intact   Skin Protection for O2 Device Yes   Orientation Middle   Location Nose   Intervention(s) Skin Barrier   NIV Started/Stopped On   Equipment Type v60   Mode Bilevel   Mask Type Full face mask   Mask Size Medium   Settings/Measurements   PIP Observed 14 cm H20   IPAP 14 cmH20   CPAP/EPAP 6 cmH2O   Vt (Measured) 371 mL   Rate Ordered 12   Resp 17   FiO2  35 %   I Time/ I Time % 0.9 s   Minute Volume (L/min) 5.6 Liters   Mask Leak (lpm) 121 lpm   Comfort Level Fair   Using Accessory Muscles Yes   SpO2 94   Oxygen Therapy/Pulse Ox   Heart Rate 73   SpO2 95 %         Janay Caballero RCP

## 2022-10-14 NOTE — PROGRESS NOTES
Physical Therapy    Pt in need of PT re-evaluation. On hold this date d/t procedure. Will attempt back as able.       Marck Aguirre, PT, DPT  [id number]

## 2022-10-14 NOTE — PROGRESS NOTES
Comprehensive Nutrition Assessment    Type and Reason for Visit:  Reassess    Nutrition Recommendations/Plan:     Minimal nutrition x > 1 week. Advance PO diet post scope if medically feasible. IF unable to use GI tract initiate low dose TPN:  Rec 3-in-1 Central Standard (1000 ml tv @41.6 ml/hr) to provide 1035 kcals & 50 gm AA    Pt at very high risk for re-feeding syndrome. Monitor/ replace electrolytes prn  Will provide goal recommendations once medical plan is clear       Malnutrition Assessment:  Malnutrition Status: At risk for malnutrition (10/14/22 1227)    Context:  Acute Illness     Findings of the 6 clinical characteristics of malnutrition:  Energy Intake:  50% or less of estimated energy requirements for 5 or more days  Weight Loss:  Unable to assess (no wt hx on file)     Body Fat Loss:  No significant body fat loss     Muscle Mass Loss:  No significant muscle mass loss    Fluid Accumulation:  No significant fluid accumulation     Strength:  Not Performed    Nutrition Assessment:    Pt remains at nutritional risk d/t ongoing need for ICU care. Admit transferred from [facility] 2/2 CVA s/p IR angio/stent complicated by post-procedure hypotension resulting in re-intubation/ extubated 9/29. Pt now w/ developed GIB s/p EGD/ c-scope. Noted R diabetic foot ulcer w/ wound vac in place. PMHx COPD, CAD/ NSTEMI,  DM/PVD, previous stroke/ seizures, & Parkinson's. Current NPO status pending repeat c-scope. Minimal nutrition x > 1 week. Previous dysphagia on modified diet per SLP eval recs. Will monitor nutriton progression/ provide updated recs based on medical plan.     Nutrition Related Findings:    Pt disoriented, fluid bal WNL, +3 pitting edema, active BS, diarrhea, GIB, dysphagia, hypernatremia    Wound Type: Diabetic Ulcer, Wound Vac (R foot)       Current Nutrition Intake & Therapies:      Diet NPO Exceptions are: Sips of Water with Meds- minimal nutrition x > 1 week    Anthropometric Measures:  Height: 5' 8\" (172.7 cm)  Ideal Body Weight (IBW): 154 lbs (70 kg)    Admission Body Weight: 260 lb (117.9 kg) (bed 9/27)  Current Body Weight: 260 lb (117.9 kg) (9/27 actual, UTO updated wt bedscale broken), 168.8 % IBW  Current BMI (kg/m2): 39.5  Usual Body Weight:  (UTO UBW 2/2 poor EMR wt hx pta)  BMI Categories: Obese Class 2 (BMI 35.0 -39.9)    Estimated Daily Nutrient Needs:  Energy Requirements Based On: Formula  Weight Used for Energy Requirements: Current  Energy (kcal/day): MSJ 1942 x 1.2 SF= 4391-8107  Weight Used for Protein Requirements: Ideal  Protein (g/day): 1.5-1.8 g/kg IBW; 105-125, monitor renal fx  Fluid (ml/day): per critical care mgmt    Nutrition Diagnosis:   Inadequate oral intake related to altered GI function (GIB) as evidenced by NPO or clear liquid status due to medical condition    Nutrition Interventions:   Nutrition Education/Counseling: Education not indicated  Coordination of Nutrition Care: Continue to monitor while inpatient    Goals:  Previous Goal Met: No Progress toward Goal(s)  Specify Other Goals: Nutrition Progression    Nutrition Monitoring and Evaluation:   Food/Nutrient Intake Outcomes: Diet Advancement/Tolerance  Physical Signs/Symptoms Outcomes: Biochemical Data, Nutrition Focused Physical Findings, Skin, Weight, Chewing or Swallowing, Diarrhea, GI Status, Fluid Status or Edema, Hemodynamic Status    Discharge Planning:     Too soon to determine     Timothy SALVATORE Partida, LD  Contact: Ext [phone]

## 2022-10-14 NOTE — PROGRESS NOTES
[facility]  Nephrology Attending Progress Note  [place]. MD Judith        SUBJECTIVE:     History of Present Ilness:    Eleno Cushing is a 74 y.o. male history of CAD s/p MI, hypertension, chronic kidney disease stage IIIa (baseline creatinine of recent 1.7-1.9) he is followed by our group with Dr. David Leong. He initially was admitted to [facility] with right lower extremity wound. .  Patient subsequently developed aphasia noted to facial droop associated with right-sided weakness. He was transferred to [address] [place] [address] for further management. Patient was admitted told the neuro ICU. He required intubation with mechanical ventilation. CT of the head and neck demonstrated bilateral carotid stenosis with the left ICA being dominant. IR performed angiogram with angioplasty of the left ICA. Laboratory data showed progressive increase in his serum creatinine to currently 2.9 mg/dL. Hemoglobin was noted to be 5.6. He has received at least 2 units of packed cells but with further drop in his hemoglobin with requirement for additional transfusion. He had an episode of urinary retention with gross hematuria Mahan catheter was reinserted and the hematuria subsequently cleared. Renal is consulted for LARY. Subjective     10/1: he denies blurred vision, no headaches  10/2: No new acute issues from overnight; more alert; receiving PRBCs hemoglobin trending low  10/3: pt seen sp egd today, no cp or sob, family and nurse in room, no cp or sob  10/4: pt seen in room, no complaints  10/5: pt seen in icu, feels ok today, no cp or sob  10/6: pt seen in nsicu, no cp or sob, npo, wants to eat  10/7: pt seen in nsicu, no cp or sob, starting dysphagia diet  10/8: pt without complaint, eating better dysphagia  diet  10/9 : pt seen in room, no cp or sob, just got back from c scope, cliveggvania, on ivf  10/10: pt seen in room, sob, sbp in 80s, getting unit of prbc, wheezing, had breathing tx.  Rrt to be called for resp distress and low bp. Passing melena and clots, on brilinita, to go to IR for embiolization, frank RN at bedside  10/11 :pt transferred to icu for continued gi bleeding and hypotension. Diomedes Quill now being held and awaits IR for possible embolization  10/12: pt seen in icu, awaits colonoscopy Friday. Getting vit k and dapt held. 10/13: pt seen in icu. Awaiting transfer to [facility], awaits colonoscopy, on bipap and started bumex drip for sob. 10/14: pt seen in icu, for c scope today, not transferring to [facility].           PROBLEM LIST:    Patient Active Problem List   Diagnosis    Acute cerebrovascular accident (CVA) ([facility]    Acute respiratory failure with hypoxia (HCC)    Stenosis of left carotid artery    Hypoalbuminemia    Electrolyte imbalance    Hypernatremia    Anemia    GI bleeding        PAST MEDICAL HISTORY:    Past Medical History:   Diagnosis Date    Chronic back pain     CKD (chronic kidney disease)     CVA (cerebral vascular accident) ([facility] [address]     CVA (cerebral vascular accident) ([facility] [address]     DM (diabetes mellitus) ([facility] [address]     Major depression     MI (myocardial infarction) ([facility] [address]     MATT (obstructive sleep apnea)     Parkinson disease (HCC)     PVD (peripheral vascular disease) ([facility] [address]     Seizure ([address]        DIET:    Diet NPO Exceptions are: Sips of Water with Meds     PHYSICAL EXAM:     Patient Vitals for the past 24 hrs:   BP Temp Temp src Pulse Resp SpO2   10/14/22 0918 -- -- -- 72 20 98 %   10/14/22 0900 (!) 149/78 -- -- 70 28 97 %   10/14/22 0824 -- -- -- 69 21 97 %   10/14/22 0821 -- -- -- 69 19 96 %   10/14/22 0800 134/62 98.9 °F (37.2 °C) Axillary 70 27 96 %   10/14/22 0700 (!) 185/118 -- -- 73 17 97 %   10/14/22 0600 (!) 142/83 98.8 °F (37.1 °C) Temporal 74 17 96 %   10/14/22 0500 (!) 158/78 -- -- 76 25 91 %   10/14/22 0417 -- -- -- -- 18 --   10/14/22 0400 136/67 98.8 °F (37.1 °C) Temporal 74 23 98 %   10/14/22 0300 (!) 142/76 -- -- 75 19 97 %   10/14/22 0200 (!) 143/73 98.9 °F (37.2 °C) Temporal 77 22 96 % 10/14/22 0114 (!) 188/106 -- -- -- -- --   10/14/22 0100 (!) 188/106 -- -- 77 19 96 %   10/14/22 0000 (!) 208/97 98.2 °F (36.8 °C) Temporal 75 21 98 %   10/13/22 2359 -- -- -- -- 20 --   10/13/22 2300 (!) 191/104 -- -- 77 22 98 %   10/13/22 2200 (!) 181/92 98.8 °F (37.1 °C) Temporal 83 24 95 %   10/13/22 2159 (!) 181/92 98.8 °F (37.1 °C) -- 83 25 95 %   10/13/22 2157 (!) 192/80 98.8 °F (37.1 °C) -- 83 (!) 34 96 %   10/13/22 2100 (!) 175/76 -- -- 84 25 96 %   10/13/22 2041 (!) 152/107 98.8 °F (37.1 °C) Axillary 88 25 94 %   10/13/22 2026 (!) 190/82 98.8 °F (37.1 °C) -- 90 21 96 %   10/13/22 2000 (!) 169/63 98.8 °F (37.1 °C) Temporal 82 18 98 %   10/13/22 1700 (!) 149/50 -- -- 83 18 95 %   10/13/22 1600 (!) 169/60 99 °F (37.2 °C) Axillary 85 18 94 %   10/13/22 1400 (!) 160/73 -- -- 85 21 95 %   10/13/22 1300 (!) 192/106 -- -- (!) 105 26 93 %   10/13/22 1227 -- -- -- (!) 104 (!) 38 93 %   10/13/22 1200 (!) 135/104 98.5 °F (36.9 °C) -- (!) 102 29 93 %   10/13/22 1100 (!) 186/63 -- -- 94 25 91 %   10/13/22 1030 (!) 172/82 -- -- 96 22 96 %   @      Intake/Output Summary (Last 24 hours) at 10/14/2022 1009  Last data filed at 10/14/2022 0900  Gross per 24 hour   Intake 1972.22 ml   Output 7147 ml   Net -5174.78 ml         Wt Readings from Last 3 Encounters:   09/27/22 260 lb 8 oz (118.2 kg)       Constitutional:  Pt is in no acute distress  Head: normocephalic, atraumatic  Neck: no JVD  Cardiovascular: regular rate and rhythm, no murmurs, gallops, or rubs  Respiratory:  No rales, rhochi, or wheezes  Gastrointestinal:  Soft, nontender, nondistended, bowel sounds x 4  Ext: tr edema  Skin: dry, no rash  Neuro: aaox3    MEDS (scheduled):    [START ON 10/15/2022] clopidogrel  75 mg Oral Daily    QUEtiapine  50 mg Oral BID    sodium chloride flush  5-40 mL IntraVENous 2 times per day    miconazole nitrate   Topical BID    [Held by provider] gabapentin  600 mg Oral 4x daily    amLODIPine  10 mg Oral Daily    white petrolatum Topical BID    sodium chloride flush  5-40 mL IntraVENous 2 times per day    miconazole   Topical BID    [Held by provider] insulin glargine  32 Units SubCUTAneous QAM    epoetin sharath-epbx  6,000 Units SubCUTAneous Once per day on Mon Wed Fri    [Held by provider] cloNIDine  0.1 mg Oral Q12H    hydrALAZINE  50 mg Oral 3 times per day    [Held by provider] lactulose  20 g Oral BID    insulin lispro  0-16 Units SubCUTAneous 4x Daily AC & HS    HYDROcodone-acetaminophen  1 tablet Oral BID    pantoprazole (PROTONIX) 40 mg injection  40 mg IntraVENous Q12H    metoprolol succinate  25 mg Oral Daily    [Held by provider] sennosides  5 mL Oral Nightly    carbidopa-levodopa  1 tablet Per NG tube TID    ipratropium-albuterol  1 ampule Inhalation Q4H WA    collagenase   Topical Q MWF    benzonatate  100 mg Oral TID    cetirizine  10 mg Oral Daily    ezetimibe  10 mg Oral Daily    [Held by provider] ticagrelor  90 mg Oral BID    [Held by provider] aspirin  81 mg Oral Daily    Or    [Held by provider] aspirin  300 mg Rectal Daily    atorvastatin  80 mg Oral Nightly       MEDS (infusions):   dextrose Stopped (10/13/22 1138)    bumetanide 0.1 mg/mL infusion 0.5 mg/hr (10/14/22 0609)    dexmedetomidine (PRECEDEX) IV infusion 1.5 mcg/kg/hr (10/14/22 0921)    sodium chloride      sodium chloride      sodium chloride      sodium chloride      dextrose         MEDS (prn):  sodium chloride, sodium chloride flush, sodium chloride, sodium chloride, miconazole nitrate **AND** miconazole nitrate, sodium chloride flush, sodium chloride, labetalol, hydrALAZINE, glucose, dextrose bolus **OR** dextrose bolus, glucagon (rDNA), dextrose, ondansetron **OR** ondansetron    DATA:    Recent Labs     10/12/22  0411 10/12/22  1905 10/13/22  0408 10/13/22  1802 10/13/22  2236 10/14/22  0413   WBC 7.3  --  11.9*  --   --  8.6   HGB 7.2*   < > 7.5* 6.8* 8.0* 8.0*   HCT 22.2*   < > 23.4* 20.8* 24.2* 24.3*   MCV 92.9  --  92.5  --   --  90.0   PLT 99*  -- 111*  --   --  116*    < > = values in this interval not displayed. Recent Labs     10/12/22  0411 10/13/22  0408 10/13/22  1802 10/13/22  2236 10/14/22  0413   * 149* 150* 153* 149*   K 4.4 4.0 4.0 3.6 3.9   * 114* 115* 115* 112*   CO2 22 22 22 24 26   BUN 53* 43* 42* 42* 37*   CREATININE 1.7* 1.5* 1.7* 1.7* 1.5*   LABGLOM 41 47 41 41 47   GLUCOSE 139* 176* 251* 221* 212*   CALCIUM 8.3* 8.3* 8.7 8.7 8.3*   ALT <5 <5  --   --  22   AST 15 18  --   --  101*   BILITOT 0.3 0.5  --   --  0.6   ALKPHOS 160* 230*  --   --  523*   MG 1.8 1.6  --  1.7 1.8   PHOS 3.9 3.3  --   --  3.4       Lab Results   Component Value Date    LABALBU 2.5 (L) 10/14/2022    LABALBU 2.5 (L) 10/13/2022    LABALBU 2.2 (L) 10/12/2022     No results found for: TSH    Iron Studies  Lab Results   Component Value Date    IRON 24 (L) 10/03/2022    TIBC 195 (L) 10/03/2022    FERRITIN 94 10/03/2022     Vitamin B-12   Date Value Ref Range Status   10/07/2022 370 211 - 946 pg/mL Final     Folate   Date Value Ref Range Status   10/07/2022 6.3 4.8 - 24.2 ng/mL Final       No results found for: VITD25  No results found for: PTH    No components found for: URIC    No results found for: VOL, APPEARANCE, COLORU, LABSPEC, LABPH, LEUKBLD, NITRU, GLUCOSEU, KETUA, UROBILINOGEN, KETUA, UROBILINOGEN, BILIRUBINUR, OCBU    No results found for: LIPIDPAN      IMPRESSION/RECOMMENDATIONS:      1. Acute CVA  S/p IR intervention with angioplasty of the left ICA     2. LARY on CKD stage 3b  Baseline 1.7-1.9  combined effects of ACEI use and contrast nephrotoxicity  Component of urinary retention  nonoliguric at this time  Disproportionate elevation of BUN relative to Cr c/w gib  stool heme positive  Monitor labs and urine  3.4  L  Bun/cr  128/3 >115/2.6> 105/2.5>95/2.5> 85/2.2>84/2.2>69/2.1>2.1> 63/1.9> 53/1.7>37/1.5     3.  Hypertensive emergency  presenting  with acute CVA  Adjust meds to target blood pressure control to SBP less than 160  BP with improved control  Was low in setting of gib and now high again     4. Anemia, chronic with acute worsening  Informed stool heme positive  PRBC transfusion as needed  Sp egd/c scope  Trf < 7  On ASHLEY   To go for embolization if worsens  Repeat c scope today     5. Hyperkalemia  Due to combination of LARY and metabolic acidosis; PRBC transfusios  Lokelma  prn     6 Urinary retention  Suspected situational  Now with trejo  Urology following     7. Hypernatremia  Suspect intervascular volume depletion  Na 151>145>141>145>141>138>141>147>148>147>150>149  On d5     8. Met acidosis  Off  hco3 drip  Improving  Ckd/hypotension    9 .resp distress  Bumex drip at this time  On bipap prn  Uo 4.7 L   Overall -1.9L          Christie Emery.  Patria Jason MD

## 2022-10-14 NOTE — PROGRESS NOTES
Department of Podiatry   Progress Note      Patient seen and evaluated at bedside this morning. No acute events to report. MWF wound vac changes. Vac is intact running continuous pressure. Scheduled for change today. No new pedal complaints      Past Medical History:            Diagnosis Date    Chronic back pain     CKD (chronic kidney disease)     CVA (cerebral vascular accident) ([facility] [address]     CVA (cerebral vascular accident) ([facility] [address]     DM (diabetes mellitus) ([facility] [address]     Major depression     MI (myocardial infarction) ([facility] [address]     MATT (obstructive sleep apnea)     Parkinson disease ([facility] [address]     PVD (peripheral vascular disease) ([facility] [address]     Seizure ([facility] [address]        Past Surgical History:        Procedure Laterality Date    COLONOSCOPY N/A 10/9/2022    COLONOSCOPY DIAGNOSTIC performed by Norberto Waters MD at [place] [address]      IR CAROTID STENT UNI W PROTECTION  9/27/2022    IR CAROTID STENT UNI W PROTECTION 9/27/2022 [place] [facility] [place] [facility]    UPPER GASTROINTESTINAL ENDOSCOPY N/A 10/3/2022    EGD DIAGNOSTIC ONLY performed by Dayana Du MD at [facility] ENDOSCOPY       Medications Prior to Admission:    Medications Prior to Admission: apixaban (ELIQUIS) 5 MG TABS tablet, Take 5 mg by mouth 2 times daily  aspirin 81 MG chewable tablet, Take 81 mg by mouth daily  carbidopa-levodopa (SINEMET)  MG per tablet, Take 1 tablet by mouth 3 times daily  carvedilol (COREG) 25 MG tablet, Take 25 mg by mouth 2 times daily (with meals)  cetirizine (ZYRTEC) 10 MG tablet, Take 10 mg by mouth daily  clopidogrel (PLAVIX) 75 MG tablet, Take 75 mg by mouth daily  diphenhydrAMINE (BENADRYL) 25 MG capsule, Take 25 mg by mouth every 6 hours  ezetimibe (ZETIA) 10 MG tablet, Take 10 mg by mouth daily  fluticasone (FLONASE) 50 MCG/ACT nasal spray, 2 sprays by Each Nostril route daily  gabapentin (NEURONTIN) 600 MG tablet, Take 600 mg by mouth 4 times daily.   HYDROcodone-acetaminophen (NORCO) 5-325 MG per tablet, Take 1 tablet by mouth 2 times daily. Insulin Glargine, 2 Unit Dial, (TOUJEO MAX SOLOSTAR) 300 UNIT/ML SOPN, Inject 66 Units into the skin daily  piperacillin-tazobactam (ZOSYN) 3-0.375 GM per 50ML IVPB extended infusion, Infuse 4.5 mg intravenously in the morning and 4.5 mg at noon and 4.5 mg in the evening. acetaminophen (TYLENOL) 325 MG tablet, Take 650 mg by mouth every 6 hours as needed for Pain  amLODIPine (NORVASC) 5 MG tablet, Take 5 mg by mouth daily  benzonatate (TESSALON) 100 MG capsule, Take 100 mg by mouth 3 times daily  cloNIDine (CATAPRES) 0.1 MG tablet, Take 0.1 mg by mouth in the morning and 0.1 mg in the evening. hydrALAZINE (APRESOLINE) 100 MG tablet, Take 100 mg by mouth 3 times daily    Allergies:  Codeine    Social History:   TOBACCO:   has no history on file for tobacco use. ETOH:   has no history on file for alcohol use. DRUGS:   Social History     Substance and Sexual Activity   Drug Use Not on file       Family History:   History reviewed. No pertinent family history. REVIEW OF SYSTEMS:    All pertinent positives and negatives as noted in HPI       LOWER EXTREMITY EXAMINATION   Wound vac intact running continuous pressure    Previous Exam:  VASCULAR:  DP and PT pulses are non palpable. CFT < 5 seconds B/L. Warm to warm from the tibial tuberosity to the distal aspect of the digits dorsally. NEUROLOGIC:  Protective sensation is diminished but grossly intact    DERM:  Right foot lateral plantar wound measuring approx 6cm in diameter. No erythema, serosanguinous drainage, no malodor.     MUSCULOSKELETAL: deferred                 CONSULTS:  IP CONSULT TO CRITICAL CARE  IP CONSULT TO DIETITIAN  IP CONSULT TO CARDIOLOGY  IP CONSULT TO PODIATRY  IP CONSULT TO PODIATRY  IP CONSULT TO UROLOGY  IP CONSULT TO NEPHROLOGY  IP CONSULT TO GENERAL SURGERY  IP CONSULT TO INFECTIOUS DISEASES  IP CONSULT TO GI  IP CONSULT TO GENERAL SURGERY  IP CONSULT TO GENERAL SURGERY    MEDICATION:  Scheduled Meds:   QUEtiapine  50 mg Oral BID    sodium chloride flush  5-40 mL IntraVENous 2 times per day    miconazole nitrate   Topical BID    [Held by provider] gabapentin  600 mg Oral 4x daily    amLODIPine  10 mg Oral Daily    white petrolatum   Topical BID    sodium chloride flush  5-40 mL IntraVENous 2 times per day    miconazole   Topical BID    [Held by provider] insulin glargine  32 Units SubCUTAneous QAM    epoetin sharath-epbx  6,000 Units SubCUTAneous Once per day on Mon Wed Fri    [Held by provider] cloNIDine  0.1 mg Oral Q12H    hydrALAZINE  50 mg Oral 3 times per day    [Held by provider] lactulose  20 g Oral BID    insulin lispro  0-16 Units SubCUTAneous 4x Daily AC & HS    HYDROcodone-acetaminophen  1 tablet Oral BID    pantoprazole (PROTONIX) 40 mg injection  40 mg IntraVENous Q12H    metoprolol succinate  25 mg Oral Daily    [Held by provider] sennosides  5 mL Oral Nightly    carbidopa-levodopa  1 tablet Per NG tube TID    ipratropium-albuterol  1 ampule Inhalation Q4H WA    collagenase   Topical Q MWF    benzonatate  100 mg Oral TID    cetirizine  10 mg Oral Daily    ezetimibe  10 mg Oral Daily    [Held by provider] ticagrelor  90 mg Oral BID    [Held by provider] aspirin  81 mg Oral Daily    Or    [Held by provider] aspirin  300 mg Rectal Daily    atorvastatin  80 mg Oral Nightly     Continuous Infusions:   dextrose Stopped (10/13/22 1138)    bumetanide 0.1 mg/mL infusion 0.5 mg/hr (10/14/22 0609)    dexmedetomidine (PRECEDEX) IV infusion 1.5 mcg/kg/hr (10/14/22 2821)    sodium chloride      sodium chloride      sodium chloride      sodium chloride      dextrose       PRN Meds:.sodium chloride, sodium chloride flush, sodium chloride, sodium chloride, miconazole nitrate **AND** miconazole nitrate, sodium chloride flush, sodium chloride, labetalol, hydrALAZINE, glucose, dextrose bolus **OR** dextrose bolus, glucagon (rDNA), dextrose, ondansetron **OR** ondansetron    RADIOLOGY:  XR CHEST PORTABLE   Final Result   Stable bilateral pulmonary infiltrates. XR ABDOMEN (KUB) (SINGLE AP VIEW)   Final Result   Catheter is in the stomach. XR ABDOMEN FOR NG/OG/NE TUBE PLACEMENT   Final Result   1. Gastric catheter is coiled in the pharynx and upper esophagus and should   be withdrawn and reinserted. Follow-up imaging recommended. XR CHEST PORTABLE   Final Result   Bilateral airspace disease with interval progression on the right. NM GI BLOOD LOSS   Final Result   No evidence of active GI bleeding during acquisition. XR CHEST PORTABLE   Final Result   Unchanged bilateral chest opacities with right pleural effusion evident. See   above. XR CHEST PORTABLE   Final Result   1. Persistent bilateral patchy parenchymal densities concerning for pneumonia   and or atelectasis   2. Persistent small bilateral pleural effusions, left greater than right   3. Cardiomegaly, stable         XR CHEST PORTABLE   Final Result   No significant change compared to the prior exam with cardiomegaly, bilateral   airspace opacities and bilateral pleural effusions seen most suggestive of   congestive heart failure, but superimposed pneumonia not excluded. US DUP ABD PEL RETRO SCROT COMPLETE   Final Result   1. No evidence of testicular torsion      2. Severe scrotal skin thickening. 3.  Trace hydroceles. 4.  No evidence of bowel containing inguinal hernia. US SCROTUM AND TESTICLES   Final Result   1. No evidence of testicular torsion      2. Severe scrotal skin thickening. 3.  Trace hydroceles. 4.  No evidence of bowel containing inguinal hernia. XR CHEST PORTABLE   Final Result   1. No significant interval change in the appearance of the chest.      2.  Bilateral pleural effusions and areas of atelectasis or multifocal   pneumonia.          CTA ABDOMEN PELVIS W CONTRAST   Final Result   Mixed densities throughout the colonic segments including in the ascending   colon and rectum could represent mixed densities of blood products although   no focal area of arterial enhancement or extravasation/blushing is observed   to localize acute hemorrhage. No evidence for perforation or abscess. No   pneumatosis intestinalis or portal venous gas evident. 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 No adjacent hematoma with only minimal   adjacent stranding. Bilateral pleural effusions moderate to large right and moderate left   effusions with adjacent atelectasis. Anasarca. XR CHEST PORTABLE   Final Result   Bilateral pleural effusions with bibasilar patchy infiltrates and mild   cardiomegaly. NM GI BLOOD LOSS   Final Result   Active GI bleeding identified during acquisition in the splenic flexure with   peristalsis identified into the proximal descending colon. RECOMMENDATIONS:   Unavailable         CT ABDOMEN PELVIS WO CONTRAST   Final Result   CHEST:      No evidence of neoplastic disease, allowing for limitations of noncontrast   technique. Moderate bilateral pleural effusions. Nonemergent incidental findings as above. ABDOMEN/PELVIS:      No evidence of neoplastic disease, allowing for limitations of noncontrast   technique. Bladder wall thickening is nonspecific given under distension; recommend   correlation urinalysis to evaluate for UTI. Nonemergent incidental findings as above. CT CHEST WO CONTRAST   Final Result   CHEST:      No evidence of neoplastic disease, allowing for limitations of noncontrast   technique. Moderate bilateral pleural effusions. Nonemergent incidental findings as above. ABDOMEN/PELVIS:      No evidence of neoplastic disease, allowing for limitations of noncontrast   technique.       Bladder wall thickening is nonspecific given under distension; recommend   correlation urinalysis to evaluate for UTI. Nonemergent incidental findings as above. FL MODIFIED BARIUM SWALLOW W VIDEO   Final Result   1. Mildly impaired swallowing mechanism with swallowing delay and followed   by laryngeal penetration with thin liquid barium when using a straw. No   barium aspiration      2. Please see separate speech pathology report for full discussion of   findings and recommendations. RECOMMENDATIONS:   Unavailable         XR ABDOMEN (KUB) (SINGLE AP VIEW)   Final Result   Somewhat greater than average quantity of retained fecal material thin the   lower GI tract suggesting dysfunction with evacuation. XR CHEST PORTABLE   Final Result   1. Atherosclerotic disease and mild cardiomegaly. 2.  Persistent but improved opacities in the bilateral lungs most pronounced   in the mid and lower lungs. There appears to be a small left and trace right   pleural effusion         XR FOOT RIGHT (2 VIEWS)   Final Result   No evidence of calcaneal osteomyelitis. CT HEAD WO CONTRAST   Final Result   Parenchymal volume loss and chronic microvascular ischemic changes. No acute intracranial abnormality. Ethmoid and sphenoid sinusitis. Right mastoid effusion. US RETROPERITONEAL COMPLETE   Final Result   1. Normal appearance of the bilateral kidneys. No hydronephrosis. 2.  A Mahan catheter is decompressing the bladder. XR CHEST PORTABLE   Final Result   Worsening infiltrate and or atelectasis on the left, stable on the right with   suspected layering pleural effusion. XR CHEST PORTABLE   Final Result   Unchanged bilateral atelectasis and or infiltrate as well as small right   pleural effusion.          MRI BRAIN WO CONTRAST   Final Result   There are 2 small regions of petechial infarcts one in the left   posterior frontal lobe and a second in the left parietal lobe not exceeding 3 mm. There is otherwise extensive small vessel ischemic   changes         XR CHEST PORTABLE   Final Result   1. Multifocal bilateral airspace disease more prominent within the lower   lobes. The airspace disease is unchanged when compared with the prior study. CT HEAD WO CONTRAST   Final Result   Diffuse atrophy likely age related   Findings compatible with small vessel ischemic changes. XR CHEST PORTABLE   Final Result   1. Pulmonary opacities present favoring edema and atelectasis, also small   pleural effusions, but nonspecific with some additional considerations noted   above   2. Support devices present as described above   3. Heart size appears borderline enlarged         XR ABDOMEN FOR NG/OG/NE TUBE PLACEMENT   Final Result   NG/OG is in the stomach. IR CAROTID STENT W PROTECTION   Final Result   1. Angiogram demonstrates successful placement of a carotid stent ,   post procedure images demonstrate a widely patent stent and internal   carotid         CT HEAD WO CONTRAST   Final Result   Diffuse atrophy likely age related   Findings compatible with small vessel ischemic changes. Findings were called at the time of dictation         CT BRAIN PERFUSION   Final Result      No significant ischemic penumbra identified      This study was analyzed by the [employer]. ai algorithm. CTA NECK W CONTRAST   Final Result   1. Estimated stenosis of the proximal right and left internal carotid   artery by NASCET criteria is greater than 90% on the left   2. Severe atherosclerotic disease . 3. No large vessel occlusion identified            This study was analyzed by the [employer] ai algorithm. CTA HEAD W CONTRAST   Final Result   1. Estimated stenosis of the proximal right and left internal carotid   artery by NASCET criteria is greater than 90% on the left   2. Severe atherosclerotic disease . 3.  No large vessel occlusion identified            This study was analyzed by the [address] ai algorithm. XR CHEST PORTABLE    (Results Pending)       Vitals:    BP (!) 149/78   Pulse 72   Temp 98.9 °F (37.2 °C) (Axillary)   Resp 20   Ht 5' 8\" (1.727 m)   Wt 260 lb 8 oz (118.2 kg)   SpO2 98%   BMI 39.61 kg/m²     LABS:   Recent Labs     10/13/22  0408 10/13/22  1802 10/13/22  2236 10/14/22  0413   WBC 11.9*  --   --  8.6   HGB 7.5*   < > 8.0* 8.0*   HCT 23.4*   < > 24.2* 24.3*   *  --   --  116*    < > = values in this interval not displayed. Recent Labs     10/14/22  0413   *   K 3.9   *   CO2 26   PHOS 3.4   BUN 37*   CREATININE 1.5*     Recent Labs     10/12/22  1044 10/13/22  0408 10/13/22  0635 10/14/22  0413   PROT  --  5.4*  --  5.2*   INR 2.4  --  1.4  --    APTT 37.8*  --   --   --        ASSESSMENTS:   1. Right foot wound diabetic ulcer  2. DM  3. Pain right foot  Acute cerebrovascular accident (CVA) ([facility] [address]            PLAN:  -Patient examined and evaluated at bedside  -All pertinent labs, charts, and imaging reviewed prior to encounter   -Abx per ID  -Continue Wound vac changes to the right foot. MWF vac changes  -X rays: No acute osseous abnormalities to foot   -Will follow  -Patient discussed with Dr. rTae Quiroz      Thank you for the opportunity to take part in the patient's care. Please do not hesitate to call for any questions or concerns.

## 2022-10-14 NOTE — PROGRESS NOTES
GENERAL SURGERY  DAILY PROGRESS NOTE  10/14/2022    Subjective:  Not clear for colonoscopy this morning still having melena however he is planned for colonoscopy with GI. Objective:  BP (!) 142/83   Pulse 74   Temp 98.8 °F (37.1 °C) (Temporal)   Resp 17   Ht 5' 8\" (1.727 m)   Wt 260 lb 8 oz (118.2 kg)   SpO2 96%   BMI 39.61 kg/m²     Gen: Alert, following commands  HEENT: NCAT, anicteric,  CV: Intermittent tachycardia, hypertension  Pulm: On BiPAP  Abdomen: soft, nontender, nondistended  Extremities: left basilic midline, right lower extremity with wound VAC holding suction and splinted  Skin: warm and dry  Mahan catheter with clear yellow urine    Assessment/Plan:  74 y.o. male with acute blood loss anemia secondary to lower GI bleed in the setting of recent CVA with left ICA angioplasty and stent placement on dual antiplatelet therapy which is since been held. We will defer colonoscopy and GI bleed management to Dr. Tae Gao. No plans for acute surgical intervention, feel free to call with any questions. Plan discussed with Dr. Danitza Navarrete on a.m. rounds for questions after 6 AM please reach out to the surgical resident on-call.     Electronically signed by Sukh Esteves MD on 10/14/2022 at 7:46 AM

## 2022-10-14 NOTE — PROGRESS NOTES
Dr. Reynaldo Delatorre d/t pt not having clear stool for his colonoscopy that is scheduled for today.

## 2022-10-14 NOTE — PROGRESS NOTES
[address] [facility]   Department of Internal Medicine   MICU Progress Note    Patient:  Meghan Rodriguez 74 y.o. male   MRN: [id number]       Date of Service: 10/14/2022    Allergy: Codeine  CC strokelike symptoms  Subjective   10/13/2022  Alert oriented 2-3 confused, follows commands. Supplement oxygen on BiPAP with respiratory distress  INR 1.4  --> S/P vitamin K and FFP   Start clear liquid diet  Not on pressors, will start precedex  and Fentanyl for severe agitation,   -- will  start Seroquel   Agitated today, due to BIAP,  No bowel movement overnight   No temps  10/14/2022   Much improved on Bumex drip   Off BiPaP on NC   OK   Creatinine 1.5  BUN 35   Na 151  Will increase free water  po  Objective     TEMPERATURE:  Current - Temp: 97.1 °F (36.2 °C); Max - Temp  Av.7 °F (37.1 °C)  Min: 97.1 °F (36.2 °C)  Max: 99 °F (37.2 °C)    RESPIRATIONS RANGE: Resp  Av  Min: 16  Max: 34    PULSE RANGE: Pulse  Av.7  Min: 69  Max: 105    BLOOD PRESSURE RANGE:  Systolic (11CDN), PRA:421 , Min:134 , UQJ:540   ; Diastolic (07WUB), UPJ:54, Min:50, Max:126      PULSE OXIMETRY RANGE: SpO2  Av.9 %  Min: 91 %  Max: 98 %    I & O - 24hr:    Intake/Output Summary (Last 24 hours) at 10/14/2022 1228  Last data filed at 10/14/2022 1200  Gross per 24 hour   Intake 1972.22 ml   Output 8077 ml   Net -6104.78 ml     I/O last 3 completed shifts: In: 542.2 [I.V.:264.7; Blood:227.5; IV Piggyback:50]  Out: 554 [Urine:5702; NBTTM:1986] I/O this shift:  In: 1500 [NG/GT:1500]  Out:  [Urine:]   Weight change:     Physical Exam:  CONSTITUTIONAL: Awake, morbid obesity, alert to 1-2, intermittently confused, labored breathing.   EYES:  Lids and lashes normal, pupils equal, round and reactive to light, extra ocular muscles intact, sclera clear, conjunctiva normal  ENT:  Normocephalic, without obvious abnormality, atraumatic, sinuses nontender on palpation, noted mucous membrane dry, picking on skin of the mouth that noted to be bloody. NV ++  CARDIOVASCULAR:  Normal apical impulse, regular rate and rhythm, normal S1 and S2, no S3 or S4, and no murmur noted  Lung Diminished lung sounds throughout lung fields, fine crackles noted on the bases, no wheezing noted, labor breathing, no use of accessory muscle at this time, on supplement oxygen. MUSCULOSKELETAL: weakness in extremities, echo noted, 2+ pitting edema on lower extremities. All extremities,  NEUROLOGIC: Awake, alert oriented 2, weakness on the right upper and lower extremities due to stroke. SKIN: Right foot nonhealing wound.  2+ Edema sacrum & belly    Medications     Continuous Infusions:   dextrose Stopped (10/13/22 1138)    bumetanide 0.1 mg/mL infusion 0.5 mg/hr (10/14/22 0609)    dexmedetomidine (PRECEDEX) IV infusion 1.5 mcg/kg/hr (10/14/22 0921)    sodium chloride      sodium chloride      sodium chloride      sodium chloride      dextrose       Scheduled Meds:   [START ON 10/15/2022] clopidogrel  75 mg Oral Daily    QUEtiapine  50 mg Oral BID    sodium chloride flush  5-40 mL IntraVENous 2 times per day    miconazole nitrate   Topical BID    [Held by provider] gabapentin  600 mg Oral 4x daily    amLODIPine  10 mg Oral Daily    white petrolatum   Topical BID    sodium chloride flush  5-40 mL IntraVENous 2 times per day    miconazole   Topical BID    [Held by provider] insulin glargine  32 Units SubCUTAneous QAM    epoetin sharath-epbx  6,000 Units SubCUTAneous Once per day on Mon Wed Fri    [Held by provider] cloNIDine  0.1 mg Oral Q12H    hydrALAZINE  50 mg Oral 3 times per day    [Held by provider] lactulose  20 g Oral BID    insulin lispro  0-16 Units SubCUTAneous 4x Daily AC & HS    HYDROcodone-acetaminophen  1 tablet Oral BID    pantoprazole (PROTONIX) 40 mg injection  40 mg IntraVENous Q12H    metoprolol succinate  25 mg Oral Daily    [Held by provider] sennosides  5 mL Oral Nightly    carbidopa-levodopa  1 tablet Per NG tube TID    ipratropium-albuterol  1 ampule Inhalation Q4H WA    collagenase   Topical Q MWF    benzonatate  100 mg Oral TID    cetirizine  10 mg Oral Daily    ezetimibe  10 mg Oral Daily    [Held by provider] ticagrelor  90 mg Oral BID    [Held by provider] aspirin  81 mg Oral Daily    Or    [Held by provider] aspirin  300 mg Rectal Daily    atorvastatin  80 mg Oral Nightly     PRN Meds: sodium chloride, sodium chloride flush, sodium chloride, sodium chloride, miconazole nitrate **AND** miconazole nitrate, sodium chloride flush, sodium chloride, labetalol, hydrALAZINE, glucose, dextrose bolus **OR** dextrose bolus, glucagon (rDNA), dextrose, ondansetron **OR** ondansetron  Nutrition:   Clear liquid diet   Labs and Imaging Studies     CBC:   Recent Labs     10/12/22  0411 10/12/22  1905 10/13/22  0408 10/13/22  1802 10/13/22  2236 10/14/22  0413   WBC 7.3  --  11.9*  --   --  8.6   RBC 2.39*  --  2.53*  --   --  2.70*   HGB 7.2*   < > 7.5* 6.8* 8.0* 8.0*   HCT 22.2*   < > 23.4* 20.8* 24.2* 24.3*   MCV 92.9  --  92.5  --   --  90.0   MCH 30.1  --  29.6  --   --  29.6   MCHC 32.4  --  32.1  --   --  32.9   RDW 16.6*  --  16.2*  --   --  15.7*   PLT 99*  --  111*  --   --  116*   MPV 9.8  --  10.1  --   --  10.7    < > = values in this interval not displayed. BMP:    Recent Labs     10/12/22  0411 10/13/22  0408 10/13/22  1802 10/13/22  2236 10/14/22  0413 10/14/22  0948   * 149*   < > 153* 149* 151*   K 4.4 4.0   < > 3.6 3.9 3.7   * 114*   < > 115* 112* 111*   CO2 22 22   < > 24 26 28   BUN 53* 43*   < > 42* 37* 35*   CREATININE 1.7* 1.5*   < > 1.7* 1.5* 1.5*   GLUCOSE 139* 176*   < > 221* 212* 212*   CALCIUM 8.3* 8.3*   < > 8.7 8.3* 8.6   PROT 4.8* 5.4*  --   --  5.2*  --    LABALBU 2.2* 2.5*  --   --  2.5*  --    BILITOT 0.3 0.5  --   --  0.6  --    ALKPHOS 160* 230*  --   --  523*  --    AST 15 18  --   --  101*  --    ALT <5 <5  --   --  22  --     < > = values in this interval not displayed.        LIVER PROFILE:   Recent Labs 10/12/22  0411 10/13/22  0408 10/14/22  0413   AST 15 18 101*   ALT <5 <5 22   BILITOT 0.3 0.5 0.6   ALKPHOS 160* 230* 523*       PT/INR:   Recent Labs     10/12/22  0411 10/12/22  1044 10/13/22  0635   PROTIME 34.6* 25.9* 15.6*   INR 3.2 2.4 1.4       APTT:   Recent Labs     10/12/22  1044   APTT 37.8*       Fasting Lipid Panel:    No results found for: CHOL, TRIG, HDL    Cardiac Enzymes:    No results found for: CKTOTAL, CKMB, CKMBINDEX, TROPONINI    Notable Cultures:      Blood cultures No results found for: BC  Respiratory cultures No results found for: RESPCULTURE No results found for: LABGRAM  Urine No results found for: LABURIN  Legionella No results found for: LABLEGI  C Diff PCR No results found for: CDIFPCR  Wound culture/abscess: No results for input(s): WNDABS in the last 72 hours. Tip culture:No results for input(s): CXCATHTIP in the last 72 hours. Antibiotic  Days  Day started   Cipro     Linezolid                      Oxygen:     Vent Information  Ventilator ID: [id number]    Additional Respiratory Assessments  Heart Rate: 72  Resp: 16  SpO2: 95 %  Position: Semi-Sarabia's  Humidification Source: Heated wire  Humidification Temp: 36.2  Circuit Condensation: Drained     Nasal cannula L/min     Face mask %     Reservoirs mask %       ABG     PH  7.35   PCO2  37   PO2  99   HCO3  20   Sat%  95   FIO2     DATES 10/12/22       Lines:  Site  Day  Date inserted     TLC              PICC              Arterial line              Peripheral line              HD cath            Urinary Catheter 09/29/22 Mahan-Output (mL): 450 mL  [REMOVED] Urinary Catheter 09/27/22-Output (mL): 100 mL    Imaging Studies:  CXR  10/13 when compared to previous CXR 10/8-10/12 suggest & is consistent with CHF     Assessment and PLan   In summary,   74 y.o. male with significant past medical history of CKD, DM type II, MATT, presented 9/26/22 2 with stroke and GI bleed.  Patient was admitted to [facility] with strokelike symptoms, right-sided paralysis and aphasia, found to have severe left ICA stenosis and was transfer to [facility],  taken to IR for left ICA angioplasty with stent placement. Patient had GI Bleed, following general surgery, received total of 12 units of PRBC since admission. EGD on 10/3/22 showed no evidence of upper GI bleed, CT a/p showed showed no focal area of arterial enhancement or extra blush to localize acute hemorrhage, nuclear medicine scan was positive for bleeding around the splenic flexure. Colonoscopy showed full of clots. Transfer to MICU from PICU with low hgb, labor breathing and hypotensive. 10/13/2022  Alert oriented 2-3 confused, follows commands. Supplement oxygen on BiPAP with respiratory distress  INR 1.4  --> S/P vitamin K and FFP   Start clear liquid diet  Not on pressors, will start precedex  and Fentanyl for severe agitation,   -- will  start Seroquel   Agitated today, due to BIAP,  No bowel movement overnight   No temps  10/14/2022   Much improved on Bumex drip   Off BiPaP on NC   OK   Creatinine 1.5  BUN 35   Na 151  Will increase free water  po   Labs & Imager reviewed & replaced  Assessment:  CHF VS aspiration  but more consistent with CHF  LMCA  stroke 2/2 symptomatic LICA stenosis s/p IR angioplasty and stent placement   9/26/2022 on dual antiplatelet therapy- off now   Acute hypoxemic and hypercapnic respiratory failure intubated/extubated,   Off BiPAP ---> currently on supplemental oxygen  Acute  large GI bleed/acute blood loss anemia s/p 15 units of PRBC s/p EGD   and Colonoscopy, and bleeding scan. bleeding around the splenic flexure.    LARY   Metabolic acidosis   Hypernatremia/ hyperchloremia  Hyperglycemia  Leukocytosis  Right foot ulcer    Hx DM type 2  Hx of MATT on CPAP  Hx of parkinson disease  Hx of seizure  Hx of PVD  Hx of depression      Plan:  -  Despite of CKD & high creatinine , will start Bumex drip to avoid intubation, if possible     And also start Percedex & Fentanyl drip  - Currently on supplement oxygen, titrate to keep SPO2 goal above 92%  - bronchodilator eran and PRN  - Bipap qhs and PRN   - precedex gtt -- will start on Seroquel 25 BID    - keep MAP >65mmHg  - neurology consulted for stroke, on dual antiplatelet therapy- recommended Plavix once stable from bleeding standpoint until able to start dual therapy   - trend troponin, pro BNP  -Wound culture culture wound culture on foot showed Corynebacteria (Mixed morphologies, COVID 19 negative,   - ID following, on  Cipro and linezolid  - General surgery following for GI bleed, input appreciated  - s/p 15 units of PBRC  - hgb 7.1 today  - INR 1.4-- S/P vitamin K and  1 FFP   - h/h q6H  - transfuse if less than 7  - PPI BID  - EGD, colonoscopy and bleeding scan done, recommended IR for embolization- on dural antiplatelet therapy- high risk of bleeding- unable to reach family  - Stat nuclear bleeding scan negative for acute bleed   - will hold brilinta and Asprin for now given a bleed   - metabolic acidosis, spoke with nephrology, will start with bicarb gtt--d/c bicarb gtt  KVO   - trend Bmp  - Bumex 1 mg x 1, urine output 2.3L; I/o Since admission (+5.8L)   - nephrology,following ,input appreciated  - Podiatry and wound care following  - labs in AM  - F/E/N Karen Lewis /replace lytes/ tube feeds   - DVT/GI scds/ no anticoagulation given GI bleed/ Protonix BID  - Code status: full code        Jerrod Vidales MD., [facility]  Pulmonary & Critical Care Medicine    During multidisciplinary team rounds pt Ag Caldera, male , was seen, examined and discussed. This is confirmation that I have personally seen and examined the patient and that the key elements of the encounter were performed by me (> 85 % time) including HPI, social history, family history, ROS, and physical examination have been done by me. The medications & laboratory data and imagery was discussed and adjusted where necessary.  Key issues of the case were discussed among consultants. End organ damage assessment was performed on all organs and aggressive measures wee taken to prevent or improve them. This patient has a high probability of sudden clinically significant deterioration. I managed/supervised life or organ supporting interventions that required frequent physician assessment. I devoted my full attention to the direct care of this patient for the period of time indicated below. In addition to above, time was devoted to teaching and to any procedure. NOTE: This report, in part or full, may have been transcribed using voice recognition software. Every effort was made to ensure accuracy; however, inadvertent computerized transcription errors may be present. Please excuse any transcriptional grammatical or spelling errors that may have escaped my editorial review. Total critical care time caring for this patient with life threatening, unstable organ failure, including direct patient contact, management of life support systems, review of data including imaging and labs, discussions with other team members and physicians is at least 61 Min so far today, excluding procedures. Family is updated at the bedside as available. Key issues of the case were discussed among consultants.       Colie Cooks MD., [facility] [employer]  Pulmonary & Critical Care Medicine

## 2022-10-14 NOTE — PROGRESS NOTES
Hospitalist Progress Note      PCP: Shun Roca MD    Date of Admission: 9/27/2022  Days in the hospital: [facility] AND [facility] Course:   Patient is a 51-year-old male with history of CKD, diabetes mellitus, obstructive sleep apnea who initially presented to [facility] with strokelike symptoms and right-sided weakness along with aphasia. Patient was noted to have severe left ICA stenosis. He was transferred to [facility] and patient underwent left ICA angioplasty and stent placement by IR. He was initially admitted to the neuro ICU. He was placed on dual antiplatelet therapy with Brilinta and aspirin. Also was noted to have GI bleed and received 15 units of PRBC. EGD was done on 10/3/2022 and no evidence of upper GI bleed noted. Colonoscopy on 10/9/2022 showed large amount of blood. CT of the abdomen did not show any localization of hemorrhage. Nuclear medicine scan was done which showed bleeding around the splenic flexure. RRT was called due to hypotension and hemorrhagic shock and patient was transferred to ICU. Due to diffuse bleeding patient was taken off dual antiplatelet therapy. Plan remains for colonoscopy on Friday. He had acute respiratory distress on 10/13/2022 and had to be placed on Bumex for volume overload. Subjective  Patient seen and examined at bedside. Patient alert, appears more comfortable compared to yesterday. Chart reviewed, overnight events reviewed, scheduled for colonoscopy today. Currently on oxygen by nasal cannula. Exam:    BP (!) 149/78   Pulse 72   Temp 98.9 °F (37.2 °C) (Axillary)   Resp 20   Ht 5' 8\" (1.727 m)   Wt 260 lb 8 oz (118.2 kg)   SpO2 98%   BMI 39.61 kg/m²     HEENT: + Pallor, no icterus. Respiratory:  CTA, decreased air entry  Cardiovascular: RRR, no murmur. Abdomen: Soft, non-tender, BS noted. Musculoskeletal: Right foot dressing and wound VAC noted.    Neurologic: Alert, confused Assessment/Plan:  Acute CVA with left ICA stenosis s/p angioplasty and stent placement, dual antiplatelet therapy on hold, continue serial neurochecks, follow-up with neurology    Acute lower GI bleed s/p multiple units of PRBC transfusion. Bleeding noted from splenic flexure. To be scheduled for colonoscopy today, follow-up with GI     Acute respiratory failure/distress, follow-up with critical care team, chest x-ray reviewed    Acute blood loss anemia status post 15 unit blood transfusion, H&H remains low but stable, monitor need for further transfusions    Volume overload, currently on Bumex drip    Acute kidney injury, renal function slowly improving, nephrology following    Metabolic acidosis secondary to acute kidney injury, on bicarb drip, follow-up with nephrology    Right foot nonhealing wound, on Cipro and Zyvox per ID, continue with local wound care, follow-up with podiatry    Obstructive sleep apnea on CPAP    Altered mental status secondary to metabolic encephalopathy, continue to monitor closely     Overall prognosis remains guarded      Labs:   Recent Labs     10/12/22  0411 10/12/22  1905 10/13/22  0408 10/13/22  1802 10/13/22  2236 10/14/22  0413   WBC 7.3  --  11.9*  --   --  8.6   HGB 7.2*   < > 7.5* 6.8* 8.0* 8.0*   HCT 22.2*   < > 23.4* 20.8* 24.2* 24.3*   PLT 99*  --  111*  --   --  116*    < > = values in this interval not displayed.      Recent Labs     10/12/22  0411 10/13/22  0408 10/13/22  1802 10/13/22  2236 10/14/22  0413   * 149* 150* 153* 149*   K 4.4 4.0 4.0 3.6 3.9   * 114* 115* 115* 112*   CO2 22 22 22 24 26   BUN 53* 43* 42* 42* 37*   CREATININE 1.7* 1.5* 1.7* 1.7* 1.5*   CALCIUM 8.3* 8.3* 8.7 8.7 8.3*   PHOS 3.9 3.3  --   --  3.4     Recent Labs     10/12/22  0411 10/13/22  0408 10/14/22  0413   AST 15 18 101*   ALT <5 <5 22   BILITOT 0.3 0.5 0.6   ALKPHOS 160* 230* 523*     Recent Labs     10/12/22  0411 10/12/22  1044 10/13/22  0635   INR 3.2 2.4 1.4     No results for input(s): Cale Dumont in the last 72 hours.     Medications:  Reviewed    Infusion Medications    dextrose Stopped (10/13/22 1138)    bumetanide 0.1 mg/mL infusion 0.5 mg/hr (10/14/22 0609)    dexmedetomidine (PRECEDEX) IV infusion 1.5 mcg/kg/hr (10/14/22 0921)    sodium chloride      sodium chloride      sodium chloride      sodium chloride      dextrose       Scheduled Medications    [START ON 10/15/2022] clopidogrel  75 mg Oral Daily    QUEtiapine  50 mg Oral BID    sodium chloride flush  5-40 mL IntraVENous 2 times per day    miconazole nitrate   Topical BID    [Held by provider] gabapentin  600 mg Oral 4x daily    amLODIPine  10 mg Oral Daily    white petrolatum   Topical BID    sodium chloride flush  5-40 mL IntraVENous 2 times per day    miconazole   Topical BID    [Held by provider] insulin glargine  32 Units SubCUTAneous QAM    epoetin sharath-epbx  6,000 Units SubCUTAneous Once per day on Mon Wed Fri    [Held by provider] cloNIDine  0.1 mg Oral Q12H    hydrALAZINE  50 mg Oral 3 times per day    [Held by provider] lactulose  20 g Oral BID    insulin lispro  0-16 Units SubCUTAneous 4x Daily AC & HS    HYDROcodone-acetaminophen  1 tablet Oral BID    pantoprazole (PROTONIX) 40 mg injection  40 mg IntraVENous Q12H    metoprolol succinate  25 mg Oral Daily    [Held by provider] sennosides  5 mL Oral Nightly    carbidopa-levodopa  1 tablet Per NG tube TID    ipratropium-albuterol  1 ampule Inhalation Q4H WA    collagenase   Topical Q MWF    benzonatate  100 mg Oral TID    cetirizine  10 mg Oral Daily    ezetimibe  10 mg Oral Daily    [Held by provider] ticagrelor  90 mg Oral BID    [Held by provider] aspirin  81 mg Oral Daily    Or    [Held by provider] aspirin  300 mg Rectal Daily    atorvastatin  80 mg Oral Nightly     PRN Meds: sodium chloride, sodium chloride flush, sodium chloride, sodium chloride, miconazole nitrate **AND** miconazole nitrate, sodium chloride flush, sodium chloride, labetalol, hydrALAZINE, glucose, dextrose bolus **OR** dextrose bolus, glucagon (rDNA), dextrose, ondansetron **OR** ondansetron      Intake/Output Summary (Last 24 hours) at 10/14/2022 1034  Last data filed at 10/14/2022 1000  Gross per 24 hour   Intake 1972.22 ml   Output 7547 ml   Net -5574.78 ml     Body mass index is 39.61 kg/m². Diet  Diet NPO Exceptions are: Sips of Water with Meds    Code Status  Full Code       Electronically signed by Harpal Macdonald MD on 10/14/2022 at 10:34 AM  [employer]   Please contact me through perfect serve    NOTE: This report was transcribed using voice recognition software. Every effort was made to ensure accuracy; however, inadvertent computerized transcription errors may be present.

## 2022-10-14 NOTE — PROGRESS NOTES
[facility] ([facility])  Gastroenterology, Hepatology &  Advanced Endoscopy     Progress Note    SUBJECTIVE:      Patient remains on BiPAP. He has had no further bloody bowel movements but continues to require transfusion. He is reportedly altered at times. NG placed at bedside today. OBJECTIVE      Physical    VITALS:  BP (!) 152/107   Pulse 88   Temp 98.8 °F (37.1 °C) (Axillary)   Resp 25   Ht 5' 8\" (1.727 m)   Wt 260 lb 8 oz (118.2 kg)   SpO2 94%   BMI 39.61 kg/m²   Physical Exam:  General: Chronically ill-appearing, NAD  HEENT: PERRLA, EOMI, Anicteric sclera, dry membranes, BiPAP mask in place, NG in place. Cards: RRR, mild LE edema  Resp: Synchronous w/ BiPAP, good air movement, no use of accessory muscles, no audible wheezing  Abdomen: soft, NT, ND. Extremities: Moves all extremities, no effusions or bruising. Skin: No rashes or jaundice  Neuro: A&O x 3, CN grossly intact, non-focal exam     ASSESSMENT AND PLAN      67y/M w/ CKD, DMII, and MATT who presents w/ acute CVA now on DAPT and R foot infection s/p debridement who has been having issues with persistent anemia requiring several transfusions. Over the weekend, he showed active signs of bleeding with positive tagged RBC scan. EGD negative, Colonoscopy with BRB in R colon and no actively bleeding lesions. CTA w/ no active extravasation. DAPT has since been d/c. PLAN:  - S/p IV Vit K for elevated INR with adequate response. - Agree w/ holding DAPT despite known risk in the setting of active bleeding requiring transfusion.  - Continue to trend and transfuse to Hgb >7.   - Confirm NG placement with KUB. Place FMS. Begin 6L GoLytely bowel prep tonight until the point that stools are clear. - I will plan on Colonoscopy with TI intubation and evaluation tomorrow. - Further decision-making based on findings. I will follow. Thank you for including us in the care of this patient.  Please do not hesitate to contact us with any additional questions or concerns.      Nkechi Palm MD  Gastroenterology/Hepatology  Advanced Endoscopy

## 2022-10-14 NOTE — CARE COORDINATION
10/14:  Update CM Note: Per Dr. Soria Brothyesika cancel transfer to [facility]. Procedure being done here today.   Electronically signed by Josette Austin RN on 10/14/2022 at 9:10 AM

## 2022-10-15 ENCOUNTER — APPOINTMENT (OUTPATIENT)
Dept: GENERAL RADIOLOGY | Age: 67
DRG: 034 | End: 2022-10-15
Attending: INTERNAL MEDICINE
Payer: MEDICARE

## 2022-10-15 ENCOUNTER — ANESTHESIA (OUTPATIENT)
Dept: ENDOSCOPY | Age: 67
DRG: 034 | End: 2022-10-15
Payer: COMMERCIAL

## 2022-10-15 ENCOUNTER — ANESTHESIA EVENT (OUTPATIENT)
Dept: ENDOSCOPY | Age: 67
DRG: 034 | End: 2022-10-15
Payer: COMMERCIAL

## 2022-10-15 LAB
ALBUMIN SERPL-MCNC: 2.9 G/DL (ref 3.5–5.2)
ALP BLD-CCNC: 453 U/L (ref 40–129)
ALT SERPL-CCNC: 24 U/L (ref 0–40)
ANION GAP SERPL CALCULATED.3IONS-SCNC: 11 MMOL/L (ref 7–16)
ANION GAP SERPL CALCULATED.3IONS-SCNC: 13 MMOL/L (ref 7–16)
ANION GAP SERPL CALCULATED.3IONS-SCNC: 15 MMOL/L (ref 7–16)
AST SERPL-CCNC: 36 U/L (ref 0–39)
BASOPHILS ABSOLUTE: 0.03 E9/L (ref 0–0.2)
BASOPHILS RELATIVE PERCENT: 0.4 % (ref 0–2)
BILIRUB SERPL-MCNC: 0.6 MG/DL (ref 0–1.2)
BUN BLDV-MCNC: 26 MG/DL (ref 6–23)
BUN BLDV-MCNC: 28 MG/DL (ref 6–23)
BUN BLDV-MCNC: 28 MG/DL (ref 6–23)
CALCIUM IONIZED: 1.16 MMOL/L (ref 1.15–1.33)
CALCIUM SERPL-MCNC: 7.9 MG/DL (ref 8.6–10.2)
CALCIUM SERPL-MCNC: 8.1 MG/DL (ref 8.6–10.2)
CALCIUM SERPL-MCNC: 8.1 MG/DL (ref 8.6–10.2)
CHLORIDE BLD-SCNC: 103 MMOL/L (ref 98–107)
CHLORIDE BLD-SCNC: 104 MMOL/L (ref 98–107)
CHLORIDE BLD-SCNC: 107 MMOL/L (ref 98–107)
CO2: 29 MMOL/L (ref 22–29)
CO2: 32 MMOL/L (ref 22–29)
CO2: 33 MMOL/L (ref 22–29)
CREAT SERPL-MCNC: 1.4 MG/DL (ref 0.7–1.2)
EOSINOPHILS ABSOLUTE: 0.36 E9/L (ref 0.05–0.5)
EOSINOPHILS RELATIVE PERCENT: 4.6 % (ref 0–6)
GFR AFRICAN AMERICAN: >60
GFR NON-AFRICAN AMERICAN: 51 ML/MIN/1.73
GLUCOSE BLD-MCNC: 160 MG/DL (ref 74–99)
GLUCOSE BLD-MCNC: 204 MG/DL (ref 74–99)
GLUCOSE BLD-MCNC: 218 MG/DL (ref 74–99)
HCT VFR BLD CALC: 21.8 % (ref 37–54)
HCT VFR BLD CALC: 22.8 % (ref 37–54)
HCT VFR BLD CALC: 23.9 % (ref 37–54)
HEMOGLOBIN: 7.1 G/DL (ref 12.5–16.5)
HEMOGLOBIN: 7.5 G/DL (ref 12.5–16.5)
HEMOGLOBIN: 7.8 G/DL (ref 12.5–16.5)
IMMATURE GRANULOCYTES #: 0.14 E9/L
IMMATURE GRANULOCYTES %: 1.8 % (ref 0–5)
LYMPHOCYTES ABSOLUTE: 0.53 E9/L (ref 1.5–4)
LYMPHOCYTES RELATIVE PERCENT: 6.8 % (ref 20–42)
MAGNESIUM: 1.4 MG/DL (ref 1.6–2.6)
MAGNESIUM: 1.9 MG/DL (ref 1.6–2.6)
MCH RBC QN AUTO: 29.9 PG (ref 26–35)
MCHC RBC AUTO-ENTMCNC: 32.6 % (ref 32–34.5)
MCV RBC AUTO: 91.6 FL (ref 80–99.9)
METER GLUCOSE: 204 MG/DL (ref 74–99)
METER GLUCOSE: 237 MG/DL (ref 74–99)
METER GLUCOSE: 246 MG/DL (ref 74–99)
METER GLUCOSE: 324 MG/DL (ref 74–99)
METER GLUCOSE: 92 MG/DL (ref 74–99)
MONOCYTES ABSOLUTE: 0.65 E9/L (ref 0.1–0.95)
MONOCYTES RELATIVE PERCENT: 8.3 % (ref 2–12)
NEUTROPHILS ABSOLUTE: 6.08 E9/L (ref 1.8–7.3)
NEUTROPHILS RELATIVE PERCENT: 78.1 % (ref 43–80)
OVALOCYTES: ABNORMAL
PDW BLD-RTO: 15.4 FL (ref 11.5–15)
PHOSPHORUS: 3.6 MG/DL (ref 2.5–4.5)
PLATELET # BLD: 105 E9/L (ref 130–450)
PMV BLD AUTO: 10.5 FL (ref 7–12)
POIKILOCYTES: ABNORMAL
POLYCHROMASIA: ABNORMAL
POTASSIUM SERPL-SCNC: 3.2 MMOL/L (ref 3.5–5)
POTASSIUM SERPL-SCNC: 3.4 MMOL/L (ref 3.5–5)
POTASSIUM SERPL-SCNC: 3.9 MMOL/L (ref 3.5–5)
PRO-BNP: 9374 PG/ML (ref 0–125)
RBC # BLD: 2.61 E12/L (ref 3.8–5.8)
SCHISTOCYTES: ABNORMAL
SODIUM BLD-SCNC: 146 MMOL/L (ref 132–146)
SODIUM BLD-SCNC: 148 MMOL/L (ref 132–146)
SODIUM BLD-SCNC: 153 MMOL/L (ref 132–146)
TOTAL PROTEIN: 5.4 G/DL (ref 6.4–8.3)
WBC # BLD: 7.8 E9/L (ref 4.5–11.5)

## 2022-10-15 PROCEDURE — 85025 COMPLETE CBC W/AUTO DIFF WBC: CPT

## 2022-10-15 PROCEDURE — 6360000002 HC RX W HCPCS: Performed by: INTERNAL MEDICINE

## 2022-10-15 PROCEDURE — 2580000003 HC RX 258: Performed by: SURGERY

## 2022-10-15 PROCEDURE — 2500000003 HC RX 250 WO HCPCS: Performed by: INTERNAL MEDICINE

## 2022-10-15 PROCEDURE — 6370000000 HC RX 637 (ALT 250 FOR IP): Performed by: NURSE PRACTITIONER

## 2022-10-15 PROCEDURE — 80053 COMPREHEN METABOLIC PANEL: CPT

## 2022-10-15 PROCEDURE — 84100 ASSAY OF PHOSPHORUS: CPT

## 2022-10-15 PROCEDURE — 6370000000 HC RX 637 (ALT 250 FOR IP): Performed by: INTERNAL MEDICINE

## 2022-10-15 PROCEDURE — 83880 ASSAY OF NATRIURETIC PEPTIDE: CPT

## 2022-10-15 PROCEDURE — 6360000002 HC RX W HCPCS: Performed by: SURGERY

## 2022-10-15 PROCEDURE — 6370000000 HC RX 637 (ALT 250 FOR IP): Performed by: SURGERY

## 2022-10-15 PROCEDURE — 85018 HEMOGLOBIN: CPT

## 2022-10-15 PROCEDURE — 6360000002 HC RX W HCPCS

## 2022-10-15 PROCEDURE — 2000000000 HC ICU R&B

## 2022-10-15 PROCEDURE — 2500000003 HC RX 250 WO HCPCS: Performed by: SURGERY

## 2022-10-15 PROCEDURE — C9113 INJ PANTOPRAZOLE SODIUM, VIA: HCPCS | Performed by: SURGERY

## 2022-10-15 PROCEDURE — P9047 ALBUMIN (HUMAN), 25%, 50ML: HCPCS

## 2022-10-15 PROCEDURE — 2700000000 HC OXYGEN THERAPY PER DAY

## 2022-10-15 PROCEDURE — 94660 CPAP INITIATION&MGMT: CPT

## 2022-10-15 PROCEDURE — 83735 ASSAY OF MAGNESIUM: CPT

## 2022-10-15 PROCEDURE — 82330 ASSAY OF CALCIUM: CPT

## 2022-10-15 PROCEDURE — A4216 STERILE WATER/SALINE, 10 ML: HCPCS | Performed by: SURGERY

## 2022-10-15 PROCEDURE — 82962 GLUCOSE BLOOD TEST: CPT

## 2022-10-15 PROCEDURE — 6370000000 HC RX 637 (ALT 250 FOR IP): Performed by: STUDENT IN AN ORGANIZED HEALTH CARE EDUCATION/TRAINING PROGRAM

## 2022-10-15 PROCEDURE — 99233 SBSQ HOSP IP/OBS HIGH 50: CPT | Performed by: STUDENT IN AN ORGANIZED HEALTH CARE EDUCATION/TRAINING PROGRAM

## 2022-10-15 PROCEDURE — 2580000003 HC RX 258

## 2022-10-15 PROCEDURE — 85014 HEMATOCRIT: CPT

## 2022-10-15 PROCEDURE — 36415 COLL VENOUS BLD VENIPUNCTURE: CPT

## 2022-10-15 PROCEDURE — 80048 BASIC METABOLIC PNL TOTAL CA: CPT

## 2022-10-15 PROCEDURE — 2580000003 HC RX 258: Performed by: INTERNAL MEDICINE

## 2022-10-15 PROCEDURE — 94640 AIRWAY INHALATION TREATMENT: CPT

## 2022-10-15 PROCEDURE — 36592 COLLECT BLOOD FROM PICC: CPT

## 2022-10-15 PROCEDURE — 71045 X-RAY EXAM CHEST 1 VIEW: CPT

## 2022-10-15 RX ORDER — POTASSIUM CHLORIDE 29.8 MG/ML
40 INJECTION INTRAVENOUS ONCE
Status: COMPLETED | OUTPATIENT
Start: 2022-10-15 | End: 2022-10-15

## 2022-10-15 RX ORDER — MAGNESIUM SULFATE IN WATER 40 MG/ML
2000 INJECTION, SOLUTION INTRAVENOUS ONCE
Status: COMPLETED | OUTPATIENT
Start: 2022-10-15 | End: 2022-10-15

## 2022-10-15 RX ORDER — BUMETANIDE 0.25 MG/ML
2 INJECTION, SOLUTION INTRAMUSCULAR; INTRAVENOUS ONCE
Status: COMPLETED | OUTPATIENT
Start: 2022-10-15 | End: 2022-10-15

## 2022-10-15 RX ORDER — POTASSIUM CHLORIDE 20 MEQ/1
20 TABLET, EXTENDED RELEASE ORAL
Status: DISCONTINUED | OUTPATIENT
Start: 2022-10-15 | End: 2022-10-17

## 2022-10-15 RX ORDER — HALOPERIDOL 2 MG/1
2 TABLET ORAL 2 TIMES DAILY
Status: DISCONTINUED | OUTPATIENT
Start: 2022-10-15 | End: 2022-10-16

## 2022-10-15 RX ORDER — FENTANYL CITRATE 50 UG/ML
50 INJECTION, SOLUTION INTRAMUSCULAR; INTRAVENOUS ONCE
Status: COMPLETED | OUTPATIENT
Start: 2022-10-15 | End: 2022-10-15

## 2022-10-15 RX ORDER — POTASSIUM CHLORIDE 29.8 MG/ML
40 INJECTION INTRAVENOUS
Status: COMPLETED | OUTPATIENT
Start: 2022-10-15 | End: 2022-10-16

## 2022-10-15 RX ADMIN — MICONAZOLE NITRATE: 2 OINTMENT TOPICAL at 08:24

## 2022-10-15 RX ADMIN — ALBUMIN (HUMAN) 25 G: 12.5 SOLUTION INTRAVENOUS at 12:17

## 2022-10-15 RX ADMIN — ANTI-FUNGAL POWDER MICONAZOLE NITRATE TALC FREE: 1.42 POWDER TOPICAL at 08:24

## 2022-10-15 RX ADMIN — IPRATROPIUM BROMIDE AND ALBUTEROL SULFATE 1 AMPULE: .5; 2.5 SOLUTION RESPIRATORY (INHALATION) at 17:07

## 2022-10-15 RX ADMIN — BUMETANIDE 0.5 MG/HR: 0.25 INJECTION, SOLUTION INTRAMUSCULAR; INTRAVENOUS at 00:19

## 2022-10-15 RX ADMIN — HYDROCODONE BITARTRATE AND ACETAMINOPHEN 1 TABLET: 7.5; 325 TABLET ORAL at 08:23

## 2022-10-15 RX ADMIN — POTASSIUM CHLORIDE 40 MEQ: 29.8 INJECTION, SOLUTION INTRAVENOUS at 01:24

## 2022-10-15 RX ADMIN — INSULIN LISPRO 4 UNITS: 100 INJECTION, SOLUTION INTRAVENOUS; SUBCUTANEOUS at 05:53

## 2022-10-15 RX ADMIN — INSULIN LISPRO 12 UNITS: 100 INJECTION, SOLUTION INTRAVENOUS; SUBCUTANEOUS at 16:54

## 2022-10-15 RX ADMIN — PETROLATUM: 420 OINTMENT TOPICAL at 20:50

## 2022-10-15 RX ADMIN — DEXMEDETOMIDINE 1.5 MCG/KG/HR: 100 INJECTION, SOLUTION, CONCENTRATE INTRAVENOUS at 21:28

## 2022-10-15 RX ADMIN — DEXMEDETOMIDINE 1.5 MCG/KG/HR: 100 INJECTION, SOLUTION, CONCENTRATE INTRAVENOUS at 03:18

## 2022-10-15 RX ADMIN — POTASSIUM CHLORIDE 40 MEQ: 29.8 INJECTION, SOLUTION INTRAVENOUS at 20:22

## 2022-10-15 RX ADMIN — LABETALOL HYDROCHLORIDE 10 MG: 5 INJECTION, SOLUTION INTRAVENOUS at 20:58

## 2022-10-15 RX ADMIN — DEXMEDETOMIDINE 1.5 MCG/KG/HR: 100 INJECTION, SOLUTION, CONCENTRATE INTRAVENOUS at 15:40

## 2022-10-15 RX ADMIN — FENTANYL CITRATE 50 MCG: 50 INJECTION, SOLUTION INTRAMUSCULAR; INTRAVENOUS at 03:16

## 2022-10-15 RX ADMIN — MAGNESIUM SULFATE HEPTAHYDRATE 2000 MG: 40 INJECTION, SOLUTION INTRAVENOUS at 05:51

## 2022-10-15 RX ADMIN — SODIUM CHLORIDE, PRESERVATIVE FREE 40 MG: 5 INJECTION INTRAVENOUS at 01:39

## 2022-10-15 RX ADMIN — QUETIAPINE 50 MG: 25 TABLET ORAL at 20:31

## 2022-10-15 RX ADMIN — MICONAZOLE NITRATE: 2 OINTMENT TOPICAL at 20:51

## 2022-10-15 RX ADMIN — ANTI-FUNGAL POWDER MICONAZOLE NITRATE TALC FREE: 1.42 POWDER TOPICAL at 20:50

## 2022-10-15 RX ADMIN — AMLODIPINE BESYLATE 10 MG: 10 TABLET ORAL at 08:23

## 2022-10-15 RX ADMIN — MICONAZOLE NITRATE: 2 OINTMENT TOPICAL at 20:50

## 2022-10-15 RX ADMIN — MAGNESIUM SULFATE HEPTAHYDRATE 2000 MG: 40 INJECTION, SOLUTION INTRAVENOUS at 10:11

## 2022-10-15 RX ADMIN — DEXMEDETOMIDINE 1.5 MCG/KG/HR: 100 INJECTION, SOLUTION, CONCENTRATE INTRAVENOUS at 09:21

## 2022-10-15 RX ADMIN — IPRATROPIUM BROMIDE AND ALBUTEROL SULFATE 1 AMPULE: .5; 2.5 SOLUTION RESPIRATORY (INHALATION) at 07:33

## 2022-10-15 RX ADMIN — IPRATROPIUM BROMIDE AND ALBUTEROL SULFATE 1 AMPULE: .5; 2.5 SOLUTION RESPIRATORY (INHALATION) at 11:37

## 2022-10-15 RX ADMIN — LABETALOL HYDROCHLORIDE 10 MG: 5 INJECTION, SOLUTION INTRAVENOUS at 09:22

## 2022-10-15 RX ADMIN — ALBUMIN (HUMAN) 25 G: 12.5 SOLUTION INTRAVENOUS at 03:22

## 2022-10-15 RX ADMIN — INSULIN LISPRO 4 UNITS: 100 INJECTION, SOLUTION INTRAVENOUS; SUBCUTANEOUS at 11:16

## 2022-10-15 RX ADMIN — POTASSIUM CHLORIDE 40 MEQ: 29.8 INJECTION, SOLUTION INTRAVENOUS at 22:10

## 2022-10-15 RX ADMIN — POLYETHYLENE GLYCOL-3350 AND ELECTROLYTES 6000 ML: 236; 6.74; 5.86; 2.97; 22.74 POWDER, FOR SOLUTION ORAL at 14:35

## 2022-10-15 RX ADMIN — CARBIDOPA AND LEVODOPA 1 TABLET: 25; 100 TABLET ORAL at 08:23

## 2022-10-15 RX ADMIN — DEXTROSE MONOHYDRATE: 50 INJECTION, SOLUTION INTRAVENOUS at 20:30

## 2022-10-15 RX ADMIN — HYDRALAZINE HYDROCHLORIDE 10 MG: 20 INJECTION INTRAMUSCULAR; INTRAVENOUS at 02:08

## 2022-10-15 RX ADMIN — QUETIAPINE 50 MG: 25 TABLET ORAL at 08:23

## 2022-10-15 RX ADMIN — IPRATROPIUM BROMIDE AND ALBUTEROL SULFATE 1 AMPULE: .5; 2.5 SOLUTION RESPIRATORY (INHALATION) at 23:13

## 2022-10-15 RX ADMIN — HALOPERIDOL 2 MG: 2 TABLET ORAL at 20:31

## 2022-10-15 RX ADMIN — HYDRALAZINE HYDROCHLORIDE 10 MG: 20 INJECTION INTRAMUSCULAR; INTRAVENOUS at 04:42

## 2022-10-15 RX ADMIN — DEXTROSE MONOHYDRATE: 50 INJECTION, SOLUTION INTRAVENOUS at 01:22

## 2022-10-15 RX ADMIN — Medication 10 ML: at 08:26

## 2022-10-15 RX ADMIN — CETIRIZINE HYDROCHLORIDE 10 MG: 10 TABLET, FILM COATED ORAL at 08:29

## 2022-10-15 RX ADMIN — SODIUM CHLORIDE, PRESERVATIVE FREE 40 MG: 5 INJECTION INTRAVENOUS at 15:51

## 2022-10-15 RX ADMIN — PETROLATUM: 420 OINTMENT TOPICAL at 08:25

## 2022-10-15 RX ADMIN — CARBIDOPA AND LEVODOPA 1 TABLET: 25; 100 TABLET ORAL at 20:31

## 2022-10-15 RX ADMIN — BUMETANIDE 2 MG: 0.25 INJECTION INTRAMUSCULAR; INTRAVENOUS at 10:09

## 2022-10-15 RX ADMIN — HYDRALAZINE HYDROCHLORIDE 50 MG: 50 TABLET, FILM COATED ORAL at 22:10

## 2022-10-15 RX ADMIN — LABETALOL HYDROCHLORIDE 10 MG: 5 INJECTION, SOLUTION INTRAVENOUS at 15:51

## 2022-10-15 RX ADMIN — LABETALOL HYDROCHLORIDE 10 MG: 5 INJECTION, SOLUTION INTRAVENOUS at 06:35

## 2022-10-15 RX ADMIN — LABETALOL HYDROCHLORIDE 10 MG: 5 INJECTION, SOLUTION INTRAVENOUS at 14:48

## 2022-10-15 RX ADMIN — LABETALOL HYDROCHLORIDE 10 MG: 5 INJECTION, SOLUTION INTRAVENOUS at 11:03

## 2022-10-15 RX ADMIN — ATORVASTATIN CALCIUM 80 MG: 40 TABLET, FILM COATED ORAL at 20:31

## 2022-10-15 RX ADMIN — DEXTROSE MONOHYDRATE: 50 INJECTION, SOLUTION INTRAVENOUS at 10:13

## 2022-10-15 RX ADMIN — EZETIMIBE 10 MG: 10 TABLET ORAL at 08:29

## 2022-10-15 RX ADMIN — LABETALOL HYDROCHLORIDE 10 MG: 5 INJECTION, SOLUTION INTRAVENOUS at 05:33

## 2022-10-15 RX ADMIN — HALOPERIDOL 2 MG: 2 TABLET ORAL at 11:08

## 2022-10-15 RX ADMIN — HYDRALAZINE HYDROCHLORIDE 10 MG: 20 INJECTION INTRAMUSCULAR; INTRAVENOUS at 16:44

## 2022-10-15 ASSESSMENT — PAIN DESCRIPTION - LOCATION: LOCATION: HAND;ARM

## 2022-10-15 ASSESSMENT — PAIN SCALES - GENERAL
PAINLEVEL_OUTOF10: 0
PAINLEVEL_OUTOF10: 3
PAINLEVEL_OUTOF10: 9
PAINLEVEL_OUTOF10: 3
PAINLEVEL_OUTOF10: 4
PAINLEVEL_OUTOF10: 0

## 2022-10-15 ASSESSMENT — PAIN SCALES - WONG BAKER
WONGBAKER_NUMERICALRESPONSE: 0

## 2022-10-15 ASSESSMENT — PAIN DESCRIPTION - ORIENTATION: ORIENTATION: RIGHT

## 2022-10-15 ASSESSMENT — PAIN DESCRIPTION - DESCRIPTORS: DESCRIPTORS: ACHING;CRAMPING

## 2022-10-15 ASSESSMENT — LIFESTYLE VARIABLES: SMOKING_STATUS: 0

## 2022-10-15 NOTE — PROGRESS NOTES
Department of Internal Medicine  Infectious Diseases   Progress Note      C/C :  Right foot  wound infection , resp failure     All above noted   Reports SOB   Afebrile       Current Facility-Administered Medications   Medication Dose Route Frequency Provider Last Rate Last Admin    polyethylene glycol (GoLYTELY) solution 6,000 mL  6,000 mL Per NG tube Once Nichelle Murphy MD        clopidogrel (PLAVIX) tablet 75 mg  75 mg Oral Daily Diana Taylor MD        albumin human 25 % IV solution 25 g  25 g IntraVENous Q8H CONSTANTINE Brown CNP   Stopped at 10/15/22 0422    dextrose 5 % solution   IntraVENous Continuous CONSTANTINE Brown  mL/hr at 10/15/22 0623 Rate Verify at 10/15/22 0623    bumetanide (BUMEX) 12.5 mg in sodium chloride 0.9 % 125 mL infusion  0.5 mg/hr IntraVENous Continuous Diana Taylor MD 5 mL/hr at 10/15/22 0623 0.5 mg/hr at 10/15/22 0623    dexmedetomidine (PRECEDEX) 1,000 mcg in sodium chloride 0.9 % 250 mL infusion  0.1-1.5 mcg/kg/hr IntraVENous Continuous Diana Taylor MD 44.33 mL/hr at 10/15/22 0623 1.5 mcg/kg/hr at 10/15/22 0623    0.9 % sodium chloride infusion   IntraVENous PRN CONSTANTINE Brown CNP        QUEtiapine (SEROQUEL) tablet 50 mg  50 mg Oral BID CONSTANTINE Thomas CNP   50 mg at 10/15/22 7954    sodium chloride flush 0.9 % injection 5-40 mL  5-40 mL IntraVENous 2 times per day Nichelle Murphy MD   10 mL at 10/14/22 2000    sodium chloride flush 0.9 % injection 5-40 mL  5-40 mL IntraVENous PRN Nichelle Murphy MD        0.9 % sodium chloride infusion  25 mL IntraVENous PRN Nichelle Murphy MD        0.9 % sodium chloride infusion   IntraVENous PRN CONSTANTINE Thomas CNP        miconazole nitrate 2 % ointment   Topical BID [employer], DO   Given at 10/15/22 3546    And    miconazole nitrate 2 % ointment   Topical TID PRN [employer], DO   Given at 10/14/22 2000    [Held by provider] gabapentin (NEURONTIN) capsule 600 mg  600 mg Oral 4x daily Sunny C Zachariah Alfaro MD   600 mg at 10/10/22 2102    amLODIPine (NORVASC) tablet 10 mg  10 mg Oral Daily CONSTANTINE Patel CNP   10 mg at 10/15/22 7949    white petrolatum ointment   Topical BID Daniela Dole, DO   Given at 10/15/22 0825    sodium chloride flush 0.9 % injection 5-40 mL  5-40 mL IntraVENous 2 times per day Lolly Skiff, MD   10 mL at 10/15/22 0826    sodium chloride flush 0.9 % injection 5-40 mL  5-40 mL IntraVENous PRN Lolly Skiff, MD        0.9 % sodium chloride infusion   IntraVENous PRN Lolly Skiff, MD        miconazole (MICOTIN) 2 % powder   Topical BID Danieal Dole, DO   Given at 10/15/22 1144    [Held by provider] insulin glargine-yfgn (SEMGLEE-YFGN) injection vial 32 Units  32 Units SubCUTAneous QAM Daniela Dole, DO   32 Units at 10/07/22 0911    epoetin sharath-epbx (RETACRIT) injection 6,000 Units  6,000 Units SubCUTAneous Once per day on Mon Wed Fri Lolly Skiff, MD   6,000 Units at 10/14/22 0757    [Held by provider] cloNIDine (CATAPRES) tablet 0.1 mg  0.1 mg Oral Q12H CONSTANTINE Richmond - CNP   0.1 mg at 10/08/22 0030    hydrALAZINE (APRESOLINE) tablet 50 mg  50 mg Oral 3 times per day CONSTANTINE Richmond - CNP   50 mg at 10/08/22 8092    [Held by provider] lactulose (CHRONULAC) 10 GM/15ML solution 20 g  20 g Oral BID Lolly Skiff, MD   20 g at 10/09/22 2300    insulin lispro (HUMALOG) injection vial 0-16 Units  0-16 Units SubCUTAneous 4x Daily AC & HS Lolly Skiff, MD   4 Units at 10/15/22 0553    HYDROcodone-acetaminophen ([facility]) 7.5-325 MG per tablet 1 tablet  1 tablet Oral BID Lolly Skiff, MD   1 tablet at 10/15/22 0823    pantoprazole (PROTONIX) 40 mg in sodium chloride (PF) 10 mL injection  40 mg IntraVENous Q12H Lolly Skiff, MD   40 mg at 10/15/22 0139    metoprolol succinate (TOPROL XL) extended release tablet 25 mg  25 mg Oral Daily Lolly Skiff, MD   25 mg at 10/12/22 0748    [Held by provider] sennosides (SENOKOT) 8.8 MG/5ML syrup 5 mL  5 mL Oral Nightly Timi Babin MD   5 mL at 10/09/22 2300    labetalol (NORMODYNE;TRANDATE) injection 10 mg  10 mg IntraVENous Q30 Min PRN Timi Babin MD   10 mg at 10/15/22 5743    hydrALAZINE (APRESOLINE) injection 10 mg  10 mg IntraVENous Q30 Min PRN Timi Babin MD   10 mg at 10/15/22 0442    glucose chewable tablet 16 g  4 tablet Oral PRN Timi Babin MD        dextrose bolus 10% 125 mL  125 mL IntraVENous PRN Timi Babin MD        Or    dextrose bolus 10% 250 mL  250 mL IntraVENous PRN Timi Babin MD        glucagon (rDNA) injection 1 mg  1 mg SubCUTAneous PRN Timi Babin MD        dextrose 10 % infusion   IntraVENous Continuous PRN Timi Babin MD        carbidopa-levodopa (SINEMET)  MG per tablet 1 tablet  1 tablet Per NG tube TID Timi Babin MD   1 tablet at 10/15/22 0823    ipratropium-albuterol (DUONEB) nebulizer solution 1 ampule  1 ampule Inhalation Q4H WA Timi Babin MD   1 ampule at 10/15/22 7326    collagenase ointment   Topical Q MWF Timi Babin MD   Given at 10/14/22 1200    benzonatate (TESSALON) capsule 100 mg  100 mg Oral TID Timi Babin MD   100 mg at 10/12/22 2006    cetirizine (ZYRTEC) tablet 10 mg  10 mg Oral Daily Timi Babin MD   10 mg at 10/12/22 0750    ezetimibe (ZETIA) tablet 10 mg  10 mg Oral Daily Timi Babin MD   10 mg at 10/12/22 0753    [Held by provider] ticagrelor (BRILINTA) tablet 90 mg  90 mg Oral BID Timi Babin MD   90 mg at 10/09/22 2300    ondansetron (ZOFRAN-ODT) disintegrating tablet 4 mg  4 mg Oral Q8H PRN Timi Babin MD        Or    ondansetron (ZOFRAN) injection 4 mg  4 mg IntraVENous Q6H PRN Timi Babin MD        [Held by provider] aspirin EC tablet 81 mg  81 mg Oral Daily Timi Babin MD   81 mg at 10/09/22 2557    Or    [Held by provider] aspirin suppository 300 mg  300 mg Rectal Daily Timi Babin MD   300 mg at 09/29/22 0809    atorvastatin (LIPITOR) tablet 80 mg  80 mg Oral Nightly Timi Babin MD   80 mg at 10/12/22 2004 REVIEW OF SYSTEMS:   CONSTITUTIONAL:  Denies fever, chill or rigors. HEENT: denies blurring of vision or double vision, denies hearing problem  RESPIRATORY: denies cough, shortness of breath,  CARDIOVASCULAR:  Denies palpitation  GASTROINTESTINAL:  Denies abdomen pain, diarrhea or constipation. GENITOURINARY:  Denies burning urination or frequency of urination  INTEGUMENT: Right heel wound  HEMATOLOGIC/LYMPHATIC:  Denies lymph node swelling, gum bleeding or easy bruising. MUSCULOSKELETAL:  Denies leg pain , joint pain , joint swelling  NEUROLOGICAL:  weakness right side . PHYSICAL EXAM:      Vitals:     BP (!) 185/91   Pulse 79   Temp 98 °F (36.7 °C) (Temporal)   Resp 20   Ht 5' 8\" (1.727 m)   Wt 260 lb 8 oz (118.2 kg)   SpO2 96%   BMI 39.61 kg/m²     General Appearance:    Awake, alert ,in respiratory distress, on Opti flow    Head:    Normocephalic, atraumatic   Eyes:    No pallor, no icterus,   Ears:    No obvious deformity or drainage.    Nose:   No nasal drainage   Throat:   Mucosa moist, no oral thrush   Neck:   Supple, no lymphadenopathy   Lungs:     Bilateral  wheeze    Heart:    Regular rate and rhythm, no murmur   Abdomen:     Soft, LLQ tender , bowel sounds present    Extremities:    Right heel wound with VAC    Pulses:   Dorsalis pedis palpable - diminished    Skin:   Right foot  wound with VAC                CBC with Differential:      Lab Results   Component Value Date/Time    WBC 7.8 10/15/2022 04:00 AM    RBC 2.61 10/15/2022 04:00 AM    HGB 7.8 10/15/2022 04:00 AM    HCT 23.9 10/15/2022 04:00 AM    HCT 15.0 09/27/2022 08:58 PM     10/15/2022 04:00 AM    MCV 91.6 10/15/2022 04:00 AM    MCH 29.9 10/15/2022 04:00 AM    MCHC 32.6 10/15/2022 04:00 AM    RDW 15.4 10/15/2022 04:00 AM    NRBC 1.7 10/13/2022 04:08 AM    LYMPHOPCT 6.8 10/15/2022 04:00 AM    MONOPCT 8.3 10/15/2022 04:00 AM    BASOPCT 0.4 10/15/2022 04:00 AM    MONOSABS 0.65 10/15/2022 04:00 AM    LYMPHSABS 0.53 10/15/2022 04:00 AM    EOSABS 0.36 10/15/2022 04:00 AM    BASOSABS 0.03 10/15/2022 04:00 AM       CMP     Lab Results   Component Value Date/Time     10/15/2022 04:00 AM    K 3.9 10/15/2022 04:00 AM    K 4.7 10/10/2022 05:21 PM     10/15/2022 04:00 AM    CO2 29 10/15/2022 04:00 AM    BUN 28 10/15/2022 04:00 AM    CREATININE 1.4 10/15/2022 04:00 AM    GFRAA >60 10/15/2022 04:00 AM    LABGLOM 51 10/15/2022 04:00 AM    GLUCOSE 218 10/15/2022 04:00 AM    PROT 5.4 10/15/2022 04:00 AM    LABALBU 2.9 10/15/2022 04:00 AM    CALCIUM 8.1 10/15/2022 04:00 AM    BILITOT 0.6 10/15/2022 04:00 AM    ALKPHOS 453 10/15/2022 04:00 AM    AST 36 10/15/2022 04:00 AM    ALT 24 10/15/2022 04:00 AM         Hepatic Function Panel:    Lab Results   Component Value Date/Time    ALKPHOS 453 10/15/2022 04:00 AM    ALT 24 10/15/2022 04:00 AM    AST 36 10/15/2022 04:00 AM    PROT 5.4 10/15/2022 04:00 AM    BILITOT 0.6 10/15/2022 04:00 AM    LABALBU 2.9 10/15/2022 04:00 AM       PT/INR:    Lab Results   Component Value Date/Time    PROTIME 15.6 10/13/2022 06:35 AM    INR 1.4 10/13/2022 06:35 AM        Pro-BNP 12,808 High  pg/mL   Magnesium [[id number]]        MICROBIOLOGY:    SARS CoV 2 neg     CRP 0.8    Pro BNP 2806  Procacitonin 0.36      Radiology :    CT abdomen and pelvis -  Impression:        Mixed densities throughout the colonic segments including in the ascending   colon and rectum could represent mixed densities of blood products although   no focal area of arterial enhancement or extravasation/blushing is observed   to localize acute hemorrhage. No evidence for perforation or abscess. No   pneumatosis intestinalis or portal venous gas evident. 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 No adjacent hematoma with only minimal   adjacent stranding.      Bilateral pleural effusions moderate to large right and moderate left   effusions with adjacent atelectasis. Anasarca. X ray foot :           No evidence of calcaneal osteomyelitis.         IMPRESSION:     Right foot  non healing wound- cipro and zyvox stopped 10/12 - wound healing slowly   Leukocytosis - improved   Resp failure, CHF        RECOMMENDATIONS:      Diuretics    Local wound care   Monitor off abx

## 2022-10-15 NOTE — PLAN OF CARE
Problem: Discharge Planning  Goal: Discharge to home or other facility with appropriate resources  10/14/2022 2043 by Lyndsey Martinez RN  Outcome: Progressing  Flowsheets (Taken 10/14/2022 2000)  Discharge to home or other facility with appropriate resources: Identify barriers to discharge with patient and caregiver  10/14/2022 1505 by Eliecer Diaz RN  Outcome: Progressing  Flowsheets (Taken 10/14/2022 0800 by Rachael Bills)  Discharge to home or other facility with appropriate resources: Identify barriers to discharge with patient and caregiver     Problem: Pain  Goal: Verbalizes/displays adequate comfort level or baseline comfort level  10/14/2022 2043 by Lyndsey Martinez RN  Outcome: Progressing  Flowsheets (Taken 10/14/2022 2000)  Verbalizes/displays adequate comfort level or baseline comfort level: Encourage patient to monitor pain and request assistance  10/14/2022 1505 by Eliecer Diaz RN  Outcome: Progressing     Problem: Skin/Tissue Integrity  Goal: Absence of new skin breakdown  Description: 1. Monitor for areas of redness and/or skin breakdown  2. Assess vascular access sites hourly  3. Every 4-6 hours minimum:  Change oxygen saturation probe site  4. Every 4-6 hours:  If on nasal continuous positive airway pressure, respiratory therapy assess nares and determine need for appliance change or resting period.   10/14/2022 2043 by Lyndsey Martinez RN  Outcome: Progressing  10/14/2022 1505 by Eliecer Diaz RN  Outcome: Progressing     Problem: Safety - Adult  Goal: Free from fall injury  10/14/2022 2043 by Lyndsey Martinez RN  Outcome: Progressing  10/14/2022 1505 by Eliecer Diaz RN  Outcome: Progressing  Flowsheets (Taken 10/14/2022 0934 by Rachael Bills)  Free From Fall Injury: Instruct family/caregiver on patient safety     Problem: ABCDS Injury Assessment  Goal: Absence of physical injury  10/14/2022 2043 by Lyndsey Martinez RN  Outcome: Progressing  10/14/2022 1505 by Naveed Menchaca RN  Outcome: Progressing  Flowsheets (Taken 10/14/2022 0934 by Nakul Kay)  Absence of Physical Injury: Implement safety measures based on patient assessment     Problem: Chronic Conditions and Co-morbidities  Goal: Patient's chronic conditions and co-morbidity symptoms are monitored and maintained or improved  10/14/2022 2043 by Selene Cornell RN  Outcome: Progressing  Flowsheets (Taken 10/14/2022 2000)  Care Plan - Patient's Chronic Conditions and Co-Morbidity Symptoms are Monitored and Maintained or Improved: Monitor and assess patient's chronic conditions and comorbid symptoms for stability, deterioration, or improvement  10/14/2022 1505 by Naveed Menchaca RN  Outcome: Progressing  Flowsheets (Taken 10/14/2022 0800 by Nakul Kay)  Care Plan - Patient's Chronic Conditions and Co-Morbidity Symptoms are Monitored and Maintained or Improved: Monitor and assess patient's chronic conditions and comorbid symptoms for stability, deterioration, or improvement     Problem: Neurosensory - Adult  Goal: Achieves stable or improved neurological status  10/14/2022 2043 by Selene Cornell RN  Outcome: Progressing  10/14/2022 1505 by Naveed Menchaca RN  Outcome: Progressing  Flowsheets (Taken 10/14/2022 0800 by Nakul Kay)  Achieves stable or improved neurological status:   Assess for and report changes in neurological status   Maintain blood pressure and fluid volume within ordered parameters to optimize cerebral perfusion and minimize risk of hemorrhage  Goal: Remains free of injury related to seizures activity  10/14/2022 2043 by Selene Cornell RN  Outcome: Progressing  10/14/2022 1505 by Naveed Menchaca RN  Outcome: Progressing  Flowsheets (Taken 10/14/2022 0800 by Nakul Kay)  Remains free of injury related to seizure activity: Maintain airway, patient safety  and administer oxygen as ordered  Goal: Achieves maximal functionality and self care  10/14/2022 2043 by Selene Cornell RN  Outcome: Progressing  10/14/2022 1505 by Naveed Menchaca RN  Outcome: Progressing  Flowsheets (Taken 10/14/2022 0800 by Nakul Kay)  Achieves maximal functionality and self care: Monitor swallowing and airway patency with patient fatigue and changes in neurological status     Problem: Respiratory - Adult  Goal: Achieves optimal ventilation and oxygenation  10/14/2022 2043 by Selene Cornell RN  Outcome: Progressing  10/14/2022 1505 by Naveed Menchaca RN  Outcome: Progressing  Flowsheets (Taken 10/14/2022 0800 by Nakul Kay)  Achieves optimal ventilation and oxygenation:   Assess for changes in respiratory status   Assess for changes in mentation and behavior   Position to facilitate oxygenation and minimize respiratory effort     Problem: Cardiovascular - Adult  Goal: Maintains optimal cardiac output and hemodynamic stability  10/14/2022 2043 by Selene Cornell RN  Outcome: Progressing  10/14/2022 1505 by Naveed Menchaca RN  Outcome: Progressing  Flowsheets (Taken 10/14/2022 0800 by Nakul Kay)  Maintains optimal cardiac output and hemodynamic stability:   Monitor blood pressure and heart rate   Assess for signs of decreased cardiac output  Goal: Absence of cardiac dysrhythmias or at baseline  10/14/2022 2043 by Selene Cornell RN  Outcome: Progressing  10/14/2022 1505 by Naveed Menchaca RN  Outcome: Progressing  Flowsheets (Taken 10/14/2022 0800 by Nakul Kay)  Absence of cardiac dysrhythmias or at baseline: Monitor cardiac rate and rhythm     Problem: Skin/Tissue Integrity - Adult  Goal: Skin integrity remains intact  10/14/2022 2043 by Selene Cornell RN  Outcome: Progressing  10/14/2022 1505 by Naveed Menchaca RN  Outcome: Progressing  Flowsheets (Taken 10/14/2022 0800 by Nakul Kay)  Skin Integrity Remains Intact:   Monitor for areas of redness and/or skin breakdown   Assess vascular access sites hourly   Every 4-6 hours minimum: Change oxygen saturation probe site   Every 4-6 hours: If on nasal continuous positive airway pressure, respiratory therapy assesses nares and determine need for appliance change or resting period  Goal: Incisions, wounds, or drain sites healing without S/S of infection  10/14/2022 2043 by Liudmila Greenfield RN  Outcome: Progressing  10/14/2022 1505 by Sonny Roberts RN  Outcome: Progressing  Goal: Oral mucous membranes remain intact  10/14/2022 2043 by Liudmila Greenfield RN  Outcome: Progressing  10/14/2022 1505 by Sonny Roberts RN  Outcome: Progressing     Problem: Metabolic/Fluid and Electrolytes - Adult  Goal: Electrolytes maintained within normal limits  10/14/2022 2043 by Liudmila Greenfield RN  Outcome: Progressing  Flowsheets (Taken 10/14/2022 2000)  Electrolytes maintained within normal limits: Monitor labs and assess patient for signs and symptoms of electrolyte imbalances  10/14/2022 1505 by Sonny Roberts RN  Outcome: Progressing  Flowsheets (Taken 10/14/2022 0800 by Dustin Alegria)  Electrolytes maintained within normal limits:   Monitor labs and assess patient for signs and symptoms of electrolyte imbalances   Administer electrolyte replacement as ordered   Monitor response to electrolyte replacements, including repeat lab results as appropriate  Goal: Hemodynamic stability and optimal renal function maintained  10/14/2022 2043 by Liudmila Greenfield RN  Outcome: Progressing  Flowsheets (Taken 10/14/2022 2000)  Hemodynamic stability and optimal renal function maintained: Monitor labs and assess for signs and symptoms of volume excess or deficit  10/14/2022 1505 by Sonny Roberts RN  Outcome: Progressing  Flowsheets (Taken 10/14/2022 0800 by Dustin Alegria)  Hemodynamic stability and optimal renal function maintained:   Monitor labs and assess for signs and symptoms of volume excess or deficit   Monitor intake, output and patient weight   Monitor response to interventions for patient's volume status, including labs, urine output, blood pressure (other measures as available)  Goal: Glucose maintained within prescribed range  10/14/2022 2043 by Mehran Martinez RN  Outcome: Progressing  Flowsheets (Taken 10/14/2022 2000)  Glucose maintained within prescribed range: Monitor blood glucose as ordered  10/14/2022 1505 by Kittie Soulier, RN  Outcome: Progressing  Flowsheets (Taken 10/14/2022 0800 by Zaida Farley)  Glucose maintained within prescribed range: Monitor blood glucose as ordered     Problem: Hematologic - Adult  Goal: Maintains hematologic stability  10/14/2022 2043 by Mehran Martinez RN  Outcome: Progressing  Flowsheets (Taken 10/14/2022 2000)  Maintains hematologic stability: Assess for signs and symptoms of bleeding or hemorrhage  10/14/2022 1505 by Kittie Soulier, RN  Outcome: Progressing  Flowsheets (Taken 10/14/2022 0800 by Zaida Farley)  Maintains hematologic stability:   Assess for signs and symptoms of bleeding or hemorrhage   Administer blood products/factors as ordered     Problem: Nutrition Deficit:  Goal: Optimize nutritional status  10/14/2022 2043 by Mehran Martinez RN  Outcome: Progressing  10/14/2022 1505 by Kittie Soulier, RN  Outcome: Progressing  Flowsheets (Taken 10/14/2022 1158 by Salma Gray RD, LD)  Nutrient intake appropriate for improving, restoring, or maintaining nutritional needs: Assess nutritional status and recommend course of action     Problem: Safety - Medical Restraint  Goal: Remains free of injury from restraints (Restraint for Interference with Medical Device)  Description: INTERVENTIONS:  1. Determine that other, less restrictive measures have been tried or would not be effective before applying the restraint  2. Evaluate the patient's condition at the time of restraint application  3. Inform patient/family regarding the reason for restraint  4.  Q2H: Monitor safety, psychosocial status, comfort, nutrition and hydration  10/14/2022 2043 by Apryl Sutton RN  Outcome: Progressing  10/14/2022 1505 by Lore Brown RN  Outcome: Progressing

## 2022-10-15 NOTE — PROGRESS NOTES
[address] [facility]   Department of Internal Medicine   MICU Progress Note    Patient:  Yinka Fleming 74 y.o. male   MRN: [id number]       Date of Service: 10/15/2022    Allergy: Codeine  CC strokelike symptoms  Subjective   10/13/2022  Alert oriented 2-3 confused, follows commands. Supplement oxygen on BiPAP with respiratory distress  INR 1.4  --> S/P vitamin K and FFP   Start clear liquid diet  Not on pressors, will start precedex  and Fentanyl for severe agitation,   -- will  start Seroquel   Agitated today, due to BIAP,  No bowel movement overnight   No temps  10/14/2022   Much improved on Bumex drip   Off BiPaP on NC   OK   Creatinine 1.5  BUN 35   Na 151  Will increase free water  po  10/15/2022   Cxr worsening of CHF with effusion   K replaced   1+ Edema   DC fresh water thru NG + Restrict fluids   Vitals stable   On HFNC  with sat at 96%   Refuses BiPAP  Objective     TEMPERATURE:  Current - Temp: 98 °F (36.7 °C); Max - Temp  Av.4 °F (36.3 °C)  Min: 96.8 °F (36 °C)  Max: 98 °F (36.7 °C)    RESPIRATIONS RANGE: Resp  Av.6  Min: 16  Max: 34    PULSE RANGE: Pulse  Av.4  Min: 71  Max: 80    BLOOD PRESSURE RANGE:  Systolic (05YTO), JIC:353 , Min:138 , WAB:148   ; Diastolic (55HZQ), GWK:77, Min:63, Max:167      PULSE OXIMETRY RANGE: SpO2  Av.9 %  Min: 95 %  Max: 100 %    I & O - 24hr:    Intake/Output Summary (Last 24 hours) at 10/15/2022 0912  Last data filed at 10/15/2022 0800  Gross per 24 hour   Intake 39541.78 ml   Output 9945 ml   Net 322.78 ml     I/O last 3 completed shifts: In: 23168.3 [I.V.:1992.4; Blood:227.5; NG/GT:9390; IV Piggyback:385.4]  Out: 30092 [PTTP; YEGAE:7504] I/O this shift:  In: -   Out: 400 [Urine:400]   Weight change:     Physical Exam:  CONSTITUTIONAL: Awake, morbid obesity, alert to 1-2, intermittently confused, labored breathing.   EYES:  Lids and lashes normal, pupils equal, round and reactive to light, extra ocular muscles intact, sclera clear, conjunctiva normal  ENT:  Normocephalic, without obvious abnormality, atraumatic, sinuses nontender on palpation, noted mucous membrane dry, picking on skin of the mouth that noted to be bloody. NV ++  CARDIOVASCULAR:  Normal apical impulse, regular rate and rhythm, normal S1 and S2, no S3 or S4, and no murmur noted  Lung Diminished lung sounds throughout lung fields, fine crackles noted on the bases, no wheezing noted, labor breathing, no use of accessory muscle at this time, on supplement oxygen. MUSCULOSKELETAL: weakness in extremities, echo noted, 2+ pitting edema on lower extremities. All extremities,  NEUROLOGIC: Awake, alert oriented 2, weakness on the right upper and lower extremities due to stroke. SKIN: Right foot nonhealing wound.  2+ Edema sacrum & belly    Medications     Continuous Infusions:   dextrose 100 mL/hr at 10/15/22 2200    bumetanide 0.1 mg/mL infusion 0.5 mg/hr (10/15/22 0623)    dexmedetomidine (PRECEDEX) IV infusion 1.5 mcg/kg/hr (10/15/22 5378)    sodium chloride      sodium chloride      sodium chloride      sodium chloride      dextrose       Scheduled Meds:   polyethylene glycol  6,000 mL Per NG tube Once    clopidogrel  75 mg Oral Daily    albumin human  25 g IntraVENous Q8H    QUEtiapine  50 mg Oral BID    sodium chloride flush  5-40 mL IntraVENous 2 times per day    miconazole nitrate   Topical BID    [Held by provider] gabapentin  600 mg Oral 4x daily    amLODIPine  10 mg Oral Daily    white petrolatum   Topical BID    sodium chloride flush  5-40 mL IntraVENous 2 times per day    miconazole   Topical BID    [Held by provider] insulin glargine  32 Units SubCUTAneous QAM    epoetin sharath-epbx  6,000 Units SubCUTAneous Once per day on Mon Wed Fri    [Held by provider] cloNIDine  0.1 mg Oral Q12H    hydrALAZINE  50 mg Oral 3 times per day    [Held by provider] lactulose  20 g Oral BID    insulin lispro  0-16 Units SubCUTAneous 4x Daily AC & HS    HYDROcodone-acetaminophen  1 tablet Oral BID    pantoprazole (PROTONIX) 40 mg injection  40 mg IntraVENous Q12H    metoprolol succinate  25 mg Oral Daily    [Held by provider] sennosides  5 mL Oral Nightly    carbidopa-levodopa  1 tablet Per NG tube TID    ipratropium-albuterol  1 ampule Inhalation Q4H WA    collagenase   Topical Q MWF    benzonatate  100 mg Oral TID    cetirizine  10 mg Oral Daily    ezetimibe  10 mg Oral Daily    [Held by provider] ticagrelor  90 mg Oral BID    [Held by provider] aspirin  81 mg Oral Daily    Or    [Held by provider] aspirin  300 mg Rectal Daily    atorvastatin  80 mg Oral Nightly     PRN Meds: sodium chloride, sodium chloride flush, sodium chloride, sodium chloride, miconazole nitrate **AND** miconazole nitrate, sodium chloride flush, sodium chloride, labetalol, hydrALAZINE, glucose, dextrose bolus **OR** dextrose bolus, glucagon (rDNA), dextrose, ondansetron **OR** ondansetron  Nutrition:   Clear liquid diet   Labs and Imaging Studies     CBC:   Recent Labs     10/13/22  0408 10/13/22  1802 10/14/22  0413 10/14/22  2311 10/15/22  0400   WBC 11.9*  --  8.6  --  7.8   RBC 2.53*  --  2.70*  --  2.61*   HGB 7.5*   < > 8.0* 8.0* 7.8*   HCT 23.4*   < > 24.3* 24.0* 23.9*   MCV 92.5  --  90.0  --  91.6   MCH 29.6  --  29.6  --  29.9   MCHC 32.1  --  32.9  --  32.6   RDW 16.2*  --  15.7*  --  15.4*   *  --  116*  --  105*   MPV 10.1  --  10.7  --  10.5    < > = values in this interval not displayed.        BMP:    Recent Labs     10/13/22  0408 10/13/22  1802 10/14/22  0413 10/14/22  0948 10/14/22  2022 10/15/22  0400   *   < > 149* 151* 153* 148*   K 4.0   < > 3.9 3.7 3.5 3.9   *   < > 112* 111* 109* 104   CO2 22   < > 26 28 29 29   BUN 43*   < > 37* 35* 31* 28*   CREATININE 1.5*   < > 1.5* 1.5* 1.4* 1.4*   GLUCOSE 176*   < > 212* 212* 192* 218*   CALCIUM 8.3*   < > 8.3* 8.6 8.5* 8.1*   PROT 5.4*  --  5.2*  --   --  5.4*   LABALBU 2.5*  --  2.5*  --   --  2.9*   BILITOT 0.5  --  0.6  --   --  0.6 ALKPHOS 230*  --  523*  --   --  453*   AST 18  --  101*  --   --  36   ALT <5  --  22  --   --  24    < > = values in this interval not displayed. LIVER PROFILE:   Recent Labs     10/13/22  0408 10/14/22  0413 10/15/22  0400   AST 18 101* 36   ALT <5 22 24   BILITOT 0.5 0.6 0.6   ALKPHOS 230* 523* 453*       PT/INR:   Recent Labs     10/12/22  1044 10/13/22  0635   PROTIME 25.9* 15.6*   INR 2.4 1.4       APTT:   Recent Labs     10/12/22  1044   APTT 37.8*       Fasting Lipid Panel:    No results found for: CHOL, TRIG, HDL    Cardiac Enzymes:    No results found for: CKTOTAL, CKMB, CKMBINDEX, TROPONINI    Notable Cultures:      Blood cultures   Blood Culture, Routine   Date Value Ref Range Status   10/13/2022 24 Hours no growth  Preliminary     Respiratory cultures No results found for: RESPCULTURE No results found for: LABGRAM  Urine No results found for: LABURIN  Legionella No results found for: LABLEGI  C Diff PCR No results found for: CDIFPCR  Wound culture/abscess: No results for input(s): WNDABS in the last 72 hours. Tip culture:No results for input(s): CXCATHTIP in the last 72 hours.      Antibiotic  Days  Day started   Cipro     Linezolid                      Oxygen:     Vent Information  Ventilator ID: [id number]    Additional Respiratory Assessments  Heart Rate: 77  Resp: 18  SpO2: 98 %  Position: Semi-Sarabia's  Humidification Source: Heated wire  Humidification Temp: 36.2  Circuit Condensation: Drained     Nasal cannula L/min     Face mask %     Reservoirs mask %       ABG     PH  7.35   PCO2  37   PO2  99   HCO3  20   Sat%  95   FIO2     DATES 10/12/22       Lines:  Site  Day  Date inserted     TLC              PICC              Arterial line              Peripheral line              HD cath            Urinary Catheter 09/29/22 Mahan-Output (mL): 400 mL  [REMOVED] Urinary Catheter 09/27/22-Output (mL): 100 mL    Imaging Studies:  CXR  10/13 when compared to previous CXR 10/8-10/12 suggest & is consistent with CHF     Assessment and PLan   In summary,   74 y.o. male with significant past medical history of CKD, DM type II, MATT, presented 9/26/22 2 with stroke and GI bleed. Patient was admitted to [facility] with strokelike symptoms, right-sided paralysis and aphasia, found to have severe left ICA stenosis and was transfer to [facility],  taken to IR for left ICA angioplasty with stent placement. Patient had GI Bleed, following general surgery, received total of 12 units of PRBC since admission. EGD on 10/3/22 showed no evidence of upper GI bleed, CT a/p showed showed no focal area of arterial enhancement or extra blush to localize acute hemorrhage, nuclear medicine scan was positive for bleeding around the splenic flexure. Colonoscopy showed full of clots. Transfer to MICU from PICU with low hgb, labor breathing and hypotensive. 10/13/2022  Alert oriented 2-3 confused, follows commands.   Supplement oxygen on BiPAP with respiratory distress  INR 1.4  --> S/P vitamin K and FFP   Start clear liquid diet  Not on pressors, will start precedex  and Fentanyl for severe agitation,   -- will  start Seroquel   Agitated today, due to BIAP,  No bowel movement overnight   No temps  10/14/2022   Much improved on Bumex drip   Off BiPaP on NC   OK   Creatinine 1.5  BUN 35   Na 151  Will increase free water  po   Labs & Imager reviewed & replaced  10/15/2022   Cxr worsening of CHF with effusion--> Increase Bumex drip to 1 mg   K replaced   1+ Edema   DC fresh water thru NG + Restrict fluids   Vitals stable   On HFNC  with sat at 96%   Refuses BiPAP   Labs & Imager reviewed & replaced  Assessment:  CHF VS aspiration  but more consistent with CHF  EF 60%   Bumex drip  LMCA  stroke 2/2 symptomatic LICA stenosis s/p IR angioplasty and stent placement   9/26/2022 on dual antiplatelet therapy- off now   Acute hypoxemic and hypercapnic respiratory failure intubated/extubated,   Off BiPAP ---> currently on supplemental oxygen  Acute  large GI bleed/acute blood loss anemia s/p 15 units of PRBC s/p EGD   and Colonoscopy, and bleeding scan. bleeding around the splenic flexure.    LARY   Metabolic acidosis   Hypernatremia/ hyperchloremia  Hyperglycemia  Leukocytosis  Right foot ulcer    Hx DM type 2  Hx of MATT on CPAP  Hx of parkinson disease  Hx of seizure  Hx of PVD  Hx of depression      Plan:  -  Despite of CKD & high creatinine , will start Bumex drip to avoid intubation, if possible     And also start Percedex & Fentanyl drip  - Currently on supplement oxygen, titrate to keep SPO2 goal above 92%  - bronchodilator eran and PRN  - Bipap qhs and PRN   - precedex gtt -- will start on Seroquel 25 BID    - keep MAP >65mmHg  - neurology consulted for stroke, on dual antiplatelet therapy- recommended Plavix once stable from bleeding standpoint until able to start dual therapy   - trend troponin, pro BNP  -Wound culture culture wound culture on foot showed Corynebacteria (Mixed morphologies, COVID 19 negative,   - ID following, on  Cipro and linezolid  - General surgery following for GI bleed, input appreciated  - s/p 15 units of PBRC  - hgb 7.1 today  - INR 1.4-- S/P vitamin K and  1 FFP   - h/h q6H  - transfuse if less than 7  - PPI BID  - EGD, colonoscopy and bleeding scan done, recommended IR for embolization- on dural antiplatelet therapy- high risk of bleeding- unable to reach family  - Stat nuclear bleeding scan negative for acute bleed   - will hold brilinta and Asprin for now given a bleed   - metabolic acidosis, spoke with nephrology, will start with bicarb gtt--d/c bicarb gtt  [place] Old   - trend Bmp  - Bumex 1 mg x 1, urine output 2.3L; I/o Since admission (+5.8L)   - nephrology,following ,input appreciated  - Podiatry and wound care following  - labs in AM  - F/E/N Wong Old /replace lytes/ tube feeds   - DVT/GI scds/ no anticoagulation given GI bleed/ Protonix BID  - Code status: full code        Jenna Zapata MD., [facility]  Pulmonary & Critical Care Medicine    During multidisciplinary team rounds pt Ge Del Angel, male , was seen, examined and discussed. This is confirmation that I have personally seen and examined the patient and that the key elements of the encounter were performed by me (> 85 % time) including HPI, social history, family history, ROS, and physical examination have been done by me. The medications & laboratory data and imagery was discussed and adjusted where necessary. Key issues of the case were discussed among consultants. End organ damage assessment was performed on all organs and aggressive measures wee taken to prevent or improve them. This patient has a high probability of sudden clinically significant deterioration. I managed/supervised life or organ supporting interventions that required frequent physician assessment. I devoted my full attention to the direct care of this patient for the period of time indicated below. In addition to above, time was devoted to teaching and to any procedure. NOTE: This report, in part or full, may have been transcribed using voice recognition software. Every effort was made to ensure accuracy; however, inadvertent computerized transcription errors may be present. Please excuse any transcriptional grammatical or spelling errors that may have escaped my editorial review. Total critical care time caring for this patient with life threatening, unstable organ failure, including direct patient contact, management of life support systems, review of data including imaging and labs, discussions with other team members and physicians is at least 39 Min so far today, excluding procedures. Family is updated at the bedside as available. Key issues of the case were discussed among consultants.       Ankit Kenney MD., [facility]  Pulmonary & Critical Care Medicine

## 2022-10-15 NOTE — PROGRESS NOTES
Department of Podiatry   Progress Note      Mr. Angelica Dickerson was seen and evaluated at bedside this morning s/p wound vac application. No acute events to report. Patient is sleeping with sitter at bedside. Past Medical History:            Diagnosis Date    Chronic back pain     CKD (chronic kidney disease)     CVA (cerebral vascular accident) ([facility] [address]     CVA (cerebral vascular accident) ([facility] [address]     DM (diabetes mellitus) ([facility] [address]     Major depression     MI (myocardial infarction) ([facility] [address]     MATT (obstructive sleep apnea)     Parkinson disease ([facility] [address]     PVD (peripheral vascular disease) ([facility] [address]     Seizure ([facility] [address]        Past Surgical History:        Procedure Laterality Date    COLONOSCOPY N/A 10/9/2022    COLONOSCOPY DIAGNOSTIC performed by Chante Cast MD at [place] [address]      IR CAROTID STENT UNI W PROTECTION  9/27/2022    IR CAROTID STENT UNI W PROTECTION 9/27/2022 Marily Pina MD [facility]    UPPER GASTROINTESTINAL ENDOSCOPY N/A 10/3/2022    EGD DIAGNOSTIC ONLY performed by Brooke Monroy MD at [facility] ENDOSCOPY       Medications Prior to Admission:    Medications Prior to Admission: apixaban (ELIQUIS) 5 MG TABS tablet, Take 5 mg by mouth 2 times daily  aspirin 81 MG chewable tablet, Take 81 mg by mouth daily  carbidopa-levodopa (SINEMET)  MG per tablet, Take 1 tablet by mouth 3 times daily  carvedilol (COREG) 25 MG tablet, Take 25 mg by mouth 2 times daily (with meals)  cetirizine (ZYRTEC) 10 MG tablet, Take 10 mg by mouth daily  clopidogrel (PLAVIX) 75 MG tablet, Take 75 mg by mouth daily  diphenhydrAMINE (BENADRYL) 25 MG capsule, Take 25 mg by mouth every 6 hours  ezetimibe (ZETIA) 10 MG tablet, Take 10 mg by mouth daily  fluticasone (FLONASE) 50 MCG/ACT nasal spray, 2 sprays by Each Nostril route daily  gabapentin (NEURONTIN) 600 MG tablet, Take 600 mg by mouth 4 times daily.   HYDROcodone-acetaminophen (NORCO) 5-325 MG per tablet, Take 1 tablet by mouth 2 times daily.  Insulin Glargine, 2 Unit Dial, (TOUJEO MAX SOLOSTAR) 300 UNIT/ML SOPN, Inject 66 Units into the skin daily  piperacillin-tazobactam (ZOSYN) 3-0.375 GM per 50ML IVPB extended infusion, Infuse 4.5 mg intravenously in the morning and 4.5 mg at noon and 4.5 mg in the evening. acetaminophen (TYLENOL) 325 MG tablet, Take 650 mg by mouth every 6 hours as needed for Pain  amLODIPine (NORVASC) 5 MG tablet, Take 5 mg by mouth daily  benzonatate (TESSALON) 100 MG capsule, Take 100 mg by mouth 3 times daily  cloNIDine (CATAPRES) 0.1 MG tablet, Take 0.1 mg by mouth in the morning and 0.1 mg in the evening. hydrALAZINE (APRESOLINE) 100 MG tablet, Take 100 mg by mouth 3 times daily    Allergies:  Codeine    Social History:   TOBACCO:   has no history on file for tobacco use. ETOH:   has no history on file for alcohol use. DRUGS:   Social History     Substance and Sexual Activity   Drug Use Not on file       Family History:   History reviewed. No pertinent family history. REVIEW OF SYSTEMS:    All pertinent positives and negatives as noted in HPI       LOWER EXTREMITY EXAMINATION   -Dressings clean, dry and intact without dishevelment.   -No reaction to pain on palpation to posterior calf   -Patient digits are warm to touch.   -Offloading pads noted    PHYSICAL EXAM AS OF 10/14/22:  VASCULAR:  DP and PT pulses are non palpable. CFT < 5 seconds B/L. Warm to warm from the tibial tuberosity to the distal aspect of the digits dorsally. NEUROLOGIC:  Protective sensation is diminished but grossly intact    DERM:  Right foot lateral plantar wound measuring approx 6cm in diameter. No erythema, serosanguinous drainage, no malodor.     MUSCULOSKELETAL: deferred                 CONSULTS:  IP CONSULT TO CRITICAL CARE  IP CONSULT TO DIETITIAN  IP CONSULT TO CARDIOLOGY  IP CONSULT TO PODIATRY  IP CONSULT TO PODIATRY  IP CONSULT TO UROLOGY  IP CONSULT TO NEPHROLOGY  IP CONSULT TO GENERAL SURGERY  IP CONSULT TO INFECTIOUS DISEASES  IP CONSULT TO GI  IP CONSULT TO GENERAL SURGERY  IP CONSULT TO GENERAL SURGERY    MEDICATION:  Scheduled Meds:   potassium chloride  20 mEq Oral Daily with breakfast    magnesium sulfate  2,000 mg IntraVENous Once    haloperidol  2 mg Oral BID    polyethylene glycol  6,000 mL Per NG tube Once    clopidogrel  75 mg Oral Daily    albumin human  25 g IntraVENous Q8H    QUEtiapine  50 mg Oral BID    sodium chloride flush  5-40 mL IntraVENous 2 times per day    miconazole nitrate   Topical BID    [Held by provider] gabapentin  600 mg Oral 4x daily    amLODIPine  10 mg Oral Daily    white petrolatum   Topical BID    sodium chloride flush  5-40 mL IntraVENous 2 times per day    miconazole   Topical BID    [Held by provider] insulin glargine  32 Units SubCUTAneous QAM    epoetin sharath-epbx  6,000 Units SubCUTAneous Once per day on Mon Wed Fri    hydrALAZINE  50 mg Oral 3 times per day    insulin lispro  0-16 Units SubCUTAneous 4x Daily AC & HS    pantoprazole (PROTONIX) 40 mg injection  40 mg IntraVENous Q12H    metoprolol succinate  25 mg Oral Daily    [Held by provider] sennosides  5 mL Oral Nightly    carbidopa-levodopa  1 tablet Per NG tube TID    ipratropium-albuterol  1 ampule Inhalation Q4H WA    collagenase   Topical Q MWF    benzonatate  100 mg Oral TID    ezetimibe  10 mg Oral Daily    [Held by provider] ticagrelor  90 mg Oral BID    [Held by provider] aspirin  81 mg Oral Daily    Or    [Held by provider] aspirin  300 mg Rectal Daily    atorvastatin  80 mg Oral Nightly     Continuous Infusions:   dextrose 100 mL/hr at 10/15/22 1013    bumetanide 0.1 mg/mL infusion 1 mg/hr (10/15/22 0951)    dexmedetomidine (PRECEDEX) IV infusion 1.5 mcg/kg/hr (10/15/22 0921)    sodium chloride      sodium chloride      sodium chloride      sodium chloride      dextrose       PRN Meds:.sodium chloride, sodium chloride flush, sodium chloride, sodium chloride, miconazole nitrate **AND** miconazole nitrate, sodium chloride flush, sodium chloride, labetalol, hydrALAZINE, glucose, dextrose bolus **OR** dextrose bolus, glucagon (rDNA), dextrose    RADIOLOGY:  XR CHEST PORTABLE   Final Result   New right pleural effusion. XR CHEST PORTABLE   Final Result   Stable bilateral pulmonary infiltrates. XR ABDOMEN (KUB) (SINGLE AP VIEW)   Final Result   Catheter is in the stomach. XR ABDOMEN FOR NG/OG/NE TUBE PLACEMENT   Final Result   1. Gastric catheter is coiled in the pharynx and upper esophagus and should   be withdrawn and reinserted. Follow-up imaging recommended. XR CHEST PORTABLE   Final Result   Bilateral airspace disease with interval progression on the right. NM GI BLOOD LOSS   Final Result   No evidence of active GI bleeding during acquisition. XR CHEST PORTABLE   Final Result   Unchanged bilateral chest opacities with right pleural effusion evident. See   above. XR CHEST PORTABLE   Final Result   1. Persistent bilateral patchy parenchymal densities concerning for pneumonia   and or atelectasis   2. Persistent small bilateral pleural effusions, left greater than right   3. Cardiomegaly, stable         XR CHEST PORTABLE   Final Result   No significant change compared to the prior exam with cardiomegaly, bilateral   airspace opacities and bilateral pleural effusions seen most suggestive of   congestive heart failure, but superimposed pneumonia not excluded. US DUP ABD PEL RETRO SCROT COMPLETE   Final Result   1. No evidence of testicular torsion      2. Severe scrotal skin thickening. 3.  Trace hydroceles. 4.  No evidence of bowel containing inguinal hernia. US SCROTUM AND TESTICLES   Final Result   1. No evidence of testicular torsion      2. Severe scrotal skin thickening. 3.  Trace hydroceles. 4.  No evidence of bowel containing inguinal hernia. XR CHEST PORTABLE   Final Result   1.   No significant interval change in the appearance of the chest.      2.  Bilateral pleural effusions and areas of atelectasis or multifocal   pneumonia. CTA ABDOMEN PELVIS W CONTRAST   Final Result   Mixed densities throughout the colonic segments including in the ascending   colon and rectum could represent mixed densities of blood products although   no focal area of arterial enhancement or extravasation/blushing is observed   to localize acute hemorrhage. No evidence for perforation or abscess. No   pneumatosis intestinalis or portal venous gas evident. 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 No adjacent hematoma with only minimal   adjacent stranding. Bilateral pleural effusions moderate to large right and moderate left   effusions with adjacent atelectasis. Anasarca. XR CHEST PORTABLE   Final Result   Bilateral pleural effusions with bibasilar patchy infiltrates and mild   cardiomegaly. NM GI BLOOD LOSS   Final Result   Active GI bleeding identified during acquisition in the splenic flexure with   peristalsis identified into the proximal descending colon. RECOMMENDATIONS:   Unavailable         CT ABDOMEN PELVIS WO CONTRAST   Final Result   CHEST:      No evidence of neoplastic disease, allowing for limitations of noncontrast   technique. Moderate bilateral pleural effusions. Nonemergent incidental findings as above. ABDOMEN/PELVIS:      No evidence of neoplastic disease, allowing for limitations of noncontrast   technique. Bladder wall thickening is nonspecific given under distension; recommend   correlation urinalysis to evaluate for UTI. Nonemergent incidental findings as above. CT CHEST WO CONTRAST   Final Result   CHEST:      No evidence of neoplastic disease, allowing for limitations of noncontrast   technique. Moderate bilateral pleural effusions. Nonemergent incidental findings as above. ABDOMEN/PELVIS:      No evidence of neoplastic disease, allowing for limitations of noncontrast   technique. Bladder wall thickening is nonspecific given under distension; recommend   correlation urinalysis to evaluate for UTI. Nonemergent incidental findings as above. FL MODIFIED BARIUM SWALLOW W VIDEO   Final Result   1. Mildly impaired swallowing mechanism with swallowing delay and followed   by laryngeal penetration with thin liquid barium when using a straw. No   barium aspiration      2. Please see separate speech pathology report for full discussion of   findings and recommendations. RECOMMENDATIONS:   Unavailable         XR ABDOMEN (KUB) (SINGLE AP VIEW)   Final Result   Somewhat greater than average quantity of retained fecal material thin the   lower GI tract suggesting dysfunction with evacuation. XR CHEST PORTABLE   Final Result   1. Atherosclerotic disease and mild cardiomegaly. 2.  Persistent but improved opacities in the bilateral lungs most pronounced   in the mid and lower lungs. There appears to be a small left and trace right   pleural effusion         XR FOOT RIGHT (2 VIEWS)   Final Result   No evidence of calcaneal osteomyelitis. CT HEAD WO CONTRAST   Final Result   Parenchymal volume loss and chronic microvascular ischemic changes. No acute intracranial abnormality. Ethmoid and sphenoid sinusitis. Right mastoid effusion. US RETROPERITONEAL COMPLETE   Final Result   1. Normal appearance of the bilateral kidneys. No hydronephrosis. 2.  A Mahan catheter is decompressing the bladder. XR CHEST PORTABLE   Final Result   Worsening infiltrate and or atelectasis on the left, stable on the right with   suspected layering pleural effusion.          XR CHEST PORTABLE   Final Result   Unchanged bilateral atelectasis and or infiltrate as well as small right   pleural effusion. MRI BRAIN WO CONTRAST   Final Result   There are 2 small regions of petechial infarcts one in the left   posterior frontal lobe and a second in the left parietal lobe not   exceeding 3 mm. There is otherwise extensive small vessel ischemic   changes         XR CHEST PORTABLE   Final Result   1. Multifocal bilateral airspace disease more prominent within the lower   lobes. The airspace disease is unchanged when compared with the prior study. CT HEAD WO CONTRAST   Final Result   Diffuse atrophy likely age related   Findings compatible with small vessel ischemic changes. XR CHEST PORTABLE   Final Result   1. Pulmonary opacities present favoring edema and atelectasis, also small   pleural effusions, but nonspecific with some additional considerations noted   above   2. Support devices present as described above   3. Heart size appears borderline enlarged         XR ABDOMEN FOR NG/OG/NE TUBE PLACEMENT   Final Result   NG/OG is in the stomach. IR CAROTID STENT W PROTECTION   Final Result   1. Angiogram demonstrates successful placement of a carotid stent ,   post procedure images demonstrate a widely patent stent and internal   carotid         CT HEAD WO CONTRAST   Final Result   Diffuse atrophy likely age related   Findings compatible with small vessel ischemic changes. Findings were called at the time of dictation         CT BRAIN PERFUSION   Final Result      No significant ischemic penumbra identified      This study was analyzed by the [employer]. ai algorithm. CTA NECK W CONTRAST   Final Result   1. Estimated stenosis of the proximal right and left internal carotid   artery by NASCET criteria is greater than 90% on the left   2. Severe atherosclerotic disease . 3. No large vessel occlusion identified            This study was analyzed by the [employer]. ai algorithm. CTA HEAD W CONTRAST   Final Result   1. Estimated stenosis of the proximal right and left internal carotid   artery by NASCET criteria is greater than 90% on the left   2. Severe atherosclerotic disease . 3. No large vessel occlusion identified            This study was analyzed by the [employer] ai algorithm. XR CHEST PORTABLE    (Results Pending)       Vitals:    BP (!) 189/97   Pulse 75   Temp 98 °F (36.7 °C) (Temporal)   Resp 16   Ht 5' 8\" (1.727 m)   Wt 260 lb 8 oz (118.2 kg)   SpO2 98%   BMI 39.61 kg/m²     LABS:   Recent Labs     10/14/22  0413 10/14/22  2311 10/15/22  0400   WBC 8.6  --  7.8   HGB 8.0* 8.0* 7.8*   HCT 24.3* 24.0* 23.9*   *  --  105*       Recent Labs     10/15/22  0400 10/15/22  0842   * 146   K 3.9 3.4*    103   CO2 29 32*   PHOS 3.6  --    BUN 28* 28*   CREATININE 1.4* 1.4*       Recent Labs     10/13/22  0635 10/14/22  0413 10/15/22  0400   PROT  --  5.2* 5.4*   INR 1.4  --   --          ASSESSMENTS:   1. Right foot wound diabetic ulcer  2. DM  3. Pain right foot  Acute cerebrovascular accident (CVA) ([facility] [address]            PLAN:  -Patient examined and evaluated at bedside. All pertinent labs, charts, and imaging reviewed prior to encounter   -Antibiotics as per ID: Cipro, Zyvox  -Culture: Corynebacteria  -Continue Wound vac changes to E. MWF Dressing changes. Wound vac noted to day with good seal, running at 125mmHg with minimal debris in cannister.   -X rays: No acute osseous abnormalities to foot   -Will continue conservative care for now.    -Patient discussed with Dr. Bertha Lugo  -Continue offloading

## 2022-10-15 NOTE — PROGRESS NOTES
[facility] ([facility])  Gastroenterology, Hepatology &  Advanced Endoscopy     Progress Note    SUBJECTIVE:      Patient sedated on high flow nasal cannula and no longer on BiPAP. Brown liquid stool remains in FMS. No abdominal pain. No further transfusions needed overnight. OBJECTIVE      Physical    VITALS:  BP (!) 185/91   Pulse 79   Temp 98 °F (36.7 °C) (Temporal)   Resp 20   Ht 5' 8\" (1.727 m)   Wt 260 lb 8 oz (118.2 kg)   SpO2 96%   BMI 39.61 kg/m²   Physical Exam:  General: Ill-appearing, NAD  HEENT: PERRLA, EOMI, Anicteric sclera, MMM, no rhinorrhea. High flow nasal cannula and NG tube in place. Cards: RRR, mild LE edema  Resp: Breathing comfortably on HFNC, good air movement, no use of accessory muscles, no audible wheezing  Abdomen: soft, NT, ND  Extremities: Moves all extremities, no effusions or bruising. Dressing and wound vac in place in R foot. Skin: No rashes or jaundice  Neuro: Sedated, comfortable. ASSESSMENT AND PLAN      67y/M w/ CKD, DMII, and MATT who presents w/ acute CVA now on DAPT and R foot infection s/p debridement who has been having issues with persistent anemia requiring several transfusions. Over the weekend, he showed active signs of bleeding with positive tagged RBC scan. EGD negative, Colonoscopy with BRB in R colon and no actively bleeding lesions. CTA w/ no active extravasation. DAPT has since been d/c. PLAN:  - S/p IV Vit K for elevated INR with adequate response. - Agree w/ holding DAPT despite known risk in the setting of active bleeding requiring transfusion.  - Continue to trend and transfuse to Hgb >7.   - Continue to prep through NG until BM clear. - Will perform colonoscopy once adequately prepped. - Further decision-making based on findings. I will follow. Thank you for including us in the care of this patient. Please do not hesitate to contact us with any additional questions or concerns.      Werner Ochoa, MD  Gastroenterology/Hepatology  Advanced Endoscopy

## 2022-10-15 NOTE — PLAN OF CARE
Problem: Discharge Planning  Goal: Discharge to home or other facility with appropriate resources  10/15/2022 0046 by Neema Good RN  Outcome: Progressing  10/14/2022 2043 by Neema Good RN  Outcome: Progressing  Flowsheets (Taken 10/14/2022 2000)  Discharge to home or other facility with appropriate resources: Identify barriers to discharge with patient and caregiver  10/14/2022 1505 by Caryl Maldonado RN  Outcome: Progressing  Flowsheets (Taken 10/14/2022 0800 by Georgie Carvalho)  Discharge to home or other facility with appropriate resources: Identify barriers to discharge with patient and caregiver     Problem: Pain  Goal: Verbalizes/displays adequate comfort level or baseline comfort level  10/15/2022 0046 by Neema Good RN  Outcome: Progressing  10/14/2022 2043 by Neema Good RN  Outcome: Progressing  Flowsheets (Taken 10/14/2022 2000)  Verbalizes/displays adequate comfort level or baseline comfort level: Encourage patient to monitor pain and request assistance  10/14/2022 1505 by Caryl Maldonado RN  Outcome: Progressing     Problem: Skin/Tissue Integrity  Goal: Absence of new skin breakdown  Description: 1. Monitor for areas of redness and/or skin breakdown  2. Assess vascular access sites hourly  3. Every 4-6 hours minimum:  Change oxygen saturation probe site  4. Every 4-6 hours:  If on nasal continuous positive airway pressure, respiratory therapy assess nares and determine need for appliance change or resting period.   10/15/2022 0046 by Neema Good RN  Outcome: Progressing  10/14/2022 2043 by Neema Good RN  Outcome: Progressing  10/14/2022 1505 by Caryl Maldonado RN  Outcome: Progressing     Problem: Safety - Adult  Goal: Free from fall injury  10/15/2022 0046 by Neema Good RN  Outcome: Progressing  10/14/2022 2043 by Neema Good RN  Outcome: Progressing  10/14/2022 1505 by Caryl Maldonado RN  Outcome: Progressing  Flowsheets (Taken 10/14/2022 0934 by Zaida Farley)  Free From Fall Injury: Instruct family/caregiver on patient safety     Problem: ABCDS Injury Assessment  Goal: Absence of physical injury  10/15/2022 0046 by Mehran Martinez RN  Outcome: Progressing  10/14/2022 2043 by Mehran Martinez RN  Outcome: Progressing  10/14/2022 1505 by Kittie Soulier, RN  Outcome: Progressing  Flowsheets (Taken 10/14/2022 0934 by Zaida Farley)  Absence of Physical Injury: Implement safety measures based on patient assessment     Problem: Chronic Conditions and Co-morbidities  Goal: Patient's chronic conditions and co-morbidity symptoms are monitored and maintained or improved  10/15/2022 0046 by Mehran Martinez RN  Outcome: Progressing  10/14/2022 2043 by Mehran Martinez RN  Outcome: Progressing  Flowsheets (Taken 10/14/2022 2000)  Care Plan - Patient's Chronic Conditions and Co-Morbidity Symptoms are Monitored and Maintained or Improved: Monitor and assess patient's chronic conditions and comorbid symptoms for stability, deterioration, or improvement  10/14/2022 1505 by Kittie Soulier, RN  Outcome: Progressing  Flowsheets (Taken 10/14/2022 0800 by Zaida Farley)  Care Plan - Patient's Chronic Conditions and Co-Morbidity Symptoms are Monitored and Maintained or Improved: Monitor and assess patient's chronic conditions and comorbid symptoms for stability, deterioration, or improvement     Problem: Neurosensory - Adult  Goal: Achieves stable or improved neurological status  10/15/2022 0046 by Mehran Martinez RN  Outcome: Progressing  Flowsheets (Taken 10/14/2022 2200)  Achieves stable or improved neurological status: Assess for and report changes in neurological status  10/14/2022 2043 by Mehran Martinez RN  Outcome: Progressing  10/14/2022 1505 by Kittie Soulier, RN  Outcome: Progressing  Flowsheets (Taken 10/14/2022 0800 by Zaida Farley)  Achieves stable or improved neurological status:   Assess for and report changes in neurological status   Maintain blood pressure and fluid volume within ordered parameters to optimize cerebral perfusion and minimize risk of hemorrhage  Goal: Remains free of injury related to seizures activity  10/15/2022 0046 by Camron Griffith RN  Outcome: Progressing  Flowsheets (Taken 10/14/2022 2200)  Remains free of injury related to seizure activity: Maintain airway, patient safety  and administer oxygen as ordered  10/14/2022 2043 by Camron Griffith RN  Outcome: Progressing  10/14/2022 1505 by Gregg Orr RN  Outcome: Progressing  Flowsheets (Taken 10/14/2022 0800 by [place])  Remains free of injury related to seizure activity: Maintain airway, patient safety  and administer oxygen as ordered  Goal: Achieves maximal functionality and self care  10/15/2022 0046 by Camron Griffith RN  Outcome: Progressing  Flowsheets (Taken 10/14/2022 2200)  Achieves maximal functionality and self care: Monitor swallowing and airway patency with patient fatigue and changes in neurological status  10/14/2022 2043 by Camron Griffith RN  Outcome: Progressing  10/14/2022 1505 by Gregg Orr RN  Outcome: Progressing  Flowsheets (Taken 10/14/2022 0800 by [place])  Achieves maximal functionality and self care: Monitor swallowing and airway patency with patient fatigue and changes in neurological status     Problem: Respiratory - Adult  Goal: Achieves optimal ventilation and oxygenation  10/15/2022 0046 by Camron Griffith RN  Outcome: Progressing  10/14/2022 2043 by Camron Griffith RN  Outcome: Progressing  10/14/2022 1505 by Gregg Orr RN  Outcome: Progressing  Flowsheets (Taken 10/14/2022 0800 by [place])  Achieves optimal ventilation and oxygenation:   Assess for changes in respiratory status   Assess for changes in mentation and behavior   Position to facilitate oxygenation and minimize respiratory effort     Problem: Cardiovascular - Adult  Goal: Maintains optimal cardiac output and hemodynamic stability  10/15/2022 0046 by Gregorio Garcia RN  Outcome: Progressing  10/14/2022 2043 by Gregorio Garcia RN  Outcome: Progressing  10/14/2022 1505 by Chencho Craig RN  Outcome: Progressing  Flowsheets (Taken 10/14/2022 0800 by Ramon Figueredo)  Maintains optimal cardiac output and hemodynamic stability:   Monitor blood pressure and heart rate   Assess for signs of decreased cardiac output  Goal: Absence of cardiac dysrhythmias or at baseline  10/15/2022 0046 by Gregorio Garcia RN  Outcome: Progressing  10/14/2022 2043 by Gregorio Garcia RN  Outcome: Progressing  10/14/2022 1505 by Chencho Craig RN  Outcome: Progressing  Flowsheets (Taken 10/14/2022 0800 by Ramon Figueredo)  Absence of cardiac dysrhythmias or at baseline: Monitor cardiac rate and rhythm     Problem: Skin/Tissue Integrity - Adult  Goal: Skin integrity remains intact  10/15/2022 0046 by Gregorio Garcia RN  Outcome: Progressing  10/14/2022 2043 by Gregorio Garcia RN  Outcome: Progressing  10/14/2022 1505 by Chencho Craig RN  Outcome: Progressing  Flowsheets (Taken 10/14/2022 0800 by Ramon Figueredo)  Skin Integrity Remains Intact:   Monitor for areas of redness and/or skin breakdown   Assess vascular access sites hourly   Every 4-6 hours minimum: Change oxygen saturation probe site   Every 4-6 hours: If on nasal continuous positive airway pressure, respiratory therapy assesses nares and determine need for appliance change or resting period  Goal: Incisions, wounds, or drain sites healing without S/S of infection  10/15/2022 0046 by Gregorio Garcia RN  Outcome: Progressing  10/14/2022 2043 by Gregorio Garcia RN  Outcome: Progressing  10/14/2022 1505 by Chencho Craig RN  Outcome: Progressing  Goal: Oral mucous membranes remain intact  10/15/2022 0046 by Gregorio Garcia RN  Outcome: Progressing  10/14/2022 2043 by Gregorio Garcia RN  Outcome: Progressing  10/14/2022 1505 by Chencho Craig RN  Outcome: Progressing     Problem: Metabolic/Fluid and Electrolytes - Adult  Goal: Electrolytes maintained within normal limits  10/15/2022 0046 by Liudmila Greenfield RN  Outcome: Progressing  10/14/2022 2043 by Liudmila Greenfield RN  Outcome: Progressing  Flowsheets (Taken 10/14/2022 2000)  Electrolytes maintained within normal limits: Monitor labs and assess patient for signs and symptoms of electrolyte imbalances  10/14/2022 1505 by Sonny Roberts RN  Outcome: Progressing  Flowsheets (Taken 10/14/2022 0800 by Dustin Alegria)  Electrolytes maintained within normal limits:   Monitor labs and assess patient for signs and symptoms of electrolyte imbalances   Administer electrolyte replacement as ordered   Monitor response to electrolyte replacements, including repeat lab results as appropriate  Goal: Hemodynamic stability and optimal renal function maintained  10/15/2022 0046 by Liudmila Greenfield RN  Outcome: Progressing  10/14/2022 2043 by Liudmila Greenfield RN  Outcome: Progressing  Flowsheets (Taken 10/14/2022 2000)  Hemodynamic stability and optimal renal function maintained: Monitor labs and assess for signs and symptoms of volume excess or deficit  10/14/2022 1505 by Sonny Roberts RN  Outcome: Progressing  Flowsheets (Taken 10/14/2022 0800 by Dustin Alegria)  Hemodynamic stability and optimal renal function maintained:   Monitor labs and assess for signs and symptoms of volume excess or deficit   Monitor intake, output and patient weight   Monitor response to interventions for patient's volume status, including labs, urine output, blood pressure (other measures as available)  Goal: Glucose maintained within prescribed range  10/15/2022 0046 by Liudmila Greenfield RN  Outcome: Progressing  10/14/2022 2043 by Liudmila Greenfield RN  Outcome: Progressing  Flowsheets (Taken 10/14/2022 2000)  Glucose maintained within prescribed range: Monitor blood glucose as ordered  10/14/2022 1505 by Sonny Roberts RN  Outcome: Progressing  Flowsheets (Taken 10/14/2022 0800 by Salvatore Stanley)  Glucose maintained within prescribed range: Monitor blood glucose as ordered     Problem: Hematologic - Adult  Goal: Maintains hematologic stability  10/15/2022 0046 by Cara Bryant RN  Outcome: Progressing  10/14/2022 2043 by Cara Bryant RN  Outcome: Progressing  Flowsheets (Taken 10/14/2022 2000)  Maintains hematologic stability: Assess for signs and symptoms of bleeding or hemorrhage  10/14/2022 1505 by Melissa Jean-Baptiste RN  Outcome: Progressing  Flowsheets (Taken 10/14/2022 0800 by Salvatore Stanley)  Maintains hematologic stability:   Assess for signs and symptoms of bleeding or hemorrhage   Administer blood products/factors as ordered     Problem: Nutrition Deficit:  Goal: Optimize nutritional status  10/15/2022 0046 by Cara Bryant RN  Outcome: Progressing  10/14/2022 2043 by Cara Bryant RN  Outcome: Progressing  10/14/2022 1505 by Melissa Jean-Baptiste RN  Outcome: Progressing  Flowsheets (Taken 10/14/2022 1158 by Rufus Solorzano, RD, LD)  Nutrient intake appropriate for improving, restoring, or maintaining nutritional needs: Assess nutritional status and recommend course of action     Problem: Safety - Medical Restraint  Goal: Remains free of injury from restraints (Restraint for Interference with Medical Device)  Description: INTERVENTIONS:  1. Determine that other, less restrictive measures have been tried or would not be effective before applying the restraint  2. Evaluate the patient's condition at the time of restraint application  3. Inform patient/family regarding the reason for restraint  4.  Q2H: Monitor safety, psychosocial status, comfort, nutrition and hydration  10/15/2022 0046 by Cara Bryant RN  Outcome: Progressing  10/14/2022 2043 by Cara Bryant RN  Outcome: Progressing  10/14/2022 1505 by Melissa Jean-Baptiste RN  Outcome: Progressing

## 2022-10-15 NOTE — ANESTHESIA PRE PROCEDURE
Department of Anesthesiology  Preprocedure Note       Name:  Herman Fritz   Age:  74 y.o.  :  1955                                          MRN:  [id number]         Date:  10/15/2022      Surgeon: Ewa Zhao):  Leroy Escalante MD    Procedure: Procedure(s):  COLONOSCOPY DIAGNOSTIC bedside    Medications prior to admission:   Prior to Admission medications    Medication Sig Start Date End Date Taking? Authorizing Provider   apixaban (ELIQUIS) 5 MG TABS tablet Take 5 mg by mouth 2 times daily   Yes Historical Provider, MD   aspirin 81 MG chewable tablet Take 81 mg by mouth daily   Yes Historical Provider, MD   carbidopa-levodopa (SINEMET)  MG per tablet Take 1 tablet by mouth 3 times daily   Yes Historical Provider, MD   carvedilol (COREG) 25 MG tablet Take 25 mg by mouth 2 times daily (with meals)   Yes Historical Provider, MD   cetirizine (ZYRTEC) 10 MG tablet Take 10 mg by mouth daily   Yes Historical Provider, MD   clopidogrel (PLAVIX) 75 MG tablet Take 75 mg by mouth daily   Yes Historical Provider, MD   diphenhydrAMINE (BENADRYL) 25 MG capsule Take 25 mg by mouth every 6 hours   Yes Historical Provider, MD   ezetimibe (ZETIA) 10 MG tablet Take 10 mg by mouth daily   Yes Historical Provider, MD   fluticasone (FLONASE) 50 MCG/ACT nasal spray 2 sprays by Each Nostril route daily   Yes Historical Provider, MD   gabapentin (NEURONTIN) 600 MG tablet Take 600 mg by mouth 4 times daily. Yes Historical Provider, MD   HYDROcodone-acetaminophen (NORCO) 5-325 MG per tablet Take 1 tablet by mouth 2 times daily. Yes Historical Provider, MD   Insulin Glargine, 2 Unit Dial, (TOUJEO MAX SOLOSTAR) 300 UNIT/ML SOPN Inject 66 Units into the skin daily   Yes Historical Provider, MD   piperacillin-tazobactam (ZOSYN) 3-0.375 GM per 50ML IVPB extended infusion Infuse 4.5 mg intravenously in the morning and 4.5 mg at noon and 4.5 mg in the evening.    Yes Historical Provider, MD   acetaminophen (TYLENOL) 325 MG tablet Take 650 mg by mouth every 6 hours as needed for Pain   Yes Historical Provider, MD   amLODIPine (NORVASC) 5 MG tablet Take 5 mg by mouth daily   Yes Historical Provider, MD   benzonatate (TESSALON) 100 MG capsule Take 100 mg by mouth 3 times daily   Yes Historical Provider, MD   cloNIDine (CATAPRES) 0.1 MG tablet Take 0.1 mg by mouth in the morning and 0.1 mg in the evening.    Yes Historical Provider, MD   hydrALAZINE (APRESOLINE) 100 MG tablet Take 100 mg by mouth 3 times daily   Yes Historical Provider, MD       Current medications:    Current Facility-Administered Medications   Medication Dose Route Frequency Provider Last Rate Last Admin    potassium chloride (KLOR-CON M) extended release tablet 20 mEq  20 mEq Oral Daily with breakfast Destini Simmons MD        haloperidol (HALDOL) tablet 2 mg  2 mg Oral BID Destini Simmons MD   2 mg at 10/15/22 1108    polyethylene glycol (GoLYTELY) solution 6,000 mL  6,000 mL Per NG tube Once Leroy Escalante MD        clopidogrel (PLAVIX) tablet 75 mg  75 mg Oral Daily Destini Simmons MD        dextrose 5 % solution   IntraVENous Continuous CONSTANTINE Craig  mL/hr at 10/15/22 1013 New Bag at 10/15/22 1013    dexmedetomidine (PRECEDEX) 1,000 mcg in sodium chloride 0.9 % 250 mL infusion  0.1-1.5 mcg/kg/hr IntraVENous Continuous Destini Simmons MD 44.33 mL/hr at 10/15/22 0921 1.5 mcg/kg/hr at 10/15/22 0921    0.9 % sodium chloride infusion   IntraVENous PRN CONSTANTINE Craig - CNP        QUEtiapine (SEROQUEL) tablet 50 mg  50 mg Oral BID CONSTANTINE Rivas - CNP   50 mg at 10/15/22 1024    sodium chloride flush 0.9 % injection 5-40 mL  5-40 mL IntraVENous 2 times per day Leroy Escalante MD   10 mL at 10/14/22 2000    sodium chloride flush 0.9 % injection 5-40 mL  5-40 mL IntraVENous PRN Leroy Escalante MD        0.9 % sodium chloride infusion  25 mL IntraVENous PRN Leroy Escalante MD        0.9 % sodium chloride infusion   IntraVENous PRN CONSTANTINE Rivas - CNP miconazole nitrate 2 % ointment   Topical BID Rheba Eng, DO   Given at 10/15/22 8880    And    miconazole nitrate 2 % ointment   Topical TID PRN Rheba Eng, DO   Given at 10/14/22 2000    [Held by provider] gabapentin (NEURONTIN) capsule 600 mg  600 mg Oral 4x daily Laura Springer MD   600 mg at 10/10/22 2102    amLODIPine (NORVASC) tablet 10 mg  10 mg Oral Daily CONSTANTINE Shen CNP   10 mg at 10/15/22 5819    white petrolatum ointment   Topical BID Rheba Eng, DO   Given at 10/15/22 0825    sodium chloride flush 0.9 % injection 5-40 mL  5-40 mL IntraVENous 2 times per day Laura Springer MD   10 mL at 10/15/22 0826    sodium chloride flush 0.9 % injection 5-40 mL  5-40 mL IntraVENous PRN Laura Springer MD        0.9 % sodium chloride infusion   IntraVENous PRN Laura Springer MD        miconazole (MICOTIN) 2 % powder   Topical BID Rheba Eng, DO   Given at 10/15/22 0824    [Held by provider] insulin glargine-yfgn (SEMGLEE-YFGN) injection vial 32 Units  32 Units SubCUTAneous QAM Rheba Eng, DO   32 Units at 10/07/22 0911    epoetin sharath-epbx (RETACRIT) injection 6,000 Units  6,000 Units SubCUTAneous Once per day on Mon Wed Fri Laura Springer MD   6,000 Units at 10/14/22 0757    hydrALAZINE (APRESOLINE) tablet 50 mg  50 mg Oral 3 times per day CONSTANTINE Cast CNP   50 mg at 10/08/22 4211    insulin lispro (HUMALOG) injection vial 0-16 Units  0-16 Units SubCUTAneous 4x Daily AC & HS Laura Springer MD   4 Units at 10/15/22 1116    pantoprazole (PROTONIX) 40 mg in sodium chloride (PF) 10 mL injection  40 mg IntraVENous Q12H Laura Springer MD   40 mg at 10/15/22 0139    metoprolol succinate (TOPROL XL) extended release tablet 25 mg  25 mg Oral Daily Laura Springer MD   25 mg at 10/12/22 0748    [Held by provider] sennosides (SENOKOT) 8.8 MG/5ML syrup 5 mL  5 mL Oral Nightly Laura Springer MD   5 mL at 10/09/22 2300    labetalol (NORMODYNE;TRANDATE) injection 10 mg  10 mg IntraVENous Q30 Min PRN Ilya Urbina MD   10 mg at 10/15/22 1103    hydrALAZINE (APRESOLINE) injection 10 mg  10 mg IntraVENous Q30 Min PRN Ilya Urbina MD   10 mg at 10/15/22 0442    glucose chewable tablet 16 g  4 tablet Oral PRN Ilya Urbina MD        dextrose bolus 10% 125 mL  125 mL IntraVENous PRN Ilya Urbina MD        Or    dextrose bolus 10% 250 mL  250 mL IntraVENous PRN Ilya Urbina MD        glucagon (rDNA) injection 1 mg  1 mg SubCUTAneous PRN Ilya Urbina MD        dextrose 10 % infusion   IntraVENous Continuous PRN Ilya Urbina MD        carbidopa-levodopa (SINEMET)  MG per tablet 1 tablet  1 tablet Per NG tube TID Ilya Urbina MD   1 tablet at 10/15/22 0823    ipratropium-albuterol (DUONEB) nebulizer solution 1 ampule  1 ampule Inhalation Q4H WA Ilya Urbina MD   1 ampule at 10/15/22 1137    collagenase ointment   Topical Q MWF Ilya Urbina MD   Given at 10/14/22 1200    benzonatate (TESSALON) capsule 100 mg  100 mg Oral TID Ilya Urbina MD   100 mg at 10/12/22 2006    ezetimibe (ZETIA) tablet 10 mg  10 mg Oral Daily Ilya Urbina MD   10 mg at 10/15/22 7996    [Held by provider] ticagrelor (BRILINTA) tablet 90 mg  90 mg Oral BID Ilya Urbina MD   90 mg at 10/09/22 2300    [Held by provider] aspirin EC tablet 81 mg  81 mg Oral Daily Ilya Urbina MD   81 mg at 10/09/22 0965    Or    [Held by provider] aspirin suppository 300 mg  300 mg Rectal Daily Ilya Urbina MD   300 mg at 09/29/22 0809    atorvastatin (LIPITOR) tablet 80 mg  80 mg Oral Nightly Ilya Urbina MD   80 mg at 10/12/22 2004       Allergies:     Allergies   Allergen Reactions    Codeine Other (See Comments)     blisters       Problem List:    Patient Active Problem List   Diagnosis Code    Acute cerebrovascular accident (CVA) ([facility] I63.9    Acute respiratory failure with hypoxia (HCC) J96.01    Stenosis of left carotid artery I65.22    Hypoalbuminemia E88.09    Electrolyte imbalance E87.8 Hypernatremia E87.0    Anemia D64.9    GI bleeding K92.2       Past Medical History:        Diagnosis Date    Chronic back pain     CKD (chronic kidney disease)     CVA (cerebral vascular accident) ([facility]     CVA (cerebral vascular accident) ([facility]     DM (diabetes mellitus) ([facility]     Major depression     MI (myocardial infarction) ([facility]     MATT (obstructive sleep apnea)     Parkinson disease ([facility]     PVD (peripheral vascular disease) ([facility]     Seizure ([facility]        Past Surgical History:        Procedure Laterality Date    COLONOSCOPY N/A 10/9/2022    COLONOSCOPY DIAGNOSTIC performed by Holli Zimmer MD at [place] [address]      IR CAROTID STENT UNI W PROTECTION  9/27/2022    IR CAROTID STENT UNI W PROTECTION 9/27/2022 Melissa Mayorga MD [facility]    UPPER GASTROINTESTINAL ENDOSCOPY N/A 10/3/2022    EGD DIAGNOSTIC ONLY performed by Freddie Tan MD at [address] [place] [address] History:    Social History     Tobacco Use    Smoking status: Unknown    Smokeless tobacco: Not on file   Substance Use Topics    Alcohol use: Not on file                                Counseling given: Not Answered      Vital Signs (Current):   Vitals:    10/15/22 1000 10/15/22 1100 10/15/22 1137 10/15/22 1200   BP: (!) 189/97 (!) 194/87  (!) 188/83   Pulse: 75 73 74 72   Resp: 16 15 21 16   Temp:       TempSrc:       SpO2: 98% 98% 97% 97%   Weight:       Height:                                                  BP Readings from Last 3 Encounters:   10/15/22 (!) 188/83       NPO Status:                                                                                 BMI:   Wt Readings from Last 3 Encounters:   09/27/22 260 lb 8 oz (118.2 kg)     Body mass index is 39.61 kg/m².     CBC:   Lab Results   Component Value Date/Time    WBC 7.8 10/15/2022 04:00 AM    RBC 2.61 10/15/2022 04:00 AM    HGB 7.5 10/15/2022 11:20 AM    HCT 22.8 10/15/2022 11:20 AM    HCT 15.0 09/27/2022 08:58 PM    MCV 91.6 10/15/2022 04:00 AM    RDW 15.4 10/15/2022 04:00 AM     10/15/2022 04:00 AM       CMP:   Lab Results   Component Value Date/Time     10/15/2022 08:42 AM    K 3.4 10/15/2022 08:42 AM    K 4.7 10/10/2022 05:21 PM     10/15/2022 08:42 AM    CO2 32 10/15/2022 08:42 AM    BUN 28 10/15/2022 08:42 AM    CREATININE 1.4 10/15/2022 08:42 AM    GFRAA >60 10/15/2022 08:42 AM    LABGLOM 51 10/15/2022 08:42 AM    GLUCOSE 204 10/15/2022 08:42 AM    PROT 5.4 10/15/2022 04:00 AM    CALCIUM 8.1 10/15/2022 08:42 AM    BILITOT 0.6 10/15/2022 04:00 AM    ALKPHOS 453 10/15/2022 04:00 AM    AST 36 10/15/2022 04:00 AM    ALT 24 10/15/2022 04:00 AM       POC Tests: No results for input(s): POCGLU, POCNA, POCK, POCCL, POCBUN, POCHEMO, POCHCT in the last 72 hours. Coags:   Lab Results   Component Value Date/Time    PROTIME 15.6 10/13/2022 06:35 AM    INR 1.4 10/13/2022 06:35 AM    APTT 37.8 10/12/2022 10:44 AM       HCG (If Applicable): No results found for: PREGTESTUR, PREGSERUM, HCG, HCGQUANT     ABGs: No results found for: PHART, PO2ART, RPT1FZG, GEU2PND, BEART, A5VLUKOP     Type & Screen (If Applicable):  No results found for: LABABO, LABRH    Drug/Infectious Status (If Applicable):  No results found for: HIV, HEPCAB    COVID-19 Screening (If Applicable):   Lab Results   Component Value Date/Time    COVID19 Not Detected 10/01/2022 08:23 AM     EKG 10/10/22  Narrative & Impression    Normal sinus rhythm  Normal ECG  When compared with ECG of 28-SEP-2022 08:46,  No significant change was found     ECHO 9/28/22   Summary   Severe concentric left ventricular hypertrophy. Ejection fraction is visually estimated at 60 to 65%. Left ventricular diastolic filling is elevated . Mildly dilated right ventricle with normal systolic function. Agitated saline injected for shunt evaluation was technically difficult   due to pt being on vent and lack of corperation.  Please obtain limited   echo with Bubble study once pt is extubated and cooperative. Mild mitral regurgitation is present. Mild tricuspid regurgitation. RVSP is 42 mmHg. Physiologic and/or trace, Mild pulmonic regurgitation present. CXR 10/15/22  FINDINGS:   Stable bilateral pulmonary opacities. New right pleural effusion. No   pneumothorax. Stable cardiomegaly. The osseous structures are without acute   process. Anesthesia Evaluation  Patient summary reviewed and Nursing notes reviewed no history of anesthetic complications:   Airway: Mallampati: II  TM distance: >3 FB   Neck ROM: full  Mouth opening: > = 3 FB   Dental:    (+) edentulous      Pulmonary:   (+) COPD:  sleep apnea: on CPAP,  decreased breath sounds: right     (-) not a current smoker                          ROS comment: R pleural effusion  Former smoker - quit 25 year ago  Pt on HFNC  Wears 2-3 L O2 at home as needed   Cardiovascular:    (+) hypertension:, valvular problems/murmurs:, past MI: > 6 months, CAD (Stenosis of left carotid artery, left ICA stenosis with stent placement 9/26/22):,     CABG/stent: stent x 1 9/26/22. ECG reviewed  Rhythm: regular  Rate: normal  Echocardiogram reviewed         Beta Blocker:  Dose within 24 Hrs      ROS comment: Stenosis of left carotid artery     Neuro/Psych:   (+) seizures:, CVA (weakness and garbled speech):, neuromuscular disease: Parkinson's disease, depression/anxiety              ROS comment: Chronic back pain GI/Hepatic/Renal:   (+) renal disease: CRI,          ROS comment: GI Bleed. Endo/Other:    (+) DiabetesType II DM, using insulin, blood dyscrasia: anemia:., .                 Abdominal:             Vascular:   + PVD, aortic or cerebral, . ROS comment: Femoral artery stent. Other Findings:           Anesthesia Plan      MAC     ASA 4     (On precedex gtt)  Induction: intravenous. Anesthetic plan and risks discussed with patient. Plan discussed with CRNA and attending.                     Pantera Castorena Timur Freitas   10/15/2022        Patient to be re-evaluated by [facility] Anesthesiologist      Daniel Jarrett MD

## 2022-10-15 NOTE — PROGRESS NOTES
Hospitalist Progress Note      PCP: Elton Cowden, MD    Date of Admission: 9/27/2022  Days in the hospital: [place] Course:   Patient is a 72-year-old male with history of CKD, diabetes mellitus, obstructive sleep apnea who initially presented to [facility] with strokelike symptoms and right-sided weakness along with aphasia. Patient was noted to have severe left ICA stenosis. He was transferred to [facility] and patient underwent left ICA angioplasty and stent placement by IR. He was initially admitted to the neuro ICU. He was placed on dual antiplatelet therapy with Brilinta and aspirin. Also was noted to have GI bleed and received 15 units of PRBC. EGD was done on 10/3/2022 and no evidence of upper GI bleed noted. Colonoscopy on 10/9/2022 showed large amount of blood. CT of the abdomen did not show any localization of hemorrhage. Nuclear medicine scan was done which showed bleeding around the splenic flexure. RRT was called due to hypotension and hemorrhagic shock and patient was transferred to ICU. Due to diffuse bleeding patient was taken off dual antiplatelet therapy. He is planned for colonoscopy with GI once he has been adequately prepped. He had acute respiratory distress on 10/13/2022 and had to be placed on Bumex for volume overload. Subjective  Patient seen and examined at bedside. Patient alert, appears more comfortable compared to yesterday. Continues on HFNC     Exam:    BP (!) 185/91   Pulse 79   Temp 98 °F (36.7 °C) (Temporal)   Resp 20   Ht 5' 8\" (1.727 m)   Wt 260 lb 8 oz (118.2 kg)   SpO2 96%   BMI 39.61 kg/m²     HEENT: + Pallor, no icterus. Respiratory:  CTA, decreased air entry  Cardiovascular: RRR, no murmur. Abdomen: Soft, non-tender, BS noted. Musculoskeletal: Right foot dressing and wound VAC noted.    Neurologic: Alert, confused        Assessment/Plan:  Acute CVA with left ICA stenosis s/p angioplasty and stent placement, dual antiplatelet therapy on hold, continue serial neurochecks, follow-up with neurology    Acute lower GI bleed s/p multiple units of PRBC transfusion. Bleeding noted from splenic flexure. To be scheduled for colonoscopy soon once appropriately prepped, follow-up with GI     Acute hypoxic respiratory failure/distress, follow-up with critical care team, chest x-ray reviewed; wean oxygen as tolerated     Acute blood loss anemia status post 15 unit blood transfusion, H&H remains low but stable, monitor need for further transfusions    Volume overload, currently on Bumex drip    Acute kidney injury, renal function slowly improving, nephrology following; strict I/O    Metabolic acidosis secondary to acute kidney injury, on bicarb drip, follow-up with nephrology    Right foot nonhealing wound, on Cipro and Zyvox per ID, continue with local wound care, follow-up with podiatry    Obstructive sleep apnea on CPAP    Altered mental status secondary to metabolic encephalopathy, continue to monitor closely     Overall prognosis remains guarded      Labs:   Recent Labs     10/13/22  0408 10/13/22  1802 10/14/22  0413 10/14/22  2311 10/15/22  0400   WBC 11.9*  --  8.6  --  7.8   HGB 7.5*   < > 8.0* 8.0* 7.8*   HCT 23.4*   < > 24.3* 24.0* 23.9*   *  --  116*  --  105*    < > = values in this interval not displayed. Recent Labs     10/13/22  0408 10/13/22  1802 10/14/22  0413 10/14/22  0948 10/14/22  2022 10/15/22  0400   *   < > 149* 151* 153* 148*   K 4.0   < > 3.9 3.7 3.5 3.9   *   < > 112* 111* 109* 104   CO2 22   < > 26 28 29 29   BUN 43*   < > 37* 35* 31* 28*   CREATININE 1.5*   < > 1.5* 1.5* 1.4* 1.4*   CALCIUM 8.3*   < > 8.3* 8.6 8.5* 8.1*   PHOS 3.3  --  3.4  --   --  3.6    < > = values in this interval not displayed.        Recent Labs     10/13/22  0408 10/14/22  0413 10/15/22  0400   AST 18 101* 36   ALT <5 22 24   BILITOT 0.5 0.6 0.6   ALKPHOS 230* 523* 453*       Recent Labs 10/12/22  1044 10/13/22  0635   INR 2.4 1.4       No results for input(s): CKTOTAL, TROPONINI in the last 72 hours.     Medications:  Reviewed    Infusion Medications    dextrose 100 mL/hr at 10/15/22 3598    bumetanide 0.1 mg/mL infusion 0.5 mg/hr (10/15/22 0623)    dexmedetomidine (PRECEDEX) IV infusion 1.5 mcg/kg/hr (10/15/22 4380)    sodium chloride      sodium chloride      sodium chloride      sodium chloride      dextrose       Scheduled Medications    polyethylene glycol  6,000 mL Per NG tube Once    clopidogrel  75 mg Oral Daily    albumin human  25 g IntraVENous Q8H    QUEtiapine  50 mg Oral BID    sodium chloride flush  5-40 mL IntraVENous 2 times per day    miconazole nitrate   Topical BID    [Held by provider] gabapentin  600 mg Oral 4x daily    amLODIPine  10 mg Oral Daily    white petrolatum   Topical BID    sodium chloride flush  5-40 mL IntraVENous 2 times per day    miconazole   Topical BID    [Held by provider] insulin glargine  32 Units SubCUTAneous QAM    epoetin sharath-epbx  6,000 Units SubCUTAneous Once per day on Mon Wed Fri    [Held by provider] cloNIDine  0.1 mg Oral Q12H    hydrALAZINE  50 mg Oral 3 times per day    [Held by provider] lactulose  20 g Oral BID    insulin lispro  0-16 Units SubCUTAneous 4x Daily AC & HS    HYDROcodone-acetaminophen  1 tablet Oral BID    pantoprazole (PROTONIX) 40 mg injection  40 mg IntraVENous Q12H    metoprolol succinate  25 mg Oral Daily    [Held by provider] sennosides  5 mL Oral Nightly    carbidopa-levodopa  1 tablet Per NG tube TID    ipratropium-albuterol  1 ampule Inhalation Q4H WA    collagenase   Topical Q MWF    benzonatate  100 mg Oral TID    cetirizine  10 mg Oral Daily    ezetimibe  10 mg Oral Daily    [Held by provider] ticagrelor  90 mg Oral BID    [Held by provider] aspirin  81 mg Oral Daily    Or    [Held by provider] aspirin  300 mg Rectal Daily    atorvastatin  80 mg Oral Nightly     PRN Meds: sodium chloride, sodium chloride flush, sodium chloride, sodium chloride, miconazole nitrate **AND** miconazole nitrate, sodium chloride flush, sodium chloride, labetalol, hydrALAZINE, glucose, dextrose bolus **OR** dextrose bolus, glucagon (rDNA), dextrose, ondansetron **OR** ondansetron      Intake/Output Summary (Last 24 hours) at 10/15/2022 0827  Last data filed at 10/15/2022 3448  Gross per 24 hour   Intake 17257.78 ml   Output 99359 ml   Net 672.78 ml       Body mass index is 39.61 kg/m². Diet  ADULT DIET; Easy to Chew; 4 carb choices (60 gm/meal); Low Sodium (2 gm); 60 to 80 gm; High Fiber; 1500 ml  ADULT ORAL NUTRITION SUPPLEMENT; AM Snack, PM Snack; Standard High Calorie/High Protein Oral Supplement    Code Status  Full Code       Electronically signed by Venora Bloch, MD on 10/15/2022 at 8:27 AM  [employer]   Please contact me through perfect serve    NOTE: This report was transcribed using voice recognition software. Every effort was made to ensure accuracy; however, inadvertent computerized transcription errors may be present.

## 2022-10-15 NOTE — PROGRESS NOTES
Progress Note  10/15/2022 8:47 AM  Subjective:   Admit Date: 9/27/2022  PCP: Serafin Robertson MD  Interval History: Patient examined on high flow 02 , alert responsive in no distress     Diet: ADULT DIET; Easy to Chew; 4 carb choices (60 gm/meal); Low Sodium (2 gm); 60 to 80 gm;  High Fiber; 1500 ml  ADULT ORAL NUTRITION SUPPLEMENT; AM Snack, PM Snack; Standard High Calorie/High Protein Oral Supplement    Data:   Scheduled Meds:   polyethylene glycol  6,000 mL Per NG tube Once    clopidogrel  75 mg Oral Daily    albumin human  25 g IntraVENous Q8H    QUEtiapine  50 mg Oral BID    sodium chloride flush  5-40 mL IntraVENous 2 times per day    miconazole nitrate   Topical BID    [Held by provider] gabapentin  600 mg Oral 4x daily    amLODIPine  10 mg Oral Daily    white petrolatum   Topical BID    sodium chloride flush  5-40 mL IntraVENous 2 times per day    miconazole   Topical BID    [Held by provider] insulin glargine  32 Units SubCUTAneous QAM    epoetin sharath-epbx  6,000 Units SubCUTAneous Once per day on Mon Wed Fri    [Held by provider] cloNIDine  0.1 mg Oral Q12H    hydrALAZINE  50 mg Oral 3 times per day    [Held by provider] lactulose  20 g Oral BID    insulin lispro  0-16 Units SubCUTAneous 4x Daily AC & HS    HYDROcodone-acetaminophen  1 tablet Oral BID    pantoprazole (PROTONIX) 40 mg injection  40 mg IntraVENous Q12H    metoprolol succinate  25 mg Oral Daily    [Held by provider] sennosides  5 mL Oral Nightly    carbidopa-levodopa  1 tablet Per NG tube TID    ipratropium-albuterol  1 ampule Inhalation Q4H WA    collagenase   Topical Q MWF    benzonatate  100 mg Oral TID    cetirizine  10 mg Oral Daily    ezetimibe  10 mg Oral Daily    [Held by provider] ticagrelor  90 mg Oral BID    [Held by provider] aspirin  81 mg Oral Daily    Or    [Held by provider] aspirin  300 mg Rectal Daily    atorvastatin  80 mg Oral Nightly     Continuous Infusions:   dextrose 100 mL/hr at 10/15/22 0623    bumetanide 0.1 mg/mL infusion 0.5 mg/hr (10/15/22 8193)    dexmedetomidine (PRECEDEX) IV infusion 1.5 mcg/kg/hr (10/15/22 4579)    sodium chloride      sodium chloride      sodium chloride      sodium chloride      dextrose       PRN Meds:sodium chloride, sodium chloride flush, sodium chloride, sodium chloride, miconazole nitrate **AND** miconazole nitrate, sodium chloride flush, sodium chloride, labetalol, hydrALAZINE, glucose, dextrose bolus **OR** dextrose bolus, glucagon (rDNA), dextrose, ondansetron **OR** ondansetron  I/O last 3 completed shifts: In: 60563.3 [I.V.:1992.4; Blood:227.5; NG/GT:9390; IV Piggyback:385.4]  Out: 64177 [VNYAE:7497; DQWDU:8536]  I/O this shift:  In: -   Out: 400 [Urine:400]    Intake/Output Summary (Last 24 hours) at 10/15/2022 0847  Last data filed at 10/15/2022 0800  Gross per 24 hour   Intake 28976.78 ml   Output 19559 ml   Net 272.78 ml     CBC:   Recent Labs     10/13/22  0408 10/13/22  1802 10/14/22  0413 10/14/22  2311 10/15/22  0400   WBC 11.9*  --  8.6  --  7.8   HGB 7.5*   < > 8.0* 8.0* 7.8*   *  --  116*  --  105*    < > = values in this interval not displayed. BMP:    Recent Labs     10/14/22  0948 10/14/22  2022 10/15/22  0400   * 153* 148*   K 3.7 3.5 3.9   * 109* 104   CO2 28 29 29   BUN 35* 31* 28*   CREATININE 1.5* 1.4* 1.4*   GLUCOSE 212* 192* 218*     Hepatic:   Recent Labs     10/13/22  0408 10/14/22  0413 10/15/22  0400   AST 18 101* 36   ALT <5 22 24   BILITOT 0.5 0.6 0.6   ALKPHOS 230* 523* 453*     Troponin: No results for input(s): TROPONINI in the last 72 hours. BNP: No results for input(s): BNP in the last 72 hours. Lipids: No results for input(s): CHOL, HDL in the last 72 hours.     Invalid input(s): LDLCALCU  ABGs: No results found for: PHART, PO2ART, UTK0BCL  INR:   Recent Labs     10/12/22  1044 10/13/22  0635   INR 2.4 1.4       -----------------------------------------------------------------  RAD: CT HEAD WO CONTRAST    Result Date: 2022  Patient MRN:  [id number] : 1955 Age: 74 years Gender: Male Order Date:  2022 5:35 AM EXAM: CT HEAD WO CONTRAST NUMBER OF IMAGES:  200 INDICATION:  stroke evaluation after mechanical thrombectomy Please assign to read to Dr. Ophelia Pulido in [facility] stroke evaluation after mechanical thrombectomy What reading provider will be dictating this exam?->[facility] COMPARISON: None Technique: Low-dose CT  acquisition technique included one of following options; 1 . Automated exposure control, 2. Adjustment of MA and or KV according to patient's size or 3. Use of iterative reconstruction. Multiple CT sections were obtained with sagittal and coronal MPR reconstructions. The ventricles are prominent. The gyri and sulci appear  prominent. The white matter appears  prominent. There is no evidence for hemorrhage. There is no infarct identified. There is no mass effect identified. There is no mass identified. Diffuse atrophy likely age related Findings compatible with small vessel ischemic changes. CT HEAD WO CONTRAST    Result Date: 2022  Patient MRN:  [id number] : 1955 Age: 74 years Gender: Male Order Date:  2022 EXAM: CT HEAD WO CONTRAST NUMBER OF IMAGES:  357 INDICATION:  cva cva COMPARISON: None Technique: Low-dose CT  acquisition technique included one of following options; 1 . Automated exposure control, 2. Adjustment of MA and or KV according to patient's size or 3. Use of iterative reconstruction. Multiple CT sections were obtained with sagittal and coronal MPR reconstructions. The ventricles are prominent. The gyri and sulci appear  prominent. The white matter appears  prominent. There is no evidence for hemorrhage. There is no infarct identified. There is no mass effect identified. There is no mass identified. Diffuse atrophy likely age related Findings compatible with small vessel ischemic changes.  Findings were called at the time of dictation     XR CHEST PORTABLE    Result Date: Date:  2022 6:24 PM EXAM: CTA NECK W CONTRAST, CTA HEAD W CONTRAST NUMBER OF IMAGES:  36 INDICATION:  cva cva What reading provider will be dictating this exam?->[facility] COMPARISON: None Technique: Low-dose CT  acquisition technique included one of following options; 1 . Automated exposure control, 2. Adjustment of MA and or KV according to patient's size or 3. Use of iterative reconstruction. Contiguous spiral images were obtained in the axial plane, following the administration of intravenous contrast using CT angiographic protocol. Sagittal and coronal images were reconstructed from the axial plane acquisition. Additional MIP reconstructions were presented to aid in the interpretation of this study. Images were obtained from the skull base cranially. There is mild calcified plaque identified in the vessels compatible with atherosclerotic disease. The right carotid is moderately atherosclerotic without significant stenosis The left carotid is abnormal. There is  evidence for hemodynamically significant stenosis at the level the proximal internal carotid artery. By NASCET criteria estimated stenosis is 90% or greater The right vertebral artery is mildly atherosclerotic without significant stenosis The left vertebral artery is mildly atherosclerotic without significant stenosis The basilar artery is unremarkable The middle cerebral arteries are unremarkable The anterior cerebral arteries are unremarkable The posterior cerebral arteries are unremarkable     1. Estimated stenosis of the proximal right and left internal carotid artery by NASCET criteria is greater than 90% on the left 2. Severe atherosclerotic disease . 3. No large vessel occlusion identified This study was analyzed by the Viz. ai algorithm.      CT BRAIN PERFUSION    Result Date: 2022  Patient MRN: [id number] : 1955 Age:  74 years Gender: Male Order Date: 2022 Exam: CT BRAIN PERFUSION Number of Images: 333 views Indication:   cva cva What reading provider will be dictating this exam?->[facility] Comparison: None. Findings: Perfusion images demonstrate symmetric blood volume Blood flow images demonstrate symmetric blood flow There is no significant ischemic penumbra identified. There is no significant core infarct identified. No significant ischemic penumbra identified This study was analyzed by the Viz. ai algorithm. XR ABDOMEN FOR NG/OG/NE TUBE PLACEMENT    Result Date: 2022  EXAMINATION: ONE SUPINE XRAY VIEW(S) OF THE ABDOMEN 2022 12:40 am COMPARISON: None. HISTORY: ORDERING SYSTEM PROVIDED HISTORY: Confirmation of course of NG/OG/NE tube and location of tip of tube TECHNOLOGIST PROVIDED HISTORY: Reason for exam:->Confirmation of course of NG/OG/NE tube and location of tip of tube Portable? ->Yes What reading provider will be dictating this exam?->[facility] FINDINGS: Esophageal route catheter is into the left upper quadrant expected stomach location. No clear bowel obstruction identified. Possible constipation. Limited detailed evaluation present on exam.  Chest evaluation is done separately. NG/OG is in the stomach. IR CAROTID STENT W PROTECTION    Result Date: 2022  Patient MRN:  [id number] : 1955 Age: 74 years Gender: Male Order Date:  2022 7:00 PM Examination angiogram and carotid stent NUMBER OF IMAGES:  12 INDICATION: I65.22 Stenosis of left carotid artery left carotid stenosis What reading provider will be dictating this exam?->[facility] COMPARISON: None FINDINGS:  After obtaining informed consent and following the routine sterile prep and drape after administration of local anesthesia and following a time out a needle was inserted in the right common femoral artery and guidewire and catheter were advanced into the abdominal aorta and subsequently into the thoracic aorta. Subsequently selective catheterization of the left common carotid was performed and angiogram was performed .  Images demonstrate 80 stenosis of the proximal internal carotid artery by NASCET criteria on the  left prior to stent placement The external carotid artery is patent. The catheter was then exchanged for a guide catheter The distal protection device was placed. A filter wire was utilized Angioplasty was performed. Subsequently a carotid stent was placed Subsequently angioplasty of the stent was performed Repeat images demonstrate A patent stent with a patent distal internal carotid artery. Elbert Christianson TICI 3 0 no perfusion 1 Penetration but no distal branch filling 2a perfusion with incomplete distal branch filling, less than 50% 2b  perfusion with incomplete distal branch filling greater than 50% 3  Full perfusion with filling of distal branches The patient tolerated the procedure well. Angiogram of the right common femoral artery demonstrates a patent vessel and Angio-Seal was utilized. The procedure was performed with general anesthesia. 12 minutes 13 seconds of fluoroscopy was utilized. Time out occurred at 1920 hours. 1. Angiogram demonstrates successful placement of a carotid stent , post procedure images demonstrate a widely patent stent and internal carotid     CTA HEAD W CONTRAST    Result Date: 2022  Patient MRN:  [id number] : 1955 Age: 74 years Gender: Male Order Date:  2022 6:24 PM EXAM: CTA NECK W CONTRAST, CTA HEAD W CONTRAST NUMBER OF IMAGES:  36 INDICATION:  cva cva What reading provider will be dictating this exam?->[facility] COMPARISON: None Technique: Low-dose CT  acquisition technique included one of following options; 1 . Automated exposure control, 2. Adjustment of MA and or KV according to patient's size or 3. Use of iterative reconstruction. Contiguous spiral images were obtained in the axial plane, following the administration of intravenous contrast using CT angiographic protocol. Sagittal and coronal images were reconstructed from the axial plane acquisition.  Additional MIP reconstructions were presented to aid in the interpretation of this study. Images were obtained from the skull base cranially. There is mild calcified plaque identified in the vessels compatible with atherosclerotic disease. The right carotid is moderately atherosclerotic without significant stenosis The left carotid is abnormal. There is  evidence for hemodynamically significant stenosis at the level the proximal internal carotid artery. By NASCET criteria estimated stenosis is 90% or greater The right vertebral artery is mildly atherosclerotic without significant stenosis The left vertebral artery is mildly atherosclerotic without significant stenosis The basilar artery is unremarkable The middle cerebral arteries are unremarkable The anterior cerebral arteries are unremarkable The posterior cerebral arteries are unremarkable     1. Estimated stenosis of the proximal right and left internal carotid artery by NASCET criteria is greater than 90% on the left 2. Severe atherosclerotic disease . 3. No large vessel occlusion identified This study was analyzed by the Viz. ai algorithm. MRI BRAIN WO CONTRAST    Result Date: 2022  Patient MRN:  [id number] : 1955 Age: 74 years Gender: Male Order Date:  2022 3:24 PM EXAM: MRI BRAIN WO CONTRAST NUMBER OF IMAGES:  605 INDICATION:  Stroke Evaluation Mechanical Thrombectomy MRI of the brain 24 hours following mechanical thrombectomy. Discontinue order after first follow up. Please assign to Dr. Segundo Beard to read in [facility] Stroke Evaluation Mechanical Thrombectomy What reading provider will be dictating this exam?->[facility] COMPARISON: CT scan 2022 Total sequences obtained: 9 The ventricles are prominent. The gyri and sulci appear  prominent. The white matter appears  prominent. No convincing hemorrhage identified. There is no mass effect identified. There is no mass identified.  There is a small region of restricted diffusion measuring approximately 3 mm seen in the left parietal lobe and a similar finding present in the left posterior frontal lobe. No other restricted diffusion is seen to suggest acute infarct. There is a small region of susceptibility infarct in the left frontal lobe. There are 2 small regions of petechial infarcts one in the left posterior frontal lobe and a second in the left parietal lobe not exceeding 3 mm. There is otherwise extensive small vessel ischemic changes       Objective:   Vitals: BP (!) 185/91   Pulse 77   Temp 98 °F (36.7 °C) (Temporal)   Resp 18   Ht 5' 8\" (1.727 m)   Wt 260 lb 8 oz (118.2 kg)   SpO2 98%   BMI 39.61 kg/m²   General appearance: appears stated age   Skin:  No rashes or lesions  HEENT: Head: Normocephalic, no lesions, without obvious abnormality. Neck: no adenopathy, no carotid bruit, no JVD, supple, symmetrical, trachea midline, and thyroid not enlarged, symmetric, no tenderness/mass/nodules  Lungs: diminished breath sounds bibasilar  Heart: regular rate and rhythm, S1, S2 normal, no murmur, click, rub or gallop  Abdomen: soft, non-tender; bowel sounds normal; no masses,  no organomegaly  Extremities: edema+   Neurologic: Mental status: Alert, oriented, thought content appropriate    Assessment:   Patient Active Problem List:     Acute cerebrovascular accident (CVA) ([address]     Acute respiratory failure with hypoxia ([address]     Stenosis of left carotid artery     Hypoalbuminemia     Electrolyte imbalance     Hypernatremia     Anemia     GI bleeding    Plan:     1. Acute CVA  S/p IR intervention with angioplasty of the left ICA     2. LARY on CKD stage 3b  Baseline 1.7-1.9  combined effects of ACEI use and contrast nephrotoxicity  Component of urinary retention  nonoliguric at this time  Disproportionate elevation of BUN relative to Cr c/w gib  stool heme positive  Monitor labs and urine  3.4  L  Bun/cr   28/1.4 , u/o > 8 liters     3.  Hypertensive emergency  presenting  with acute CVA  Adjust meds to target blood pressure control to SBP less than 160  BP high , was NPO , received all meds this am , may add clonidine back as needed , d/w staff        4. Anemia, chronic with acute worsening  Informed stool heme positive  PRBC transfusion as needed  Sp egd/c scope  Trf < 7  On ASHLEY   To go for embolization if worsens  Repeat c scope today     5. Hyperkalemia better   Due to combination of LARY and metabolic acidosis; PRBC transfusios  Lokelma  prn     6 Urinary retention  Suspected situational  Now with trejo  Urology following     7. Hypernatremia  Suspect intervascular volume depletion  Na 148 - betetr        8.  Met acidosis resolved   Off  hco3 drip  Improving  Ckd/hypotension     9 resp distress  Bumex drip at this time  On bipap prn    Continue bumex drip        Mitchell Watkins MD

## 2022-10-16 ENCOUNTER — APPOINTMENT (OUTPATIENT)
Dept: GENERAL RADIOLOGY | Age: 67
DRG: 034 | End: 2022-10-16
Attending: INTERNAL MEDICINE
Payer: MEDICARE

## 2022-10-16 LAB
ALBUMIN SERPL-MCNC: 3 G/DL (ref 3.5–5.2)
ALP BLD-CCNC: 443 U/L (ref 40–129)
ALT SERPL-CCNC: 10 U/L (ref 0–40)
ANION GAP SERPL CALCULATED.3IONS-SCNC: 14 MMOL/L (ref 7–16)
ANION GAP SERPL CALCULATED.3IONS-SCNC: 15 MMOL/L (ref 7–16)
ANION GAP SERPL CALCULATED.3IONS-SCNC: 15 MMOL/L (ref 7–16)
ANION GAP SERPL CALCULATED.3IONS-SCNC: 17 MMOL/L (ref 7–16)
AST SERPL-CCNC: 27 U/L (ref 0–39)
B.E.: 2.1 MMOL/L (ref -3–3)
BASOPHILS ABSOLUTE: 0.02 E9/L (ref 0–0.2)
BASOPHILS RELATIVE PERCENT: 0.3 % (ref 0–2)
BILIRUB SERPL-MCNC: 0.6 MG/DL (ref 0–1.2)
BUN BLDV-MCNC: 22 MG/DL (ref 6–23)
BUN BLDV-MCNC: 23 MG/DL (ref 6–23)
BUN BLDV-MCNC: 24 MG/DL (ref 6–23)
BUN BLDV-MCNC: 26 MG/DL (ref 6–23)
CALCIUM IONIZED: 1.12 MMOL/L (ref 1.15–1.33)
CALCIUM IONIZED: 1.14 MMOL/L (ref 1.15–1.33)
CALCIUM SERPL-MCNC: 7.7 MG/DL (ref 8.6–10.2)
CALCIUM SERPL-MCNC: 7.8 MG/DL (ref 8.6–10.2)
CALCIUM SERPL-MCNC: 8.2 MG/DL (ref 8.6–10.2)
CALCIUM SERPL-MCNC: 8.2 MG/DL (ref 8.6–10.2)
CHLORIDE BLD-SCNC: 100 MMOL/L (ref 98–107)
CHLORIDE BLD-SCNC: 100 MMOL/L (ref 98–107)
CHLORIDE BLD-SCNC: 103 MMOL/L (ref 98–107)
CHLORIDE BLD-SCNC: 103 MMOL/L (ref 98–107)
CO2: 29 MMOL/L (ref 22–29)
CO2: 30 MMOL/L (ref 22–29)
CREAT SERPL-MCNC: 1.4 MG/DL (ref 0.7–1.2)
DELIVERY SYSTEMS: ABNORMAL
DEVICE: ABNORMAL
EOSINOPHILS ABSOLUTE: 0.39 E9/L (ref 0.05–0.5)
EOSINOPHILS RELATIVE PERCENT: 5.9 % (ref 0–6)
FIO2: 35
GFR AFRICAN AMERICAN: >60
GFR NON-AFRICAN AMERICAN: 51 ML/MIN/1.73
GLUCOSE BLD-MCNC: 154 MG/DL (ref 74–99)
GLUCOSE BLD-MCNC: 230 MG/DL (ref 74–99)
GLUCOSE BLD-MCNC: 239 MG/DL (ref 74–99)
GLUCOSE BLD-MCNC: 258 MG/DL (ref 74–99)
HCO3: 26.6 MMOL/L (ref 22–26)
HCT VFR BLD CALC: 22.3 % (ref 37–54)
HCT VFR BLD CALC: 22.9 % (ref 37–54)
HCT VFR BLD CALC: 23.1 % (ref 37–54)
HCT VFR BLD CALC: 23.5 % (ref 37–54)
HEMOGLOBIN: 7.3 G/DL (ref 12.5–16.5)
HEMOGLOBIN: 7.6 G/DL (ref 12.5–16.5)
HEMOGLOBIN: 7.7 G/DL (ref 12.5–16.5)
HEMOGLOBIN: 7.7 G/DL (ref 12.5–16.5)
IMMATURE GRANULOCYTES #: 0.08 E9/L
IMMATURE GRANULOCYTES %: 1.2 % (ref 0–5)
LYMPHOCYTES ABSOLUTE: 0.78 E9/L (ref 1.5–4)
LYMPHOCYTES RELATIVE PERCENT: 11.9 % (ref 20–42)
MAGNESIUM: 1.5 MG/DL (ref 1.6–2.6)
MAGNESIUM: 1.8 MG/DL (ref 1.6–2.6)
MCH RBC QN AUTO: 30.4 PG (ref 26–35)
MCHC RBC AUTO-ENTMCNC: 32.8 % (ref 32–34.5)
MCV RBC AUTO: 92.9 FL (ref 80–99.9)
METER GLUCOSE: 153 MG/DL (ref 74–99)
METER GLUCOSE: 184 MG/DL (ref 74–99)
METER GLUCOSE: 205 MG/DL (ref 74–99)
METER GLUCOSE: 214 MG/DL (ref 74–99)
METER GLUCOSE: 243 MG/DL (ref 74–99)
METER GLUCOSE: 367 MG/DL (ref 74–99)
MODE: ABNORMAL
MONOCYTES ABSOLUTE: 0.51 E9/L (ref 0.1–0.95)
MONOCYTES RELATIVE PERCENT: 7.8 % (ref 2–12)
NEUTROPHILS ABSOLUTE: 4.78 E9/L (ref 1.8–7.3)
NEUTROPHILS RELATIVE PERCENT: 72.9 % (ref 43–80)
O2 SATURATION: 96.7 % (ref 92–98.5)
OPERATOR ID: 2863
PATIENT TEMP: 37
PCO2 (TEMP CORRECTED): 40.4 MMHG (ref 35–45)
PDW BLD-RTO: 15.3 FL (ref 11.5–15)
PH (TEMPERATURE CORRECTED): 7.43 (ref 7.35–7.45)
PHOSPHORUS: 3.7 MG/DL (ref 2.5–4.5)
PLATELET # BLD: 124 E9/L (ref 130–450)
PMV BLD AUTO: 10.5 FL (ref 7–12)
PO2 (TEMP CORRECTED): 85.4 MMHG (ref 60–80)
POC SOURCE: ABNORMAL
POSITIVE END EXP PRESS: 6 CMH2O
POTASSIUM SERPL-SCNC: 3.3 MMOL/L (ref 3.5–5)
POTASSIUM SERPL-SCNC: 3.5 MMOL/L (ref 3.5–5)
POTASSIUM SERPL-SCNC: 3.7 MMOL/L (ref 3.5–5)
POTASSIUM SERPL-SCNC: 3.7 MMOL/L (ref 3.5–5)
PRO-BNP: 8707 PG/ML (ref 0–125)
RBC # BLD: 2.53 E12/L (ref 3.8–5.8)
RESPIRATORY RATE: 12 B/MIN
SODIUM BLD-SCNC: 144 MMOL/L (ref 132–146)
SODIUM BLD-SCNC: 146 MMOL/L (ref 132–146)
SODIUM BLD-SCNC: 147 MMOL/L (ref 132–146)
SODIUM BLD-SCNC: 147 MMOL/L (ref 132–146)
TOTAL PROTEIN: 5.5 G/DL (ref 6.4–8.3)
WBC # BLD: 6.6 E9/L (ref 4.5–11.5)

## 2022-10-16 PROCEDURE — 6370000000 HC RX 637 (ALT 250 FOR IP): Performed by: SURGERY

## 2022-10-16 PROCEDURE — S5553 INSULIN LONG ACTING 5 U: HCPCS | Performed by: INTERNAL MEDICINE

## 2022-10-16 PROCEDURE — 85025 COMPLETE CBC W/AUTO DIFF WBC: CPT

## 2022-10-16 PROCEDURE — A4216 STERILE WATER/SALINE, 10 ML: HCPCS | Performed by: SURGERY

## 2022-10-16 PROCEDURE — 2000000000 HC ICU R&B

## 2022-10-16 PROCEDURE — 82962 GLUCOSE BLOOD TEST: CPT

## 2022-10-16 PROCEDURE — 2500000003 HC RX 250 WO HCPCS: Performed by: INTERNAL MEDICINE

## 2022-10-16 PROCEDURE — 85014 HEMATOCRIT: CPT

## 2022-10-16 PROCEDURE — 2580000003 HC RX 258: Performed by: STUDENT IN AN ORGANIZED HEALTH CARE EDUCATION/TRAINING PROGRAM

## 2022-10-16 PROCEDURE — 99291 CRITICAL CARE FIRST HOUR: CPT | Performed by: INTERNAL MEDICINE

## 2022-10-16 PROCEDURE — 6370000000 HC RX 637 (ALT 250 FOR IP): Performed by: NURSE PRACTITIONER

## 2022-10-16 PROCEDURE — 82330 ASSAY OF CALCIUM: CPT

## 2022-10-16 PROCEDURE — 6360000002 HC RX W HCPCS: Performed by: SURGERY

## 2022-10-16 PROCEDURE — 84100 ASSAY OF PHOSPHORUS: CPT

## 2022-10-16 PROCEDURE — 2500000003 HC RX 250 WO HCPCS: Performed by: SURGERY

## 2022-10-16 PROCEDURE — 2580000003 HC RX 258

## 2022-10-16 PROCEDURE — C9113 INJ PANTOPRAZOLE SODIUM, VIA: HCPCS | Performed by: SURGERY

## 2022-10-16 PROCEDURE — 36415 COLL VENOUS BLD VENIPUNCTURE: CPT

## 2022-10-16 PROCEDURE — 94660 CPAP INITIATION&MGMT: CPT

## 2022-10-16 PROCEDURE — 6360000002 HC RX W HCPCS: Performed by: INTERNAL MEDICINE

## 2022-10-16 PROCEDURE — 6370000000 HC RX 637 (ALT 250 FOR IP): Performed by: INTERNAL MEDICINE

## 2022-10-16 PROCEDURE — 71045 X-RAY EXAM CHEST 1 VIEW: CPT

## 2022-10-16 PROCEDURE — 6370000000 HC RX 637 (ALT 250 FOR IP): Performed by: STUDENT IN AN ORGANIZED HEALTH CARE EDUCATION/TRAINING PROGRAM

## 2022-10-16 PROCEDURE — 83880 ASSAY OF NATRIURETIC PEPTIDE: CPT

## 2022-10-16 PROCEDURE — 2580000003 HC RX 258: Performed by: INTERNAL MEDICINE

## 2022-10-16 PROCEDURE — 2700000000 HC OXYGEN THERAPY PER DAY

## 2022-10-16 PROCEDURE — 80053 COMPREHEN METABOLIC PANEL: CPT

## 2022-10-16 PROCEDURE — 85018 HEMOGLOBIN: CPT

## 2022-10-16 PROCEDURE — 82803 BLOOD GASES ANY COMBINATION: CPT

## 2022-10-16 PROCEDURE — 2580000003 HC RX 258: Performed by: SURGERY

## 2022-10-16 PROCEDURE — 94640 AIRWAY INHALATION TREATMENT: CPT

## 2022-10-16 PROCEDURE — 80048 BASIC METABOLIC PNL TOTAL CA: CPT

## 2022-10-16 PROCEDURE — 6360000002 HC RX W HCPCS

## 2022-10-16 PROCEDURE — 83735 ASSAY OF MAGNESIUM: CPT

## 2022-10-16 RX ORDER — HYDRALAZINE HYDROCHLORIDE 20 MG/ML
10 INJECTION INTRAMUSCULAR; INTRAVENOUS EVERY 4 HOURS PRN
Status: DISCONTINUED | OUTPATIENT
Start: 2022-10-16 | End: 2022-10-26 | Stop reason: HOSPADM

## 2022-10-16 RX ORDER — BUMETANIDE 0.25 MG/ML
2 INJECTION, SOLUTION INTRAMUSCULAR; INTRAVENOUS ONCE
Status: DISCONTINUED | OUTPATIENT
Start: 2022-10-16 | End: 2022-10-16

## 2022-10-16 RX ORDER — MORPHINE SULFATE 2 MG/ML
1 INJECTION, SOLUTION INTRAMUSCULAR; INTRAVENOUS
Status: DISCONTINUED | OUTPATIENT
Start: 2022-10-16 | End: 2022-10-16 | Stop reason: SDUPTHER

## 2022-10-16 RX ORDER — ASPIRIN 81 MG/1
81 TABLET, CHEWABLE ORAL DAILY
Status: DISCONTINUED | OUTPATIENT
Start: 2022-10-16 | End: 2022-10-19

## 2022-10-16 RX ORDER — MORPHINE SULFATE 2 MG/ML
1 INJECTION, SOLUTION INTRAMUSCULAR; INTRAVENOUS
Status: DISCONTINUED | OUTPATIENT
Start: 2022-10-16 | End: 2022-10-26 | Stop reason: HOSPADM

## 2022-10-16 RX ORDER — MAGNESIUM SULFATE 1 G/100ML
1000 INJECTION INTRAVENOUS ONCE
Status: COMPLETED | OUTPATIENT
Start: 2022-10-16 | End: 2022-10-16

## 2022-10-16 RX ORDER — MAGNESIUM SULFATE IN WATER 40 MG/ML
2000 INJECTION, SOLUTION INTRAVENOUS ONCE
Status: COMPLETED | OUTPATIENT
Start: 2022-10-16 | End: 2022-10-16

## 2022-10-16 RX ORDER — POTASSIUM CHLORIDE 29.8 MG/ML
40 INJECTION INTRAVENOUS ONCE
Status: COMPLETED | OUTPATIENT
Start: 2022-10-16 | End: 2022-10-16

## 2022-10-16 RX ORDER — POTASSIUM CHLORIDE 20 MEQ/1
20 TABLET, EXTENDED RELEASE ORAL
Status: DISCONTINUED | OUTPATIENT
Start: 2022-10-16 | End: 2022-10-16

## 2022-10-16 RX ORDER — HYDRALAZINE HYDROCHLORIDE 20 MG/ML
20 INJECTION INTRAMUSCULAR; INTRAVENOUS EVERY 4 HOURS PRN
Status: DISCONTINUED | OUTPATIENT
Start: 2022-10-16 | End: 2022-10-16

## 2022-10-16 RX ORDER — METOPROLOL SUCCINATE 50 MG/1
50 TABLET, EXTENDED RELEASE ORAL DAILY
Status: DISCONTINUED | OUTPATIENT
Start: 2022-10-17 | End: 2022-10-26 | Stop reason: HOSPADM

## 2022-10-16 RX ORDER — CLONIDINE HYDROCHLORIDE 0.1 MG/1
0.1 TABLET ORAL 2 TIMES DAILY
Status: DISCONTINUED | OUTPATIENT
Start: 2022-10-16 | End: 2022-10-21

## 2022-10-16 RX ORDER — MORPHINE SULFATE 2 MG/ML
2 INJECTION, SOLUTION INTRAMUSCULAR; INTRAVENOUS ONCE
Status: COMPLETED | OUTPATIENT
Start: 2022-10-16 | End: 2022-10-16

## 2022-10-16 RX ORDER — INSULIN GLARGINE-YFGN 100 [IU]/ML
20 INJECTION, SOLUTION SUBCUTANEOUS NIGHTLY
Status: DISCONTINUED | OUTPATIENT
Start: 2022-10-16 | End: 2022-10-17

## 2022-10-16 RX ADMIN — DEXMEDETOMIDINE 1.5 MCG/KG/HR: 100 INJECTION, SOLUTION, CONCENTRATE INTRAVENOUS at 09:32

## 2022-10-16 RX ADMIN — CARBIDOPA AND LEVODOPA 1 TABLET: 25; 100 TABLET ORAL at 08:24

## 2022-10-16 RX ADMIN — ANTI-FUNGAL POWDER MICONAZOLE NITRATE TALC FREE: 1.42 POWDER TOPICAL at 21:14

## 2022-10-16 RX ADMIN — BENZONATATE 100 MG: 100 CAPSULE ORAL at 14:15

## 2022-10-16 RX ADMIN — HALOPERIDOL 3 MG: 1 TABLET ORAL at 21:14

## 2022-10-16 RX ADMIN — POTASSIUM CHLORIDE 40 MEQ: 29.8 INJECTION, SOLUTION INTRAVENOUS at 07:44

## 2022-10-16 RX ADMIN — CARBIDOPA AND LEVODOPA 1 TABLET: 25; 100 TABLET ORAL at 14:12

## 2022-10-16 RX ADMIN — GABAPENTIN 600 MG: 300 CAPSULE ORAL at 11:47

## 2022-10-16 RX ADMIN — MICONAZOLE NITRATE: 2 OINTMENT TOPICAL at 08:26

## 2022-10-16 RX ADMIN — SENNOSIDES 5 ML: 8.8 SYRUP ORAL at 21:13

## 2022-10-16 RX ADMIN — IPRATROPIUM BROMIDE AND ALBUTEROL SULFATE 1 AMPULE: .5; 2.5 SOLUTION RESPIRATORY (INHALATION) at 11:18

## 2022-10-16 RX ADMIN — HALOPERIDOL 2 MG: 2 TABLET ORAL at 08:24

## 2022-10-16 RX ADMIN — BENZONATATE 100 MG: 100 CAPSULE ORAL at 08:24

## 2022-10-16 RX ADMIN — IPRATROPIUM BROMIDE AND ALBUTEROL SULFATE 1 AMPULE: .5; 2.5 SOLUTION RESPIRATORY (INHALATION) at 15:52

## 2022-10-16 RX ADMIN — IPRATROPIUM BROMIDE AND ALBUTEROL SULFATE 1 AMPULE: .5; 2.5 SOLUTION RESPIRATORY (INHALATION) at 19:38

## 2022-10-16 RX ADMIN — INSULIN LISPRO 4 UNITS: 100 INJECTION, SOLUTION INTRAVENOUS; SUBCUTANEOUS at 05:46

## 2022-10-16 RX ADMIN — CALCIUM GLUCONATE 1000 MG: 98 INJECTION, SOLUTION INTRAVENOUS at 10:03

## 2022-10-16 RX ADMIN — SODIUM CHLORIDE, PRESERVATIVE FREE 40 MG: 5 INJECTION INTRAVENOUS at 14:14

## 2022-10-16 RX ADMIN — MAGNESIUM SULFATE HEPTAHYDRATE 2000 MG: 40 INJECTION, SOLUTION INTRAVENOUS at 11:30

## 2022-10-16 RX ADMIN — IPRATROPIUM BROMIDE AND ALBUTEROL SULFATE 1 AMPULE: .5; 2.5 SOLUTION RESPIRATORY (INHALATION) at 08:04

## 2022-10-16 RX ADMIN — LABETALOL HYDROCHLORIDE 10 MG: 5 INJECTION, SOLUTION INTRAVENOUS at 16:26

## 2022-10-16 RX ADMIN — HYDRALAZINE HYDROCHLORIDE 50 MG: 50 TABLET, FILM COATED ORAL at 05:46

## 2022-10-16 RX ADMIN — ACETAZOLAMIDE 250 MG: 500 INJECTION, POWDER, LYOPHILIZED, FOR SOLUTION INTRAVENOUS at 04:05

## 2022-10-16 RX ADMIN — HALOPERIDOL 3 MG: 1 TABLET ORAL at 14:12

## 2022-10-16 RX ADMIN — MAGNESIUM SULFATE HEPTAHYDRATE 1000 MG: 1 INJECTION, SOLUTION INTRAVENOUS at 21:11

## 2022-10-16 RX ADMIN — ATORVASTATIN CALCIUM 80 MG: 40 TABLET, FILM COATED ORAL at 21:15

## 2022-10-16 RX ADMIN — PETROLATUM: 420 OINTMENT TOPICAL at 21:15

## 2022-10-16 RX ADMIN — SODIUM CHLORIDE, PRESERVATIVE FREE 10 ML: 5 INJECTION INTRAVENOUS at 21:14

## 2022-10-16 RX ADMIN — EZETIMIBE 10 MG: 10 TABLET ORAL at 08:25

## 2022-10-16 RX ADMIN — HYDRALAZINE HYDROCHLORIDE 50 MG: 50 TABLET, FILM COATED ORAL at 22:31

## 2022-10-16 RX ADMIN — INSULIN GLARGINE-YFGN 20 UNITS: 100 INJECTION, SOLUTION SUBCUTANEOUS at 21:16

## 2022-10-16 RX ADMIN — METOPROLOL TARTRATE 25 MG: 25 TABLET, FILM COATED ORAL at 11:47

## 2022-10-16 RX ADMIN — POLYETHYLENE GLYCOL-3350 AND ELECTROLYTES 6000 ML: 236; 6.74; 5.86; 2.97; 22.74 POWDER, FOR SOLUTION ORAL at 15:33

## 2022-10-16 RX ADMIN — HYDRALAZINE HYDROCHLORIDE 20 MG: 20 INJECTION INTRAMUSCULAR; INTRAVENOUS at 02:33

## 2022-10-16 RX ADMIN — LABETALOL HYDROCHLORIDE 10 MG: 5 INJECTION, SOLUTION INTRAVENOUS at 01:49

## 2022-10-16 RX ADMIN — HYDRALAZINE HYDROCHLORIDE 50 MG: 50 TABLET, FILM COATED ORAL at 14:12

## 2022-10-16 RX ADMIN — GABAPENTIN 600 MG: 300 CAPSULE ORAL at 16:29

## 2022-10-16 RX ADMIN — PETROLATUM: 420 OINTMENT TOPICAL at 08:27

## 2022-10-16 RX ADMIN — DEXMEDETOMIDINE 1.5 MCG/KG/HR: 100 INJECTION, SOLUTION, CONCENTRATE INTRAVENOUS at 03:25

## 2022-10-16 RX ADMIN — MICONAZOLE NITRATE: 2 OINTMENT TOPICAL at 21:14

## 2022-10-16 RX ADMIN — DEXMEDETOMIDINE 1.3 MCG/KG/HR: 100 INJECTION, SOLUTION, CONCENTRATE INTRAVENOUS at 15:39

## 2022-10-16 RX ADMIN — POTASSIUM BICARBONATE 20 MEQ: 782 TABLET, EFFERVESCENT ORAL at 12:14

## 2022-10-16 RX ADMIN — MORPHINE SULFATE 2 MG: 2 INJECTION, SOLUTION INTRAMUSCULAR; INTRAVENOUS at 04:14

## 2022-10-16 RX ADMIN — AMLODIPINE BESYLATE 10 MG: 10 TABLET ORAL at 08:24

## 2022-10-16 RX ADMIN — SODIUM CHLORIDE, PRESERVATIVE FREE 40 MG: 5 INJECTION INTRAVENOUS at 03:09

## 2022-10-16 RX ADMIN — BUMETANIDE 1 MG/HR: 0.25 INJECTION, SOLUTION INTRAMUSCULAR; INTRAVENOUS at 15:40

## 2022-10-16 RX ADMIN — LABETALOL HYDROCHLORIDE 10 MG: 5 INJECTION, SOLUTION INTRAVENOUS at 10:04

## 2022-10-16 RX ADMIN — LABETALOL HYDROCHLORIDE 10 MG: 5 INJECTION, SOLUTION INTRAVENOUS at 03:06

## 2022-10-16 RX ADMIN — LABETALOL HYDROCHLORIDE 10 MG: 5 INJECTION, SOLUTION INTRAVENOUS at 12:14

## 2022-10-16 RX ADMIN — QUETIAPINE 50 MG: 25 TABLET ORAL at 08:23

## 2022-10-16 RX ADMIN — POTASSIUM CHLORIDE 40 MEQ: 29.8 INJECTION, SOLUTION INTRAVENOUS at 01:48

## 2022-10-16 RX ADMIN — INSULIN LISPRO 4 UNITS: 100 INJECTION, SOLUTION INTRAVENOUS; SUBCUTANEOUS at 11:47

## 2022-10-16 RX ADMIN — MORPHINE SULFATE 1 MG: 2 INJECTION, SOLUTION INTRAMUSCULAR; INTRAVENOUS at 16:29

## 2022-10-16 RX ADMIN — ANTI-FUNGAL POWDER MICONAZOLE NITRATE TALC FREE: 1.42 POWDER TOPICAL at 08:25

## 2022-10-16 RX ADMIN — BUMETANIDE 1 MG/HR: 0.25 INJECTION, SOLUTION INTRAMUSCULAR; INTRAVENOUS at 03:26

## 2022-10-16 RX ADMIN — QUETIAPINE 50 MG: 25 TABLET ORAL at 21:15

## 2022-10-16 RX ADMIN — CARBIDOPA AND LEVODOPA 1 TABLET: 25; 100 TABLET ORAL at 21:15

## 2022-10-16 RX ADMIN — LABETALOL HYDROCHLORIDE 10 MG: 5 INJECTION, SOLUTION INTRAVENOUS at 01:07

## 2022-10-16 RX ADMIN — INSULIN LISPRO 12 UNITS: 100 INJECTION, SOLUTION INTRAVENOUS; SUBCUTANEOUS at 17:47

## 2022-10-16 RX ADMIN — CALCIUM GLUCONATE 1000 MG: 98 INJECTION, SOLUTION INTRAVENOUS at 13:40

## 2022-10-16 RX ADMIN — ASPIRIN 81 MG 81 MG: 81 TABLET ORAL at 21:15

## 2022-10-16 RX ADMIN — CLONIDINE HYDROCHLORIDE 0.1 MG: 0.1 TABLET ORAL at 11:47

## 2022-10-16 RX ADMIN — POTASSIUM CHLORIDE 40 MEQ: 29.8 INJECTION, SOLUTION INTRAVENOUS at 21:09

## 2022-10-16 RX ADMIN — GABAPENTIN 600 MG: 300 CAPSULE ORAL at 21:15

## 2022-10-16 RX ADMIN — CLONIDINE HYDROCHLORIDE 0.1 MG: 0.1 TABLET ORAL at 21:14

## 2022-10-16 ASSESSMENT — PAIN SCALES - GENERAL
PAINLEVEL_OUTOF10: 0
PAINLEVEL_OUTOF10: 4
PAINLEVEL_OUTOF10: 0
PAINLEVEL_OUTOF10: 0

## 2022-10-16 ASSESSMENT — PAIN SCALES - WONG BAKER: WONGBAKER_NUMERICALRESPONSE: 0

## 2022-10-16 NOTE — PLAN OF CARE
Problem: Discharge Planning  Goal: Discharge to home or other facility with appropriate resources  Outcome: Not Progressing     Problem: Pain  Goal: Verbalizes/displays adequate comfort level or baseline comfort level  Outcome: Not Progressing     Problem: Skin/Tissue Integrity  Goal: Absence of new skin breakdown  Outcome: Progressing     Problem: Safety - Adult  Goal: Free from fall injury  Outcome: Progressing     Problem: ABCDS Injury Assessment  Goal: Absence of physical injury  Outcome: Progressing     Problem: Chronic Conditions and Co-morbidities  Goal: Patient's chronic conditions and co-morbidity symptoms are monitored and maintained or improved  Outcome: Progressing     Problem: Neurosensory - Adult  Goal: Achieves stable or improved neurological status  Outcome: Progressing  Goal: Remains free of injury related to seizures activity  Outcome: Progressing  Goal: Achieves maximal functionality and self care  Outcome: Not Progressing     Problem: Respiratory - Adult  Goal: Achieves optimal ventilation and oxygenation  Outcome: Progressing     Problem: Cardiovascular - Adult  Goal: Maintains optimal cardiac output and hemodynamic stability  Outcome: Progressing  Goal: Absence of cardiac dysrhythmias or at baseline  Outcome: Progressing     Problem: Skin/Tissue Integrity - Adult  Goal: Skin integrity remains intact  Outcome: Progressing  Goal: Incisions, wounds, or drain sites healing without S/S of infection  Outcome: Progressing  Goal: Oral mucous membranes remain intact  Outcome: Progressing     Problem: Metabolic/Fluid and Electrolytes - Adult  Goal: Electrolytes maintained within normal limits  Outcome: Progressing  Goal: Hemodynamic stability and optimal renal function maintained  Outcome: Progressing  Goal: Glucose maintained within prescribed range  Outcome: Progressing     Problem: Hematologic - Adult  Goal: Maintains hematologic stability  Outcome: Progressing     Problem: Safety - Medical Restraint  Goal: Remains free of injury from restraints (Restraint for Interference with Medical Device)  Outcome: Completed     Problem: Nutrition Deficit:  Goal: Optimize nutritional status  Outcome: Progressing     Problem: Discharge Planning  Goal: Discharge to home or other facility with appropriate resources  Outcome: Not Progressing     Problem: Pain  Goal: Verbalizes/displays adequate comfort level or baseline comfort level  Outcome: Not Progressing     Problem: Neurosensory - Adult  Goal: Achieves maximal functionality and self care  Outcome: Not Progressing

## 2022-10-16 NOTE — PLAN OF CARE
Problem: Discharge Planning  Goal: Discharge to home or other facility with appropriate resources  10/16/2022 1458 by Abigail Grant RN  Outcome: Progressing  Flowsheets (Taken 10/16/2022 0755)  Discharge to home or other facility with appropriate resources: Identify barriers to discharge with patient and caregiver  10/16/2022 0104 by Slime Weber RN  Outcome: Not Progressing     Problem: Pain  Goal: Verbalizes/displays adequate comfort level or baseline comfort level  10/16/2022 1458 by Abigail Grant RN  Outcome: Progressing  Flowsheets  Taken 10/16/2022 1100  Verbalizes/displays adequate comfort level or baseline comfort level: Encourage patient to monitor pain and request assistance  Taken 10/16/2022 0800  Verbalizes/displays adequate comfort level or baseline comfort level: Encourage patient to monitor pain and request assistance  10/16/2022 0104 by Slime Weber RN  Outcome: Not Progressing     Problem: Skin/Tissue Integrity  Goal: Absence of new skin breakdown  Description: 1. Monitor for areas of redness and/or skin breakdown  2. Assess vascular access sites hourly  3. Every 4-6 hours minimum:  Change oxygen saturation probe site  4. Every 4-6 hours:  If on nasal continuous positive airway pressure, respiratory therapy assess nares and determine need for appliance change or resting period.   10/16/2022 1458 by Abigail Grant RN  Outcome: Progressing  10/16/2022 0104 by Slime Weber RN  Outcome: Progressing     Problem: Safety - Adult  Goal: Free from fall injury  10/16/2022 1458 by Abigail Grant RN  Outcome: Progressing  10/16/2022 0104 by Slime Weber RN  Outcome: Progressing     Problem: ABCDS Injury Assessment  Goal: Absence of physical injury  10/16/2022 1458 by Abigail Grant RN  Outcome: Progressing  10/16/2022 0104 by Slime Weber RN  Outcome: Progressing     Problem: Chronic Conditions and Co-morbidities  Goal: Patient's chronic conditions and stability  10/16/2022 1458 by Peter Lee RN  Outcome: Progressing  Flowsheets (Taken 10/16/2022 0755)  Maintains optimal cardiac output and hemodynamic stability: Monitor blood pressure and heart rate  10/16/2022 0104 by Lynette Zamora RN  Outcome: Progressing  Goal: Absence of cardiac dysrhythmias or at baseline  10/16/2022 1458 by Peter Lee RN  Outcome: Progressing  Flowsheets (Taken 10/16/2022 0755)  Absence of cardiac dysrhythmias or at baseline: Monitor cardiac rate and rhythm  10/16/2022 0104 by Lynette Zamora RN  Outcome: Progressing     Problem: Skin/Tissue Integrity - Adult  Goal: Skin integrity remains intact  10/16/2022 1458 by Peter Lee RN  Outcome: Progressing  Flowsheets (Taken 10/16/2022 0755)  Skin Integrity Remains Intact: Monitor for areas of redness and/or skin breakdown  10/16/2022 0104 by Lynette Zamora RN  Outcome: Progressing  Goal: Incisions, wounds, or drain sites healing without S/S of infection  10/16/2022 1458 by Peter Lee RN  Outcome: Progressing  Flowsheets (Taken 10/16/2022 0755)  Incisions, Wounds, or Drain Sites Healing Without Sign and Symptoms of Infection: TWICE DAILY: Assess and document skin integrity  10/16/2022 0104 by Lynette Zamora RN  Outcome: Progressing  Goal: Oral mucous membranes remain intact  10/16/2022 1458 by Peter Lee RN  Outcome: Progressing  Flowsheets (Taken 10/16/2022 0755)  Oral Mucous Membranes Remain Intact: Assess oral mucosa and hygiene practices  10/16/2022 0104 by Lynette Zamora RN  Outcome: Progressing     Problem: Metabolic/Fluid and Electrolytes - Adult  Goal: Electrolytes maintained within normal limits  10/16/2022 1458 by Peter Lee RN  Outcome: Progressing  Flowsheets (Taken 10/16/2022 0755)  Electrolytes maintained within normal limits: Monitor labs and assess patient for signs and symptoms of electrolyte imbalances  10/16/2022 0104 by Lynette Zamora RN  Outcome: Progressing  Goal: Hemodynamic stability and optimal renal function maintained  10/16/2022 1458 by Peter Lee RN  Outcome: Progressing  Flowsheets (Taken 10/16/2022 0755)  Hemodynamic stability and optimal renal function maintained: Monitor labs and assess for signs and symptoms of volume excess or deficit  10/16/2022 0104 by Lynette Zamora RN  Outcome: Progressing  Goal: Glucose maintained within prescribed range  10/16/2022 1458 by Peter Lee RN  Outcome: Progressing  Flowsheets (Taken 10/16/2022 0755)  Glucose maintained within prescribed range: Monitor blood glucose as ordered  10/16/2022 0104 by Lynette Zamora RN  Outcome: Progressing     Problem: Hematologic - Adult  Goal: Maintains hematologic stability  10/16/2022 1458 by Peter Lee RN  Outcome: Progressing  Flowsheets (Taken 10/16/2022 0755)  Maintains hematologic stability: Assess for signs and symptoms of bleeding or hemorrhage  10/16/2022 0104 by Lynette Zamora RN  Outcome: Progressing     Problem: Nutrition Deficit:  Goal: Optimize nutritional status  10/16/2022 1458 by Peter Lee RN  Outcome: Progressing  10/16/2022 0104 by Lynette Zamora RN  Outcome: Progressing     Problem: Discharge Planning  Goal: Discharge to home or other facility with appropriate resources  10/16/2022 1458 by Peter Lee RN  Outcome: Progressing  Flowsheets (Taken 10/16/2022 0755)  Discharge to home or other facility with appropriate resources: Identify barriers to discharge with patient and caregiver  10/16/2022 0104 by Lynette Zamora RN  Outcome: Not Progressing     Problem: Pain  Goal: Verbalizes/displays adequate comfort level or baseline comfort level  10/16/2022 1458 by Peter Lee RN  Outcome: Progressing  Flowsheets  Taken 10/16/2022 1100  Verbalizes/displays adequate comfort level or baseline comfort level: Encourage patient to monitor pain and request assistance  Taken 10/16/2022 0800  Verbalizes/displays adequate comfort level or baseline comfort level: Encourage patient to monitor pain and request assistance  10/16/2022 0104 by Bean Robbins RN  Outcome: Not Progressing     Problem: Neurosensory - Adult  Goal: Achieves maximal functionality and self care  10/16/2022 3789 by Alda Sellers RN  Outcome: Progressing  Flowsheets (Taken 10/16/2022 8474)  Achieves maximal functionality and self care: Monitor swallowing and airway patency with patient fatigue and changes in neurological status  10/16/2022 0104 by Bean Robbins RN  Outcome: Not Progressing

## 2022-10-16 NOTE — PROGRESS NOTES
Progress Note  10/16/2022 8:40 AM  Subjective:   Admit Date: 9/27/2022  PCP: Kimberley Turner MD  Interval History: Patient examined doing ok feels better received a dose of Morphine , anxiety better     Diet: Diet NPO    Data:   Scheduled Meds:   calcium gluconate IVPB  1,000 mg IntraVENous Once    potassium chloride  40 mEq IntraVENous Once    magnesium sulfate  2,000 mg IntraVENous Once    [Held by provider] potassium chloride  20 mEq Oral Daily with breakfast    haloperidol  2 mg Oral BID    clopidogrel  75 mg Oral Daily    QUEtiapine  50 mg Oral BID    sodium chloride flush  5-40 mL IntraVENous 2 times per day    miconazole nitrate   Topical BID    [Held by provider] gabapentin  600 mg Oral 4x daily    amLODIPine  10 mg Oral Daily    white petrolatum   Topical BID    sodium chloride flush  5-40 mL IntraVENous 2 times per day    miconazole   Topical BID    [Held by provider] insulin glargine  32 Units SubCUTAneous QAM    epoetin sharath-epbx  6,000 Units SubCUTAneous Once per day on Mon Wed Fri    hydrALAZINE  50 mg Oral 3 times per day    insulin lispro  0-16 Units SubCUTAneous 4x Daily AC & HS    pantoprazole (PROTONIX) 40 mg injection  40 mg IntraVENous Q12H    metoprolol succinate  25 mg Oral Daily    [Held by provider] sennosides  5 mL Oral Nightly    carbidopa-levodopa  1 tablet Per NG tube TID    ipratropium-albuterol  1 ampule Inhalation Q4H WA    collagenase   Topical Q MWF    benzonatate  100 mg Oral TID    ezetimibe  10 mg Oral Daily    [Held by provider] ticagrelor  90 mg Oral BID    [Held by provider] aspirin  81 mg Oral Daily    Or    [Held by provider] aspirin  300 mg Rectal Daily    atorvastatin  80 mg Oral Nightly     Continuous Infusions:   bumetanide 0.1 mg/mL infusion 1 mg/hr (10/16/22 0326)    dextrose 75 mL/hr at 10/16/22 0149    dexmedetomidine (PRECEDEX) IV infusion 1.5 mcg/kg/hr (10/16/22 0325)    sodium chloride      sodium chloride      dextrose       PRN Meds:hydrALAZINE, sodium chloride flush, sodium chloride, miconazole nitrate **AND** miconazole nitrate, sodium chloride flush, sodium chloride, labetalol, glucose, dextrose bolus **OR** dextrose bolus, glucagon (rDNA), dextrose  I/O last 3 completed shifts: In: 61411 [I.V.:4324.6; KM/NP:51199; IV Piggyback:763.5]  Out: 85156 [BIYII:8397; LHUXP:4003]  I/O this shift:  In: -   Out: 1715 [Urine:515; Stool:1200]    Intake/Output Summary (Last 24 hours) at 10/16/2022 0840  Last data filed at 10/16/2022 0706  Gross per 24 hour   Intake 9298.09 ml   Output 7535 ml   Net 1763.09 ml     CBC:   Recent Labs     10/14/22  0413 10/14/22  2311 10/15/22  0400 10/15/22  1120 10/15/22  1651 10/16/22  0030 10/16/22  0519   WBC 8.6  --  7.8  --   --   --  6.6   HGB 8.0*   < > 7.8*   < > 7.1* 7.3* 7.7*   *  --  105*  --   --   --  124*    < > = values in this interval not displayed. BMP:    Recent Labs     10/15/22  1651 10/16/22  0030 10/16/22  0519   * 147* 147*   K 3.2* 3.7 3.7    103 103   CO2 33* 29 29   BUN 26* 26* 24*   CREATININE 1.4* 1.4* 1.4*   GLUCOSE 160* 154* 230*     Hepatic:   Recent Labs     10/14/22  0413 10/15/22  0400 10/16/22  0519   * 36 27   ALT 22 24 10   BILITOT 0.6 0.6 0.6   ALKPHOS 523* 453* 443*     Troponin: No results for input(s): TROPONINI in the last 72 hours. BNP: No results for input(s): BNP in the last 72 hours. Lipids: No results for input(s): CHOL, HDL in the last 72 hours. Invalid input(s): LDLCALCU  ABGs: No results found for: PHART, PO2ART, FRY4OOF  INR: No results for input(s): INR in the last 72 hours.     -----------------------------------------------------------------  RAD: CT HEAD WO CONTRAST    Result Date: 2022  Patient MRN:  [id number] : 1955 Age: 74 years Gender: Male Order Date:  2022 5:35 AM EXAM: CT HEAD WO CONTRAST NUMBER OF IMAGES:  200 INDICATION:  stroke evaluation after mechanical thrombectomy Please assign to read to Dr. Bhaskar Galindo in [facility] stroke evaluation after mechanical thrombectomy What reading provider will be dictating this exam?->[facility] COMPARISON: None Technique: Low-dose CT  acquisition technique included one of following options; 1 . Automated exposure control, 2. Adjustment of MA and or KV according to patient's size or 3. Use of iterative reconstruction. Multiple CT sections were obtained with sagittal and coronal MPR reconstructions. The ventricles are prominent. The gyri and sulci appear  prominent. The white matter appears  prominent. There is no evidence for hemorrhage. There is no infarct identified. There is no mass effect identified. There is no mass identified. Diffuse atrophy likely age related Findings compatible with small vessel ischemic changes. CT HEAD WO CONTRAST    Result Date: 2022  Patient MRN:  [id number] : 1955 Age: 74 years Gender: Male Order Date:  2022 EXAM: CT HEAD WO CONTRAST NUMBER OF IMAGES:  357 INDICATION:  cva cva COMPARISON: None Technique: Low-dose CT  acquisition technique included one of following options; 1 . Automated exposure control, 2. Adjustment of MA and or KV according to patient's size or 3. Use of iterative reconstruction. Multiple CT sections were obtained with sagittal and coronal MPR reconstructions. The ventricles are prominent. The gyri and sulci appear  prominent. The white matter appears  prominent. There is no evidence for hemorrhage. There is no infarct identified. There is no mass effect identified. There is no mass identified. Diffuse atrophy likely age related Findings compatible with small vessel ischemic changes. Findings were called at the time of dictation     XR CHEST PORTABLE    Result Date: 2022  EXAMINATION: ONE XRAY VIEW OF THE CHEST 2022 6:57 am COMPARISON: None.  HISTORY: ORDERING SYSTEM PROVIDED HISTORY: intubated TECHNOLOGIST PROVIDED HISTORY: Reason for exam:->intubated What reading provider will be dictating this exam?->[facility] FINDINGS: There is stable position of the endotracheal tube and NG tube Multifocal bilateral airspace disease is again noted unchanged compared to prior study and most prominent within the lower lobes. There trace bilateral pleural effusions. 1. Multifocal bilateral airspace disease more prominent within the lower lobes. The airspace disease is unchanged when compared with the prior study. XR CHEST PORTABLE    Result Date: 2022  EXAMINATION: ONE XRAY VIEW OF THE CHEST 2022 12:41 am COMPARISON: None. HISTORY: ORDERING SYSTEM PROVIDED HISTORY: intuabted TECHNOLOGIST PROVIDED HISTORY: Reason for exam:->intuabted What reading provider will be dictating this exam?->[facility] FINDINGS: Indistinct pulmonary opacification rather diffusely most aggregated in mid to lower locations. The may represent pulmonary edema but nonspecific probably includes atelectasis at least in the bases other etiologies such is infiltrates, ARDS, etc.  Are possible with no comparison available. Advise clinical correlation Esophageal route catheter is into the stomach. ET tube has tip approximately 4.1 cm above the mark. Heart size around upper normal limits. Small pleural effusions suggested The detail of evaluation on the exam is suboptimal due to technique and body habitus. Further imaging may be helpful. 1. Pulmonary opacities present favoring edema and atelectasis, also small pleural effusions, but nonspecific with some additional considerations noted above 2. Support devices present as described above 3. Heart size appears borderline enlarged     CTA NECK W CONTRAST    Result Date: 2022  Patient MRN:  [id number] : 1955 Age: 74 years Gender: Male Order Date:  2022 6:24 PM EXAM: CTA NECK W CONTRAST, CTA HEAD W CONTRAST NUMBER OF IMAGES:  36 INDICATION:  cva cva What reading provider will be dictating this exam?->[facility] COMPARISON: None Technique: Low-dose CT  acquisition technique included one of following options; 1 .  Automated exposure control, 2. Adjustment of MA and or KV according to patient's size or 3. Use of iterative reconstruction. Contiguous spiral images were obtained in the axial plane, following the administration of intravenous contrast using CT angiographic protocol. Sagittal and coronal images were reconstructed from the axial plane acquisition. Additional MIP reconstructions were presented to aid in the interpretation of this study. Images were obtained from the skull base cranially. There is mild calcified plaque identified in the vessels compatible with atherosclerotic disease. The right carotid is moderately atherosclerotic without significant stenosis The left carotid is abnormal. There is  evidence for hemodynamically significant stenosis at the level the proximal internal carotid artery. By NASCET criteria estimated stenosis is 90% or greater The right vertebral artery is mildly atherosclerotic without significant stenosis The left vertebral artery is mildly atherosclerotic without significant stenosis The basilar artery is unremarkable The middle cerebral arteries are unremarkable The anterior cerebral arteries are unremarkable The posterior cerebral arteries are unremarkable     1. Estimated stenosis of the proximal right and left internal carotid artery by NASCET criteria is greater than 90% on the left 2. Severe atherosclerotic disease . 3. No large vessel occlusion identified This study was analyzed by the Viz. ai algorithm. CT BRAIN PERFUSION    Result Date: 2022  Patient MRN: [id number] : 1955 Age:  74 years Gender: Male Order Date: 2022 Exam: CT BRAIN PERFUSION Number of Images: 333 views Indication:   cva cva What reading provider will be dictating this exam?->[facility] Comparison: None. Findings: Perfusion images demonstrate symmetric blood volume Blood flow images demonstrate symmetric blood flow There is no significant ischemic penumbra identified.  There is no significant core infarct identified. No significant ischemic penumbra identified This study was analyzed by the Viz. ai algorithm. XR ABDOMEN FOR NG/OG/NE TUBE PLACEMENT    Result Date: 2022  EXAMINATION: ONE SUPINE XRAY VIEW(S) OF THE ABDOMEN 2022 12:40 am COMPARISON: None. HISTORY: ORDERING SYSTEM PROVIDED HISTORY: Confirmation of course of NG/OG/NE tube and location of tip of tube TECHNOLOGIST PROVIDED HISTORY: Reason for exam:->Confirmation of course of NG/OG/NE tube and location of tip of tube Portable? ->Yes What reading provider will be dictating this exam?->[facility] FINDINGS: Esophageal route catheter is into the left upper quadrant expected stomach location. No clear bowel obstruction identified. Possible constipation. Limited detailed evaluation present on exam.  Chest evaluation is done separately. NG/OG is in the stomach. IR CAROTID STENT W PROTECTION    Result Date: 2022  Patient MRN:  [id number] : 1955 Age: 74 years Gender: Male Order Date:  2022 7:00 PM Examination angiogram and carotid stent NUMBER OF IMAGES:  12 INDICATION: I65.22 Stenosis of left carotid artery left carotid stenosis What reading provider will be dictating this exam?->[facility] COMPARISON: None FINDINGS:  After obtaining informed consent and following the routine sterile prep and drape after administration of local anesthesia and following a time out a needle was inserted in the right common femoral artery and guidewire and catheter were advanced into the abdominal aorta and subsequently into the thoracic aorta. Subsequently selective catheterization of the left common carotid was performed and angiogram was performed . Images demonstrate 80 stenosis of the proximal internal carotid artery by NASCET criteria on the  left prior to stent placement The external carotid artery is patent. The catheter was then exchanged for a guide catheter The distal protection device was placed.  A filter wire was utilized Angioplasty was performed. Subsequently a carotid stent was placed Subsequently angioplasty of the stent was performed Repeat images demonstrate A patent stent with a patent distal internal carotid artery. [place]ch TICI 3 0 no perfusion 1 Penetration but no distal branch filling 2a perfusion with incomplete distal branch filling, less than 50% 2b  perfusion with incomplete distal branch filling greater than 50% 3  Full perfusion with filling of distal branches The patient tolerated the procedure well. Angiogram of the right common femoral artery demonstrates a patent vessel and Angio-Seal was utilized. The procedure was performed with general anesthesia. 12 minutes 13 seconds of fluoroscopy was utilized. Time out occurred at 1920 hours. 1. Angiogram demonstrates successful placement of a carotid stent , post procedure images demonstrate a widely patent stent and internal carotid     CTA HEAD W CONTRAST    Result Date: 2022  Patient MRN:  [id number] : 1955 Age: 74 years Gender: Male Order Date:  2022 6:24 PM EXAM: CTA NECK W CONTRAST, CTA HEAD W CONTRAST NUMBER OF IMAGES:  36 INDICATION:  cva cva What reading provider will be dictating this exam?->[facility] COMPARISON: None Technique: Low-dose CT  acquisition technique included one of following options; 1 . Automated exposure control, 2. Adjustment of MA and or KV according to patient's size or 3. Use of iterative reconstruction. Contiguous spiral images were obtained in the axial plane, following the administration of intravenous contrast using CT angiographic protocol. Sagittal and coronal images were reconstructed from the axial plane acquisition. Additional MIP reconstructions were presented to aid in the interpretation of this study. Images were obtained from the skull base cranially. There is mild calcified plaque identified in the vessels compatible with atherosclerotic disease.  The right carotid is moderately atherosclerotic without significant stenosis The left carotid is abnormal. There is  evidence for hemodynamically significant stenosis at the level the proximal internal carotid artery. By NASCET criteria estimated stenosis is 90% or greater The right vertebral artery is mildly atherosclerotic without significant stenosis The left vertebral artery is mildly atherosclerotic without significant stenosis The basilar artery is unremarkable The middle cerebral arteries are unremarkable The anterior cerebral arteries are unremarkable The posterior cerebral arteries are unremarkable     1. Estimated stenosis of the proximal right and left internal carotid artery by NASCET criteria is greater than 90% on the left 2. Severe atherosclerotic disease . 3. No large vessel occlusion identified This study was analyzed by the Viz. ai algorithm. MRI BRAIN WO CONTRAST    Result Date: 2022  Patient MRN:  [id number] : 1955 Age: 74 years Gender: Male Order Date:  2022 3:24 PM EXAM: MRI BRAIN WO CONTRAST NUMBER OF IMAGES:  605 INDICATION:  Stroke Evaluation Mechanical Thrombectomy MRI of the brain 24 hours following mechanical thrombectomy. Discontinue order after first follow up. Please assign to Dr. Adilia Jacobo to read in [facility] Stroke Evaluation Mechanical Thrombectomy What reading provider will be dictating this exam?->[facility] COMPARISON: CT scan 2022 Total sequences obtained: 9 The ventricles are prominent. The gyri and sulci appear  prominent. The white matter appears  prominent. No convincing hemorrhage identified. There is no mass effect identified. There is no mass identified. There is a small region of restricted diffusion measuring approximately 3 mm seen in the left parietal lobe and a similar finding present in the left posterior frontal lobe. No other restricted diffusion is seen to suggest acute infarct. There is a small region of susceptibility infarct in the left frontal lobe.     There are 2 small regions of petechial infarcts one in the left posterior frontal lobe and a second in the left parietal lobe not exceeding 3 mm. There is otherwise extensive small vessel ischemic changes       Objective:   Vitals: BP (!) 165/63   Pulse 75   Temp 98 °F (36.7 °C) (Temporal)   Resp 14   Ht 5' 8\" (1.727 m)   Wt 241 lb 4.8 oz (109.5 kg)   SpO2 96%   BMI 36.69 kg/m²   General appearance: appears stated age   Skin:  No rashes or lesions  HEENT: Head: Normocephalic, no lesions, without obvious abnormality. Neck: no adenopathy, no carotid bruit, no JVD, supple, symmetrical, trachea midline, and thyroid not enlarged, symmetric, no tenderness/mass/nodules  Lungs: clear to auscultation bilaterally  Heart: regular rate and rhythm, S1, S2 normal, no murmur, click, rub or gallop  Abdomen: soft, non-tender; bowel sounds normal; no masses,  no organomegaly  Extremities: extremities normal, atraumatic, no cyanosis or edema  Neurologic: Mental status: Alert, oriented, thought content appropriate    Assessment:   Patient Active Problem List:     Acute cerebrovascular accident (CVA) ([facility] [address]     Acute respiratory failure with hypoxia ([address]     Stenosis of left carotid artery     Hypoalbuminemia     Electrolyte imbalance     Hypernatremia     Anemia     GI bleeding    Plan:     1. Acute CVA  S/p IR intervention with angioplasty of the left ICA     2. LARY on CKD stage 3b  Baseline 1.7-1.9  combined effects of ACEI use and contrast nephrotoxicity  Component of urinary retention  nonoliguric at this time  Disproportionate elevation of BUN relative to Cr c/w gib  stool heme positive  Monitor labs and urine  3.4  L  Bun/cr   24/1.4 , u/o > 7.9 liters      3. Hypertensive emergency  presenting  with acute CVA  Adjust meds to target blood pressure control to SBP less than 160  BP high , was NPO , received all meds this am , may add clonidine back as needed , d/w staff         4.  Anemia, chronic with acute worsening  Informed stool heme positive  PRBC transfusion as needed  Sp egd/c scope  Trf < 7  On ASHLEY   To go for embolization if worsens     5. Hyperkalemia better   Due to combination of LARY and metabolic acidosis; PRBC transfusios  Lokelma  prn     6 Urinary retention  Suspected situational  Now with trejo  Urology following     7. Hypernatremia  Suspect intervascular volume depletion  Na 147 better         8.  Met acidosis resolved   Off  hco3 drip  Improving  Ckd/hypotension     9 resp distress  Bumex drip at this time  On bipap prn     Will cut the dose of bumex        Samantha Copeland MD

## 2022-10-16 NOTE — PROGRESS NOTES
Department of Internal Medicine  Infectious Diseases   Progress Note      C/C :  Right foot  wound infection , resp failure     Pt is awake and alert  Denies fever   Report sob   Afebrile       Current Facility-Administered Medications   Medication Dose Route Frequency Provider Last Rate Last Admin    hydrALAZINE (APRESOLINE) injection 20 mg  20 mg IntraVENous Q4H PRN Marlyce Parshimanshu, APRN - CNP   20 mg at 10/16/22 9417    calcium gluconate 1,000 mg in dextrose 5 % 100 mL IVPB  1,000 mg IntraVENous Once Marlyce Parsley, APRN - CNP        potassium chloride 40 mEq in 100 mL IVPB (Central Line)  40 mEq IntraVENous Once Marlyce Parsley, APRN - CNP 50 mL/hr at 10/16/22 0744 40 mEq at 10/16/22 0744    magnesium sulfate 2000 mg in 50 mL IVPB premix  2,000 mg IntraVENous Once Marlyce Parsley, APRN - CNP        [Held by provider] potassium chloride (KLOR-CON M) extended release tablet 20 mEq  20 mEq Oral Daily with breakfast Keila Contreras MD        haloperidol (HALDOL) tablet 2 mg  2 mg Oral BID Keila Contreras MD   2 mg at 10/16/22 0824    bumetanide (BUMEX) 12.5 mg in sodium chloride 0.9 % 125 mL infusion  0.5 mg/hr IntraVENous Continuous Kika Castelan MD 10 mL/hr at 10/16/22 0326 1 mg/hr at 10/16/22 0326    clopidogrel (PLAVIX) tablet 75 mg  75 mg Oral Daily Keila Contreras MD        dextrose 5 % solution   IntraVENous Continuous Brett Nichols APRN - CNP 75 mL/hr at 10/16/22 0149 Rate Change at 10/16/22 0149    dexmedetomidine (PRECEDEX) 1,000 mcg in sodium chloride 0.9 % 250 mL infusion  0.1-1.5 mcg/kg/hr IntraVENous Continuous Keila Contreras MD 44.33 mL/hr at 10/16/22 0325 1.5 mcg/kg/hr at 10/16/22 0325    QUEtiapine (SEROQUEL) tablet 50 mg  50 mg Oral BID Nicole Klein, APRN - CNP   50 mg at 10/16/22 7146    sodium chloride flush 0.9 % injection 5-40 mL  5-40 mL IntraVENous 2 times per day Cherylene Guardian, MD   10 mL at 10/14/22 2000    sodium chloride flush 0.9 % injection 5-40 mL  5-40 mL IntraVENous PRN Claudine Fraction Angelica Wilde MD        0.9 % sodium chloride infusion  25 mL IntraVENous PRN Leroy Escalante MD        miconazole nitrate 2 % ointment   Topical BID Derenda Purl, DO   Given at 10/16/22 6839    And    miconazole nitrate 2 % ointment   Topical TID PRN Derenda Purl, DO   Given at 10/15/22 2050    [Held by provider] gabapentin (NEURONTIN) capsule 600 mg  600 mg Oral 4x daily India Russo MD   600 mg at 10/10/22 2102    amLODIPine (NORVASC) tablet 10 mg  10 mg Oral Daily Jasonjocy Rodriguesat, APRN - CNP   10 mg at 10/16/22 4944    white petrolatum ointment   Topical BID Derenda Purl, DO   Given at 10/16/22 0827    sodium chloride flush 0.9 % injection 5-40 mL  5-40 mL IntraVENous 2 times per day India Russo MD   10 mL at 10/15/22 0826    sodium chloride flush 0.9 % injection 5-40 mL  5-40 mL IntraVENous PRN India Russo MD        0.9 % sodium chloride infusion   IntraVENous PRN India Russo MD        miconazole (MICOTIN) 2 % powder   Topical BID Derenda Purl, DO   Given at 10/16/22 0825    [Held by provider] insulin glargine-yfgn (SEMGLEE-YFGN) injection vial 32 Units  32 Units SubCUTAneous QAM Derenda Purl, DO   32 Units at 10/07/22 0911    epoetin sharath-epbx (RETACRIT) injection 6,000 Units  6,000 Units SubCUTAneous Once per day on Mon Wed Fri India Russo MD   6,000 Units at 10/14/22 0757    hydrALAZINE (APRESOLINE) tablet 50 mg  50 mg Oral 3 times per day Green Sonch, APRN - CNP   50 mg at 10/16/22 0546    insulin lispro (HUMALOG) injection vial 0-16 Units  0-16 Units SubCUTAneous 4x Daily AC & HS India Russo MD   4 Units at 10/16/22 0546    pantoprazole (PROTONIX) 40 mg in sodium chloride (PF) 10 mL injection  40 mg IntraVENous Q12H India Russo MD   40 mg at 10/16/22 0309    metoprolol succinate (TOPROL XL) extended release tablet 25 mg  25 mg Oral Daily India Russo MD   25 mg at 10/12/22 0748    [Held by provider] sennosides (SENOKOT) 8.8 MG/5ML syrup 5 mL  5 mL Oral Nightly Norberto Waters MD   5 mL at 10/09/22 2300    labetalol (NORMODYNE;TRANDATE) injection 10 mg  10 mg IntraVENous Q30 Min PRN Norberto Waters MD   10 mg at 10/16/22 0306    glucose chewable tablet 16 g  4 tablet Oral PRN Norberto Waters MD        dextrose bolus 10% 125 mL  125 mL IntraVENous PRN Norberto Waters MD        Or    dextrose bolus 10% 250 mL  250 mL IntraVENous PRN Norberto Waters MD        glucagon (rDNA) injection 1 mg  1 mg SubCUTAneous PRN Norberto Waters MD        dextrose 10 % infusion   IntraVENous Continuous PRN Norberto Waters MD        carbidopa-levodopa (SINEMET)  MG per tablet 1 tablet  1 tablet Per NG tube TID Norberto Waters MD   1 tablet at 10/16/22 0824    ipratropium-albuterol (DUONEB) nebulizer solution 1 ampule  1 ampule Inhalation Q4H WA Norberto Waters MD   1 ampule at 10/16/22 0804    collagenase ointment   Topical Q MWF Norberto Waters MD   Given at 10/14/22 1200    benzonatate (TESSALON) capsule 100 mg  100 mg Oral TID Norberto Waters MD   100 mg at 10/16/22 1425    ezetimibe (ZETIA) tablet 10 mg  10 mg Oral Daily Norberto Waters MD   10 mg at 10/16/22 0825    [Held by provider] ticagrelor (BRILINTA) tablet 90 mg  90 mg Oral BID Norberto Waters MD   90 mg at 10/09/22 2300    [Held by provider] aspirin EC tablet 81 mg  81 mg Oral Daily Norberto Waters MD   81 mg at 10/09/22 0235    Or    [Held by provider] aspirin suppository 300 mg  300 mg Rectal Daily Norberto Waters MD   300 mg at 09/29/22 0809    atorvastatin (LIPITOR) tablet 80 mg  80 mg Oral Nightly Norberto Waters MD   80 mg at 10/15/22 2031           REVIEW OF SYSTEMS:   CONSTITUTIONAL:  Denies fever, chill or rigors. HEENT: denies blurring of vision or double vision, denies hearing problem  RESPIRATORY: SOB   CARDIOVASCULAR:  Denies palpitation  GASTROINTESTINAL:  Denies abdomen pain, diarrhea or constipation.   GENITOURINARY:  Denies burning urination or frequency of urination  INTEGUMENT: Right heel wound  HEMATOLOGIC/LYMPHATIC: Denies lymph node swelling, gum bleeding or easy bruising. MUSCULOSKELETAL:  Denies leg pain , joint pain , joint swelling  NEUROLOGICAL:  weakness right side . PHYSICAL EXAM:      Vitals:     BP (!) 165/63   Pulse 75   Temp 98 °F (36.7 °C) (Temporal)   Resp 14   Ht 5' 8\" (1.727 m)   Wt 241 lb 4.8 oz (109.5 kg)   SpO2 96%   BMI 36.69 kg/m²     General Appearance:    Awake, alert ,in respiratory distress, on BiPAP     Head:    Normocephalic, atraumatic   Eyes:    No pallor, no icterus,   Ears:    No obvious deformity or drainage.    Nose:   No nasal drainage   Throat:   Mucosa moist, no oral thrush   Neck:   Supple, no lymphadenopathy   Lungs:     Bilateral  wheeze    Heart:    Regular rate and rhythm, no murmur   Abdomen:     Soft, LLQ tender , bowel sounds present    Extremities:    Right heel wound with VAC    Pulses:   Dorsalis pedis palpable - diminished    Skin:   Right foot  wound with VAC                CBC with Differential:      Lab Results   Component Value Date/Time    WBC 6.6 10/16/2022 05:19 AM    RBC 2.53 10/16/2022 05:19 AM    HGB 7.7 10/16/2022 05:19 AM    HCT 23.5 10/16/2022 05:19 AM    HCT 15.0 09/27/2022 08:58 PM     10/16/2022 05:19 AM    MCV 92.9 10/16/2022 05:19 AM    MCH 30.4 10/16/2022 05:19 AM    MCHC 32.8 10/16/2022 05:19 AM    RDW 15.3 10/16/2022 05:19 AM    NRBC 1.7 10/13/2022 04:08 AM    LYMPHOPCT 11.9 10/16/2022 05:19 AM    MONOPCT 7.8 10/16/2022 05:19 AM    BASOPCT 0.3 10/16/2022 05:19 AM    MONOSABS 0.51 10/16/2022 05:19 AM    LYMPHSABS 0.78 10/16/2022 05:19 AM    EOSABS 0.39 10/16/2022 05:19 AM    BASOSABS 0.02 10/16/2022 05:19 AM       CMP     Lab Results   Component Value Date/Time     10/16/2022 05:19 AM    K 3.7 10/16/2022 05:19 AM    K 4.7 10/10/2022 05:21 PM     10/16/2022 05:19 AM    CO2 29 10/16/2022 05:19 AM    BUN 24 10/16/2022 05:19 AM    CREATININE 1.4 10/16/2022 05:19 AM    GFRAA >60 10/16/2022 05:19 AM    LABGLOM 51 10/16/2022 05:19 AM GLUCOSE 230 10/16/2022 05:19 AM    PROT 5.5 10/16/2022 05:19 AM    LABALBU 3.0 10/16/2022 05:19 AM    CALCIUM 7.7 10/16/2022 05:19 AM    BILITOT 0.6 10/16/2022 05:19 AM    ALKPHOS 443 10/16/2022 05:19 AM    AST 27 10/16/2022 05:19 AM    ALT 10 10/16/2022 05:19 AM         Hepatic Function Panel:    Lab Results   Component Value Date/Time    ALKPHOS 443 10/16/2022 05:19 AM    ALT 10 10/16/2022 05:19 AM    AST 27 10/16/2022 05:19 AM    PROT 5.5 10/16/2022 05:19 AM    BILITOT 0.6 10/16/2022 05:19 AM    LABALBU 3.0 10/16/2022 05:19 AM       PT/INR:    Lab Results   Component Value Date/Time    PROTIME 15.6 10/13/2022 06:35 AM    INR 1.4 10/13/2022 06:35 AM        Pro-BNP 12,808 High  pg/mL   Magnesium [[id number]]        MICROBIOLOGY:    SARS CoV 2 neg     CRP 0.8    Pro BNP 2806  Procacitonin 0.36      Radiology :    CT abdomen and pelvis -  Impression:        Mixed densities throughout the colonic segments including in the ascending   colon and rectum could represent mixed densities of blood products although   no focal area of arterial enhancement or extravasation/blushing is observed   to localize acute hemorrhage. No evidence for perforation or abscess. No   pneumatosis intestinalis or portal venous gas evident. 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 No adjacent hematoma with only minimal   adjacent stranding. Bilateral pleural effusions moderate to large right and moderate left   effusions with adjacent atelectasis. Anasarca. X ray foot :           No evidence of calcaneal osteomyelitis.         IMPRESSION:     Right foot  non healing wound- cipro and zyvox stopped 10/12 - wound healing slowly   Leukocytosis - improved   Resp failure, CHF        RECOMMENDATIONS:      Diuretics    Local wound care   Monitor off abx   ID sign off

## 2022-10-16 NOTE — PROGRESS NOTES
[facility] ([facility])  Gastroenterology, Hepatology &  Advanced Endoscopy     Progress Note      SUBJECTIVE:      Patient is back on BiPAP. He remains with brown liquid stool and ongoing prep. No evidence of blood in his stool. OBJECTIVE      Physical    VITALS:  BP (!) 175/55   Pulse 65   Temp 98.2 °F (36.8 °C) (Temporal)   Resp 14   Ht 5' 8\" (1.727 m)   Wt 241 lb 4.8 oz (109.5 kg)   SpO2 98%   BMI 36.69 kg/m²   Physical Exam:  General: Chronically ill-appearing, NAD  HEENT: PERRLA, EOMI, dry membranes, BiPAP and NG in place  Cards: RRR, + LE edema  Resp: Synchronous with BiPAP, good air movement, no use of accessory muscles, no audible wheezing  Abdomen: soft, NT, ND. Extremities: Edema in hands, RLE dressed in wound vac  Skin: No rashes or jaundice  Neuro: Lethargic, sedated     ASSESSMENT AND PLAN      67y/M w/ CKD, DMII, and MATT who presents w/ acute CVA now on DAPT and R foot infection s/p debridement who has been having issues with persistent anemia requiring several transfusions. Over the weekend, he showed active signs of bleeding with positive tagged RBC scan. EGD negative, Colonoscopy with BRB in R colon and no actively bleeding lesions. CTA w/ no active extravasation. DAPT has since been d/c. PLAN:  - S/p IV Vit K for elevated INR with adequate response. Will check again tomorrow. - Hgb has been stable with no active signs of GI bleeding  - I will restart aspirin 81mg monotherapy daily and we will monitor Hgb daily and monitor FMS output carefully for signs of rebleeding.  - Trend Hgb daily and transfuse to Hgb >7.   - Continue NG and FMS. Continue giving 6L GoLytely solution continuously until clear. Once clear, can stop. He will then require more overnight Monday based on output into tomorrow. - I will plan on Colonoscopy with TI intubation and evaluation Tuesday. - Further decision-making based on findings.    - We can d/c transfer to [place] as there is little else than can be done there that we are not currently doing. I will follow. Thank you for including us in the care of this patient. Please do not hesitate to contact us with any additional questions or concerns.      Lu Anderson MD  Gastroenterology/Hepatology  Advanced Endoscopy

## 2022-10-16 NOTE — PROGRESS NOTES
[address] [facility]   Department of Internal Medicine   MICU Progress Note    Patient:  Yinka Fleming 74 y.o. male   MRN: [id number]       Date of Service: 10/16/2022    Allergy: Codeine  CC strokelike symptoms  Subjective   10/13/2022  Alert oriented 2-3 confused, follows commands. Supplement oxygen on BiPAP with respiratory distress  INR 1.4  --> S/P vitamin K and FFP   Start clear liquid diet  Not on pressors, will start precedex  and Fentanyl for severe agitation,   -- will  start Seroquel   Agitated today, due to BIAP,  No bowel movement overnight   No temps  10/14/2022   Much improved on Bumex drip   Off BiPaP on NC   OK   Creatinine 1.5  BUN 35   Na 151  Will increase free water  po  10/15/2022   Cxr worsening of CHF with effusion   K replaced   1+ Edema   DC fresh water thru NG + Restrict fluids   Vitals stable   On HFNC  with sat at 96%   Refuses BiPAP  10/16/2022   Afebrile   High /55  Will add catapres and increase Metoprolol  HR 75   CXR CHF persists   Intake 9955 due to isotonic bowel prep    Will increase Bumex drip to 0.75 mg today and   decrease to 0.5 mg tomorrow 10/17   Electrolytes replaced & re-check at 6 PM  Objective     TEMPERATURE:  Current - Temp: 98 °F (36.7 °C); Max - Temp  Av.3 °F (36.8 °C)  Min: 97.8 °F (36.6 °C)  Max: 98.8 °F (37.1 °C)    RESPIRATIONS RANGE: Resp  Av.2  Min: 14  Max: 41    PULSE RANGE: Pulse  Av.6  Min: 71  Max: 101    BLOOD PRESSURE RANGE:  Systolic (06DFL), TXZ:062 , Min:107 , RDD:158   ; Diastolic (79IFL), GEORGE:39, Min:58, Max:195      PULSE OXIMETRY RANGE: SpO2  Av.7 %  Min: 86 %  Max: 97 %    I & O - 24hr:    Intake/Output Summary (Last 24 hours) at 10/16/2022 1121  Last data filed at 10/16/2022 1023  Gross per 24 hour   Intake 9964.4 ml   Output 7610 ml   Net 2354.4 ml     I/O last 3 completed shifts:   In: 68981 [I.V.:4324.6; NA/GZ:13233; IV Piggyback:763.5]  Out: 75811 [UPSVA:4390; TSMMA:5626] I/O this shift:  In: 56.3 [I.V.:528.9; IV Piggyback:137.4]  Out: 2036 [Urine:1115; Stool:1200]   Weight change:     Physical Exam:  CONSTITUTIONAL: Awake, morbid obesity, alert to 1-2, intermittently confused, labored breathing. EYES:  Lids and lashes normal, pupils equal, round and reactive to light, extra ocular muscles intact, sclera clear, conjunctiva normal  ENT:  Normocephalic, without obvious abnormality, atraumatic, sinuses nontender on palpation, noted mucous membrane dry, picking on skin of the mouth that noted to be bloody. NV ++  CARDIOVASCULAR:  Normal apical impulse, regular rate and rhythm, normal S1 and S2, no S3 or S4, and no murmur noted  Lung Diminished lung sounds throughout lung fields, fine crackles noted on the bases, no wheezing noted, labor breathing, no use of accessory muscle at this time, on supplement oxygen. MUSCULOSKELETAL: weakness in extremities, echo noted, 2+ pitting edema on lower extremities. All extremities,  NEUROLOGIC: Awake, alert oriented 2, weakness on the right upper and lower extremities due to stroke. SKIN: Right foot nonhealing wound.  2+ Edema sacrum & belly    Medications     Continuous Infusions:   bumetanide 0.1 mg/mL infusion 0.5 mg/hr (10/16/22 1023)    dextrose 75 mL/hr at 10/16/22 1023    dexmedetomidine (PRECEDEX) IV infusion 1.5 mcg/kg/hr (10/16/22 1023)    sodium chloride      sodium chloride      dextrose       Scheduled Meds:   calcium gluconate IVPB  1,000 mg IntraVENous Once    magnesium sulfate  2,000 mg IntraVENous Once    potassium chloride  20 mEq Oral Daily with breakfast    calcium gluconate IVPB  1,000 mg IntraVENous Once    haloperidol  3 mg Oral TID    [START ON 10/17/2022] metoprolol succinate  50 mg Oral Daily    metoprolol tartrate  25 mg Oral Once    bumetanide  2 mg IntraVENous Once    [Held by provider] potassium chloride  20 mEq Oral Daily with breakfast    clopidogrel  75 mg Oral Daily    QUEtiapine  50 mg Oral BID    sodium chloride flush  5-40 mL IntraVENous 2 times per day    miconazole nitrate   Topical BID    gabapentin  600 mg Oral 4x daily    amLODIPine  10 mg Oral Daily    white petrolatum   Topical BID    sodium chloride flush  5-40 mL IntraVENous 2 times per day    miconazole   Topical BID    [Held by provider] insulin glargine  32 Units SubCUTAneous QAM    epoetin sharath-epbx  6,000 Units SubCUTAneous Once per day on Mon Wed Fri    hydrALAZINE  50 mg Oral 3 times per day    insulin lispro  0-16 Units SubCUTAneous 4x Daily AC & HS    pantoprazole (PROTONIX) 40 mg injection  40 mg IntraVENous Q12H    [Held by provider] sennosides  5 mL Oral Nightly    carbidopa-levodopa  1 tablet Per NG tube TID    ipratropium-albuterol  1 ampule Inhalation Q4H WA    collagenase   Topical Q MWF    benzonatate  100 mg Oral TID    ezetimibe  10 mg Oral Daily    [Held by provider] ticagrelor  90 mg Oral BID    [Held by provider] aspirin  81 mg Oral Daily    Or    [Held by provider] aspirin  300 mg Rectal Daily    atorvastatin  80 mg Oral Nightly     PRN Meds: hydrALAZINE, morphine, morphine, sodium chloride flush, sodium chloride, miconazole nitrate **AND** miconazole nitrate, sodium chloride flush, sodium chloride, labetalol, glucose, dextrose bolus **OR** dextrose bolus, glucagon (rDNA), dextrose  Nutrition:   Clear liquid diet   Labs and Imaging Studies     CBC:   Recent Labs     10/14/22  0413 10/14/22  2311 10/15/22  0400 10/15/22  1120 10/15/22  1651 10/16/22  0030 10/16/22  0519   WBC 8.6  --  7.8  --   --   --  6.6   RBC 2.70*  --  2.61*  --   --   --  2.53*   HGB 8.0*   < > 7.8*   < > 7.1* 7.3* 7.7*   HCT 24.3*   < > 23.9*   < > 21.8* 22.3* 23.5*   MCV 90.0  --  91.6  --   --   --  92.9   MCH 29.6  --  29.9  --   --   --  30.4   MCHC 32.9  --  32.6  --   --   --  32.8   RDW 15.7*  --  15.4*  --   --   --  15.3*   *  --  105*  --   --   --  124*   MPV 10.7  --  10.5  --   --   --  10.5    < > = values in this interval not displayed.        BMP:    Recent Labs 10/14/22  0413 10/14/22  0948 10/15/22  0400 10/15/22  0842 10/15/22  1651 10/16/22  0030 10/16/22  0519   *   < > 148*   < > 153* 147* 147*   K 3.9   < > 3.9   < > 3.2* 3.7 3.7   *   < > 104   < > 107 103 103   CO2 26   < > 29   < > 33* 29 29   BUN 37*   < > 28*   < > 26* 26* 24*   CREATININE 1.5*   < > 1.4*   < > 1.4* 1.4* 1.4*   GLUCOSE 212*   < > 218*   < > 160* 154* 230*   CALCIUM 8.3*   < > 8.1*   < > 7.9* 8.2* 7.7*   PROT 5.2*  --  5.4*  --   --   --  5.5*   LABALBU 2.5*  --  2.9*  --   --   --  3.0*   BILITOT 0.6  --  0.6  --   --   --  0.6   ALKPHOS 523*  --  453*  --   --   --  443*   *  --  36  --   --   --  27   ALT 22  --  24  --   --   --  10    < > = values in this interval not displayed. LIVER PROFILE:   Recent Labs     10/14/22  0413 10/15/22  0400 10/16/22  0519   * 36 27   ALT 22 24 10   BILITOT 0.6 0.6 0.6   ALKPHOS 523* 453* 443*       PT/INR:   No results for input(s): PROTIME, INR in the last 72 hours. APTT:   No results for input(s): APTT in the last 72 hours. Fasting Lipid Panel:    No results found for: CHOL, TRIG, HDL    Cardiac Enzymes:    No results found for: CKTOTAL, CKMB, CKMBINDEX, TROPONINI    Notable Cultures:      Blood cultures   Blood Culture, Routine   Date Value Ref Range Status   10/13/2022 24 Hours no growth  Preliminary     Respiratory cultures No results found for: RESPCULTURE No results found for: LABGRAM  Urine No results found for: LABURIN  Legionella No results found for: LABLEGI  C Diff PCR No results found for: CDIFPCR  Wound culture/abscess: No results for input(s): WNDABS in the last 72 hours. Tip culture:No results for input(s): CXCATHTIP in the last 72 hours.      Antibiotic  Days  Day started   Cipro     Linezolid                      Oxygen:     Vent Information  Ventilator ID: [id number]  Equipment Changed: (S) Humidification    Additional Respiratory Assessments  Heart Rate: 71  Resp: 15  SpO2: 96 %  Position: Semi-Sarabia's  Humidification Source: Heated wire  Humidification Temp: 94  Circuit Condensation: Drained     Nasal cannula L/min     Face mask %     Reservoirs mask %       ABG     PH  7.35   PCO2  37   PO2  99   HCO3  20   Sat%  95   FIO2     DATES 10/12/22       Lines:  Site  Day  Date inserted     TLC              PICC              Arterial line              Peripheral line              HD cath            Urinary Catheter 09/29/22 Mahan-Output (mL): 200 mL  [REMOVED] Urinary Catheter 09/27/22-Output (mL): 100 mL    Imaging Studies:  CXR  10/13 when compared to previous CXR 10/8-10/12 suggest & is consistent with CHF     Assessment and PLan   In summary,   74 y.o. male with significant past medical history of CKD, DM type II, MATT, presented 9/26/22 2 with stroke and GI bleed. Patient was admitted to [facility] with strokelike symptoms, right-sided paralysis and aphasia, found to have severe left ICA stenosis and [place] [facility] [place]  taken to IR for left ICA angioplasty with stent placement. Patient had GI Bleed, following general surgery, received total of 12 units of PRBC since admission. EGD on 10/3/22 showed no evidence of upper GI bleed, CT a/p showed showed no focal area of arterial enhancement or extra blush to localize acute hemorrhage, nuclear medicine scan was positive for bleeding around the splenic flexure. Colonoscopy showed full of clots. Transfer to MICU from PICU with low hgb, labor breathing and hypotensive. 10/13/2022  Alert oriented 2-3 confused, follows commands.   Supplement oxygen on BiPAP with respiratory distress  INR 1.4  --> S/P vitamin K and FFP   Start clear liquid diet  Not on pressors, will start precedex  and Fentanyl for severe agitation,   -- will  start Seroquel   Agitated today, due to BIAP,  No bowel movement overnight   No temps  10/14/2022   Much improved on Bumex drip   Off BiPaP on NC   OK   Creatinine 1.5  BUN 35   Na 151  Will increase free water  po   Labs & Imager reviewed & replaced  10/15/2022   Cxr worsening of CHF with effusion--> Increase Bumex drip to 1 mg   K replaced   1+ Edema   DC fresh water thru NG + Restrict fluids   Vitals stable   On HFNC  with sat at 96%   Refuses BiPAP   Labs & Imager reviewed & replaced  10/16/2022   Afebrile   High /55  Will add catapres and increase Metoprolol  HR 75   CXR CHF persists   Intake 9955 due to isotonic bowel prep    Will increase Bumex drip to 0.75 mg today and   decrease to 0.5 mg tomorrow 10/17   Electrolytes replaced & re-check at 6 PM    Assessment:  CHF CXR more consistent with CHF  EF 60%   Bumex drip  LMCA  stroke 2/2 symptomatic LICA stenosis s/p IR angioplasty and stent placement   9/26/2022 on dual antiplatelet therapy- off now   Uncontrolled HTN  on Metoprolol/Catapres/Apresoline  Acute hypoxemic and hypercapnic respiratory failure intubated/extubated,   Off BiPAP ---> currently on supplemental oxygen  Acute  large GI bleed/acute blood loss anemia s/p 16 units of PRBC s/p EGD   and awaiting Colonoscopy, and bleeding scan. Bleeding positive around the splenic flexure.  --> Surgery on board  LARY   Cr 1.4    Metabolic acidosis   Improved  Hypernatremia/ hyperchloremia   144/100  Hyperglycemia  Right foot ulcer    Hx DM type 2  Hx of MATT on CPAP  Hx of parkinson disease  Hx of seizure  Hx of PVD  Hx of depression      Plan:  -  Despite of CKD & high creatinine , will start Bumex drip to avoid intubation, if possible     And also start Percedex & Fentanyl drip  - Currently on supplement oxygen, titrate to keep SPO2 goal above 92%  - bronchodilator eran and PRN  - Bipap qhs and PRN   - precedex gtt -- will start on Seroquel 25 BID  Taper off Fentanyl + Percedex  - keep MAP >65mmHg  - neurology consulted for stroke, on dual antiplatelet therapy- recommended Plavix once stable from bleeding standpoint until able to start dual therapy   - trend troponin, pro BNP  -Wound culture culture wound culture on foot showed Corynebacteria (Mixed morphologies, COVID 19 negative,   - ID following, on  Cipro and linezolid  - General surgery following for GI bleed, input appreciated   Colonoscopy today? 10/16  - s/p 15 units of PBRC  - hgb 7.7 today  10/17  - INR 1.4-- S/P vitamin K and  1 FFP   - h/h q6H  - transfuse if less than 7  - PPI BID  - EGD, colonoscopy and bleeding scan done, recommended IR for embolization- on dural antiplatelet therapy- high risk of bleeding- unable to reach family  - will hold brilinta and Asprin for now given a bleed   - metabolic acidosis, resolved  - trend Bmp  - Increase Bumex 1 mg x today only 10/17 due to large intake 9955 cc,   - nephrology,following ,input appreciated  note 1-/16 noted  - Podiatry and wound care following  - labs in AM  - F/E/N Curlene Roof /replace lytes/ tube feeds   - DVT/GI scds/ no anticoagulation given GI bleed/ Protonix BID  - Code status: full code        Roberto Prince MD., [facility]  Pulmonary & Critical Care Medicine    During multidisciplinary team rounds pt Nannette Cai, male , was seen, examined and discussed. This is confirmation that I have personally seen and examined the patient and that the key elements of the encounter were performed by me (> 85 % time) including HPI, social history, family history, ROS, and physical examination have been done by me. The medications & laboratory data and imagery was discussed and adjusted where necessary. Key issues of the case were discussed among consultants. End organ damage assessment was performed on all organs and aggressive measures wee taken to prevent or improve them. This patient has a high probability of sudden clinically significant deterioration. I managed/supervised life or organ supporting interventions that required frequent physician assessment. I devoted my full attention to the direct care of this patient for the period of time indicated below.  In addition to above, time was devoted to teaching and to any procedure. NOTE: This report, in part or full, may have been transcribed using voice recognition software. Every effort was made to ensure accuracy; however, inadvertent computerized transcription errors may be present. Please excuse any transcriptional grammatical or spelling errors that may have escaped my editorial review. Total critical care time caring for this patient with life threatening, unstable organ failure, including direct patient contact, management of life support systems, review of data including imaging and labs, discussions with other team members and physicians is at least 39 Min so far today, excluding procedures. Family is updated at the bedside as available. Key issues of the case were discussed among consultants.       John Mccann MD., [facility]  Pulmonary & Critical Care Medicine

## 2022-10-16 NOTE — PROGRESS NOTES
Department of Podiatry   Progress Note      Mr. Selina Lu was seen and evaluated at bedside this morning s/p wound vac application. No acute events to report. Patient is awake with sitter at bedside. Past Medical History:            Diagnosis Date    Chronic back pain     CKD (chronic kidney disease)     CVA (cerebral vascular accident) ([facility] [address]     CVA (cerebral vascular accident) ([facility] [address]     DM (diabetes mellitus) ([facility] [address]     Major depression     MI (myocardial infarction) ([facility] [address]     MATT (obstructive sleep apnea)     Parkinson disease ([facility] [address]     PVD (peripheral vascular disease) ([facility] [address]     Seizure ([facility] [address]        Past Surgical History:        Procedure Laterality Date    COLONOSCOPY N/A 10/9/2022    COLONOSCOPY DIAGNOSTIC performed by Timi aBbin MD at [place] [address]      IR CAROTID STENT UNI W PROTECTION  9/27/2022    IR CAROTID STENT UNI W PROTECTION 9/27/2022 Eliseo Loza MD [facility]    UPPER GASTROINTESTINAL ENDOSCOPY N/A 10/3/2022    EGD DIAGNOSTIC ONLY performed by Iza Downing MD at [facility] ENDOSCOPY       Medications Prior to Admission:    Medications Prior to Admission: apixaban (ELIQUIS) 5 MG TABS tablet, Take 5 mg by mouth 2 times daily  aspirin 81 MG chewable tablet, Take 81 mg by mouth daily  carbidopa-levodopa (SINEMET)  MG per tablet, Take 1 tablet by mouth 3 times daily  carvedilol (COREG) 25 MG tablet, Take 25 mg by mouth 2 times daily (with meals)  cetirizine (ZYRTEC) 10 MG tablet, Take 10 mg by mouth daily  clopidogrel (PLAVIX) 75 MG tablet, Take 75 mg by mouth daily  diphenhydrAMINE (BENADRYL) 25 MG capsule, Take 25 mg by mouth every 6 hours  ezetimibe (ZETIA) 10 MG tablet, Take 10 mg by mouth daily  fluticasone (FLONASE) 50 MCG/ACT nasal spray, 2 sprays by Each Nostril route daily  gabapentin (NEURONTIN) 600 MG tablet, Take 600 mg by mouth 4 times daily.   HYDROcodone-acetaminophen (NORCO) 5-325 MG per tablet, Take 1 tablet by mouth 2 times daily.  Insulin Glargine, 2 Unit Dial, (TOUJEO MAX SOLOSTAR) 300 UNIT/ML SOPN, Inject 66 Units into the skin daily  piperacillin-tazobactam (ZOSYN) 3-0.375 GM per 50ML IVPB extended infusion, Infuse 4.5 mg intravenously in the morning and 4.5 mg at noon and 4.5 mg in the evening. acetaminophen (TYLENOL) 325 MG tablet, Take 650 mg by mouth every 6 hours as needed for Pain  amLODIPine (NORVASC) 5 MG tablet, Take 5 mg by mouth daily  benzonatate (TESSALON) 100 MG capsule, Take 100 mg by mouth 3 times daily  cloNIDine (CATAPRES) 0.1 MG tablet, Take 0.1 mg by mouth in the morning and 0.1 mg in the evening. hydrALAZINE (APRESOLINE) 100 MG tablet, Take 100 mg by mouth 3 times daily    Allergies:  Codeine    Social History:   TOBACCO:   has no history on file for tobacco use. ETOH:   has no history on file for alcohol use. DRUGS:   Social History     Substance and Sexual Activity   Drug Use Not on file       Family History:   History reviewed. No pertinent family history. REVIEW OF SYSTEMS:    All pertinent positives and negatives as noted in HPI       LOWER EXTREMITY EXAMINATION   -Dressings clean, dry and intact without dishevelment.   -Posterior calf pain on palpation L>R  -Patient digits are warm to touch.   -Offloading pads noted    PHYSICAL EXAM AS OF 10/14/22:  VASCULAR:  DP and PT pulses are non palpable. CFT < 5 seconds B/L. Warm to warm from the tibial tuberosity to the distal aspect of the digits dorsally. NEUROLOGIC:  Protective sensation is diminished but grossly intact    DERM:  Right foot lateral plantar wound measuring approx 6cm in diameter. No erythema, serosanguinous drainage, no malodor.     MUSCULOSKELETAL: deferred                 CONSULTS:  IP CONSULT TO CRITICAL CARE  IP CONSULT TO DIETITIAN  IP CONSULT TO CARDIOLOGY  IP CONSULT TO PODIATRY  IP CONSULT TO PODIATRY  IP CONSULT TO UROLOGY  IP CONSULT TO NEPHROLOGY  IP CONSULT TO GENERAL SURGERY  IP CONSULT TO INFECTIOUS DISEASES  IP CONSULT TO GI  IP CONSULT TO GENERAL SURGERY  IP CONSULT TO GENERAL SURGERY    MEDICATION:  Scheduled Meds:   calcium gluconate IVPB  1,000 mg IntraVENous Once    magnesium sulfate  2,000 mg IntraVENous Once    [Held by provider] potassium chloride  20 mEq Oral Daily with breakfast    haloperidol  2 mg Oral BID    clopidogrel  75 mg Oral Daily    QUEtiapine  50 mg Oral BID    sodium chloride flush  5-40 mL IntraVENous 2 times per day    miconazole nitrate   Topical BID    [Held by provider] gabapentin  600 mg Oral 4x daily    amLODIPine  10 mg Oral Daily    white petrolatum   Topical BID    sodium chloride flush  5-40 mL IntraVENous 2 times per day    miconazole   Topical BID    [Held by provider] insulin glargine  32 Units SubCUTAneous QAM    epoetin sharath-epbx  6,000 Units SubCUTAneous Once per day on Mon Wed Fri    hydrALAZINE  50 mg Oral 3 times per day    insulin lispro  0-16 Units SubCUTAneous 4x Daily AC & HS    pantoprazole (PROTONIX) 40 mg injection  40 mg IntraVENous Q12H    metoprolol succinate  25 mg Oral Daily    [Held by provider] sennosides  5 mL Oral Nightly    carbidopa-levodopa  1 tablet Per NG tube TID    ipratropium-albuterol  1 ampule Inhalation Q4H WA    collagenase   Topical Q MWF    benzonatate  100 mg Oral TID    ezetimibe  10 mg Oral Daily    [Held by provider] ticagrelor  90 mg Oral BID    [Held by provider] aspirin  81 mg Oral Daily    Or    [Held by provider] aspirin  300 mg Rectal Daily    atorvastatin  80 mg Oral Nightly     Continuous Infusions:   bumetanide 0.1 mg/mL infusion 0.5 mg/hr (10/16/22 0941)    dextrose 75 mL/hr at 10/16/22 0941    dexmedetomidine (PRECEDEX) IV infusion 1.5 mcg/kg/hr (10/16/22 0941)    sodium chloride      sodium chloride      dextrose       PRN Meds:.hydrALAZINE, sodium chloride flush, sodium chloride, miconazole nitrate **AND** miconazole nitrate, sodium chloride flush, sodium chloride, labetalol, glucose, dextrose bolus **OR** dextrose bolus, glucagon (rDNA), dextrose    RADIOLOGY:  XR CHEST PORTABLE   Final Result   New right pleural effusion. XR CHEST PORTABLE   Final Result   Stable bilateral pulmonary infiltrates. XR ABDOMEN (KUB) (SINGLE AP VIEW)   Final Result   Catheter is in the stomach. XR ABDOMEN FOR NG/OG/NE TUBE PLACEMENT   Final Result   1. Gastric catheter is coiled in the pharynx and upper esophagus and should   be withdrawn and reinserted. Follow-up imaging recommended. XR CHEST PORTABLE   Final Result   Bilateral airspace disease with interval progression on the right. NM GI BLOOD LOSS   Final Result   No evidence of active GI bleeding during acquisition. XR CHEST PORTABLE   Final Result   Unchanged bilateral chest opacities with right pleural effusion evident. See   above. XR CHEST PORTABLE   Final Result   1. Persistent bilateral patchy parenchymal densities concerning for pneumonia   and or atelectasis   2. Persistent small bilateral pleural effusions, left greater than right   3. Cardiomegaly, stable         XR CHEST PORTABLE   Final Result   No significant change compared to the prior exam with cardiomegaly, bilateral   airspace opacities and bilateral pleural effusions seen most suggestive of   congestive heart failure, but superimposed pneumonia not excluded. US DUP ABD PEL RETRO SCROT COMPLETE   Final Result   1. No evidence of testicular torsion      2. Severe scrotal skin thickening. 3.  Trace hydroceles. 4.  No evidence of bowel containing inguinal hernia. US SCROTUM AND TESTICLES   Final Result   1. No evidence of testicular torsion      2. Severe scrotal skin thickening. 3.  Trace hydroceles. 4.  No evidence of bowel containing inguinal hernia. XR CHEST PORTABLE   Final Result   1.   No significant interval change in the appearance of the chest.      2.  Bilateral pleural effusions and areas of atelectasis or multifocal pneumonia. CTA ABDOMEN PELVIS W CONTRAST   Final Result   Mixed densities throughout the colonic segments including in the ascending   colon and rectum could represent mixed densities of blood products although   no focal area of arterial enhancement or extravasation/blushing is observed   to localize acute hemorrhage. No evidence for perforation or abscess. No   pneumatosis intestinalis or portal venous gas evident. 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 No adjacent hematoma with only minimal   adjacent stranding. Bilateral pleural effusions moderate to large right and moderate left   effusions with adjacent atelectasis. Anasarca. XR CHEST PORTABLE   Final Result   Bilateral pleural effusions with bibasilar patchy infiltrates and mild   cardiomegaly. NM GI BLOOD LOSS   Final Result   Active GI bleeding identified during acquisition in the splenic flexure with   peristalsis identified into the proximal descending colon. RECOMMENDATIONS:   Unavailable         CT ABDOMEN PELVIS WO CONTRAST   Final Result   CHEST:      No evidence of neoplastic disease, allowing for limitations of noncontrast   technique. Moderate bilateral pleural effusions. Nonemergent incidental findings as above. ABDOMEN/PELVIS:      No evidence of neoplastic disease, allowing for limitations of noncontrast   technique. Bladder wall thickening is nonspecific given under distension; recommend   correlation urinalysis to evaluate for UTI. Nonemergent incidental findings as above. CT CHEST WO CONTRAST   Final Result   CHEST:      No evidence of neoplastic disease, allowing for limitations of noncontrast   technique. Moderate bilateral pleural effusions. Nonemergent incidental findings as above.       ABDOMEN/PELVIS: No evidence of neoplastic disease, allowing for limitations of noncontrast   technique. Bladder wall thickening is nonspecific given under distension; recommend   correlation urinalysis to evaluate for UTI. Nonemergent incidental findings as above. FL MODIFIED BARIUM SWALLOW W VIDEO   Final Result   1. Mildly impaired swallowing mechanism with swallowing delay and followed   by laryngeal penetration with thin liquid barium when using a straw. No   barium aspiration      2. Please see separate speech pathology report for full discussion of   findings and recommendations. RECOMMENDATIONS:   Unavailable         XR ABDOMEN (KUB) (SINGLE AP VIEW)   Final Result   Somewhat greater than average quantity of retained fecal material thin the   lower GI tract suggesting dysfunction with evacuation. XR CHEST PORTABLE   Final Result   1. Atherosclerotic disease and mild cardiomegaly. 2.  Persistent but improved opacities in the bilateral lungs most pronounced   in the mid and lower lungs. There appears to be a small left and trace right   pleural effusion         XR FOOT RIGHT (2 VIEWS)   Final Result   No evidence of calcaneal osteomyelitis. CT HEAD WO CONTRAST   Final Result   Parenchymal volume loss and chronic microvascular ischemic changes. No acute intracranial abnormality. Ethmoid and sphenoid sinusitis. Right mastoid effusion. US RETROPERITONEAL COMPLETE   Final Result   1. Normal appearance of the bilateral kidneys. No hydronephrosis. 2.  A Mahan catheter is decompressing the bladder. XR CHEST PORTABLE   Final Result   Worsening infiltrate and or atelectasis on the left, stable on the right with   suspected layering pleural effusion. XR CHEST PORTABLE   Final Result   Unchanged bilateral atelectasis and or infiltrate as well as small right   pleural effusion.          MRI BRAIN WO CONTRAST   Final Result   There are 2 small regions of petechial infarcts one in the left   posterior frontal lobe and a second in the left parietal lobe not   exceeding 3 mm. There is otherwise extensive small vessel ischemic   changes         XR CHEST PORTABLE   Final Result   1. Multifocal bilateral airspace disease more prominent within the lower   lobes. The airspace disease is unchanged when compared with the prior study. CT HEAD WO CONTRAST   Final Result   Diffuse atrophy likely age related   Findings compatible with small vessel ischemic changes. XR CHEST PORTABLE   Final Result   1. Pulmonary opacities present favoring edema and atelectasis, also small   pleural effusions, but nonspecific with some additional considerations noted   above   2. Support devices present as described above   3. Heart size appears borderline enlarged         XR ABDOMEN FOR NG/OG/NE TUBE PLACEMENT   Final Result   NG/OG is in the stomach. IR CAROTID STENT W PROTECTION   Final Result   1. Angiogram demonstrates successful placement of a carotid stent ,   post procedure images demonstrate a widely patent stent and internal   carotid         CT HEAD WO CONTRAST   Final Result   Diffuse atrophy likely age related   Findings compatible with small vessel ischemic changes. Findings were called at the time of dictation         CT BRAIN PERFUSION   Final Result      No significant ischemic penumbra identified      This study was analyzed by the [employer] ai algorithm. CTA NECK W CONTRAST   Final Result   1. Estimated stenosis of the proximal right and left internal carotid   artery by NASCET criteria is greater than 90% on the left   2. Severe atherosclerotic disease . 3. No large vessel occlusion identified            This study was analyzed by the [employer] ai algorithm. CTA HEAD W CONTRAST   Final Result   1.  Estimated stenosis of the proximal right and left internal carotid   artery by NASCET criteria is greater than 90% on the left   2. Severe atherosclerotic disease . 3. No large vessel occlusion identified            This study was analyzed by the [employer] ai algorithm. XR CHEST PORTABLE    (Results Pending)   XR CHEST PORTABLE    (Results Pending)       Vitals:    BP (!) 160/67   Pulse 73   Temp 98 °F (36.7 °C) (Temporal)   Resp 16   Ht 5' 8\" (1.727 m)   Wt 241 lb 4.8 oz (109.5 kg)   SpO2 97%   BMI 36.69 kg/m²     LABS:   Recent Labs     10/15/22  0400 10/15/22  1120 10/16/22  0030 10/16/22  0519   WBC 7.8  --   --  6.6   HGB 7.8*   < > 7.3* 7.7*   HCT 23.9*   < > 22.3* 23.5*   *  --   --  124*    < > = values in this interval not displayed. Recent Labs     10/16/22  0519   *   K 3.7      CO2 29   PHOS 3.7   BUN 24*   CREATININE 1.4*       Recent Labs     10/15/22  0400 10/16/22  0519   PROT 5.4* 5.5*         ASSESSMENTS:   1. Right foot wound diabetic ulcer  2. DM  3. Pain right foot  Acute cerebrovascular accident (CVA) ([facility] [address]            PLAN:  -Patient examined and evaluated at bedside. All pertinent labs, charts, and imaging reviewed prior to encounter   -Antibiotics as per ID: Stopped  -Culture: Corynebacteria  -Continue Wound vac changes to RLE:MWF Dressing changes. Wound vac noted today with good seal, running at 125mmHg with minimal debris in cannister.   -X rays: No acute osseous abnormalities to foot   -US:Pending  -Will continue conservative care for now.    -Patient discussed with Dr. Kenny Arguello  -Continue offloading

## 2022-10-16 NOTE — PROGRESS NOTES
Date: 10/15/2022    Time: 11:22 PM    Patient Placed On BIPAP/CPAP/ Non-Invasive Ventilation? Yes    If no must comment. Facial area red/color change? No           If YES are Blister/Lesion present? No   If yes must notify nursing staff  BIPAP/CPAP skin barrier?   Yes    Skin barrier type:mepilexlite       Comments:        Olga Taylor RCP

## 2022-10-16 NOTE — PROGRESS NOTES
Hospitalist Progress Note      PCP: Bret Terrazas MD    Date of Admission: 9/27/2022  Days in the hospital: [id number] Course:   Patient is a 71-year-old male with history of CKD, diabetes mellitus, obstructive sleep apnea who initially presented to [facility] with strokelike symptoms and right-sided weakness along with aphasia. Patient was noted to have severe left ICA stenosis. He was transferred to [facility] and patient underwent left ICA angioplasty and stent placement by IR. He was initially admitted to the neuro ICU. He was placed on dual antiplatelet therapy with Brilinta and aspirin. Also was noted to have GI bleed and received 15 units of PRBC. EGD was done on 10/3/2022 and no evidence of upper GI bleed noted. Colonoscopy on 10/9/2022 showed large amount of blood. CT of the abdomen did not show any localization of hemorrhage. Nuclear medicine scan was done which showed bleeding around the splenic flexure. RRT was called due to hypotension and hemorrhagic shock and patient was transferred to ICU. Due to diffuse bleeding patient was taken off dual antiplatelet therapy. He is planned for colonoscopy with GI once he has been adequately prepped. He had acute respiratory distress on 10/13/2022 and had to be placed on Bumex for volume overload. Subjective  Patient seen and examined at bedside. Patient is sedated. He is on BiPAP. Patient is on dextrose 5% water 75 mL/h Bumex drip 1 mg/h and Precedex drip    Exam:    BP (!) 176/55   Pulse 71   Temp 98 °F (36.7 °C) (Temporal)   Resp 16   Ht 5' 8\" (1.727 m)   Wt 241 lb 4.8 oz (109.5 kg)   SpO2 97%   BMI 36.69 kg/m²     HEENT: + Pallor, no icterus. Respiratory:  CTA, decreased air entry  Cardiovascular: RRR, no murmur. Anasarca  Abdomen: Soft, non-tender, BS noted. Musculoskeletal: Right foot dressing and wound VAC noted.    Neurologic: Sedated        Assessment/Plan:  Acute CVA with left ICA stenosis s/p angioplasty and stent placement, dual antiplatelet therapy on hold, continue serial neurochecks, follow-up with neurology    Acute lower GI bleed s/p multiple units of PRBC transfusion. Last one in October 13. Bleeding noted from splenic flexure. To be scheduled for colonoscopy soon once appropriately prepped, follow-up with GI     Acute hypoxic respiratory failure/distress, follow-up with critical care team, chest x-ray reviewed; wean oxygen as tolerated. Patient is currently on BiPAP    Acute blood loss anemia status post 15 unit blood transfusion, H&H remains low but stable, monitor need for further transfusions. Last transfusion on October 13    Volume overload, currently on Bumex drip    Acute kidney injury, renal function slowly improving, nephrology following; strict I/O    Right foot nonhealing wound, on Cipro and Zyvox per ID, continue with local wound care, follow-up with podiatry    Obstructive sleep apnea on CPAP    Altered mental status secondary to metabolic encephalopathy, continue to monitor closely     Overall prognosis remains guarded      Labs:   Recent Labs     10/14/22  0413 10/14/22  2311 10/15/22  0400 10/15/22  1120 10/16/22  0030 10/16/22  0519 10/16/22  1124   WBC 8.6  --  7.8  --   --  6.6  --    HGB 8.0*   < > 7.8*   < > 7.3* 7.7* 7.7*   HCT 24.3*   < > 23.9*   < > 22.3* 23.5* 23.1*   *  --  105*  --   --  124*  --     < > = values in this interval not displayed. Recent Labs     10/14/22  0413 10/14/22  0948 10/15/22  0400 10/15/22  0842 10/16/22  0030 10/16/22  0519 10/16/22  1124   *   < > 148*   < > 147* 147* 144   K 3.9   < > 3.9   < > 3.7 3.7 3.3*   *   < > 104   < > 103 103 100   CO2 26   < > 29   < > 29 29 30*   BUN 37*   < > 28*   < > 26* 24* 23   CREATININE 1.5*   < > 1.4*   < > 1.4* 1.4* 1.4*   CALCIUM 8.3*   < > 8.1*   < > 8.2* 7.7* 7.8*   PHOS 3.4  --  3.6  --   --  3.7  --     < > = values in this interval not displayed.      Recent Labs     10/14/22  0413 10/15/22  0400 10/16/22  0519   * 36 27   ALT 22 24 10   BILITOT 0.6 0.6 0.6   ALKPHOS 523* 453* 443*     No results for input(s): INR in the last 72 hours. No results for input(s): Lattie Codi in the last 72 hours.     Medications:  Reviewed    Infusion Medications    bumetanide 0.1 mg/mL infusion 1 mg/hr (10/16/22 1212)    dextrose 75 mL/hr at 10/16/22 1212    dexmedetomidine (PRECEDEX) IV infusion 1.5 mcg/kg/hr (10/16/22 1212)    sodium chloride      sodium chloride      dextrose       Scheduled Medications    magnesium sulfate  2,000 mg IntraVENous Once    calcium gluconate IVPB  1,000 mg IntraVENous Once    haloperidol  3 mg Oral TID    [START ON 10/17/2022] metoprolol succinate  50 mg Oral Daily    insulin glargine-yfgn  20 Units SubCUTAneous Nightly    cloNIDine  0.1 mg Oral BID    potassium bicarb-citric acid  20 mEq Oral Daily    [Held by provider] potassium chloride  20 mEq Oral Daily with breakfast    clopidogrel  75 mg Oral Daily    QUEtiapine  50 mg Oral BID    sodium chloride flush  5-40 mL IntraVENous 2 times per day    miconazole nitrate   Topical BID    gabapentin  600 mg Oral 4x daily    amLODIPine  10 mg Oral Daily    white petrolatum   Topical BID    sodium chloride flush  5-40 mL IntraVENous 2 times per day    miconazole   Topical BID    epoetin sharath-epbx  6,000 Units SubCUTAneous Once per day on Mon Wed Fri    hydrALAZINE  50 mg Oral 3 times per day    insulin lispro  0-16 Units SubCUTAneous 4x Daily AC & HS    pantoprazole (PROTONIX) 40 mg injection  40 mg IntraVENous Q12H    sennosides  5 mL Oral Nightly    carbidopa-levodopa  1 tablet Per NG tube TID    ipratropium-albuterol  1 ampule Inhalation Q4H WA    collagenase   Topical Q MWF    benzonatate  100 mg Oral TID    ezetimibe  10 mg Oral Daily    [Held by provider] ticagrelor  90 mg Oral BID    [Held by provider] aspirin  81 mg Oral Daily    Or    [Held by provider] aspirin  300 mg Rectal Daily atorvastatin  80 mg Oral Nightly     PRN Meds: morphine, hydrALAZINE, sodium chloride flush, sodium chloride, miconazole nitrate **AND** miconazole nitrate, sodium chloride flush, sodium chloride, labetalol, glucose, dextrose bolus **OR** dextrose bolus, glucagon (rDNA), dextrose      Intake/Output Summary (Last 24 hours) at 10/16/2022 1305  Last data filed at 10/16/2022 1212  Gross per 24 hour   Intake 44581.33 ml   Output 7760 ml   Net 3703.33 ml     Body mass index is 36.69 kg/m². Diet  Diet NPO    Code Status  Full Code       Electronically signed by Adi Morris MD on 10/16/2022 at 1:05 PM  [employer]   Please contact me through perfect serve    NOTE: This report was transcribed using voice recognition software. Every effort was made to ensure accuracy; however, inadvertent computerized transcription errors may be present.

## 2022-10-17 ENCOUNTER — APPOINTMENT (OUTPATIENT)
Dept: ULTRASOUND IMAGING | Age: 67
DRG: 034 | End: 2022-10-17
Attending: INTERNAL MEDICINE
Payer: MEDICARE

## 2022-10-17 ENCOUNTER — APPOINTMENT (OUTPATIENT)
Dept: GENERAL RADIOLOGY | Age: 67
DRG: 034 | End: 2022-10-17
Attending: INTERNAL MEDICINE
Payer: MEDICARE

## 2022-10-17 LAB
ALBUMIN SERPL-MCNC: 2.7 G/DL (ref 3.5–5.2)
ALP BLD-CCNC: 403 U/L (ref 40–129)
ALT SERPL-CCNC: 10 U/L (ref 0–40)
ANION GAP SERPL CALCULATED.3IONS-SCNC: 13 MMOL/L (ref 7–16)
AST SERPL-CCNC: 23 U/L (ref 0–39)
B.E.: 9 MMOL/L (ref -3–3)
BASOPHILS ABSOLUTE: 0.02 E9/L (ref 0–0.2)
BASOPHILS RELATIVE PERCENT: 0.4 % (ref 0–2)
BETA-HYDROXYBUTYRATE: 0.28 MMOL/L (ref 0.02–0.27)
BILIRUB SERPL-MCNC: 0.7 MG/DL (ref 0–1.2)
BUN BLDV-MCNC: 20 MG/DL (ref 6–23)
CALCIUM IONIZED: 1.14 MMOL/L (ref 1.15–1.33)
CALCIUM SERPL-MCNC: 8.1 MG/DL (ref 8.6–10.2)
CHLORIDE BLD-SCNC: 98 MMOL/L (ref 98–107)
CO2: 33 MMOL/L (ref 22–29)
COHB: 1.5 % (ref 0–1.5)
CREAT SERPL-MCNC: 1.4 MG/DL (ref 0.7–1.2)
CRITICAL: ABNORMAL
DATE ANALYZED: ABNORMAL
DATE OF COLLECTION: ABNORMAL
EOSINOPHILS ABSOLUTE: 0.48 E9/L (ref 0.05–0.5)
EOSINOPHILS RELATIVE PERCENT: 9.8 % (ref 0–6)
GFR AFRICAN AMERICAN: >60
GFR NON-AFRICAN AMERICAN: 51 ML/MIN/1.73
GLUCOSE BLD-MCNC: 123 MG/DL (ref 74–99)
HCO3: 33.8 MMOL/L (ref 22–26)
HCT VFR BLD CALC: 25.4 % (ref 37–54)
HEMOGLOBIN: 8.1 G/DL (ref 12.5–16.5)
HHB: 2.3 % (ref 0–5)
IMMATURE GRANULOCYTES #: 0.04 E9/L
IMMATURE GRANULOCYTES %: 0.8 % (ref 0–5)
INR BLD: 1.4
LAB: ABNORMAL
LACTIC ACID: 0.8 MMOL/L (ref 0.5–2.2)
LYMPHOCYTES ABSOLUTE: 0.73 E9/L (ref 1.5–4)
LYMPHOCYTES RELATIVE PERCENT: 14.9 % (ref 20–42)
Lab: ABNORMAL
MAGNESIUM: 1.7 MG/DL (ref 1.6–2.6)
MCH RBC QN AUTO: 29.5 PG (ref 26–35)
MCHC RBC AUTO-ENTMCNC: 31.9 % (ref 32–34.5)
MCV RBC AUTO: 92.4 FL (ref 80–99.9)
METER GLUCOSE: 110 MG/DL (ref 74–99)
METER GLUCOSE: 114 MG/DL (ref 74–99)
METER GLUCOSE: 117 MG/DL (ref 74–99)
METER GLUCOSE: 120 MG/DL (ref 74–99)
METHB: 0.5 % (ref 0–1.5)
MODE: ABNORMAL
MONOCYTES ABSOLUTE: 0.45 E9/L (ref 0.1–0.95)
MONOCYTES RELATIVE PERCENT: 9.2 % (ref 2–12)
NEUTROPHILS ABSOLUTE: 3.19 E9/L (ref 1.8–7.3)
NEUTROPHILS RELATIVE PERCENT: 64.9 % (ref 43–80)
O2 SATURATION: 98.3 % (ref 92–98.5)
O2HB: 95.7 % (ref 94–97)
OPERATOR ID: 2863
PATIENT TEMP: 37 C
PCO2: 47.9 MMHG (ref 35–45)
PDW BLD-RTO: 15 FL (ref 11.5–15)
PEEP/CPAP: 6 CMH2O
PH BLOOD GAS: 7.47 (ref 7.35–7.45)
PHOSPHORUS: 4.6 MG/DL (ref 2.5–4.5)
PLATELET # BLD: 145 E9/L (ref 130–450)
PMV BLD AUTO: 10 FL (ref 7–12)
PO2: 113.7 MMHG (ref 75–100)
POTASSIUM SERPL-SCNC: 3.5 MMOL/L (ref 3.5–5)
PROTHROMBIN TIME: 15.4 SEC (ref 9.3–12.4)
PS: 14 CMH20
RBC # BLD: 2.75 E12/L (ref 3.8–5.8)
SODIUM BLD-SCNC: 144 MMOL/L (ref 132–146)
SOURCE, BLOOD GAS: ABNORMAL
THB: 8.8 G/DL (ref 11.5–16.5)
TIME ANALYZED: 443
TOTAL PROTEIN: 5.3 G/DL (ref 6.4–8.3)
WBC # BLD: 4.9 E9/L (ref 4.5–11.5)

## 2022-10-17 PROCEDURE — 6360000002 HC RX W HCPCS: Performed by: SURGERY

## 2022-10-17 PROCEDURE — 2500000003 HC RX 250 WO HCPCS: Performed by: INTERNAL MEDICINE

## 2022-10-17 PROCEDURE — 94640 AIRWAY INHALATION TREATMENT: CPT

## 2022-10-17 PROCEDURE — 6360000002 HC RX W HCPCS: Performed by: INTERNAL MEDICINE

## 2022-10-17 PROCEDURE — 85025 COMPLETE CBC W/AUTO DIFF WBC: CPT

## 2022-10-17 PROCEDURE — 80053 COMPREHEN METABOLIC PANEL: CPT

## 2022-10-17 PROCEDURE — 2580000003 HC RX 258: Performed by: SURGERY

## 2022-10-17 PROCEDURE — 6370000000 HC RX 637 (ALT 250 FOR IP)

## 2022-10-17 PROCEDURE — 6370000000 HC RX 637 (ALT 250 FOR IP): Performed by: SURGERY

## 2022-10-17 PROCEDURE — 6370000000 HC RX 637 (ALT 250 FOR IP): Performed by: STUDENT IN AN ORGANIZED HEALTH CARE EDUCATION/TRAINING PROGRAM

## 2022-10-17 PROCEDURE — 93971 EXTREMITY STUDY: CPT | Performed by: RADIOLOGY

## 2022-10-17 PROCEDURE — 2580000003 HC RX 258: Performed by: NURSE PRACTITIONER

## 2022-10-17 PROCEDURE — 2580000003 HC RX 258: Performed by: STUDENT IN AN ORGANIZED HEALTH CARE EDUCATION/TRAINING PROGRAM

## 2022-10-17 PROCEDURE — 2580000003 HC RX 258

## 2022-10-17 PROCEDURE — C9113 INJ PANTOPRAZOLE SODIUM, VIA: HCPCS | Performed by: SURGERY

## 2022-10-17 PROCEDURE — 6370000000 HC RX 637 (ALT 250 FOR IP): Performed by: NURSE PRACTITIONER

## 2022-10-17 PROCEDURE — 82330 ASSAY OF CALCIUM: CPT

## 2022-10-17 PROCEDURE — 82805 BLOOD GASES W/O2 SATURATION: CPT

## 2022-10-17 PROCEDURE — 6370000000 HC RX 637 (ALT 250 FOR IP): Performed by: INTERNAL MEDICINE

## 2022-10-17 PROCEDURE — 2000000000 HC ICU R&B

## 2022-10-17 PROCEDURE — 2580000003 HC RX 258: Performed by: INTERNAL MEDICINE

## 2022-10-17 PROCEDURE — 99291 CRITICAL CARE FIRST HOUR: CPT | Performed by: INTERNAL MEDICINE

## 2022-10-17 PROCEDURE — 94660 CPAP INITIATION&MGMT: CPT

## 2022-10-17 PROCEDURE — 83605 ASSAY OF LACTIC ACID: CPT

## 2022-10-17 PROCEDURE — 85610 PROTHROMBIN TIME: CPT

## 2022-10-17 PROCEDURE — 2500000003 HC RX 250 WO HCPCS: Performed by: NURSE PRACTITIONER

## 2022-10-17 PROCEDURE — 51702 INSERT TEMP BLADDER CATH: CPT

## 2022-10-17 PROCEDURE — A4216 STERILE WATER/SALINE, 10 ML: HCPCS | Performed by: SURGERY

## 2022-10-17 PROCEDURE — 82010 KETONE BODYS QUAN: CPT

## 2022-10-17 PROCEDURE — 6360000002 HC RX W HCPCS

## 2022-10-17 PROCEDURE — 83735 ASSAY OF MAGNESIUM: CPT

## 2022-10-17 PROCEDURE — 84100 ASSAY OF PHOSPHORUS: CPT

## 2022-10-17 PROCEDURE — 82962 GLUCOSE BLOOD TEST: CPT

## 2022-10-17 PROCEDURE — 2700000000 HC OXYGEN THERAPY PER DAY

## 2022-10-17 PROCEDURE — 71045 X-RAY EXAM CHEST 1 VIEW: CPT

## 2022-10-17 PROCEDURE — 93970 EXTREMITY STUDY: CPT

## 2022-10-17 RX ORDER — MAGNESIUM SULFATE 1 G/100ML
1000 INJECTION INTRAVENOUS ONCE
Status: COMPLETED | OUTPATIENT
Start: 2022-10-17 | End: 2022-10-17

## 2022-10-17 RX ORDER — POTASSIUM CHLORIDE 29.8 MG/ML
40 INJECTION INTRAVENOUS ONCE
Status: DISCONTINUED | OUTPATIENT
Start: 2022-10-17 | End: 2022-10-17

## 2022-10-17 RX ORDER — POTASSIUM CHLORIDE 7.45 MG/ML
10 INJECTION INTRAVENOUS
Status: COMPLETED | OUTPATIENT
Start: 2022-10-17 | End: 2022-10-17

## 2022-10-17 RX ADMIN — ASPIRIN 81 MG 81 MG: 81 TABLET ORAL at 09:03

## 2022-10-17 RX ADMIN — MICONAZOLE NITRATE: 2 OINTMENT TOPICAL at 09:05

## 2022-10-17 RX ADMIN — MORPHINE SULFATE 1 MG: 2 INJECTION, SOLUTION INTRAMUSCULAR; INTRAVENOUS at 05:24

## 2022-10-17 RX ADMIN — MORPHINE SULFATE 1 MG: 2 INJECTION, SOLUTION INTRAMUSCULAR; INTRAVENOUS at 01:10

## 2022-10-17 RX ADMIN — GABAPENTIN 600 MG: 300 CAPSULE ORAL at 16:39

## 2022-10-17 RX ADMIN — MAGNESIUM SULFATE HEPTAHYDRATE 1000 MG: 1 INJECTION, SOLUTION INTRAVENOUS at 07:01

## 2022-10-17 RX ADMIN — CARBIDOPA AND LEVODOPA 1 TABLET: 25; 100 TABLET ORAL at 09:04

## 2022-10-17 RX ADMIN — DEXMEDETOMIDINE 0.5 MCG/KG/HR: 100 INJECTION, SOLUTION, CONCENTRATE INTRAVENOUS at 16:36

## 2022-10-17 RX ADMIN — SENNOSIDES 5 ML: 8.8 SYRUP ORAL at 20:30

## 2022-10-17 RX ADMIN — GABAPENTIN 600 MG: 300 CAPSULE ORAL at 20:30

## 2022-10-17 RX ADMIN — METOPROLOL SUCCINATE 50 MG: 50 TABLET, EXTENDED RELEASE ORAL at 09:03

## 2022-10-17 RX ADMIN — HALOPERIDOL 3 MG: 1 TABLET ORAL at 14:04

## 2022-10-17 RX ADMIN — BUMETANIDE 1 MG/HR: 0.25 INJECTION, SOLUTION INTRAMUSCULAR; INTRAVENOUS at 20:33

## 2022-10-17 RX ADMIN — GABAPENTIN 600 MG: 300 CAPSULE ORAL at 13:12

## 2022-10-17 RX ADMIN — POTASSIUM CHLORIDE 10 MEQ: 7.46 INJECTION, SOLUTION INTRAVENOUS at 07:50

## 2022-10-17 RX ADMIN — MICONAZOLE NITRATE: 2 OINTMENT TOPICAL at 20:22

## 2022-10-17 RX ADMIN — ATORVASTATIN CALCIUM 80 MG: 40 TABLET, FILM COATED ORAL at 20:20

## 2022-10-17 RX ADMIN — EZETIMIBE 10 MG: 10 TABLET ORAL at 09:04

## 2022-10-17 RX ADMIN — SODIUM CHLORIDE, PRESERVATIVE FREE 10 ML: 5 INJECTION INTRAVENOUS at 05:24

## 2022-10-17 RX ADMIN — BENZONATATE 100 MG: 100 CAPSULE ORAL at 09:03

## 2022-10-17 RX ADMIN — ANTI-FUNGAL POWDER MICONAZOLE NITRATE TALC FREE: 1.42 POWDER TOPICAL at 09:04

## 2022-10-17 RX ADMIN — HYDRALAZINE HYDROCHLORIDE 50 MG: 50 TABLET, FILM COATED ORAL at 14:07

## 2022-10-17 RX ADMIN — IPRATROPIUM BROMIDE AND ALBUTEROL SULFATE 1 AMPULE: .5; 2.5 SOLUTION RESPIRATORY (INHALATION) at 08:13

## 2022-10-17 RX ADMIN — CALCIUM GLUCONATE 1000 MG: 98 INJECTION, SOLUTION INTRAVENOUS at 01:10

## 2022-10-17 RX ADMIN — CLONIDINE HYDROCHLORIDE 0.1 MG: 0.1 TABLET ORAL at 09:04

## 2022-10-17 RX ADMIN — COLLAGENASE SANTYL: 250 OINTMENT TOPICAL at 09:00

## 2022-10-17 RX ADMIN — BENZONATATE 100 MG: 100 CAPSULE ORAL at 14:03

## 2022-10-17 RX ADMIN — HYDRALAZINE HYDROCHLORIDE 50 MG: 50 TABLET, FILM COATED ORAL at 20:21

## 2022-10-17 RX ADMIN — CARBIDOPA AND LEVODOPA 1 TABLET: 25; 100 TABLET ORAL at 20:29

## 2022-10-17 RX ADMIN — POTASSIUM BICARBONATE 20 MEQ: 782 TABLET, EFFERVESCENT ORAL at 06:59

## 2022-10-17 RX ADMIN — DEXMEDETOMIDINE 0.5 MCG/KG/HR: 100 INJECTION, SOLUTION, CONCENTRATE INTRAVENOUS at 00:41

## 2022-10-17 RX ADMIN — QUETIAPINE 50 MG: 25 TABLET ORAL at 09:03

## 2022-10-17 RX ADMIN — SODIUM CHLORIDE, PRESERVATIVE FREE 10 ML: 5 INJECTION INTRAVENOUS at 20:30

## 2022-10-17 RX ADMIN — MICONAZOLE NITRATE: 2 OINTMENT TOPICAL at 20:49

## 2022-10-17 RX ADMIN — HALOPERIDOL 3 MG: 1 TABLET ORAL at 09:04

## 2022-10-17 RX ADMIN — MORPHINE SULFATE 1 MG: 2 INJECTION, SOLUTION INTRAMUSCULAR; INTRAVENOUS at 10:27

## 2022-10-17 RX ADMIN — CLONIDINE HYDROCHLORIDE 0.1 MG: 0.1 TABLET ORAL at 20:21

## 2022-10-17 RX ADMIN — PETROLATUM: 420 OINTMENT TOPICAL at 20:22

## 2022-10-17 RX ADMIN — IPRATROPIUM BROMIDE AND ALBUTEROL SULFATE 1 AMPULE: .5; 2.5 SOLUTION RESPIRATORY (INHALATION) at 16:59

## 2022-10-17 RX ADMIN — PETROLATUM: 420 OINTMENT TOPICAL at 09:04

## 2022-10-17 RX ADMIN — QUETIAPINE 50 MG: 25 TABLET ORAL at 20:21

## 2022-10-17 RX ADMIN — AMLODIPINE BESYLATE 10 MG: 10 TABLET ORAL at 09:03

## 2022-10-17 RX ADMIN — HYDRALAZINE HYDROCHLORIDE 50 MG: 50 TABLET, FILM COATED ORAL at 06:14

## 2022-10-17 RX ADMIN — IPRATROPIUM BROMIDE AND ALBUTEROL SULFATE 1 AMPULE: .5; 2.5 SOLUTION RESPIRATORY (INHALATION) at 11:32

## 2022-10-17 RX ADMIN — GABAPENTIN 600 MG: 300 CAPSULE ORAL at 09:04

## 2022-10-17 RX ADMIN — BUMETANIDE 1 MG/HR: 0.25 INJECTION, SOLUTION INTRAMUSCULAR; INTRAVENOUS at 06:11

## 2022-10-17 RX ADMIN — BUMETANIDE 1 MG/HR: 0.25 INJECTION, SOLUTION INTRAMUSCULAR; INTRAVENOUS at 16:15

## 2022-10-17 RX ADMIN — ANTI-FUNGAL POWDER MICONAZOLE NITRATE TALC FREE: 1.42 POWDER TOPICAL at 20:22

## 2022-10-17 RX ADMIN — EPOETIN ALFA-EPBX 6000 UNITS: 3000 INJECTION, SOLUTION INTRAVENOUS; SUBCUTANEOUS at 09:03

## 2022-10-17 RX ADMIN — SODIUM CHLORIDE, PRESERVATIVE FREE 40 MG: 5 INJECTION INTRAVENOUS at 04:12

## 2022-10-17 RX ADMIN — MORPHINE SULFATE 1 MG: 2 INJECTION, SOLUTION INTRAMUSCULAR; INTRAVENOUS at 20:29

## 2022-10-17 RX ADMIN — POTASSIUM CHLORIDE 10 MEQ: 7.46 INJECTION, SOLUTION INTRAVENOUS at 06:56

## 2022-10-17 RX ADMIN — Medication 10 ML: at 20:49

## 2022-10-17 RX ADMIN — IPRATROPIUM BROMIDE AND ALBUTEROL SULFATE 1 AMPULE: .5; 2.5 SOLUTION RESPIRATORY (INHALATION) at 21:47

## 2022-10-17 RX ADMIN — MORPHINE SULFATE 1 MG: 2 INJECTION, SOLUTION INTRAMUSCULAR; INTRAVENOUS at 23:33

## 2022-10-17 RX ADMIN — HALOPERIDOL 3 MG: 1 TABLET ORAL at 20:20

## 2022-10-17 RX ADMIN — CARBIDOPA AND LEVODOPA 1 TABLET: 25; 100 TABLET ORAL at 14:04

## 2022-10-17 RX ADMIN — SODIUM CHLORIDE, PRESERVATIVE FREE 40 MG: 5 INJECTION INTRAVENOUS at 16:00

## 2022-10-17 RX ADMIN — CALCIUM GLUCONATE 1000 MG: 98 INJECTION, SOLUTION INTRAVENOUS at 07:54

## 2022-10-17 ASSESSMENT — PAIN DESCRIPTION - LOCATION
LOCATION: FOOT
LOCATION: LEG

## 2022-10-17 ASSESSMENT — PAIN SCALES - GENERAL
PAINLEVEL_OUTOF10: 6
PAINLEVEL_OUTOF10: 6
PAINLEVEL_OUTOF10: 5
PAINLEVEL_OUTOF10: 6
PAINLEVEL_OUTOF10: 0

## 2022-10-17 ASSESSMENT — PAIN DESCRIPTION - DESCRIPTORS
DESCRIPTORS: ACHING
DESCRIPTORS: ACHING;DISCOMFORT
DESCRIPTORS: ACHING
DESCRIPTORS: ACHING

## 2022-10-17 ASSESSMENT — PAIN DESCRIPTION - ORIENTATION
ORIENTATION: LEFT
ORIENTATION: RIGHT

## 2022-10-17 ASSESSMENT — PAIN SCALES - WONG BAKER: WONGBAKER_NUMERICALRESPONSE: 0

## 2022-10-17 ASSESSMENT — PAIN - FUNCTIONAL ASSESSMENT: PAIN_FUNCTIONAL_ASSESSMENT: ACTIVITIES ARE NOT PREVENTED

## 2022-10-17 NOTE — PROGRESS NOTES
Hospitalist Progress Note      PCP: Patrica Doll MD    Date of Admission: 9/27/2022  Days in the hospital: [address] [place] [address] Course:   Patient is a 44-year-old male with history of CKD, diabetes mellitus, obstructive sleep apnea who initially presented to [facility] with strokelike symptoms and right-sided weakness along with aphasia. Patient was noted to have severe left ICA stenosis. He was transferred to [facility] on September 27 and patient underwent left ICA angioplasty and stent placement by IR. He was initially admitted to the neuro ICU. He was placed on dual antiplatelet therapy with Brilinta and aspirin. Also was noted to have GI bleed and received 15 units of PRBC. EGD was done on 10/3/2022 and no evidence of upper GI bleed noted. Colonoscopy on 10/9/2022 showed large amount of blood. CT of the abdomen did not show any localization of hemorrhage. Nuclear medicine scan was done which showed bleeding around the splenic flexure. RRT was called due to hypotension and hemorrhagic shock and patient was transferred to ICU. Due to diffuse bleeding patient was taken off dual antiplatelet therapy. He is planned for colonoscopy with GI once he has been adequately prepped. He had acute respiratory distress on 10/13/2022 and had to be placed on Bumex for volume overload. Subjective  Patient seen and examined at bedside. Patient is sedated. He is on oxygen high flow nasal cannula. Patient is on dextrose 5% water 100 mL/h Bumex drip 1 mg/h and Precedex drip    Exam:    BP (!) 135/52   Pulse 71   Temp 97.6 °F (36.4 °C) (Temporal)   Resp 16   Ht 5' 8\" (1.727 m)   Wt 232 lb 12.8 oz (105.6 kg)   SpO2 96%   BMI 35.40 kg/m²     HEENT: + Pallor, no icterus. Respiratory:  CTA, decreased air entry  Cardiovascular: RRR, no murmur. Anasarca  Abdomen: Soft, non-tender, BS noted. Musculoskeletal: Right foot dressing and wound VAC noted.    Neurologic: Sedated Assessment/Plan:  Acute CVA with left ICA stenosis s/p angioplasty and stent placement, dual antiplatelet therapy on hold, continue serial neurochecks, follow-up with neurology    Acute lower GI bleed s/p multiple units of PRBC transfusion. Last one in October 13. Bleeding noted from splenic flexure. To be scheduled for colonoscopy soon once appropriately prepped, follow-up with GI     Acute hypoxic respiratory failure/distress, follow-up with critical care team, chest x-ray reviewed; wean oxygen as tolerated. Patient is on oxygen high flow nasal cannula alternating with BiPAP    Acute blood loss anemia status post 15 unit blood transfusion, H&H remains low but stable, monitor need for further transfusions. Last transfusion on October 13    Volume overload, currently on Bumex drip    Acute kidney injury, renal function slowly improving, nephrology following; strict I/O    Right foot nonhealing wound, on Cipro and Zyvox per ID, continue with local wound care, follow-up with podiatry    Obstructive sleep apnea on CPAP    Altered mental status secondary to metabolic encephalopathy, continue to monitor closely     Overall prognosis remains guarded      Labs:   Recent Labs     10/15/22  0400 10/15/22  1120 10/16/22  0519 10/16/22  1124 10/16/22  1736 10/17/22  0409   WBC 7.8  --  6.6  --   --  4.9   HGB 7.8*   < > 7.7* 7.7* 7.6* 8.1*   HCT 23.9*   < > 23.5* 23.1* 22.9* 25.4*   *  --  124*  --   --  145    < > = values in this interval not displayed. Recent Labs     10/15/22  0400 10/15/22  0842 10/16/22  0519 10/16/22  1124 10/16/22  1736 10/17/22  0409   *   < > 147* 144 146 144   K 3.9   < > 3.7 3.3* 3.5 3.5      < > 103 100 100 98   CO2 29   < > 29 30* 29 33*   BUN 28*   < > 24* 23 22 20   CREATININE 1.4*   < > 1.4* 1.4* 1.4* 1.4*   CALCIUM 8.1*   < > 7.7* 7.8* 8.2* 8.1*   PHOS 3.6  --  3.7  --   --  4.6*    < > = values in this interval not displayed.      Recent Labs     10/15/22  7820 10/16/22  0519 10/17/22  0409   AST 36 27 23   ALT 24 10 10   BILITOT 0.6 0.6 0.7   ALKPHOS 453* 443* 403*     Recent Labs     10/17/22  0409   INR 1.4     No results for input(s): CKTOTAL, TROPONINI in the last 72 hours.     Medications:  Reviewed    Infusion Medications    bumetanide 0.1 mg/mL infusion 1 mg/hr (10/17/22 1410)    dexmedetomidine (PRECEDEX) IV infusion 0.5 mcg/kg/hr (10/17/22 1410)    sodium chloride      sodium chloride      dextrose       Scheduled Medications    haloperidol  3 mg Oral TID    metoprolol succinate  50 mg Oral Daily    cloNIDine  0.1 mg Oral BID    aspirin  81 mg Per NG tube Daily    [Held by provider] clopidogrel  75 mg Oral Daily    QUEtiapine  50 mg Oral BID    sodium chloride flush  5-40 mL IntraVENous 2 times per day    miconazole nitrate   Topical BID    gabapentin  600 mg Oral 4x daily    amLODIPine  10 mg Oral Daily    white petrolatum   Topical BID    sodium chloride flush  5-40 mL IntraVENous 2 times per day    miconazole   Topical BID    epoetin sharath-epbx  6,000 Units SubCUTAneous Once per day on Mon Wed Fri    hydrALAZINE  50 mg Oral 3 times per day    insulin lispro  0-16 Units SubCUTAneous 4x Daily AC & HS    pantoprazole (PROTONIX) 40 mg injection  40 mg IntraVENous Q12H    sennosides  5 mL Oral Nightly    carbidopa-levodopa  1 tablet Per NG tube TID    ipratropium-albuterol  1 ampule Inhalation Q4H WA    collagenase   Topical Q MWF    benzonatate  100 mg Oral TID    ezetimibe  10 mg Oral Daily    [Held by provider] ticagrelor  90 mg Oral BID    atorvastatin  80 mg Oral Nightly     PRN Meds: morphine, hydrALAZINE, sodium chloride flush, sodium chloride, miconazole nitrate **AND** miconazole nitrate, sodium chloride flush, sodium chloride, labetalol, glucose, dextrose bolus **OR** dextrose bolus, glucagon (rDNA), dextrose      Intake/Output Summary (Last 24 hours) at 10/17/2022 1610  Last data filed at 10/17/2022 1410  Gross per 24 hour   Intake 9701.23 ml   Output 40904 ml   Net -798.77 ml     Body mass index is 35.4 kg/m². Diet  Diet NPO    Code Status  Full Code       Electronically signed by Woody Vieyra MD on 10/17/2022 at 4:10 PM  [employer]   Please contact me through perfect serve    NOTE: This report was transcribed using voice recognition software. Every effort was made to ensure accuracy; however, inadvertent computerized transcription errors may be present.

## 2022-10-17 NOTE — FLOWSHEET NOTE
Inpatient Wound Care(follow up) 4405    Admit Date: 9/27/2022  5:45 PM    Reason for consult:  bridge  of nose pressure injury from mask    Findings:    10/17/22 1456   Wound 10/17/22 bridge of nose   Date First Assessed/Time First Assessed: 10/17/22 0430   Present on Hospital Admission: No  Wound Description (Comments): bridge of nose   Wound Image    Wound Etiology Deep tissue/Injury   Dressing/Treatment Open to air   Wound Length (cm) 0.3 cm   Wound Width (cm) 0.3 cm   Wound Depth (cm)   (unable to determine)   Wound Surface Area (cm^2) 0.09 cm^2   Change in Wound Size % (l*w) 0   Wound Assessment Purple/maroon   Drainage Amount None     **Informed Consent**    photos taken of nose and inserted into their chart as part of their permanent medical record for purposes of documentation, treatment management and/or medical review. All Images taken on 10/17/22 of patient name: Rachael Layer were transmitted and stored on secured YOU[employer]ite located within [employer] by a registered [employer] Mobile Application Device.       Impression:  DTI    Plan:  No wound care required  Mask off  Area will need padded if mask required again    Mukesh Larry RN 10/17/2022 3:21 PM

## 2022-10-17 NOTE — ADT AUTH CERT
Respiratory Failure GRG - Care Day 17 (10/13/2022) by Eduardo Casarez, RN       Review Status Review Entered   Completed 10/14/2022 1408       Created By   Eduardo Casarez RN      Criteria Review      Care Day: 17 Care Date: 10/13/2022 Level of Care: ICU    Guideline Day 1    Level Of Care    (X) ICU or intermediate care as appropriate    10/14/2022 2:03 PM EDT by Trevor Zambrano      micu    Clinical Status    (X) * Clinical Indications met    Interventions    (X) Inpatient interventions as needed    10/14/2022 2:08 PM EDT by Vanessa Hawthorne      precedex gtt, fentanyl gtt, iv ppi, npo    10/14/2022 2:03 PM EDT by Fermin Hawthorne      on iv drip, supplemental o2, deteriorating AMS  /160, 172/105, 219/97, 192/106, 191/104  P 112, 113, 115  R 45, 48, 52    * Milestone   Additional Notes   DATE: 10/13   ---------------------------------------------------------------------------   PERTINENT UPDATES:   Supplement oxygen on BiPAP with respiratory distress   precedex started and Fentanyl for severe agitation -- will  start Seroquel    GS inserted NG in R nare. to start bowel prep as ordered for patient to have scope tomorrow. He has had no further bloody bowel movements but continues to require transfusion.  Hemoglobin Quant: 6.8 (LL) - 1 units PRBC transfused   sitter at bedside   ---------------------------------------------------------------------------   VITALS:   T 99.4 F   90% BIPAP fio2 35%   ---------------------------------------------------------------------------   ABNL/PERTINENT LABS/RADIOLOGY/DIAGNOSTIC STUDIES:   10/13/22 04:08   Sodium: 149 (H)   Chloride: 114 (H)   BUN,BUNPL: 43 (H)   Creatinine: 1.5 (H)   Glucose, Random: 176 (H)   CALCIUM, SERUM, [id number]: 8.3 (L)   Total Protein: 5.4 (L)   Albumin: 2.5 (L)   Alk Phos: 230 (H)   WBC: 11.9 (H)   RBC: 2.53 (L)   Hemoglobin Quant: 7.5 (L)   Hematocrit: 23.4 (L)   RDW: 16.2 (H)   Platelet Count: 893 (L)   Neutrophils %: 96.5 (H) Lymphocyte %: 1.7 (L)   Monocytes %: 0.9 (L)   Neutrophils Absolute: 11.54 (H)   Lymphocytes Absolute: 0.24 (L)   Poikilocytes: 1+   Polychromasia: 2+   Anisocytosis: 1+   Ovalocytes: 1+      10/13/22 05:05   pO2: 63.2 (L)   HCO3: 21.8 (L)   THB,THB: 7.9 (L)   O2Hb: 89.3 (L)   HHb: 8.9 (H)      10/13/22 05:53   Meter Glucose: 191 (H)      10/13/22 06:35   Prothrombin Time: 15.6 (H)      10/13/22 08:33   Meter Glucose: 215 (H)      10/13/22 11:48   Procalcitonin: 0.16 (H)   Pro-BNP: 7,044 (H)      10/13/22 12:03   Meter Glucose: 221 (H)      10/13/22 12:12   CULTURE, BLOOD 1: Rpt   CULTURE, BLOOD 2: Rpt   Culture, Blood 2: 24 Hours no growth (P)      10/13/22 17:04   Meter Glucose: 250 (H)      10/13/22 18:02   Sodium: 150 (H)   Chloride: 115 (H)   BUN, BUNPL: 42 (H)   Creatinine: 1.7 (H)   Glucose, Random: 251 (H)   Hematocrit: 20.8 (L)      10/13/22 21:52   Meter Glucose: 242 (H)      10/13/22 22:36   Sodium: 153 (H)   Chloride: 115 (H)   BUN, BUNPL: 42 (H)   Creatinine: 1.7 (H)   Glucose, Random: 221 (H)   Hemoglobin Quant: 8.0 (L)   Hematocrit: 24.2 (L)      CXR   Bilateral airspace disease with interval progression on the right. ONE SUPINE XRAY VIEW(S) OF THE ABDOMEN   1. Gastric catheter is coiled in the pharynx and upper esophagus and should   be withdrawn and reinserted. Follow-up imaging recommended. XR ABDOMEN (KUB) (SINGLE AP VIEW)   Catheter is in the stomach.   ---------------------------------------------------------------------------   PHYSICAL EXAM:   CONSTITUTIONAL: Awake, morbid obesity, alert to 1-2, intermittently confused, labored breathing. ENT:  Normocephalic, without obvious abnormality, atraumatic, sinuses nontender on palpation, noted mucous membrane dry, picking on skin of the mouth that noted to be bloody.  NV ++   CARDIOVASCULAR:  Intermittent tachycardia, hypertension   RESP: Diminished lung sounds throughout lung fields, fine crackles noted on the bases, no wheezing noted, labor breathing, no use of accessory muscle at this time, on supplement oxygen. Musculoskeletal: Extremities: left basilic midline, right lower extremity with wound VAC holding suction and splinted, 2+ pitting edema on lower extremities. NEURO: weakness on the right upper and lower extremities due to stroke. SKIN: Right foot nonhealing wound. 2+ Edema sacrum & belly   ---------------------------------------------------------------------------   MD CONSULTS/ASSESSMENT AND PLAN:   PODIATRY   ASSESSMENTS:    1. Right foot wound diabetic ulcer   2. DM   3. Pain right foot   Acute cerebrovascular accident (CVA)        PLAN:   -Patient examined and evaluated at bedside   -All pertinent labs, charts, and imaging reviewed prior to encounter    -Abx per ID:Cipro   -Continue Wound vac changes to the right foot. MWF vac changes. intact today         ID   IMPRESSION:     Right foot non healing wound- cipro and zyvox stopped 10/12    Leukocytosis   Aspiration         RECOMMENDATIONS:      Diuretics          IM   Assessment/Plan   Acute respiratory failure/distress, continue BiPAP, follow-up with critical care team, chest x-ray reviewed   Overall prognosis remains guarded         NEPHRO   A/P   IMPRESSION/RECOMMENDATIONS:     LARY on CKD stage 3b   Monitor labs and urine 3.4        Htn emergency   Was low in setting of gib and now high again      Hypernatremia   Start d5   resp distress   Bumex drip at this time   On bipap         CRIT CARE   Assessment:   CHF VS aspiration  but more consistent with CHF      Plan:   -  Despite of CKD & high creatinine, will start Bumex drip to avoid intubation, if possible      And also start Percedex & Fentanyl drip          AM   Assessment/Plan:   74 y.o. male with acute blood loss anemia secondary to lower GI bleed in the setting of recent CVA with left ICA angioplasty and stent placement on dual antiplatelet therapy which is since been held.        Hemoglobin stable   Agree with Dr. Jaimie Jo plans for repeat colonoscopy   A.m. INR pending       Attending notes 18 PM   Dr. Jaimie Jo plan repeat colonoscopy and enter the terminal ileum on Friday   Since gastroenterology is planning on the colonoscopy, we will follow peripherally. GI   ASSESSMENT AND PLAN      67y/M w/ CKD, DMII, and MATT who presents w/ acute CVA now on DAPT and R foot infection s/p debridement who has been having issues with persistent anemia requiring several transfusions. Over the weekend, he showed active signs of bleeding with positive tagged RBC scan. EGD negative, Colonoscopy with BRB in R colon and no actively bleeding lesions. CTA w/ no active extravasation. DAPT has since been d/c. PLAN:   - S/p IV Vit K for elevated INR with adequate response.    - Confirm NG placement with KUB. Place FMS. Begin 6L GoLytely bowel prep tonight until the point that stools are clear. - I will plan on Colonoscopy with TI intubation and evaluation tomorrow. - Further decision-making based on findings. ---------------------------------------------------------------------------   MEDICATIONS:   Bumex 1 mg iv once   Bumex 2 mg iv once   Retacrit 6000 units sc qd on MWF   Fentanyl 40 mcg iv once x 3   Haldol 2 mg iv once x 2   Duoneb 1 amp in q4h wa x 5   Mgso4 2 g iv once   Versed 1mg iv once   Protonix 40 mg iv q12h   Golytely 6000 ml po once   Bumex 12.5 mg in pnss 5ml/hr iv cont   Precedex 1mg in pnss 2.96-44.33 ml/hr iv titrate   Fentanyl 10 mcg/ml pnss 2.5-20 ml iv titrate   Apresoline 10 mg iv [id number] prn x 2   Labetalol 10 mg iv q30 min prn x 3   ---------------------------------------------------------------------------   ORDERS:   BMP q12h   BNP daily   H&h q6h   ---------------------------------------------------------------------------   PT/OT/SLP/CM ASSESSMENT OR NOTES:   CM    Pt has been accepted at [facility] pending a bed. Pt original dc plan was for [facility]. Ambulance form in chart. ---------------------------------------------------------------------------        Respiratory Failure GRG - Clinical Indications for Admission to Inpatient Care by Chris Cervantes RN       Review Status Review Entered   Completed 10/14/2022 1359       Created By   Chris Cervantes RN      Criteria Review      Clinical Indications for Admission to Inpatient Care    Most Recent : Zack Bernheim Most Recent Date: 10/14/2022 1:59 PM EDT    (X) Hospital admission is needed for appropriate care of the patient because of  1 or more  of    the following  (1) (2) (3) (4) (5) (6) (7) (8) (9) (10):       (X) New (acute) need for mechanical invasive or noninvasive (eg, bilevel positive airway pressure       (BPAP), volume-assured pressure support (VAPS), or volume control) ventilation (11) (12)       (13) (14) (15) (16) (17) (18)       10/14/2022 1:59 PM EDT by Vanessa Bee         pt placed on bipap d/t resp distress       (X) Severe respiratory distress, as indicated by  1 or more  of the following :          (X) Severe tachypnea (respiratory rate greater than 30 breaths per minute, greater than 45 for          child 10months of age, greater than 61 for ) [A]          10/14/2022 1:59 PM EDT by Vanessa Hawthorne            R 45, 48, 52          ( ) Severe hypoxemia (partial pressure of oxygen less than 50 mm Hg (6.7 kPa) on greater than          50% oxygen, or partial pressure of oxygen to FIO2 ratio less than 200) (19) (20)          10/14/2022 1:59 PM EDT by Vanessa Hawthorne            90% BIPAP fio2 35%          (X) Altered mental status from respiratory disease          10/14/2022 1:59 PM EDT by Vanessa Hawthorne            intermittently confused       Definitions    Altered mental status    (X) Altered mental status (ie, different from baseline), as indicated by  1 or more  of the following     (1) (2) (3) (4):       (X) Confusional state (eg, disorientation, difficulty following commands, deficit in attention)            clinical image by Uzma Teran RN       Review Status Review Entered   In Primary 10/14/2022 1307       Created By   Uzma Teran RN      Criteria Review           Cardiovascular Surgery or Procedure GRG - Care Day 15 (10/11/2022) by Uzma Teran RN       Review Status Review Entered   Completed 10/13/2022 1700       Created By   Uzma Teran RN      Criteria Review      Care Day: 15 Care Date: 10/11/2022 Level of Care: ICU    Guideline Day 1    Level Of Care    (X) OR to ICU or intermediate care    10/13/2022 4:53 PM EDT by Vanessa Hawthorne      intensive    Clinical Status    ( ) * Clinical Indications met    (X) * Procedure completed    10/13/2022 4:53 PM EDT by Zoe Smith      10/9 - -Colonoscopy    Interventions    (X) Inpatient interventions as needed    10/13/2022 5:00 PM EDT by Vanessa Hawthorne      o2 supplement    10/13/2022 4:53 PM EDT by Vanessa Hawthorne      IV meds,drips, cpap, daily labs, electrolyte abnormalities    (X) Vascular and neurologic checks    10/13/2022 4:53 PM EDT by Zoe Hawthorne      neuro ip consult    * Milestone   Additional Notes   DATE: 10/11   ---------------------------------------------------------------------------   PERTINENT UPDATES:   RN entered pt room to find pt with bipap ripped apart, IV pulled out, and leads off; confused and yelling out. Pt has been consistently pulling off bipap and equipment throughout the night despite multiple attempts to reorient pt. Two point soft wrist restraints applied, sitter at bedside placed. Pt is on 6L/NC   IR consulted for possible embolization for colonic bleed.    PT/OT on hold d/t pts medical status   given w iv hydralazine for elevated bps   on drips: nahco3, precedex   on IV meds: PPI, bumex   hypernatremia, hypocalcemia   metabolic acidosis   Hemoglobin Quant: 6.8 (LL) - 1 unit PRBC transfused ---------------------------------------------------------------------------   VITALS:   /59 167/63, 145/103, 187/92, 144/104   P 88   T 98.6 F   R 27, 31, 32   97% PAP fio2 35%   94% 6lnc   PS 6   ---------------------------------------------------------------------------   ABNL/PERTINENT LABS/RADIOLOGY/DIAGNOSTIC STUDIES:   10/11/22 00:38   Chloride: 116 (H)   CO2: 20 (L)   BUN,BUNPL: 67 (H)   Creatinine: 1.9 (H)   Glucose, Random: 173 (H)   CALCIUM, SERUM, [id number]: 8.5 (L)   Troponin, High Sensitivity: 113 (H)   Hemoglobin Quant: 7.5 (L)   Hematocrit: 23.6 (L)      10/11/22 04:59   Sodium: 148 (H)   Chloride: 117 (H)   CO2: 20 (L)   BUN,BUNPL: 63 (H)   Creatinine: 1.9 (H)   Glucose, Random: 150 (H)   CALCIUM, SERUM, [id number]: 8.2 (L)   Total Protein: 4.9 (L)   Pro-BNP: 2,806 (H)   Albumin: 2.2 (L)   Alk Phos: 193 (H)      RBC: 2.29 (L)   Hematocrit: 21.1 (L)   RDW: 17.2 (H)   Platelet Count: 012 (L)   Neutrophils %: 81.1 (H)   Lymphocyte %: 7.8 (L)   Neutrophils Absolute: 8.47 (H)   Lymphocytes Absolute: 0.82 (L)      10/11/22 07:30   Meter Glucose: 155 (H)      10/11/22 10:43   Sodium: 148 (H)   Chloride: 118 (H)   CO2: 21 (L)   BUN,BUNPL: 61 (H)   Creatinine: 1.8 (H)   Glucose, Random: 147 (H)   CALCIUM, SERUM, [id number]: 8.2 (L)   Troponin, High Sensitivity: 99 (H)   Hemoglobin Quant: 7.1 (L)   Hematocrit: 22.2 (L)      10/11/22 14:42   AMMONIA, PLASMA, [id number]: <10.0 (L)   AMMONIA: Rpt !      10/11/22 05:02   Source[de-identified] Blood Arterial   pH, Blood Gas: 7.322 (L)   HCO3: 19.5 (L)   Base Excess: -6.0 (L)   THB,THB: 7.3 (L)   Carboxy-Hgb: 1.9 (H)      10/11/22 10:43   Prothrombin Time: 28.4 (H)         10/11/22 15:50   pH, Blood Gas: 7.329 (L)   pO2: 73.4 (L)   Base Excess: -3.2 (L)   THB,THB: 8.4 (L)   O2Hb: 92.3 (L)   HHb: 6.4 (H)      10/11/22 17:25   Meter Glucose: 146 (H)      10/11/22 19:02   Sodium: 149 (H)   Chloride: 116 (H)   BUN,BUNPL: 57 (H)   Creatinine: 1.8 (H)   Glucose, Random: 145 (H)   CALCIUM, SERUM, [id number]: 8.5 (L)   Troponin, High Sensitivity: 93 (H)      10/11/22 19:51   Meter Glucose: 165 (H)      CXR   1. Persistent bilateral patchy parenchymal densities concerning for pneumonia   and or atelectasis   2. Persistent small bilateral pleural effusions, left greater than right   3. Cardiomegaly, stable   ---------------------------------------------------------------------------   PHYSICAL EXAM:   General: Lethargic, does not open eyes to voice or noxious stimuli   HEENT: + Pallor, no icterus. Ext: tr edema, Right foot dressing and wound VAC noted. 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 s/p IR angioplasty and stent placement   Unfortunately due to GI bleed both aspirin and Brilinta have been discontinued       Large GI bleed               Status post 12 units of PRBCs--- last H&H 6.8/21.1               Embolization with IR pending       Plan   Continue supportive care   Discontinue ASA and Brilinta due to GI bleed   Once able, Plavix should be started until patient is able to resume DAPT to prevent clotting of his stent   Continue high intensity statin    A1c 6.4, fasting lipid panel ordered   Stroke risk factor modification   SCDs   Continue PT/OT/ST as able   Continue telemetry   Stroke education          ID   IMPRESSION:     1. Right foot  non healing wound   2. Leukocytosis - improved    3. Aspiration            RECOMMENDATIONS:      Cipro 500 mg po q 12 hrs / Zyvox 600 mg po q 12 hs ~1 day    Local wound care          PODIATRY   ASSESSMENTS:    1. Right foot wound diabetic ulcer   2. DM   3. Pain right foot   Acute cerebrovascular accident (CVA)         PLAN:   -Patient examined and evaluated at bedside   -All pertinent labs, charts, and imaging reviewed prior to encounter    -Abx per ID   -Continue Wound vac changes with santyl to the right foot. MWF vac changes   -Previous foot x rays: No acute osseous abnormalities         IM   Assessment/Plan:   Acute CVA with left ICA stenosis s/p angioplasty and stent placement, dual antiplatelet therapy on hold, continue serial neurochecks, follow-up with neurology      Acute lower GI bleed s/p multiple units of PRBC transfusion. Bleeding noted from splenic flexure. Acute blood loss anemia status post multiple unit blood transfusion, H&H remains low, monitor need further transfusions      Acute kidney injury, creatinine slightly improved, nephrology following      Metabolic acidosis secondary to acute kidney injury, on bicarb drip, follow-up with nephrology      Right foot nonhealing wound, on Cipro and Zyvox per ID, continue with local wound care      Obstructive sleep apnea on CPAP         NEPHRO   IMPRESSION/RECOMMENDATIONS:     Hypernatremia   Suspect intervascular volume depletion   Follow on hco3 drip      Met acidosis   Ckd/hypotension    ---------------------------------------------------------------------------   MEDICATIONS:   Amlodipine 10 mg po daily   Atorvastatin 80 mg po nightly   bumex 1 mg iv once   Sinemet 1 tab per ngt tid   Cipro 500 mg po bid   Sublimaze 25mcg iv once x 2   Norco 1 tab po bid    Melatonin 5 mg po once   Toprol xl 25 mg po daily   Protonix 40mg iv q12h   Precedex 1mg in pnss 2.96-44.33 ml/hr iv titrate   Nahco3 150 meq in sterile water 75 ml/hr iv cont   Hydralazine 10 mg iv q30min prn x 2   ---------------------------------------------------------------------------   ORDERS:   Xr chest portable daily imaging   ---------------------------------------------------------------------------   PT/OT/SLP/CM ASSESSMENT OR NOTES:   FARHAT   left message for [employer]. [address] mcgarry 10/11. C   OC, passar & envelope in soft chart. Will need covid test done on day of dc. ---------------------------------------------------------------------------           clinical image by Kade Valencia RN       Review Status Review Entered   In Primary 10/13/2022 1625       Created By   Kade Valencia RN      Criteria Review

## 2022-10-17 NOTE — PROGRESS NOTES
[address] [facility]   Department of Internal Medicine   MICU Progress Note    Patient:  Derrick Pemberton 74 y.o. male   MRN: [id number]       Date of Service: 10/17/2022    Allergy: Codeine  CC strokelike symptoms  Subjective   10/13/2022  Alert oriented 2-3 confused, follows commands. Supplement oxygen on high flow nasal cannula with respiratory distress  INR 1.4  --> S/P vitamin K and FFP   Start clear liquid diet  Not on pressors, will start precedex  and Fentanyl for severe agitation,   -- will  start Seroquel   Agitated today, due to BIAP,  No bowel movement overnight   No temps  10/14/2022   Much improved on Bumex drip   Off BiPaP on NC   OK   Creatinine 1.5  BUN 35   Na 151  Will increase free water  po  10/15/2022   Cxr worsening of CHF with effusion   K replaced   1+ Edema   DC fresh water thru NG + Restrict fluids   Vitals stable   On HFNC  with sat at 96%   Refuses BiPAP  10/16/2022   Afebrile   High /55  Will add catapres and increase Metoprolol  HR 75   CXR CHF persists   Intake 9955 due to isotonic bowel prep    Will increase Bumex drip to 0.75 mg today and   decrease to 0.5 mg tomorrow 10/17   Electrolytes replaced & re-check at 6 PM    10/17/2022  Patient continues to have significant bilateral pulmonary congestion and edema. Patient is responding well to Bumex drip of 1 mg/h with urine output around 6.8 L in last 24 hours. H&H continues to drop with intermittent blood transfusion. Plan is to do an EGD today by GI. Objective     TEMPERATURE:  Current - Temp: 97.4 °F (36.3 °C);  Max - Temp  Av.6 °F (36.4 °C)  Min: 97.1 °F (36.2 °C)  Max: 98.2 °F (36.8 °C)    RESPIRATIONS RANGE: Resp  Avg: 15.2  Min: 11  Max: 25    PULSE RANGE: Pulse  Av.5  Min: 58  Max: 74    BLOOD PRESSURE RANGE:  Systolic (10UJC), OZM:947 , Min:107 , GDR:886   ; Diastolic (33KUX), IUX:02, Min:42, Max:84      PULSE OXIMETRY RANGE: SpO2  Av %  Min: 93 %  Max: 99 %    I & O - 24hr:    Intake/Output Summary (Last 24 hours) at 10/17/2022 1133  Last data filed at 10/17/2022 1100  Gross per 24 hour   Intake 23377.91 ml   Output 52538 ml   Net -783.09 ml     I/O last 3 completed shifts: In: 46577 [I.V.:3870.8; GW/SW:47652; IV Piggyback:639.2]  Out: 86687 [Urine:9170; FVQPZ:2838] I/O this shift:  In: 60 [NG/GT:60]  Out: 1640 [Urine:1640]   Weight change: -8 lb 8 oz (-3.856 kg)    Physical Exam:  CONSTITUTIONAL: Awake, morbid obesity, alert to 1-2, intermittently confused, labored breathing. EYES:  Lids and lashes normal, pupils equal, round and reactive to light, extra ocular muscles intact, sclera clear, conjunctiva normal  ENT:  Normocephalic, without obvious abnormality, atraumatic, sinuses nontender on palpation, noted mucous membrane dry, picking on skin of the mouth that noted to be bloody. NV ++  CARDIOVASCULAR:  Normal apical impulse, regular rate and rhythm, normal S1 and S2, no S3 or S4, and no murmur noted  Lung Diminished lung sounds throughout lung fields, fine crackles noted on the bases, no wheezing noted, labor breathing, no use of accessory muscle at this time, on supplement oxygen. MUSCULOSKELETAL: weakness in extremities, echo noted, 2+ pitting edema on lower extremities. All extremities,  NEUROLOGIC: Awake, alert oriented 2, weakness on the right upper and lower extremities due to stroke. SKIN: Right foot nonhealing wound.  2+ Edema sacrum & belly    Medications     Continuous Infusions:   bumetanide 0.1 mg/mL infusion 1 mg/hr (10/17/22 0611)    dexmedetomidine (PRECEDEX) IV infusion 0.5 mcg/kg/hr (10/17/22 0041)    sodium chloride      sodium chloride      dextrose       Scheduled Meds:   haloperidol  3 mg Oral TID    metoprolol succinate  50 mg Oral Daily    cloNIDine  0.1 mg Oral BID    aspirin  81 mg Per NG tube Daily    [Held by provider] clopidogrel  75 mg Oral Daily    QUEtiapine  50 mg Oral BID    sodium chloride flush  5-40 mL IntraVENous 2 times per day    miconazole nitrate   Topical BID    gabapentin  600 mg Oral 4x daily    amLODIPine  10 mg Oral Daily    white petrolatum   Topical BID    sodium chloride flush  5-40 mL IntraVENous 2 times per day    miconazole   Topical BID    epoetin sharath-epbx  6,000 Units SubCUTAneous Once per day on Mon Wed Fri    hydrALAZINE  50 mg Oral 3 times per day    insulin lispro  0-16 Units SubCUTAneous 4x Daily AC & HS    pantoprazole (PROTONIX) 40 mg injection  40 mg IntraVENous Q12H    sennosides  5 mL Oral Nightly    carbidopa-levodopa  1 tablet Per NG tube TID    ipratropium-albuterol  1 ampule Inhalation Q4H WA    collagenase   Topical Q MWF    benzonatate  100 mg Oral TID    ezetimibe  10 mg Oral Daily    [Held by provider] ticagrelor  90 mg Oral BID    atorvastatin  80 mg Oral Nightly     PRN Meds: morphine, hydrALAZINE, sodium chloride flush, sodium chloride, miconazole nitrate **AND** miconazole nitrate, sodium chloride flush, sodium chloride, labetalol, glucose, dextrose bolus **OR** dextrose bolus, glucagon (rDNA), dextrose  Nutrition:   Clear liquid diet   Labs and Imaging Studies     CBC:   Recent Labs     10/15/22  0400 10/15/22  1120 10/16/22  0519 10/16/22  1124 10/16/22  1736 10/17/22  0409   WBC 7.8  --  6.6  --   --  4.9   RBC 2.61*  --  2.53*  --   --  2.75*   HGB 7.8*   < > 7.7* 7.7* 7.6* 8.1*   HCT 23.9*   < > 23.5* 23.1* 22.9* 25.4*   MCV 91.6  --  92.9  --   --  92.4   MCH 29.9  --  30.4  --   --  29.5   MCHC 32.6  --  32.8  --   --  31.9*   RDW 15.4*  --  15.3*  --   --  15.0   *  --  124*  --   --  145   MPV 10.5  --  10.5  --   --  10.0    < > = values in this interval not displayed.        BMP:    Recent Labs     10/15/22  0400 10/15/22  0842 10/16/22  0519 10/16/22  1124 10/16/22  1736 10/17/22  0409   *   < > 147* 144 146 144   K 3.9   < > 3.7 3.3* 3.5 3.5      < > 103 100 100 98   CO2 29   < > 29 30* 29 33*   BUN 28*   < > 24* 23 22 20   CREATININE 1.4*   < > 1.4* 1.4* 1.4* 1.4*   GLUCOSE 218*   < > 230* 239* 258* 123*   CALCIUM 8.1*   < > 7.7* 7.8* 8.2* 8.1*   PROT 5.4*  --  5.5*  --   --  5.3*   LABALBU 2.9*  --  3.0*  --   --  2.7*   BILITOT 0.6  --  0.6  --   --  0.7   ALKPHOS 453*  --  443*  --   --  403*   AST 36  --  27  --   --  23   ALT 24  --  10  --   --  10    < > = values in this interval not displayed. LIVER PROFILE:   Recent Labs     10/15/22  0400 10/16/22  0519 10/17/22  0409   AST 36 27 23   ALT 24 10 10   BILITOT 0.6 0.6 0.7   ALKPHOS 453* 443* 403*       PT/INR:   Recent Labs     10/17/22  0409   PROTIME 15.4*   INR 1.4         APTT:   No results for input(s): APTT in the last 72 hours. Fasting Lipid Panel:    No results found for: CHOL, TRIG, HDL    Cardiac Enzymes:    No results found for: CKTOTAL, CKMB, CKMBINDEX, TROPONINI    Notable Cultures:      Blood cultures   Blood Culture, Routine   Date Value Ref Range Status   10/13/2022 24 Hours no growth  Preliminary     Respiratory cultures No results found for: RESPCULTURE No results found for: LABGRAM  Urine No results found for: LABURIN  Legionella No results found for: LABLEGI  C Diff PCR No results found for: CDIFPCR  Wound culture/abscess: No results for input(s): WNDABS in the last 72 hours. Tip culture:No results for input(s): CXCATHTIP in the last 72 hours.      Antibiotic  Days  Day started   Cipro     Linezolid                      Oxygen:     Vent Information  Ventilator ID: [id number]  Equipment Changed: (S) Humidification    Additional Respiratory Assessments  Heart Rate: 71  Resp: 15  SpO2: 96 %  Position: Semi-Sarabia's  Humidification Source: Heated wire  Humidification Temp: 94  Circuit Condensation: Drained     Nasal cannula L/min     Face mask %     Reservoirs mask %       ABG     PH  7.35   PCO2  37   PO2  99   HCO3  20   Sat%  95   FIO2     DATES 10/12/22       Lines:  Site  Day  Date inserted     TLC              PICC              Arterial line              Peripheral line              HD cath            Urinary Catheter 09/29/22 Mahan-Output (mL): 350 mL  [REMOVED] Urinary Catheter 09/27/22-Output (mL): 100 mL    Imaging Studies:  CXR  10/13 when compared to previous CXR 10/8-10/12 suggest & is consistent with CHF     Assessment and PLan   In summary,   74 y.o. male with significant past medical history of CKD, DM type II, MATT, presented 9/26/22 2 with stroke and GI bleed. Patient was admitted to [facility] with strokelike symptoms, right-sided paralysis and aphasia, found to have severe left ICA stenosis and was transfer to [facility] [place],  taken to IR for left ICA angioplasty with stent placement. Patient had GI Bleed, following general surgery, received total of 12 units of PRBC since admission. EGD on 10/3/22 showed no evidence of upper GI bleed, CT a/p showed showed no focal area of arterial enhancement or extra blush to localize acute hemorrhage, nuclear medicine scan was positive for bleeding around the splenic flexure. Colonoscopy showed full of clots. Transfer to MICU from PICU with low hgb, labor breathing and hypotensive. 10/13/2022  Alert oriented 2-3 confused, follows commands.   Supplement oxygen on BiPAP with respiratory distress  INR 1.4  --> S/P vitamin K and FFP   Start clear liquid diet  Not on pressors, will start precedex  and Fentanyl for severe agitation,   -- will  start Seroquel   Agitated today, due to BIAP,  No bowel movement overnight   No temps  10/14/2022   Much improved on Bumex drip   Off BiPaP on NC   OK   Creatinine 1.5  BUN 35   Na 151  Will increase free water  po   Labs & Imager reviewed & replaced  10/15/2022   Cxr worsening of CHF with effusion--> Increase Bumex drip to 1 mg   K replaced   1+ Edema   DC fresh water thru NG + Restrict fluids   Vitals stable   On HFNC  with sat at 96%   Refuses BiPAP   Labs & Imager reviewed & replaced  10/16/2022   Afebrile   High /55  Will add catapres and increase Metoprolol  HR 75   CXR CHF persists   Intake 9955 due to isotonic bowel prep    Will increase Bumex drip to 0.75 mg today and   decrease to 0.5 mg tomorrow 10/17   Electrolytes replaced & re-check at 6 PM    Assessment:  HFpEF  Bilateral pulmonary edema  Left ICA stent placement 09/26 with left MCA stroke, was on dual antiplatelet therapy, currently on aspirin only  Acute hypoxic respiratory failure on high flow nasal cannula  GI bleed with bleeding around splenic flexure, s/p total 15 unit PRBC transfusion  LARY  Anemia due to blood loss  Right foot ulcer  MATT on CPAP at home    Plan:    Neuro: Patient is alert and oriented and following commands, no focal neurodeficit. Patient gets agitated, on Precedex drip currently. Continue aspirin only as per GI. Pulmonary: Patient continues to have hypoxic respiratory failure and is on high flow nasal cannula. Significant bilateral pulmonary congestion and edema noted on chest x-ray. Patient continues to have Bumex drip. Cardiovascular: Patient has preserved EF, pulmonary edema is likely related with large amount of blood transfusion leading to TACO. Abdomen: GI bleed, plan for EGD by GI today, continue Protonix. Renal: LARY noted with good urine output, 6.8 L in last 24 hours. Discussed with Dr. Deutsch [facility] nephrology. We will continue Bumex drip, DC D5 drip. Hematology: Continue to monitor H&H, as needed blood transfusion. ID: Monitor off antibiotics  CODE STATUS: Full code  Total critical care time spent 35 minutes. This included respiratory care management, pulmonary edema management, anemia management, coordination of care.

## 2022-10-17 NOTE — FLOWSHEET NOTE
Inpatient Wound Care(follow up) 4405    Admit Date: 9/27/2022  5:45 PM    Reason for consult:  wound vac management    Findings:    10/17/22 1101   Wound 09/28/22 Foot Right;Lateral   Date First Assessed/Time First Assessed: 09/28/22 0944   Present on Hospital Admission: No  Location: Foot  Wound Location Orientation: Right;Lateral   Wound Image    Wound Etiology Diabetic   Dressing Status New dressing applied   Wound Cleansed Cleansed with saline   Dressing/Treatment Negative pressure wound therapy   Wound Length (cm) 3 cm   Wound Width (cm) 2.6 cm   Wound Depth (cm) 0.6 cm   Wound Surface Area (cm^2) 7.8 cm^2   Change in Wound Size % (l*w) 15.58   Wound Volume (cm^3) 4.68 cm^3   Wound Healing % 37   Wound Assessment Pink/red   Drainage Amount None   Meily-wound Assessment   (calloused)   Negative Pressure Wound Therapy Foot Right;Plantar;Lateral   Placement Date/Time: 09/28/22 1040   Location: Foot  Wound Location Orientation: Right;Plantar;Lateral   Dressing Type Black Foam   Number of pieces used 3   Cycle Continuous   Target Pressure (mmHg) 125   Canister changed? No   Dressing Status New dressing applied   Dressing Change Due 10/19/22     **Informed Consent**    The patient has given verbal consent to have photos taken of wound and inserted into their chart as part of their permanent medical record for purposes of documentation, treatment management and/or medical review. All Images taken on 10/17/22 of patient name: Derrick Pemberton were transmitted and stored on secured Mett[employer] located within [facility] by a registered [employer] Mobile Application Device. Plan:   Will follow    Alem Hare RN 10/17/2022 11:55 AM

## 2022-10-17 NOTE — PROGRESS NOTES
Comprehensive Nutrition Assessment    Type and Reason for Visit:  Reassess    Nutrition Recommendations/Plan:     Continue NPO for GI workup  Resume nutrition after procedures when medically feasible. Consider supplement PN if unable to use GI tract       Malnutrition Assessment:  Malnutrition Status: At risk for malnutrition (10/14/22 1227)    Context:  Acute Illness     Findings of the 6 clinical characteristics of malnutrition:  Energy Intake:  50% or less of estimated energy requirements for 5 or more days  Weight Loss:  Unable to assess (no wt hx on file)     Body Fat Loss:  No significant body fat loss     Muscle Mass Loss:  No significant muscle mass loss    Fluid Accumulation:  No significant fluid accumulation     Strength:  Not Performed    Nutrition Assessment:    Pt remains at nutritional risk d/t ongoing need for ICU care. Admit transferred from [facility] 2/2 CVA s/p IR angio/stent. Noted post-procedure hypotension resulting in re-intubation/ extubated 9/29. Course now complicated by developed GIB s/p EGD/ c-scope. Noted R diabetic foot ulcer w/ wound vac in place. PMHx COPD, CAD/ NSTEMI, DM/PVD, previous stroke/ seizures, & Parkinson's. Current NPO status x 2 days for c-scope prep planned for today. Noted minimal nutriton/ intermittent PO dysphagia diet x > 1 week. Will monitor nutrition progression/ provide recs based on medical plan.     Nutrition Related Findings:    Pt disoriented, agitation, intermittent hypotension (not on pressor), +I/O's 3L, +2/+3 pitting edema, active BS, FMS w/ diarrhea d/t c-scope prep, NGT clamped, wound vac R foot, dysphagia     Wound Type: Diabetic Ulcer, Wound Vac (R foot)       Current Nutrition Intake & Therapies:      Diet NPO    Anthropometric Measures:  Height: 5' 8\" (172.7 cm)  Ideal Body Weight (IBW): 154 lbs (70 kg)    Admission Body Weight: 260 lb (117.9 kg) (bed 9/27)  Current Body Weight: 232 lb (105.2 kg) (10/17 actual, noted wt fluctuations), 168.8 % IBW  Current BMI (kg/m2): 35.3  Usual Body Weight:  (UTO UBW 2/2 poor EMR wt hx pta)                BMI Categories: Obese Class 2 (BMI 35.0 -39.9)    Estimated Daily Nutrient Needs:  Energy Requirements Based On: Formula  Weight Used for Energy Requirements: Current  Energy (kcal/day): MSJ 1816 x 1.3 SF= 0379-3424  Weight Used for Protein Requirements: Ideal  Protein (g/day): 1.5-1.8 g/kg IBW; 105-125  Fluid (ml/day): per critical care mgmt    Nutrition Diagnosis:   Inadequate oral intake related to altered GI function (GIB) as evidenced by NPO or clear liquid status due to medical condition    Nutrition Interventions:   Nutrition Education/Counseling: Education not indicated  Coordination of Nutrition Care: Continue to monitor while inpatient      Goals:  Previous Goal Met: Progress towards Goal(s) Declining (NPO again)  Specify Other Goals: Nutrition Progression    Nutrition Monitoring and Evaluation:   Food/Nutrient Intake Outcomes: Diet Advancement/Tolerance  Physical Signs/Symptoms Outcomes: Biochemical Data, Nutrition Focused Physical Findings, Skin, Weight, Chewing or Swallowing, Diarrhea, GI Status, Fluid Status or Edema, Hemodynamic Status    Discharge Planning:     Too soon to determine     Veronica Beltran RD, LD  Contact: Ext [phone]

## 2022-10-17 NOTE — PROGRESS NOTES
Progress Note  10/17/2022 10:01 AM  Subjective:   Admit Date: 2022  PCP: Khalida Rogre MD  Interval History: Patient examined doing ok in no distress , on high flow o2     Diet: Diet NPO    Data:   Scheduled Meds:   haloperidol  3 mg Oral TID    metoprolol succinate  50 mg Oral Daily    cloNIDine  0.1 mg Oral BID    aspirin  81 mg Per NG tube Daily    [Held by provider] clopidogrel  75 mg Oral Daily    QUEtiapine  50 mg Oral BID    sodium chloride flush  5-40 mL IntraVENous 2 times per day    miconazole nitrate   Topical BID    gabapentin  600 mg Oral 4x daily    amLODIPine  10 mg Oral Daily    white petrolatum   Topical BID    sodium chloride flush  5-40 mL IntraVENous 2 times per day    miconazole   Topical BID    epoetin sharath-epbx  6,000 Units SubCUTAneous Once per day on     hydrALAZINE  50 mg Oral 3 times per day    insulin lispro  0-16 Units SubCUTAneous 4x Daily AC & HS    pantoprazole (PROTONIX) 40 mg injection  40 mg IntraVENous Q12H    sennosides  5 mL Oral Nightly    carbidopa-levodopa  1 tablet Per NG tube TID    ipratropium-albuterol  1 ampule Inhalation Q4H WA    collagenase   Topical Q MWF    benzonatate  100 mg Oral TID    ezetimibe  10 mg Oral Daily    [Held by provider] ticagrelor  90 mg Oral BID    atorvastatin  80 mg Oral Nightly     Continuous Infusions:   bumetanide 0.1 mg/mL infusion 1 mg/hr (10/17/22 0611)    dexmedetomidine (PRECEDEX) IV infusion 0.5 mcg/kg/hr (10/17/22 0041)    sodium chloride      sodium chloride      dextrose       PRN Meds:morphine, hydrALAZINE, sodium chloride flush, sodium chloride, miconazole nitrate **AND** miconazole nitrate, sodium chloride flush, sodium chloride, labetalol, glucose, dextrose bolus **OR** dextrose bolus, glucagon (rDNA), dextrose  I/O last 3 completed shifts:   In: 27089 [I.V.:3870.8; XP/Z; IV Piggyback:639.2]  Out: 43925 [OLVSZ:2441; GFUAB:6912]  I/O this shift:  In: 60 [NG/GT:60]  Out: 1040 [Urine:1040]    Intake/Output Summary (Last 24 hours) at 10/17/2022 1001  Last data filed at 10/17/2022 0900  Gross per 24 hour   Intake 97928.35 ml   Output 31916 ml   Net -257.65 ml     CBC:   Recent Labs     10/15/22  0400 10/15/22  1120 10/16/22  0519 10/16/22  1124 10/16/22  1736 10/17/22  0409   WBC 7.8  --  6.6  --   --  4.9   HGB 7.8*   < > 7.7* 7.7* 7.6* 8.1*   *  --  124*  --   --  145    < > = values in this interval not displayed. BMP:    Recent Labs     10/16/22  1124 10/16/22  1736 10/17/22  0409    146 144   K 3.3* 3.5 3.5    100 98   CO2 30* 29 33*   BUN 23 22 20   CREATININE 1.4* 1.4* 1.4*   GLUCOSE 239* 258* 123*     Hepatic:   Recent Labs     10/15/22  0400 10/16/22  0519 10/17/22  0409   AST 36 27 23   ALT 24 10 10   BILITOT 0.6 0.6 0.7   ALKPHOS 453* 443* 403*     Troponin: No results for input(s): TROPONINI in the last 72 hours. BNP: No results for input(s): BNP in the last 72 hours. Lipids: No results for input(s): CHOL, HDL in the last 72 hours. Invalid input(s): LDLCALCU  ABGs: No results found for: PHART, PO2ART, CAE4OWK  INR:   Recent Labs     10/17/22  0409   INR 1.4       -----------------------------------------------------------------  RAD: CT HEAD WO CONTRAST    Result Date: 2022  Patient MRN:  [id number] : 1955 Age: 74 years Gender: Male Order Date:  2022 5:35 AM EXAM: CT HEAD WO CONTRAST NUMBER OF IMAGES:  200 INDICATION:  stroke evaluation after mechanical thrombectomy Please assign to read to Dr. Crystal Fernandez in [facility] stroke evaluation after mechanical thrombectomy What reading provider will be dictating this exam?->[facility] COMPARISON: None Technique: Low-dose CT  acquisition technique included one of following options; 1 . Automated exposure control, 2. Adjustment of MA and or KV according to patient's size or 3. Use of iterative reconstruction. Multiple CT sections were obtained with sagittal and coronal MPR reconstructions. The ventricles are prominent.  The gyri and sulci appear  prominent. The white matter appears  prominent. There is no evidence for hemorrhage. There is no infarct identified. There is no mass effect identified. There is no mass identified. Diffuse atrophy likely age related Findings compatible with small vessel ischemic changes. CT HEAD WO CONTRAST    Result Date: 2022  Patient MRN:  [id number] : 1955 Age: 74 years Gender: Male Order Date:  2022 EXAM: CT HEAD WO CONTRAST NUMBER OF IMAGES:  357 INDICATION:  cva cva COMPARISON: None Technique: Low-dose CT  acquisition technique included one of following options; 1 . Automated exposure control, 2. Adjustment of MA and or KV according to patient's size or 3. Use of iterative reconstruction. Multiple CT sections were obtained with sagittal and coronal MPR reconstructions. The ventricles are prominent. The gyri and sulci appear  prominent. The white matter appears  prominent. There is no evidence for hemorrhage. There is no infarct identified. There is no mass effect identified. There is no mass identified. Diffuse atrophy likely age related Findings compatible with small vessel ischemic changes. Findings were called at the time of dictation     XR CHEST PORTABLE    Result Date: 2022  EXAMINATION: ONE XRAY VIEW OF THE CHEST 2022 6:57 am COMPARISON: None. HISTORY: ORDERING SYSTEM PROVIDED HISTORY: intubated TECHNOLOGIST PROVIDED HISTORY: Reason for exam:->intubated What reading provider will be dictating this exam?->[facility] FINDINGS: There is stable position of the endotracheal tube and NG tube Multifocal bilateral airspace disease is again noted unchanged compared to prior study and most prominent within the lower lobes. There trace bilateral pleural effusions. 1. Multifocal bilateral airspace disease more prominent within the lower lobes. The airspace disease is unchanged when compared with the prior study.      XR CHEST PORTABLE    Result Date: 2022  EXAMINATION: ONE XRAY VIEW OF THE CHEST 2022 12:41 am COMPARISON: None. HISTORY: ORDERING SYSTEM PROVIDED HISTORY: intuabted TECHNOLOGIST PROVIDED HISTORY: Reason for exam:->intuabted What reading provider will be dictating this exam?->[facility] FINDINGS: Indistinct pulmonary opacification rather diffusely most aggregated in mid to lower locations. The may represent pulmonary edema but nonspecific probably includes atelectasis at least in the bases other etiologies such is infiltrates, ARDS, etc.  Are possible with no comparison available. Advise clinical correlation Esophageal route catheter is into the stomach. ET tube has tip approximately 4.1 cm above the mark. Heart size around upper normal limits. Small pleural effusions suggested The detail of evaluation on the exam is suboptimal due to technique and body habitus. Further imaging may be helpful. 1. Pulmonary opacities present favoring edema and atelectasis, also small pleural effusions, but nonspecific with some additional considerations noted above 2. Support devices present as described above 3. Heart size appears borderline enlarged     CTA NECK W CONTRAST    Result Date: 2022  Patient MRN:  [id number] : 1955 Age: 74 years Gender: Male Order Date:  2022 6:24 PM EXAM: CTA NECK W CONTRAST, CTA HEAD W CONTRAST NUMBER OF IMAGES:  36 INDICATION:  cva cva What reading provider will be dictating this exam?->[facility] COMPARISON: None Technique: Low-dose CT  acquisition technique included one of following options; 1 . Automated exposure control, 2. Adjustment of MA and or KV according to patient's size or 3. Use of iterative reconstruction. Contiguous spiral images were obtained in the axial plane, following the administration of intravenous contrast using CT angiographic protocol. Sagittal and coronal images were reconstructed from the axial plane acquisition. Additional MIP reconstructions were presented to aid in the interpretation of this study.  Images were obtained from the skull base cranially. There is mild calcified plaque identified in the vessels compatible with atherosclerotic disease. The right carotid is moderately atherosclerotic without significant stenosis The left carotid is abnormal. There is  evidence for hemodynamically significant stenosis at the level the proximal internal carotid artery. By NASCET criteria estimated stenosis is 90% or greater The right vertebral artery is mildly atherosclerotic without significant stenosis The left vertebral artery is mildly atherosclerotic without significant stenosis The basilar artery is unremarkable The middle cerebral arteries are unremarkable The anterior cerebral arteries are unremarkable The posterior cerebral arteries are unremarkable     1. Estimated stenosis of the proximal right and left internal carotid artery by NASCET criteria is greater than 90% on the left 2. Severe atherosclerotic disease . 3. No large vessel occlusion identified This study was analyzed by the Jobool algorithm. CT BRAIN PERFUSION    Result Date: 2022  Patient MRN: [id number] : 1955 Age:  74 years Gender: Male Order Date: 2022 Exam: CT BRAIN PERFUSION Number of Images: 333 views Indication:   cva cva What reading provider will be dictating this exam?->[facility] Comparison: None. Findings: Perfusion images demonstrate symmetric blood volume Blood flow images demonstrate symmetric blood flow There is no significant ischemic penumbra identified. There is no significant core infarct identified. No significant ischemic penumbra identified This study was analyzed by the [employer] algorithm. XR ABDOMEN FOR NG/OG/NE TUBE PLACEMENT    Result Date: 2022  EXAMINATION: ONE SUPINE XRAY VIEW(S) OF THE ABDOMEN 2022 12:40 am COMPARISON: None.  HISTORY: ORDERING SYSTEM PROVIDED HISTORY: Confirmation of course of NG/OG/NE tube and location of tip of tube TECHNOLOGIST PROVIDED HISTORY: Reason for exam:->Confirmation of course of NG/OG/NE tube and location of tip of tube Portable? ->Yes What reading provider will be dictating this exam?->CRC FINDINGS: Esophageal route catheter is into the left upper quadrant expected stomach location. No clear bowel obstruction identified. Possible constipation. Limited detailed evaluation present on exam.  Chest evaluation is done separately. NG/OG is in the stomach. IR CAROTID STENT W PROTECTION    Result Date: 2022  Patient MRN:  [id number] : 1955 Age: 74 years Gender: Male Order Date:  2022 7:00 PM Examination angiogram and carotid stent NUMBER OF IMAGES:  12 INDICATION: I65.22 Stenosis of left carotid artery left carotid stenosis What reading provider will be dictating this exam?->[facility] COMPARISON: None FINDINGS:  After obtaining informed consent and following the routine sterile prep and drape after administration of local anesthesia and following a time out a needle was inserted in the right common femoral artery and guidewire and catheter were advanced into the abdominal aorta and subsequently into the thoracic aorta. Subsequently selective catheterization of the left common carotid was performed and angiogram was performed . Images demonstrate 80 stenosis of the proximal internal carotid artery by NASCET criteria on the  left prior to stent placement The external carotid artery is patent. The catheter was then exchanged for a guide catheter The distal protection device was placed. A filter wire was utilized Angioplasty was performed. Subsequently a carotid stent was placed Subsequently angioplasty of the stent was performed Repeat images demonstrate A patent stent with a patent distal internal carotid artery. Gloria Singh  TICI 3 0 no perfusion 1 Penetration but no distal branch filling 2a perfusion with incomplete distal branch filling, less than 50% 2b  perfusion with incomplete distal branch filling greater than 50% 3  Full perfusion with filling of distal branches The patient tolerated the procedure well. Angiogram of the right common femoral artery demonstrates a patent vessel and Angio-Seal was utilized. The procedure was performed with general anesthesia. 12 minutes 13 seconds of fluoroscopy was utilized. Time out occurred at 1920 hours. 1. Angiogram demonstrates successful placement of a carotid stent , post procedure images demonstrate a widely patent stent and internal carotid     CTA HEAD W CONTRAST    Result Date: 2022  Patient MRN:  [id number] : 1955 Age: 74 years Gender: Male Order Date:  2022 6:24 PM EXAM: CTA NECK W CONTRAST, CTA HEAD W CONTRAST NUMBER OF IMAGES:  36 INDICATION:  cva cva What reading provider will be dictating this exam?->[facility] COMPARISON: None Technique: Low-dose CT  acquisition technique included one of following options; 1 . Automated exposure control, 2. Adjustment of MA and or KV according to patient's size or 3. Use of iterative reconstruction. Contiguous spiral images were obtained in the axial plane, following the administration of intravenous contrast using CT angiographic protocol. Sagittal and coronal images were reconstructed from the axial plane acquisition. Additional MIP reconstructions were presented to aid in the interpretation of this study. Images were obtained from the skull base cranially. There is mild calcified plaque identified in the vessels compatible with atherosclerotic disease. The right carotid is moderately atherosclerotic without significant stenosis The left carotid is abnormal. There is  evidence for hemodynamically significant stenosis at the level the proximal internal carotid artery.  By NASCET criteria estimated stenosis is 90% or greater The right vertebral artery is mildly atherosclerotic without significant stenosis The left vertebral artery is mildly atherosclerotic without significant stenosis The basilar artery is unremarkable The middle cerebral arteries are unremarkable The anterior cerebral arteries are unremarkable The posterior cerebral arteries are unremarkable     1. Estimated stenosis of the proximal right and left internal carotid artery by NASCET criteria is greater than 90% on the left 2. Severe atherosclerotic disease . 3. No large vessel occlusion identified This study was analyzed by the [employer] algorithm. MRI BRAIN WO CONTRAST    Result Date: 2022  Patient MRN:  [id number] : 1955 Age: 74 years Gender: Male Order Date:  2022 3:24 PM EXAM: MRI BRAIN WO CONTRAST NUMBER OF IMAGES:  605 INDICATION:  Stroke Evaluation Mechanical Thrombectomy MRI of the brain 24 hours following mechanical thrombectomy. Discontinue order after first follow up. Please assign to Dr. Dank Keyes to read in [facility] Stroke Evaluation Mechanical Thrombectomy What reading provider will be dictating this exam?->[facility] COMPARISON: CT scan 2022 Total sequences obtained: 9 The ventricles are prominent. The gyri and sulci appear  prominent. The white matter appears  prominent. No convincing hemorrhage identified. There is no mass effect identified. There is no mass identified. There is a small region of restricted diffusion measuring approximately 3 mm seen in the left parietal lobe and a similar finding present in the left posterior frontal lobe. No other restricted diffusion is seen to suggest acute infarct. There is a small region of susceptibility infarct in the left frontal lobe. There are 2 small regions of petechial infarcts one in the left posterior frontal lobe and a second in the left parietal lobe not exceeding 3 mm.  There is otherwise extensive small vessel ischemic changes       Objective:   Vitals: BP (!) 143/71   Pulse 73   Temp 97.4 °F (36.3 °C) (Temporal)   Resp 13   Ht 5' 8\" (1.727 m)   Wt 232 lb 12.8 oz (105.6 kg)   SpO2 95%   BMI 35.40 kg/m²   General appearance: appears stated age   Skin:  No rashes or lesions  HEENT: Head: Normocephalic, no lesions, without obvious abnormality. Neck: no adenopathy, no carotid bruit, no JVD, supple, symmetrical, trachea midline, and thyroid not enlarged, symmetric, no tenderness/mass/nodules  Lungs: diminished breath sounds bibasilar  Heart: regular rate and rhythm, S1, S2 normal, no murmur, click, rub or gallop  Abdomen: soft, non-tender; bowel sounds normal; no masses,  no organomegaly  Extremities: edema +  Neurologic: Mental status: Alert, oriented, thought content appropriate    Assessment:   Patient Active Problem List:     Acute cerebrovascular accident (CVA) ([facility] [address]     Acute respiratory failure with hypoxia ([facility] [address]     Stenosis of left carotid artery     Hypoalbuminemia     Electrolyte imbalance     Hypernatremia     Anemia     GI bleeding    Plan:     1. Acute CVA  S/p IR intervention with angioplasty of the left ICA     2. LARY on CKD stage 3b  Baseline 1.7-1.9  combined effects of ACEI use and contrast nephrotoxicity  Component of urinary retention  nonoliguric at this time  Disproportionate elevation of BUN relative to Cr c/w gib  stool heme positive  Monitor labs and urine  3.4  L  Bun/cr   24/1.4 , u/o > 7.9 liters      3. Hypertensive emergency  presenting  with acute CVA  Adjust meds to target blood pressure control to SBP less than 160  BP high , was NPO , received all meds this am , may add clonidine back as needed , d/w staff         4. Anemia, chronic with acute worsening  Informed stool heme positive  PRBC transfusion as needed  Sp egd/c scope  Trf < 7  On ASHLEY   To go for embolization if worsens     5. Hyperkalemia better   Due to combination of LARY and metabolic acidosis; PRBC transfusios  Lokelma  prn     6 Urinary retention  Suspected situational  Now with trejo  Urology following     7. Hypernatremia  Suspect intervascular volume depletion  Na 147 better         8.  Met acidosis resolved   Off  hco3 drip  Improving  Ckd/hypotension     9 resp distress  Bumex drip at this time  On bipap prn    U/o > 9 liters , on bumex , chest x ray results noted CHF , issues d/w ICU team , will cut IVF   Cut IV as much as possible , possible EGD planned today , continue present care , may increase fredy Watkins MD

## 2022-10-17 NOTE — PROGRESS NOTES
Department of Podiatry   Progress Note      Mr. Lamin Norwood was seen and evaluated at bedside this morning s/p wound vac application. No acute events to report. Patient is awake with sitter at bedside. Past Medical History:            Diagnosis Date    Chronic back pain     CKD (chronic kidney disease)     CVA (cerebral vascular accident) ([facility]     CVA (cerebral vascular accident) ([facility] [facility]     DM (diabetes mellitus) ([facility] [address]     Major depression     MI [facility] infarction) [facility]     MATT (obstructive sleep apnea)     Parkinson disease ([facility]     PVD (peripheral vascular disease) ([facility]     Seizure ([facility] [address] [facility]        Past Surgical History:        Procedure Laterality Date    COLONOSCOPY N/A 10/9/2022    COLONOSCOPY DIAGNOSTIC performed by Erica Arellano MD at [place] [address]      IR CAROTID STENT UNI W PROTECTION  9/27/2022    IR CAROTID STENT UNI W PROTECTION 9/27/2022 Caio Lee MD [facility]    UPPER GASTROINTESTINAL ENDOSCOPY N/A 10/3/2022    EGD DIAGNOSTIC ONLY performed by Rachel Chambers MD at [facility] ENDOSCOPY       Medications Prior to Admission:    Medications Prior to Admission: apixaban (ELIQUIS) 5 MG TABS tablet, Take 5 mg by mouth 2 times daily  aspirin 81 MG chewable tablet, Take 81 mg by mouth daily  carbidopa-levodopa (SINEMET)  MG per tablet, Take 1 tablet by mouth 3 times daily  carvedilol (COREG) 25 MG tablet, Take 25 mg by mouth 2 times daily (with meals)  cetirizine (ZYRTEC) 10 MG tablet, Take 10 mg by mouth daily  clopidogrel (PLAVIX) 75 MG tablet, Take 75 mg by mouth daily  diphenhydrAMINE (BENADRYL) 25 MG capsule, Take 25 mg by mouth every 6 hours  ezetimibe (ZETIA) 10 MG tablet, Take 10 mg by mouth daily  fluticasone (FLONASE) 50 MCG/ACT nasal spray, 2 sprays by Each Nostril route daily  gabapentin (NEURONTIN) 600 MG tablet, Take 600 mg by mouth 4 times daily.   HYDROcodone-acetaminophen (NORCO) 5-325 MG per tablet, Take 1 tablet by mouth 2 times daily.  Insulin Glargine, 2 Unit Dial, (TOUJEO MAX SOLOSTAR) 300 UNIT/ML SOPN, Inject 66 Units into the skin daily  piperacillin-tazobactam (ZOSYN) 3-0.375 GM per 50ML IVPB extended infusion, Infuse 4.5 mg intravenously in the morning and 4.5 mg at noon and 4.5 mg in the evening. acetaminophen (TYLENOL) 325 MG tablet, Take 650 mg by mouth every 6 hours as needed for Pain  amLODIPine (NORVASC) 5 MG tablet, Take 5 mg by mouth daily  benzonatate (TESSALON) 100 MG capsule, Take 100 mg by mouth 3 times daily  cloNIDine (CATAPRES) 0.1 MG tablet, Take 0.1 mg by mouth in the morning and 0.1 mg in the evening. hydrALAZINE (APRESOLINE) 100 MG tablet, Take 100 mg by mouth 3 times daily    Allergies:  Codeine    Social History:   TOBACCO:   has no history on file for tobacco use. ETOH:   has no history on file for alcohol use. DRUGS:   Social History     Substance and Sexual Activity   Drug Use Not on file       Family History:   History reviewed. No pertinent family history. REVIEW OF SYSTEMS:    All pertinent positives and negatives as noted in HPI       LOWER EXTREMITY EXAMINATION   -Dressings clean, dry and intact without dishevelment.   -Posterior calf pain on palpation L>R  -Patient digits are warm to touch.   -Offloading pads noted    PHYSICAL EXAM AS OF 10/14/22:  VASCULAR:  DP and PT pulses are non palpable. CFT < 5 seconds B/L. Warm to warm from the tibial tuberosity to the distal aspect of the digits dorsally. NEUROLOGIC:  Protective sensation is diminished but grossly intact    DERM:  Right foot lateral plantar wound measuring approx 6cm in diameter. No erythema, serosanguinous drainage, no malodor.     MUSCULOSKELETAL: deferred      CONSULTS:  IP CONSULT TO CRITICAL CARE  IP CONSULT TO DIETITIAN  IP CONSULT TO CARDIOLOGY  IP CONSULT TO PODIATRY  IP CONSULT TO PODIATRY  IP CONSULT TO UROLOGY  IP CONSULT TO NEPHROLOGY  IP CONSULT TO GENERAL SURGERY  IP CONSULT TO INFECTIOUS DISEASES  IP CONSULT TO GI  IP CONSULT TO GENERAL SURGERY  IP CONSULT TO GENERAL SURGERY    MEDICATION:  Scheduled Meds:   haloperidol  3 mg Oral TID    metoprolol succinate  50 mg Oral Daily    cloNIDine  0.1 mg Oral BID    aspirin  81 mg Per NG tube Daily    [Held by provider] clopidogrel  75 mg Oral Daily    QUEtiapine  50 mg Oral BID    sodium chloride flush  5-40 mL IntraVENous 2 times per day    miconazole nitrate   Topical BID    gabapentin  600 mg Oral 4x daily    amLODIPine  10 mg Oral Daily    white petrolatum   Topical BID    sodium chloride flush  5-40 mL IntraVENous 2 times per day    miconazole   Topical BID    epoetin sharath-epbx  6,000 Units SubCUTAneous Once per day on Mon Wed Fri    hydrALAZINE  50 mg Oral 3 times per day    insulin lispro  0-16 Units SubCUTAneous 4x Daily AC & HS    pantoprazole (PROTONIX) 40 mg injection  40 mg IntraVENous Q12H    sennosides  5 mL Oral Nightly    carbidopa-levodopa  1 tablet Per NG tube TID    ipratropium-albuterol  1 ampule Inhalation Q4H WA    collagenase   Topical Q MWF    benzonatate  100 mg Oral TID    ezetimibe  10 mg Oral Daily    [Held by provider] ticagrelor  90 mg Oral BID    atorvastatin  80 mg Oral Nightly     Continuous Infusions:   bumetanide 0.1 mg/mL infusion 1 mg/hr (10/17/22 0611)    dexmedetomidine (PRECEDEX) IV infusion 0.5 mcg/kg/hr (10/17/22 0041)    sodium chloride      sodium chloride      dextrose       PRN Meds:.morphine, hydrALAZINE, sodium chloride flush, sodium chloride, miconazole nitrate **AND** miconazole nitrate, sodium chloride flush, sodium chloride, labetalol, glucose, dextrose bolus **OR** dextrose bolus, glucagon (rDNA), dextrose    RADIOLOGY:  XR CHEST PORTABLE   Final Result   Worsening congestive heart failure suspected         XR CHEST PORTABLE   Final Result   Very minimal improvement seen in the aeration of the upper lung fields with   stable increased markings seen within the mid and lower lung fields   bilaterally with small bilateral pleural multifocal   pneumonia. CTA ABDOMEN PELVIS W CONTRAST   Final Result   Mixed densities throughout the colonic segments including in the ascending   colon and rectum could represent mixed densities of blood products although   no focal area of arterial enhancement or extravasation/blushing is observed   to localize acute hemorrhage. No evidence for perforation or abscess. No   pneumatosis intestinalis or portal venous gas evident. 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 No adjacent hematoma with only minimal   adjacent stranding. Bilateral pleural effusions moderate to large right and moderate left   effusions with adjacent atelectasis. Anasarca. XR CHEST PORTABLE   Final Result   Bilateral pleural effusions with bibasilar patchy infiltrates and mild   cardiomegaly. NM GI BLOOD LOSS   Final Result   Active GI bleeding identified during acquisition in the splenic flexure with   peristalsis identified into the proximal descending colon. RECOMMENDATIONS:   Unavailable         CT ABDOMEN PELVIS WO CONTRAST   Final Result   CHEST:      No evidence of neoplastic disease, allowing for limitations of noncontrast   technique. Moderate bilateral pleural effusions. Nonemergent incidental findings as above. ABDOMEN/PELVIS:      No evidence of neoplastic disease, allowing for limitations of noncontrast   technique. Bladder wall thickening is nonspecific given under distension; recommend   correlation urinalysis to evaluate for UTI. Nonemergent incidental findings as above. CT CHEST WO CONTRAST   Final Result   CHEST:      No evidence of neoplastic disease, allowing for limitations of noncontrast   technique. Moderate bilateral pleural effusions. Nonemergent incidental findings as above. ABDOMEN/PELVIS:      No evidence of neoplastic disease, allowing for limitations of noncontrast   technique. Bladder wall thickening is nonspecific given under distension; recommend   correlation urinalysis to evaluate for UTI. Nonemergent incidental findings as above. FL MODIFIED BARIUM SWALLOW W VIDEO   Final Result   1. Mildly impaired swallowing mechanism with swallowing delay and followed   by laryngeal penetration with thin liquid barium when using a straw. No   barium aspiration      2. Please see separate speech pathology report for full discussion of   findings and recommendations. RECOMMENDATIONS:   Unavailable         XR ABDOMEN (KUB) (SINGLE AP VIEW)   Final Result   Somewhat greater than average quantity of retained fecal material thin the   lower GI tract suggesting dysfunction with evacuation. XR CHEST PORTABLE   Final Result   1. Atherosclerotic disease and mild cardiomegaly. 2.  Persistent but improved opacities in the bilateral lungs most pronounced   in the mid and lower lungs. There appears to be a small left and trace right   pleural effusion         XR FOOT RIGHT (2 VIEWS)   Final Result   No evidence of calcaneal osteomyelitis. CT HEAD WO CONTRAST   Final Result   Parenchymal volume loss and chronic microvascular ischemic changes. No acute intracranial abnormality. Ethmoid and sphenoid sinusitis. Right mastoid effusion. US RETROPERITONEAL COMPLETE   Final Result   1. Normal appearance of the bilateral kidneys. No hydronephrosis. 2.  A Mahan catheter is decompressing the bladder. XR CHEST PORTABLE   Final Result   Worsening infiltrate and or atelectasis on the left, stable on the right with   suspected layering pleural effusion. XR CHEST PORTABLE   Final Result   Unchanged bilateral atelectasis and or infiltrate as well as small right   pleural effusion.          MRI BRAIN WO CONTRAST   Final Result There are 2 small regions of petechial infarcts one in the left   posterior frontal lobe and a second in the left parietal lobe not   exceeding 3 mm. There is otherwise extensive small vessel ischemic   changes         XR CHEST PORTABLE   Final Result   1. Multifocal bilateral airspace disease more prominent within the lower   lobes. The airspace disease is unchanged when compared with the prior study. CT HEAD WO CONTRAST   Final Result   Diffuse atrophy likely age related   Findings compatible with small vessel ischemic changes. XR CHEST PORTABLE   Final Result   1. Pulmonary opacities present favoring edema and atelectasis, also small   pleural effusions, but nonspecific with some additional considerations noted   above   2. Support devices present as described above   3. Heart size appears borderline enlarged         XR ABDOMEN FOR NG/OG/NE TUBE PLACEMENT   Final Result   NG/OG is in the stomach. IR CAROTID STENT W PROTECTION   Final Result   1. Angiogram demonstrates successful placement of a carotid stent ,   post procedure images demonstrate a widely patent stent and internal   carotid         CT HEAD WO CONTRAST   Final Result   Diffuse atrophy likely age related   Findings compatible with small vessel ischemic changes. Findings were called at the time of dictation         CT BRAIN PERFUSION   Final Result      No significant ischemic penumbra identified      This study was analyzed by the [employer]. ai algorithm. CTA NECK W CONTRAST   Final Result   1. Estimated stenosis of the proximal right and left internal carotid   artery by NASCET criteria is greater than 90% on the left   2. Severe atherosclerotic disease . 3. No large vessel occlusion identified            This study was analyzed by the [employer]. ai algorithm. CTA HEAD W CONTRAST   Final Result   1.  Estimated stenosis of the proximal right and left internal carotid   artery by NASCET criteria is greater than 90% on the left   2. Severe atherosclerotic disease . 3. No large vessel occlusion identified            This study was analyzed by the [employer] ai algorithm. US DUP LOWER EXTREMITIES BILATERAL VENOUS    (Results Pending)   XR CHEST PORTABLE    (Results Pending)       Vitals:    BP (!) 128/53   Pulse 70   Temp 97.6 °F (36.4 °C) (Temporal)   Resp 17   Ht 5' 8\" (1.727 m)   Wt 232 lb 12.8 oz (105.6 kg)   SpO2 96%   BMI 35.40 kg/m²     LABS:   Recent Labs     10/16/22  0519 10/16/22  1124 10/16/22  1736 10/17/22  0409   WBC 6.6  --   --  4.9   HGB 7.7*   < > 7.6* 8.1*   HCT 23.5*   < > 22.9* 25.4*   *  --   --  145    < > = values in this interval not displayed. Recent Labs     10/17/22  0409      K 3.5   CL 98   CO2 33*   PHOS 4.6*   BUN 20   CREATININE 1.4*     Recent Labs     10/16/22  0519 10/17/22  0409   PROT 5.5* 5.3*   INR  --  1.4       ASSESSMENTS:   1. Right foot wound diabetic ulcer  2. DM  3. Pain right foot  Acute cerebrovascular accident (CVA) ([facility] [address]            PLAN:  -Patient examined and evaluated at bedside. All pertinent labs, charts, and imaging reviewed prior to encounter   -Antibiotics as per ID: Stopped  -Culture: Corynebacteria  -Continue Wound vac changes to RLE:MWF Dressing changes. Wound vac noted today with good seal, running at 125mmHg with minimal debris in cannister.   -X rays: No acute osseous abnormalities to foot   -US:Pending  -Will continue conservative care for now.    -Patient discussed with Dr. Gulshan Campos  -Continue offloading

## 2022-10-17 NOTE — PLAN OF CARE
Problem: Pain  Goal: Verbalizes/displays adequate comfort level or baseline comfort level  10/17/2022 0839 by Sonya Georges RN  Outcome: Progressing     Problem: Skin/Tissue Integrity  Goal: Absence of new skin breakdown  Description: 1. Monitor for areas of redness and/or skin breakdown  2. Assess vascular access sites hourly  3. Every 4-6 hours minimum:  Change oxygen saturation probe site  4. Every 4-6 hours:  If on nasal continuous positive airway pressure, respiratory therapy assess nares and determine need for appliance change or resting period. 10/17/2022 0839 by Sonya Georges RN  Outcome: Progressing     Problem: Safety - Adult  Goal: Free from fall injury  10/17/2022 0839 by Sonya Georges RN  Outcome: Progressing     Problem: ABCDS Injury Assessment  Goal: Absence of physical injury  10/17/2022 0839 by Sonya Georges RN  Outcome: Progressing     Problem: Chronic Conditions and Co-morbidities  Goal: Patient's chronic conditions and co-morbidity symptoms are monitored and maintained or improved  10/17/2022 0839 by Sonya Georges RN  Outcome: Progressing     Problem: Respiratory - Adult  Goal: Achieves optimal ventilation and oxygenation  10/17/2022 0839 by Sonya Georges RN  Outcome: Progressing     Problem: Skin/Tissue Integrity - Adult  Goal: Skin integrity remains intact  10/17/2022 0839 by Sonya Georges RN  Outcome: Progressing   Plan of care discussed with patient / family.     Problem: Discharge Planning  Goal: Discharge to home or other facility with appropriate resources  10/17/2022 0447 by Bean Robbins RN  Outcome: Not Progressing     Problem: Neurosensory - Adult  Goal: Achieves maximal functionality and self care  10/17/2022 0447 by Bean Robbins RN  Outcome: Not Progressing     Problem: Nutrition Deficit:  Goal: Optimize nutritional status  10/17/2022 0447 by Bean Robbins RN  Outcome: Not Progressing

## 2022-10-17 NOTE — PLAN OF CARE
Problem: Discharge Planning  Goal: Discharge to home or other facility with appropriate resources  10/17/2022 0447 by Milla Delarosa RN  Outcome: Not Progressing  10/16/2022 1458 by Anila Chowdhury RN  Outcome: Progressing  Flowsheets (Taken 10/16/2022 4574)  Discharge to home or other facility with appropriate resources: Identify barriers to discharge with patient and caregiver     Problem: Pain  Goal: Verbalizes/displays adequate comfort level or baseline comfort level  10/17/2022 0447 by Milla Delarosa RN  Outcome: Progressing  10/16/2022 1458 by Anila Chowdhury RN  Outcome: Progressing  Flowsheets  Taken 10/16/2022 1100  Verbalizes/displays adequate comfort level or baseline comfort level: Encourage patient to monitor pain and request assistance  Taken 10/16/2022 0800  Verbalizes/displays adequate comfort level or baseline comfort level: Encourage patient to monitor pain and request assistance     Problem: Skin/Tissue Integrity  Goal: Absence of new skin breakdown  10/17/2022 0447 by Milla Delarosa RN  Outcome: Progressing  10/16/2022 1458 by Anila Chowdhury RN  Outcome: Progressing     Problem: Safety - Adult  Goal: Free from fall injury  10/17/2022 0447 by Milla Delarosa RN  Outcome: Progressing  10/16/2022 1458 by Anila Chowdhury RN  Outcome: Progressing     Problem: ABCDS Injury Assessment  Goal: Absence of physical injury  10/17/2022 0447 by Milla Delarosa RN  Outcome: Progressing  10/16/2022 1458 by Anila Chowdhury RN  Outcome: Progressing     Problem: Chronic Conditions and Co-morbidities  Goal: Patient's chronic conditions and co-morbidity symptoms are monitored and maintained or improved  10/17/2022 0447 by Milla Delarosa RN  Outcome: Progressing  10/16/2022 1458 by Anila Chowdhury RN  Outcome: Progressing  Flowsheets (Taken 10/16/2022 9115)  Care Plan - Patient's Chronic Conditions and Co-Morbidity Symptoms are Monitored and Maintained or Improved: Monitor and assess patient's chronic conditions and comorbid symptoms for stability, deterioration, or improvement     Problem: Neurosensory - Adult  Goal: Achieves stable or improved neurological status  10/17/2022 0447 by Valentina Jo RN  Outcome: Progressing  10/16/2022 1458 by Travis Pabon RN  Outcome: Progressing  Flowsheets (Taken 10/16/2022 0755)  Achieves stable or improved neurological status: Assess for and report changes in neurological status  Goal: Remains free of injury related to seizures activity  10/17/2022 0447 by Valentina Jo RN  Outcome: Progressing  10/16/2022 1458 by Travis Pabon RN  Outcome: Progressing  Flowsheets (Taken 10/16/2022 0755)  Remains free of injury related to seizure activity: Maintain airway, patient safety  and administer oxygen as ordered  Goal: Achieves maximal functionality and self care  10/17/2022 0447 by Valentina Jo RN  Outcome: Not Progressing  10/16/2022 1458 by Travis Pabon RN  Outcome: Progressing  Flowsheets (Taken 10/16/2022 0755)  Achieves maximal functionality and self care: Monitor swallowing and airway patency with patient fatigue and changes in neurological status     Problem: Respiratory - Adult  Goal: Achieves optimal ventilation and oxygenation  10/17/2022 0447 by Valentina Jo RN  Outcome: Progressing  10/16/2022 1458 by Travis Pabon RN  Outcome: Progressing  Flowsheets (Taken 10/16/2022 0755)  Achieves optimal ventilation and oxygenation: Assess for changes in respiratory status     Problem: Cardiovascular - Adult  Goal: Maintains optimal cardiac output and hemodynamic stability  10/17/2022 0447 by Valentina Jo RN  Outcome: Progressing  10/16/2022 1458 by Travis Pabon RN  Outcome: Progressing  Flowsheets (Taken 10/16/2022 0755)  Maintains optimal cardiac output and hemodynamic stability: Monitor blood pressure and heart rate  Goal: Absence of cardiac dysrhythmias or at baseline  10/17/2022 0447 by Shahla Anand LEYLA Mulligan  Outcome: Progressing  10/16/2022 1458 by Ellen Sharp RN  Outcome: Progressing  Flowsheets (Taken 10/16/2022 0755)  Absence of cardiac dysrhythmias or at baseline: Monitor cardiac rate and rhythm     Problem: Skin/Tissue Integrity - Adult  Goal: Skin integrity remains intact  10/17/2022 0447 by Teresa Orr RN  Outcome: Progressing  10/16/2022 1458 by Ellen Sharp RN  Outcome: Progressing  Flowsheets (Taken 10/16/2022 0755)  Skin Integrity Remains Intact: Monitor for areas of redness and/or skin breakdown  Goal: Incisions, wounds, or drain sites healing without S/S of infection  10/17/2022 0447 by Teresa Orr RN  Outcome: Progressing  10/16/2022 1458 by Ellen Sharp RN  Outcome: Progressing  Flowsheets (Taken 10/16/2022 0755)  Incisions, Wounds, or Drain Sites Healing Without Sign and Symptoms of Infection: TWICE DAILY: Assess and document skin integrity  Goal: Oral mucous membranes remain intact  10/17/2022 0447 by Teresa Orr RN  Outcome: Progressing  10/16/2022 1458 by Ellen Sharp RN  Outcome: Progressing  Flowsheets (Taken 10/16/2022 0755)  Oral Mucous Membranes Remain Intact: Assess oral mucosa and hygiene practices     Problem: Metabolic/Fluid and Electrolytes - Adult  Goal: Electrolytes maintained within normal limits  10/17/2022 0447 by Teresa Orr RN  Outcome: Progressing  10/16/2022 1458 by Ellen Sharp RN  Outcome: Progressing  Flowsheets (Taken 10/16/2022 0755)  Electrolytes maintained within normal limits: Monitor labs and assess patient for signs and symptoms of electrolyte imbalances  Goal: Hemodynamic stability and optimal renal function maintained  10/17/2022 0447 by Teresa Orr RN  Outcome: Progressing  10/16/2022 1458 by Ellen Sharp RN  Outcome: Progressing  Flowsheets (Taken 10/16/2022 0755)  Hemodynamic stability and optimal renal function maintained: Monitor labs and assess for signs and symptoms of volume excess or deficit  Goal: Glucose maintained within prescribed range  10/17/2022 0447 by Anirudh Flynn RN  Outcome: Progressing  10/16/2022 1458 by Marii Nelson RN  Outcome: Progressing  Flowsheets (Taken 10/16/2022 0755)  Glucose maintained within prescribed range: Monitor blood glucose as ordered     Problem: Hematologic - Adult  Goal: Maintains hematologic stability  10/17/2022 0447 by Anirudh Flynn RN  Outcome: Progressing  10/16/2022 1458 by Marii Nelson RN  Outcome: Progressing  Flowsheets (Taken 10/16/2022 0755)  Maintains hematologic stability: Assess for signs and symptoms of bleeding or hemorrhage     Problem: Nutrition Deficit:  Goal: Optimize nutritional status  10/17/2022 0447 by Anirudh Flynn RN  Outcome: Not Progressing  10/16/2022 1458 by Marii Nelson RN  Outcome: Progressing     Problem: Discharge Planning  Goal: Discharge to home or other facility with appropriate resources  10/17/2022 0447 by Anirudh Flynn RN  Outcome: Not Progressing  10/16/2022 1458 by Marii Nelson RN  Outcome: Progressing  Flowsheets (Taken 10/16/2022 0755)  Discharge to home or other facility with appropriate resources: Identify barriers to discharge with patient and caregiver     Problem: Neurosensory - Adult  Goal: Achieves maximal functionality and self care  10/17/2022 0447 by Anirudh Flynn RN  Outcome: Not Progressing  10/16/2022 1458 by Marii Nelson RN  Outcome: Progressing  Flowsheets (Taken 10/16/2022 0755)  Achieves maximal functionality and self care: Monitor swallowing and airway patency with patient fatigue and changes in neurological status     Problem: Nutrition Deficit:  Goal: Optimize nutritional status  10/17/2022 0447 by Anirudh Flynn RN  Outcome: Not Progressing  10/16/2022 1458 by Marii Nelson RN  Outcome: Progressing

## 2022-10-18 ENCOUNTER — APPOINTMENT (OUTPATIENT)
Dept: GENERAL RADIOLOGY | Age: 67
DRG: 034 | End: 2022-10-18
Attending: INTERNAL MEDICINE
Payer: MEDICARE

## 2022-10-18 LAB
ALBUMIN SERPL-MCNC: 2.6 G/DL (ref 3.5–5.2)
ALP BLD-CCNC: 392 U/L (ref 40–129)
ALT SERPL-CCNC: <5 U/L (ref 0–40)
ANION GAP SERPL CALCULATED.3IONS-SCNC: 14 MMOL/L (ref 7–16)
ANION GAP SERPL CALCULATED.3IONS-SCNC: 15 MMOL/L (ref 7–16)
ANION GAP SERPL CALCULATED.3IONS-SCNC: 18 MMOL/L (ref 7–16)
APTT: 37.3 SEC (ref 24.5–35.1)
AST SERPL-CCNC: 24 U/L (ref 0–39)
B.E.: 8.7 MMOL/L (ref -3–3)
BASOPHILS ABSOLUTE: 0.01 E9/L (ref 0–0.2)
BASOPHILS RELATIVE PERCENT: 0.2 % (ref 0–2)
BILIRUB SERPL-MCNC: 0.5 MG/DL (ref 0–1.2)
BLOOD CULTURE, ROUTINE: NORMAL
BUN BLDV-MCNC: 18 MG/DL (ref 6–23)
BUN BLDV-MCNC: 20 MG/DL (ref 6–23)
BUN BLDV-MCNC: 21 MG/DL (ref 6–23)
CALCIUM IONIZED: 1.12 MMOL/L (ref 1.15–1.33)
CALCIUM SERPL-MCNC: 8.1 MG/DL (ref 8.6–10.2)
CALCIUM SERPL-MCNC: 8.5 MG/DL (ref 8.6–10.2)
CALCIUM SERPL-MCNC: 8.6 MG/DL (ref 8.6–10.2)
CHLORIDE BLD-SCNC: 94 MMOL/L (ref 98–107)
CHLORIDE BLD-SCNC: 95 MMOL/L (ref 98–107)
CHLORIDE BLD-SCNC: 96 MMOL/L (ref 98–107)
CO2: 32 MMOL/L (ref 22–29)
CO2: 35 MMOL/L (ref 22–29)
CO2: 35 MMOL/L (ref 22–29)
COHB: 1.2 % (ref 0–1.5)
CREAT SERPL-MCNC: 1.7 MG/DL (ref 0.7–1.2)
CREAT SERPL-MCNC: 1.7 MG/DL (ref 0.7–1.2)
CREAT SERPL-MCNC: 1.9 MG/DL (ref 0.7–1.2)
CRITICAL: ABNORMAL
CULTURE, BLOOD 2: NORMAL
DATE ANALYZED: ABNORMAL
DATE OF COLLECTION: ABNORMAL
EOSINOPHILS ABSOLUTE: 0.3 E9/L (ref 0.05–0.5)
EOSINOPHILS RELATIVE PERCENT: 5.3 % (ref 0–6)
FERRITIN: 228 NG/ML
FOLATE: 10.5 NG/ML (ref 4.8–24.2)
GFR SERPL CREATININE-BSD FRML MDRD: 39 ML/MIN/1.73
GFR SERPL CREATININE-BSD FRML MDRD: 44 ML/MIN/1.73
GFR SERPL CREATININE-BSD FRML MDRD: 44 ML/MIN/1.73
GLUCOSE BLD-MCNC: 87 MG/DL (ref 74–99)
GLUCOSE BLD-MCNC: 95 MG/DL (ref 74–99)
GLUCOSE BLD-MCNC: 96 MG/DL (ref 74–99)
HCO3: 33.8 MMOL/L (ref 22–26)
HCT VFR BLD CALC: 23.7 % (ref 37–54)
HEMOGLOBIN: 7.7 G/DL (ref 12.5–16.5)
HHB: 5 % (ref 0–5)
IMMATURE GRANULOCYTES #: 0.03 E9/L
IMMATURE GRANULOCYTES %: 0.5 % (ref 0–5)
INR BLD: 1.5
IRON SATURATION: 17 % (ref 20–55)
IRON: 24 MCG/DL (ref 59–158)
LAB: ABNORMAL
LYMPHOCYTES ABSOLUTE: 1.28 E9/L (ref 1.5–4)
LYMPHOCYTES RELATIVE PERCENT: 22.6 % (ref 20–42)
Lab: ABNORMAL
MAGNESIUM: 1.4 MG/DL (ref 1.6–2.6)
MAGNESIUM: 1.7 MG/DL (ref 1.6–2.6)
MAGNESIUM: 1.8 MG/DL (ref 1.6–2.6)
MCH RBC QN AUTO: 29.2 PG (ref 26–35)
MCHC RBC AUTO-ENTMCNC: 32.5 % (ref 32–34.5)
MCV RBC AUTO: 89.8 FL (ref 80–99.9)
METER GLUCOSE: 101 MG/DL (ref 74–99)
METER GLUCOSE: 110 MG/DL (ref 74–99)
METER GLUCOSE: 90 MG/DL (ref 74–99)
METER GLUCOSE: 94 MG/DL (ref 74–99)
METER GLUCOSE: 99 MG/DL (ref 74–99)
METHB: 0.4 % (ref 0–1.5)
MODE: ABNORMAL
MONOCYTES ABSOLUTE: 0.57 E9/L (ref 0.1–0.95)
MONOCYTES RELATIVE PERCENT: 10.1 % (ref 2–12)
NEUTROPHILS ABSOLUTE: 3.48 E9/L (ref 1.8–7.3)
NEUTROPHILS RELATIVE PERCENT: 61.3 % (ref 43–80)
O2 SATURATION: 96.5 % (ref 92–98.5)
O2HB: 93.4 % (ref 94–97)
OPERATOR ID: 2863
PATIENT TEMP: 37 C
PCO2: 50.5 MMHG (ref 35–45)
PDW BLD-RTO: 14.7 FL (ref 11.5–15)
PH BLOOD GAS: 7.44 (ref 7.35–7.45)
PHOSPHORUS: 4.6 MG/DL (ref 2.5–4.5)
PHOSPHORUS: 5.1 MG/DL (ref 2.5–4.5)
PLATELET # BLD: 149 E9/L (ref 130–450)
PMV BLD AUTO: 10.1 FL (ref 7–12)
PO2: 83.5 MMHG (ref 75–100)
POTASSIUM SERPL-SCNC: 3 MMOL/L (ref 3.5–5)
POTASSIUM SERPL-SCNC: 3.5 MMOL/L (ref 3.5–5)
POTASSIUM SERPL-SCNC: 3.5 MMOL/L (ref 3.5–5)
PRO-BNP: 4335 PG/ML (ref 0–125)
PROTHROMBIN TIME: 16.4 SEC (ref 9.3–12.4)
RBC # BLD: 2.64 E12/L (ref 3.8–5.8)
SODIUM BLD-SCNC: 144 MMOL/L (ref 132–146)
SODIUM BLD-SCNC: 144 MMOL/L (ref 132–146)
SODIUM BLD-SCNC: 146 MMOL/L (ref 132–146)
SOURCE, BLOOD GAS: ABNORMAL
THB: 8.5 G/DL (ref 11.5–16.5)
TIME ANALYZED: 450
TOTAL IRON BINDING CAPACITY: 139 MCG/DL (ref 250–450)
TOTAL PROTEIN: 5.3 G/DL (ref 6.4–8.3)
VITAMIN B-12: 753 PG/ML (ref 211–946)
WBC # BLD: 5.7 E9/L (ref 4.5–11.5)

## 2022-10-18 PROCEDURE — 82728 ASSAY OF FERRITIN: CPT

## 2022-10-18 PROCEDURE — 3609010600 HC COLONOSCOPY POLYPECTOMY SNARE/COLD BIOPSY: Performed by: STUDENT IN AN ORGANIZED HEALTH CARE EDUCATION/TRAINING PROGRAM

## 2022-10-18 PROCEDURE — 2580000003 HC RX 258: Performed by: STUDENT IN AN ORGANIZED HEALTH CARE EDUCATION/TRAINING PROGRAM

## 2022-10-18 PROCEDURE — 2500000003 HC RX 250 WO HCPCS: Performed by: NURSE PRACTITIONER

## 2022-10-18 PROCEDURE — 88305 TISSUE EXAM BY PATHOLOGIST: CPT

## 2022-10-18 PROCEDURE — 6360000002 HC RX W HCPCS: Performed by: NURSE PRACTITIONER

## 2022-10-18 PROCEDURE — 2500000003 HC RX 250 WO HCPCS: Performed by: INTERNAL MEDICINE

## 2022-10-18 PROCEDURE — 94640 AIRWAY INHALATION TREATMENT: CPT

## 2022-10-18 PROCEDURE — 2580000003 HC RX 258: Performed by: INTERNAL MEDICINE

## 2022-10-18 PROCEDURE — 2580000003 HC RX 258

## 2022-10-18 PROCEDURE — 80053 COMPREHEN METABOLIC PANEL: CPT

## 2022-10-18 PROCEDURE — 82805 BLOOD GASES W/O2 SATURATION: CPT

## 2022-10-18 PROCEDURE — 6360000002 HC RX W HCPCS: Performed by: ANESTHESIOLOGIST ASSISTANT

## 2022-10-18 PROCEDURE — 82330 ASSAY OF CALCIUM: CPT

## 2022-10-18 PROCEDURE — 3700000001 HC ADD 15 MINUTES (ANESTHESIA): Performed by: STUDENT IN AN ORGANIZED HEALTH CARE EDUCATION/TRAINING PROGRAM

## 2022-10-18 PROCEDURE — 6360000002 HC RX W HCPCS: Performed by: INTERNAL MEDICINE

## 2022-10-18 PROCEDURE — 85730 THROMBOPLASTIN TIME PARTIAL: CPT

## 2022-10-18 PROCEDURE — 99291 CRITICAL CARE FIRST HOUR: CPT | Performed by: INTERNAL MEDICINE

## 2022-10-18 PROCEDURE — 84100 ASSAY OF PHOSPHORUS: CPT

## 2022-10-18 PROCEDURE — 83735 ASSAY OF MAGNESIUM: CPT

## 2022-10-18 PROCEDURE — 6370000000 HC RX 637 (ALT 250 FOR IP): Performed by: STUDENT IN AN ORGANIZED HEALTH CARE EDUCATION/TRAINING PROGRAM

## 2022-10-18 PROCEDURE — 2700000000 HC OXYGEN THERAPY PER DAY

## 2022-10-18 PROCEDURE — 80048 BASIC METABOLIC PNL TOTAL CA: CPT

## 2022-10-18 PROCEDURE — 3700000000 HC ANESTHESIA ATTENDED CARE: Performed by: STUDENT IN AN ORGANIZED HEALTH CARE EDUCATION/TRAINING PROGRAM

## 2022-10-18 PROCEDURE — 85610 PROTHROMBIN TIME: CPT

## 2022-10-18 PROCEDURE — 82746 ASSAY OF FOLIC ACID SERUM: CPT

## 2022-10-18 PROCEDURE — 6370000000 HC RX 637 (ALT 250 FOR IP): Performed by: SURGERY

## 2022-10-18 PROCEDURE — 83880 ASSAY OF NATRIURETIC PEPTIDE: CPT

## 2022-10-18 PROCEDURE — 0DBK8ZZ EXCISION OF ASCENDING COLON, VIA NATURAL OR ARTIFICIAL OPENING ENDOSCOPIC: ICD-10-PCS | Performed by: STUDENT IN AN ORGANIZED HEALTH CARE EDUCATION/TRAINING PROGRAM

## 2022-10-18 PROCEDURE — 36415 COLL VENOUS BLD VENIPUNCTURE: CPT

## 2022-10-18 PROCEDURE — 2580000003 HC RX 258: Performed by: SURGERY

## 2022-10-18 PROCEDURE — 2500000003 HC RX 250 WO HCPCS

## 2022-10-18 PROCEDURE — A4216 STERILE WATER/SALINE, 10 ML: HCPCS | Performed by: SURGERY

## 2022-10-18 PROCEDURE — 2580000003 HC RX 258: Performed by: ANESTHESIOLOGIST ASSISTANT

## 2022-10-18 PROCEDURE — 82607 VITAMIN B-12: CPT

## 2022-10-18 PROCEDURE — 0DBN8ZZ EXCISION OF SIGMOID COLON, VIA NATURAL OR ARTIFICIAL OPENING ENDOSCOPIC: ICD-10-PCS | Performed by: STUDENT IN AN ORGANIZED HEALTH CARE EDUCATION/TRAINING PROGRAM

## 2022-10-18 PROCEDURE — 82962 GLUCOSE BLOOD TEST: CPT

## 2022-10-18 PROCEDURE — 83540 ASSAY OF IRON: CPT

## 2022-10-18 PROCEDURE — 6370000000 HC RX 637 (ALT 250 FOR IP): Performed by: NURSE PRACTITIONER

## 2022-10-18 PROCEDURE — 6370000000 HC RX 637 (ALT 250 FOR IP): Performed by: INTERNAL MEDICINE

## 2022-10-18 PROCEDURE — 2580000003 HC RX 258: Performed by: NURSE PRACTITIONER

## 2022-10-18 PROCEDURE — C9113 INJ PANTOPRAZOLE SODIUM, VIA: HCPCS | Performed by: SURGERY

## 2022-10-18 PROCEDURE — 94660 CPAP INITIATION&MGMT: CPT

## 2022-10-18 PROCEDURE — 2709999900 HC NON-CHARGEABLE SUPPLY: Performed by: STUDENT IN AN ORGANIZED HEALTH CARE EDUCATION/TRAINING PROGRAM

## 2022-10-18 PROCEDURE — 83550 IRON BINDING TEST: CPT

## 2022-10-18 PROCEDURE — 85025 COMPLETE CBC W/AUTO DIFF WBC: CPT

## 2022-10-18 PROCEDURE — 71045 X-RAY EXAM CHEST 1 VIEW: CPT

## 2022-10-18 PROCEDURE — 6360000002 HC RX W HCPCS: Performed by: SURGERY

## 2022-10-18 PROCEDURE — 2000000000 HC ICU R&B

## 2022-10-18 RX ORDER — MAGNESIUM SULFATE 1 G/100ML
1000 INJECTION INTRAVENOUS ONCE
Status: COMPLETED | OUTPATIENT
Start: 2022-10-18 | End: 2022-10-18

## 2022-10-18 RX ORDER — SODIUM CHLORIDE 9 MG/ML
25 INJECTION, SOLUTION INTRAVENOUS PRN
Status: DISCONTINUED | OUTPATIENT
Start: 2022-10-18 | End: 2022-10-19 | Stop reason: SDUPTHER

## 2022-10-18 RX ORDER — POTASSIUM CHLORIDE 7.45 MG/ML
10 INJECTION INTRAVENOUS
Status: COMPLETED | OUTPATIENT
Start: 2022-10-18 | End: 2022-10-19

## 2022-10-18 RX ORDER — SODIUM CHLORIDE 9 MG/ML
INJECTION, SOLUTION INTRAVENOUS CONTINUOUS PRN
Status: DISCONTINUED | OUTPATIENT
Start: 2022-10-18 | End: 2022-10-18 | Stop reason: SDUPTHER

## 2022-10-18 RX ORDER — SODIUM CHLORIDE 0.9 % (FLUSH) 0.9 %
5-40 SYRINGE (ML) INJECTION PRN
Status: DISCONTINUED | OUTPATIENT
Start: 2022-10-18 | End: 2022-10-26 | Stop reason: HOSPADM

## 2022-10-18 RX ORDER — MAGNESIUM SULFATE IN WATER 40 MG/ML
2000 INJECTION, SOLUTION INTRAVENOUS ONCE
Status: COMPLETED | OUTPATIENT
Start: 2022-10-18 | End: 2022-10-18

## 2022-10-18 RX ORDER — POTASSIUM CHLORIDE 7.45 MG/ML
10 INJECTION INTRAVENOUS
Status: COMPLETED | OUTPATIENT
Start: 2022-10-18 | End: 2022-10-18

## 2022-10-18 RX ORDER — SODIUM CHLORIDE 0.9 % (FLUSH) 0.9 %
5-40 SYRINGE (ML) INJECTION EVERY 12 HOURS SCHEDULED
Status: DISCONTINUED | OUTPATIENT
Start: 2022-10-18 | End: 2022-10-19 | Stop reason: SDUPTHER

## 2022-10-18 RX ORDER — PROPOFOL 10 MG/ML
INJECTION, EMULSION INTRAVENOUS PRN
Status: DISCONTINUED | OUTPATIENT
Start: 2022-10-18 | End: 2022-10-18 | Stop reason: SDUPTHER

## 2022-10-18 RX ADMIN — HALOPERIDOL 3 MG: 1 TABLET ORAL at 08:57

## 2022-10-18 RX ADMIN — POLYETHYLENE GLYCOL 3350, SODIUM SULFATE ANHYDROUS, SODIUM BICARBONATE, SODIUM CHLORIDE, POTASSIUM CHLORIDE 2000 ML: 236; 22.74; 6.74; 5.86; 2.97 POWDER, FOR SOLUTION ORAL at 00:31

## 2022-10-18 RX ADMIN — SENNOSIDES 5 ML: 8.8 SYRUP ORAL at 20:17

## 2022-10-18 RX ADMIN — EZETIMIBE 10 MG: 10 TABLET ORAL at 08:53

## 2022-10-18 RX ADMIN — CLONIDINE HYDROCHLORIDE 0.1 MG: 0.1 TABLET ORAL at 20:12

## 2022-10-18 RX ADMIN — MICONAZOLE NITRATE: 2 OINTMENT TOPICAL at 20:18

## 2022-10-18 RX ADMIN — DEXMEDETOMIDINE 0.5 MCG/KG/HR: 100 INJECTION, SOLUTION, CONCENTRATE INTRAVENOUS at 08:49

## 2022-10-18 RX ADMIN — MORPHINE SULFATE 1 MG: 2 INJECTION, SOLUTION INTRAMUSCULAR; INTRAVENOUS at 02:29

## 2022-10-18 RX ADMIN — IPRATROPIUM BROMIDE AND ALBUTEROL SULFATE 1 AMPULE: .5; 2.5 SOLUTION RESPIRATORY (INHALATION) at 21:11

## 2022-10-18 RX ADMIN — MICONAZOLE NITRATE: 2 OINTMENT TOPICAL at 20:16

## 2022-10-18 RX ADMIN — PROPOFOL 550 MG: 10 INJECTION, EMULSION INTRAVENOUS at 15:44

## 2022-10-18 RX ADMIN — Medication 10 MEQ: at 23:40

## 2022-10-18 RX ADMIN — GABAPENTIN 600 MG: 300 CAPSULE ORAL at 08:57

## 2022-10-18 RX ADMIN — SODIUM CHLORIDE: 9 INJECTION, SOLUTION INTRAVENOUS at 15:35

## 2022-10-18 RX ADMIN — POTASSIUM CHLORIDE 10 MEQ: 7.46 INJECTION, SOLUTION INTRAVENOUS at 08:24

## 2022-10-18 RX ADMIN — CLONIDINE HYDROCHLORIDE 0.1 MG: 0.1 TABLET ORAL at 08:52

## 2022-10-18 RX ADMIN — CARBIDOPA AND LEVODOPA 1 TABLET: 25; 100 TABLET ORAL at 20:13

## 2022-10-18 RX ADMIN — POTASSIUM CHLORIDE 10 MEQ: 7.46 INJECTION, SOLUTION INTRAVENOUS at 09:12

## 2022-10-18 RX ADMIN — ASPIRIN 81 MG 81 MG: 81 TABLET ORAL at 08:50

## 2022-10-18 RX ADMIN — AMLODIPINE BESYLATE 10 MG: 10 TABLET ORAL at 09:00

## 2022-10-18 RX ADMIN — CARBIDOPA AND LEVODOPA 1 TABLET: 25; 100 TABLET ORAL at 13:44

## 2022-10-18 RX ADMIN — IPRATROPIUM BROMIDE AND ALBUTEROL SULFATE 1 AMPULE: .5; 2.5 SOLUTION RESPIRATORY (INHALATION) at 07:51

## 2022-10-18 RX ADMIN — GABAPENTIN 600 MG: 300 CAPSULE ORAL at 17:47

## 2022-10-18 RX ADMIN — MORPHINE SULFATE 1 MG: 2 INJECTION, SOLUTION INTRAMUSCULAR; INTRAVENOUS at 23:19

## 2022-10-18 RX ADMIN — ATORVASTATIN CALCIUM 80 MG: 40 TABLET, FILM COATED ORAL at 20:13

## 2022-10-18 RX ADMIN — SODIUM CHLORIDE, PRESERVATIVE FREE 40 MG: 5 INJECTION INTRAVENOUS at 02:29

## 2022-10-18 RX ADMIN — HALOPERIDOL 3 MG: 1 TABLET ORAL at 13:45

## 2022-10-18 RX ADMIN — CARBIDOPA AND LEVODOPA 1 TABLET: 25; 100 TABLET ORAL at 08:51

## 2022-10-18 RX ADMIN — QUETIAPINE 50 MG: 25 TABLET ORAL at 08:59

## 2022-10-18 RX ADMIN — POTASSIUM CHLORIDE 10 MEQ: 7.46 INJECTION, SOLUTION INTRAVENOUS at 10:13

## 2022-10-18 RX ADMIN — POTASSIUM BICARBONATE 40 MEQ: 782 TABLET, EFFERVESCENT ORAL at 07:23

## 2022-10-18 RX ADMIN — PETROLATUM: 420 OINTMENT TOPICAL at 09:03

## 2022-10-18 RX ADMIN — CALCIUM GLUCONATE 1000 MG: 98 INJECTION, SOLUTION INTRAVENOUS at 09:07

## 2022-10-18 RX ADMIN — PETROLATUM: 420 OINTMENT TOPICAL at 20:25

## 2022-10-18 RX ADMIN — SODIUM CHLORIDE, PRESERVATIVE FREE 10 ML: 5 INJECTION INTRAVENOUS at 08:59

## 2022-10-18 RX ADMIN — POTASSIUM CHLORIDE 10 MEQ: 7.46 INJECTION, SOLUTION INTRAVENOUS at 07:23

## 2022-10-18 RX ADMIN — HALOPERIDOL 3 MG: 1 TABLET ORAL at 20:15

## 2022-10-18 RX ADMIN — BUMETANIDE 1 MG/HR: 0.25 INJECTION, SOLUTION INTRAMUSCULAR; INTRAVENOUS at 20:47

## 2022-10-18 RX ADMIN — SODIUM CHLORIDE, PRESERVATIVE FREE 10 ML: 5 INJECTION INTRAVENOUS at 20:13

## 2022-10-18 RX ADMIN — SODIUM CHLORIDE, PRESERVATIVE FREE 40 MG: 5 INJECTION INTRAVENOUS at 14:46

## 2022-10-18 RX ADMIN — SODIUM CHLORIDE, PRESERVATIVE FREE 10 ML: 5 INJECTION INTRAVENOUS at 20:17

## 2022-10-18 RX ADMIN — IPRATROPIUM BROMIDE AND ALBUTEROL SULFATE 1 AMPULE: .5; 2.5 SOLUTION RESPIRATORY (INHALATION) at 11:48

## 2022-10-18 RX ADMIN — IPRATROPIUM BROMIDE AND ALBUTEROL SULFATE 1 AMPULE: .5; 2.5 SOLUTION RESPIRATORY (INHALATION) at 18:26

## 2022-10-18 RX ADMIN — MICONAZOLE NITRATE: 2 OINTMENT TOPICAL at 08:58

## 2022-10-18 RX ADMIN — ANTI-FUNGAL POWDER MICONAZOLE NITRATE TALC FREE: 1.42 POWDER TOPICAL at 20:16

## 2022-10-18 RX ADMIN — ANTI-FUNGAL POWDER MICONAZOLE NITRATE TALC FREE: 1.42 POWDER TOPICAL at 08:58

## 2022-10-18 RX ADMIN — BUMETANIDE 1 MG/HR: 0.25 INJECTION, SOLUTION INTRAMUSCULAR; INTRAVENOUS at 04:09

## 2022-10-18 RX ADMIN — MORPHINE SULFATE 1 MG: 2 INJECTION, SOLUTION INTRAMUSCULAR; INTRAVENOUS at 07:19

## 2022-10-18 RX ADMIN — Medication 10 MEQ: at 22:23

## 2022-10-18 RX ADMIN — GABAPENTIN 600 MG: 300 CAPSULE ORAL at 13:11

## 2022-10-18 RX ADMIN — QUETIAPINE 50 MG: 25 TABLET ORAL at 20:13

## 2022-10-18 RX ADMIN — MAGNESIUM SULFATE HEPTAHYDRATE 1000 MG: 1 INJECTION, SOLUTION INTRAVENOUS at 22:25

## 2022-10-18 RX ADMIN — GABAPENTIN 600 MG: 300 CAPSULE ORAL at 20:12

## 2022-10-18 RX ADMIN — MAGNESIUM SULFATE HEPTAHYDRATE 2000 MG: 40 INJECTION, SOLUTION INTRAVENOUS at 07:29

## 2022-10-18 ASSESSMENT — PAIN SCALES - GENERAL
PAINLEVEL_OUTOF10: 0
PAINLEVEL_OUTOF10: 0
PAINLEVEL_OUTOF10: 5
PAINLEVEL_OUTOF10: 5
PAINLEVEL_OUTOF10: 0
PAINLEVEL_OUTOF10: 6
PAINLEVEL_OUTOF10: 0

## 2022-10-18 ASSESSMENT — PAIN DESCRIPTION - PAIN TYPE: TYPE: CHRONIC PAIN

## 2022-10-18 ASSESSMENT — PAIN DESCRIPTION - ORIENTATION
ORIENTATION: RIGHT;LEFT
ORIENTATION: RIGHT

## 2022-10-18 ASSESSMENT — PAIN DESCRIPTION - DESCRIPTORS
DESCRIPTORS: ACHING;DISCOMFORT
DESCRIPTORS: ACHING
DESCRIPTORS: ACHING;DISCOMFORT

## 2022-10-18 ASSESSMENT — PAIN - FUNCTIONAL ASSESSMENT
PAIN_FUNCTIONAL_ASSESSMENT: ACTIVITIES ARE NOT PREVENTED
PAIN_FUNCTIONAL_ASSESSMENT: PREVENTS OR INTERFERES SOME ACTIVE ACTIVITIES AND ADLS

## 2022-10-18 ASSESSMENT — LIFESTYLE VARIABLES: SMOKING_STATUS: 0

## 2022-10-18 ASSESSMENT — PAIN DESCRIPTION - LOCATION
LOCATION: FOOT

## 2022-10-18 NOTE — PLAN OF CARE
Problem: Discharge Planning  Goal: Discharge to home or other facility with appropriate resources  Outcome: Progressing     Problem: Pain  Goal: Verbalizes/displays adequate comfort level or baseline comfort level  Outcome: Progressing     Problem: Skin/Tissue Integrity  Goal: Absence of new skin breakdown  Description: 1. Monitor for areas of redness and/or skin breakdown  2. Assess vascular access sites hourly  3. Every 4-6 hours minimum:  Change oxygen saturation probe site  4. Every 4-6 hours:  If on nasal continuous positive airway pressure, respiratory therapy assess nares and determine need for appliance change or resting period.   Outcome: Progressing     Problem: Safety - Adult  Goal: Free from fall injury  Outcome: Progressing     Problem: ABCDS Injury Assessment  Goal: Absence of physical injury  Outcome: Progressing     Problem: Chronic Conditions and Co-morbidities  Goal: Patient's chronic conditions and co-morbidity symptoms are monitored and maintained or improved  Outcome: Progressing     Problem: Neurosensory - Adult  Goal: Achieves stable or improved neurological status  Outcome: Progressing  Goal: Remains free of injury related to seizures activity  Outcome: Progressing  Goal: Achieves maximal functionality and self care  Outcome: Progressing     Problem: Respiratory - Adult  Goal: Achieves optimal ventilation and oxygenation  Outcome: Progressing     Problem: Cardiovascular - Adult  Goal: Maintains optimal cardiac output and hemodynamic stability  Outcome: Progressing  Goal: Absence of cardiac dysrhythmias or at baseline  Outcome: Progressing     Problem: Skin/Tissue Integrity - Adult  Goal: Skin integrity remains intact  Outcome: Progressing  Flowsheets (Taken 10/17/2022 2000)  Skin Integrity Remains Intact:   Monitor for areas of redness and/or skin breakdown   Assess vascular access sites hourly  Goal: Incisions, wounds, or drain sites healing without S/S of infection  Outcome: Progressing  Flowsheets (Taken 10/17/2022 2000)  Incisions, Wounds, or Drain Sites Healing Without Sign and Symptoms of Infection: TWICE DAILY: Assess and document skin integrity  Goal: Oral mucous membranes remain intact  Outcome: Progressing  Flowsheets (Taken 10/17/2022 2000)  Oral Mucous Membranes Remain Intact: Assess oral mucosa and hygiene practices     Problem: Metabolic/Fluid and Electrolytes - Adult  Goal: Electrolytes maintained within normal limits  Outcome: Progressing  Goal: Hemodynamic stability and optimal renal function maintained  Outcome: Progressing  Goal: Glucose maintained within prescribed range  Outcome: Progressing     Problem: Hematologic - Adult  Goal: Maintains hematologic stability  Outcome: Progressing     Problem: Nutrition Deficit:  Goal: Optimize nutritional status  Outcome: Progressing

## 2022-10-18 NOTE — PROGRESS NOTES
Hospitalist Progress Note      PCP: Anjana Ortiz MD    Date of Admission: 9/27/2022  Days in the hospital: [address] Course:   Patient is a 72-year-old male with history of CKD, diabetes mellitus, obstructive sleep apnea who initially presented to [facility] with strokelike symptoms and right-sided weakness along with aphasia. Patient was noted to have severe left ICA stenosis. He was transferred to [facility] on September 27 and patient underwent left ICA angioplasty and stent placement by IR. He was initially admitted to the neuro ICU. He was placed on dual antiplatelet therapy with Brilinta and aspirin. Also was noted to have GI bleed and received 15 units of PRBC. EGD was done on 10/3/2022 and no evidence of upper GI bleed noted. Colonoscopy on 10/9/2022 showed large amount of blood. CT of the abdomen did not show any localization of hemorrhage. Nuclear medicine scan was done which showed bleeding around the splenic flexure. RRT was called due to hypotension and hemorrhagic shock and patient was transferred to ICU. Due to diffuse bleeding patient was taken off dual antiplatelet therapy. He is planned for colonoscopy with GI once he has been adequately prepped. He had acute respiratory distress on 10/13/2022 and had to be placed on Bumex for volume overload. Subjective  Patient seen and examined at bedside. Patient is sedated. He is on oxygen high flow nasal cannula.  3 L/min. Exam:    BP (!) 132/49   Pulse 74   Temp 98 °F (36.7 °C) (Axillary)   Resp 14   Ht 5' 8\" (1.727 m)   Wt 225 lb 9.6 oz (102.3 kg)   SpO2 95%   BMI 34.30 kg/m²     HEENT: + Pallor, no icterus. Respiratory:  CTA, decreased air entry  Cardiovascular: RRR, no murmur. Anasarca  Abdomen: Soft, non-tender, BS noted. Musculoskeletal: Right foot dressing and wound VAC noted.    Neurologic: Sedated        Assessment/Plan:  Acute CVA with left ICA stenosis s/p angioplasty and stent placement, dual antiplatelet therapy on hold, continue serial neurochecks, follow-up with neurology    Acute lower GI bleed s/p multiple units of PRBC transfusion. Last one in October 13. Bleeding noted from splenic flexure. Plan for colonoscopy this afternoon     Acute hypoxic respiratory failure/distress, follow-up with critical care team, chest x-ray reviewed; clinically significantly better. Currently on oxygen high flow nasal cannula 3 L/min    Acute blood loss anemia status post 15 unit blood transfusion, H&H remains low but stable, monitor need for further transfusions. Last transfusion on October 13    Volume overload, currently on Bumex drip    Acute kidney injury, nephrology following; strict I/O. Creatinine 1.7, drifting up    Right foot nonhealing wound, on Cipro and Zyvox per ID, continue with local wound care, follow-up with podiatry    Obstructive sleep apnea on CPAP    Altered mental status secondary to metabolic encephalopathy, continue to monitor closely     Overall prognosis remains guarded    Plan  Decrease Bumex drip to 0.5 mg/h  Replete magnesium and potassium      Labs:   Recent Labs     10/16/22  0519 10/16/22  1124 10/16/22  1736 10/17/22  0409 10/18/22  0419   WBC 6.6  --   --  4.9 5.7   HGB 7.7*   < > 7.6* 8.1* 7.7*   HCT 23.5*   < > 22.9* 25.4* 23.7*   *  --   --  145 149    < > = values in this interval not displayed. Recent Labs     10/16/22  0519 10/16/22  1124 10/16/22  1736 10/17/22  0409 10/18/22  0419   *   < > 146 144 146   K 3.7   < > 3.5 3.5 3.0*      < > 100 98 96*   CO2 29   < > 29 33* 35*   BUN 24*   < > 22 20 20   CREATININE 1.4*   < > 1.4* 1.4* 1.7*   CALCIUM 7.7*   < > 8.2* 8.1* 8.1*   PHOS 3.7  --   --  4.6* 5.1*    < > = values in this interval not displayed.      Recent Labs     10/16/22  0519 10/17/22  0409 10/18/22  0419   AST 27 23 24   ALT 10 10 <5   BILITOT 0.6 0.7 0.5   ALKPHOS 443* 403* 392*     Recent Labs     10/17/22  0408 10/18/22  0419   INR 1.4 1.5     No results for input(s): CKTOTAL, TROPONINI in the last 72 hours.     Medications:  Reviewed    Infusion Medications    sodium chloride      bumetanide 0.1 mg/mL infusion 0.5 mg/hr (10/18/22 1047)    dexmedetomidine (PRECEDEX) IV infusion 0.4 mcg/kg/hr (10/18/22 1047)    sodium chloride      sodium chloride      dextrose       Scheduled Medications    sodium chloride flush  5-40 mL IntraVENous 2 times per day    haloperidol  3 mg Oral TID    metoprolol succinate  50 mg Oral Daily    cloNIDine  0.1 mg Oral BID    aspirin  81 mg Per NG tube Daily    [Held by provider] clopidogrel  75 mg Oral Daily    QUEtiapine  50 mg Oral BID    sodium chloride flush  5-40 mL IntraVENous 2 times per day    miconazole nitrate   Topical BID    gabapentin  600 mg Oral 4x daily    amLODIPine  10 mg Oral Daily    white petrolatum   Topical BID    sodium chloride flush  5-40 mL IntraVENous 2 times per day    miconazole   Topical BID    epoetin sharath-epbx  6,000 Units SubCUTAneous Once per day on Mon Wed Fri    hydrALAZINE  50 mg Oral 3 times per day    insulin lispro  0-16 Units SubCUTAneous 4x Daily AC & HS    pantoprazole (PROTONIX) 40 mg injection  40 mg IntraVENous Q12H    sennosides  5 mL Oral Nightly    carbidopa-levodopa  1 tablet Per NG tube TID    ipratropium-albuterol  1 ampule Inhalation Q4H WA    collagenase   Topical Q MWF    benzonatate  100 mg Oral TID    ezetimibe  10 mg Oral Daily    [Held by provider] ticagrelor  90 mg Oral BID    atorvastatin  80 mg Oral Nightly     PRN Meds: sodium chloride flush, sodium chloride, morphine, hydrALAZINE, sodium chloride flush, sodium chloride, miconazole nitrate **AND** miconazole nitrate, sodium chloride flush, sodium chloride, labetalol, glucose, dextrose bolus **OR** dextrose bolus, glucagon (rDNA), dextrose      Intake/Output Summary (Last 24 hours) at 10/18/2022 1139  Last data filed at 10/18/2022 1000  Gross per 24 hour   Intake 1551.99 ml   Output 8005 ml   Net -6453.01 ml     Body mass index is 34.3 kg/m². Diet  Diet NPO Exceptions are: Sips of Water with Meds    Code Status  Full Code       Electronically signed by Felipe Alexis MD on 10/18/2022 at 11:39 AM  [employer]   Please contact me through perfect [employer]    NOTE: This report was transcribed using voice recognition software. Every effort was made to ensure accuracy; however, inadvertent computerized transcription errors may be present.

## 2022-10-18 NOTE — PROGRESS NOTES
Department of Podiatry   Progress Note      Mr. Marlen Pete was seen and evaluated at bedside this morning. No acute events to report. Patient is awake with sitter at bedside. Past Medical History:            Diagnosis Date    Chronic back pain     CKD (chronic kidney disease)     CVA (cerebral vascular accident) ([facility] [address]     CVA (cerebral vascular accident) ([facility] [address]     DM (diabetes mellitus) ([facility] [address]     Major depression     MI (myocardial infarction) ([facility] [address]     MATT (obstructive sleep apnea)     Parkinson disease ([facility] [address]     PVD (peripheral vascular disease) ([facility] [address]     Seizure ([facility] [address]        Past Surgical History:        Procedure Laterality Date    COLONOSCOPY N/A 10/9/2022    COLONOSCOPY DIAGNOSTIC performed by Jake Guerra MD at [place] [address]      IR CAROTID STENT UNI W PROTECTION  9/27/2022    IR CAROTID STENT UNI W PROTECTION 9/27/2022 Hesham Hart MD [facility]    UPPER GASTROINTESTINAL ENDOSCOPY N/A 10/3/2022    EGD DIAGNOSTIC ONLY performed by Marlee Kaufman MD at [facility] ENDOSCOPY       Medications Prior to Admission:    Medications Prior to Admission: apixaban (ELIQUIS) 5 MG TABS tablet, Take 5 mg by mouth 2 times daily  aspirin 81 MG chewable tablet, Take 81 mg by mouth daily  carbidopa-levodopa (SINEMET)  MG per tablet, Take 1 tablet by mouth 3 times daily  carvedilol (COREG) 25 MG tablet, Take 25 mg by mouth 2 times daily (with meals)  cetirizine (ZYRTEC) 10 MG tablet, Take 10 mg by mouth daily  clopidogrel (PLAVIX) 75 MG tablet, Take 75 mg by mouth daily  diphenhydrAMINE (BENADRYL) 25 MG capsule, Take 25 mg by mouth every 6 hours  ezetimibe (ZETIA) 10 MG tablet, Take 10 mg by mouth daily  fluticasone (FLONASE) 50 MCG/ACT nasal spray, 2 sprays by Each Nostril route daily  gabapentin (NEURONTIN) 600 MG tablet, Take 600 mg by mouth 4 times daily. HYDROcodone-acetaminophen (NORCO) 5-325 MG per tablet, Take 1 tablet by mouth 2 times daily.   Insulin Glargine, 2 Unit Dial, (TOUJEO MAX SOLOSTAR) 300 UNIT/ML SOPN, Inject 66 Units into the skin daily  piperacillin-tazobactam (ZOSYN) 3-0.375 GM per 50ML IVPB extended infusion, Infuse 4.5 mg intravenously in the morning and 4.5 mg at noon and 4.5 mg in the evening. acetaminophen (TYLENOL) 325 MG tablet, Take 650 mg by mouth every 6 hours as needed for Pain  amLODIPine (NORVASC) 5 MG tablet, Take 5 mg by mouth daily  benzonatate (TESSALON) 100 MG capsule, Take 100 mg by mouth 3 times daily  cloNIDine (CATAPRES) 0.1 MG tablet, Take 0.1 mg by mouth in the morning and 0.1 mg in the evening. hydrALAZINE (APRESOLINE) 100 MG tablet, Take 100 mg by mouth 3 times daily    Allergies:  Codeine    Social History:   TOBACCO:   has no history on file for tobacco use. ETOH:   has no history on file for alcohol use. DRUGS:   Social History     Substance and Sexual Activity   Drug Use Not on file       Family History:   History reviewed. No pertinent family history. REVIEW OF SYSTEMS:    All pertinent positives and negatives as noted in HPI       LOWER EXTREMITY EXAMINATION   -Dressings clean, dry and intact without dishevelment.   -Patient digits are warm to touch.   -Offloading pads noted    PHYSICAL EXAM AS OF 10/14/22:  VASCULAR:  DP and PT pulses are non palpable. CFT < 5 seconds B/L. Warm to warm from the tibial tuberosity to the distal aspect of the digits dorsally. NEUROLOGIC:  Protective sensation is diminished but grossly intact    DERM:  Right foot lateral plantar wound measuring approx 6cm in diameter. No erythema, serosanguinous drainage, no malodor.     MUSCULOSKELETAL: deferred    Picture as of 10/17/22        CONSULTS:  IP CONSULT TO CRITICAL CARE  IP CONSULT TO DIETITIAN  IP CONSULT TO CARDIOLOGY  IP CONSULT TO PODIATRY  IP CONSULT TO PODIATRY  IP CONSULT TO UROLOGY  IP CONSULT TO NEPHROLOGY  IP CONSULT TO GENERAL SURGERY  IP CONSULT TO INFECTIOUS DISEASES  IP CONSULT TO GI  IP CONSULT TO GENERAL SURGERY  IP CONSULT TO GENERAL SURGERY  IP CONSULT TO PALLIATIVE CARE    MEDICATION:  Scheduled Meds:   sodium chloride flush  5-40 mL IntraVENous 2 times per day    haloperidol  3 mg Oral TID    metoprolol succinate  50 mg Oral Daily    cloNIDine  0.1 mg Oral BID    aspirin  81 mg Per NG tube Daily    [Held by provider] clopidogrel  75 mg Oral Daily    QUEtiapine  50 mg Oral BID    sodium chloride flush  5-40 mL IntraVENous 2 times per day    miconazole nitrate   Topical BID    gabapentin  600 mg Oral 4x daily    amLODIPine  10 mg Oral Daily    white petrolatum   Topical BID    sodium chloride flush  5-40 mL IntraVENous 2 times per day    miconazole   Topical BID    epoetin sharath-epbx  6,000 Units SubCUTAneous Once per day on Mon Wed Fri    hydrALAZINE  50 mg Oral 3 times per day    insulin lispro  0-16 Units SubCUTAneous 4x Daily AC & HS    pantoprazole (PROTONIX) 40 mg injection  40 mg IntraVENous Q12H    sennosides  5 mL Oral Nightly    carbidopa-levodopa  1 tablet Per NG tube TID    ipratropium-albuterol  1 ampule Inhalation Q4H WA    collagenase   Topical Q MWF    benzonatate  100 mg Oral TID    ezetimibe  10 mg Oral Daily    [Held by provider] ticagrelor  90 mg Oral BID    atorvastatin  80 mg Oral Nightly     Continuous Infusions:   sodium chloride      bumetanide 0.1 mg/mL infusion 0.5 mg/hr (10/18/22 1047)    dexmedetomidine (PRECEDEX) IV infusion 0.4 mcg/kg/hr (10/18/22 1047)    sodium chloride      sodium chloride      dextrose       PRN Meds:.sodium chloride flush, sodium chloride, morphine, hydrALAZINE, sodium chloride flush, sodium chloride, miconazole nitrate **AND** miconazole nitrate, sodium chloride flush, sodium chloride, labetalol, glucose, dextrose bolus **OR** dextrose bolus, glucagon (rDNA), dextrose    RADIOLOGY:  XR CHEST PORTABLE   Final Result   No interval change         US DUP LOWER EXTREMITIES BILATERAL VENOUS   Final Result   Within the visualized vessels there is no evidence for deep venous   thrombosis XR CHEST PORTABLE   Final Result   Worsening congestive heart failure suspected         XR CHEST PORTABLE   Final Result   Very minimal improvement seen in the aeration of the upper lung fields with   stable increased markings seen within the mid and lower lung fields   bilaterally with small bilateral pleural effusions. Heart remains enlarged. XR CHEST PORTABLE   Final Result   New right pleural effusion. XR CHEST PORTABLE   Final Result   Stable bilateral pulmonary infiltrates. XR ABDOMEN (KUB) (SINGLE AP VIEW)   Final Result   Catheter is in the stomach. XR ABDOMEN FOR NG/OG/NE TUBE PLACEMENT   Final Result   1. Gastric catheter is coiled in the pharynx and upper esophagus and should   be withdrawn and reinserted. Follow-up imaging recommended. XR CHEST PORTABLE   Final Result   Bilateral airspace disease with interval progression on the right. NM GI BLOOD LOSS   Final Result   No evidence of active GI bleeding during acquisition. XR CHEST PORTABLE   Final Result   Unchanged bilateral chest opacities with right pleural effusion evident. See   above. XR CHEST PORTABLE   Final Result   1. Persistent bilateral patchy parenchymal densities concerning for pneumonia   and or atelectasis   2. Persistent small bilateral pleural effusions, left greater than right   3. Cardiomegaly, stable         XR CHEST PORTABLE   Final Result   No significant change compared to the prior exam with cardiomegaly, bilateral   airspace opacities and bilateral pleural effusions seen most suggestive of   congestive heart failure, but superimposed pneumonia not excluded. US DUP ABD PEL RETRO SCROT COMPLETE   Final Result   1. No evidence of testicular torsion      2. Severe scrotal skin thickening. 3.  Trace hydroceles. 4.  No evidence of bowel containing inguinal hernia. US SCROTUM AND TESTICLES   Final Result   1.   No evidence of testicular torsion      2. Severe scrotal skin thickening. 3.  Trace hydroceles. 4.  No evidence of bowel containing inguinal hernia. XR CHEST PORTABLE   Final Result   1. No significant interval change in the appearance of the chest.      2.  Bilateral pleural effusions and areas of atelectasis or multifocal   pneumonia. CTA ABDOMEN PELVIS W CONTRAST   Final Result   Mixed densities throughout the colonic segments including in the ascending   colon and rectum could represent mixed densities of blood products although   no focal area of arterial enhancement or extravasation/blushing is observed   to localize acute hemorrhage. No evidence for perforation or abscess. No   pneumatosis intestinalis or portal venous gas evident. 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 No adjacent hematoma with only minimal   adjacent stranding. Bilateral pleural effusions moderate to large right and moderate left   effusions with adjacent atelectasis. Anasarca. XR CHEST PORTABLE   Final Result   Bilateral pleural effusions with bibasilar patchy infiltrates and mild   cardiomegaly. NM GI BLOOD LOSS   Final Result   Active GI bleeding identified during acquisition in the splenic flexure with   peristalsis identified into the proximal descending colon. RECOMMENDATIONS:   Unavailable         CT ABDOMEN PELVIS WO CONTRAST   Final Result   CHEST:      No evidence of neoplastic disease, allowing for limitations of noncontrast   technique. Moderate bilateral pleural effusions. Nonemergent incidental findings as above. ABDOMEN/PELVIS:      No evidence of neoplastic disease, allowing for limitations of noncontrast   technique.       Bladder wall thickening is nonspecific given under distension; recommend   correlation urinalysis to evaluate for UTI. Nonemergent incidental findings as above. CT CHEST WO CONTRAST   Final Result   CHEST:      No evidence of neoplastic disease, allowing for limitations of noncontrast   technique. Moderate bilateral pleural effusions. Nonemergent incidental findings as above. ABDOMEN/PELVIS:      No evidence of neoplastic disease, allowing for limitations of noncontrast   technique. Bladder wall thickening is nonspecific given under distension; recommend   correlation urinalysis to evaluate for UTI. Nonemergent incidental findings as above. FL MODIFIED BARIUM SWALLOW W VIDEO   Final Result   1. Mildly impaired swallowing mechanism with swallowing delay and followed   by laryngeal penetration with thin liquid barium when using a straw. No   barium aspiration      2. Please see separate speech pathology report for full discussion of   findings and recommendations. RECOMMENDATIONS:   Unavailable         XR ABDOMEN (KUB) (SINGLE AP VIEW)   Final Result   Somewhat greater than average quantity of retained fecal material thin the   lower GI tract suggesting dysfunction with evacuation. XR CHEST PORTABLE   Final Result   1. Atherosclerotic disease and mild cardiomegaly. 2.  Persistent but improved opacities in the bilateral lungs most pronounced   in the mid and lower lungs. There appears to be a small left and trace right   pleural effusion         XR FOOT RIGHT (2 VIEWS)   Final Result   No evidence of calcaneal osteomyelitis. CT HEAD WO CONTRAST   Final Result   Parenchymal volume loss and chronic microvascular ischemic changes. No acute intracranial abnormality. Ethmoid and sphenoid sinusitis. Right mastoid effusion. US RETROPERITONEAL COMPLETE   Final Result   1. Normal appearance of the bilateral kidneys. No hydronephrosis. 2.  A Mahan catheter is decompressing the bladder.          XR CHEST PORTABLE   Final Result   Worsening infiltrate and or atelectasis on the left, stable on the right with   suspected layering pleural effusion. XR CHEST PORTABLE   Final Result   Unchanged bilateral atelectasis and or infiltrate as well as small right   pleural effusion. MRI BRAIN WO CONTRAST   Final Result   There are 2 small regions of petechial infarcts one in the left   posterior frontal lobe and a second in the left parietal lobe not   exceeding 3 mm. There is otherwise extensive small vessel ischemic   changes         XR CHEST PORTABLE   Final Result   1. Multifocal bilateral airspace disease more prominent within the lower   lobes. The airspace disease is unchanged when compared with the prior study. CT HEAD WO CONTRAST   Final Result   Diffuse atrophy likely age related   Findings compatible with small vessel ischemic changes. XR CHEST PORTABLE   Final Result   1. Pulmonary opacities present favoring edema and atelectasis, also small   pleural effusions, but nonspecific with some additional considerations noted   above   2. Support devices present as described above   3. Heart size appears borderline enlarged         XR ABDOMEN FOR NG/OG/NE TUBE PLACEMENT   Final Result   NG/OG is in the stomach. IR CAROTID STENT W PROTECTION   Final Result   1. Angiogram demonstrates successful placement of a carotid stent ,   post procedure images demonstrate a widely patent stent and internal   carotid         CT HEAD WO CONTRAST   Final Result   Diffuse atrophy likely age related   Findings compatible with small vessel ischemic changes. Findings were called at the time of dictation         CT BRAIN PERFUSION   Final Result      No significant ischemic penumbra identified      This study was analyzed by the [employer] ai algorithm. CTA NECK W CONTRAST   Final Result   1.  Estimated stenosis of the proximal right and left internal carotid   artery by NASCET criteria is greater than 90% on the left   2. Severe atherosclerotic disease . 3. No large vessel occlusion identified            This study was analyzed by the [employer]. ai algorithm. CTA HEAD W CONTRAST   Final Result   1. Estimated stenosis of the proximal right and left internal carotid   artery by NASCET criteria is greater than 90% on the left   2. Severe atherosclerotic disease . 3. No large vessel occlusion identified            This study was analyzed by the [employer]. ai algorithm. Vitals:    BP (!) 132/49   Pulse 74   Temp 98 °F (36.7 °C) (Axillary)   Resp 14   Ht 5' 8\" (1.727 m)   Wt 225 lb 9.6 oz (102.3 kg)   SpO2 95%   BMI 34.30 kg/m²     LABS:   Recent Labs     10/17/22  0409 10/18/22  0419   WBC 4.9 5.7   HGB 8.1* 7.7*   HCT 25.4* 23.7*    149     Recent Labs     10/18/22  0419      K 3.0*   CL 96*   CO2 35*   PHOS 5.1*   BUN 20   CREATININE 1.7*     Recent Labs     10/17/22  0409 10/18/22  0419   PROT 5.3* 5.3*   INR 1.4 1.5   APTT  --  37.3*       ASSESSMENTS:   1. Right foot wound diabetic ulcer  2. DM  3. Pain right foot  Acute cerebrovascular accident (CVA) ([facility] [address]       PLAN:  -Patient examined and evaluated at bedside. All pertinent labs, charts, and imaging reviewed prior to encounter   -Antibiotics as per ID: Stopped  -Culture: Corynebacteria  -Continue Wound vac changes to RLE:MWF Dressing changes. Wound vac noted today with good seal, running at 125mmHg with minimal debris in cannister.   -X rays: No acute osseous abnormalities to foot   -US: No evidence of DVT  -Will continue conservative care for now.    -Patient discussed with Dr. Antonio Lopez  -Continue offloading

## 2022-10-18 NOTE — BRIEF OP NOTE
Brief Postoperative Note      Patient: Amanda Pascal  YOB: 1955  MRN: [id number]    Date of Procedure: 10/18/2022    Pre-Op Diagnosis: GI bleeding [K92.2]    Post-Op Diagnosis: Colon polyps, No high risk lesions       Procedure(s):  COLONOSCOPY POLYPECTOMY SNARE/COLD BIOPSY    Surgeon(s):  Ruel Romero MD    Assistant:  * No surgical staff found *    Anesthesia: Monitor Anesthesia Care    Estimated Blood Loss (mL): Minimal    Complications: None    Specimens:   ID Type Source Tests Collected by Time Destination   A : Biopsy Multiple colon polyps Tissue Colon SURGICAL PATHOLOGY Ruel Romero MD 10/18/2022 1611        Implants:  * No implants in log *      Drains:   Negative Pressure Wound Therapy Foot Right;Plantar;Lateral (Active)   $ Standard NPWT <=50 sq cm PER TX $ Yes 09/28/22 1040   Wound Type Diabetic foot ulcer 10/18/22 1400   Dressing Type Black Foam 10/18/22 1400   Number of pieces used 2 10/18/22 1400   Number of pieces removed 3 10/18/22 1400   Cycle Continuous 10/18/22 1400   Target Pressure (mmHg) 125 10/18/22 1400   Canister changed?  No 10/18/22 1400   Dressing Status Clean, dry & intact 10/18/22 1400   Dressing Changed Other (Comment) 10/13/22 0600   Drainage Amount Small 10/15/22 1800   Drainage Description Serosanguinous 10/18/22 1400   Dressing Change Due 10/17/22 10/18/22 1400   Output (ml) 0 ml 10/14/22 2301   Wound Assessment Other (Comment) 10/18/22 1400   Emily-wound Assessment Other (Comment) 10/18/22 1400       NG/OG/NJ/NE Tube Nasogastric 16 fr Right nostril (Active)   Surrounding Skin Clean, dry & intact 10/18/22 1400   Securement device Bridle 10/18/22 1400   Status Other (Comment) 10/18/22 1400   Placement Verified X-Ray (Initial) 10/18/22 1400   NG/OG/NJ/NE External Measurement (cm) 60 cm 10/18/22 1400   Tube Feeding Intake (mL) 350 ml 10/15/22 0800   Free Water/Flush (mL) 1000 mL 10/17/22 1300       Urinary Catheter 09/29/22 Mahan (Active)   $ Urethral catheter insertion $ Not inserted for procedure 10/17/22 2000   Catheter Indications Need for fluid volume management of the critically ill patient in a critical care setting 10/18/22 1400   Site Assessment Edema 10/18/22 1400   Urine Color Yellow;Diluted 10/18/22 1400   Urine Appearance Clear 10/18/22 1400   Collection Container Standard 10/18/22 1400   Securement Method Securing device (Describe) 10/18/22 1400   Catheter Care Completed Yes 10/17/22 2000   Catheter Best Practices  Drainage tube clipped to bed;Catheter secured to thigh; Bag below bladder;Bag not on floor; Lack of dependent loop in tubing;Drainage bag less than half full 10/18/22 1400   Status Draining 10/18/22 1400   Output (mL) 300 mL 10/18/22 1400       Fecal Management System 10/13/22 (Active)   Stool Appearance Watery 10/18/22 1400   Stool Color Yellow 10/18/22 1400   Position of Indicator Line Visible 10/18/22 1400   Balloon Volume Verified Yes 10/18/22 0600   Skin Assessment of the Anal Area Treatment with Zinc Oxide cream 10/18/22 1400   Tube Irrigated Yes 10/18/22 1400   Irrigation Volume (mL) 30 10/18/22 1400   Stool Amount Large 10/18/22 1400   Fecal Management Tube Output 500 ml 10/18/22 1400       [REMOVED] NG/OG/NJ/NE Tube Orogastric 16 fr Right mouth (Removed)   Surrounding Skin Clean, dry & intact 09/29/22 0800   Securement device Tape 09/29/22 0800   Status Continuous feeding 09/29/22 0800   Placement Verified External Catheter Length 09/29/22 0800   NG/OG/NJ/NE External Measurement (cm) 52 cm 09/29/22 0800   Drainage Appearance Bile 09/28/22 1600   Tube Feeding High Protein 09/29/22 0800   Tube feeding/verify rate (mL/hr) 55 mL/hr 09/29/22 0600   Tube Feeding Intake (mL) 277 ml 09/29/22 0600   Free Water/Flush (mL) 30 mL 09/29/22 0600   Residual Volume (ml) 40 ml 09/29/22 0500       [REMOVED] Urinary Catheter 09/27/22 (Removed)   Catheter Indications Need for fluid volume management of the critically ill patient in a critical care setting 09/29/22 1000 Site Assessment No urethral drainage 09/29/22 1000   Urine Color Shana 09/29/22 1000   Urine Appearance Clear 09/29/22 1000   Collection Container Standard 09/29/22 1000   Securement Method Leg strap 09/29/22 1000   Catheter Care Completed Yes 09/28/22 2000   Catheter Best Practices  Drainage tube clipped to bed 09/29/22 1000   Status Draining 09/29/22 1000   Output (mL) 100 mL 09/29/22 1400       [REMOVED] Fecal Management System 10/08/22 (Removed)   Stool Appearance Watery 10/09/22 0000   Stool Color Black; Brown 10/09/22 0000   Position of Indicator Line Visible 10/09/22 0000   Balloon Volume Verified Yes 10/09/22 0000   Skin Assessment of the Anal Area Unremarkable 10/09/22 0000   Tube Irrigated Yes 10/09/22 0000   Irrigation Volume (mL) 10 10/09/22 0000   Stool Amount Large 10/09/22 0000   Fecal Management Tube Output 1000 ml 10/09/22 0200       Findings:     Normal terminal ileum. Roughly 20cm examined with no inflammation, masses, ulcers, or AVMs appreciated  Normal colon. No active inflammation, masses, ulcers, or AVMs appreciated. Several polyps 3-7mm in size resected with cold snare. Small internal hemorrhoids. No external hemorrhoids. PLAN:  - Continue daily aspirin for roughly one week. - If Hgb stays stable with no signs of active bleeding, consider re-introducing second anti-platelet agent.   - Okay to begin tube feeds.        Electronically signed by Rossi Brown MD on 10/18/2022 at 5:12 PM

## 2022-10-18 NOTE — PROGRESS NOTES
Nephrology Progress Note  10/18/2022 9:32 AM  Subjective:   Admit Date: 9/27/2022  PCP: Lexus Serrano MD  Interval History:   10/18/22:for repeat colonoscopy today    Diet: Diet NPO Exceptions are: Sips of Water with Meds    Data:   Scheduled Meds:   calcium gluconate IVPB  1,000 mg IntraVENous Once    potassium chloride  10 mEq IntraVENous Q1H    sodium chloride flush  5-40 mL IntraVENous 2 times per day    haloperidol  3 mg Oral TID    metoprolol succinate  50 mg Oral Daily    cloNIDine  0.1 mg Oral BID    aspirin  81 mg Per NG tube Daily    [Held by provider] clopidogrel  75 mg Oral Daily    QUEtiapine  50 mg Oral BID    sodium chloride flush  5-40 mL IntraVENous 2 times per day    miconazole nitrate   Topical BID    gabapentin  600 mg Oral 4x daily    amLODIPine  10 mg Oral Daily    white petrolatum   Topical BID    sodium chloride flush  5-40 mL IntraVENous 2 times per day    miconazole   Topical BID    epoetin sharath-epbx  6,000 Units SubCUTAneous Once per day on Mon Wed Fri    hydrALAZINE  50 mg Oral 3 times per day    insulin lispro  0-16 Units SubCUTAneous 4x Daily AC & HS    pantoprazole (PROTONIX) 40 mg injection  40 mg IntraVENous Q12H    sennosides  5 mL Oral Nightly    carbidopa-levodopa  1 tablet Per NG tube TID    ipratropium-albuterol  1 ampule Inhalation Q4H WA    collagenase   Topical Q MWF    benzonatate  100 mg Oral TID    ezetimibe  10 mg Oral Daily    [Held by provider] ticagrelor  90 mg Oral BID    atorvastatin  80 mg Oral Nightly     Continuous Infusions:   sodium chloride      bumetanide 0.1 mg/mL infusion 1 mg/hr (10/18/22 0409)    dexmedetomidine (PRECEDEX) IV infusion 0.5 mcg/kg/hr (10/18/22 0869)    sodium chloride      sodium chloride      dextrose       PRN Meds:sodium chloride flush, sodium chloride, morphine, hydrALAZINE, sodium chloride flush, sodium chloride, miconazole nitrate **AND** miconazole nitrate, sodium chloride flush, sodium chloride, labetalol, glucose, dextrose bolus **OR** dextrose bolus, glucagon (rDNA), dextrose  I/O last 3 completed shifts: In: 7514.4 [I.V.:581; CY/VQ:7078; IV Piggyback:291.4]  Out: 30293 [Urine:20894; Stool:5000]  I/O this shift:  In: -   Out: 550 [Urine:550]    Intake/Output Summary (Last 24 hours) at 10/18/2022 0932  Last data filed at 10/18/2022 0900  Gross per 24 hour   Intake 1551.99 ml   Output 8305 ml   Net -6753.01 ml     CBC:   Recent Labs     10/16/22  0519 10/16/22  1124 10/16/22  1736 10/17/22  0409 10/18/22  0419   WBC 6.6  --   --  4.9 5.7   HGB 7.7*   < > 7.6* 8.1* 7.7*   *  --   --  145 149    < > = values in this interval not displayed. BMP:    Recent Labs     10/16/22  1736 10/17/22  0409 10/18/22  0419    144 146   K 3.5 3.5 3.0*    98 96*   CO2 29 33* 35*   BUN 22 20 20   CREATININE 1.4* 1.4* 1.7*   GLUCOSE 258* 123* 87     Hepatic:   Recent Labs     10/16/22  0519 10/17/22  0409 10/18/22  0419   AST 27 23 24   ALT 10 10 <5   BILITOT 0.6 0.7 0.5   ALKPHOS 443* 403* 392*     Troponin: No results for input(s): TROPONINI in the last 72 hours. BNP: No results for input(s): BNP in the last 72 hours. Lipids: No results for input(s): CHOL, HDL in the last 72 hours. Invalid input(s): LDLCALCU  ABGs: No results found for: PHART, PO2ART, AMV4LPE  INR:   Recent Labs     10/17/22  0409 10/18/22  0419   INR 1.4 1.5       -----------------------------------------------------------------  RAD: CT HEAD WO CONTRAST    Result Date: 2022  Patient MRN:  [id number] : 1955 Age: 74 years Gender: Male Order Date:  2022 5:35 AM EXAM: CT HEAD WO CONTRAST NUMBER OF IMAGES:  200 INDICATION:  stroke evaluation after mechanical thrombectomy Please assign to read to Dr. Adilia Jacobo in [facility] stroke evaluation after mechanical thrombectomy What reading provider will be dictating this exam?->[facility] COMPARISON: None Technique: Low-dose CT  acquisition technique included one of following options; 1 . Automated exposure control, 2. Adjustment of MA and or KV according to patient's size or 3. Use of iterative reconstruction. Multiple CT sections were obtained with sagittal and coronal MPR reconstructions. The ventricles are prominent. The gyri and sulci appear  prominent. The white matter appears  prominent. There is no evidence for hemorrhage. There is no infarct identified. There is no mass effect identified. There is no mass identified. Diffuse atrophy likely age related Findings compatible with small vessel ischemic changes. XR CHEST PORTABLE    Result Date: 2022  EXAMINATION: ONE XRAY VIEW OF THE CHEST 2022 6:57 am COMPARISON: None. HISTORY: ORDERING SYSTEM PROVIDED HISTORY: intubated TECHNOLOGIST PROVIDED HISTORY: Reason for exam:->intubated What reading provider will be dictating this exam?->[facility] FINDINGS: There is stable position of the endotracheal tube and NG tube Multifocal bilateral airspace disease is again noted unchanged compared to prior study and most prominent within the lower lobes. There trace bilateral pleural effusions. 1. Multifocal bilateral airspace disease more prominent within the lower lobes. The airspace disease is unchanged when compared with the prior study. MRI BRAIN WO CONTRAST    Result Date: 2022  Patient MRN:  [id number] : 1955 Age: 74 years Gender: Male Order Date:  2022 3:24 PM EXAM: MRI BRAIN WO CONTRAST NUMBER OF IMAGES:  605 INDICATION:  Stroke Evaluation Mechanical Thrombectomy MRI of the brain 24 hours following mechanical thrombectomy. Discontinue order after first follow up. Please assign to Dr. Simi Durham to read in [facility] Stroke Evaluation Mechanical Thrombectomy What reading provider will be dictating this exam?->[facility] COMPARISON: CT scan 2022 Total sequences obtained: 9 The ventricles are prominent. The gyri and sulci appear  prominent. The white matter appears  prominent. No convincing hemorrhage identified. There is no mass effect identified.  There is no mass identified. There is a small region of restricted diffusion measuring approximately 3 mm seen in the left parietal lobe and a similar finding present in the left posterior frontal lobe. No other restricted diffusion is seen to suggest acute infarct. There is a small region of susceptibility infarct in the left frontal lobe. There are 2 small regions of petechial infarcts one in the left posterior frontal lobe and a second in the left parietal lobe not exceeding 3 mm. There is otherwise extensive small vessel ischemic changes       Objective:   Vitals: /64   Pulse 72   Temp 98 °F (36.7 °C) (Axillary)   Resp 14   Ht 5' 8\" (1.727 m)   Wt 225 lb 9.6 oz (102.3 kg)   SpO2 95%   BMI 34.30 kg/m²   General appearance: appears stated age   Skin:  No rashes or lesions  HEENT: Head: Normocephalic, no lesions, without obvious abnormality. Neck: no adenopathy, no carotid bruit, no JVD, supple, symmetrical, trachea midline, and thyroid not enlarged, symmetric, no tenderness/mass/nodules  Lungs: diminished breath sounds bibasilar  Heart: regular rate and rhythm, S1, S2 normal, no murmur, click, rub or gallop  Abdomen: soft, non-tender; bowel sounds normal; no masses,  no organomegaly  Extremities: edema +  Neurologic: Mental status: Alert, oriented, thought content appropriate       Patient Active Problem List:     Acute cerebrovascular accident (CVA) ([address]     Acute respiratory failure with hypoxia ([address]     Stenosis of left carotid artery     Hypoalbuminemia     Electrolyte imbalance     Hypernatremia     Anemia     GI bleeding    Assessment/Plans     1. Acute CVA  S/p IR intervention with angioplasty of the left ICA     2. LARY on CKD stage 3b  Baseline 1.7-1.9  combined effects of ACEI use and contrast nephrotoxicity  Component of urinary retention  nonoliguric at this time  Good response to bumex drip  Monitor labs and UO; 6.8 L past 24hrs  Bun/cr   24/1.4 , u/o > 7.9 liters      3.  Hypertensive emergency  presenting  with acute CVA  BP now controlled on current antihypertensive regimen        4. Anemia, chronic with acute worsening  stool heme positive  PRBC transfusion as needed  Sp egd/c scope  For repeat colonoscopy today 10/18  On ASHLEY        5. Hyperkalemia better   Due to combination of LARY and metabolic acidosis; PRBC transfusios  Lokelma  prn     6 Urinary retention  Suspected situational  Now with trejo  Urology following      7.  Acute hypoxic resp failure   HFpEF   CXR today still with significant bilateral pulm congestion and pleural effusion  On Bumex drip at this time with good response  Continue same    D/W ICU Team    MD Brendon Gonzalez MD

## 2022-10-18 NOTE — CARE COORDINATION
10/18:  Update CM Note:  Pt presented to the [facility] ER for stroke-like symptoms - right side weakness & aphasic. Pt was found to have severe left ICA stenosis. Pt was transferred to [facility]. Pt had a left ICA angioplasty & stent placement by IR. Pt was placed on dual antiplatelet therapy with Brilinta & aspirin. Pt was found to have a GI bleed & has recd 12 units of PRBC. 10/3- EGD no evidence of upper GI bleed noted. 10/9-Colonoscopy showed large amount of blood. Bleeding Scan showed bleeding around the splenic flexure. 10/10- RRT was called due to hypotension & low Hgb. Pt was transferred to MICU. Pt is on 3L/NC at 95%, Iv Precedex, Iv Bumex gtt & Iv Protonix. Pt has a sitter at bedside. Pt has a wound vac to right foot changed M-W-F. Pt is for a colonoscopy with TI intubation with Dr. Luis Enrique Carias since Friday still delayed possible on 10/18. Pt's dc plan is for the [facility]. Cm spoke with Cyrus Castrejon at [phone]. She will need updates clinical notes & PT/OT faxed to [phone]. Cm sent today's notes & PASSAR. [address] [place] [address] fax [phone] Ref [id number] good til 10/11. DUANE, passar completed & envelope in soft chart. Will need covid test done on day of dc. [address] ELEAZAR/Alvarez [phone]. Please call [facility] to [phone]. Eleazar/FARHAT will continue to follow for dc planning.   Electronically signed by Abena Smith RN on 10/18/2022 at 2:45 PM

## 2022-10-18 NOTE — OP NOTE
Operative Note      Patient: Yinka Fleming  YOB: 1955  MRN: [id number]    Date of Procedure: 10/18/2022    Pre-Op Diagnosis: Lower GI Bleeding    Post-Op Diagnosis: Colon Polyps, No source of bleeding       Procedure(s):  COLONOSCOPY POLYPECTOMY SNARE/COLD BIOPSY    Surgeon(s):  Matthew Ace MD    Assistant:   * No surgical staff found *    Anesthesia: Monitor Anesthesia Care    Estimated Blood Loss (mL): Minimal    Complications: None    Specimens:   ID Type Source Tests Collected by Time Destination   A : Biopsy Multiple colon polyps Tissue Colon SURGICAL PATHOLOGY Matthew Ace MD 10/18/2022 1611        Implants:  * No implants in log *      Drains:   Negative Pressure Wound Therapy Foot Right;Plantar;Lateral (Active)   $ Standard NPWT <=50 sq cm PER TX $ Yes 09/28/22 1040   Wound Type Diabetic foot ulcer 10/18/22 1400   Dressing Type Black Foam 10/18/22 1400   Number of pieces used 2 10/18/22 1400   Number of pieces removed 3 10/18/22 1400   Cycle Continuous 10/18/22 1400   Target Pressure (mmHg) 125 10/18/22 1400   Canister changed?  No 10/18/22 1400   Dressing Status Clean, dry & intact 10/18/22 1400   Dressing Changed Other (Comment) 10/13/22 0600   Drainage Amount Small 10/15/22 1800   Drainage Description Serosanguinous 10/18/22 1400   Dressing Change Due 10/17/22 10/18/22 1400   Output (ml) 0 ml 10/14/22 2301   Wound Assessment Other (Comment) 10/18/22 1400   Emily-wound Assessment Other (Comment) 10/18/22 1400       NG/OG/NJ/NE Tube Nasogastric 16 fr Right nostril (Active)   Surrounding Skin Clean, dry & intact 10/18/22 1400   Securement device Bridle 10/18/22 1400   Status Other (Comment) 10/18/22 1400   Placement Verified X-Ray (Initial) 10/18/22 1400   NG/OG/NJ/NE External Measurement (cm) 60 cm 10/18/22 1400   Tube Feeding Intake (mL) 350 ml 10/15/22 0800   Free Water/Flush (mL) 1000 mL 10/17/22 1300       Urinary Catheter 09/29/22 Mahan (Active)   $ Urethral catheter insertion $ Not Assessment No urethral drainage 09/29/22 1000   Urine Color Shana 09/29/22 1000   Urine Appearance Clear 09/29/22 1000   Collection Container Standard 09/29/22 1000   Securement Method Leg strap 09/29/22 1000   Catheter Care Completed Yes 09/28/22 2000   Catheter Best Practices  Drainage tube clipped to bed 09/29/22 1000   Status Draining 09/29/22 1000   Output (mL) 100 mL 09/29/22 1400       [REMOVED] Fecal Management System 10/08/22 (Removed)   Stool Appearance Watery 10/09/22 0000   Stool Color Black; Brown 10/09/22 0000   Position of Indicator Line Visible 10/09/22 0000   Balloon Volume Verified Yes 10/09/22 0000   Skin Assessment of the Anal Area Unremarkable 10/09/22 0000   Tube Irrigated Yes 10/09/22 0000   Irrigation Volume (mL) 10 10/09/22 0000   Stool Amount Large 10/09/22 0000   Fecal Management Tube Output 1000 ml 10/09/22 0200     Indications:  67y/M w/ recent CVA s/p stent placement c/b large volume GIB on DAPT which has now been d/c here for evaluation of high risk lesions before restarting anti-platelet agents. Findings:      Normal terminal ileum. Roughly 20cm examined with no inflammation, masses, ulcers, or AVMs appreciated  Normal colon. No active inflammation, masses, ulcers, or AVMs appreciated. Several polyps 3-7mm in size resected with cold snare. No significant diverticulosis appreciated. Small internal hemorrhoids. No external hemorrhoids. Detailed Description of Procedure:   Pre-Anesthesia Assessment:  Prior to the procedure, a history and physical was performed and patient medications and allergies were reviewed. The risks, benefits, complications, treatment options and expected outcomes were discussed with the patient.   The possibilities of reaction to medication, pulmonary aspiration, perforation of the gastrointestinal tract, bleeding requiring transfusion or operation, respiratory failure requiring placement on a ventilator, and failure to diagnose a condition or identify polyps/lesions were discussed with the patient and his POA. All questions were answered and informed consent was obtained. Patient identification and proposed procedure were verified by the physician, the nurse, the anesthesiologist, the anesthetist, and the technician in the preprocedure area. Please see accompanying documentation. All staff in attendance for the performed procedure act in the role of the Chaperone. After I obtained informed consent, the scope was passed under direct vision. Throughout the procedure, the patient's blood pressure, pulse, and oxygen saturations were monitored continuously. The colonoscope was introduced through the anus and advanced to the terminal ileum. The procedure was performed without difficulty. The patient tolerated the procedure well. Colonoscopy:    Anticoagulants: Aspirin    Primary Family Member/s with Colon Cancer: None. The quality of the bowel preparation was adequate. The terminal ileum, the ileocecal valve, the appendiceal orifice, and the rectum via retroflex were photographed. On Perianal exam, no external hemorrhoids or other bleeding jacki-anal lesions were appreciated. The mucosa of the terminal ileum appeared normal. Roughly 20cm of terminal ileum was assessed. No inflammation, ulceration, masses, or AVMs were appreciated. No active bleeding or evidence of recent bleeding was appreciated. The mucosa of the cecum, ascending colon, transverse colon, descending colon, sigmoid colon, and rectum was normal. No inflammation, ulceration, masses, or AVMs were appreciated. No active bleeding or evidence of recent bleeding was appreciated. Six 3-7mm sessile polyps were appreciated in the ascending and rectosigmoid colon. The polyps were removed with a cold snare. Resection and retrieval were complete. No significant diverticular disease was appreciated. Small, non-bleeding internal hemorrhoids were appreciated on rectal retroflexion. Recommendations:  - Continue daily aspirin for roughly one week. - If Hgb stays stable with no signs of active bleeding, consider re-introducing second anti-platelet agent.   - Okay to begin tube feeds.              Electronically signed by Neal Robles MD on 10/18/2022 at 5:12 PM

## 2022-10-18 NOTE — PLAN OF CARE
Problem: Pain  Goal: Verbalizes/displays adequate comfort level or baseline comfort level  10/18/2022 0828 by Marcos Arceo RN  Outcome: Progressing     Problem: Skin/Tissue Integrity  Goal: Absence of new skin breakdown  Description: 1. Monitor for areas of redness and/or skin breakdown  2. Assess vascular access sites hourly  3. Every 4-6 hours minimum:  Change oxygen saturation probe site  4. Every 4-6 hours:  If on nasal continuous positive airway pressure, respiratory therapy assess nares and determine need for appliance change or resting period.   10/18/2022 0828 by Marcos Arceo RN  Outcome: Progressing     Problem: Safety - Adult  Goal: Free from fall injury  10/18/2022 0828 by Marcos Arceo RN  Outcome: Progressing     Problem: ABCDS Injury Assessment  Goal: Absence of physical injury  10/18/2022 0828 by Marcos Arceo RN  Outcome: Progressing     Problem: Chronic Conditions and Co-morbidities  Goal: Patient's chronic conditions and co-morbidity symptoms are monitored and maintained or improved  10/18/2022 0828 by Marcos Arceo RN  Outcome: Progressing     Problem: Neurosensory - Adult  Goal: Remains free of injury related to seizures activity  10/18/2022 0828 by Marcos Arceo RN  Outcome: Progressing     Problem: Respiratory - Adult  Goal: Achieves optimal ventilation and oxygenation  10/18/2022 0828 by Marcos Arceo RN  Outcome: Progressing     Problem: Skin/Tissue Integrity - Adult  Goal: Skin integrity remains intact  10/18/2022 0828 by Marcos Arceo RN  Outcome: Progressing     Problem: Metabolic/Fluid and Electrolytes - Adult  Goal: Electrolytes maintained within normal limits  10/18/2022 0828 by Marcos Arceo RN  Outcome: Progressing

## 2022-10-18 NOTE — PROGRESS NOTES
[address] [facility]   Department of Internal Medicine   MICU Progress Note    Patient:  Tuyet Terry 74 y.o. male   MRN: [id number]       Date of Service: 10/18/2022    Allergy: Codeine  CC strokelike symptoms  Subjective   10/13/2022  Alert oriented 2-3 confused, follows commands. Supplement oxygen on high flow nasal cannula with respiratory distress  INR 1.4  --> S/P vitamin K and FFP   Start clear liquid diet  Not on pressors, will start precedex  and Fentanyl for severe agitation,   -- will  start Seroquel   Agitated today, due to BIAP,  No bowel movement overnight   No temps  10/14/2022   Much improved on Bumex drip   Off BiPaP on NC   OK   Creatinine 1.5  BUN 35   Na 151  Will increase free water  po  10/15/2022   Cxr worsening of CHF with effusion   K replaced   1+ Edema   DC fresh water thru NG + Restrict fluids   Vitals stable   On HFNC  with sat at 96%   Refuses BiPAP  10/16/2022   Afebrile   High /55  Will add catapres and increase Metoprolol  HR 75   CXR CHF persists   Intake 9955 due to isotonic bowel prep    Will increase Bumex drip to 0.75 mg today and   decrease to 0.5 mg tomorrow 10/17   Electrolytes replaced & re-check at 6 PM    10/17/2022  Patient continues to have significant bilateral pulmonary congestion and edema. Patient is responding well to Bumex drip of 1 mg/h with urine output around 6.8 L in last 24 hours. H&H continues to drop with intermittent blood transfusion. Plan is to do an EGD today by GI. Objective     TEMPERATURE:  Current - Temp: 98 °F (36.7 °C);  Max - Temp  Av °F (37.2 °C)  Min: 97.6 °F (36.4 °C)  Max: 100.1 °F (37.8 °C)    RESPIRATIONS RANGE: Resp  Av.9  Min: 13  Max: 29    PULSE RANGE: Pulse  Av.8  Min: 70  Max: 84    BLOOD PRESSURE RANGE:  Systolic (95BPS), OPL:237 , Min:101 , WPI:823   ; Diastolic (48AXR), ODE:60, Min:39, Max:83      PULSE OXIMETRY RANGE: SpO2  Av.6 %  Min: 82 %  Max: 98 %    I & O - 24hr:    Intake/Output Summary (Last 24 hours) at 10/18/2022 1055  Last data filed at 10/18/2022 1000  Gross per 24 hour   Intake 1551.99 ml   Output 8355 ml   Net -6803.01 ml     I/O last 3 completed shifts: In: 7514.4 [I.V.:581; YO/HY:9993; IV Piggyback:291.4]  Out: 03347 [Urine:36888; Stool:5000] I/O this shift:  In: -   Out: 850 [Urine:850]   Weight change: -7 lb 3.2 oz (-3.266 kg)    Physical Exam:  CONSTITUTIONAL: Awake, morbid obesity, alert to 1-2, intermittently confused, labored breathing. EYES:  Lids and lashes normal, pupils equal, round and reactive to light, extra ocular muscles intact, sclera clear, conjunctiva normal  ENT:  Normocephalic, without obvious abnormality, atraumatic, sinuses nontender on palpation, noted mucous membrane dry, picking on skin of the mouth that noted to be bloody. NV ++  CARDIOVASCULAR:  Normal apical impulse, regular rate and rhythm, normal S1 and S2, no S3 or S4, and no murmur noted  Lung Diminished lung sounds throughout lung fields, fine crackles noted on the bases, no wheezing noted, labor breathing, no use of accessory muscle at this time, on supplement oxygen. MUSCULOSKELETAL: weakness in extremities, echo noted, 2+ pitting edema on lower extremities. All extremities,  NEUROLOGIC: Awake, alert oriented 2, weakness on the right upper and lower extremities due to stroke. SKIN: Right foot nonhealing wound.  2+ Edema sacrum & belly    Medications     Continuous Infusions:   sodium chloride      bumetanide 0.1 mg/mL infusion 0.5 mg/hr (10/18/22 1047)    dexmedetomidine (PRECEDEX) IV infusion 0.4 mcg/kg/hr (10/18/22 1047)    sodium chloride      sodium chloride      dextrose       Scheduled Meds:   sodium chloride flush  5-40 mL IntraVENous 2 times per day    haloperidol  3 mg Oral TID    metoprolol succinate  50 mg Oral Daily    cloNIDine  0.1 mg Oral BID    aspirin  81 mg Per NG tube Daily    [Held by provider] clopidogrel  75 mg Oral Daily    QUEtiapine  50 mg Oral BID    sodium chloride flush 5-40 mL IntraVENous 2 times per day    miconazole nitrate   Topical BID    gabapentin  600 mg Oral 4x daily    amLODIPine  10 mg Oral Daily    white petrolatum   Topical BID    sodium chloride flush  5-40 mL IntraVENous 2 times per day    miconazole   Topical BID    epoetin sharath-epbx  6,000 Units SubCUTAneous Once per day on Mon Wed Fri    hydrALAZINE  50 mg Oral 3 times per day    insulin lispro  0-16 Units SubCUTAneous 4x Daily AC & HS    pantoprazole (PROTONIX) 40 mg injection  40 mg IntraVENous Q12H    sennosides  5 mL Oral Nightly    carbidopa-levodopa  1 tablet Per NG tube TID    ipratropium-albuterol  1 ampule Inhalation Q4H WA    collagenase   Topical Q MWF    benzonatate  100 mg Oral TID    ezetimibe  10 mg Oral Daily    [Held by provider] ticagrelor  90 mg Oral BID    atorvastatin  80 mg Oral Nightly     PRN Meds: sodium chloride flush, sodium chloride, morphine, hydrALAZINE, sodium chloride flush, sodium chloride, miconazole nitrate **AND** miconazole nitrate, sodium chloride flush, sodium chloride, labetalol, glucose, dextrose bolus **OR** dextrose bolus, glucagon (rDNA), dextrose  Nutrition:   Clear liquid diet   Labs and Imaging Studies     CBC:   Recent Labs     10/16/22  0519 10/16/22  1124 10/16/22  1736 10/17/22  0409 10/18/22  0419   WBC 6.6  --   --  4.9 5.7   RBC 2.53*  --   --  2.75* 2.64*   HGB 7.7*   < > 7.6* 8.1* 7.7*   HCT 23.5*   < > 22.9* 25.4* 23.7*   MCV 92.9  --   --  92.4 89.8   MCH 30.4  --   --  29.5 29.2   MCHC 32.8  --   --  31.9* 32.5   RDW 15.3*  --   --  15.0 14.7   *  --   --  145 149   MPV 10.5  --   --  10.0 10.1    < > = values in this interval not displayed.        BMP:    Recent Labs     10/16/22  0519 10/16/22  1124 10/16/22  1736 10/17/22  0409 10/18/22  0419   *   < > 146 144 146   K 3.7   < > 3.5 3.5 3.0*      < > 100 98 96*   CO2 29   < > 29 33* 35*   BUN 24*   < > 22 20 20   CREATININE 1.4*   < > 1.4* 1.4* 1.7*   GLUCOSE 230*   < > 258* 123* 87 (mL): 300 mL  [REMOVED] Urinary Catheter 09/27/22-Output (mL): 100 mL    Imaging Studies:  CXR  10/13 when compared to previous CXR 10/8-10/12 suggest & is consistent with CHF     Assessment and PLan   In summary,   74 y.o. male with significant past medical history of CKD, DM type II, MATT, presented 9/26/22 2 with stroke and GI bleed. Patient was admitted to [facility] with strokelike symptoms, right-sided paralysis and aphasia, found to have severe left ICA stenosis and was transfer to [facility],  taken to IR for left ICA angioplasty with stent placement. Patient had GI Bleed, following general surgery, received total of 12 units of PRBC since admission. EGD on 10/3/22 showed no evidence of upper GI bleed, CT a/p showed showed no focal area of arterial enhancement or extra blush to localize acute hemorrhage, nuclear medicine scan was positive for bleeding around the splenic flexure. Colonoscopy showed full of clots. Transfer to MICU from PICU with low hgb, labor breathing and hypotensive. 10/13/2022  Alert oriented 2-3 confused, follows commands.   Supplement oxygen on BiPAP with respiratory distress  INR 1.4  --> S/P vitamin K and FFP   Start clear liquid diet  Not on pressors, will start precedex  and Fentanyl for severe agitation,   -- will  start Seroquel   Agitated today, due to BIAP,  No bowel movement overnight   No temps  10/14/2022   Much improved on Bumex drip   Off BiPaP on NC   OK   Creatinine 1.5  BUN 35   Na 151  Will increase free water  po   Labs & Imager reviewed & replaced  10/15/2022   Cxr worsening of CHF with effusion--> Increase Bumex drip to 1 mg   K replaced   1+ Edema   DC fresh water thru NG + Restrict fluids   Vitals stable   On HFNC  with sat at 96%   Refuses BiPAP   Labs & Imager reviewed & replaced  10/16/2022   Afebrile   High /55  Will add catapres and increase Metoprolol  HR 75   CXR CHF persists   Intake 9955 due to isotonic bowel prep    Will increase Bumex drip to 0.75 mg today and   decrease to 0.5 mg tomorrow 10/17   Electrolytes replaced & re-check at 6 PM    Assessment:  HFpEF  Bilateral pulmonary edema  Left ICA stent placement 09/26 with left MCA stroke, was on dual antiplatelet therapy, currently on aspirin only  Acute hypoxic respiratory failure on high flow nasal cannula  GI bleed with bleeding around splenic flexure, s/p total 15 unit PRBC transfusion  LARY  Anemia due to blood loss  Right foot ulcer  MATT on CPAP at home    Plan:    Neuro: Patient is alert and oriented and following commands, no focal neurodeficit. Taper off Precedex drip. Continue aspirin only as per GI. Pulmonary: Patient's oxygen requirement has significantly decreased to 3 L nasal cannula. Chest x-ray has significant improvement in pulmonary congestion. Continue Bumex drip at 1 mg/h for 1 more day. Will adjust dose of Bumex drip tomorrow. Cardiovascular: Patient has preserved EF, pulmonary edema is likely related with large amount of blood transfusion leading to TACO. Abdomen: GI bleed, plan for EGD by GI today, continue Protonix. Renal: LARY noted with good urine output, 6.7 L in last 24 hours. Discussed with Dr. Tj Beach nephrology. We will continue Bumex drip, DC D5 drip. Hematology: Continue to monitor H&H, as needed blood transfusion. ID: Monitor off antibiotics  CODE STATUS: Full code  Total critical care time spent 35 minutes. This included respiratory care management, pulmonary edema management, anemia management, coordination of care.

## 2022-10-18 NOTE — H&P
[facility] ([facility])   Gastroenterology, Hepatology, &  Advanced Endoscopy    H&P      ASSESSMENT AND PLAN:    67y/M w/ history of clinically significantly large volume lower GI bleed with associated hemodynamic instability while on DAPT after recent stent placement for CVA currently stable off all anti-platelet medications. PLAN:  Colonoscopy for re-evaluation today        HISTORY OF PRESENT ILLNESS:      Mr. Ransom Najjar is a 67y/M w/ history of PAD admitted for acute ischemic CVA s/p stent placement and DAPT c/b significant GI bleed w/ bleeding scans suggesting colonic source but colonoscopy negative. CTA was negative so no IR embolization could be performed. He has now been stable off DAPT but is high risk for recurrent CVA. Past Medical History:        Diagnosis Date    Chronic back pain     CKD (chronic kidney disease)     CVA (cerebral vascular accident) ([address]     CVA (cerebral vascular accident) ([address]     DM (diabetes mellitus) ([address]     Major depression     MI (myocardial infarction) ([address]     MATT (obstructive sleep apnea)     Parkinson disease ([address]     PVD (peripheral vascular disease) ([address]     Seizure ([address]      Past Surgical History:        Procedure Laterality Date    COLONOSCOPY N/A 10/9/2022    COLONOSCOPY DIAGNOSTIC performed by Timi Babin MD at [place] [address]      IR CAROTID STENT UNI W PROTECTION  9/27/2022    IR CAROTID STENT UNI W PROTECTION 9/27/2022 Eliseo Loza MD [facility]    UPPER GASTROINTESTINAL ENDOSCOPY N/A 10/3/2022    EGD DIAGNOSTIC ONLY performed by Iza Downing MD at [address] [place] [address] History:    TOBACCO:   has no history on file for tobacco use. ETOH:   has no history on file for alcohol use. DRUGS:   has no history on file for drug use. Family History:   History reviewed. No pertinent family history.       Current Facility-Administered Medications:     calcium gluconate 1,000 mg in dextrose 5 % 100 mL IVPB, 1,000 mg, IntraVENous, Once, CONSTANTINE Beltran CNP    magnesium sulfate 2000 mg in 50 mL IVPB premix, 2,000 mg, IntraVENous, Once, CONSTANTINE Beltran CNP, Last Rate: 25 mL/hr at 10/18/22 0729, 2,000 mg at 10/18/22 0729    potassium chloride 10 mEq/100 mL IVPB (Peripheral Line), 10 mEq, IntraVENous, Q1H, CONSTANTINE Calix CNP, Last Rate: 100 mL/hr at 10/18/22 0723, 10 mEq at 10/18/22 0723    bumetanide (BUMEX) 12.5 mg in sodium chloride 0.9 % 125 mL infusion, 1 mg/hr, IntraVENous, Continuous, CONSTANTINE Calix CNP, Last Rate: 10 mL/hr at 10/18/22 0409, 1 mg/hr at 10/18/22 0409    haloperidol (HALDOL) tablet 3 mg, 3 mg, Oral, TID, Sher Hernandez MD, 3 mg at 10/17/22 2020    metoprolol succinate (TOPROL XL) extended release tablet 50 mg, 50 mg, Oral, Daily, Sher Hernandez MD, 50 mg at 10/17/22 9544    morphine (PF) injection 1 mg, 1 mg, IntraVENous, Q3H PRN, Sher Hernandez MD, 1 mg at 10/18/22 0719    cloNIDine (CATAPRES) tablet 0.1 mg, 0.1 mg, Oral, BID, Sher Hernandez MD, 0.1 mg at 10/17/22 2021    hydrALAZINE (APRESOLINE) injection 10 mg, 10 mg, IntraVENous, Q4H PRN, Damaris Galvez MD    aspirin chewable tablet 81 mg, 81 mg, Per NG tube, Daily, Fito Samayoa MD, 81 mg at 10/17/22 7242    [Held by provider] clopidogrel (PLAVIX) tablet 75 mg, 75 mg, Oral, Daily, Sher Hernandez MD    dexmedetomidine (PRECEDEX) 1,000 mcg in sodium chloride 0.9 % 250 mL infusion, 0.1-1.5 mcg/kg/hr, IntraVENous, Continuous, Sher Hernandez MD, Last Rate: 14.78 mL/hr at 10/17/22 2355, 0.5 mcg/kg/hr at 10/17/22 2355    QUEtiapine (SEROQUEL) tablet 50 mg, 50 mg, Oral, BID, Norma Major, APRN - CNP, 50 mg at 10/17/22 2021    sodium chloride flush 0.9 % injection 5-40 mL, 5-40 mL, IntraVENous, 2 times per day, Fito Samayoa MD, 10 mL at 10/17/22 2030    sodium chloride flush 0.9 % injection 5-40 mL, 5-40 mL, IntraVENous, PRN, Fito Samayoa MD, 10 mL at 10/17/22 0524    0.9 % sodium chloride infusion, 25 mL, IntraVENous, PRN, Fito Samayoa MD miconazole nitrate 2 % ointment, , Topical, BID, Given at 10/17/22 2049 **AND** miconazole nitrate 2 % ointment, , Topical, TID PRN, Leanne Giron DO, Given at 10/17/22 2022    gabapentin (NEURONTIN) capsule 600 mg, 600 mg, Oral, 4x daily, Guzman Su MD, 600 mg at 10/17/22 2030    amLODIPine (NORVASC) tablet 10 mg, 10 mg, Oral, Daily, CONSTANTINE Christopher CNP, 10 mg at 10/17/22 0218    white petrolatum ointment, , Topical, BID, Leanne Giron DO, Given at 10/17/22 2022    sodium chloride flush 0.9 % injection 5-40 mL, 5-40 mL, IntraVENous, 2 times per day, Guzman Su MD, 10 mL at 10/17/22 2049    sodium chloride flush 0.9 % injection 5-40 mL, 5-40 mL, IntraVENous, PRN, Guzman Su MD    0.9 % sodium chloride infusion, , IntraVENous, PRN, Guzman Su MD    miconazole (MICOTIN) 2 % powder, , Topical, BID, Leanne Giron DO, Given at 10/17/22 2022    epoetin sharath-epbx (RETACRIT) injection 6,000 Units, 6,000 Units, SubCUTAneous, Once per day on Mon Wed Fri, Guzman Su MD, 6,000 Units at 10/17/22 3274    hydrALAZINE (APRESOLINE) tablet 50 mg, 50 mg, Oral, 3 times per day, CONSTANTINE Gama - CNP, 50 mg at 10/17/22 2021    insulin lispro (HUMALOG) injection vial 0-16 Units, 0-16 Units, SubCUTAneous, 4x Daily AC & HS, Guzman uS MD, 12 Units at 10/16/22 1747    pantoprazole (PROTONIX) 40 mg in sodium chloride (PF) 10 mL injection, 40 mg, IntraVENous, Q12H, Guzman Su MD, 40 mg at 10/18/22 0229    sennosides (SENOKOT) 8.8 MG/5ML syrup 5 mL, 5 mL, Oral, Nightly, Guzman Su MD, 5 mL at 10/17/22 2030    labetalol (NORMODYNE;TRANDATE) injection 10 mg, 10 mg, IntraVENous, Q30 Min PRN, Guzman Su MD, 10 mg at 10/16/22 1626    glucose chewable tablet 16 g, 4 tablet, Oral, PRN, Guzman Su MD    dextrose bolus 10% 125 mL, 125 mL, IntraVENous, PRN **OR** dextrose bolus 10% 250 mL, 250 mL, IntraVENous, PRN, Guzman Su MD    glucagon (rDNA) injection 1 mg, 1 mg, SubCUTAneous, PRN, Brigitte Montero MD    dextrose 10 % infusion, , IntraVENous, Continuous PRN, Brigitte Montero MD    carbidopa-levodopa (SINEMET)  MG per tablet 1 tablet, 1 tablet, Per NG tube, TID, Brigitte Montero MD, 1 tablet at 10/17/22 2029    ipratropium-albuterol (DUONEB) nebulizer solution 1 ampule, 1 ampule, Inhalation, Q4H WA, Brigitte Montero MD, 1 ampule at 10/17/22 2147    collagenase ointment, , Topical, Q MWF, Brigitte Montero MD, Given at 10/17/22 0900    benzonatate (TESSALON) capsule 100 mg, 100 mg, Oral, TID, Brigitte Montero MD, 100 mg at 10/17/22 1403    ezetimibe (ZETIA) tablet 10 mg, 10 mg, Oral, Daily, Brigitte Montero MD, 10 mg at 10/17/22 9999    [Held by provider] ticagrelor (BRILINTA) tablet 90 mg, 90 mg, Oral, BID, Brigitte Montero MD, 90 mg at 10/09/22 2300    atorvastatin (LIPITOR) tablet 80 mg, 80 mg, Oral, Nightly, Brigitte Montero MD, 80 mg at 10/17/22 2020     Allergies:  Codeine    ROS:  General: Patient denies n/v/f/c or weight loss. HEENT: Patient denies persistent postnasal drip, scleral icterus, drooling, persistent bleeding from nose/mouth. Resp: Patient denies SOB, wheezing, productive cough. Cards: Patient denies CP, palpitations, significant edema  GI: As above. Derm: Patient denies jaundice/rashes. Musc: Patient denies diffuse/irregular joint swelling or myalgias. PHYSICAL EXAM:  BP (!) 149/57   Pulse 75   Temp 99.6 °F (37.6 °C) (Temporal)   Resp 18   Ht 5' 8\" (1.727 m)   Wt 225 lb 9.6 oz (102.3 kg)   SpO2 95%   BMI 34.30 kg/m²   Physical Exam:  General: Ill-appearing, NAD  HEENT: PERRLA, EOMI, Anicteric sclera, MMM  Cards: RRR, no LE edema  Resp: Breathing comfortably on supplemental oxygen, good air movement, no use of accessory muscles, no audible wheezing  Abdomen: soft, NT, ND. Extremities: Moves all extremities, no effusions or bruising. RLE w/ wound vac  Skin: No rashes or jaundice  Neuro: Awake, alert, lethargic. Responds to name and some questions. Electronically signed by Neal Robles MD on 10/18/2022 at 7:45 AM

## 2022-10-18 NOTE — ANESTHESIA POSTPROCEDURE EVALUATION
Department of Anesthesiology  Postprocedure Note    Patient: Tuyet Terry  MRN: [id number]  YOB: 1955  Date of evaluation: 10/18/2022      Procedure Summary     Date: 10/18/22 Room / Location: [facility]ON 03 / [facility]    Anesthesia Start: [facility] Anesthesia Stop: 1703    Procedure: COLONOSCOPY POLYPECTOMY SNARE/COLD BIOPSY Diagnosis:       GI bleeding      (GI bleeding [K92.2])    Surgeons: Julius Colon MD Responsible Provider: El Hernadez DO    Anesthesia Type: MAC ASA Status: 4          Anesthesia Type: No value filed.     Amy Phase I: Amy Score: 9    Amy Phase II: Amy Score: 9      Anesthesia Post Evaluation    Patient location during evaluation: ICU  Patient participation: complete - patient participated  Level of consciousness: awake  Pain score: 0  Airway patency: patent  Nausea & Vomiting: no nausea and no vomiting  Complications: no  Cardiovascular status: blood pressure returned to baseline  Respiratory status: acceptable  Hydration status: stable

## 2022-10-18 NOTE — ANESTHESIA PRE PROCEDURE
Department of Anesthesiology  Preprocedure Note       Name:  Eleno Cushing   Age:  74 y.o.  :  1955                                          MRN:  [id number]         Date:  10/18/2022      Surgeon: Sivan Brennan):  Gume Trejo MD    Procedure: Procedure(s):  COLONOSCOPY DIAGNOSTIC bedside    Medications prior to admission:   Prior to Admission medications    Medication Sig Start Date End Date Taking? Authorizing Provider   apixaban (ELIQUIS) 5 MG TABS tablet Take 5 mg by mouth 2 times daily   Yes Historical Provider, MD   aspirin 81 MG chewable tablet Take 81 mg by mouth daily   Yes Historical Provider, MD   carbidopa-levodopa (SINEMET)  MG per tablet Take 1 tablet by mouth 3 times daily   Yes Historical Provider, MD   carvedilol (COREG) 25 MG tablet Take 25 mg by mouth 2 times daily (with meals)   Yes Historical Provider, MD   cetirizine (ZYRTEC) 10 MG tablet Take 10 mg by mouth daily   Yes Historical Provider, MD   clopidogrel (PLAVIX) 75 MG tablet Take 75 mg by mouth daily   Yes Historical Provider, MD   diphenhydrAMINE (BENADRYL) 25 MG capsule Take 25 mg by mouth every 6 hours   Yes Historical Provider, MD   ezetimibe (ZETIA) 10 MG tablet Take 10 mg by mouth daily   Yes Historical Provider, MD   fluticasone (FLONASE) 50 MCG/ACT nasal spray 2 sprays by Each Nostril route daily   Yes Historical Provider, MD   gabapentin (NEURONTIN) 600 MG tablet Take 600 mg by mouth 4 times daily. Yes Historical Provider, MD   HYDROcodone-acetaminophen (NORCO) 5-325 MG per tablet Take 1 tablet by mouth 2 times daily. Yes Historical Provider, MD   Insulin Glargine, 2 Unit Dial, (TOUJEO MAX SOLOSTAR) 300 UNIT/ML SOPN Inject 66 Units into the skin daily   Yes Historical Provider, MD   piperacillin-tazobactam (ZOSYN) 3-0.375 GM per 50ML IVPB extended infusion Infuse 4.5 mg intravenously in the morning and 4.5 mg at noon and 4.5 mg in the evening.    Yes Historical Provider, MD   acetaminophen (TYLENOL) 325 MG tablet Take 650 mg by mouth every 6 hours as needed for Pain   Yes Historical Provider, MD   amLODIPine (NORVASC) 5 MG tablet Take 5 mg by mouth daily   Yes Historical Provider, MD   benzonatate (TESSALON) 100 MG capsule Take 100 mg by mouth 3 times daily   Yes Historical Provider, MD   cloNIDine (CATAPRES) 0.1 MG tablet Take 0.1 mg by mouth in the morning and 0.1 mg in the evening.    Yes Historical Provider, MD   hydrALAZINE (APRESOLINE) 100 MG tablet Take 100 mg by mouth 3 times daily   Yes Historical Provider, MD       Current medications:    Current Facility-Administered Medications   Medication Dose Route Frequency Provider Last Rate Last Admin    sodium chloride flush 0.9 % injection 5-40 mL  5-40 mL IntraVENous 2 times per day Negro Mark MD   10 mL at 10/18/22 0859    sodium chloride flush 0.9 % injection 5-40 mL  5-40 mL IntraVENous PRN Negro Mark MD        0.9 % sodium chloride infusion  25 mL IntraVENous PRN Negro Mark MD        bumetanide (BUMEX) 12.5 mg in sodium chloride 0.9 % 125 mL infusion  1 mg/hr IntraVENous Continuous Buren CONSTANTINE Kumari - CNP 10 mL/hr at 10/18/22 1354 1 mg/hr at 10/18/22 1354    haloperidol (HALDOL) tablet 3 mg  3 mg Oral TID Tommy Yang MD   3 mg at 10/18/22 1345    metoprolol succinate (TOPROL XL) extended release tablet 50 mg  50 mg Oral Daily Tommy Yang MD   50 mg at 10/17/22 0903    morphine (PF) injection 1 mg  1 mg IntraVENous Q3H PRN Tommy Yang MD   1 mg at 10/18/22 0719    cloNIDine (CATAPRES) tablet 0.1 mg  0.1 mg Oral BID Tommy Yang MD   0.1 mg at 10/18/22 5487    hydrALAZINE (APRESOLINE) injection 10 mg  10 mg IntraVENous Q4H PRN Sarah Fleming MD        aspirin chewable tablet 81 mg  81 mg Per NG tube Daily Negro Mark MD   81 mg at 10/18/22 0850    [Held by provider] clopidogrel (PLAVIX) tablet 75 mg  75 mg Oral Daily Tommy Yang MD        dexmedetomidine (PRECEDEX) 1,000 mcg in sodium chloride 0.9 % 250 mL infusion  0.1-1.5 mcg/kg/hr IntraVENous Continuous Chrissy Major MD 5.91 mL/hr at 10/18/22 1420 0.2 mcg/kg/hr at 10/18/22 1420    QUEtiapine (SEROQUEL) tablet 50 mg  50 mg Oral BID CONSTANTINE Patel - CNP   50 mg at 10/18/22 0859    sodium chloride flush 0.9 % injection 5-40 mL  5-40 mL IntraVENous 2 times per day Pema Black MD   10 mL at 10/17/22 2030    sodium chloride flush 0.9 % injection 5-40 mL  5-40 mL IntraVENous PRN Pema Black MD   10 mL at 10/17/22 0524    0.9 % sodium chloride infusion  25 mL IntraVENous PRN Pema Black MD        miconazole nitrate 2 % ointment   Topical BID Daniela Dole, DO   Given at 10/18/22 8538    And    miconazole nitrate 2 % ointment   Topical TID PRN Daniela Dole, DO   Given at 10/17/22 2022    gabapentin (NEURONTIN) capsule 600 mg  600 mg Oral 4x daily Lolly Skiff, MD   600 mg at 10/18/22 1311    amLODIPine (NORVASC) tablet 10 mg  10 mg Oral Daily CONSTANTINE Patel - CNP   10 mg at 10/18/22 0900    white petrolatum ointment   Topical BID Daniela Dole, DO   Given at 10/18/22 0924    sodium chloride flush 0.9 % injection 5-40 mL  5-40 mL IntraVENous 2 times per day Lolly Skiff, MD   10 mL at 10/17/22 2049    sodium chloride flush 0.9 % injection 5-40 mL  5-40 mL IntraVENous PRN Lolly Skiff, MD        0.9 % sodium chloride infusion   IntraVENous PRN Lolly Skiff, MD        miconazole (MICOTIN) 2 % powder   Topical BID Daniela Dole, DO   Given at 10/18/22 0858    epoetin sharath-epbx (RETACRIT) injection 6,000 Units  6,000 Units SubCUTAneous Once per day on Mon Wed Fri Lolly Skiff, MD   6,000 Units at 10/17/22 5878    hydrALAZINE (APRESOLINE) tablet 50 mg  50 mg Oral 3 times per day CONSTANTINE Richmond CNP   50 mg at 10/17/22 2021    insulin lispro (HUMALOG) injection vial 0-16 Units  0-16 Units SubCUTAneous 4x Daily AC & HS Lolly Skiff, MD   12 Units at 10/16/22 1747    pantoprazole (PROTONIX) 40 mg in sodium chloride (PF) 10 mL injection  40 mg IntraVENous Q12H Radha Brown MD   40 mg at 10/18/22 1446    sennosides (SENOKOT) 8.8 MG/5ML syrup 5 mL  5 mL Oral Nightly Radha Brown MD   5 mL at 10/17/22 2030    labetalol (NORMODYNE;TRANDATE) injection 10 mg  10 mg IntraVENous Q30 Min PRN Radha Brown MD   10 mg at 10/16/22 1626    glucose chewable tablet 16 g  4 tablet Oral PRN Radha Brown MD        dextrose bolus 10% 125 mL  125 mL IntraVENous PRN Radha Brown MD        Or    dextrose bolus 10% 250 mL  250 mL IntraVENous PRN Radha Brown MD        glucagon (rDNA) injection 1 mg  1 mg SubCUTAneous PRN Radha Brown MD        dextrose 10 % infusion   IntraVENous Continuous PRN aRdha Brown MD        carbidopa-levodopa (SINEMET)  MG per tablet 1 tablet  1 tablet Per NG tube TID Radha Brown MD   1 tablet at 10/18/22 1344    ipratropium-albuterol (DUONEB) nebulizer solution 1 ampule  1 ampule Inhalation Q4H WA Radha Brown MD   1 ampule at 10/18/22 1148    collagenase ointment   Topical Q MWF Radha Brown MD   Given at 10/17/22 0900    benzonatate (TESSALON) capsule 100 mg  100 mg Oral TID Radha Brown MD   100 mg at 10/17/22 1403    ezetimibe (ZETIA) tablet 10 mg  10 mg Oral Daily Radha Brown MD   10 mg at 10/18/22 0853    [Held by provider] ticagrelor (BRILINTA) tablet 90 mg  90 mg Oral BID Radha Brown MD   90 mg at 10/09/22 2300    atorvastatin (LIPITOR) tablet 80 mg  80 mg Oral Nightly Radha Brown MD   80 mg at 10/17/22 2020       Allergies:     Allergies   Allergen Reactions    Codeine Other (See Comments)     blisters       Problem List:    Patient Active Problem List   Diagnosis Code    Acute cerebrovascular accident (CVA) ([address] I63.9    Acute respiratory failure with hypoxia ([address] J96.01    Stenosis of left carotid artery I65.22    Hypoalbuminemia E88.09    Electrolyte imbalance E87.8    Hypernatremia E87.0    Anemia D64.9    GI bleeding K92.2       Past Medical History:        Diagnosis Date  Chronic back pain     CKD (chronic kidney disease)     [facility]     CVA [facility]cerebral vascular accident) [facility]     DM (diabetes mellitus) ([facility]     Major depression     MI (myocardial infarction) ([facility]     MATT (obstructive sleep apnea)     Parkinson disease ([facility]     PVD (peripheral vascular disease) ([facility]     Seiz[facility]        Past Surgical History:        Procedure Laterality Date    COLONOSCOPY N/A 10/9/2022    COLONOSCOPY DIAGNOSTIC performed by Lukasz Sampson MD at [address] [place] [address] IR CAROTID STENT UNI W PROTECTION  9/27/2022    IR CAROTID STENT UNI W PROTECTION 9/27/2022 Alma Cancino MD [facility]    UPPER GASTROINTESTINAL ENDOSCOPY N/A 10/3/2022    EGD DIAGNOSTIC ONLY performed by Seymour Murphy MD at [facility] History:    Social History     Tobacco Use    Smoking status: Unknown    Smokeless tobacco: Not on file   Substance Use Topics    Alcohol use: Not on file                                Counseling given: Not Answered      Vital Signs (Current):   Vitals:    10/18/22 1200 10/18/22 1300 10/18/22 1345 10/18/22 1400   BP: 125/63 120/62 (!) 122/52 125/61   Pulse: 75 77  75   Resp: 15 15  22   Temp: 98.2 °F (36.8 °C)      TempSrc: Temporal      SpO2: 95% 94%  94%   Weight:       Height:                                                  BP Readings from Last 3 Encounters:   10/18/22 125/61       NPO Status:  > 8hrs                                                                               BMI:   Wt Readings from Last 3 Encounters:   10/18/22 225 lb 9.6 oz (102.3 kg)     Body mass index is 34.3 kg/m².     CBC:   Lab Results   Component Value Date/Time    WBC 5.7 10/18/2022 04:19 AM    RBC 2.64 10/18/2022 04:19 AM    HGB 7.7 10/18/2022 04:19 AM    HCT 23.7 10/18/2022 04:19 AM    HCT 15.0 09/27/2022 08:58 PM    MCV 89.8 10/18/2022 04:19 AM    RDW 14.7 10/18/2022 04:19 AM     10/18/2022 04:19 AM       CMP:   Lab Results   Component Value Date/Time     10/18/2022 01:53 PM    K 3.5 10/18/2022 01:53 PM    K 4.7 10/10/2022 05:21 PM    CL 95 10/18/2022 01:53 PM    CO2 35 10/18/2022 01:53 PM    BUN 18 10/18/2022 01:53 PM    CREATININE 1.7 10/18/2022 01:53 PM    GFRAA >60 10/17/2022 04:09 AM    LABGLOM 44 10/18/2022 01:53 PM    GLUCOSE 96 10/18/2022 01:53 PM    PROT 5.3 10/18/2022 04:19 AM    CALCIUM 8.6 10/18/2022 01:53 PM    BILITOT 0.5 10/18/2022 04:19 AM    ALKPHOS 392 10/18/2022 04:19 AM    AST 24 10/18/2022 04:19 AM    ALT <5 10/18/2022 04:19 AM       POC Tests: No results for input(s): POCGLU, POCNA, POCK, POCCL, POCBUN, POCHEMO, POCHCT in the last 72 hours. Coags:   Lab Results   Component Value Date/Time    PROTIME 16.4 10/18/2022 04:19 AM    INR 1.5 10/18/2022 04:19 AM    APTT 37.3 10/18/2022 04:19 AM       HCG (If Applicable): No results found for: PREGTESTUR, PREGSERUM, HCG, HCGQUANT     ABGs: No results found for: PHART, PO2ART, IQH5SEY, VSF8LAV, BEART, [id number]     Type & Screen (If Applicable):  No results found for: LABABO, LABRH    Drug/Infectious Status (If Applicable):  No results found for: HIV, HEPCAB    COVID-19 Screening (If Applicable):   Lab Results   Component Value Date/Time    COVID19 Not Detected 10/01/2022 08:23 AM     EKG 10/10/22  Narrative & Impression    Normal sinus rhythm  Normal ECG  When compared with ECG of 28-SEP-2022 08:46,  No significant change was found     ECHO 9/28/22   Summary   Severe concentric left ventricular hypertrophy. Ejection fraction is visually estimated at 60 to 65%. Left ventricular diastolic filling is elevated . Mildly dilated right ventricle with normal systolic function. Agitated saline injected for shunt evaluation was technically difficult   due to pt being on vent and lack of corperation. Please obtain limited   echo with Bubble study once pt is extubated and cooperative. Mild mitral regurgitation is present.    Mild tricuspid regurgitation. RVSP is 42 mmHg. Physiologic and/or trace, Mild pulmonic regurgitation present. CXR 10/15/22  FINDINGS:   Stable bilateral pulmonary opacities. New right pleural effusion. No   pneumothorax. Stable cardiomegaly. The osseous structures are without acute   process. Anesthesia Evaluation  Patient summary reviewed and Nursing notes reviewed no history of anesthetic complications:   Airway: Mallampati: II  TM distance: >3 FB   Neck ROM: full  Mouth opening: > = 3 FB   Dental:    (+) edentulous      Pulmonary:   (+) COPD:  sleep apnea: on CPAP,  decreased breath sounds     (-) not a current smoker                          ROS comment: R pleural effusion  Former smoker - quit 25 year ago  Pt on HFNC  Wears 2-3 L O2 at home as needed   Cardiovascular:    (+) hypertension:, valvular problems/murmurs:, past MI: > 6 months, CAD (Stenosis of left carotid artery, left ICA stenosis with stent placement 9/26/22):,     CABG/stent: stent x 1 9/26/22. ECG reviewed  Rhythm: regular  Rate: normal  Echocardiogram reviewed         Beta Blocker:  Dose within 24 Hrs      ROS comment: Stenosis of left carotid artery     Neuro/Psych:   (+) seizures:, CVA (weakness and garbled speech):, neuromuscular disease: Parkinson's disease, depression/anxiety              ROS comment: Chronic back pain GI/Hepatic/Renal:   (+) renal disease: CRI,          ROS comment: GI Bleed. Endo/Other:    (+) DiabetesType II DM, using insulin, blood dyscrasia: anemia:., .                 Abdominal:             Vascular:   + PVD, aortic or cerebral, . ROS comment: Femoral artery stent. Other Findings:             Anesthesia Plan      MAC     ASA 4       Induction: intravenous. MIPS: Prophylactic antiemetics administered. Anesthetic plan and risks discussed with patient. Plan discussed with CRNA and attending. Bruce Kapadia DO   10/18/2022      . CONSTANTINE Chung - CRNA

## 2022-10-18 NOTE — CONSULTS
Palliative Care Department  Palliative Care Initial Consult  Provider: Collette Sr DO HPM Fellow  [phone]    Hospital Day: 22  Date of Initial Consult: 10/18/22  Referring Provider: CONSTANTINE Smith CNP   Palliative Medicine was consulted for assistance with: Code status Discussion, Assist with goals of care, and Family Support    Chief Complaint: Torie Herrera is a 74 y.o. male with chief complaint of right sided weakness and aphasia. HPI:   Torie Herrera is a 74 y.o. male with significant medical history of CKD, diabetes mellitus, MATT, who was transferred from [facility] to [facility] on 9/27/2022 with acute CVA, severe left ICA stenosis. The patient initially presented to the ED with strokelike symptoms of right-sided weakness along with aphasia. At [facility], the patient underwent left ICA angioplasty and stent placement by interventional radiology he was admitted to the neuro ICU and placed on dual antiplatelet therapy. The patient then developed a GI bleed and received 15 units of PRBC. EGD daily was performed on 10-3-2022 and revealed no evidence of upper GI bleed. The patient underwent a colonoscopy on 10-9-2022 which showed a large amount of blood. A nuclear medicine scan was done which showed bleeding around the splenic flexure. During the patient's hospitalization an RRT was called due to hypotension, hemorrhagic shock and the patient was transferred to the medical intensive care unit for further evaluation management. The patient also has a wound VAC to the right foot wound-ulcer. He is planned for colonoscopy with GI today. Palliative medicine was consulted to assist with CODE STATUS discussion, assist with goals of care and family support.     ASSESSMENT/PLAN:     Pertinent Hospital Diagnoses:  Current medical issues leading to Palliative Medicine involvement include   Active Hospital Problems    Diagnosis Date Noted    Anemia [D64.9] 10/11/2022     Priority: Medium    Electrolyte imbalance [E87.8] 10/01/2022     Priority: Medium    Hypernatremia [E87.0] 10/01/2022     Priority: Medium    Acute respiratory failure with hypoxia ([facility] [J96.01] 09/28/2022     Priority: Medium    Stenosis of left carotid artery [I65.22] 09/28/2022     Priority: Medium    Hypoalbuminemia [E88.09] 09/28/2022     Priority: Medium    Acute cerebrovascular accident (CVA) ([facility] [I63.9] 09/27/2022     Priority: Medium    GI bleeding [K92.2] 09/27/2022         Palliative Care Encounter / Counseling Regarding Goals of Care:  Please see detailed goals of care discussion as below. At this time, Tamika Hilario, Does have capacity for medical decision-making. Capacity is time limited and situation/question specific. During encounter adoptive father, Terri Carroll, was surrogate medical decision-maker  Outcome of goals of care meeting:  Patient would like to continue current management  Patient's long-term goal is to return to home. Code status: Full Code  I reviewed with the Patient that given multiple medical co-morbidities, advanced disease process, and current severe acute illness, in the event of cardiac arrest, the chances of surviving may likely be low. It was also reviewed that if they survived a cardiac arrest, a return to prior level of function also may be low. He verbalizes understanding and agrees. He wishes to remain a FULL code. Advanced Directives:  None uploaded in [address] documents in patient's soft chart. Surrogate/Legal NOK:   Sissy Nieto (adoptive father, Gian Gifford) [phone]        SUBJECTIVE:   Events/Discussions:    Chart reviewed. Spoke to nursing. Spoke to . Introduced myself to the patient discussed the role of palliative medicine and the patient verbalizes understanding. He is lying in bed, in no acute distress. He is currently on Precedex infusion.   The patient is awake, alert and oriented to person, month, and year, president and location. His speech is slurred and mildly difficult to understand but answers questions appropriately at this time. The patient tells me that he came to the hospital because of a stroke. He is aware that he is going for a colonoscopy today. He currently denies any pain and has no complaints except for dry mouth. When asked about advanced directives, the patient tells me he has filled out a living will and healthcare power of  forms in the past but, he does not know where they are. When asked about his decision maker, he states his adoptive father, Ruma Fuentes, is the first contact and HCPOA. He also states that his caregiver, Po Patel, is a secondary contact. Discussed goals of care with the patient. He states that he would like to continue current management, get better soon and return home. Discussed CODE STATUS options with the patient. He states that he would like to remain a full code. When asked if he would like to be resuscitated, he replies \"yes \". When asked if the patient wants to receive chest compressions, CPR, defibrillation\cardioversion, intubation on mechanical ventilation, and resuscitative medications, he replies \"yes\". Discussed with the patient that given his multiple comorbidities at this time and the acute serious illness that he currently has, the likelihood of a meaningful recovery may be low. The patient verbalizes understanding and wishes to continue as FULL code. Per patient request, he would like me to call his adoptive father and updated him. Questions were answered. Support provided to patient. 1:58 PM- Per patient's request, called and spoke to adoptive father, Ruma Fuentes. Discussed role of palliative medicine and he verbalizes understanding.  He identifies himself as the HCPOA and says that that the healthcare power of  papers may be at [facility] and is unsure if they were transferred or not. Update provided to Terri Carroll on what Dalysajiyarelis Petersen and I discussed earlier. Discussed Cirilo's wishes to remain a full code, continue current management, and to get better soon and return home. Terri Carroll is in agreement with his wishes. He states he is praying for him every day and has visited him here in the hospital and hopes to in the next day or 2 to visit him. He says that Be Petersen has been disabled for several years and lives in an apartment in [place] but has been staying with his caregiver for help. He states that they called him earlier to obtain consent for his procedure, colonoscopy today. He has no other questions or concerns at this time. He was instructed to call our office at [phone] for any questions or concerns. He verbalizes understanding. Support provided. Checked patient's soft chart and has completed HCPOA document with Terri Carroll listed as HCPOA. Past Medical History:   Diagnosis Date    Chronic back pain     CKD (chronic kidney disease)     CVA (cerebral vascular accident) ([address]     CVA (cerebral vascular accident) ([address]     DM (diabetes mellitus) ([address]     Major depression     MI (myocardial infarction) ([address]     MATT (obstructive sleep apnea)     Parkinson disease ([address]     PVD (peripheral vascular disease) ([address]     Seizure ([address]        Past Surgical History:   Procedure Laterality Date    COLONOSCOPY N/A 10/9/2022    COLONOSCOPY DIAGNOSTIC performed by Norberto Waters MD at [place] [address]      IR CAROTID STENT UNI W PROTECTION  9/27/2022    IR CAROTID STENT UNI W PROTECTION 9/27/2022 Silvia Martinez MD [facility]    UPPER GASTROINTESTINAL ENDOSCOPY N/A 10/3/2022    EGD DIAGNOSTIC ONLY performed by Dayana Du MD at [address]       History reviewed. No pertinent family history.     Allergies   Allergen Reactions    Codeine Other (See Comments)     blisters       ROS: Bold positive  CONSTITUTIONAL:  fever, chill, fatigue  HEENT: blurry vision, double vision, dry mouth, dysphagia, odynophagia  RESPIRATORY: cough, shortness of breath, wheezing  CARDIOVASCULAR:  Chest pain/pressure, palpitation, light headed,   GASTROINTESTINAL:  abdominal pain, nausea, vomiting, diarrhea, constipation, . GENITOURINARY:  Burning, frequency, urgency  INTEGUMENTARY: rash, wound, pruritis  HEMATOLOGIC/LYMPHATIC:  Swelling, sores, bleeding  MUSCULOSKELETAL:  pain, edema, joint swelling or redness  NEUROLOGICAL:  dizziness, headache, weakness  OBJECTIVE:   Prognosis: Guarded    Physical Exam:  /63   Pulse 75   Temp 98.2 °F (36.8 °C) (Temporal)   Resp 15   Ht 5' 8\" (1.727 m)   Wt 225 lb 9.6 oz (102.3 kg)   SpO2 95%   BMI 34.30 kg/m²     Gen: 81-year-old male, lying in bed, he is awake, alert. On Precedex infusion. HEENT:  Normocephalic, PERRL, conjunctiva pink, mucosa moist  Neck:  Supple. Lungs: No distress. On nasal cannula. Coarse breath sounds bilaterally. Heart: Regular rate and rhythm. No murmur. Abd: Bowel sounds present. Soft, no distention. No tenderness to palpation. :  Mahan with clear yellow urine in tube. M/S/Ext: Weakness to right upper and right lower extremities. Wound to right lower extremity that is covered to wound VAC. Skin:  Warm and dry  Neuro: The patient is awake, alert and oriented to person, month, and year, President and location. His speech is slurred and mildly difficult to understand but answers questions appropriately at this time. Weakness noted to the right upper extremity and right lower extremity. Patient is able to wiggle toes on the left. Moves the left upper extremity. No tremulous or seizure-like activity.     Objective data reviewed: labs, images, records, medication use, vitals, and chart    Time/Communication:  Greater than 50% of time spent, total 65 minutes in counseling and coordination of care at the bedside regarding goals of care, diagnosis and prognosis, and see above.    Francesca Baird,  HPM Fellow  Palliative Medicine    Patient and the plan of care discussed with the other IDT members of Palliative Care Team, and with consultants, Primary Attending, patient, family, and floor nurses, as appropriate and available. Thank you for allowing Palliative Medicine to participate in the care of Justice Bernardo. Note: This report was completed using computerBesstech voiced recognition software. Every effort has been made to ensure accuracy; however, inadvertent computerized transcription errors may be present.

## 2022-10-18 NOTE — PLAN OF CARE
Spoke to ICU team earlier. Also spoke to Dr Crescencio Vance. Effient has led to poor outcomes in stroke including more intracranial hemorrhage then Brilinta. Dr. Crescencio Vance feels Plavix would be have less risk as monotherapy for GI bleed in the interim as compared to aspirin or Brilinta. Please call with additional questions or concerns.

## 2022-10-19 ENCOUNTER — APPOINTMENT (OUTPATIENT)
Dept: GENERAL RADIOLOGY | Age: 67
DRG: 034 | End: 2022-10-19
Attending: INTERNAL MEDICINE
Payer: MEDICARE

## 2022-10-19 LAB
ALBUMIN SERPL-MCNC: 2.6 G/DL (ref 3.5–5.2)
ALP BLD-CCNC: 345 U/L (ref 40–129)
ALT SERPL-CCNC: <5 U/L (ref 0–40)
ANION GAP SERPL CALCULATED.3IONS-SCNC: 16 MMOL/L (ref 7–16)
ANION GAP SERPL CALCULATED.3IONS-SCNC: 24 MMOL/L (ref 7–16)
AST SERPL-CCNC: 20 U/L (ref 0–39)
B.E.: 8.3 MMOL/L (ref -3–3)
BASOPHILS ABSOLUTE: 0.02 E9/L (ref 0–0.2)
BASOPHILS RELATIVE PERCENT: 0.3 % (ref 0–2)
BILIRUB SERPL-MCNC: 0.5 MG/DL (ref 0–1.2)
BUN BLDV-MCNC: 20 MG/DL (ref 6–23)
BUN BLDV-MCNC: 21 MG/DL (ref 6–23)
CALCIUM IONIZED: 1.15 MMOL/L (ref 1.15–1.33)
CALCIUM SERPL-MCNC: 8.3 MG/DL (ref 8.6–10.2)
CALCIUM SERPL-MCNC: 9 MG/DL (ref 8.6–10.2)
CHLORIDE BLD-SCNC: 91 MMOL/L (ref 98–107)
CHLORIDE BLD-SCNC: 93 MMOL/L (ref 98–107)
CO2: 29 MMOL/L (ref 22–29)
CO2: 34 MMOL/L (ref 22–29)
COHB: 1.2 % (ref 0–1.5)
COMMENT: ABNORMAL
CREAT SERPL-MCNC: 1.7 MG/DL (ref 0.7–1.2)
CREAT SERPL-MCNC: 1.9 MG/DL (ref 0.7–1.2)
CRITICAL: ABNORMAL
DATE ANALYZED: ABNORMAL
DATE OF COLLECTION: ABNORMAL
EOSINOPHILS ABSOLUTE: 0.32 E9/L (ref 0.05–0.5)
EOSINOPHILS RELATIVE PERCENT: 4.7 % (ref 0–6)
GFR SERPL CREATININE-BSD FRML MDRD: 39 ML/MIN/1.73
GFR SERPL CREATININE-BSD FRML MDRD: 44 ML/MIN/1.73
GLUCOSE BLD-MCNC: 80 MG/DL (ref 74–99)
GLUCOSE BLD-MCNC: 95 MG/DL (ref 74–99)
HCO3: 33.5 MMOL/L (ref 22–26)
HCT VFR BLD CALC: 25.7 % (ref 37–54)
HEMOGLOBIN: 8.3 G/DL (ref 12.5–16.5)
HHB: 13.9 % (ref 0–5)
IMMATURE GRANULOCYTES #: 0.03 E9/L
IMMATURE GRANULOCYTES %: 0.4 % (ref 0–5)
LAB: ABNORMAL
LYMPHOCYTES ABSOLUTE: 1 E9/L (ref 1.5–4)
LYMPHOCYTES RELATIVE PERCENT: 14.7 % (ref 20–42)
Lab: ABNORMAL
MAGNESIUM: 1.7 MG/DL (ref 1.6–2.6)
MAGNESIUM: 1.7 MG/DL (ref 1.6–2.6)
MCH RBC QN AUTO: 29.3 PG (ref 26–35)
MCHC RBC AUTO-ENTMCNC: 32.3 % (ref 32–34.5)
MCV RBC AUTO: 90.8 FL (ref 80–99.9)
METER GLUCOSE: 103 MG/DL (ref 74–99)
METER GLUCOSE: 81 MG/DL (ref 74–99)
METER GLUCOSE: 96 MG/DL (ref 74–99)
METER GLUCOSE: 98 MG/DL (ref 74–99)
METER GLUCOSE: 99 MG/DL (ref 74–99)
METHB: 0.5 % (ref 0–1.5)
MODE: ABNORMAL
MONOCYTES ABSOLUTE: 0.63 E9/L (ref 0.1–0.95)
MONOCYTES RELATIVE PERCENT: 9.2 % (ref 2–12)
NEUTROPHILS ABSOLUTE: 4.82 E9/L (ref 1.8–7.3)
NEUTROPHILS RELATIVE PERCENT: 70.7 % (ref 43–80)
O2 SATURATION: 88.3 % (ref 92–98.5)
O2HB: 84.4 % (ref 94–97)
OPERATOR ID: ABNORMAL
PATIENT TEMP: 37 C
PCO2: 50.4 MMHG (ref 35–45)
PDW BLD-RTO: 14.2 FL (ref 11.5–15)
PH BLOOD GAS: 7.44 (ref 7.35–7.45)
PHOSPHORUS: 4.6 MG/DL (ref 2.5–4.5)
PLATELET # BLD: 178 E9/L (ref 130–450)
PMV BLD AUTO: 10.2 FL (ref 7–12)
PO2: 53.2 MMHG (ref 75–100)
POTASSIUM SERPL-SCNC: 3.8 MMOL/L (ref 3.5–5)
POTASSIUM SERPL-SCNC: 3.8 MMOL/L (ref 3.5–5)
RBC # BLD: 2.83 E12/L (ref 3.8–5.8)
SODIUM BLD-SCNC: 143 MMOL/L (ref 132–146)
SODIUM BLD-SCNC: 144 MMOL/L (ref 132–146)
SOURCE, BLOOD GAS: ABNORMAL
THB: 9 G/DL (ref 11.5–16.5)
TIME ANALYZED: 633
TOTAL PROTEIN: 5.7 G/DL (ref 6.4–8.3)
WBC # BLD: 6.8 E9/L (ref 4.5–11.5)

## 2022-10-19 PROCEDURE — 6370000000 HC RX 637 (ALT 250 FOR IP): Performed by: NURSE PRACTITIONER

## 2022-10-19 PROCEDURE — 6370000000 HC RX 637 (ALT 250 FOR IP): Performed by: SURGERY

## 2022-10-19 PROCEDURE — 82962 GLUCOSE BLOOD TEST: CPT

## 2022-10-19 PROCEDURE — P9047 ALBUMIN (HUMAN), 25%, 50ML: HCPCS

## 2022-10-19 PROCEDURE — 97535 SELF CARE MNGMENT TRAINING: CPT

## 2022-10-19 PROCEDURE — 36415 COLL VENOUS BLD VENIPUNCTURE: CPT

## 2022-10-19 PROCEDURE — 2580000003 HC RX 258

## 2022-10-19 PROCEDURE — 97164 PT RE-EVAL EST PLAN CARE: CPT

## 2022-10-19 PROCEDURE — 80053 COMPREHEN METABOLIC PANEL: CPT

## 2022-10-19 PROCEDURE — 2580000003 HC RX 258: Performed by: SURGERY

## 2022-10-19 PROCEDURE — 85025 COMPLETE CBC W/AUTO DIFF WBC: CPT

## 2022-10-19 PROCEDURE — 6360000002 HC RX W HCPCS: Performed by: INTERNAL MEDICINE

## 2022-10-19 PROCEDURE — C9113 INJ PANTOPRAZOLE SODIUM, VIA: HCPCS | Performed by: SURGERY

## 2022-10-19 PROCEDURE — 6360000002 HC RX W HCPCS: Performed by: NURSE PRACTITIONER

## 2022-10-19 PROCEDURE — 6360000002 HC RX W HCPCS

## 2022-10-19 PROCEDURE — 80048 BASIC METABOLIC PNL TOTAL CA: CPT

## 2022-10-19 PROCEDURE — 2700000000 HC OXYGEN THERAPY PER DAY

## 2022-10-19 PROCEDURE — 6360000002 HC RX W HCPCS: Performed by: SURGERY

## 2022-10-19 PROCEDURE — 2500000003 HC RX 250 WO HCPCS

## 2022-10-19 PROCEDURE — 97530 THERAPEUTIC ACTIVITIES: CPT

## 2022-10-19 PROCEDURE — 84100 ASSAY OF PHOSPHORUS: CPT

## 2022-10-19 PROCEDURE — 97168 OT RE-EVAL EST PLAN CARE: CPT

## 2022-10-19 PROCEDURE — 82330 ASSAY OF CALCIUM: CPT

## 2022-10-19 PROCEDURE — 94660 CPAP INITIATION&MGMT: CPT

## 2022-10-19 PROCEDURE — 71045 X-RAY EXAM CHEST 1 VIEW: CPT

## 2022-10-19 PROCEDURE — 2000000000 HC ICU R&B

## 2022-10-19 PROCEDURE — 94640 AIRWAY INHALATION TREATMENT: CPT

## 2022-10-19 PROCEDURE — 6370000000 HC RX 637 (ALT 250 FOR IP)

## 2022-10-19 PROCEDURE — 83735 ASSAY OF MAGNESIUM: CPT

## 2022-10-19 PROCEDURE — A4216 STERILE WATER/SALINE, 10 ML: HCPCS | Performed by: SURGERY

## 2022-10-19 RX ORDER — CLOPIDOGREL BISULFATE 75 MG/1
75 TABLET ORAL DAILY
Status: DISCONTINUED | OUTPATIENT
Start: 2022-10-20 | End: 2022-10-20

## 2022-10-19 RX ORDER — ASPIRIN 300 MG/1
300 SUPPOSITORY RECTAL ONCE
Status: COMPLETED | OUTPATIENT
Start: 2022-10-19 | End: 2022-10-19

## 2022-10-19 RX ORDER — ALBUMIN (HUMAN) 12.5 G/50ML
25 SOLUTION INTRAVENOUS EVERY 8 HOURS
Status: COMPLETED | OUTPATIENT
Start: 2022-10-19 | End: 2022-10-20

## 2022-10-19 RX ORDER — DEXTROSE AND SODIUM CHLORIDE 5; .9 G/100ML; G/100ML
INJECTION, SOLUTION INTRAVENOUS CONTINUOUS
Status: ACTIVE | OUTPATIENT
Start: 2022-10-19 | End: 2022-10-20

## 2022-10-19 RX ORDER — METOPROLOL TARTRATE 5 MG/5ML
2.5 INJECTION INTRAVENOUS EVERY 12 HOURS
Status: DISCONTINUED | OUTPATIENT
Start: 2022-10-19 | End: 2022-10-20

## 2022-10-19 RX ADMIN — ANTI-FUNGAL POWDER MICONAZOLE NITRATE TALC FREE: 1.42 POWDER TOPICAL at 20:02

## 2022-10-19 RX ADMIN — ANTI-FUNGAL POWDER MICONAZOLE NITRATE TALC FREE: 1.42 POWDER TOPICAL at 09:00

## 2022-10-19 RX ADMIN — PETROLATUM: 420 OINTMENT TOPICAL at 20:02

## 2022-10-19 RX ADMIN — Medication 10 MEQ: at 00:48

## 2022-10-19 RX ADMIN — BUMETANIDE 0.5 MG/HR: 0.25 INJECTION, SOLUTION INTRAMUSCULAR; INTRAVENOUS at 09:10

## 2022-10-19 RX ADMIN — MORPHINE SULFATE 1 MG: 2 INJECTION, SOLUTION INTRAMUSCULAR; INTRAVENOUS at 03:39

## 2022-10-19 RX ADMIN — EPOETIN ALFA-EPBX 6000 UNITS: 3000 INJECTION, SOLUTION INTRAVENOUS; SUBCUTANEOUS at 09:10

## 2022-10-19 RX ADMIN — METOPROLOL TARTRATE 2.5 MG: 5 INJECTION INTRAVENOUS at 15:04

## 2022-10-19 RX ADMIN — MICONAZOLE NITRATE: 2 OINTMENT TOPICAL at 20:02

## 2022-10-19 RX ADMIN — ASPIRIN 300 MG: 300 SUPPOSITORY RECTAL at 16:03

## 2022-10-19 RX ADMIN — MICONAZOLE NITRATE: 2 OINTMENT TOPICAL at 20:01

## 2022-10-19 RX ADMIN — ALBUMIN (HUMAN) 25 G: 12.5 SOLUTION INTRAVENOUS at 16:13

## 2022-10-19 RX ADMIN — MORPHINE SULFATE 1 MG: 2 INJECTION, SOLUTION INTRAMUSCULAR; INTRAVENOUS at 09:14

## 2022-10-19 RX ADMIN — ALBUMIN (HUMAN) 25 G: 12.5 SOLUTION INTRAVENOUS at 07:52

## 2022-10-19 RX ADMIN — Medication 10 MEQ: at 02:32

## 2022-10-19 RX ADMIN — HYDRALAZINE HYDROCHLORIDE 50 MG: 50 TABLET, FILM COATED ORAL at 06:00

## 2022-10-19 RX ADMIN — IPRATROPIUM BROMIDE AND ALBUTEROL SULFATE 1 AMPULE: .5; 2.5 SOLUTION RESPIRATORY (INHALATION) at 07:57

## 2022-10-19 RX ADMIN — MORPHINE SULFATE 1 MG: 2 INJECTION, SOLUTION INTRAMUSCULAR; INTRAVENOUS at 12:04

## 2022-10-19 RX ADMIN — POTASSIUM BICARBONATE 40 MEQ: 782 TABLET, EFFERVESCENT ORAL at 07:54

## 2022-10-19 RX ADMIN — IPRATROPIUM BROMIDE AND ALBUTEROL SULFATE 1 AMPULE: .5; 2.5 SOLUTION RESPIRATORY (INHALATION) at 12:37

## 2022-10-19 RX ADMIN — IPRATROPIUM BROMIDE AND ALBUTEROL SULFATE 1 AMPULE: .5; 2.5 SOLUTION RESPIRATORY (INHALATION) at 20:02

## 2022-10-19 RX ADMIN — IPRATROPIUM BROMIDE AND ALBUTEROL SULFATE 1 AMPULE: .5; 2.5 SOLUTION RESPIRATORY (INHALATION) at 16:31

## 2022-10-19 RX ADMIN — SODIUM CHLORIDE, PRESERVATIVE FREE 40 MG: 5 INJECTION INTRAVENOUS at 03:33

## 2022-10-19 RX ADMIN — DEXTROSE AND SODIUM CHLORIDE: 5; 900 INJECTION, SOLUTION INTRAVENOUS at 15:04

## 2022-10-19 RX ADMIN — COLLAGENASE SANTYL: 250 OINTMENT TOPICAL at 09:00

## 2022-10-19 RX ADMIN — PETROLATUM: 420 OINTMENT TOPICAL at 10:00

## 2022-10-19 RX ADMIN — SODIUM CHLORIDE, PRESERVATIVE FREE 40 MG: 5 INJECTION INTRAVENOUS at 15:57

## 2022-10-19 RX ADMIN — MICONAZOLE NITRATE: 2 OINTMENT TOPICAL at 10:47

## 2022-10-19 ASSESSMENT — PAIN SCALES - WONG BAKER
WONGBAKER_NUMERICALRESPONSE: 2
WONGBAKER_NUMERICALRESPONSE: 2

## 2022-10-19 ASSESSMENT — PAIN DESCRIPTION - LOCATION
LOCATION: FOOT

## 2022-10-19 ASSESSMENT — PAIN - FUNCTIONAL ASSESSMENT
PAIN_FUNCTIONAL_ASSESSMENT: PREVENTS OR INTERFERES SOME ACTIVE ACTIVITIES AND ADLS

## 2022-10-19 ASSESSMENT — PAIN SCALES - GENERAL
PAINLEVEL_OUTOF10: 1
PAINLEVEL_OUTOF10: 4
PAINLEVEL_OUTOF10: 0
PAINLEVEL_OUTOF10: 9
PAINLEVEL_OUTOF10: 1
PAINLEVEL_OUTOF10: 5
PAINLEVEL_OUTOF10: 6

## 2022-10-19 ASSESSMENT — PAIN DESCRIPTION - DESCRIPTORS
DESCRIPTORS: ACHING

## 2022-10-19 ASSESSMENT — PAIN DESCRIPTION - ORIENTATION
ORIENTATION: RIGHT;LEFT

## 2022-10-19 NOTE — PROGRESS NOTES
Physical Therapy    Physical Therapy Re-Assessment     Name: John Nance  : 1955  MRN: [id number]      Date of Service: 10/19/2022    Re-Evaluating PT:  Ana Garcia, PT, DPT  [id number]     Room #:  7181/6550-N  Diagnosis:  Acute cerebrovascular accident (CVA) ([facility] [I63.9]  PMHx/PSHx:   has a past medical history of Chronic back pain, CKD (chronic kidney disease), CVA (cerebral vascular accident) ([facility] CVA (cerebral vascular accident) ([facility] DM (diabetes mellitus) ([facility] Major depression, MI (myocardial infarction) ([facility] MATT (obstructive sleep apnea), Parkinson disease ([facility] PVD (peripheral vascular disease) ([facility] and Seizure ([place] [address] Procedure/Surgery:  L ICA angioplasty and stent ;  EGD 10/3;  Colonoscopy 10/9, Colonoscopy polypectomy 10/18  Precautions:  Falls, R foot wound, R foot wound vac, Aphasia, R hemiparesis, O2  *R foot WB status not clarified in chart. Will assume NWB d/t wound vac until clarified. Equipment Needs:  TBD - possible FWW    Reason for re-evaluation:  Change in status requiring transfer to intensive care. Multiple procedures since last time seen by PT. Date of re-evaluation:  10/19/22    SUBJECTIVE:    Pt lives with his caregiver. He reports 2 story home with 4+4 steps with 1 rail to enter. Reports full flight with B rails to second floor bedroom and bathroom. Ambulates with quad cane PTA. Per chart, pt wears an offloading shoe during ambulation on R foot. OBJECTIVE:   Re-Evaluation  Date: 10/19/22 Treatment Short Term/ Long Term   Goals   AM-PAC 6 Clicks /     Was pt agreeable to Re-Eval/treatment? yes     Does pt have pain? 6/10 R foot pain      Bed Mobility  Rolling: Min A  Supine to sit: Mod A  Sit to supine: Mod A  Scooting: Min A  Rolling: SBA  Supine to sit: SBA  Sit to supine: SBA  Scooting: SBA   Transfers Sit to stand:  Mod A x2  Stand to sit: Mod A x2  Stand pivot: NT  Sit to stand: Min A  Stand to sit: Min A  Stand pivot: Min A with AAD Ambulation    NT  >10 feet with AAD Mod A   Stair negotiation: ascended and descended  NT  NA   ROM BUE:  Per OT eval   BLE:  WFL     Strength BUE:  Per OT eval   RLE:  Grossly 2-/5 at hip, 2/5 at knee, 1/5 at ankle     Balance Sitting EOB:  SBA static, Min A dynamic   Dynamic Standing: Mod A x2 with FWW  Sitting EOB:  SBA  Dynamic Standing:  Min A     Pt is A & O x 2-3  RASS:  0  CAM-ICU:  Positive   Sensation:  Pt reports numbness and tingling to B feet  Edema:  Unremarkable     Vitals:  Blood Pressure at rest 138/82 mmHg  Blood Pressure post session 138/57 mmHg   Heart Rate at rest 112 bpm  Heart Rate post session 114 bpm    SPO2 at rest 97%  SPO2 post session 98%          Functional Status Score-Intensive Care Unit (FSS-ICU)   Rolling 4/7   Supine to sit transfer 3/7   Unsupported sitting  4/7   Sit to stand transfers 2/7   Ambulation 0/7   Total  13/35     Therapeutic Exercises:    BLE AAROM at EOB and in supine: hip/knee bends x10 reps, ankle pumps x10 reps    Patient education  Pt educated on PT role, safety during functional mobility, attending to R, sitting balance/posture    Patient response to education:   Pt verbalized understanding Pt demonstrated skill Pt requires further education in this area   Yes  Yes  Yes      RE ASSESSMENT:    Conditions Requiring Skilled Therapeutic Intervention:    [x]Decreased strength     []Decreased ROM  [x]Decreased functional mobility  [x]Decreased balance   [x]Decreased endurance   [x]Decreased posture  [x]Decreased sensation  [x]Decreased coordination   []Decreased vision  [x]Decreased safety awareness   [x]Increased pain       Comments:  Pt received supine and agreeable to PT re-evaluation with OT collaboration. Pt cleared for participation by RN prior to session. Vitals monitored during session. Pt demonstrates R hemiparesis. Pt also has wound vac on R. He does usually wear a post op shoe. Kept NWB for safety until clarified.   Pt able to get up to EOB and complete EOB activity. R posterolateral leaning in sitting in creased with dynamic activity. Completed STS at bedside with assist to grasp FWW on R.  Verbal cues for upright standing posture. Linens change. Pt assisted back  to bed and into chair position. Pt left with call button in reach, lines attached, and needs met. Treatment:  Patient practiced and was instructed in the following treatment:    Bed mobility training - pt given verbal and tactile cues to facilitate proper sequencing and safety during rolling and supine>sit as well as provided with physical assistance to complete task    Sitting EOB for >10 minutes for upright tolerance, postural awareness and BLE ROM   Sit<>stand training at EOB: verbal cues for hand/foot placement, physical assistance to achieve upright posture in standing, physical assistance with FWW, physical assistance to maintain NWB on R foot   Skilled positioning - Pt placed in the chair position with pillows utilized to facilitate upright posture, joint and skin integrity, and interaction with environment. Non-pharmacological treatment and prevention of ICU delirium - Pt oriented to date, time, time of day, place, and situation as well as provided with visual and auditory stimuli in order to improve cognition and combat effects of ICU delirium. Pt's/ family goals   1. Rehab     Prognosis is good for reaching above PT goals. Patient and or family understand(s) diagnosis, prognosis, and plan of care.   yes    PHYSICAL THERAPY PLAN OF CARE:    PT POC is established based on physician order and patient diagnosis     Referring provider/PT Order:  CONSTANTINE Brown - CNP / PT eval and treat  Diagnosis:  Acute cerebrovascular accident (CVA) [facility]) [I63.9]  Specific instructions for next treatment:  progress STS and transfers training     Current Treatment Recommendations:     [x] Strengthening to improve independence with functional mobility   [] ROM to improve independence with functional mobility   [x] Balance Training to improve static/dynamic balance and to reduce fall risk  [] Endurance Training to improve activity tolerance during functional mobility   [x] Transfer Training to improve safety and independence with all functional transfers   [x] Gait Training to improve gait mechanics, endurance and asses need for appropriate assistive device  [x] Stair Training in preparation for safe discharge home and/or into the community   [x] Positioning to prevent skin breakdown and contractures  [x] Safety and Education Training   [x] Patient/Caregiver Education   [x] HEP  [] Other     PT long term treatment goals are located in above grid    Frequency of treatments: 2-5x/week x 1-2 weeks. Time in  0955  Time out  1030    Total Treatment Time  23 minutes     Re-Evaluation Time includes thorough review of current medical information, gathering information on past medical history/social history and prior level of function, completion of standardized testing/informal observation of tasks, assessment of data and education on plan of care and goals.     CPT codes:  [] Low Complexity PT evaluation 45881  [] Moderate Complexity PT evaluation 74204  [] High Complexity PT evaluation 84773  [x] PT Re-evaluation 60118  [] Gait training 55635 -- minutes  [] Manual therapy 47321 -- minutes  [x] Therapeutic activities 73449 23 minutes  [] Therapeutic exercises 88457 - minutes  [] Neuromuscular reeducation 19635 -- minutes     Noris Hodge, PT, DPT  [id number]

## 2022-10-19 NOTE — PLAN OF CARE
Problem: Pain  Goal: Verbalizes/displays adequate comfort level or baseline comfort level  10/19/2022 0838 by Tiki Dai RN  Outcome: Progressing     Problem: Skin/Tissue Integrity  Goal: Absence of new skin breakdown  Description: 1. Monitor for areas of redness and/or skin breakdown  2. Assess vascular access sites hourly  3. Every 4-6 hours minimum:  Change oxygen saturation probe site  4. Every 4-6 hours:  If on nasal continuous positive airway pressure, respiratory therapy assess nares and determine need for appliance change or resting period. 10/19/2022 0838 by Tiki Dai RN  Outcome: Progressing     Problem: Safety - Adult  Goal: Free from fall injury  10/19/2022 0838 by Tiki Dai RN  Outcome: Progressing     Problem: ABCDS Injury Assessment  Goal: Absence of physical injury  10/19/2022 0838 by Tiki Dai RN  Outcome: Progressing     Problem: Chronic Conditions and Co-morbidities  Goal: Patient's chronic conditions and co-morbidity symptoms are monitored and maintained or improved  10/19/2022 0838 by Tiki Dai RN  Outcome: Progressing     Problem: Neurosensory - Adult  Goal: Achieves stable or improved neurological status  10/19/2022 0838 by Tiki Dai RN  Outcome: Progressing     Problem: Respiratory - Adult  Goal: Achieves optimal ventilation and oxygenation  10/19/2022 0838 by Tiki Dai RN  Outcome: Progressing     Problem: Skin/Tissue Integrity - Adult  Goal: Skin integrity remains intact  10/19/2022 0838 by Tiki Dai RN  Outcome: Progressing     Problem: Nutrition Deficit:  Goal: Optimize nutritional status  10/19/2022 0838 by Tiki Dai RN  Outcome: Progressing   Plan of care discussed with patient / family.

## 2022-10-19 NOTE — PLAN OF CARE
Problem: Discharge Planning  Goal: Discharge to home or other facility with appropriate resources  Outcome: Progressing     Problem: Pain  Goal: Verbalizes/displays adequate comfort level or baseline comfort level  Outcome: Progressing     Problem: Skin/Tissue Integrity  Goal: Absence of new skin breakdown  Description: 1. Monitor for areas of redness and/or skin breakdown  2. Assess vascular access sites hourly  3. Every 4-6 hours minimum:  Change oxygen saturation probe site  4. Every 4-6 hours:  If on nasal continuous positive airway pressure, respiratory therapy assess nares and determine need for appliance change or resting period.   Outcome: Progressing     Problem: Safety - Adult  Goal: Free from fall injury  Outcome: Progressing  Flowsheets (Taken 10/18/2022 2000 by Analisa Lopez RN)  Free From Fall Injury: Instruct family/caregiver on patient safety     Problem: ABCDS Injury Assessment  Goal: Absence of physical injury  Outcome: Progressing     Problem: Chronic Conditions and Co-morbidities  Goal: Patient's chronic conditions and co-morbidity symptoms are monitored and maintained or improved  Outcome: Progressing     Problem: Neurosensory - Adult  Goal: Achieves stable or improved neurological status  Outcome: Progressing  Goal: Remains free of injury related to seizures activity  Outcome: Progressing  Goal: Achieves maximal functionality and self care  Outcome: Progressing     Problem: Respiratory - Adult  Goal: Achieves optimal ventilation and oxygenation  Outcome: Progressing     Problem: Cardiovascular - Adult  Goal: Maintains optimal cardiac output and hemodynamic stability  Outcome: Progressing  Goal: Absence of cardiac dysrhythmias or at baseline  Outcome: Progressing     Problem: Skin/Tissue Integrity - Adult  Goal: Skin integrity remains intact  Outcome: Progressing  Goal: Incisions, wounds, or drain sites healing without S/S of infection  Outcome: Progressing  Goal: Oral mucous membranes remain intact  Outcome: Progressing     Problem: Metabolic/Fluid and Electrolytes - Adult  Goal: Electrolytes maintained within normal limits  Outcome: Progressing  Goal: Hemodynamic stability and optimal renal function maintained  Outcome: Progressing  Goal: Glucose maintained within prescribed range  Outcome: Progressing     Problem: Hematologic - Adult  Goal: Maintains hematologic stability  Outcome: Progressing     Problem: Nutrition Deficit:  Goal: Optimize nutritional status  Outcome: Progressing

## 2022-10-19 NOTE — FLOWSHEET NOTE
Inpatient Wound Care(follow up) 4405    Admit Date: 9/27/2022  5:45 PM    Reason for consult:  wound vac management    Findings:    10/19/22 0950   Wound 09/28/22 Foot Right;Lateral   Date First Assessed/Time First Assessed: 09/28/22 0944   Present on Hospital Admission: No  Location: Foot  Wound Location Orientation: Right;Lateral   Wound Image    Dressing Status New dressing applied   Wound Cleansed Cleansed with saline   Dressing/Treatment Pharmaceutical agent (see MAR); Negative pressure wound therapy   Wound Length (cm) 2.6 cm   Wound Width (cm) 2.6 cm   Wound Depth (cm) 0.6 cm   Wound Surface Area (cm^2) 6.76 cm^2   Change in Wound Size % (l*w) 26.84   Wound Volume (cm^3) 4. 056 cm^3   Wound Healing % 45   Wound Assessment Pink/red   Drainage Amount Scant   Drainage Description Serosanguinous   Odor None   Emily-wound Assessment   (calloused)   Negative Pressure Wound Therapy Foot Right;Plantar;Lateral   Placement Date/Time: 09/28/22 1040   Location: Foot  Wound Location Orientation: Right;Plantar;Lateral   Dressing Type Black Foam   Number of pieces used 3   Cycle Continuous   Target Pressure (mmHg) 125   Canister changed? Yes   Dressing Change Due 10/21/22     **Informed Consent**    photos taken of wound and inserted into their chart as part of their permanent medical record for purposes of documentation, treatment management and/or medical review. All Images taken on 10/19/22 of patient name: Eleno Cushing were transmitted and stored on secured [email or web address] located within [facility] by a registered [employer] Mobile Application Device.       Plan:  Wound vac dressing changed  Will follow    Jesus Alberto Gill RN 10/19/2022 11:22 AM

## 2022-10-19 NOTE — PROGRESS NOTES
Nephrology Progress Note  10/19/2022 8:37 AM  Subjective:   Admit Date: 9/27/2022  PCP: Kimberley Turner MD  Interval History:   10/18/22:for repeat colonoscopy today    10/19: colonoscopy performed yesterday, findings noted; patient pulled NG    Diet: Diet NPO Exceptions are: Sips of Water with Meds    Data:   Scheduled Meds:   albumin human  25 g IntraVENous Q8H    sodium chloride flush  5-40 mL IntraVENous 2 times per day    haloperidol  3 mg Oral TID    metoprolol succinate  50 mg Oral Daily    cloNIDine  0.1 mg Oral BID    aspirin  81 mg Per NG tube Daily    [Held by provider] clopidogrel  75 mg Oral Daily    QUEtiapine  50 mg Oral BID    sodium chloride flush  5-40 mL IntraVENous 2 times per day    miconazole nitrate   Topical BID    gabapentin  600 mg Oral 4x daily    amLODIPine  10 mg Oral Daily    white petrolatum   Topical BID    sodium chloride flush  5-40 mL IntraVENous 2 times per day    miconazole   Topical BID    epoetin sharath-epbx  6,000 Units SubCUTAneous Once per day on Mon Wed Fri    hydrALAZINE  50 mg Oral 3 times per day    insulin lispro  0-16 Units SubCUTAneous 4x Daily AC & HS    pantoprazole (PROTONIX) 40 mg injection  40 mg IntraVENous Q12H    sennosides  5 mL Oral Nightly    carbidopa-levodopa  1 tablet Per NG tube TID    ipratropium-albuterol  1 ampule Inhalation Q4H WA    collagenase   Topical Q MWF    benzonatate  100 mg Oral TID    ezetimibe  10 mg Oral Daily    [Held by provider] ticagrelor  90 mg Oral BID    atorvastatin  80 mg Oral Nightly     Continuous Infusions:   sodium chloride      bumetanide 0.1 mg/mL infusion 0.5 mg/hr (10/19/22 0829)    dexmedetomidine (PRECEDEX) IV infusion Stopped (10/18/22 1710)    sodium chloride      sodium chloride      dextrose       PRN Meds:sodium chloride flush, sodium chloride, morphine, hydrALAZINE, sodium chloride flush, sodium chloride, miconazole nitrate **AND** miconazole nitrate, sodium chloride flush, sodium chloride, labetalol, glucose, dextrose bolus **OR** dextrose bolus, glucagon (rDNA), dextrose  I/O last 3 completed shifts: In: 1175.4 [I.V.:654.1; IV Piggyback:521.4]  Out: 2788 [RAUOC:7887; IOEQU:2786]  I/O this shift:  In: -   Out: 400 [Urine:400]    Intake/Output Summary (Last 24 hours) at 10/19/2022 0837  Last data filed at 10/19/2022 0800  Gross per 24 hour   Intake 1031. 45 ml   Output 6425 ml   Net -5393.55 ml     CBC:   Recent Labs     10/17/22  0409 10/18/22  0419 10/19/22  0431   WBC 4.9 5.7 6.8   HGB 8.1* 7.7* 8.3*    149 178     BMP:    Recent Labs     10/18/22  1353 10/18/22  1954 10/19/22  0431    144 143   K 3.5 3.5 3.8   CL 95* 94* 93*   CO2 35* 32* 34*   BUN 18 21 20   CREATININE 1.7* 1.9* 1.7*   GLUCOSE 96 95 80     Hepatic:   Recent Labs     10/17/22  0409 10/18/22  0419 10/19/22  0431   AST 23 24 20   ALT 10 <5 <5   BILITOT 0.7 0.5 0.5   ALKPHOS 403* 392* 345*     Troponin: No results for input(s): TROPONINI in the last 72 hours. BNP: No results for input(s): BNP in the last 72 hours. Lipids: No results for input(s): CHOL, HDL in the last 72 hours. Invalid input(s): LDLCALCU  ABGs: No results found for: PHART, PO2ART, RMW1YSO  INR:   Recent Labs     10/17/22  0409 10/18/22  0419   INR 1.4 1.5       -----------------------------------------------------------------  RAD: CT HEAD WO CONTRAST    Result Date: 2022  Patient MRN:  [id number] : 1955 Age: 74 years Gender: Male Order Date:  2022 5:35 AM EXAM: CT HEAD WO CONTRAST NUMBER OF IMAGES:  200 INDICATION:  stroke evaluation after mechanical thrombectomy Please assign to read to Dr. Crystal Fernandez in [facility] stroke evaluation after mechanical thrombectomy What reading provider will be dictating this exam?->[facility] COMPARISON: None Technique: Low-dose CT  acquisition technique included one of following options; 1 . Automated exposure control, 2. Adjustment of MA and or KV according to patient's size or 3. Use of iterative reconstruction.  Multiple CT sections were obtained with sagittal and coronal MPR reconstructions. The ventricles are prominent. The gyri and sulci appear  prominent. The white matter appears  prominent. There is no evidence for hemorrhage. There is no infarct identified. There is no mass effect identified. There is no mass identified. Diffuse atrophy likely age related Findings compatible with small vessel ischemic changes. XR CHEST PORTABLE    Result Date: 2022  EXAMINATION: ONE XRAY VIEW OF THE CHEST 2022 6:57 am COMPARISON: None. HISTORY: ORDERING SYSTEM PROVIDED HISTORY: intubated TECHNOLOGIST PROVIDED HISTORY: Reason for exam:->intubated What reading provider will be dictating this exam?->[facility] FINDINGS: There is stable position of the endotracheal tube and NG tube Multifocal bilateral airspace disease is again noted unchanged compared to prior study and most prominent within the lower lobes. There trace bilateral pleural effusions. 1. Multifocal bilateral airspace disease more prominent within the lower lobes. The airspace disease is unchanged when compared with the prior study. MRI BRAIN WO CONTRAST    Result Date: 2022  Patient MRN:  [id number] : 1955 Age: 74 years Gender: Male Order Date:  2022 3:24 PM EXAM: MRI BRAIN WO CONTRAST NUMBER OF IMAGES:  605 INDICATION:  Stroke Evaluation Mechanical Thrombectomy MRI of the brain 24 hours following mechanical thrombectomy. Discontinue order after first follow up. Please assign to Dr. Ophelia Pulido to read in [facility] Stroke Evaluation Mechanical Thrombectomy What reading provider will be dictating this exam?->[facility] COMPARISON: CT scan 2022 Total sequences obtained: 9 The ventricles are prominent. The gyri and sulci appear  prominent. The white matter appears  prominent. No convincing hemorrhage identified. There is no mass effect identified. There is no mass identified.  There is a small region of restricted diffusion measuring approximately 3 mm seen in the left parietal lobe and a similar finding present in the left posterior frontal lobe. No other restricted diffusion is seen to suggest acute infarct. There is a small region of susceptibility infarct in the left frontal lobe. There are 2 small regions of petechial infarcts one in the left posterior frontal lobe and a second in the left parietal lobe not exceeding 3 mm. There is otherwise extensive small vessel ischemic changes       Objective:   Vitals: BP (!) 123/51   Pulse (!) 110   Temp 98.8 °F (37.1 °C) (Temporal)   Resp (!) 34   Ht 5' 8\" (1.727 m)   Wt 225 lb 9.6 oz (102.3 kg)   SpO2 100%   BMI 34.30 kg/m²   General appearance: appears stated age   Skin:  No rashes or lesions  HEENT: Head: Normocephalic, no lesions, without obvious abnormality. Neck: no adenopathy, no carotid bruit, no JVD, supple, symmetrical, trachea midline, and thyroid not enlarged, symmetric, no tenderness/mass/nodules  Lungs: diminished breath sounds bibasilar  Heart: regular rate and rhythm, S1, S2 normal, no murmur, click, rub or gallop  Abdomen: soft, non-tender; bowel sounds normal; no masses,  no organomegaly  Extremities: edema +  Neurologic: Mental status: Alert, oriented, thought content appropriate       Patient Active Problem List:     Acute cerebrovascular accident (CVA) ([address]     Acute respiratory failure with hypoxia ([address]     Stenosis of left carotid artery     Hypoalbuminemia     Electrolyte imbalance     Hypernatremia     Anemia     GI bleeding    Assessment/Plans     1. Acute CVA  S/p IR intervention with angioplasty of the left ICA     2. LARY on CKD stage 3b  Baseline 1.7-1.9  combined effects of ACEI use and contrast nephrotoxicity  Component of urinary retention  nonoliguric at this time  Good response to bumex drip  Decrease dose to 0.5mg/hr  Cr back to baseline     3. Hypertensive emergency  presenting  with acute CVA  BP now controlled on current antihypertensive regimen        4.  Anemia, chronic with acute worsening  stool heme positive  PRBC transfusion as needed  Sp egd/c scope  repeat colonoscopy today 10/18, findings noted  On ASHLEY      5 Urinary retention  Suspected situational  Now with trejo  Urology following      6.  Acute hypoxic resp failure   HFpEF   CXR 10/18 still with significant bilateral pulm congestion and pleural effusion  On Bumex drip at this time with good response; dose decreased to 0.5mg/hr      D/W ICU Team    MD Fabiola Medellin MD

## 2022-10-19 NOTE — PROGRESS NOTES
Hospitalist Progress Note      PCP: Shun Roca MD    Date of Admission: 9/27/2022  Days in the hospital: [address] [place] [address] [address] Course:   Patient is a 77-year-old male with history of CKD, diabetes mellitus, obstructive sleep apnea who initially presented to [facility] with strokelike symptoms and right-sided weakness along with aphasia. Patient was noted to have severe left ICA stenosis. He was transferred to [facility] on September 27 and patient underwent left ICA angioplasty and stent placement by IR. He was initially admitted to the neuro ICU. He was placed on dual antiplatelet therapy with Brilinta and aspirin. Also was noted to have GI bleed and received 15 units of PRBC. EGD was done on 10/3/2022 and no evidence of upper GI bleed noted. Colonoscopy on 10/9/2022 showed large amount of blood. CT of the abdomen did not show any localization of hemorrhage. Nuclear medicine scan was done which showed bleeding around the splenic flexure. RRT was called due to hypotension and hemorrhagic shock and patient was transferred to ICU. Due to diffuse bleeding patient was taken off dual antiplatelet therapy. He is planned for colonoscopy with GI once he has been adequately prepped. He had acute respiratory distress on 10/13/2022 and had to be placed on Bumex for volume overload. Subjective  Patient seen and examined at bedside. Patient is doing well. No chest pain or shortness of breath. He is on oxygen nasal cannula 2 L/min. Exam:    BP (!) 133/53   Pulse (!) 101   Temp 99.1 °F (37.3 °C) (Temporal)   Resp 25   Ht 5' 8\" (1.727 m)   Wt 225 lb 9.6 oz (102.3 kg)   SpO2 99%   BMI 34.30 kg/m²     HEENT: + Pallor, no icterus. Respiratory:  CTA, decreased air entry  Cardiovascular: RRR, no murmur. Anasarca  Abdomen: Soft, non-tender, BS noted. Musculoskeletal: Right foot dressing and wound VAC noted. Neurologic: Alert and oriented x3.   Nonfocal Assessment/Plan:  Acute CVA with left ICA stenosis s/p angioplasty and stent placement, dual antiplatelet therapy on hold, continue serial neurochecks, follow-up with neurology    Acute lower GI bleed s/p multiple units of PRBC transfusion. Last one in October 13. Bleeding noted from splenic flexure. Colonoscopy on October 18 negative for active bleeding. Acute hypoxic respiratory failure/distress, follow-up with critical care team, chest x-ray reviewed; clinically significantly better. Currently on oxygen high flow nasal cannula 3 L/min    Acute blood loss anemia status post 15 unit blood transfusion, H&H remains low but stable, monitor need for further transfusions. Last transfusion on October 13    Volume overload, significantly better. Currently on Bumex drip    Acute kidney injury, nephrology following; strict I/O.   Creatinine 1.9, drifting up    Right foot nonhealing wound, on Cipro and Zyvox per ID, continue with local wound care, follow-up with podiatry    Obstructive sleep apnea on CPAP    Altered mental status secondary to metabolic encephalopathy, continue to monitor closely     Overall prognosis remains guarded    Plan  Discontinue Bumex drip  Replete magnesium and potassium  Speech therapy consult for swallow evaluation    Labs:   Recent Labs     10/17/22  0409 10/18/22  0419 10/19/22  0431   WBC 4.9 5.7 6.8   HGB 8.1* 7.7* 8.3*   HCT 25.4* 23.7* 25.7*    149 178     Recent Labs     10/18/22  0419 10/18/22  1353 10/18/22  1954 10/19/22  0431 10/19/22  1334    144 144 143 144   K 3.0* 3.5 3.5 3.8 3.8   CL 96* 95* 94* 93* 91*   CO2 35* 35* 32* 34* 29   BUN 20 18 21 20 21   CREATININE 1.7* 1.7* 1.9* 1.7* 1.9*   CALCIUM 8.1* 8.6 8.5* 8.3* 9.0   PHOS 5.1* 4.6*  --  4.6*  --      Recent Labs     10/17/22  0409 10/18/22  0419 10/19/22  0431   AST 23 24 20   ALT 10 <5 <5   BILITOT 0.7 0.5 0.5   ALKPHOS 403* 392* 345*     Recent Labs     10/17/22  0409 10/18/22  0419   INR 1.4 1.5     No results for input(s): Cindy Watts in the last 72 hours. Medications:  Reviewed    Infusion Medications    dextrose 5 % and 0.9 % NaCl 20 mL/hr at 10/19/22 1504    bumetanide 0.1 mg/mL infusion 0.5 mg/hr (10/19/22 1412)    dexmedetomidine (PRECEDEX) IV infusion Stopped (10/18/22 1710)    sodium chloride      dextrose       Scheduled Medications    albumin human  25 g IntraVENous Q8H    [START ON 10/20/2022] clopidogrel  75 mg Oral Daily    metoprolol  2.5 mg IntraVENous Q12H    [Held by provider] metoprolol succinate  50 mg Oral Daily    [Held by provider] cloNIDine  0.1 mg Oral BID    [Held by provider] QUEtiapine  50 mg Oral BID    miconazole nitrate   Topical BID    [Held by provider] gabapentin  600 mg Oral 4x daily    [Held by provider] amLODIPine  10 mg Oral Daily    white petrolatum   Topical BID    sodium chloride flush  5-40 mL IntraVENous 2 times per day    miconazole   Topical BID    epoetin sharath-epbx  6,000 Units SubCUTAneous Once per day on Mon Wed Fri    [Held by provider] hydrALAZINE  50 mg Oral 3 times per day    insulin lispro  0-16 Units SubCUTAneous 4x Daily AC & HS    pantoprazole (PROTONIX) 40 mg injection  40 mg IntraVENous Q12H    sennosides  5 mL Oral Nightly    [Held by provider] carbidopa-levodopa  1 tablet Per NG tube TID    ipratropium-albuterol  1 ampule Inhalation Q4H WA    collagenase   Topical Q MWF    [Held by provider] benzonatate  100 mg Oral TID    [Held by provider] ezetimibe  10 mg Oral Daily    [Held by provider] atorvastatin  80 mg Oral Nightly     PRN Meds: sodium chloride flush, morphine, hydrALAZINE, miconazole nitrate **AND** miconazole nitrate, sodium chloride, labetalol, glucose, dextrose bolus **OR** dextrose bolus, glucagon (rDNA), dextrose      Intake/Output Summary (Last 24 hours) at 10/19/2022 1738  Last data filed at 10/19/2022 1700  Gross per 24 hour   Intake 806.59 ml   Output 5245 ml   Net -4438.41 ml     Body mass index is 34.3 kg/m².       Diet  Diet NPO Exceptions are: Sips of Water with Meds    Code Status  Full Code       Electronically signed by Dasha Khan MD on 10/19/2022 at 5:38 PM  [employer]   Please contact me through perfect serve    NOTE: This report was transcribed using voice recognition software. Every effort was made to ensure accuracy; however, inadvertent computerized transcription errors may be present.

## 2022-10-19 NOTE — PROGRESS NOTES
Coordination of care discussion and chart review with PM team. Pt seen in icu, he is alert and oriented. We reviewed his HCPOA which is [facility] [employer] [facility]. This is on file in soft chart. No immediate PM psychosocial needs identified for Ge Del Angel.  will remain available for on-going psychosocial support, as needed.

## 2022-10-19 NOTE — PROGRESS NOTES
[id number]  [address] [place] [address]       HSBW:                                                               Patient Name: Renetta Johnson  MRN: [id number]  : 1955  Room: [address] [place] [address]    Re-evaluating OT: Gabirele Sample OTD, OTR/L, [id number]    Evaluating OT: Gabriele Sample, OTD,  OTR/L; [id number]    Re-evaluation indicated s/p pt transferred to MICU with GI bleed requiring ICU monitoring. Referring Provider: Angela Joseph DO   Specific Provider Orders/Date: OT eval and treat (10/3/22)       Diagnosis: Acute cerebrovascular accident (CVA) ([address] [I63.9]     Reason for admission: Pt admitted with Acute CVA, R heel wound. Surgery/Procedures:   10/3: EGD   10/9: Colonoscopy  10/15: Colonoscopy  10/18: Colonoscopy    Pertinent Medical History:    Past Medical History:   Diagnosis Date    Chronic back pain     CKD (chronic kidney disease)     CVA (cerebral vascular accident) ([address]     CVA (cerebral vascular accident) ([address]     DM (diabetes mellitus) ([address]     Major depression     MI (myocardial infarction) ([address]     MATT (obstructive sleep apnea)     Parkinson disease (HCC)     PVD (peripheral vascular disease) ([address]     Seizure ([address]           *Precautions:  Fall Risk, O2, R shaista, expressive > receptive aphasia, RLE wound with wound vac, NWB RLE, off-loading shoe to RLE. Assessment of current deficits   [x] Functional mobility  [x]ADLs  [x] Strength               [x]Cognition   [x] Functional transfers   [x] IADLs         [x] Safety Awareness   [x]Endurance   [x] Fine Coordination        [x] ROM     [x] Vision/perception   []Sensation    [x]Gross Motor Coordination [x] Balance   [] Delirium                  [x]Motor Control     [x] Communication    OT PLAN OF CARE   OT POC based on physician orders, patient diagnosis and results of clinical assessment.        Frequency/Duration: 1-3 days/wk for 1-2 weeks PRN    Specific OT Treatment Interventions to include:   * Instruction/training on adapted ADL techniques and AE recommendations to increase functional independence within precautions       * Training on energy conservation strategies, correct breathing pattern and techniques to improve independence/tolerance for self-care routine  * Functional transfer/mobility training/DME recommendations for increased independence, safety, and fall prevention  * Patient/Family education to increase follow through with safety techniques and functional independence  * Recommendation of environmental modifications for increased safety with functional transfers/mobility and ADLs  * Cognitive retraining/development of therapeutic activities to improve problem solving, judgement, memory, and attention for increased safety/participation in ADL/IADL tasks  * Sensory re-education to improve body/limb awareness, maintain/improve skin integrity, and improve hand/UE motor function  * Visual-perceptual training to improve environmental scanning, visual attention/focus, and oculomotor skills for increased safety/independence with functional transfers/mobility and ADLs  * Splinting/positioning for increased function, prevention of contractures, and improve skin integrity  * Therapeutic exercise to improve motor endurance, ROM, and functional strength for ADLs/functional transfers  * Therapeutic activities to facilitate/challenge dynamic balance, stand tolerance for increased safety and independence with ADLs  * Therapeutic activities to facilitate gross/fine motor skills for increased independence with ADLs  * Neuro-muscular re-education: facilitation of righting/equilibrium reactions, midline orientation, scapular stability/mobility, normalization of muscle tone, and facilitation of volitional active controled movement  * Positioning to improve skin integrity, interaction with environment and functional independence  * Delirium prevention/treatment  * Manual techniques for edema management    Modified [place] Scale   Score     Description  0             No symptoms  1             No significant disability despite symptoms  2             Slight disability; able to look after own affairs  3             Moderate disability; able to ambulate without assist/ requires assist with ADLs  4             Moderate/Severe disability;requires assist to ambulate/assist with ADLs  5             Severe disability;bedridden/incontinent   6               Score:   4    Recommended Adaptive Equipment: tub bench, LB AE, TBD pending progress     Home Living: Pt lives with family  in a 2 story home with 4+4-5 step(s) to enter and 1 rail(s); bed/bath on 2nd floor  Bathroom setup: tub/shower  Equipment owned: grab bars in shower, quad cane    Prior Level of Function: Ind with ADLs; Ind with IADLs. Quad cane for functional mobility. Driving: Unknown  Occupation: None stated    Pain Level: pt c/o 6/10 pain in R foot at wound site. Positioning provided. Cognition: A&O: 2/4; unable to report location/month; Follows 2 step commands; answers appropriately. Memory: G-   Comprehension: G-   Problem solving: F+   Judgement/safety: F+               Communication skills: Impaired; difficulty with word finding- increased time; easily frustrated. Vision: Impaired peripherals; requires increases font size/contrast/lighting. Glasses: Yes                                                   Hearing: WFL     RASS: 0  CAM-ICU: (NT) Delirium    UE Assessment:  Hand Dominance: Right []  Left []   ? ROM Strength STM goal: PRN   RUE  AROM  Shoulder flx: 0-50  Elbow: ~20 AROM  Wrist: ~10 AROM  Hand: digits 4,5 flx contracture.     PROM  Shoulder: 0-110  Elbow: WFL  Wrist: WFL  Hand: WFL Shoulder: 1/5  Elbow flx: 2+/5; ext: 2/5  Wrist flx: 2-/5; ext: 1/5  Thumb: 2-/5  : Poor+  FMC: Poor  GMC: Fair- Increase RUE strength for participation in ADLs.    Demonstrate good knowledge of adaptive techniques for dressing. Good understanding of AAROM exercises. LUE WFL Proximal 4+/5  Elbow: 5/5  : Good  FMC: WFL  GMC: WFL Increase overall LUE strength       Sensation: c/o numbness/tinging to B feet. Tone: Hypertonicity in RUE flexors. Edema: Unremarkable. Functional Assessment:  AM-PAC Daily Activity Raw Score: 11/24   Initial Eval Status  Date: 10/3/22 Re-eval Status  Date: 10/19/22 Treatment Status  Date:  STGs = LTGs  Time frame: 7-14 days   Feeding Min A  To drink from cup with BUE for stabilization. Min A  For bilateral tasks                      S; set-up       Grooming Max A overall  Min A to wash face with LUE. Verbal cues for use of RUE. Max A                      Min A        UB dressing/bathing Max A Mod A  Pt demo fair use of shaista techniques to don gown in semi supine. Min A  With use of shaista techniques       LB dressing/bathing Dep Dep                      Mod A   With use of shaista techniques       Toileting Dep Dep                      Mod A     Bed Mobility  Supine to sit:   Mod A    Sit to supine:   NT  Pt seated in chair upon exit. Supine to sit:   Mod Ax2    Sit to supine:   NT  Pt seated in chair upon exit. SBA     Functional Transfers Sit to stand: Mod A x2    Stand to sit:   Mod A x2    Stand Pivot: Mod A x2  Verbal cues to maintain NWB of RLE. Sit to stand: Mod A x2    Stand to sit:   Mod A x2    Stand Pivot: Mod A x2      Fair maintenance of NWB to RLE requiring verbal cues. Mod A     Functional Mobility NT NT                      Mod A        Balance Sitting:     Static: Min A    Dynamic: Mod A  Standing: Mod A x2 Sitting:     Static: Min A<>SBA    Dynamic: Mod A  Standing: Mod A x2  Sitting:     Static: SBA    Dynamic: Min A  Standing: Mod A                   Endurance/Activity Tolerance   fair tolerance with light activity.     Fair- tolerance with light activity. WFL  For full ADL. Visual/  Perceptual Impaired: Pt reporting vision is \"dimming. \" Impaired peripheral vision with assessment, improving at midline. Impaired peripheral vision. Pt requires increased lighting, font size, and contrast to read board and small font. Vitals:   HR at rest: 112 bpm HR at end of session: 113 bpm   Spo2 at rest: 96% Spo2 at end of session 97%   BP at rest: 138/60 mmHg BP at end of session 138/57 mmHg       Treatment: OT treatment provided this date includes:   Instruction/training on safety and adapted techniques for completion of ADLs: Pt demo fair use of shaista techniques to don gown. Instruction/verbal cues provided to increase independence and safety in self-care. Instruction/training on safe bed mobility/functional mobility/transfer techniques: Assist to sequence with focus on safety, body mechanics, and precautions. Facilitation of Visual Perceptual Skills for increased safety and independence with ADLs. Adaptive techniques to improve comprehension of written communication d/t visual impairment. Proper Positioning/Alignment: Pt properly positioned in chair position in bed with BUE supported and heels offloaded for optimal healing, skin integrity, to prevent breakdown, decrease edema, and reduce risk of contracture. Skilled Monitoring of Vitals: to include BP, spO2, and HR throughout session to maximize safety. Delirium Prevention: Environmental and sensory modifications assessed and implemented to decrease ICU acquired delirium and to improve overall orientation, mentation and pt interaction with family/staff. Line management and environmental modifications made prior to and end of session to ensure patient safety and to increase efficiency of session. Skilled monitoring of HR, O2 saturation, blood pressure and patient's response to activity performed throughout session.     Comments: Pt case discussed in rounds, OK from RN to see patient. Upon arrival, patient semi supine in bed. Pt pleasant and agreeable to participate in therapy session. Pt demo fair tolerance with fair+ understanding of education/techniques. At end of session, patient properly positioned in chair position in bed with call light within reach, all lines and tubes intact. Pt instructed on use of call light for assistance and fall prevention. Nursing notified of patient positioning. Patient presents with decreased ROM/strength, activity tolerance, dynamic balance, functional mobility limiting completion of ADLs and safety. Pt can benefit from intensive OT services to increase safety, functional independence and quality of life. Rehab Potential: Good for established goals    Patient / Family Goal: to return to PLOF    Patient and/or family were instructed/educated on diagnosis, prognosis/goals and plan of care. Patient demonstrated good understanding. Evaluation Complexity: re-eval    Time In: 0958           Time Out: 1036         Total Treatment time: 23 min   Min Units   OT Eval Low 87307     OT Eval Medium 68761     OT Eval High 19727     OT Re-Eval 64847 X    Therapeutic Ex 76110     Therapeutic Activities 19851 15 1   ADL/Self Care 55239 8 1   Orthotic Management 44492     Neuro Re-Ed 63645     Non-Billable Time        Evaluation time includes thorough review of current medical information, gathering information on past medical history/social history and prior level of function, completion of standardized testing/informal observation of tasks, assessment of data and development of POC/Goals.      Gabriele Sample, OTD,  OTR/L; [id number]

## 2022-10-19 NOTE — PROGRESS NOTES
Department of Podiatry   Progress Note      Mr. Diego Schwartz was seen and evaluated at bedside this morning. Patient is awake with sitter at bedside. No pedal complains at this time.     Past Medical History:            Diagnosis Date    Chronic back pain     CKD (chronic kidney disease)     CVA (cerebral vascular accident) ([facility] [address]     CVA (cerebral vascular accident) ([facility] [address]     DM (diabetes mellitus) ([facility] [address]     Major depression     MI (myocardial infarction) ([facility] [address]     MATT (obstructive sleep apnea)     Parkinson disease ([facility] [address]     PVD (peripheral vascular disease) ([facility] [address]     Seizure ([facility] [address]        Past Surgical History:        Procedure Laterality Date    COLONOSCOPY N/A 10/9/2022    COLONOSCOPY DIAGNOSTIC performed by Lidya Arzate MD at [address] [place] [address] N/A 10/18/2022    COLONOSCOPY POLYPECTOMY SNARE/COLD BIOPSY performed by Ileana Carvajal MD at [place] [address]      IR CAROTID STENT UNI W PROTECTION  9/27/2022    IR CAROTID STENT UNI W PROTECTION 9/27/2022 Cynthia Martinez MD [facility]    UPPER GASTROINTESTINAL ENDOSCOPY N/A 10/3/2022    EGD DIAGNOSTIC ONLY performed by Kendall Waters MD at [facility] ENDOSCOPY       Medications Prior to Admission:    Medications Prior to Admission: apixaban (ELIQUIS) 5 MG TABS tablet, Take 5 mg by mouth 2 times daily  aspirin 81 MG chewable tablet, Take 81 mg by mouth daily  carbidopa-levodopa (SINEMET)  MG per tablet, Take 1 tablet by mouth 3 times daily  carvedilol (COREG) 25 MG tablet, Take 25 mg by mouth 2 times daily (with meals)  cetirizine (ZYRTEC) 10 MG tablet, Take 10 mg by mouth daily  clopidogrel (PLAVIX) 75 MG tablet, Take 75 mg by mouth daily  diphenhydrAMINE (BENADRYL) 25 MG capsule, Take 25 mg by mouth every 6 hours  ezetimibe (ZETIA) 10 MG tablet, Take 10 mg by mouth daily  fluticasone (FLONASE) 50 MCG/ACT nasal spray, 2 sprays by Each Nostril route daily  gabapentin (NEURONTIN) 600 MG tablet, Take 600 mg by mouth 4 times daily.  HYDROcodone-acetaminophen (NORCO) 5-325 MG per tablet, Take 1 tablet by mouth 2 times daily. Insulin Glargine, 2 Unit Dial, (TOUJEO MAX SOLOSTAR) 300 UNIT/ML SOPN, Inject 66 Units into the skin daily  piperacillin-tazobactam (ZOSYN) 3-0.375 GM per 50ML IVPB extended infusion, Infuse 4.5 mg intravenously in the morning and 4.5 mg at noon and 4.5 mg in the evening. acetaminophen (TYLENOL) 325 MG tablet, Take 650 mg by mouth every 6 hours as needed for Pain  amLODIPine (NORVASC) 5 MG tablet, Take 5 mg by mouth daily  benzonatate (TESSALON) 100 MG capsule, Take 100 mg by mouth 3 times daily  cloNIDine (CATAPRES) 0.1 MG tablet, Take 0.1 mg by mouth in the morning and 0.1 mg in the evening. hydrALAZINE (APRESOLINE) 100 MG tablet, Take 100 mg by mouth 3 times daily    Allergies:  Codeine    Social History:   TOBACCO:   has no history on file for tobacco use. ETOH:   has no history on file for alcohol use. DRUGS:   Social History     Substance and Sexual Activity   Drug Use Not on file       Family History:   History reviewed. No pertinent family history. REVIEW OF SYSTEMS:    All pertinent positives and negatives as noted in HPI       LOWER EXTREMITY EXAMINATION   -Dressings clean, dry and intact without dishevelment.   -Patient digits are warm to touch.   -Offloading pads noted    PHYSICAL EXAM AS OF 10/14/22:  VASCULAR:  DP and PT pulses are non palpable. CFT < 5 seconds B/L. Warm to warm from the tibial tuberosity to the distal aspect of the digits dorsally. NEUROLOGIC:  Protective sensation is diminished but grossly intact    DERM:  Right foot lateral plantar wound measuring approx 6cm in diameter. No erythema, serosanguinous drainage, no malodor.     MUSCULOSKELETAL: deferred    Picture as of 10/19/22      CONSULTS:  IP CONSULT TO CRITICAL CARE  IP CONSULT TO DIETITIAN  IP CONSULT TO CARDIOLOGY  IP CONSULT TO PODIATRY  IP CONSULT TO PODIATRY  IP CONSULT TO UROLOGY  IP CONSULT TO NEPHROLOGY  IP CONSULT TO GENERAL SURGERY  IP CONSULT TO INFECTIOUS DISEASES  IP CONSULT TO GI  IP CONSULT TO GENERAL SURGERY  IP CONSULT TO GENERAL SURGERY  IP CONSULT TO PALLIATIVE CARE  IP CONSULT TO DIETITIAN    MEDICATION:  Scheduled Meds:   albumin human  25 g IntraVENous Q8H    [START ON 10/20/2022] clopidogrel  75 mg Oral Daily    metoprolol succinate  50 mg Oral Daily    cloNIDine  0.1 mg Oral BID    QUEtiapine  50 mg Oral BID    miconazole nitrate   Topical BID    gabapentin  600 mg Oral 4x daily    amLODIPine  10 mg Oral Daily    white petrolatum   Topical BID    sodium chloride flush  5-40 mL IntraVENous 2 times per day    miconazole   Topical BID    epoetin sharath-epbx  6,000 Units SubCUTAneous Once per day on Mon Wed Fri    hydrALAZINE  50 mg Oral 3 times per day    insulin lispro  0-16 Units SubCUTAneous 4x Daily AC & HS    pantoprazole (PROTONIX) 40 mg injection  40 mg IntraVENous Q12H    sennosides  5 mL Oral Nightly    carbidopa-levodopa  1 tablet Per NG tube TID    ipratropium-albuterol  1 ampule Inhalation Q4H WA    collagenase   Topical Q MWF    benzonatate  100 mg Oral TID    ezetimibe  10 mg Oral Daily    atorvastatin  80 mg Oral Nightly     Continuous Infusions:   bumetanide 0.1 mg/mL infusion 0.5 mg/hr (10/19/22 0910)    dexmedetomidine (PRECEDEX) IV infusion Stopped (10/18/22 1710)    sodium chloride      dextrose       PRN Meds:.sodium chloride flush, morphine, hydrALAZINE, miconazole nitrate **AND** miconazole nitrate, sodium chloride, labetalol, glucose, dextrose bolus **OR** dextrose bolus, glucagon (rDNA), dextrose    RADIOLOGY:  XR CHEST PORTABLE   Final Result   The enteric tube has been removed, otherwise stable appearance of the chest   compared to 10/18/2022.          XR CHEST PORTABLE   Final Result   No interval change         US DUP LOWER EXTREMITIES BILATERAL VENOUS   Final Result   Within the visualized vessels there is no evidence for deep venous   thrombosis               XR CHEST PORTABLE Final Result   Worsening congestive heart failure suspected         XR CHEST PORTABLE   Final Result   Very minimal improvement seen in the aeration of the upper lung fields with   stable increased markings seen within the mid and lower lung fields   bilaterally with small bilateral pleural effusions. Heart remains enlarged. XR CHEST PORTABLE   Final Result   New right pleural effusion. XR CHEST PORTABLE   Final Result   Stable bilateral pulmonary infiltrates. XR ABDOMEN (KUB) (SINGLE AP VIEW)   Final Result   Catheter is in the stomach. XR ABDOMEN FOR NG/OG/NE TUBE PLACEMENT   Final Result   1. Gastric catheter is coiled in the pharynx and upper esophagus and should   be withdrawn and reinserted. Follow-up imaging recommended. XR CHEST PORTABLE   Final Result   Bilateral airspace disease with interval progression on the right. NM GI BLOOD LOSS   Final Result   No evidence of active GI bleeding during acquisition. XR CHEST PORTABLE   Final Result   Unchanged bilateral chest opacities with right pleural effusion evident. See   above. XR CHEST PORTABLE   Final Result   1. Persistent bilateral patchy parenchymal densities concerning for pneumonia   and or atelectasis   2. Persistent small bilateral pleural effusions, left greater than right   3. Cardiomegaly, stable         XR CHEST PORTABLE   Final Result   No significant change compared to the prior exam with cardiomegaly, bilateral   airspace opacities and bilateral pleural effusions seen most suggestive of   congestive heart failure, but superimposed pneumonia not excluded. US DUP ABD PEL RETRO SCROT COMPLETE   Final Result   1. No evidence of testicular torsion      2. Severe scrotal skin thickening. 3.  Trace hydroceles. 4.  No evidence of bowel containing inguinal hernia. US SCROTUM AND TESTICLES   Final Result   1. No evidence of testicular torsion      2.   Severe scrotal skin thickening. 3.  Trace hydroceles. 4.  No evidence of bowel containing inguinal hernia. XR CHEST PORTABLE   Final Result   1. No significant interval change in the appearance of the chest.      2.  Bilateral pleural effusions and areas of atelectasis or multifocal   pneumonia. CTA ABDOMEN PELVIS W CONTRAST   Final Result   Mixed densities throughout the colonic segments including in the ascending   colon and rectum could represent mixed densities of blood products although   no focal area of arterial enhancement or extravasation/blushing is observed   to localize acute hemorrhage. No evidence for perforation or abscess. No   pneumatosis intestinalis or portal venous gas evident. 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 No adjacent hematoma with only minimal   adjacent stranding. Bilateral pleural effusions moderate to large right and moderate left   effusions with adjacent atelectasis. Anasarca. XR CHEST PORTABLE   Final Result   Bilateral pleural effusions with bibasilar patchy infiltrates and mild   cardiomegaly. NM GI BLOOD LOSS   Final Result   Active GI bleeding identified during acquisition in the splenic flexure with   peristalsis identified into the proximal descending colon. RECOMMENDATIONS:   Unavailable         CT ABDOMEN PELVIS WO CONTRAST   Final Result   CHEST:      No evidence of neoplastic disease, allowing for limitations of noncontrast   technique. Moderate bilateral pleural effusions. Nonemergent incidental findings as above. ABDOMEN/PELVIS:      No evidence of neoplastic disease, allowing for limitations of noncontrast   technique. Bladder wall thickening is nonspecific given under distension; recommend   correlation urinalysis to evaluate for UTI. Nonemergent incidental findings as above. CT CHEST WO CONTRAST   Final Result   CHEST:      No evidence of neoplastic disease, allowing for limitations of noncontrast   technique. Moderate bilateral pleural effusions. Nonemergent incidental findings as above. ABDOMEN/PELVIS:      No evidence of neoplastic disease, allowing for limitations of noncontrast   technique. Bladder wall thickening is nonspecific given under distension; recommend   correlation urinalysis to evaluate for UTI. Nonemergent incidental findings as above. FL MODIFIED BARIUM SWALLOW W VIDEO   Final Result   1. Mildly impaired swallowing mechanism with swallowing delay and followed   by laryngeal penetration with thin liquid barium when using a straw. No   barium aspiration      2. Please see separate speech pathology report for full discussion of   findings and recommendations. RECOMMENDATIONS:   Unavailable         XR ABDOMEN (KUB) (SINGLE AP VIEW)   Final Result   Somewhat greater than average quantity of retained fecal material thin the   lower GI tract suggesting dysfunction with evacuation. XR CHEST PORTABLE   Final Result   1. Atherosclerotic disease and mild cardiomegaly. 2.  Persistent but improved opacities in the bilateral lungs most pronounced   in the mid and lower lungs. There appears to be a small left and trace right   pleural effusion         XR FOOT RIGHT (2 VIEWS)   Final Result   No evidence of calcaneal osteomyelitis. CT HEAD WO CONTRAST   Final Result   Parenchymal volume loss and chronic microvascular ischemic changes. No acute intracranial abnormality. Ethmoid and sphenoid sinusitis. Right mastoid effusion. US RETROPERITONEAL COMPLETE   Final Result   1. Normal appearance of the bilateral kidneys. No hydronephrosis. 2.  A Mahan catheter is decompressing the bladder.          XR CHEST PORTABLE   Final Result   Worsening infiltrate and or atelectasis on the left, stable on the right with   suspected layering pleural effusion. XR CHEST PORTABLE   Final Result   Unchanged bilateral atelectasis and or infiltrate as well as small right   pleural effusion. MRI BRAIN WO CONTRAST   Final Result   There are 2 small regions of petechial infarcts one in the left   posterior frontal lobe and a second in the left parietal lobe not   exceeding 3 mm. There is otherwise extensive small vessel ischemic   changes         XR CHEST PORTABLE   Final Result   1. Multifocal bilateral airspace disease more prominent within the lower   lobes. The airspace disease is unchanged when compared with the prior study. CT HEAD WO CONTRAST   Final Result   Diffuse atrophy likely age related   Findings compatible with small vessel ischemic changes. XR CHEST PORTABLE   Final Result   1. Pulmonary opacities present favoring edema and atelectasis, also small   pleural effusions, but nonspecific with some additional considerations noted   above   2. Support devices present as described above   3. Heart size appears borderline enlarged         XR ABDOMEN FOR NG/OG/NE TUBE PLACEMENT   Final Result   NG/OG is in the stomach. IR CAROTID STENT W PROTECTION   Final Result   1. Angiogram demonstrates successful placement of a carotid stent ,   post procedure images demonstrate a widely patent stent and internal   carotid         CT HEAD WO CONTRAST   Final Result   Diffuse atrophy likely age related   Findings compatible with small vessel ischemic changes. Findings were called at the time of dictation         CT BRAIN PERFUSION   Final Result      No significant ischemic penumbra identified      This study was analyzed by the [employer] algorithm. CTA NECK W CONTRAST   Final Result   1. Estimated stenosis of the proximal right and left internal carotid   artery by NASCET criteria is greater than 90% on the left   2.  Severe atherosclerotic disease . 3. No large vessel occlusion identified            This study was analyzed by the [employer] algorithm. CTA HEAD W CONTRAST   Final Result   1. Estimated stenosis of the proximal right and left internal carotid   artery by NASCET criteria is greater than 90% on the left   2. Severe atherosclerotic disease . 3. No large vessel occlusion identified            This study was analyzed by the [employer] algorithm. XR CHEST PORTABLE    (Results Pending)       Vitals:    /60   Pulse (!) 116   Temp 99.4 °F (37.4 °C) (Temporal)   Resp 24   Ht 5' 8\" (1.727 m)   Wt 225 lb 9.6 oz (102.3 kg)   SpO2 94%   BMI 34.30 kg/m²     LABS:   Recent Labs     10/18/22  0419 10/19/22  0431   WBC 5.7 6.8   HGB 7.7* 8.3*   HCT 23.7* 25.7*    178     Recent Labs     10/19/22  0431      K 3.8   CL 93*   CO2 34*   PHOS 4.6*   BUN 20   CREATININE 1.7*     Recent Labs     10/17/22  0409 10/18/22  0419 10/19/22  0431   PROT 5.3* 5.3* 5.7*   INR 1.4 1.5  --    APTT  --  37.3*  --        ASSESSMENTS:   1. Right foot wound diabetic ulcer  2. DM  3. Pain right foot  Acute cerebrovascular accident (CVA) ([facility] [address]       PLAN:  -Patient examined and evaluated at bedside. All pertinent labs, charts, and imaging reviewed prior to encounter   -Antibiotics as per ID: Stopped  -Culture: Corynebacteria  -Continue Wound vac changes to RLE:MWF Dressing changes. Wound vac noted today with good seal, running at 125mmHg with minimal debris in cannister.  -X rays: No acute osseous abnormalities to foot   -US: No evidence of DVT  -Will continue conservative care for now.    -Patient discussed with Dr. Silvia Lee  -Continue offloading

## 2022-10-19 NOTE — PROGRESS NOTES
Palliative Medicine  Progress Note  10/19/22    Chart reviewed. Spoke with nursing. No reported acute overnight events. The patient was seen at bedside today. He is awake, alert and oriented to person, month, year, location, President and situation. He is s/p colonoscopy yesterday. The patient currently appears in no acute distress, he is resting comfortably. He asks for something to eat but is currently waiting for swallow evaluation. He denies any pain and has no other complaints. He is asking when he can eat and when he can go home. Re-addressed goals of care and code status with the patient. He reports he wants to continue with current management and get better and ultimately return home. He wishes to continue as FULL code. Discussed patient's POA forms in his soft chart. He states he would like to change that form and remove Jennet Kiran. He would like his new HCPOA to be his adoptive father, Analy Garcia, and his caregiver Lelo Lou. The patient asks how to complete this. I discussed with him that he can contact an  and I will let our  know as well. He denies any other acute needs at this time. His questions were answered. Support provided. Informed Palliative , Naila Murphy and will follow-up with patient regarding new HCPOA form completion.       Evelina Blanchard, DO HPM Fellow

## 2022-10-19 NOTE — PROGRESS NOTES
[address] [facility]   Department of Internal Medicine   MICU Progress Note    Patient:  Robert Jessica 74 y.o. male   MRN: [id number]       Date of Service: 10/19/2022    Allergy: Codeine  CC strokelike symptoms  Subjective   10/13/2022  Alert oriented 2-3 confused, follows commands. Supplement oxygen on high flow nasal cannula with respiratory distress  INR 1.4  --> S/P vitamin K and FFP   Start clear liquid diet  Not on pressors, will start precedex  and Fentanyl for severe agitation,   -- will  start Seroquel   Agitated today, due to BIAP,  No bowel movement overnight   No temps  10/14/2022   Much improved on Bumex drip   Off BiPaP on NC   OK   Creatinine 1.5  BUN 35   Na 151  Will increase free water  po  10/15/2022   Cxr worsening of CHF with effusion   K replaced   1+ Edema   DC fresh water thru NG + Restrict fluids   Vitals stable   On HFNC  with sat at 96%   Refuses BiPAP  10/16/2022   Afebrile   High /55  Will add catapres and increase Metoprolol  HR 75   CXR CHF persists   Intake 9955 due to isotonic bowel prep    Will increase Bumex drip to 0.75 mg today and   decrease to 0.5 mg tomorrow 10/17   Electrolytes replaced & re-check at 6 PM    10/17/2022  Patient continues to have significant bilateral pulmonary congestion and edema. Patient is responding well to Bumex drip of 1 mg/h with urine output around 6.8 L in last 24 hours. H&H continues to drop with intermittent blood transfusion. Plan is to do an EGD today by GI.    10/19/22   S/p colonoscopy with no active bleeding found. Excellent urine output while on the bumex drip (-5.7L) slight increase in creatinine. Patient pulled out his NG tube, for barium swallow tomorrow 10/20/22, received aspirin suppository today        Objective     TEMPERATURE:  Current - Temp: 99.4 °F (37.4 °C);  Max - Temp  Av.4 °F (36.9 °C)  Min: 97.7 °F (36.5 °C)  Max: 99.4 °F (37.4 °C)    RESPIRATIONS RANGE: Resp  Av.4  Min: 12  Max: 34    PULSE RANGE: Pulse  Av.6  Min: 73  Max: 122    BLOOD PRESSURE RANGE:  Systolic (05SON), EZP:765 , Min:111 , SMS:240   ; Diastolic (76ILZ), HGS:83, Min:49, Max:94      PULSE OXIMETRY RANGE: SpO2  Av.2 %  Min: 94 %  Max: 100 %    I & O - 24hr:    Intake/Output Summary (Last 24 hours) at 10/19/2022 1425  Last data filed at 10/19/2022 1412  Gross per 24 hour   Intake 906.59 ml   Output 5320 ml   Net -4413.41 ml     I/O last 3 completed shifts: In: 1175.4 [I.V.:654.1; IV Piggyback:521.4]  Out: 7938 [ULQHU:2119; RWMBY:4204] I/O this shift: In: 586.6 [I.V.:78.3; IV Piggyback:508.3]  Out: 0376 [Urine:1345]   Weight change:     Physical Exam:  CONSTITUTIONAL: Awake, morbid obesity, alert x4, intermittently confused, no acute distress  EYES:  Lids and lashes normal, pupils equal, round and reactive to light, extra ocular muscles intact, sclera clear, conjunctiva normal  ENT:  Normocephalic, without obvious abnormality, atraumatic, sinuses nontender on palpation, noted mucous membrane dry, picking on skin of the mouth that noted to be bloody. CARDIOVASCULAR:  Normal apical impulse, regular rate and rhythm, normal S1 and S2, no S3 or S4, and no murmur noted  Lung Diminished lung sounds throughout lung fields, fine crackles noted on the bases, no wheezing noted, no increase in breathing, no use of accessory muscle at this time, on supplement oxygen. MUSCULOSKELETAL: weakness in extremities, echo noted, 1+ pitting edema on lower extremities. All extremities,  NEUROLOGIC: Awake, alert oriented 4, weakness on the right upper and lower extremities due to stroke. SKIN: Right foot nonhealing wound.  +1 Edema BLE, Thighs and sacral edema    Medications     Continuous Infusions:   bumetanide 0.1 mg/mL infusion 0.5 mg/hr (10/19/22 1412)    dexmedetomidine (PRECEDEX) IV infusion Stopped (10/18/22 1710)    sodium chloride      dextrose       Scheduled Meds:   albumin human  25 g IntraVENous Q8H    [START ON 10/20/2022] clopidogrel 75 mg Oral Daily    aspirin  300 mg Rectal Once    [Held by provider] metoprolol succinate  50 mg Oral Daily    [Held by provider] cloNIDine  0.1 mg Oral BID    [Held by provider] QUEtiapine  50 mg Oral BID    miconazole nitrate   Topical BID    [Held by provider] gabapentin  600 mg Oral 4x daily    [Held by provider] amLODIPine  10 mg Oral Daily    white petrolatum   Topical BID    sodium chloride flush  5-40 mL IntraVENous 2 times per day    miconazole   Topical BID    epoetin sharath-epbx  6,000 Units SubCUTAneous Once per day on Mon Wed Fri    [Held by provider] hydrALAZINE  50 mg Oral 3 times per day    insulin lispro  0-16 Units SubCUTAneous 4x Daily AC & HS    pantoprazole (PROTONIX) 40 mg injection  40 mg IntraVENous Q12H    sennosides  5 mL Oral Nightly    [Held by provider] carbidopa-levodopa  1 tablet Per NG tube TID    ipratropium-albuterol  1 ampule Inhalation Q4H WA    collagenase   Topical Q MWF    [Held by provider] benzonatate  100 mg Oral TID    [Held by provider] ezetimibe  10 mg Oral Daily    [Held by provider] atorvastatin  80 mg Oral Nightly     PRN Meds: sodium chloride flush, morphine, hydrALAZINE, miconazole nitrate **AND** miconazole nitrate, sodium chloride, labetalol, glucose, dextrose bolus **OR** dextrose bolus, glucagon (rDNA), dextrose  Nutrition:   Clear liquid diet   Labs and Imaging Studies     CBC:   Recent Labs     10/17/22  0409 10/18/22  0419 10/19/22  0431   WBC 4.9 5.7 6.8   RBC 2.75* 2.64* 2.83*   HGB 8.1* 7.7* 8.3*   HCT 25.4* 23.7* 25.7*   MCV 92.4 89.8 90.8   MCH 29.5 29.2 29.3   MCHC 31.9* 32.5 32.3   RDW 15.0 14.7 14.2    149 178   MPV 10.0 10.1 10.2       BMP:    Recent Labs     10/17/22  0409 10/18/22  0419 10/18/22  1353 10/18/22  1954 10/19/22  0431    146 144 144 143   K 3.5 3.0* 3.5 3.5 3.8   CL 98 96* 95* 94* 93*   CO2 33* 35* 35* 32* 34*   BUN 20 20 18 21 20   CREATININE 1.4* 1.7* 1.7* 1.9* 1.7*   GLUCOSE 123* 87 96 95 80   CALCIUM 8.1* 8.1* 8.6 8.5* 8.3*   PROT 5.3* 5.3*  --   --  5.7*   LABALBU 2.7* 2.6*  --   --  2.6*   BILITOT 0.7 0.5  --   --  0.5   ALKPHOS 403* 392*  --   --  345*   AST 23 24  --   --  20   ALT 10 <5  --   --  <5       LIVER PROFILE:   Recent Labs     10/17/22  0409 10/18/22  0419 10/19/22  0431   AST 23 24 20   ALT 10 <5 <5   BILITOT 0.7 0.5 0.5   ALKPHOS 403* 392* 345*       PT/INR:   Recent Labs     10/17/22  0409 10/18/22  0419   PROTIME 15.4* 16.4*   INR 1.4 1.5         APTT:   Recent Labs     10/18/22  0419   APTT 37.3*         Fasting Lipid Panel:    No results found for: CHOL, TRIG, HDL    Cardiac Enzymes:    No results found for: CKTOTAL, CKMB, CKMBINDEX, TROPONINI    Notable Cultures:      Blood cultures   Blood Culture, Routine   Date Value Ref Range Status   10/13/2022 5 Days no growth  Final     Respiratory cultures No results found for: RESPCULTURE No results found for: LABGRAM  Urine No results found for: LABURIN  Legionella No results found for: LABLEGI  C Diff PCR No results found for: CDIFPCR  Wound culture/abscess: No results for input(s): WNDABS in the last 72 hours. Tip culture:No results for input(s): CXCATHTIP in the last 72 hours.      Antibiotic  Days  Day started                              Oxygen:     Vent Information  Ventilator ID: [id number]  Equipment Changed: (S) Humidification    Additional Respiratory Assessments  Heart Rate: (!) 118  Resp: 20  SpO2: 94 %  Position: Semi-Sarabia's  Humidification Source: Heated wire  Humidification Temp: 94  Circuit Condensation: Drained     Nasal cannula L/min  1L NC   Face mask %     Reservoirs mask %       ABG     PH  7.44   PCO2  50.4   PO2  53.2   HCO3  33.5   Sat%  98   FIO2  2L NC   DATES 10/19/22       Lines:  Site  Day  Date inserted     TLC              PICC              Arterial line              Peripheral line              HD cath            Urinary Catheter 09/29/22 Mahan-Output (mL): 125 mL  [REMOVED] Urinary Catheter 09/27/22-Output (mL): 100 mL    Imaging decrease to 0.5 mg tomorrow 10/17   Electrolytes replaced & re-check at 6 PM    10/19/22   S/p colonoscopy with no active bleeding found. Excellent urine output while on the bumex drip (-5.7L) slight increase in creatinine. Patient pulled out his NG tube, for barium swallow tomorrow 10/20/22, received aspirin suppository today    Assessment:  HFpEF  Bilateral pulmonary edema  Left ICA stent placement 09/26 with left MCA stroke, was on dual antiplatelet therapy, currently on aspirin only  Acute hypoxic respiratory failure on high flow nasal cannula  GI bleed with bleeding around splenic flexure, s/p total 15 unit PRBC transfusion  LARY  Anemia due to blood loss  Right foot ulcer  MATT on CPAP at home    Plan:    Neuro: Patient is alert and oriented and following commands, residual right sided weakness from previous stroke. Neurology recommends Plavix, Patient for barium swallow tomorrow 10/20/22, Will give ASA suppository today and start Plavix tomorrow once swallow evaluation is completed. Pulmonary: Patient's oxygen requirement has significantly decreased. Chest x-ray has significant improvement in pulmonary congestion. Continue Bumex drip at 0.5 mg/h for 1 more day. Continue daily CXR. Cardiovascular: Patient has preserved EF, pulmonary edema is likely related with large amount of blood transfusion leading to TACO. Tachycardia, oral metoprolol held, will start IV metoprolol 2.5 mg Q12 while NPO    Abdomen: GI bleed, H&H stable, continue Protonix. Nutrition: currently NPO, will start D5NS at 20 cc/hour    Renal: Renal function has improved with a slight increase in creatinine over the last 24 hours, 1.7 mg/dL, will continue Bumex drip 0.5 mg/hr for one more day to continue diuresis. Patient continues to make excellent urine (-5.7) in the last 24 hours. Obtain BMP, Mag at 1400. Discussed with the plan of care with Dr. Lori Payne. Hematology: Continue to monitor H&H, as needed blood transfusion. Patient has had a total of 15 units of RBC's throughout admission. Will start Plavix tomorrow and give ASA suppository since patient is NPO.      ID: Monitor off antibiotics  CODE STATUS: Full code  Disposition: ICU    Case and plan discussed during multidisciplinary rounds with attending Dr. Zheng Naranjo    Electronically signed by CONSTANTINE Craig CNP on 10/19/2022 at 2:25 PM

## 2022-10-20 ENCOUNTER — APPOINTMENT (OUTPATIENT)
Dept: GENERAL RADIOLOGY | Age: 67
DRG: 034 | End: 2022-10-20
Attending: INTERNAL MEDICINE
Payer: MEDICARE

## 2022-10-20 LAB
ALBUMIN SERPL-MCNC: 3.6 G/DL (ref 3.5–5.2)
ALP BLD-CCNC: 292 U/L (ref 40–129)
ALT SERPL-CCNC: 11 U/L (ref 0–40)
ANION GAP SERPL CALCULATED.3IONS-SCNC: 23 MMOL/L (ref 7–16)
AST SERPL-CCNC: 20 U/L (ref 0–39)
BASOPHILS ABSOLUTE: 0.03 E9/L (ref 0–0.2)
BASOPHILS RELATIVE PERCENT: 0.4 % (ref 0–2)
BILIRUB SERPL-MCNC: 0.6 MG/DL (ref 0–1.2)
BUN BLDV-MCNC: 20 MG/DL (ref 6–23)
CALCIUM IONIZED: 1.17 MMOL/L (ref 1.15–1.33)
CALCIUM SERPL-MCNC: 9.1 MG/DL (ref 8.6–10.2)
CHLORIDE BLD-SCNC: 91 MMOL/L (ref 98–107)
CO2: 32 MMOL/L (ref 22–29)
CREAT SERPL-MCNC: 1.7 MG/DL (ref 0.7–1.2)
EOSINOPHILS ABSOLUTE: 0.19 E9/L (ref 0.05–0.5)
EOSINOPHILS RELATIVE PERCENT: 2.3 % (ref 0–6)
GFR SERPL CREATININE-BSD FRML MDRD: 44 ML/MIN/1.73
GLUCOSE BLD-MCNC: 115 MG/DL (ref 74–99)
HCT VFR BLD CALC: 25.9 % (ref 37–54)
HEMOGLOBIN: 8.2 G/DL (ref 12.5–16.5)
IMMATURE GRANULOCYTES #: 0.06 E9/L
IMMATURE GRANULOCYTES %: 0.7 % (ref 0–5)
LYMPHOCYTES ABSOLUTE: 1.1 E9/L (ref 1.5–4)
LYMPHOCYTES RELATIVE PERCENT: 13.5 % (ref 20–42)
MAGNESIUM: 1.7 MG/DL (ref 1.6–2.6)
MCH RBC QN AUTO: 28.3 PG (ref 26–35)
MCHC RBC AUTO-ENTMCNC: 31.7 % (ref 32–34.5)
MCV RBC AUTO: 89.3 FL (ref 80–99.9)
METER GLUCOSE: 142 MG/DL (ref 74–99)
METER GLUCOSE: 163 MG/DL (ref 74–99)
METER GLUCOSE: 229 MG/DL (ref 74–99)
MONOCYTES ABSOLUTE: 0.8 E9/L (ref 0.1–0.95)
MONOCYTES RELATIVE PERCENT: 9.8 % (ref 2–12)
NEUTROPHILS ABSOLUTE: 5.96 E9/L (ref 1.8–7.3)
NEUTROPHILS RELATIVE PERCENT: 73.3 % (ref 43–80)
PDW BLD-RTO: 14.1 FL (ref 11.5–15)
PHOSPHORUS: 4.1 MG/DL (ref 2.5–4.5)
PLATELET # BLD: 203 E9/L (ref 130–450)
PMV BLD AUTO: 9.9 FL (ref 7–12)
POTASSIUM SERPL-SCNC: 3.8 MMOL/L (ref 3.5–5)
RBC # BLD: 2.9 E12/L (ref 3.8–5.8)
SODIUM BLD-SCNC: 146 MMOL/L (ref 132–146)
TOTAL PROTEIN: 6.6 G/DL (ref 6.4–8.3)
WBC # BLD: 8.1 E9/L (ref 4.5–11.5)

## 2022-10-20 PROCEDURE — 2580000003 HC RX 258: Performed by: SURGERY

## 2022-10-20 PROCEDURE — 83735 ASSAY OF MAGNESIUM: CPT

## 2022-10-20 PROCEDURE — 6360000002 HC RX W HCPCS: Performed by: SURGERY

## 2022-10-20 PROCEDURE — P9047 ALBUMIN (HUMAN), 25%, 50ML: HCPCS

## 2022-10-20 PROCEDURE — 99291 CRITICAL CARE FIRST HOUR: CPT | Performed by: INTERNAL MEDICINE

## 2022-10-20 PROCEDURE — 6370000000 HC RX 637 (ALT 250 FOR IP): Performed by: INTERNAL MEDICINE

## 2022-10-20 PROCEDURE — 80053 COMPREHEN METABOLIC PANEL: CPT

## 2022-10-20 PROCEDURE — 6370000000 HC RX 637 (ALT 250 FOR IP): Performed by: NURSE PRACTITIONER

## 2022-10-20 PROCEDURE — 6370000000 HC RX 637 (ALT 250 FOR IP): Performed by: SURGERY

## 2022-10-20 PROCEDURE — 6360000002 HC RX W HCPCS: Performed by: NURSE PRACTITIONER

## 2022-10-20 PROCEDURE — 6360000002 HC RX W HCPCS: Performed by: INTERNAL MEDICINE

## 2022-10-20 PROCEDURE — 92526 ORAL FUNCTION THERAPY: CPT

## 2022-10-20 PROCEDURE — 97530 THERAPEUTIC ACTIVITIES: CPT

## 2022-10-20 PROCEDURE — A4216 STERILE WATER/SALINE, 10 ML: HCPCS | Performed by: SURGERY

## 2022-10-20 PROCEDURE — 82962 GLUCOSE BLOOD TEST: CPT

## 2022-10-20 PROCEDURE — 94660 CPAP INITIATION&MGMT: CPT

## 2022-10-20 PROCEDURE — 94640 AIRWAY INHALATION TREATMENT: CPT

## 2022-10-20 PROCEDURE — C9113 INJ PANTOPRAZOLE SODIUM, VIA: HCPCS | Performed by: SURGERY

## 2022-10-20 PROCEDURE — 71045 X-RAY EXAM CHEST 1 VIEW: CPT

## 2022-10-20 PROCEDURE — 6360000002 HC RX W HCPCS

## 2022-10-20 PROCEDURE — 2500000003 HC RX 250 WO HCPCS

## 2022-10-20 PROCEDURE — 92611 MOTION FLUOROSCOPY/SWALLOW: CPT

## 2022-10-20 PROCEDURE — 2060000000 HC ICU INTERMEDIATE R&B

## 2022-10-20 PROCEDURE — 2500000003 HC RX 250 WO HCPCS: Performed by: NURSE PRACTITIONER

## 2022-10-20 PROCEDURE — 84100 ASSAY OF PHOSPHORUS: CPT

## 2022-10-20 PROCEDURE — 2700000000 HC OXYGEN THERAPY PER DAY

## 2022-10-20 PROCEDURE — 74230 X-RAY XM SWLNG FUNCJ C+: CPT

## 2022-10-20 PROCEDURE — 85025 COMPLETE CBC W/AUTO DIFF WBC: CPT

## 2022-10-20 PROCEDURE — 82330 ASSAY OF CALCIUM: CPT

## 2022-10-20 PROCEDURE — 2500000003 HC RX 250 WO HCPCS: Performed by: SURGERY

## 2022-10-20 RX ORDER — ASPIRIN 81 MG/1
81 TABLET, CHEWABLE ORAL DAILY
Status: DISCONTINUED | OUTPATIENT
Start: 2022-10-20 | End: 2022-10-26 | Stop reason: HOSPADM

## 2022-10-20 RX ORDER — AMLODIPINE BESYLATE 5 MG/1
5 TABLET ORAL DAILY
Status: DISCONTINUED | OUTPATIENT
Start: 2022-10-20 | End: 2022-10-20

## 2022-10-20 RX ORDER — BUMETANIDE 0.25 MG/ML
1 INJECTION, SOLUTION INTRAMUSCULAR; INTRAVENOUS 2 TIMES DAILY
Status: DISCONTINUED | OUTPATIENT
Start: 2022-10-20 | End: 2022-10-21

## 2022-10-20 RX ORDER — AMLODIPINE BESYLATE 10 MG/1
10 TABLET ORAL DAILY
Status: DISCONTINUED | OUTPATIENT
Start: 2022-10-21 | End: 2022-10-26 | Stop reason: HOSPADM

## 2022-10-20 RX ORDER — ASPIRIN 300 MG/1
300 SUPPOSITORY RECTAL ONCE
Status: DISCONTINUED | OUTPATIENT
Start: 2022-10-20 | End: 2022-10-20

## 2022-10-20 RX ORDER — MAGNESIUM SULFATE 1 G/100ML
1000 INJECTION INTRAVENOUS ONCE
Status: COMPLETED | OUTPATIENT
Start: 2022-10-20 | End: 2022-10-20

## 2022-10-20 RX ORDER — INSULIN LISPRO 100 [IU]/ML
0-4 INJECTION, SOLUTION INTRAVENOUS; SUBCUTANEOUS EVERY 6 HOURS
Status: DISCONTINUED | OUTPATIENT
Start: 2022-10-20 | End: 2022-10-22

## 2022-10-20 RX ORDER — METOPROLOL TARTRATE 5 MG/5ML
2.5 INJECTION INTRAVENOUS EVERY 6 HOURS
Status: DISCONTINUED | OUTPATIENT
Start: 2022-10-20 | End: 2022-10-21

## 2022-10-20 RX ORDER — CHLORHEXIDINE GLUCONATE 0.12 MG/ML
15 RINSE ORAL 2 TIMES DAILY
Status: DISCONTINUED | OUTPATIENT
Start: 2022-10-20 | End: 2022-10-26 | Stop reason: HOSPADM

## 2022-10-20 RX ORDER — POTASSIUM CHLORIDE 7.45 MG/ML
10 INJECTION INTRAVENOUS
Status: COMPLETED | OUTPATIENT
Start: 2022-10-20 | End: 2022-10-20

## 2022-10-20 RX ADMIN — Medication 10 MEQ: at 11:17

## 2022-10-20 RX ADMIN — IPRATROPIUM BROMIDE AND ALBUTEROL SULFATE 1 AMPULE: .5; 2.5 SOLUTION RESPIRATORY (INHALATION) at 12:20

## 2022-10-20 RX ADMIN — METOPROLOL TARTRATE 2.5 MG: 5 INJECTION INTRAVENOUS at 10:33

## 2022-10-20 RX ADMIN — CHLORHEXIDINE GLUCONATE 15 ML: 1.2 RINSE BUCCAL at 10:33

## 2022-10-20 RX ADMIN — MICONAZOLE NITRATE: 2 OINTMENT TOPICAL at 09:00

## 2022-10-20 RX ADMIN — BUMETANIDE 1 MG: 0.25 INJECTION, SOLUTION INTRAMUSCULAR; INTRAVENOUS at 17:48

## 2022-10-20 RX ADMIN — HYDRALAZINE HYDROCHLORIDE 10 MG: 20 INJECTION INTRAMUSCULAR; INTRAVENOUS at 22:03

## 2022-10-20 RX ADMIN — ALBUMIN (HUMAN) 25 G: 12.5 SOLUTION INTRAVENOUS at 00:16

## 2022-10-20 RX ADMIN — PETROLATUM: 420 OINTMENT TOPICAL at 20:37

## 2022-10-20 RX ADMIN — METOPROLOL TARTRATE 2.5 MG: 5 INJECTION INTRAVENOUS at 22:10

## 2022-10-20 RX ADMIN — INSULIN LISPRO 2 UNITS: 100 INJECTION, SOLUTION INTRAVENOUS; SUBCUTANEOUS at 17:49

## 2022-10-20 RX ADMIN — SODIUM CHLORIDE, PRESERVATIVE FREE 40 MG: 5 INJECTION INTRAVENOUS at 03:29

## 2022-10-20 RX ADMIN — LABETALOL HYDROCHLORIDE 10 MG: 5 INJECTION, SOLUTION INTRAVENOUS at 15:15

## 2022-10-20 RX ADMIN — IPRATROPIUM BROMIDE AND ALBUTEROL SULFATE 1 AMPULE: .5; 2.5 SOLUTION RESPIRATORY (INHALATION) at 20:00

## 2022-10-20 RX ADMIN — METOPROLOL TARTRATE 2.5 MG: 5 INJECTION INTRAVENOUS at 17:17

## 2022-10-20 RX ADMIN — CHLORHEXIDINE GLUCONATE 15 ML: 1.2 RINSE BUCCAL at 20:36

## 2022-10-20 RX ADMIN — LABETALOL HYDROCHLORIDE 10 MG: 5 INJECTION, SOLUTION INTRAVENOUS at 17:45

## 2022-10-20 RX ADMIN — ANTI-FUNGAL POWDER MICONAZOLE NITRATE TALC FREE: 1.42 POWDER TOPICAL at 20:37

## 2022-10-20 RX ADMIN — SODIUM CHLORIDE, PRESERVATIVE FREE 40 MG: 5 INJECTION INTRAVENOUS at 14:36

## 2022-10-20 RX ADMIN — ANTI-FUNGAL POWDER MICONAZOLE NITRATE TALC FREE: 1.42 POWDER TOPICAL at 08:58

## 2022-10-20 RX ADMIN — CLONIDINE HYDROCHLORIDE 0.1 MG: 0.1 TABLET ORAL at 20:36

## 2022-10-20 RX ADMIN — BUMETANIDE 1 MG: 0.25 INJECTION, SOLUTION INTRAMUSCULAR; INTRAVENOUS at 10:33

## 2022-10-20 RX ADMIN — HYDRALAZINE HYDROCHLORIDE 10 MG: 20 INJECTION INTRAMUSCULAR; INTRAVENOUS at 14:01

## 2022-10-20 RX ADMIN — IPRATROPIUM BROMIDE AND ALBUTEROL SULFATE 1 AMPULE: .5; 2.5 SOLUTION RESPIRATORY (INHALATION) at 07:44

## 2022-10-20 RX ADMIN — ASPIRIN 81 MG CHEWABLE TABLET 81 MG: 81 TABLET CHEWABLE at 14:36

## 2022-10-20 RX ADMIN — MICONAZOLE NITRATE: 2 OINTMENT TOPICAL at 20:37

## 2022-10-20 RX ADMIN — MICONAZOLE NITRATE: 2 OINTMENT TOPICAL at 08:58

## 2022-10-20 RX ADMIN — MAGNESIUM SULFATE HEPTAHYDRATE 1000 MG: 1 INJECTION, SOLUTION INTRAVENOUS at 08:07

## 2022-10-20 RX ADMIN — Medication 10 ML: at 08:59

## 2022-10-20 RX ADMIN — METOPROLOL TARTRATE 2.5 MG: 5 INJECTION INTRAVENOUS at 04:08

## 2022-10-20 RX ADMIN — PETROLATUM: 420 OINTMENT TOPICAL at 09:00

## 2022-10-20 RX ADMIN — Medication 10 ML: at 20:36

## 2022-10-20 RX ADMIN — LABETALOL HYDROCHLORIDE 10 MG: 5 INJECTION, SOLUTION INTRAVENOUS at 11:41

## 2022-10-20 ASSESSMENT — PAIN SCALES - GENERAL
PAINLEVEL_OUTOF10: 0

## 2022-10-20 NOTE — PROGRESS NOTES
Nephrology Progress Note  10/20/2022 9:06 AM  Subjective:   Admit Date: 9/27/2022  PCP: Terrie Oppenheim, MD  Interval History:   10/18/22:for repeat colonoscopy today    10/19: colonoscopy performed yesterday, findings noted; patient pulled NG    10/20: for video swallow study today; otherwise no other acute events overnight    Diet: Diet NPO Exceptions are: Sips of Water with Meds    Data:   Scheduled Meds:   amLODIPine  5 mg Oral Daily    clopidogrel  75 mg Oral Daily    metoprolol  2.5 mg IntraVENous Q12H    [Held by provider] metoprolol succinate  50 mg Oral Daily    [Held by provider] cloNIDine  0.1 mg Oral BID    [Held by provider] QUEtiapine  50 mg Oral BID    miconazole nitrate   Topical BID    [Held by provider] gabapentin  600 mg Oral 4x daily    white petrolatum   Topical BID    sodium chloride flush  5-40 mL IntraVENous 2 times per day    miconazole   Topical BID    epoetin sharath-epbx  6,000 Units SubCUTAneous Once per day on Mon Wed Fri    [Held by provider] hydrALAZINE  50 mg Oral 3 times per day    insulin lispro  0-16 Units SubCUTAneous 4x Daily AC & HS    pantoprazole (PROTONIX) 40 mg injection  40 mg IntraVENous Q12H    sennosides  5 mL Oral Nightly    [Held by provider] carbidopa-levodopa  1 tablet Per NG tube TID    ipratropium-albuterol  1 ampule Inhalation Q4H WA    collagenase   Topical Q MWF    [Held by provider] benzonatate  100 mg Oral TID    [Held by provider] ezetimibe  10 mg Oral Daily    [Held by provider] atorvastatin  80 mg Oral Nightly     Continuous Infusions:   dexmedetomidine (PRECEDEX) IV infusion Stopped (10/18/22 1710)    sodium chloride      dextrose       PRN Meds:sodium chloride flush, morphine, hydrALAZINE, miconazole nitrate **AND** miconazole nitrate, sodium chloride, labetalol, glucose, dextrose bolus **OR** dextrose bolus, glucagon (rDNA), dextrose  I/O last 3 completed shifts:   In: 1273.1 [I.V.:586.7; IV Piggyback:686.4]  Out: 6770 [WXIRT:6716]  I/O this shift:  In: - Out: 200 [Urine:200]    Intake/Output Summary (Last 24 hours) at 10/20/2022 0906  Last data filed at 10/20/2022 0900  Gross per 24 hour   Intake 1053.13 ml   Output 3650 ml   Net -2596.87 ml     CBC:   Recent Labs     10/18/22  0419 10/19/22  0431 10/20/22  0452   WBC 5.7 6.8 8.1   HGB 7.7* 8.3* 8.2*    178 203     BMP:    Recent Labs     10/19/22  0431 10/19/22  1334 10/20/22  0452    144 146   K 3.8 3.8 3.8   CL 93* 91* 91*   CO2 34* 29 32*   BUN 20 21 20   CREATININE 1.7* 1.9* 1.7*   GLUCOSE 80 95 115*     Hepatic:   Recent Labs     10/18/22  0419 10/19/22  0431 10/20/22  0452   AST 24 20 20   ALT <5 <5 11   BILITOT 0.5 0.5 0.6   ALKPHOS 392* 345* 292*     Troponin: No results for input(s): TROPONINI in the last 72 hours. BNP: No results for input(s): BNP in the last 72 hours. Lipids: No results for input(s): CHOL, HDL in the last 72 hours. Invalid input(s): LDLCALCU  ABGs: No results found for: PHART, PO2ART, MKH1BZX  INR:   Recent Labs     10/18/22  0419   INR 1.5       -----------------------------------------------------------------  RAD: CT HEAD WO CONTRAST    Result Date: 2022  Patient MRN:  [id number] : 1955 Age: 74 years Gender: Male Order Date:  2022 5:35 AM EXAM: CT HEAD WO CONTRAST NUMBER OF IMAGES:  200 INDICATION:  stroke evaluation after mechanical thrombectomy Please assign to read to Dr. Best Cerna in [facility] stroke evaluation after mechanical thrombectomy What reading provider will be dictating this exam?->[facility] COMPARISON: None Technique: Low-dose CT  acquisition technique included one of following options; 1 . Automated exposure control, 2. Adjustment of MA and or KV according to patient's size or 3. Use of iterative reconstruction. Multiple CT sections were obtained with sagittal and coronal MPR reconstructions. The ventricles are prominent. The gyri and sulci appear  prominent. The white matter appears  prominent. There is no evidence for hemorrhage.  There is no infarct identified. There is no mass effect identified. There is no mass identified. Diffuse atrophy likely age related Findings compatible with small vessel ischemic changes. XR CHEST PORTABLE    Result Date: 2022  EXAMINATION: ONE XRAY VIEW OF THE CHEST 2022 6:57 am COMPARISON: None. HISTORY: ORDERING SYSTEM PROVIDED HISTORY: intubated TECHNOLOGIST PROVIDED HISTORY: Reason for exam:->intubated What reading provider will be dictating this exam?->[facility] FINDINGS: There is stable position of the endotracheal tube and NG tube Multifocal bilateral airspace disease is again noted unchanged compared to prior study and most prominent within the lower lobes. There trace bilateral pleural effusions. 1. Multifocal bilateral airspace disease more prominent within the lower lobes. The airspace disease is unchanged when compared with the prior study. MRI BRAIN WO CONTRAST    Result Date: 2022  Patient MRN:  [id number] : 1955 Age: 74 years Gender: Male Order Date:  2022 3:24 PM EXAM: MRI BRAIN WO CONTRAST NUMBER OF IMAGES:  605 INDICATION:  Stroke Evaluation Mechanical Thrombectomy MRI of the brain 24 hours following mechanical thrombectomy. Discontinue order after first follow up. Please assign to Dr. Yung Weiner to read in [facility] Stroke Evaluation Mechanical Thrombectomy What reading provider will be dictating this exam?->[facility] COMPARISON: CT scan 2022 Total sequences obtained: 9 The ventricles are prominent. The gyri and sulci appear  prominent. The white matter appears  prominent. No convincing hemorrhage identified. There is no mass effect identified. There is no mass identified. There is a small region of restricted diffusion measuring approximately 3 mm seen in the left parietal lobe and a similar finding present in the left posterior frontal lobe. No other restricted diffusion is seen to suggest acute infarct.  There is a small region of susceptibility infarct in the left frontal lobe.    There are 2 small regions of petechial infarcts one in the left posterior frontal lobe and a second in the left parietal lobe not exceeding 3 mm. There is otherwise extensive small vessel ischemic changes       Objective:   Vitals: BP (!) 151/65   Pulse (!) 111   Temp 98 °F (36.7 °C)   Resp (!) 34   Ht 5' 8\" (1.727 m)   Wt 199 lb 12.8 oz (90.6 kg)   SpO2 95%   BMI 30.38 kg/m²   General appearance: appears stated age   Skin:  No rashes or lesions  HEENT: Head: Normocephalic, no lesions, without obvious abnormality. Neck: no adenopathy, no carotid bruit, no JVD, supple, symmetrical, trachea midline, and thyroid not enlarged, symmetric, no tenderness/mass/nodules  Lungs: diminished breath sounds bibasilar  Heart: regular rate and rhythm, S1, S2 normal, no murmur, click, rub or gallop  Abdomen: soft, non-tender; bowel sounds normal; no masses,  no organomegaly  Extremities: edema +  Neurologic: Mental status: Alert, oriented, thought content appropriate       Patient Active Problem List:     Acute cerebrovascular accident (CVA) ([address]     Acute respiratory failure with hypoxia ([address]     Stenosis of left carotid artery     Hypoalbuminemia     Electrolyte imbalance     Hypernatremia     Anemia     GI bleeding    Assessment/Plans     1. Acute CVA  S/p IR intervention with angioplasty of the left ICA     2. LARY on CKD stage 3b  Baseline 1.7-1.9  combined effects of ACEI use and contrast nephrotoxicity  Component of urinary retention  nonoliguric at this time  Good response to bumex drip  Now transitioned to intermittent dosing  Cr back to baseline     3. Hypertensive emergency  presenting  with acute CVA  BP now controlled on current antihypertensive regimen        4. Anemia, chronic with acute worsening  stool heme positive  PRBC transfusion as needed  Sp egd/c scope  repeat colonoscopy 10/18, findings noted  On ASHLEY      5 Urinary retention  Suspected situational  Now with trejo  Urology following      6. Acute hypoxic resp failure   HFpEF   CXR 10/18 still with significant bilateral pulm congestion and pleural effusion  S/p bumex drip, now transitioned to intermittent dosing  Cumulative fluid balance -14 liters    D/W ICU Team    MD Fabiola Medellin MD

## 2022-10-20 NOTE — PROGRESS NOTES
Palliative Medicine   Progress Note  10/20/22      Chart reviewed. Saw patient at the bedside sitting upright in [facility] and he has no needs. Code status and goals of care were verified with the patient and the Kat [facility]. Patient who currently lives in [place], wants to change his POA documents and, he was informed he will need to change or complete those forms in [place]. Goals of care and code status were established. There are no further PM needs at this time. PM will now sign off. If new PM needs arise, please re-consult. Thank you.       Obie Wilkins, DO HPM Fellow

## 2022-10-20 NOTE — PLAN OF CARE
Problem: Skin/Tissue Integrity  Goal: Absence of new skin breakdown  Outcome: Progressing     Problem: Safety - Adult  Goal: Free from fall injury  Outcome: Progressing     Problem: Skin/Tissue Integrity - Adult  Goal: Skin integrity remains intact  Outcome: Progressing     Problem: Skin/Tissue Integrity - Adult  Goal: Oral mucous membranes remain intact  Outcome: Progressing

## 2022-10-20 NOTE — PROGRESS NOTES
Follow up for  new advance directives. PY is aware that he will need to be alert and oriented x4 in order to complete them. He will require new PA forms as well.

## 2022-10-20 NOTE — PROGRESS NOTES
Physical Therapy    Physical Therapy Daily Treatment Note      Name: Yinka Fleming  : 1955  MRN: [id number]      Date of Service: 10/20/2022    Re-Evaluating PT:  Doc Arciniega PT, DPT  [id number]     Room #:  0455/8248-B  Diagnosis:  Acute cerebrovascular accident (CVA) ([facility] [I63.9]  PMHx/PSHx:   has a past medical history of Chronic back pain, CKD (chronic kidney disease), CVA (cerebral vascular accident) ([facility] CVA (cerebral vascular accident) ([facility] DM (diabetes mellitus) ([facility] Major depression, MI (myocardial infarction) ([facility] MATT (obstructive sleep apnea), Parkinson disease ([facility] PVD (peripheral vascular disease) ([facility] and Seizure ([place] [id number].). Procedure/Surgery:  L ICA angioplasty and stent ;  EGD 10/3;  Colonoscopy 10/9, Colonoscopy polypectomy 10/18  Precautions:  Falls, R foot wound, R foot wound vac, Aphasia, R hemiparesis, O2  *R foot WB status not clarified in chart. Will assume NWB d/t wound vac until clarified. Equipment Needs:  TBD - possible FWW    Reason for re-evaluation:  Change in status requiring transfer to intensive care. Multiple procedures since last time seen by PT. Date of re-evaluation:  10/19/22    SUBJECTIVE:    Pt lives with his caregiver. He reports 2 story home with 4+4 steps with 1 rail to enter. Reports full flight with B rails to second floor bedroom and bathroom. Ambulates with quad cane PTA. Per chart, pt wears an offloading shoe during ambulation on R foot. OBJECTIVE:   Re-Evaluation  Date: 10/19/22 Treatment  10/20/22 Short Term/ Long Term   Goals   AM-PAC 6 Clicks  99/53    Was pt agreeable to Re-Eval/treatment? yes yes    Does pt have pain? 6/10 R foot pain  Not reported     Bed Mobility  Rolling: Min A  Supine to sit: Mod A  Sit to supine: Mod A  Scooting: Min A Rolling: Min A  Supine to sit: Mod A  Sit to supine: Mod A  Scooting: Min A Rolling: SBA  Supine to sit: SBA  Sit to supine: SBA  Scooting: SBA   Transfers Sit to stand:  Mod A x2  Stand to sit: Mod A x2  Stand pivot: NT Sit to stand: Max A  Stand to sit: Max A  Stand pivot: Max A with FWW Sit to stand: Min A  Stand to sit: Min A  Stand pivot: Min A with AAD   Ambulation    NT NT >10 feet with AAD Mod A   Stair negotiation: ascended and descended  NT NT NA   ROM BUE:  Per OT eval   BLE:  WFL     Strength BUE:  Per OT eval   RLE:  Grossly 2-/5 at hip, 2/5 at knee, 1/5 at ankle     Balance Sitting EOB:  SBA static, Min A dynamic   Dynamic Standing: Mod A x2 with FWW Sitting EOB:  SBA static, Min A dynamic   Dynamic Standing: Max A with FWW Sitting EOB:  SBA  Dynamic Standing:  Min A     Pt is A & O x 2-3  RASS:  0  CAM-ICU:  Positive on initial eval    Sensation:  Pt reports numbness and tingling to B feet  Edema:  Unremarkable     Vitals:  Blood Pressure at rest 176/65 mmHg  Blood Pressure post session 170/73 mmHg   Heart Rate at rest 103 bpm  Heart Rate post session 105 bpm    SPO2 at rest 93%  SPO2 post session 96%          Functional Status Score-Intensive Care Unit (FSS-ICU)   Rolling 4/7   Supine to sit transfer 3/7   Unsupported sitting  4/7   Sit to stand transfers 2/7   Ambulation 0/7   Total  13/35     Therapeutic Exercises:    NT    Patient education  Pt educated on PT role, safety during functional mobility, attending to R, sitting balance/posture    Patient response to education:   Pt verbalized understanding Pt demonstrated skill Pt requires further education in this area   Yes  Yes  Yes      RE ASSESSMENT:    Conditions Requiring Skilled Therapeutic Intervention:    [x]Decreased strength     []Decreased ROM  [x]Decreased functional mobility  [x]Decreased balance   [x]Decreased endurance   [x]Decreased posture  [x]Decreased sensation  [x]Decreased coordination   []Decreased vision  [x]Decreased safety awareness   [x]Increased pain       Comments:  Pt received supine and agreeable to PT treatment. Pt cleared for participation by RN prior to session. Vitals monitored during session. Pt demonstrates R hemiparesis. Completed supine>sit with assistance of trunk. Chair set up with chair alarm and flight with loops sling. Completed STS and pivot transfer to  chair. RLE elevated on pillow with prevalon boot. Pt left with call button in reach, lines attached, and needs met. Treatment:  Patient practiced and was instructed in the following treatment:    Bed mobility training - pt given verbal and tactile cues to facilitate proper sequencing and safety during rolling and supine>sit as well as provided with physical assistance to complete task    STS and pivot transfer training - pt educated on proper hand and foot placement, safety and sequencing, and use of FWW to safely complete sit<>stand and pivot transfers with hands on assistance to complete task safely    Non-pharmacological treatment and prevention of ICU delirium - Pt oriented to date, time, time of day, place, and situation as well as provided with visual and auditory stimuli in order to improve cognition and combat effects of ICU delirium. PHYSICAL THERAPY PLAN OF CARE:    Pt is making progress towards established goals. Continue PT POC. Specific instructions for next treatment:  progress STS and transfers training     Time in  1100  Time out  1140    Total Treatment Time  40 minutes     Re-Evaluation Time includes thorough review of current medical information, gathering information on past medical history/social history and prior level of function, completion of standardized testing/informal observation of tasks, assessment of data and education on plan of care and goals.     CPT codes:  [] Low Complexity PT evaluation 95784  [] Moderate Complexity PT evaluation 59873  [] High Complexity PT evaluation 79832  [] PT Re-evaluation 80440  [] Gait training 72285 -- minutes  [] Manual therapy [address] [facility] -- minutes  [x] Therapeutic activities 17603 40 minutes  [] Therapeutic exercises 09765 - minutes  [] Neuromuscular reeducation 25008 -- minutes     Thomas Perdue, PT, DPT  [id number]

## 2022-10-20 NOTE — PROGRESS NOTES
[address] [facility]   Department of Internal Medicine   MICU Progress Note    Patient:  Liza Radford 74 y.o. male   MRN: [id number]       Date of Service: 10/20/2022    Allergy: Codeine  CC strokelike symptoms  Subjective   Alert oriented 2-3 confused, follows commands. Off bumex gtt  Off precedex- slightly agitated,   Says \"yes' to all symptoms, does not look in distress. Colonoscopy with no active bleed 10/18/22  Plan for barium swallow today  No overnight event   No temps  Objective     TEMPERATURE:  Current - Temp: 98 °F (36.7 °C); Max - Temp  Av.8 °F (37.1 °C)  Min: 98 °F (36.7 °C)  Max: 99.4 °F (37.4 °C)    RESPIRATIONS RANGE: Resp  Av.3  Min: 2  Max: 37    PULSE RANGE: Pulse  Av.7  Min: 94  Max: 122    BLOOD PRESSURE RANGE:  Systolic (58ZWY), NQY:523 , Min:109 , TVL:475   ; Diastolic (72FMS), BII:93, Min:53, Max:97      PULSE OXIMETRY RANGE: SpO2  Av.2 %  Min: 85 %  Max: 100 %    I & O - 24hr:    Intake/Output Summary (Last 24 hours) at 10/20/2022 1155  Last data filed at 10/20/2022 1038  Gross per 24 hour   Intake 1053.13 ml   Output 3425 ml   Net -2371.87 ml     I/O last 3 completed shifts: In: 1273.1 [I.V.:586.7; IV Piggyback:686.4]  Out: 6770 [Urine:6770] I/O this shift:  In: -   Out: 350 [Urine:350]   Weight change:     Physical Exam:  CONSTITUTIONAL: Awake, morbid obesity, alert x4, intermittently confused, no acute distress  EYES:  Lids and lashes normal, pupils equal, round and reactive to light, extra ocular muscles intact, sclera clear, conjunctiva normal  ENT:  Normocephalic, without obvious abnormality, atraumatic, sinuses nontender on palpation, noted mucous membrane dry, picking on skin of the mouth that noted to be bloody.    CARDIOVASCULAR:  Normal apical impulse, regular rate and rhythm, normal S1 and S2, no S3 or S4, and no murmur noted  Lung Diminished lung sounds throughout lung fields, fine crackles noted on the bases, no wheezing noted, no increase in breathing, no use of accessory muscle at this time, on supplement oxygen. MUSCULOSKELETAL: weakness in extremities, echo noted, 1+ pitting edema on lower extremities. All extremities,  NEUROLOGIC: Awake, alert oriented 4, weakness on the right upper and lower extremities due to stroke. SKIN: Right foot nonhealing wound.  +1 Edema BLE, Thighs and sacral edema       Medications     Continuous Infusions:   sodium chloride      dextrose       Scheduled Meds:   amLODIPine  5 mg Oral Daily    bumetanide  1 mg IntraVENous BID    metoprolol  2.5 mg IntraVENous Q6H    chlorhexidine  15 mL Mouth/Throat BID    insulin lispro  0-4 Units SubCUTAneous Q6H    clopidogrel  75 mg Oral Daily    [Held by provider] metoprolol succinate  50 mg Oral Daily    [Held by provider] cloNIDine  0.1 mg Oral BID    [Held by provider] QUEtiapine  50 mg Oral BID    miconazole nitrate   Topical BID    [Held by provider] gabapentin  600 mg Oral 4x daily    white petrolatum   Topical BID    sodium chloride flush  5-40 mL IntraVENous 2 times per day    miconazole   Topical BID    epoetin sharath-epbx  6,000 Units SubCUTAneous Once per day on Mon Wed Fri    [Held by provider] hydrALAZINE  50 mg Oral 3 times per day    pantoprazole (PROTONIX) 40 mg injection  40 mg IntraVENous Q12H    sennosides  5 mL Oral Nightly    [Held by provider] carbidopa-levodopa  1 tablet Per NG tube TID    ipratropium-albuterol  1 ampule Inhalation Q4H WA    collagenase   Topical Q MWF    [Held by provider] benzonatate  100 mg Oral TID    [Held by provider] ezetimibe  10 mg Oral Daily    [Held by provider] atorvastatin  80 mg Oral Nightly     PRN Meds: sodium chloride flush, morphine, hydrALAZINE, miconazole nitrate **AND** miconazole nitrate, sodium chloride, labetalol, glucose, dextrose bolus **OR** dextrose bolus, glucagon (rDNA), dextrose  Nutrition:   Clear liquid diet   Labs and Imaging Studies     CBC:   Recent Labs     10/18/22  0419 10/19/22  0431 10/20/22  7033 WBC 5.7 6.8 8.1   RBC 2.64* 2.83* 2.90*   HGB 7.7* 8.3* 8.2*   HCT 23.7* 25.7* 25.9*   MCV 89.8 90.8 89.3   MCH 29.2 29.3 28.3   MCHC 32.5 32.3 31.7*   RDW 14.7 14.2 14.1    178 203   MPV 10.1 10.2 9.9       BMP:    Recent Labs     10/18/22  0419 10/18/22  1353 10/19/22  0431 10/19/22  1334 10/20/22  0452      < > 143 144 146   K 3.0*   < > 3.8 3.8 3.8   CL 96*   < > 93* 91* 91*   CO2 35*   < > 34* 29 32*   BUN 20   < > 20 21 20   CREATININE 1.7*   < > 1.7* 1.9* 1.7*   GLUCOSE 87   < > 80 95 115*   CALCIUM 8.1*   < > 8.3* 9.0 9.1   PROT 5.3*  --  5.7*  --  6.6   LABALBU 2.6*  --  2.6*  --  3.6   BILITOT 0.5  --  0.5  --  0.6   ALKPHOS 392*  --  345*  --  292*   AST 24  --  20  --  20   ALT <5  --  <5  --  11    < > = values in this interval not displayed. LIVER PROFILE:   Recent Labs     10/18/22  0419 10/19/22  0431 10/20/22  0452   AST 24 20 20   ALT <5 <5 11   BILITOT 0.5 0.5 0.6   ALKPHOS 392* 345* 292*       PT/INR:   Recent Labs     10/18/22  0419   PROTIME 16.4*   INR 1.5       APTT:   Recent Labs     10/18/22  0419   APTT 37.3*       Fasting Lipid Panel:    No results found for: CHOL, TRIG, HDL    Cardiac Enzymes:    No results found for: CKTOTAL, CKMB, CKMBINDEX, TROPONINI    Notable Cultures:      Blood cultures   Blood Culture, Routine   Date Value Ref Range Status   10/13/2022 5 Days no growth  Final     Respiratory cultures No results found for: RESPCULTURE No results found for: LABGRAM  Urine No results found for: LABURIN  Legionella No results found for: LABLEGI  C Diff PCR No results found for: CDIFPCR  Wound culture/abscess: No results for input(s): WNDABS in the last 72 hours. Tip culture:No results for input(s): CXCATHTIP in the last 72 hours.      Antibiotic  Days  Day started                              Oxygen:     Vent Information  Ventilator ID: [id number]  Equipment Changed: (S) Humidification    Additional Respiratory Assessments  Heart Rate: (!) 101  Resp: (!) 33  SpO2: 96 %  Position: Semi-Sarabia's  Humidification Source: Heated wire  Humidification Temp: 94  Circuit Condensation: Drained     Nasal cannula L/min     Face mask %     Reservoirs mask %       ABG     PH     PCO2     PO2     HCO3     Sat%     FIO2     DATES        Lines:  Site  Day  Date inserted     TLC              PICC              Arterial line              Peripheral line              HD cath            Urinary Catheter 09/29/22 Mahan-Output (mL): 150 mL  [REMOVED] Urinary Catheter 09/27/22-Output (mL): 100 mL    Imaging Studies:    XR CHEST PORTABLE   Final Result   Findings for volume overload as observed the in October 19. XR CHEST PORTABLE   Final Result   The enteric tube has been removed, otherwise stable appearance of the chest   compared to 10/18/2022. XR CHEST PORTABLE   Final Result   No interval change         US DUP LOWER EXTREMITIES BILATERAL VENOUS   Final Result   Within the visualized vessels there is no evidence for deep venous   thrombosis               XR CHEST PORTABLE   Final Result   Worsening congestive heart failure suspected         XR CHEST PORTABLE   Final Result   Very minimal improvement seen in the aeration of the upper lung fields with   stable increased markings seen within the mid and lower lung fields   bilaterally with small bilateral pleural effusions. Heart remains enlarged. XR CHEST PORTABLE   Final Result   New right pleural effusion. XR CHEST PORTABLE   Final Result   Stable bilateral pulmonary infiltrates. XR ABDOMEN (KUB) (SINGLE AP VIEW)   Final Result   Catheter is in the stomach. XR ABDOMEN FOR NG/OG/NE TUBE PLACEMENT   Final Result   1. Gastric catheter is coiled in the pharynx and upper esophagus and should   be withdrawn and reinserted. Follow-up imaging recommended. XR CHEST PORTABLE   Final Result   Bilateral airspace disease with interval progression on the right.          NM GI BLOOD LOSS   Final Result No evidence of active GI bleeding during acquisition. XR CHEST PORTABLE   Final Result   Unchanged bilateral chest opacities with right pleural effusion evident. See   above. XR CHEST PORTABLE   Final Result   1. Persistent bilateral patchy parenchymal densities concerning for pneumonia   and or atelectasis   2. Persistent small bilateral pleural effusions, left greater than right   3. Cardiomegaly, stable         XR CHEST PORTABLE   Final Result   No significant change compared to the prior exam with cardiomegaly, bilateral   airspace opacities and bilateral pleural effusions seen most suggestive of   congestive heart failure, but superimposed pneumonia not excluded. US DUP ABD PEL RETRO SCROT COMPLETE   Final Result   1. No evidence of testicular torsion      2. Severe scrotal skin thickening. 3.  Trace hydroceles. 4.  No evidence of bowel containing inguinal hernia. US SCROTUM AND TESTICLES   Final Result   1. No evidence of testicular torsion      2. Severe scrotal skin thickening. 3.  Trace hydroceles. 4.  No evidence of bowel containing inguinal hernia. XR CHEST PORTABLE   Final Result   1. No significant interval change in the appearance of the chest.      2.  Bilateral pleural effusions and areas of atelectasis or multifocal   pneumonia. CTA ABDOMEN PELVIS W CONTRAST   Final Result   Mixed densities throughout the colonic segments including in the ascending   colon and rectum could represent mixed densities of blood products although   no focal area of arterial enhancement or extravasation/blushing is observed   to localize acute hemorrhage. No evidence for perforation or abscess. No   pneumatosis intestinalis or portal venous gas evident. 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 No adjacent hematoma with only minimal   adjacent stranding. Bilateral pleural effusions moderate to large right and moderate left   effusions with adjacent atelectasis. Anasarca. XR CHEST PORTABLE   Final Result   Bilateral pleural effusions with bibasilar patchy infiltrates and mild   cardiomegaly. NM GI BLOOD LOSS   Final Result   Active GI bleeding identified during acquisition in the splenic flexure with   peristalsis identified into the proximal descending colon. RECOMMENDATIONS:   Unavailable         CT ABDOMEN PELVIS WO CONTRAST   Final Result   CHEST:      No evidence of neoplastic disease, allowing for limitations of noncontrast   technique. Moderate bilateral pleural effusions. Nonemergent incidental findings as above. ABDOMEN/PELVIS:      No evidence of neoplastic disease, allowing for limitations of noncontrast   technique. Bladder wall thickening is nonspecific given under distension; recommend   correlation urinalysis to evaluate for UTI. Nonemergent incidental findings as above. CT CHEST WO CONTRAST   Final Result   CHEST:      No evidence of neoplastic disease, allowing for limitations of noncontrast   technique. Moderate bilateral pleural effusions. Nonemergent incidental findings as above. ABDOMEN/PELVIS:      No evidence of neoplastic disease, allowing for limitations of noncontrast   technique. Bladder wall thickening is nonspecific given under distension; recommend   correlation urinalysis to evaluate for UTI. Nonemergent incidental findings as above. FL MODIFIED BARIUM SWALLOW W VIDEO   Final Result   1. Mildly impaired swallowing mechanism with swallowing delay and followed   by laryngeal penetration with thin liquid barium when using a straw. No   barium aspiration      2. Please see separate speech pathology report for full discussion of   findings and recommendations.       RECOMMENDATIONS: Unavailable         XR ABDOMEN (KUB) (SINGLE AP VIEW)   Final Result   Somewhat greater than average quantity of retained fecal material thin the   lower GI tract suggesting dysfunction with evacuation. XR CHEST PORTABLE   Final Result   1. Atherosclerotic disease and mild cardiomegaly. 2.  Persistent but improved opacities in the bilateral lungs most pronounced   in the mid and lower lungs. There appears to be a small left and trace right   pleural effusion         XR FOOT RIGHT (2 VIEWS)   Final Result   No evidence of calcaneal osteomyelitis. CT HEAD WO CONTRAST   Final Result   Parenchymal volume loss and chronic microvascular ischemic changes. No acute intracranial abnormality. Ethmoid and sphenoid sinusitis. Right mastoid effusion. US RETROPERITONEAL COMPLETE   Final Result   1. Normal appearance of the bilateral kidneys. No hydronephrosis. 2.  A Mahan catheter is decompressing the bladder. XR CHEST PORTABLE   Final Result   Worsening infiltrate and or atelectasis on the left, stable on the right with   suspected layering pleural effusion. XR CHEST PORTABLE   Final Result   Unchanged bilateral atelectasis and or infiltrate as well as small right   pleural effusion. MRI BRAIN WO CONTRAST   Final Result   There are 2 small regions of petechial infarcts one in the left   posterior frontal lobe and a second in the left parietal lobe not   exceeding 3 mm. There is otherwise extensive small vessel ischemic   changes         XR CHEST PORTABLE   Final Result   1. Multifocal bilateral airspace disease more prominent within the lower   lobes. The airspace disease is unchanged when compared with the prior study. CT HEAD WO CONTRAST   Final Result   Diffuse atrophy likely age related   Findings compatible with small vessel ischemic changes. XR CHEST PORTABLE   Final Result   1.  Pulmonary opacities present favoring edema and atelectasis, also small   pleural effusions, but nonspecific with some additional considerations noted   above   2. Support devices present as described above   3. Heart size appears borderline enlarged         XR ABDOMEN FOR NG/OG/NE TUBE PLACEMENT   Final Result   NG/OG is in the stomach. IR CAROTID STENT W PROTECTION   Final Result   1. Angiogram demonstrates successful placement of a carotid stent ,   post procedure images demonstrate a widely patent stent and internal   carotid         CT HEAD WO CONTRAST   Final Result   Diffuse atrophy likely age related   Findings compatible with small vessel ischemic changes. Findings were called at the time of dictation         CT BRAIN PERFUSION   Final Result      No significant ischemic penumbra identified      This study was analyzed by the [employer]. ai algorithm. CTA NECK W CONTRAST   Final Result   1. Estimated stenosis of the proximal right and left internal carotid   artery by NASCET criteria is greater than 90% on the left   2. Severe atherosclerotic disease . 3. No large vessel occlusion identified            This study was analyzed by the [employer]. ai algorithm. CTA HEAD W CONTRAST   Final Result   1. Estimated stenosis of the proximal right and left internal carotid   artery by NASCET criteria is greater than 90% on the left   2. Severe atherosclerotic disease . 3. No large vessel occlusion identified            This study was analyzed by the [employer] algorithm. Fluoroscopy modified barium swallow with video    (Results Pending)   XR CHEST PORTABLE    (Results Pending)          APRN- CNP Assessment and PLan   In summary, 74 y.o. male with significant past medical history of CKD, DM type II, MATT, presented 9/26/22 2 with stroke and GI bleed.  Patient was admitted to [facility] with strokelike symptoms, right-sided paralysis and aphasia, found to have severe left ICA stenosis and was transfer to [facility] taken to IR for left ICA angioplasty with stent placement. Patient had GI Bleed, following general surgery, received total of 12 units of PRBC since admission. EGD on 10/3/22 showed no evidence of upper GI bleed, CT a/p showed showed no focal area of arterial enhancement or extra blush to localize acute hemorrhage, nuclear medicine scan was positive for bleeding around the splenic flexure. Colonoscopy showed full of clots. Transfer to MICU from PICU with low hgb, labor breathing and hypotensive. Assessment:  LMCA  stroke 2/2 symptomatic LICA stenosis s/p IR angioplasty and stent placement 9/26/2022 on dual antiplatelet therapy- on Asprin and Plavix   Acute hypoxemic and hypercapnic respiratory failure intubated/extubated, currently on supplemental oxygen  Acute  large GI bleed/acute blood loss anemia s/p 15 units of PRBC s/p EGD and Colonoscopy, and bleeding scan. bleeding around the splenic flexure.  -- repeat colonoscopy negative for bleed on 10/18/22   Bilateral pulmonary edema/ HFpEF ( TACO 2/2 blood transfusion)   LARY   Metabolic acidosis > resolved   Hypernatremia/ hyperchloremia> resolved   Hyperglycemia > resolved   Leukocytosis> improved   Right foot ulcer    Hx DM type 2  Hx of MATT on CPAP  Hx of parkinson disease  Hx of seizure  Hx of PVD  Hx of depression      Plan:  - Currently on supplement oxygen, titrate to keep SPO2 goal above 92%  - bronchodilator eran and PRN  - Bipap qhs and PRN   - off precedex gtt    - keep MAP >65mmHg  - neurology consulted for stroke, on dual antiplatelet therapy- recommended Plavix once stable from bleeding standpoint until able to start dual therapy -- plavix started - but patient is NPO - barium swallow today- concern for aspiration   -Wound culture culture wound culture on foot showed Corynebacteria (Mixed morphologies, COVID 19 negative,   - ID following,  sign off , monitor off abx   - General surgery following for GI bleed, input appreciated  - s/p 15 units of [facility]  - CBC daily   - transfuse if less than 7  - PPI BID  - GI consulted, input appreciated; Colonoscopy on 10/18/22 showed no bleeding, normal terminal ileum, no inflammation, masses, ulcers, or AVMs  noted, sever polyps 3-7mm resected with cold snare, small internal hemorrhoids, no external hemorrhoids noted-  recommended aspirin x1 week, if no bleed recommended second antiplatelet agent. - Patient is NPO for barium swallow- will give aspirin suppository, will give for 1 week aspirin 81mg daily than add plavix if no bleeding occurs.    - off Bumex gtt, Bumex 1mg BID, I/O since admission (- 13.7L); output 4.0L yesterday   - trend Bmp  - Bumex 1 mg x 1, urine output 2.3L; I/o Since admission (+5.8L)   - nephrology,following ,input appreciated  - Podiatry and wound care following  - labs in AM  - F/E/N Hedwig Peasant /replace lytes/ NPO  - barium swallow, if pass will start diet and resume home meds, if failed, may need NG tube/Peg tube  - DVT/GI scds/ no anticoagulation given GI bleed/ Protonix BID  - Code status: full code           CONSTANTINE Tyler-CNP   Critical Care     Discussed case with Attending Physician: Kain Madera

## 2022-10-20 NOTE — PROGRESS NOTES
[facility] ([facility])  Gastroenterology, Hepatology &  Advanced Endoscopy     Progress Note    SUBJECTIVE:      Patient doing well. He has been de-escalated from BiPAP to NC. He is showing no signs of bleeding. Hgb is stable on aspirin. OBJECTIVE      Physical    VITALS:  BP (!) 168/77   Pulse (!) 101   Temp 99.1 °F (37.3 °C) (Axillary)   Resp 22   Ht 5' 8\" (1.727 m)   Wt 199 lb 12.8 oz (90.6 kg)   SpO2 94%   BMI 30.38 kg/m²   Physical Exam:  General: Chronically ill-appearing, NAD  HEENT: PERRLA, EOMI, Anicteric sclera, MMM, no rhinorrhea  Cards: RRR, no LE edema  Resp: Breathing comfortably on NC, good air movement, no use of accessory muscles, no audible wheezing  Abdomen: soft, NT, ND. Extremities: Moves all extremities, no effusions or bruising. Skin: No rashes or jaundice  Neuro: CN grossly intact, non-focal exam, alert, intermittently confused. ASSESSMENT AND PLAN      67y/M w/ CKD, DMII, and MATT who presents w/ acute CVA now on DAPT and R foot infection s/p debridement who has been having issues with persistent anemia requiring several transfusions. Over the weekend, he showed active signs of bleeding with positive tagged RBC scan. EGD negative, Colonoscopy with BRB in R colon and no actively bleeding lesions. CTA w/ no active extravasation. DAPT has since been d/c. Repeat colonoscopy off of therapy shows no high risk lesions. PLAN:  - Continue single agent anti-platelet therapy and monitor Hgb daily.   - Monitor stools for signs of bleeding.  - If no signs of low-grade bleeding on single agent therapy for 7 days, consider starting DAPT with close monitoring. I will follow. Thank you for including us in the care of this patient. Please do not hesitate to contact us with any additional questions or concerns.     Robert Rabago MD  Gastroenterology/Hepatology  Advanced Endoscopy

## 2022-10-20 NOTE — PROGRESS NOTES
Department of Podiatry   Progress Note      Mr. Heriberto Aleman was seen and evaluated at bedside this morning. Patient was asleep during this encounter. Past Medical History:            Diagnosis Date    Chronic back pain     CKD (chronic kidney disease)     CVA (cerebral vascular accident) ([facility]     CVA (cerebral vascular accident) ([facility]     DM (diabetes mellitus) ([facility]     Major depression     MI (myocardial infarction) ([facility]     MATT (obstructive sleep apnea)     Parkinson disease ([facility]     PVD (peripheral vascular disease) ([facility]     Seizure ([facility]        Past Surgical History:        Procedure Laterality Date    COLONOSCOPY N/A 10/9/2022    COLONOSCOPY DIAGNOSTIC performed by Arthur Tierney MD at [address] [place] [address],[address] N/A 10/18/2022    COLONOSCOPY POLYPECTOMY SNARE/COLD BIOPSY performed by Matthew Ace MD at [place] [address]      IR CAROTID STENT UNI W PROTECTION  9/27/2022    IR CAROTID STENT UNI W PROTECTION 9/27/2022 Anali Ramirez MD [facility]    UPPER GASTROINTESTINAL ENDOSCOPY N/A 10/3/2022    EGD DIAGNOSTIC ONLY performed by Trisha Liriano MD at [facility] ENDOSCOPY       Medications Prior to Admission:    Medications Prior to Admission: apixaban (ELIQUIS) 5 MG TABS tablet, Take 5 mg by mouth 2 times daily  aspirin 81 MG chewable tablet, Take 81 mg by mouth daily  carbidopa-levodopa (SINEMET)  MG per tablet, Take 1 tablet by mouth 3 times daily  carvedilol (COREG) 25 MG tablet, Take 25 mg by mouth 2 times daily (with meals)  cetirizine (ZYRTEC) 10 MG tablet, Take 10 mg by mouth daily  clopidogrel (PLAVIX) 75 MG tablet, Take 75 mg by mouth daily  diphenhydrAMINE (BENADRYL) 25 MG capsule, Take 25 mg by mouth every 6 hours  ezetimibe (ZETIA) 10 MG tablet, Take 10 mg by mouth daily  fluticasone (FLONASE) 50 MCG/ACT nasal spray, 2 sprays by Each Nostril route daily  gabapentin (NEURONTIN) 600 MG tablet, Take 600 mg by mouth 4 times daily.   HYDROcodone-acetaminophen (NORCO) 5-325 MG per tablet, Take 1 tablet by mouth 2 times daily. Insulin Glargine, 2 Unit Dial, (TOUJEO MAX SOLOSTAR) 300 UNIT/ML SOPN, Inject 66 Units into the skin daily  piperacillin-tazobactam (ZOSYN) 3-0.375 GM per 50ML IVPB extended infusion, Infuse 4.5 mg intravenously in the morning and 4.5 mg at noon and 4.5 mg in the evening. acetaminophen (TYLENOL) 325 MG tablet, Take 650 mg by mouth every 6 hours as needed for Pain  amLODIPine (NORVASC) 5 MG tablet, Take 5 mg by mouth daily  benzonatate (TESSALON) 100 MG capsule, Take 100 mg by mouth 3 times daily  cloNIDine (CATAPRES) 0.1 MG tablet, Take 0.1 mg by mouth in the morning and 0.1 mg in the evening. hydrALAZINE (APRESOLINE) 100 MG tablet, Take 100 mg by mouth 3 times daily    Allergies:  Codeine    Social History:   TOBACCO:   has no history on file for tobacco use. ETOH:   has no history on file for alcohol use. DRUGS:   Social History     Substance and Sexual Activity   Drug Use Not on file       Family History:   History reviewed. No pertinent family history. REVIEW OF SYSTEMS:    All pertinent positives and negatives as noted in HPI       LOWER EXTREMITY EXAMINATION   -Dressings clean, dry and intact without dishevelment.   -Patient digits are warm to touch.   -Offloading pads noted    PHYSICAL EXAM AS OF 10/14/22:  VASCULAR:  DP and PT pulses are non palpable. CFT < 5 seconds B/L. Warm to warm from the tibial tuberosity to the distal aspect of the digits dorsally. NEUROLOGIC:  Protective sensation is diminished but grossly intact    DERM:  Right foot lateral plantar wound measuring approx 6cm in diameter. No erythema, serosanguinous drainage, no malodor.     MUSCULOSKELETAL: deferred    Picture as of 10/19/22      CONSULTS:  IP CONSULT TO CRITICAL CARE  IP CONSULT TO DIETITIAN  IP CONSULT TO CARDIOLOGY  IP CONSULT TO PODIATRY  IP CONSULT TO PODIATRY  IP CONSULT TO UROLOGY  IP CONSULT TO NEPHROLOGY  IP CONSULT TO GENERAL SURGERY  IP CONSULT TO INFECTIOUS DISEASES  IP CONSULT TO GI  IP CONSULT TO GENERAL SURGERY  IP CONSULT TO GENERAL SURGERY  IP CONSULT TO PALLIATIVE CARE  IP CONSULT TO DIETITIAN    MEDICATION:  Scheduled Meds:   amLODIPine  5 mg Oral Daily    bumetanide  1 mg IntraVENous BID    metoprolol  2.5 mg IntraVENous Q6H    potassium chloride  10 mEq IntraVENous Q1H    chlorhexidine  15 mL Mouth/Throat BID    clopidogrel  75 mg Oral Daily    [Held by provider] metoprolol succinate  50 mg Oral Daily    [Held by provider] cloNIDine  0.1 mg Oral BID    [Held by provider] QUEtiapine  50 mg Oral BID    miconazole nitrate   Topical BID    [Held by provider] gabapentin  600 mg Oral 4x daily    white petrolatum   Topical BID    sodium chloride flush  5-40 mL IntraVENous 2 times per day    miconazole   Topical BID    epoetin sharath-epbx  6,000 Units SubCUTAneous Once per day on Mon Wed Fri    [Held by provider] hydrALAZINE  50 mg Oral 3 times per day    insulin lispro  0-16 Units SubCUTAneous 4x Daily AC & HS    pantoprazole (PROTONIX) 40 mg injection  40 mg IntraVENous Q12H    sennosides  5 mL Oral Nightly    [Held by provider] carbidopa-levodopa  1 tablet Per NG tube TID    ipratropium-albuterol  1 ampule Inhalation Q4H WA    collagenase   Topical Q MWF    [Held by provider] benzonatate  100 mg Oral TID    [Held by provider] ezetimibe  10 mg Oral Daily    [Held by provider] atorvastatin  80 mg Oral Nightly     Continuous Infusions:   sodium chloride      dextrose       PRN Meds:.sodium chloride flush, morphine, hydrALAZINE, miconazole nitrate **AND** miconazole nitrate, sodium chloride, labetalol, glucose, dextrose bolus **OR** dextrose bolus, glucagon (rDNA), dextrose    RADIOLOGY:  XR CHEST PORTABLE   Final Result   The enteric tube has been removed, otherwise stable appearance of the chest   compared to 10/18/2022.          XR CHEST PORTABLE   Final Result   No interval change         US DUP LOWER EXTREMITIES BILATERAL VENOUS   Final Result Within the visualized vessels there is no evidence for deep venous   thrombosis               XR CHEST PORTABLE   Final Result   Worsening congestive heart failure suspected         XR CHEST PORTABLE   Final Result   Very minimal improvement seen in the aeration of the upper lung fields with   stable increased markings seen within the mid and lower lung fields   bilaterally with small bilateral pleural effusions. Heart remains enlarged. XR CHEST PORTABLE   Final Result   New right pleural effusion. XR CHEST PORTABLE   Final Result   Stable bilateral pulmonary infiltrates. XR ABDOMEN (KUB) (SINGLE AP VIEW)   Final Result   Catheter is in the stomach. XR ABDOMEN FOR NG/OG/NE TUBE PLACEMENT   Final Result   1. Gastric catheter is coiled in the pharynx and upper esophagus and should   be withdrawn and reinserted. Follow-up imaging recommended. XR CHEST PORTABLE   Final Result   Bilateral airspace disease with interval progression on the right. NM GI BLOOD LOSS   Final Result   No evidence of active GI bleeding during acquisition. XR CHEST PORTABLE   Final Result   Unchanged bilateral chest opacities with right pleural effusion evident. See   above. XR CHEST PORTABLE   Final Result   1. Persistent bilateral patchy parenchymal densities concerning for pneumonia   and or atelectasis   2. Persistent small bilateral pleural effusions, left greater than right   3. Cardiomegaly, stable         XR CHEST PORTABLE   Final Result   No significant change compared to the prior exam with cardiomegaly, bilateral   airspace opacities and bilateral pleural effusions seen most suggestive of   congestive heart failure, but superimposed pneumonia not excluded. US DUP ABD PEL RETRO SCROT COMPLETE   Final Result   1. No evidence of testicular torsion      2. Severe scrotal skin thickening. 3.  Trace hydroceles.       4.  No evidence of bowel containing inguinal hernia. US SCROTUM AND TESTICLES   Final Result   1. No evidence of testicular torsion      2. Severe scrotal skin thickening. 3.  Trace hydroceles. 4.  No evidence of bowel containing inguinal hernia. XR CHEST PORTABLE   Final Result   1. No significant interval change in the appearance of the chest.      2.  Bilateral pleural effusions and areas of atelectasis or multifocal   pneumonia. CTA ABDOMEN PELVIS W CONTRAST   Final Result   Mixed densities throughout the colonic segments including in the ascending   colon and rectum could represent mixed densities of blood products although   no focal area of arterial enhancement or extravasation/blushing is observed   to localize acute hemorrhage. No evidence for perforation or abscess. No   pneumatosis intestinalis or portal venous gas evident. 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 No adjacent hematoma with only minimal   adjacent stranding. Bilateral pleural effusions moderate to large right and moderate left   effusions with adjacent atelectasis. Anasarca. XR CHEST PORTABLE   Final Result   Bilateral pleural effusions with bibasilar patchy infiltrates and mild   cardiomegaly. NM GI BLOOD LOSS   Final Result   Active GI bleeding identified during acquisition in the splenic flexure with   peristalsis identified into the proximal descending colon. RECOMMENDATIONS:   Unavailable         CT ABDOMEN PELVIS WO CONTRAST   Final Result   CHEST:      No evidence of neoplastic disease, allowing for limitations of noncontrast   technique. Moderate bilateral pleural effusions. Nonemergent incidental findings as above. ABDOMEN/PELVIS:      No evidence of neoplastic disease, allowing for limitations of noncontrast   technique.       Bladder wall thickening is nonspecific given under distension; recommend   correlation urinalysis to evaluate for UTI. Nonemergent incidental findings as above. CT CHEST WO CONTRAST   Final Result   CHEST:      No evidence of neoplastic disease, allowing for limitations of noncontrast   technique. Moderate bilateral pleural effusions. Nonemergent incidental findings as above. ABDOMEN/PELVIS:      No evidence of neoplastic disease, allowing for limitations of noncontrast   technique. Bladder wall thickening is nonspecific given under distension; recommend   correlation urinalysis to evaluate for UTI. Nonemergent incidental findings as above. FL MODIFIED BARIUM SWALLOW W VIDEO   Final Result   1. Mildly impaired swallowing mechanism with swallowing delay and followed   by laryngeal penetration with thin liquid barium when using a straw. No   barium aspiration      2. Please see separate speech pathology report for full discussion of   findings and recommendations. RECOMMENDATIONS:   Unavailable         XR ABDOMEN (KUB) (SINGLE AP VIEW)   Final Result   Somewhat greater than average quantity of retained fecal material thin the   lower GI tract suggesting dysfunction with evacuation. XR CHEST PORTABLE   Final Result   1. Atherosclerotic disease and mild cardiomegaly. 2.  Persistent but improved opacities in the bilateral lungs most pronounced   in the mid and lower lungs. There appears to be a small left and trace right   pleural effusion         XR FOOT RIGHT (2 VIEWS)   Final Result   No evidence of calcaneal osteomyelitis. CT HEAD WO CONTRAST   Final Result   Parenchymal volume loss and chronic microvascular ischemic changes. No acute intracranial abnormality. Ethmoid and sphenoid sinusitis. Right mastoid effusion. US RETROPERITONEAL COMPLETE   Final Result   1. Normal appearance of the bilateral kidneys. No hydronephrosis. 2.  A Mahan catheter is decompressing the bladder. XR CHEST PORTABLE   Final Result   Worsening infiltrate and or atelectasis on the left, stable on the right with   suspected layering pleural effusion. XR CHEST PORTABLE   Final Result   Unchanged bilateral atelectasis and or infiltrate as well as small right   pleural effusion. MRI BRAIN WO CONTRAST   Final Result   There are 2 small regions of petechial infarcts one in the left   posterior frontal lobe and a second in the left parietal lobe not   exceeding 3 mm. There is otherwise extensive small vessel ischemic   changes         XR CHEST PORTABLE   Final Result   1. Multifocal bilateral airspace disease more prominent within the lower   lobes. The airspace disease is unchanged when compared with the prior study. CT HEAD WO CONTRAST   Final Result   Diffuse atrophy likely age related   Findings compatible with small vessel ischemic changes. XR CHEST PORTABLE   Final Result   1. Pulmonary opacities present favoring edema and atelectasis, also small   pleural effusions, but nonspecific with some additional considerations noted   above   2. Support devices present as described above   3. Heart size appears borderline enlarged         XR ABDOMEN FOR NG/OG/NE TUBE PLACEMENT   Final Result   NG/OG is in the stomach. IR CAROTID STENT W PROTECTION   Final Result   1. Angiogram demonstrates successful placement of a carotid stent ,   post procedure images demonstrate a widely patent stent and internal   carotid         CT HEAD WO CONTRAST   Final Result   Diffuse atrophy likely age related   Findings compatible with small vessel ischemic changes. Findings were called at the time of dictation         CT BRAIN PERFUSION   Final Result      No significant ischemic penumbra identified      This study was analyzed by the Viz. ai algorithm. CTA NECK W CONTRAST   Final Result   1.  Estimated stenosis of the proximal right and left internal carotid   artery by NASCET criteria is greater than 90% on the left   2. Severe atherosclerotic disease . 3. No large vessel occlusion identified            This study was analyzed by the Viz[employer] ai algorithm. CTA HEAD W CONTRAST   Final Result   1. Estimated stenosis of the proximal right and left internal carotid   artery by NASCET criteria is greater than 90% on the left   2. Severe atherosclerotic disease . 3. No large vessel occlusion identified            This study was analyzed by the Viz[employer] ai algorithm. XR CHEST PORTABLE    (Results Pending)   Fluoroscopy modified barium swallow with video    (Results Pending)   XR CHEST PORTABLE    (Results Pending)       Vitals:    BP (!) 109/91   Pulse (!) 116   Temp 98 °F (36.7 °C)   Resp 29   Ht 5' 8\" (1.727 m)   Wt 199 lb 12.8 oz (90.6 kg)   SpO2 91%   BMI 30.38 kg/m²     LABS:   Recent Labs     10/19/22  0431 10/20/22  0452   WBC 6.8 8.1   HGB 8.3* 8.2*   HCT 25.7* 25.9*    203     Recent Labs     10/20/22  0452      K 3.8   CL 91*   CO2 32*   PHOS 4.1   BUN 20   CREATININE 1.7*     Recent Labs     10/18/22  0419 10/19/22  0431 10/20/22  0452   PROT 5.3* 5.7* 6.6   INR 1.5  --   --    APTT 37.3*  --   --        ASSESSMENTS:   1. Right foot wound diabetic ulcer  2. DM  3. Pain right foot  Acute cerebrovascular accident (CVA) ([facility]       PLAN:  -Patient examined and evaluated at bedside. All pertinent labs, charts, and imaging reviewed prior to encounter   -Antibiotics as per ID: Stopped  -Culture: Corynebacteria  -Continue Wound vac changes to RLE:MWF Dressing changes. Wound vac noted today with good seal, running at 125mmHg with minimal debris in cannister. Please call podiatry resident Felicitas Cao prior to vac change on 10/21/22.  -X rays: No acute osseous abnormalities to foot   -US: No evidence of DVT  -Will continue conservative care for now.    -Patient discussed with Dr. Ponce Holter  -[facility] offloading

## 2022-10-20 NOTE — CARE COORDINATION
10/20:  Update CM Note:  Pt presented to the [facility] ER for stroke-like symptoms - right side weakness & aphasic. Pt was found to have severe left ICA stenosis. Pt was transferred to [facility]. Pt had a left ICA angioplasty & stent placement by IR. Pt was placed on dual antiplatelet therapy with Brilinta & aspirin. Pt was found to have a GI bleed & has recd 15 units of PRBC. 10/3- EGD no evidence of upper GI bleed noted. 10/9-Colonoscopy showed large amount of blood. Bleeding Scan showed bleeding around the splenic flexure. 10/10- RRT was called due to hypotension & low Hgb. Pt was transferred to MICU. Pt is on 2L/NC at 95%, Iv Bumex gtt & Iv Protonix. Pt has a wound vac to right foot changed M-W-F. Pt had a  colonoscopy with polypectomy - Flynn -no source of bleeding. PT 10/24 OT 11/24. Pt is for a MBBS today, pending results. Pt's dc plan is for the [facility]. Cm spoke with Josette Corey at [phone] & faxed updated clinicals to [phone]. [address] [place] [address] fax [phone] Ref [id number] good til 10/11. DUANE, passar completed & envelope in soft chart. Will need covid test done on day of dc. [address] [place] [phone]. Please call [facility] to [phone]. Sw/FARHAT will continue to follow for dc planning.   Electronically signed by Danna Nance RN on 10/20/2022 at 1:03 PM

## 2022-10-20 NOTE — PROGRESS NOTES
SPEECH/LANGUAGE PATHOLOGY  VIDEOFLUOROSCOPIC STUDY OF SWALLOWING (MBS)   and PLAN OF CARE    PATIENT NAME:  Robert Jessica  (male)     MRN:  [id number]    :  1955  (74 y.o.)  STATUS:  Inpatient: Room 4405/4405-A    TODAY'S DATE:  10/20/2022  REFERRING PROVIDER:   CONSTANTINE Ferro CNP   SPECIFIC PROVIDER ORDER: SLP video swallow  Date of order:  10-19-22   REASON FOR REFERRAL: dysphagai   EVALUATING THERAPIST: IVONNE Gonzalez      RESULTS:      DYSPHAGIA DIAGNOSIS:  mild  oropharyngeal phase dysphagia     DIET RECOMMENDATIONS:  Minced and moist consistency solids (IDDSI level 5) with  thin liquids (IDDSI level 0)    FEEDING RECOMMENDATIONS:    Assistance level:  Supervision is needed during all oral intake     Compensatory strategies recommended: No straw     Discussed recommendations with nursing and/or faxed report to referring provider: Yes    SPEECH THERAPY  PLAN OF CARE   The dysphagia POC is established based on physician order and dysphagia diagnosis    Skilled SLP intervention for dysphagia management on acute care 3-5 x per week until goals met, pt plateaus in function and/or discharged from hospital  Dysphagia therapy is not recommended       Conditions Requiring Skilled Therapeutic Intervention for dysphagia:    Not applicable    SPECIFIC DYSPHAGIA INTERVENTIONS TO INCLUDE:     not applicable    Specific instructions for next treatment:  not applicable   Treatment Goals:    Short Term Goals:  Not applicable no therapy warranted     Long Term Goals:   Not applicable no therapy warranted      Patient/family Goal:    not applicable                    ADMITTING DIAGNOSIS: Acute cerebrovascular accident (CVA) ([facility] [I63.9]     VISIT DIAGNOSIS:   Visit Diagnoses         Codes    Anemia, unspecified type    -  Primary D64.9    Stenosis of left carotid artery     I65.22                PATIENT REPORT/COMPLAINT: patient currently NPO pending results of this evaluation    PRIOR LEVEL OF SWALLOW FUNCTION:    Past History of Dysphagia?:  yes    Current Diet Order:  Diet NPO Exceptions are: Sips of Water with Meds    PROCEDURE:  Consistencies Administered During the Evaluation   Liquids: thin liquid and nectar thick liquid   Solids:  pureed foods      Method of Intake:   cup, straw, spoon  Self fed, Fed by clinician      Position:   Seated, upright, Lateral plane    INSTRUMENTAL ASSESSMENT:    ORAL PREP/ ORAL PHASE:    Dentition:  edentulous     PHARYNGEAL PHASE:     ONSET TIME       Onset time of the pharyngeal swallow was adequate       PHARYNGEAL RESIDUALS        Vallecula/Pharyngeal Wall           No significant residuals were noted in the vallecula      Pyriform Sinuses      No significant residuals were noted in the pyriform sinuses     LARYNGEAL PENETRATION   Laryngeal penetration occurred in the absence of aspiration DURING the swallow for thin liquid due to  delayed laryngeal closure which remained in the laryngeal vestibule.  . Laryngeal penetration was minimal and occurred only with use of a straw   an absent cough/throat clear was noted    ASPIRATION  Aspiration was not present during this evaluation    PENETRATION-ASPIRATION SCALE (PAS):  THIN 3 = Material enters the airway, remains above the vocal folds, and is not ejected from the airway  MILDLY THICK 1 = Material does not enter the airway  MODERATELY THICK item not administered  PUREE 1 = Material does not enter the airway  HARD SOLID item not administered       COMPENSATORY STRATEGIES    Compensatory strategies that were beneficial included No straw      STRUCTURAL/FUNCTIONAL ANOMALIES   No structural/functional anomalies were noted    CERVICAL ESOPHAGEAL STAGE :     The cervical esophagus appeared adequate          ___________    Cognition:   Confusion noted    Oral Peripheral Examination   Generalized oral weakness    Current Respiratory Status   2 liters nasal cannula     Parameters of Speech Production  Respiration:  Adequate for speech production  Quality:   Strained  Intensity: Within functional limits    Pain: No pain reported. EDUCATION:   The Speech Language Pathologist (SLP) completed education regarding results of evaluation and that intervention is warranted at this time. Learner: Patient  Education: Reviewed results and recommendations of this evaluation and Reviewed diet and strategies  Evaluation of Education:  Needs further instruction    This plan may be re-evaluated and revised as warranted. Evaluation Time includes thorough review of current medical information, gathering information on past medical history/social history and prior level of function, completion of standardized testing/informal observation of tasks, assessment of data and education on plan of care and goals. [x]The admitting diagnosis and active problem list, have been reviewed prior to initiation of this evaluation.     CPT Code: 73763  dysphagia study    ACTIVE PROBLEM LIST:   Patient Active Problem List   Diagnosis    Acute cerebrovascular accident (CVA) ([facility] [address]    Acute respiratory failure with hypoxia (HCC)    Stenosis of left carotid artery    Hypoalbuminemia    Electrolyte imbalance    Hypernatremia    Anemia    GI bleeding

## 2022-10-20 NOTE — PROGRESS NOTES
Hospitalist Progress Note      PCP: Ameena Justice MD    Date of Admission: 9/27/2022  Days in the hospital: [facility] 0668 Course:   Patient is a 57-year-old male with history of CKD, diabetes mellitus, obstructive sleep apnea who initially presented to [facility] with strokelike symptoms and right-sided weakness along with aphasia. Patient was noted to have severe left ICA stenosis. He was transferred to [facility] on September 27 and patient underwent left ICA angioplasty and stent placement by IR. He was initially admitted to the neuro ICU. He was placed on dual antiplatelet therapy with Brilinta and aspirin. Also was noted to have GI bleed and received 15 units of PRBC. EGD was done on 10/3/2022 and no evidence of upper GI bleed noted. Colonoscopy on 10/9/2022 showed large amount of blood. CT of the abdomen did not show any localization of hemorrhage. Nuclear medicine scan was done which showed bleeding around the splenic flexure. RRT was called due to hypotension and hemorrhagic shock and patient was transferred to ICU. Due to diffuse bleeding patient was taken off dual antiplatelet therapy. He is planned for colonoscopy with GI once he has been adequately prepped. He had acute respiratory distress on 10/13/2022 and had to be placed on Bumex for volume overload. Subjective  Patient seen and examined at bedside. Patient is doing well. No chest pain or shortness of breath. He is on oxygen nasal cannula 3 L/min. Exam:    BP (!) 176/75   Pulse 97   Temp 98 °F (36.7 °C)   Resp 20   Ht 5' 8\" (1.727 m)   Wt 199 lb 12.8 oz (90.6 kg)   SpO2 97%   BMI 30.38 kg/m²     HEENT: + Pallor, no icterus. Respiratory:  CTA, decreased air entry  Cardiovascular: RRR, no murmur. Anasarca  Abdomen: Soft, non-tender, BS noted. Musculoskeletal: Right foot dressing and wound VAC noted. Neurologic: Alert and oriented x3.   Nonfocal        Assessment/Plan:  Acute CVA with left ICA stenosis s/p angioplasty and stent placement, dual antiplatelet therapy on hold, continue serial neurochecks, follow-up with neurology    Acute lower GI bleed s/p multiple units of PRBC transfusion. Last one in October 13. Bleeding noted from splenic flexure. Colonoscopy on October 18 negative for active bleeding. Acute hypoxic respiratory failure/distress, follow-up with critical care team, chest x-ray reviewed; clinically significantly better. Currently on oxygen high flow nasal cannula 3 L/min    Acute blood loss anemia status post 15 unit blood transfusion, H&H remains low but stable, monitor need for further transfusions. Last transfusion on October 13    Volume overload, significantly better. Acute kidney injury, nephrology following; strict I/O. Creatinine 1.7 better    Right foot nonhealing wound, on Cipro and Zyvox per ID, continue with local wound care, follow-up with podiatry    Obstructive sleep apnea on CPAP    Altered mental status secondary to metabolic encephalopathy, resolved     Overall prognosis remains guarded    Plan  Resume clonidine  Resume amlodipine at 10 mg once daily  Patient passed swallow study had mild oropharyngeal phase dysphagia. Minced and moist consistency solids with thin liquids  Replete magnesium and potassium    Labs:   Recent Labs     10/18/22  0419 10/19/22  0431 10/20/22  0452   WBC 5.7 6.8 8.1   HGB 7.7* 8.3* 8.2*   HCT 23.7* 25.7* 25.9*    178 203     Recent Labs     10/18/22  1353 10/18/22  1954 10/19/22  0431 10/19/22  1334 10/20/22  0452      < > 143 144 146   K 3.5   < > 3.8 3.8 3.8   CL 95*   < > 93* 91* 91*   CO2 35*   < > 34* 29 32*   BUN 18   < > 20 21 20   CREATININE 1.7*   < > 1.7* 1.9* 1.7*   CALCIUM 8.6   < > 8.3* 9.0 9.1   PHOS 4.6*  --  4.6*  --  4.1    < > = values in this interval not displayed.      Recent Labs     10/18/22  0419 10/19/22  0431 10/20/22  0452   AST 24 20 20   ALT <5 <5 11   BILITOT 0.5 0.5 0.6 ALKPHOS 392* 345* 292*     Recent Labs     10/18/22  0419   INR 1.5     No results for input(s): CKTOTAL, TROPONINI in the last 72 hours. Medications:  Reviewed    Infusion Medications    sodium chloride      dextrose       Scheduled Medications    bumetanide  1 mg IntraVENous BID    metoprolol  2.5 mg IntraVENous Q6H    chlorhexidine  15 mL Mouth/Throat BID    insulin lispro  0-4 Units SubCUTAneous Q6H    aspirin  81 mg Oral Daily    [START ON 10/21/2022] amLODIPine  10 mg Oral Daily    [Held by provider] metoprolol succinate  50 mg Oral Daily    cloNIDine  0.1 mg Oral BID    [Held by provider] QUEtiapine  50 mg Oral BID    miconazole nitrate   Topical BID    [Held by provider] gabapentin  600 mg Oral 4x daily    white petrolatum   Topical BID    sodium chloride flush  5-40 mL IntraVENous 2 times per day    miconazole   Topical BID    epoetin sharath-epbx  6,000 Units SubCUTAneous Once per day on Mon Wed Fri    [Held by provider] hydrALAZINE  50 mg Oral 3 times per day    pantoprazole (PROTONIX) 40 mg injection  40 mg IntraVENous Q12H    sennosides  5 mL Oral Nightly    [Held by provider] carbidopa-levodopa  1 tablet Per NG tube TID    ipratropium-albuterol  1 ampule Inhalation Q4H WA    collagenase   Topical Q MWF    [Held by provider] benzonatate  100 mg Oral TID    [Held by provider] ezetimibe  10 mg Oral Daily    [Held by provider] atorvastatin  80 mg Oral Nightly     PRN Meds: sodium chloride flush, morphine, hydrALAZINE, miconazole nitrate **AND** miconazole nitrate, sodium chloride, labetalol, glucose, dextrose bolus **OR** dextrose bolus, glucagon (rDNA), dextrose      Intake/Output Summary (Last 24 hours) at 10/20/2022 1438  Last data filed at 10/20/2022 1038  Gross per 24 hour   Intake 466.52 ml   Output 3025 ml   Net -2558.48 ml     Body mass index is 30.38 kg/m². Diet  ADULT DIET; Dysphagia - Minced and Moist; 4 carb choices (60 gm/meal);  Less than 60 gm    Code Status  Full Code Electronically signed by Nataly Soni MD on 10/20/2022 at 2:38 PM  [employer]   Please contact me through perfect serve    NOTE: This report was transcribed using voice recognition software. Every effort was made to ensure accuracy; however, inadvertent computerized transcription errors may be present.

## 2022-10-20 NOTE — PLAN OF CARE
Pt scheduled for barium swallow at 1300, stable giving IV bumex and up to chair with PT and this nurse. Will continue to monitor and report changes. Problem: Discharge Planning  Goal: Discharge to home or other facility with appropriate resources  Outcome: Progressing  Flowsheets (Taken 10/20/2022 0800)  Discharge to home or other facility with appropriate resources: Identify barriers to discharge with patient and caregiver     Problem: Pain  Goal: Verbalizes/displays adequate comfort level or baseline comfort level  10/20/2022 1158 by Haim Balderrama RN  Outcome: Progressing  10/20/2022 0209 by Marry Fothergill, RN  Outcome: Progressing     Problem: Skin/Tissue Integrity  Goal: Absence of new skin breakdown  Description: 1. Monitor for areas of redness and/or skin breakdown  2. Assess vascular access sites hourly  3. Every 4-6 hours minimum:  Change oxygen saturation probe site  4. Every 4-6 hours:  If on nasal continuous positive airway pressure, respiratory therapy assess nares and determine need for appliance change or resting period.   10/20/2022 1158 by Haim Balderrama RN  Outcome: Progressing  10/20/2022 0209 by Marry Fothergill, RN  Outcome: Progressing     Problem: Safety - Adult  Goal: Free from fall injury  10/20/2022 1158 by Haim Balderrama RN  Outcome: Progressing  Flowsheets (Taken 10/20/2022 1100)  Free From Fall Injury: Instruct family/caregiver on patient safety  10/20/2022 0209 by Marry Fothergill, RN  Outcome: Progressing     Problem: ABCDS Injury Assessment  Goal: Absence of physical injury  Outcome: Progressing  Flowsheets (Taken 10/20/2022 1100)  Absence of Physical Injury: Implement safety measures based on patient assessment     Problem: Chronic Conditions and Co-morbidities  Goal: Patient's chronic conditions and co-morbidity symptoms are monitored and maintained or improved  Outcome: Progressing  Flowsheets (Taken 10/20/2022 0800)  Care Plan - Patient's Chronic Conditions and Co-Morbidity Symptoms are Monitored and Maintained or Improved: Monitor and assess patient's chronic conditions and comorbid symptoms for stability, deterioration, or improvement     Problem: Neurosensory - Adult  Goal: Achieves stable or improved neurological status  Outcome: Progressing  Flowsheets (Taken 10/20/2022 0800)  Achieves stable or improved neurological status: Assess for and report changes in neurological status  Goal: Remains free of injury related to seizures activity  Outcome: Progressing  Flowsheets (Taken 10/20/2022 0800)  Remains free of injury related to seizure activity: Maintain airway, patient safety  and administer oxygen as ordered  Goal: Achieves maximal functionality and self care  Outcome: Progressing  Flowsheets (Taken 10/20/2022 0800)  Achieves maximal functionality and self care: Monitor swallowing and airway patency with patient fatigue and changes in neurological status     Problem: Respiratory - Adult  Goal: Achieves optimal ventilation and oxygenation  Outcome: Progressing  Flowsheets (Taken 10/20/2022 0800)  Achieves optimal ventilation and oxygenation: Assess for changes in respiratory status     Problem: Cardiovascular - Adult  Goal: Maintains optimal cardiac output and hemodynamic stability  Outcome: Progressing  Flowsheets (Taken 10/20/2022 0800)  Maintains optimal cardiac output and hemodynamic stability: Monitor blood pressure and heart rate  Goal: Absence of cardiac dysrhythmias or at baseline  Outcome: Progressing  Flowsheets (Taken 10/20/2022 0800)  Absence of cardiac dysrhythmias or at baseline: Monitor cardiac rate and rhythm     Problem: Skin/Tissue Integrity - Adult  Goal: Skin integrity remains intact  10/20/2022 1158 by Gay Barbour RN  Outcome: Progressing  Flowsheets  Taken 10/20/2022 1100  Skin Integrity Remains Intact: Monitor for areas of redness and/or skin breakdown  Taken 10/20/2022 0800  Skin Integrity Remains Intact: Monitor for areas of redness and/or skin breakdown  10/20/2022 0209 by Lynette Roche RN  Outcome: Progressing  Goal: Incisions, wounds, or drain sites healing without S/S of infection  Outcome: Progressing  Flowsheets  Taken 10/20/2022 1100  Incisions, Wounds, or Drain Sites Healing Without Sign and Symptoms of Infection: ADMISSION and DAILY: Assess and document risk factors for pressure ulcer development  Taken 10/20/2022 0800  Incisions, Wounds, or Drain Sites Healing Without Sign and Symptoms of Infection: TWICE DAILY: Assess and document skin integrity  Goal: Oral mucous membranes remain intact  10/20/2022 1158 by Rogelio Hickman RN  Outcome: Progressing  Flowsheets  Taken 10/20/2022 1100  Oral Mucous Membranes Remain Intact: Assess oral mucosa and hygiene practices  Taken 10/20/2022 0800  Oral Mucous Membranes Remain Intact: Assess oral mucosa and hygiene practices  10/20/2022 0209 by Lynette Roche RN  Outcome: Progressing

## 2022-10-21 ENCOUNTER — APPOINTMENT (OUTPATIENT)
Dept: GENERAL RADIOLOGY | Age: 67
DRG: 034 | End: 2022-10-21
Attending: INTERNAL MEDICINE
Payer: MEDICARE

## 2022-10-21 LAB
ALBUMIN SERPL-MCNC: 3.3 G/DL (ref 3.5–5.2)
ANION GAP SERPL CALCULATED.3IONS-SCNC: 18 MMOL/L (ref 7–16)
BASOPHILS ABSOLUTE: 0.03 E9/L (ref 0–0.2)
BASOPHILS RELATIVE PERCENT: 0.4 % (ref 0–2)
BUN BLDV-MCNC: 21 MG/DL (ref 6–23)
CALCIUM SERPL-MCNC: 9 MG/DL (ref 8.6–10.2)
CHLORIDE BLD-SCNC: 94 MMOL/L (ref 98–107)
CO2: 34 MMOL/L (ref 22–29)
CREAT SERPL-MCNC: 1.9 MG/DL (ref 0.7–1.2)
EOSINOPHILS ABSOLUTE: 0.3 E9/L (ref 0.05–0.5)
EOSINOPHILS RELATIVE PERCENT: 4.3 % (ref 0–6)
GFR SERPL CREATININE-BSD FRML MDRD: 39 ML/MIN/1.73
GLUCOSE BLD-MCNC: 143 MG/DL (ref 74–99)
HCT VFR BLD CALC: 27.8 % (ref 37–54)
HEMOGLOBIN: 9 G/DL (ref 12.5–16.5)
IMMATURE GRANULOCYTES #: 0.04 E9/L
IMMATURE GRANULOCYTES %: 0.6 % (ref 0–5)
LYMPHOCYTES ABSOLUTE: 1.14 E9/L (ref 1.5–4)
LYMPHOCYTES RELATIVE PERCENT: 16.5 % (ref 20–42)
MAGNESIUM: 1.9 MG/DL (ref 1.6–2.6)
MCH RBC QN AUTO: 28.9 PG (ref 26–35)
MCHC RBC AUTO-ENTMCNC: 32.4 % (ref 32–34.5)
MCV RBC AUTO: 89.4 FL (ref 80–99.9)
METER GLUCOSE: 166 MG/DL (ref 74–99)
METER GLUCOSE: 167 MG/DL (ref 74–99)
METER GLUCOSE: 171 MG/DL (ref 74–99)
METER GLUCOSE: 175 MG/DL (ref 74–99)
METER GLUCOSE: 204 MG/DL (ref 74–99)
MONOCYTES ABSOLUTE: 0.89 E9/L (ref 0.1–0.95)
MONOCYTES RELATIVE PERCENT: 12.9 % (ref 2–12)
NEUTROPHILS ABSOLUTE: 4.52 E9/L (ref 1.8–7.3)
NEUTROPHILS RELATIVE PERCENT: 65.3 % (ref 43–80)
PDW BLD-RTO: 14.1 FL (ref 11.5–15)
PHOSPHORUS: 3.5 MG/DL (ref 2.5–4.5)
PLATELET # BLD: 276 E9/L (ref 130–450)
PMV BLD AUTO: 10.4 FL (ref 7–12)
POTASSIUM REFLEX MAGNESIUM: 3.2 MMOL/L (ref 3.5–5)
POTASSIUM SERPL-SCNC: 3.2 MMOL/L (ref 3.5–5)
RBC # BLD: 3.11 E12/L (ref 3.8–5.8)
SODIUM BLD-SCNC: 146 MMOL/L (ref 132–146)
WBC # BLD: 6.9 E9/L (ref 4.5–11.5)

## 2022-10-21 PROCEDURE — C9113 INJ PANTOPRAZOLE SODIUM, VIA: HCPCS | Performed by: SURGERY

## 2022-10-21 PROCEDURE — 6370000000 HC RX 637 (ALT 250 FOR IP): Performed by: NURSE PRACTITIONER

## 2022-10-21 PROCEDURE — 2580000003 HC RX 258: Performed by: SURGERY

## 2022-10-21 PROCEDURE — 2700000000 HC OXYGEN THERAPY PER DAY

## 2022-10-21 PROCEDURE — 2500000003 HC RX 250 WO HCPCS: Performed by: NURSE PRACTITIONER

## 2022-10-21 PROCEDURE — 2060000000 HC ICU INTERMEDIATE R&B

## 2022-10-21 PROCEDURE — 80069 RENAL FUNCTION PANEL: CPT

## 2022-10-21 PROCEDURE — 6360000002 HC RX W HCPCS: Performed by: SURGERY

## 2022-10-21 PROCEDURE — 82962 GLUCOSE BLOOD TEST: CPT

## 2022-10-21 PROCEDURE — 6360000002 HC RX W HCPCS: Performed by: INTERNAL MEDICINE

## 2022-10-21 PROCEDURE — A4216 STERILE WATER/SALINE, 10 ML: HCPCS | Performed by: SURGERY

## 2022-10-21 PROCEDURE — 71045 X-RAY EXAM CHEST 1 VIEW: CPT

## 2022-10-21 PROCEDURE — 80048 BASIC METABOLIC PNL TOTAL CA: CPT

## 2022-10-21 PROCEDURE — 97129 THER IVNTJ 1ST 15 MIN: CPT

## 2022-10-21 PROCEDURE — 94640 AIRWAY INHALATION TREATMENT: CPT

## 2022-10-21 PROCEDURE — 83735 ASSAY OF MAGNESIUM: CPT

## 2022-10-21 PROCEDURE — 6370000000 HC RX 637 (ALT 250 FOR IP): Performed by: SURGERY

## 2022-10-21 PROCEDURE — 85025 COMPLETE CBC W/AUTO DIFF WBC: CPT

## 2022-10-21 PROCEDURE — 6370000000 HC RX 637 (ALT 250 FOR IP): Performed by: INTERNAL MEDICINE

## 2022-10-21 PROCEDURE — 94660 CPAP INITIATION&MGMT: CPT

## 2022-10-21 PROCEDURE — 92526 ORAL FUNCTION THERAPY: CPT

## 2022-10-21 PROCEDURE — 36415 COLL VENOUS BLD VENIPUNCTURE: CPT

## 2022-10-21 RX ORDER — BUMETANIDE 0.25 MG/ML
1 INJECTION, SOLUTION INTRAMUSCULAR; INTRAVENOUS DAILY
Status: DISCONTINUED | OUTPATIENT
Start: 2022-10-22 | End: 2022-10-22

## 2022-10-21 RX ORDER — CLONIDINE HYDROCHLORIDE 0.2 MG/1
0.2 TABLET ORAL 3 TIMES DAILY
Status: DISCONTINUED | OUTPATIENT
Start: 2022-10-21 | End: 2022-10-26 | Stop reason: HOSPADM

## 2022-10-21 RX ORDER — POT CHLORIDE/POT BICARB/CIT AC 25 MEQ
40 TABLET, EFFERVESCENT ORAL ONCE
Status: DISCONTINUED | OUTPATIENT
Start: 2022-10-21 | End: 2022-10-21

## 2022-10-21 RX ORDER — POTASSIUM CHLORIDE 20 MEQ/1
40 TABLET, EXTENDED RELEASE ORAL ONCE
Status: COMPLETED | OUTPATIENT
Start: 2022-10-21 | End: 2022-10-21

## 2022-10-21 RX ADMIN — MORPHINE SULFATE 1 MG: 2 INJECTION, SOLUTION INTRAMUSCULAR; INTRAVENOUS at 22:01

## 2022-10-21 RX ADMIN — ANTI-FUNGAL POWDER MICONAZOLE NITRATE TALC FREE: 1.42 POWDER TOPICAL at 21:34

## 2022-10-21 RX ADMIN — SODIUM CHLORIDE, PRESERVATIVE FREE 40 MG: 5 INJECTION INTRAVENOUS at 15:11

## 2022-10-21 RX ADMIN — CHLORHEXIDINE GLUCONATE 15 ML: 1.2 RINSE BUCCAL at 10:31

## 2022-10-21 RX ADMIN — PETROLATUM: 420 OINTMENT TOPICAL at 21:33

## 2022-10-21 RX ADMIN — SENNOSIDES 5 ML: 8.8 SYRUP ORAL at 21:23

## 2022-10-21 RX ADMIN — METOPROLOL TARTRATE 2.5 MG: 5 INJECTION INTRAVENOUS at 10:32

## 2022-10-21 RX ADMIN — Medication 10 ML: at 12:38

## 2022-10-21 RX ADMIN — SODIUM CHLORIDE, PRESERVATIVE FREE 40 MG: 5 INJECTION INTRAVENOUS at 03:43

## 2022-10-21 RX ADMIN — IPRATROPIUM BROMIDE AND ALBUTEROL SULFATE 1 AMPULE: .5; 2.5 SOLUTION RESPIRATORY (INHALATION) at 16:52

## 2022-10-21 RX ADMIN — MORPHINE SULFATE 1 MG: 2 INJECTION, SOLUTION INTRAMUSCULAR; INTRAVENOUS at 03:05

## 2022-10-21 RX ADMIN — IPRATROPIUM BROMIDE AND ALBUTEROL SULFATE 1 AMPULE: .5; 2.5 SOLUTION RESPIRATORY (INHALATION) at 20:28

## 2022-10-21 RX ADMIN — CHLORHEXIDINE GLUCONATE 15 ML: 1.2 RINSE BUCCAL at 21:24

## 2022-10-21 RX ADMIN — Medication 10 ML: at 21:23

## 2022-10-21 RX ADMIN — IPRATROPIUM BROMIDE AND ALBUTEROL SULFATE 1 AMPULE: .5; 2.5 SOLUTION RESPIRATORY (INHALATION) at 09:42

## 2022-10-21 RX ADMIN — COLLAGENASE SANTYL: 250 OINTMENT TOPICAL at 13:00

## 2022-10-21 RX ADMIN — ASPIRIN 81 MG CHEWABLE TABLET 81 MG: 81 TABLET CHEWABLE at 10:31

## 2022-10-21 RX ADMIN — CLONIDINE HYDROCHLORIDE 0.2 MG: 0.2 TABLET ORAL at 21:23

## 2022-10-21 RX ADMIN — ANTI-FUNGAL POWDER MICONAZOLE NITRATE TALC FREE: 1.42 POWDER TOPICAL at 10:31

## 2022-10-21 RX ADMIN — IPRATROPIUM BROMIDE AND ALBUTEROL SULFATE 1 AMPULE: .5; 2.5 SOLUTION RESPIRATORY (INHALATION) at 13:04

## 2022-10-21 RX ADMIN — MICONAZOLE NITRATE: 2 OINTMENT TOPICAL at 21:35

## 2022-10-21 RX ADMIN — CLONIDINE HYDROCHLORIDE 0.2 MG: 0.2 TABLET ORAL at 15:11

## 2022-10-21 RX ADMIN — AMLODIPINE BESYLATE 10 MG: 10 TABLET ORAL at 10:31

## 2022-10-21 RX ADMIN — CLONIDINE HYDROCHLORIDE 0.2 MG: 0.2 TABLET ORAL at 10:31

## 2022-10-21 RX ADMIN — HYDRALAZINE HYDROCHLORIDE 50 MG: 50 TABLET, FILM COATED ORAL at 21:23

## 2022-10-21 RX ADMIN — METOPROLOL TARTRATE 2.5 MG: 5 INJECTION INTRAVENOUS at 03:43

## 2022-10-21 RX ADMIN — PETROLATUM: 420 OINTMENT TOPICAL at 10:33

## 2022-10-21 RX ADMIN — MICONAZOLE NITRATE: 2 OINTMENT TOPICAL at 09:00

## 2022-10-21 RX ADMIN — POTASSIUM CHLORIDE 40 MEQ: 1500 TABLET, EXTENDED RELEASE ORAL at 10:36

## 2022-10-21 ASSESSMENT — PAIN DESCRIPTION - LOCATION
LOCATION: FOOT
LOCATION: FOOT

## 2022-10-21 ASSESSMENT — PAIN DESCRIPTION - DESCRIPTORS: DESCRIPTORS: ACHING;DISCOMFORT;SORE

## 2022-10-21 ASSESSMENT — PAIN SCALES - GENERAL
PAINLEVEL_OUTOF10: 5
PAINLEVEL_OUTOF10: 3
PAINLEVEL_OUTOF10: 8
PAINLEVEL_OUTOF10: 0

## 2022-10-21 ASSESSMENT — PAIN SCALES - WONG BAKER: WONGBAKER_NUMERICALRESPONSE: 0

## 2022-10-21 ASSESSMENT — PAIN DESCRIPTION - ONSET: ONSET: ON-GOING

## 2022-10-21 ASSESSMENT — PAIN DESCRIPTION - FREQUENCY: FREQUENCY: INTERMITTENT

## 2022-10-21 ASSESSMENT — PAIN DESCRIPTION - ORIENTATION: ORIENTATION: RIGHT

## 2022-10-21 ASSESSMENT — PAIN DESCRIPTION - PAIN TYPE: TYPE: CHRONIC PAIN

## 2022-10-21 NOTE — FLOWSHEET NOTE
Inpatient Wound Care(follow up) 8505b    Admit Date: 9/27/2022  5:45 PM    Reason for consult:  wound vac management    Findings:     10/21/22 0915   Wound 09/28/22 Foot Right;Lateral   Date First Assessed/Time First Assessed: 09/28/22 0944   Present on Hospital Admission: No  Location: Foot  Wound Location Orientation: Right;Lateral   Wound Image    Dressing Status New dressing applied   Wound Cleansed Cleansed with saline   Dressing/Treatment Negative pressure wound therapy; Pharmaceutical agent (see MAR)   Dressing Change Due 10/24/22   Wound Assessment Pink/red   Drainage Amount Scant   Drainage Description Serosanguinous   Odor None   Emily-wound Assessment   (callous)   Negative Pressure Wound Therapy Foot Right;Plantar;Lateral   Placement Date/Time: 09/28/22 1040   Location: Foot  Wound Location Orientation: Right;Plantar;Lateral   Number of pieces used 3   Cycle Continuous   Target Pressure (mmHg) 125   Canister changed? No   Dressing Changed Changed/New     **Informed Consent**    photos taken of wound and inserted into their chart as part of their permanent medical record for purposes of documentation, treatment management and/or medical review. All Images taken on 10/21/22 of patient name: Levon Ortega were transmitted and stored on [email or web address] located within [facility] by a registered [employer] Mobile Application Device.       Plan:  Wound vac dressing changed  Will follow    Fidelia Pablo RN 10/21/2022 11:15 AM

## 2022-10-21 NOTE — CARE COORDINATION
SOCIAL WORK/CASEMANAGEMENT TRANSITION OF CARE PLANNINGArianna Berg, [address]):  plan is to the [facility] [place]. Spoke with Maureen Cleveland the admissions rep there at [phone] fax [phone], They will not take pt today or over weekend. They want updated chart clinicals  on Monday and they will let us know if  still able to accept. Pt needs precert as well and a rapid covid on discharge. Pcp here updated.  ERI Nicole  10/21/2022

## 2022-10-21 NOTE — PROGRESS NOTES
Comprehensive Nutrition Assessment    Type and Reason for Visit:  Reassess    Nutrition Recommendations/Plan:   Continue current diet. Recommend and start Rohan BID, Boost pudding BID to optimize intake and to help meet increased nutrient needs for wound healing. ONS is w/ consideration for 60g protein restriction - Boost pudding-7g per serving. Malnutrition Assessment:  Malnutrition Status: At risk for malnutrition (Comment) (10/14/22 1227)    Context:  Acute Illness     Findings of the 6 clinical characteristics of malnutrition:  Energy Intake:  50% or less of estimated energy requirements for 5 or more days  Weight Loss:  Unable to assess (no wt hx on file)     Body Fat Loss:  No significant body fat loss     Muscle Mass Loss:  No significant muscle mass loss    Fluid Accumulation:  No significant fluid accumulation     Strength:  Not Performed    Nutrition Assessment:    Pt remains at nutritional risk d/t suboptimal PO intake and increased nutrient needs for wound healing. Pt. Passed swallow eval -10/20 now on minced and moist dysphagia diet. Admit transferred from [facility] 2/2 CVA s/p IR angio/stent. Noted post-procedure hypotension resulting in re-intubation/ extubated 9/29. Course complicated by developed GIB s/p EGD/ c-scope. Now s/p colonoscopy w/ polypectomy on 10/18/22-showed no bleeding. Noted R diabetic foot ulcer w/ wound vac in place. PMHx COPD, CAD/ NSTEMI, DM/PVD, previous stroke/ seizures, & Parkinson's. Will start ONS and monitor. Nutrition Related Findings:    Disoriented x3, - I/O (14L), hypokalemia, hyperglycemia, soft abd, hypoactive BS, +1 edema, wound VAC R foot, Wound Type: Diabetic Ulcer, Wound Vac, Deep Tissue Injury       Current Nutrition Intake & Therapies:    Average Meal Intake: 51-75% (intake x1)  Average Supplements Intake: None Ordered  ADULT DIET; Dysphagia - Minced and Moist; 4 carb choices (60 gm/meal);  Less than 60 gm  ADULT ORAL NUTRITION SUPPLEMENT; Breakfast, Dinner; Wound Healing Oral Supplement  ADULT ORAL NUTRITION SUPPLEMENT; Lunch, Dinner; Fortified Pudding Oral Supplement    Anthropometric Measures:  Height: 5' 8\" (172.7 cm)  Ideal Body Weight (IBW): 154 lbs (70 kg)    Admission Body Weight: 260 lb (117.9 kg) (bed 9/27)  Current Body Weight: 199 lb 12.8 oz (90.6 kg) (10/20 BS), 129.7 % IBW. Weight Source: Bed Scale  Current BMI (kg/m2): 30.4  Usual Body Weight:  (UTO UBW 2/2 poor EMR wt hx pta)     Weight Adjustment For: No Adjustment                 BMI Categories: Obese Class 1 (BMI 30.0-34. 9)    Estimated Daily Nutrient Needs:  Energy Requirements Based On: Formula  Weight Used for Energy Requirements: Current  Energy (kcal/day): MSJ 2166 x 1.3SF = 2100-2200kcal  Weight Used for Protein Requirements: Ideal  Protein (g/day): 1.5-1.8 g/kg IBW; 105-125  Method Used for Fluid Requirements: Other (Comment)  Fluid (ml/day): 4782-5621    Nutrition Diagnosis:   Inadequate oral intake (improving) related to altered GI function (GIB) as evidenced by intake 51-75% (NPO status during admission; pt. now advanced to minced and moist diet with intake 50-75% x1)    Nutrition Interventions:   Food and/or Nutrient Delivery: Continue Current Diet, Start Oral Nutrition Supplement (Rohan BID, boost pudding BID)  Nutrition Education/Counseling: Education not indicated  Coordination of Nutrition Care: Continue to monitor while inpatient       Goals:  Previous Goal Met: Progressing toward Goal(s)  Goals: PO intake 75% or greater, by next RD assessment (new goal)  Specify Other Goals: Nutrition progression/PO intake 75% or >    Nutrition Monitoring and Evaluation:   Behavioral-Environmental Outcomes: None Identified  Food/Nutrient Intake Outcomes: Food and Nutrient Intake, Supplement Intake, Diet Advancement/Tolerance  Physical Signs/Symptoms Outcomes: Biochemical Data, Nutrition Focused Physical Findings, Skin, Weight, Chewing or Swallowing, Diarrhea, GI Status, Fluid Status or Edema, Hemodynamic Status    Discharge Planning:    Continue Oral Nutrition Supplement     Maribel Leahy RD  Contact: ext [phone]

## 2022-10-21 NOTE — PLAN OF CARE
Problem: Discharge Planning  Goal: Discharge to home or other facility with appropriate resources  Outcome: Progressing  Flowsheets (Taken 10/21/2022 0747)  Discharge to home or other facility with appropriate resources: Identify barriers to discharge with patient and caregiver     Problem: Pain  Goal: Verbalizes/displays adequate comfort level or baseline comfort level  Outcome: Progressing     Problem: Skin/Tissue Integrity  Goal: Absence of new skin breakdown  Description: 1. Monitor for areas of redness and/or skin breakdown  2. Assess vascular access sites hourly  3. Every 4-6 hours minimum:  Change oxygen saturation probe site  4. Every 4-6 hours:  If on nasal continuous positive airway pressure, respiratory therapy assess nares and determine need for appliance change or resting period.   Outcome: Progressing     Problem: Safety - Adult  Goal: Free from fall injury  Outcome: Progressing     Problem: ABCDS Injury Assessment  Goal: Absence of physical injury  Outcome: Progressing     Problem: Chronic Conditions and Co-morbidities  Goal: Patient's chronic conditions and co-morbidity symptoms are monitored and maintained or improved  Outcome: Progressing  Flowsheets (Taken 10/21/2022 0747)  Care Plan - Patient's Chronic Conditions and Co-Morbidity Symptoms are Monitored and Maintained or Improved: Monitor and assess patient's chronic conditions and comorbid symptoms for stability, deterioration, or improvement     Problem: Neurosensory - Adult  Goal: Achieves stable or improved neurological status  Outcome: Progressing  Flowsheets (Taken 10/21/2022 0747)  Achieves stable or improved neurological status: Assess for and report changes in neurological status  Goal: Remains free of injury related to seizures activity  Outcome: Progressing  Flowsheets (Taken 10/21/2022 0747)  Remains free of injury related to seizure activity: Maintain airway, patient safety  and administer oxygen as ordered  Goal: Achieves maximal functionality and self care  Outcome: Progressing  Flowsheets (Taken 10/21/2022 0747)  Achieves maximal functionality and self care: Monitor swallowing and airway patency with patient fatigue and changes in neurological status     Problem: Respiratory - Adult  Goal: Achieves optimal ventilation and oxygenation  Outcome: Progressing  Flowsheets (Taken 10/21/2022 0747)  Achieves optimal ventilation and oxygenation: Assess for changes in respiratory status     Problem: Cardiovascular - Adult  Goal: Maintains optimal cardiac output and hemodynamic stability  Outcome: Progressing  Goal: Absence of cardiac dysrhythmias or at baseline  Outcome: Progressing     Problem: Skin/Tissue Integrity - Adult  Goal: Skin integrity remains intact  Outcome: Progressing  Flowsheets (Taken 10/21/2022 0747)  Skin Integrity Remains Intact: Monitor for areas of redness and/or skin breakdown  Goal: Incisions, wounds, or drain sites healing without S/S of infection  Outcome: Progressing  Flowsheets (Taken 10/21/2022 0747)  Incisions, Wounds, or Drain Sites Healing Without Sign and Symptoms of Infection: ADMISSION and DAILY: Assess and document risk factors for pressure ulcer development  Goal: Oral mucous membranes remain intact  Outcome: Progressing  Flowsheets (Taken 10/21/2022 0747)  Oral Mucous Membranes Remain Intact: Assess oral mucosa and hygiene practices     Problem: Metabolic/Fluid and Electrolytes - Adult  Goal: Electrolytes maintained within normal limits  Outcome: Progressing  Flowsheets (Taken 10/21/2022 0747)  Electrolytes maintained within normal limits: Monitor labs and assess patient for signs and symptoms of electrolyte imbalances  Goal: Hemodynamic stability and optimal renal function maintained  Outcome: Progressing  Flowsheets (Taken 10/21/2022 0747)  Hemodynamic stability and optimal renal function maintained: Monitor labs and assess for signs and symptoms of volume excess or deficit  Goal: Glucose maintained within prescribed range  Outcome: Progressing  Flowsheets (Taken 10/21/2022 0747)  Glucose maintained within prescribed range: Monitor blood glucose as ordered     Problem: Hematologic - Adult  Goal: Maintains hematologic stability  Outcome: Progressing  Flowsheets (Taken 10/21/2022 0747)  Maintains hematologic stability: Assess for signs and symptoms of bleeding or hemorrhage     Problem: Nutrition Deficit:  Goal: Optimize nutritional status  Outcome: Progressing  Flowsheets (Taken 10/21/2022 1009 by Baudilio Melissa RD)  Nutrient intake appropriate for improving, restoring, or maintaining nutritional needs:   Monitor oral intake, labs, and treatment plans   Recommend appropriate diets, oral nutritional supplements, and vitamin/mineral supplements

## 2022-10-21 NOTE — PROGRESS NOTES
Patient transferring out of ICU to room 8504B. Nurse to nurse report called to [phone]. BRIAN Valdes called and notified of bed assignment change. He requests we call Chani Anthony (pt's caregiver) and notify her as well. Chani Anthony called and also given info on bed assignment change. Secure message sent to attending to advise on bed assignment change.

## 2022-10-21 NOTE — PROGRESS NOTES
[facility]  Nephrology Progress Note    Patient's Name: Gume Allan    History of Present Illness from 9/30 consult Note:    Jsoe Ferreira is a 74 y.o. male history of CAD s/p MI, hypertension, chronic kidney disease stage IIIa (baseline creatinine of recent 1.7-1.9) he is followed by our group with Dr. Yanelis Damon. He initially was admitted to [facility] with right lower extremity wound. .  Patient subsequently developed aphasia noted to facial droop associated with right-sided weakness. He was transferred to [address] [place] [address] for further management. Patient was admitted told the neuro ICU. He required intubation with mechanical ventilation. CT of the head and neck demonstrated bilateral carotid stenosis with the left ICA being dominant. IR performed angiogram with angioplasty of the left ICA. Laboratory data showed progressive increase in his serum creatinine to currently 2.9 mg/dL. Hemoglobin was noted to be 5.6. He has received at least 2 units of packed cells but with further drop in his hemoglobin with requirement for additional transfusion. He had an episode of urinary retention with gross hematuria Mahan catheter was reinserted and the hematuria subsequently cleared. Renal is consulted for LARY. \"    Subjective:    10/21: Patient was seen and examined. He is lethargic however he does wake up and reports that he feels pretty good.     PMH:    Past Medical History:   Diagnosis Date    Chronic back pain     CKD (chronic kidney disease)     CVA (cerebral vascular accident) ([address]     CVA (cerebral vascular accident) ([address]     DM (diabetes mellitus) ([address]     Major depression     MI (myocardial infarction) ([address]     MATT (obstructive sleep apnea)     Parkinson disease ([address]     PVD (peripheral vascular disease) ([address]     Seizure ([address]        Patient Active Problem List   Diagnosis    Acute cerebrovascular accident (CVA) ([address]    Acute respiratory failure with hypoxia (HCC)    Stenosis of left carotid MG chewable tablet Take 81 mg by mouth daily      carbidopa-levodopa (SINEMET)  MG per tablet Take 1 tablet by mouth 3 times daily      carvedilol (COREG) 25 MG tablet Take 25 mg by mouth 2 times daily (with meals)      cetirizine (ZYRTEC) 10 MG tablet Take 10 mg by mouth daily      clopidogrel (PLAVIX) 75 MG tablet Take 75 mg by mouth daily      diphenhydrAMINE (BENADRYL) 25 MG capsule Take 25 mg by mouth every 6 hours      ezetimibe (ZETIA) 10 MG tablet Take 10 mg by mouth daily      fluticasone (FLONASE) 50 MCG/ACT nasal spray 2 sprays by Each Nostril route daily      gabapentin (NEURONTIN) 600 MG tablet Take 600 mg by mouth 4 times daily. HYDROcodone-acetaminophen (NORCO) 5-325 MG per tablet Take 1 tablet by mouth 2 times daily. Insulin Glargine, 2 Unit Dial, (TOUJEO MAX SOLOSTAR) 300 UNIT/ML SOPN Inject 66 Units into the skin daily      piperacillin-tazobactam (ZOSYN) 3-0.375 GM per 50ML IVPB extended infusion Infuse 4.5 mg intravenously in the morning and 4.5 mg at noon and 4.5 mg in the evening. acetaminophen (TYLENOL) 325 MG tablet Take 650 mg by mouth every 6 hours as needed for Pain      amLODIPine (NORVASC) 5 MG tablet Take 5 mg by mouth daily      benzonatate (TESSALON) 100 MG capsule Take 100 mg by mouth 3 times daily      cloNIDine (CATAPRES) 0.1 MG tablet Take 0.1 mg by mouth in the morning and 0.1 mg in the evening. hydrALAZINE (APRESOLINE) 100 MG tablet Take 100 mg by mouth 3 times daily         Allergies:    Codeine    Social History:         Family History:     History reviewed. No pertinent family history.     Physical Exam:      Patient Vitals for the past 24 hrs:   BP Temp Temp src Pulse Resp SpO2   10/21/22 0747 (!) 176/68 98.4 °F (36.9 °C) Temporal 97 18 94 %   10/21/22 0305 (!) 152/66 98.9 °F (37.2 °C) Temporal 96 18 --   10/21/22 0205 -- -- -- -- 18 --   10/20/22 2313 (!) 157/68 99 °F (37.2 °C) Temporal 99 20 95 %   10/20/22 2230 (!) 164/72 -- -- (!) 103 21 94 % 10/20/22 2200 (!) 164/91 -- -- 96 19 99 %   10/20/22 2100 (!) 167/74 -- -- (!) 101 19 95 %   10/20/22 2030 (!) 168/77 99.1 °F (37.3 °C) Axillary (!) 101 22 94 %   10/20/22 2000 (!) 164/99 99.8 °F (37.7 °C) Temporal 98 22 97 %   10/20/22 1900 (!) 153/77 -- -- 95 23 93 %   10/20/22 1832 (!) 164/73 -- -- 92 26 98 %   10/20/22 1802 (!) 160/69 -- -- 89 23 98 %   10/20/22 1732 (!) 171/65 -- -- (!) 101 25 (!) 87 %   10/20/22 1702 (!) 186/77 -- -- (!) 103 22 95 %   10/20/22 1602 (!) 160/68 -- -- 91 17 97 %   10/20/22 1502 (!) 173/97 -- -- (!) 107 (!) 31 96 %   10/20/22 1401 (!) 176/75 -- -- -- -- --   10/20/22 1327 (!) 176/74 -- -- 97 20 97 %   10/20/22 1220 -- -- -- 91 28 96 %   10/20/22 1200 132/79 -- -- 88 27 96 %   10/20/22 1100 (!) 179/77 -- -- (!) 101 (!) 33 96 %   10/20/22 1000 (!) 151/63 -- -- (!) 102 19 97 %         Intake/Output Summary (Last 24 hours) at 10/21/2022 0956  Last data filed at 10/21/2022 0600  Gross per 24 hour   Intake 482.33 ml   Output 1320 ml   Net -837.67 ml     General: Lethargic, no acute distress  Neck: No JVD noted  Lungs: Clear bilaterally upper, diminished to the bases bilaterally. Unlabored  CV: Regular rate and rhythm. No rub  Abd: Soft, nontender, nondistended. Active bowel sounds  Skin: Warm and dry.   No rash on exposed extremities  Ext: No edema; RLE dressing in place  Neuro: Lethargic, awakens and answers questions appropriately    Data:    Recent Labs     10/19/22  0431 10/20/22  0452 10/21/22  0603   WBC 6.8 8.1 6.9   HGB 8.3* 8.2* 9.0*   HCT 25.7* 25.9* 27.8*   MCV 90.8 89.3 89.4    203 276       Recent Labs     10/19/22  0431 10/19/22  1334 10/20/22  0452 10/21/22  0603    144 146 146   K 3.8 3.8 3.8 3.2*  3.2*   CL 93* 91* 91* 94*   CO2 34* 29 32* 34*   CREATININE 1.7* 1.9* 1.7* 1.9*   BUN 20 21 20 21   LABGLOM 44 39 44 39   GLUCOSE 80 95 115* 143*   CALCIUM 8.3* 9.0 9.1 9.0   PHOS 4.6*  --  4.1 3.5   MG 1.7 1.7 1.7 1.9       No results found for: VITD25    No results found for: PTH    Recent Labs     10/19/22  0431 10/20/22  0452   ALT <5 11   AST 20 20   ALKPHOS 345* 292*   BILITOT 0.5 0.6       Recent Labs     10/19/22  0431 10/20/22  0452 10/21/22  0603   LABALBU 2.6* 3.6 3.3*       Ferritin   Date Value Ref Range Status   10/18/2022 228 ng/mL Final     Comment:     FERRITIN Reference Ranges:  Adult Males   20 - 60 years:    30 - 400 ng/mL  Adult females 16 - 60 years:    15 - 150 ng/mL  Adults greater than 60 years:   no established reference range  Pediatrics:                     no established reference range       Iron   Date Value Ref Range Status   10/18/2022 24 (L) 59 - 158 mcg/dL Final     TIBC   Date Value Ref Range Status   10/18/2022 139 (L) 250 - 450 mcg/dL Final       Vitamin B-12   Date Value Ref Range Status   10/18/2022 753 211 - 946 pg/mL Final       Folate   Date Value Ref Range Status   10/18/2022 10.5 4.8 - 24.2 ng/mL Final       No results found for: VOL, APPEARANCE, COLORU, LABSPEC, LABPH, LEUKBLD, NITRU, GLUCOSEU, KETUA, UROBILINOGEN, KETUA, UROBILINOGEN, BILIRUBINUR, OCBU    Lab Results   Component Value Date/Time    OSMOU 338 10/14/2022 02:10 PM       No components found for: URIC    No results found for: LIPIDPAN    Assessment and Plans: LARY on CKD 3B  Likely in the setting of ACEI use, contrast nephrotoxicity; component of urinary retention  Baseline creatinine 1.7-1.9  Creatinine peaked at 3 on 10/3--> 1.9 today-at baseline  S/p Bumex drip  Avoid nephrotoxins/NSAIDs-adjust meds for level of renal function  Strict I&O, daily weights  On Bumex 1 mg IV daily  Monitor labs    2. Acute CVA  MRI brain 9/28 2 small regions petechial infarcts 1 in the left posterior frontal lobe and second in the left parietal lobe  S/p IR angioplasty and stent 9/27  Neurology previously following    3.   Anemia  Hemoglobin target 10-12  Hemoglobin 9 today-below target  S/p EGD 10/3 no evidence of upper GI bleed  S/p colonoscopy 10/9 blood in the right colon  S/p colonoscopy polypectomy, biopsy 10/18  S/p PRBCs  ASHLEY- will hold today with hypertension  Transfuse hemoglobin<7  Monitor H&H    4.  Hypertensive emergency  BP goal<130/80  BP above goal  Monitor on current regimen    5. Urinary retention  Mahan  Urology previously following    6. Respiratory failure  S/p intubation/extubation    7. Foot wound, right  S/p antibiotics  ID previously following  Podiatry following    8. HFpEF  Echo 9/2022 EF 60 to 65%, mild tricuspid regurg, mild mitral regurg, severe concentric left ventricular hypertrophy  UOP 1520 mL last 24 hours, net -9.9 L  On Bumex 1 mg IV daily  Strict I&O, daily weights  Monitor volume status    9. Hypokalemia  Likely in the setting of diuresis  K+ 3.2 today  S/p potassium supplementation  Supplement as needed  Monitor labs    CONSTANTINE Brambila - CNP    Patient seen and examined all key components of the physical performed independently , case discussed with NP, all pertinent labs and radiologic tests personally reviewed agree with above.       Harjeet Sanchez MD

## 2022-10-21 NOTE — PROGRESS NOTES
Hospitalist Progress Note      PCP: Abhilash Maya MD    Date of Admission: 9/27/2022  Days in the hospital: [address] Course:   Patient is a 82-year-old male with history of CKD, diabetes mellitus, obstructive sleep apnea who initially presented to [facility] with strokelike symptoms and right-sided weakness along with aphasia. Patient was noted to have severe left ICA stenosis. He was transferred to [facility] on September 27 and patient underwent left ICA angioplasty and stent placement by IR. He was initially admitted to the neuro ICU. He was placed on dual antiplatelet therapy with Brilinta and aspirin. Also was noted to have GI bleed and received 15 units of PRBC. EGD was done on 10/3/2022 and no evidence of upper GI bleed noted. Colonoscopy on 10/9/2022 showed large amount of blood. CT of the abdomen did not show any localization of hemorrhage. Nuclear medicine scan was done which showed bleeding around the splenic flexure. RRT was called due to hypotension and hemorrhagic shock and patient was transferred to ICU. Due to diffuse bleeding patient was taken off dual antiplatelet therapy. He is planned for colonoscopy with GI once he has been adequately prepped. He had acute respiratory distress on 10/13/2022 and had to be placed on Bumex for volume overload. Subjective  Patient seen and examined at bedside. Patient is doing well. No chest pain or shortness of breath. He is on oxygen nasal cannula 2 L/min. Patient was transferred out ICU yesterday. Exam:    BP (!) 176/68   Pulse 97   Temp 98.4 °F (36.9 °C) (Temporal)   Resp 18   Ht 5' 8\" (1.727 m)   Wt 199 lb 12.8 oz (90.6 kg)   SpO2 94%   BMI 30.38 kg/m²     HEENT: + Pallor, no icterus. Respiratory:  CTA, decreased air entry  Cardiovascular: RRR, no murmur. Anasarca  Abdomen: Soft, non-tender, BS noted. Musculoskeletal: Right foot dressing and wound VAC noted.    Neurologic: Alert and oriented x3. Nonfocal        Assessment/Plan:  Acute CVA with left ICA stenosis s/p angioplasty and stent placement, dual antiplatelet therapy on hold, continue serial neurochecks, follow-up with neurology    Acute lower GI bleed s/p multiple units of PRBC transfusion. Last one in October 13. Bleeding noted from splenic flexure. Colonoscopy on October 18 negative for active bleeding. Acute hypoxic respiratory failure/distress, clinically significantly better. He is currently on oxygen nasal cannula 2 L/min. Acute blood loss anemia status post 15 unit blood transfusion, H&H remains low but stable, monitor need for further transfusions. Last transfusion on October 13    Volume overload, significantly better. Acute kidney injury, nephrology following; strict I/O. Creatinine 1.9, stable    Right foot nonhealing wound, completed antibiotics. Was on Zyvox and ciprofloxacin. continue with local wound care, follow-up with podiatry    Obstructive sleep apnea on CPAP    Altered mental status secondary to metabolic encephalopathy, resolved    Plan   If no signs of low-grade bleeding on single agent therapy for 7 days, consider starting DAPT with close monitoring. Patient passed swallow study had mild oropharyngeal phase dysphagia.   Minced and moist consistency solids with thin liquids  Replete magnesium and potassium  Decrease Bumex to once daily  Patient is medically cleared for discharge  Awaiting placement to skilled nursing facility    Labs:   Recent Labs     10/19/22  0431 10/20/22  0452 10/21/22  0603   WBC 6.8 8.1 6.9   HGB 8.3* 8.2* 9.0*   HCT 25.7* 25.9* 27.8*    203 276     Recent Labs     10/19/22  0431 10/19/22  1334 10/20/22  0452 10/21/22  0603    144 146 146   K 3.8 3.8 3.8 3.2*  3.2*   CL 93* 91* 91* 94*   CO2 34* 29 32* 34*   BUN 20 21 20 21   CREATININE 1.7* 1.9* 1.7* 1.9*   CALCIUM 8.3* 9.0 9.1 9.0   PHOS 4.6*  --  4.1 3.5     Recent Labs     10/19/22  0431 10/20/22  0452 AST 20 20   ALT <5 11   BILITOT 0.5 0.6   ALKPHOS 345* 292*     No results for input(s): INR in the last 72 hours. No results for input(s): Alejandro Knight in the last 72 hours. Medications:  Reviewed    Infusion Medications    sodium chloride      dextrose       Scheduled Medications    cloNIDine  0.2 mg Oral TID    [START ON 10/22/2022] bumetanide  1 mg IntraVENous Daily    metoprolol  2.5 mg IntraVENous Q6H    chlorhexidine  15 mL Mouth/Throat BID    insulin lispro  0-4 Units SubCUTAneous Q6H    aspirin  81 mg Oral Daily    amLODIPine  10 mg Oral Daily    [Held by provider] metoprolol succinate  50 mg Oral Daily    [Held by provider] QUEtiapine  50 mg Oral BID    miconazole nitrate   Topical BID    [Held by provider] gabapentin  600 mg Oral 4x daily    white petrolatum   Topical BID    sodium chloride flush  5-40 mL IntraVENous 2 times per day    miconazole   Topical BID    [Held by provider] epoetin sharath-epbx  6,000 Units SubCUTAneous Once per day on Mon Wed Fri    [Held by provider] hydrALAZINE  50 mg Oral 3 times per day    pantoprazole (PROTONIX) 40 mg injection  40 mg IntraVENous Q12H    sennosides  5 mL Oral Nightly    [Held by provider] carbidopa-levodopa  1 tablet Per NG tube TID    ipratropium-albuterol  1 ampule Inhalation Q4H WA    collagenase   Topical Q MWF    [Held by provider] benzonatate  100 mg Oral TID    [Held by provider] ezetimibe  10 mg Oral Daily    [Held by provider] atorvastatin  80 mg Oral Nightly     PRN Meds: sodium chloride flush, morphine, hydrALAZINE, miconazole nitrate **AND** miconazole nitrate, sodium chloride, labetalol, glucose, dextrose bolus **OR** dextrose bolus, glucagon (rDNA), dextrose      Intake/Output Summary (Last 24 hours) at 10/21/2022 1513  Last data filed at 10/21/2022 0600  Gross per 24 hour   Intake 242.33 ml   Output 770 ml   Net -527.67 ml     Body mass index is 30.38 kg/m². Diet  ADULT DIET;  Dysphagia - Minced and Moist; 4 carb choices (60 gm/meal); Less than 60 gm  ADULT ORAL NUTRITION SUPPLEMENT; Breakfast, Dinner; Wound Healing Oral Supplement  ADULT ORAL NUTRITION SUPPLEMENT; Lunch, Dinner; Fortified Pudding Oral Supplement    Code Status  Full Code       Electronically signed by Simi Flor MD on 10/21/2022 at 3:13 PM  [employer]   Please contact me through perfect serve    NOTE: This report was transcribed using voice recognition software. Every effort was made to ensure accuracy; however, inadvertent computerized transcription errors may be present.

## 2022-10-21 NOTE — PROGRESS NOTES
Department of Podiatry   Progress Note    Subjective:  Mr. Joe Garcia was seen and evaluated at bedside this morning. Wound care was in the room during this visit, and the wound vac was changed while I was present.     Past Medical History:            Diagnosis Date    Chronic back pain     CKD (chronic kidney disease)     CVA (cerebral vascular accident) ([address]     CVA (cerebral vascular accident) ([address]     DM (diabetes mellitus) ([address]     Major depression     MI (myocardial infarction) ([address]     MATT (obstructive sleep apnea)     Parkinson disease ([facility]     PVD (peripheral vascular disease) ([address]     Seizure ([address]        Past Surgical History:        Procedure Laterality Date    COLONOSCOPY N/A 10/9/2022    COLONOSCOPY DIAGNOSTIC performed by Lukasz Sampson MD at [address] [facility] N/A 10/18/2022    COLONOSCOPY POLYPECTOMY SNARE/COLD BIOPSY performed by Stepahnie Stacy MD at [place] [address]      IR CAROTID STENT UNI W PROTECTION  9/27/2022    IR CAROTID STENT UNI W PROTECTION 9/27/2022 Alma Cancino MD [facility]    UPPER GASTROINTESTINAL ENDOSCOPY N/A 10/3/2022    EGD DIAGNOSTIC ONLY performed by Seymour Murphy MD at [facility] ENDOSCOPY       Medications Prior to Admission:    Medications Prior to Admission: apixaban (ELIQUIS) 5 MG TABS tablet, Take 5 mg by mouth 2 times daily  aspirin 81 MG chewable tablet, Take 81 mg by mouth daily  carbidopa-levodopa (SINEMET)  MG per tablet, Take 1 tablet by mouth 3 times daily  carvedilol (COREG) 25 MG tablet, Take 25 mg by mouth 2 times daily (with meals)  cetirizine (ZYRTEC) 10 MG tablet, Take 10 mg by mouth daily  clopidogrel (PLAVIX) 75 MG tablet, Take 75 mg by mouth daily  diphenhydrAMINE (BENADRYL) 25 MG capsule, Take 25 mg by mouth every 6 hours  ezetimibe (ZETIA) 10 MG tablet, Take 10 mg by mouth daily  fluticasone (FLONASE) 50 MCG/ACT nasal spray, 2 sprays by Each Nostril route daily  gabapentin (NEURONTIN) 600 MG tablet, Take 600 mg by mouth 4 times daily. HYDROcodone-acetaminophen (NORCO) 5-325 MG per tablet, Take 1 tablet by mouth 2 times daily. Insulin Glargine, 2 Unit Dial, (TOUJEO MAX SOLOSTAR) 300 UNIT/ML SOPN, Inject 66 Units into the skin daily  piperacillin-tazobactam (ZOSYN) 3-0.375 GM per 50ML IVPB extended infusion, Infuse 4.5 mg intravenously in the morning and 4.5 mg at noon and 4.5 mg in the evening. acetaminophen (TYLENOL) 325 MG tablet, Take 650 mg by mouth every 6 hours as needed for Pain  amLODIPine (NORVASC) 5 MG tablet, Take 5 mg by mouth daily  benzonatate (TESSALON) 100 MG capsule, Take 100 mg by mouth 3 times daily  cloNIDine (CATAPRES) 0.1 MG tablet, Take 0.1 mg by mouth in the morning and 0.1 mg in the evening. hydrALAZINE (APRESOLINE) 100 MG tablet, Take 100 mg by mouth 3 times daily    Allergies:  Codeine    Social History:   TOBACCO:   has no history on file for tobacco use. ETOH:   has no history on file for alcohol use. DRUGS:   Social History     Substance and Sexual Activity   Drug Use Not on file       Family History:   History reviewed. No pertinent family history. REVIEW OF SYSTEMS:    All pertinent positives and negatives as noted in HPI       LOWER EXTREMITY EXAMINATION     VASCULAR:  DP and PT pulses are non palpable. CFT < 5 seconds B/L. Warm to warm from the tibial tuberosity to the distal aspect of the digits dorsally. NEUROLOGIC:  Protective sensation is diminished but grossly intact    DERM:  Right foot lateral plantar wound measuring approx 6cm in diameter. Wound has a notably hyperkeratotic rim. No erythema, serosanguinous drainage, no malodor.     MUSCULOSKELETAL: deferred    Pictures as of 10/21/22              CONSULTS:  IP CONSULT TO CRITICAL CARE  IP CONSULT TO DIETITIAN  IP CONSULT TO CARDIOLOGY  IP CONSULT TO PODIATRY  IP CONSULT TO PODIATRY  IP CONSULT TO UROLOGY  IP CONSULT TO NEPHROLOGY  IP CONSULT TO GENERAL SURGERY  IP CONSULT TO INFECTIOUS DISEASES  IP CONSULT TO GI  IP CONSULT TO GENERAL SURGERY  IP CONSULT TO GENERAL SURGERY  IP CONSULT TO PALLIATIVE CARE  IP CONSULT TO DIETITIAN    MEDICATION:  Scheduled Meds:   cloNIDine  0.2 mg Oral TID    [START ON 10/22/2022] bumetanide  1 mg IntraVENous Daily    metoprolol  2.5 mg IntraVENous Q6H    chlorhexidine  15 mL Mouth/Throat BID    insulin lispro  0-4 Units SubCUTAneous Q6H    aspirin  81 mg Oral Daily    amLODIPine  10 mg Oral Daily    [Held by provider] metoprolol succinate  50 mg Oral Daily    [Held by provider] QUEtiapine  50 mg Oral BID    miconazole nitrate   Topical BID    [Held by provider] gabapentin  600 mg Oral 4x daily    white petrolatum   Topical BID    sodium chloride flush  5-40 mL IntraVENous 2 times per day    miconazole   Topical BID    [Held by provider] epoetin sharath-epbx  6,000 Units SubCUTAneous Once per day on Mon Wed Fri    [Held by provider] hydrALAZINE  50 mg Oral 3 times per day    pantoprazole (PROTONIX) 40 mg injection  40 mg IntraVENous Q12H    sennosides  5 mL Oral Nightly    [Held by provider] carbidopa-levodopa  1 tablet Per NG tube TID    ipratropium-albuterol  1 ampule Inhalation Q4H WA    collagenase   Topical Q MWF    [Held by provider] benzonatate  100 mg Oral TID    [Held by provider] ezetimibe  10 mg Oral Daily    [Held by provider] atorvastatin  80 mg Oral Nightly     Continuous Infusions:   sodium chloride      dextrose       PRN Meds:.sodium chloride flush, morphine, hydrALAZINE, miconazole nitrate **AND** miconazole nitrate, sodium chloride, labetalol, glucose, dextrose bolus **OR** dextrose bolus, glucagon (rDNA), dextrose    RADIOLOGY:  XR CHEST PORTABLE   Final Result   Improved aeration of the lungs bilaterally with persistent hazy opacities at   both lung bases. A small, loculated right pleural effusion is decreased in   size from yesterday.          Fluoroscopy modified barium swallow with video   Final Result   Mildly impaired swallowing mechanism evident by swallowing delay and followed   by laryngeal penetration with thin liquid barium when using a straw. No   barium aspiration. Please see separate speech pathology report for full discussion of findings   and recommendations. XR CHEST PORTABLE   Final Result   Findings for volume overload as observed the in October 19. XR CHEST PORTABLE   Final Result   The enteric tube has been removed, otherwise stable appearance of the chest   compared to 10/18/2022. XR CHEST PORTABLE   Final Result   No interval change         US DUP LOWER EXTREMITIES BILATERAL VENOUS   Final Result   Within the visualized vessels there is no evidence for deep venous   thrombosis               XR CHEST PORTABLE   Final Result   Worsening congestive heart failure suspected         XR CHEST PORTABLE   Final Result   Very minimal improvement seen in the aeration of the upper lung fields with   stable increased markings seen within the mid and lower lung fields   bilaterally with small bilateral pleural effusions. Heart remains enlarged. XR CHEST PORTABLE   Final Result   New right pleural effusion. XR CHEST PORTABLE   Final Result   Stable bilateral pulmonary infiltrates. XR ABDOMEN (KUB) (SINGLE AP VIEW)   Final Result   Catheter is in the stomach. XR ABDOMEN FOR NG/OG/NE TUBE PLACEMENT   Final Result   1. Gastric catheter is coiled in the pharynx and upper esophagus and should   be withdrawn and reinserted. Follow-up imaging recommended. XR CHEST PORTABLE   Final Result   Bilateral airspace disease with interval progression on the right. NM GI BLOOD LOSS   Final Result   No evidence of active GI bleeding during acquisition. XR CHEST PORTABLE   Final Result   Unchanged bilateral chest opacities with right pleural effusion evident. See   above. XR CHEST PORTABLE   Final Result   1.  Persistent bilateral patchy parenchymal densities concerning for pneumonia   and or atelectasis   2. Persistent small bilateral pleural effusions, left greater than right   3. Cardiomegaly, stable         XR CHEST PORTABLE   Final Result   No significant change compared to the prior exam with cardiomegaly, bilateral   airspace opacities and bilateral pleural effusions seen most suggestive of   congestive heart failure, but superimposed pneumonia not excluded. US DUP ABD PEL RETRO SCROT COMPLETE   Final Result   1. No evidence of testicular torsion      2. Severe scrotal skin thickening. 3.  Trace hydroceles. 4.  No evidence of bowel containing inguinal hernia. US SCROTUM AND TESTICLES   Final Result   1. No evidence of testicular torsion      2. Severe scrotal skin thickening. 3.  Trace hydroceles. 4.  No evidence of bowel containing inguinal hernia. XR CHEST PORTABLE   Final Result   1. No significant interval change in the appearance of the chest.      2.  Bilateral pleural effusions and areas of atelectasis or multifocal   pneumonia. CTA ABDOMEN PELVIS W CONTRAST   Final Result   Mixed densities throughout the colonic segments including in the ascending   colon and rectum could represent mixed densities of blood products although   no focal area of arterial enhancement or extravasation/blushing is observed   to localize acute hemorrhage. No evidence for perforation or abscess. No   pneumatosis intestinalis or portal venous gas evident. 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 No adjacent hematoma with only minimal   adjacent stranding. Bilateral pleural effusions moderate to large right and moderate left   effusions with adjacent atelectasis. Anasarca. XR CHEST PORTABLE   Final Result   Bilateral pleural effusions with bibasilar patchy infiltrates and mild   cardiomegaly. NM GI BLOOD LOSS   Final Result   Active GI bleeding identified during acquisition in the splenic flexure with   peristalsis identified into the proximal descending colon. RECOMMENDATIONS:   Unavailable         CT ABDOMEN PELVIS WO CONTRAST   Final Result   CHEST:      No evidence of neoplastic disease, allowing for limitations of noncontrast   technique. Moderate bilateral pleural effusions. Nonemergent incidental findings as above. ABDOMEN/PELVIS:      No evidence of neoplastic disease, allowing for limitations of noncontrast   technique. Bladder wall thickening is nonspecific given under distension; recommend   correlation urinalysis to evaluate for UTI. Nonemergent incidental findings as above. CT CHEST WO CONTRAST   Final Result   CHEST:      No evidence of neoplastic disease, allowing for limitations of noncontrast   technique. Moderate bilateral pleural effusions. Nonemergent incidental findings as above. ABDOMEN/PELVIS:      No evidence of neoplastic disease, allowing for limitations of noncontrast   technique. Bladder wall thickening is nonspecific given under distension; recommend   correlation urinalysis to evaluate for UTI. Nonemergent incidental findings as above. FL MODIFIED BARIUM SWALLOW W VIDEO   Final Result   1. Mildly impaired swallowing mechanism with swallowing delay and followed   by laryngeal penetration with thin liquid barium when using a straw. No   barium aspiration      2. Please see separate speech pathology report for full discussion of   findings and recommendations. RECOMMENDATIONS:   Unavailable         XR ABDOMEN (KUB) (SINGLE AP VIEW)   Final Result   Somewhat greater than average quantity of retained fecal material thin the   lower GI tract suggesting dysfunction with evacuation. XR CHEST PORTABLE   Final Result   1. Atherosclerotic disease and mild cardiomegaly.       2.  Persistent but improved opacities in the bilateral lungs most pronounced   in the mid and lower lungs. There appears to be a small left and trace right   pleural effusion         XR FOOT RIGHT (2 VIEWS)   Final Result   No evidence of calcaneal osteomyelitis. CT HEAD WO CONTRAST   Final Result   Parenchymal volume loss and chronic microvascular ischemic changes. No acute intracranial abnormality. Ethmoid and sphenoid sinusitis. Right mastoid effusion. US RETROPERITONEAL COMPLETE   Final Result   1. Normal appearance of the bilateral kidneys. No hydronephrosis. 2.  A Mah[place] catheter is decompressing the bladder. XR CHEST PORTABLE   Final Result   Worsening infiltrate and or atelectasis on the left, stable on the right with   suspected layering pleural effusion. XR CHEST PORTABLE   Final Result   Unchanged bilateral atelectasis and or infiltrate as well as small right   pleural effusion. MRI BRAIN WO CONTRAST   Final Result   There are 2 small regions of petechial infarcts one in the left   posterior frontal lobe and a second in the left parietal lobe not   exceeding 3 mm. There is otherwise extensive small vessel ischemic   changes         XR CHEST PORTABLE   Final Result   1. Multifocal bilateral airspace disease more prominent within the lower   lobes. The airspace disease is unchanged when compared with the prior study. CT HEAD WO CONTRAST   Final Result   Diffuse atrophy likely age related   Findings compatible with small vessel ischemic changes. XR CHEST PORTABLE   Final Result   1. Pulmonary opacities present favoring edema and atelectasis, also small   pleural effusions, but nonspecific with some additional considerations noted   above   2. Support devices present as described above   3. Heart size appears borderline enlarged         XR ABDOMEN FOR NG/OG/NE TUBE PLACEMENT   Final Result   NG/OG is in the stomach.          IR CAROTID STENT W PROTECTION   Final Result   1. Angiogram demonstrates successful placement of a carotid stent ,   post procedure images demonstrate a widely patent stent and internal   carotid         CT HEAD WO CONTRAST   Final Result   Diffuse atrophy likely age related   Findings compatible with small vessel ischemic changes. Findings were called at the time of dictation         CT BRAIN PERFUSION   Final Result      No significant ischemic penumbra identified      This study was analyzed by the [employer]. ai algorithm. CTA NECK W CONTRAST   Final Result   1. Estimated stenosis of the proximal right and left internal carotid   artery by NASCET criteria is greater than 90% on the left   2. Severe atherosclerotic disease . 3. No large vessel occlusion identified            This study was analyzed by the [employer]. ai algorithm. CTA HEAD W CONTRAST   Final Result   1. Estimated stenosis of the proximal right and left internal carotid   artery by NASCET criteria is greater than 90% on the left   2. Severe atherosclerotic disease . 3. No large vessel occlusion identified            This study was analyzed by the [employer]. ai algorithm. XR CHEST PORTABLE    (Results Pending)       Vitals:    BP (!) 176/68   Pulse 97   Temp 98.4 °F (36.9 °C) (Temporal)   Resp 18   Ht 5' 8\" (1.727 m)   Wt 199 lb 12.8 oz (90.6 kg)   SpO2 94%   BMI 30.38 kg/m²     LABS:   Recent Labs     10/20/22  0452 10/21/22  0603   WBC 8.1 6.9   HGB 8.2* 9.0*   HCT 25.9* 27.8*    276     Recent Labs     10/21/22  0603      K 3.2*  3.2*   CL 94*   CO2 34*   PHOS 3.5   BUN 21   CREATININE 1.9*     Recent Labs     10/19/22  0431 10/20/22  0452   PROT 5.7* 6.6       ASSESSMENTS:   1. Right foot wound diabetic ulcer  2. DM  3. Pain right foot  Acute cerebrovascular accident (CVA) ([address]       PLAN:  -Patient examined and evaluated at bedside. All pertinent labs, charts, and imaging reviewed prior to encounter   -Antibiotics as per ID: Stopped  -Culture: Corynebacteria  -Continue Wound vac changes to RLE:MWF Dressing changes. Wound vac noted today with good seal, running at 125mmHg with minimal debris in cannister.  -X rays: No acute osseous abnormalities to foot   -US: No evidence of DVT  -Will continue conservative care for now.    -Patient discussed with Dr. Silvia Lee  -Continue offloading

## 2022-10-22 ENCOUNTER — APPOINTMENT (OUTPATIENT)
Dept: GENERAL RADIOLOGY | Age: 67
DRG: 034 | End: 2022-10-22
Attending: INTERNAL MEDICINE
Payer: MEDICARE

## 2022-10-22 LAB
ALBUMIN SERPL-MCNC: 3.4 G/DL (ref 3.5–5.2)
ANION GAP SERPL CALCULATED.3IONS-SCNC: 13 MMOL/L (ref 7–16)
BASOPHILS ABSOLUTE: 0.03 E9/L (ref 0–0.2)
BASOPHILS RELATIVE PERCENT: 0.6 % (ref 0–2)
BUN BLDV-MCNC: 19 MG/DL (ref 6–23)
CALCIUM IONIZED: 1.2 MMOL/L (ref 1.15–1.33)
CALCIUM SERPL-MCNC: 8.9 MG/DL (ref 8.6–10.2)
CHLORIDE BLD-SCNC: 93 MMOL/L (ref 98–107)
CO2: 33 MMOL/L (ref 22–29)
CREAT SERPL-MCNC: 1.7 MG/DL (ref 0.7–1.2)
EOSINOPHILS ABSOLUTE: 0.34 E9/L (ref 0.05–0.5)
EOSINOPHILS RELATIVE PERCENT: 6.4 % (ref 0–6)
GFR SERPL CREATININE-BSD FRML MDRD: 44 ML/MIN/1.73
GLUCOSE BLD-MCNC: 135 MG/DL (ref 74–99)
HCT VFR BLD CALC: 27.9 % (ref 37–54)
HEMOGLOBIN: 8.9 G/DL (ref 12.5–16.5)
IMMATURE GRANULOCYTES #: 0.03 E9/L
IMMATURE GRANULOCYTES %: 0.6 % (ref 0–5)
LYMPHOCYTES ABSOLUTE: 1.02 E9/L (ref 1.5–4)
LYMPHOCYTES RELATIVE PERCENT: 19.2 % (ref 20–42)
MAGNESIUM: 1.9 MG/DL (ref 1.6–2.6)
MCH RBC QN AUTO: 28 PG (ref 26–35)
MCHC RBC AUTO-ENTMCNC: 31.9 % (ref 32–34.5)
MCV RBC AUTO: 87.7 FL (ref 80–99.9)
METER GLUCOSE: 142 MG/DL (ref 74–99)
METER GLUCOSE: 159 MG/DL (ref 74–99)
METER GLUCOSE: 171 MG/DL (ref 74–99)
METER GLUCOSE: 212 MG/DL (ref 74–99)
MONOCYTES ABSOLUTE: 0.71 E9/L (ref 0.1–0.95)
MONOCYTES RELATIVE PERCENT: 13.4 % (ref 2–12)
NEUTROPHILS ABSOLUTE: 3.18 E9/L (ref 1.8–7.3)
NEUTROPHILS RELATIVE PERCENT: 59.8 % (ref 43–80)
PDW BLD-RTO: 13.9 FL (ref 11.5–15)
PHOSPHORUS: 2.9 MG/DL (ref 2.5–4.5)
PLATELET # BLD: 299 E9/L (ref 130–450)
PMV BLD AUTO: 10.2 FL (ref 7–12)
POTASSIUM REFLEX MAGNESIUM: 3 MMOL/L (ref 3.5–5)
POTASSIUM SERPL-SCNC: 3 MMOL/L (ref 3.5–5)
RBC # BLD: 3.18 E12/L (ref 3.8–5.8)
SODIUM BLD-SCNC: 139 MMOL/L (ref 132–146)
WBC # BLD: 5.3 E9/L (ref 4.5–11.5)

## 2022-10-22 PROCEDURE — 6370000000 HC RX 637 (ALT 250 FOR IP): Performed by: INTERNAL MEDICINE

## 2022-10-22 PROCEDURE — 94640 AIRWAY INHALATION TREATMENT: CPT

## 2022-10-22 PROCEDURE — 94660 CPAP INITIATION&MGMT: CPT

## 2022-10-22 PROCEDURE — 2060000000 HC ICU INTERMEDIATE R&B

## 2022-10-22 PROCEDURE — C9113 INJ PANTOPRAZOLE SODIUM, VIA: HCPCS | Performed by: SURGERY

## 2022-10-22 PROCEDURE — 2580000003 HC RX 258: Performed by: SURGERY

## 2022-10-22 PROCEDURE — 2500000003 HC RX 250 WO HCPCS: Performed by: INTERNAL MEDICINE

## 2022-10-22 PROCEDURE — 6360000002 HC RX W HCPCS: Performed by: INTERNAL MEDICINE

## 2022-10-22 PROCEDURE — 82330 ASSAY OF CALCIUM: CPT

## 2022-10-22 PROCEDURE — 6370000000 HC RX 637 (ALT 250 FOR IP): Performed by: NURSE PRACTITIONER

## 2022-10-22 PROCEDURE — 6370000000 HC RX 637 (ALT 250 FOR IP): Performed by: SURGERY

## 2022-10-22 PROCEDURE — 80069 RENAL FUNCTION PANEL: CPT

## 2022-10-22 PROCEDURE — 71045 X-RAY EXAM CHEST 1 VIEW: CPT

## 2022-10-22 PROCEDURE — 83735 ASSAY OF MAGNESIUM: CPT

## 2022-10-22 PROCEDURE — 36415 COLL VENOUS BLD VENIPUNCTURE: CPT

## 2022-10-22 PROCEDURE — 2700000000 HC OXYGEN THERAPY PER DAY

## 2022-10-22 PROCEDURE — 6360000002 HC RX W HCPCS: Performed by: SURGERY

## 2022-10-22 PROCEDURE — A4216 STERILE WATER/SALINE, 10 ML: HCPCS | Performed by: SURGERY

## 2022-10-22 PROCEDURE — 80048 BASIC METABOLIC PNL TOTAL CA: CPT

## 2022-10-22 PROCEDURE — 82962 GLUCOSE BLOOD TEST: CPT

## 2022-10-22 PROCEDURE — 85025 COMPLETE CBC W/AUTO DIFF WBC: CPT

## 2022-10-22 RX ORDER — INSULIN LISPRO 100 [IU]/ML
0-4 INJECTION, SOLUTION INTRAVENOUS; SUBCUTANEOUS NIGHTLY
Status: DISCONTINUED | OUTPATIENT
Start: 2022-10-22 | End: 2022-10-26 | Stop reason: HOSPADM

## 2022-10-22 RX ORDER — POTASSIUM CHLORIDE 20 MEQ/1
20 TABLET, EXTENDED RELEASE ORAL ONCE
Status: COMPLETED | OUTPATIENT
Start: 2022-10-22 | End: 2022-10-22

## 2022-10-22 RX ORDER — BUMETANIDE 1 MG/1
2 TABLET ORAL DAILY
Status: DISCONTINUED | OUTPATIENT
Start: 2022-10-23 | End: 2022-10-26 | Stop reason: HOSPADM

## 2022-10-22 RX ORDER — POTASSIUM CHLORIDE 20 MEQ/1
40 TABLET, EXTENDED RELEASE ORAL ONCE
Status: COMPLETED | OUTPATIENT
Start: 2022-10-22 | End: 2022-10-22

## 2022-10-22 RX ORDER — INSULIN LISPRO 100 [IU]/ML
0-8 INJECTION, SOLUTION INTRAVENOUS; SUBCUTANEOUS
Status: DISCONTINUED | OUTPATIENT
Start: 2022-10-23 | End: 2022-10-26 | Stop reason: HOSPADM

## 2022-10-22 RX ADMIN — CHLORHEXIDINE GLUCONATE 15 ML: 1.2 RINSE BUCCAL at 08:09

## 2022-10-22 RX ADMIN — IPRATROPIUM BROMIDE AND ALBUTEROL SULFATE 1 AMPULE: .5; 2.5 SOLUTION RESPIRATORY (INHALATION) at 10:25

## 2022-10-22 RX ADMIN — Medication 10 ML: at 08:08

## 2022-10-22 RX ADMIN — POTASSIUM CHLORIDE 40 MEQ: 1500 TABLET, EXTENDED RELEASE ORAL at 08:38

## 2022-10-22 RX ADMIN — IPRATROPIUM BROMIDE AND ALBUTEROL SULFATE 1 AMPULE: .5; 2.5 SOLUTION RESPIRATORY (INHALATION) at 15:47

## 2022-10-22 RX ADMIN — CLONIDINE HYDROCHLORIDE 0.2 MG: 0.2 TABLET ORAL at 14:32

## 2022-10-22 RX ADMIN — INSULIN LISPRO 1 UNITS: 100 INJECTION, SOLUTION INTRAVENOUS; SUBCUTANEOUS at 11:57

## 2022-10-22 RX ADMIN — SODIUM CHLORIDE, PRESERVATIVE FREE 40 MG: 5 INJECTION INTRAVENOUS at 14:32

## 2022-10-22 RX ADMIN — AMLODIPINE BESYLATE 10 MG: 10 TABLET ORAL at 08:12

## 2022-10-22 RX ADMIN — POTASSIUM CHLORIDE 20 MEQ: 1500 TABLET, EXTENDED RELEASE ORAL at 11:56

## 2022-10-22 RX ADMIN — METOPROLOL SUCCINATE 50 MG: 50 TABLET, EXTENDED RELEASE ORAL at 08:10

## 2022-10-22 RX ADMIN — MICONAZOLE NITRATE: 2 OINTMENT TOPICAL at 08:40

## 2022-10-22 RX ADMIN — ANTI-FUNGAL POWDER MICONAZOLE NITRATE TALC FREE: 1.42 POWDER TOPICAL at 20:26

## 2022-10-22 RX ADMIN — CHLORHEXIDINE GLUCONATE 15 ML: 1.2 RINSE BUCCAL at 20:25

## 2022-10-22 RX ADMIN — SODIUM CHLORIDE, PRESERVATIVE FREE 40 MG: 5 INJECTION INTRAVENOUS at 06:25

## 2022-10-22 RX ADMIN — MICONAZOLE NITRATE: 2 OINTMENT TOPICAL at 20:27

## 2022-10-22 RX ADMIN — HYDRALAZINE HYDROCHLORIDE 50 MG: 50 TABLET, FILM COATED ORAL at 06:25

## 2022-10-22 RX ADMIN — IPRATROPIUM BROMIDE AND ALBUTEROL SULFATE 1 AMPULE: .5; 2.5 SOLUTION RESPIRATORY (INHALATION) at 19:02

## 2022-10-22 RX ADMIN — HYDRALAZINE HYDROCHLORIDE 50 MG: 50 TABLET, FILM COATED ORAL at 14:32

## 2022-10-22 RX ADMIN — SENNOSIDES 5 ML: 8.8 SYRUP ORAL at 20:25

## 2022-10-22 RX ADMIN — ASPIRIN 81 MG CHEWABLE TABLET 81 MG: 81 TABLET CHEWABLE at 08:11

## 2022-10-22 RX ADMIN — IPRATROPIUM BROMIDE AND ALBUTEROL SULFATE 1 AMPULE: .5; 2.5 SOLUTION RESPIRATORY (INHALATION) at 13:23

## 2022-10-22 RX ADMIN — MICONAZOLE NITRATE: 2 OINTMENT TOPICAL at 08:42

## 2022-10-22 RX ADMIN — Medication 10 ML: at 20:25

## 2022-10-22 RX ADMIN — PETROLATUM: 420 OINTMENT TOPICAL at 20:26

## 2022-10-22 RX ADMIN — CLONIDINE HYDROCHLORIDE 0.2 MG: 0.2 TABLET ORAL at 20:25

## 2022-10-22 RX ADMIN — HYDRALAZINE HYDROCHLORIDE 50 MG: 50 TABLET, FILM COATED ORAL at 20:25

## 2022-10-22 RX ADMIN — PETROLATUM: 420 OINTMENT TOPICAL at 08:40

## 2022-10-22 RX ADMIN — CLONIDINE HYDROCHLORIDE 0.2 MG: 0.2 TABLET ORAL at 08:11

## 2022-10-22 RX ADMIN — MORPHINE SULFATE 1 MG: 2 INJECTION, SOLUTION INTRAMUSCULAR; INTRAVENOUS at 22:51

## 2022-10-22 RX ADMIN — BUMETANIDE 1 MG: 0.25 INJECTION, SOLUTION INTRAMUSCULAR; INTRAVENOUS at 08:09

## 2022-10-22 RX ADMIN — ANTI-FUNGAL POWDER MICONAZOLE NITRATE TALC FREE: 1.42 POWDER TOPICAL at 08:41

## 2022-10-22 NOTE — PROGRESS NOTES
[facility]  Nephrology Progress Note    Patient's Name: Ge Del Angel    History of Present Illness from 9/30 consult Note:    Wilner Regalado is a 74 y.o. male history of CAD s/p MI, hypertension, chronic kidney disease stage IIIa (baseline creatinine of recent 1.7-1.9) he is followed by our group with Dr. Shaan Hubbard. He initially was admitted to [facility] with right lower extremity wound. .  Patient subsequently developed aphasia noted to facial droop associated with right-sided weakness. He was transferred to [address] [place] [address] for further management. Patient was admitted told the neuro ICU. He required intubation with mechanical ventilation. CT of the head and neck demonstrated bilateral carotid stenosis with the left ICA being dominant. IR performed angiogram with angioplasty of the left ICA. Laboratory data showed progressive increase in his serum creatinine to currently 2.9 mg/dL. Hemoglobin was noted to be 5.6. He has received at least 2 units of packed cells but with further drop in his hemoglobin with requirement for additional transfusion. He had an episode of urinary retention with gross hematuria Mahan catheter was reinserted and the hematuria subsequently cleared. Renal is consulted for LARY. \"    Subjective:    10/21: Patient was seen and examined. He is lethargic however he does wake up and reports that he feels pretty good. 10/22: Patient gives no complaints. He denies shortness of breath. He reports improved appetite.     PMH:    Past Medical History:   Diagnosis Date    Chronic back pain     CKD (chronic kidney disease)     CVA (cerebral vascular accident) ([address]     CVA (cerebral vascular accident) ([address]     DM (diabetes mellitus) ([address]     Major depression     MI (myocardial infarction) ([address]     MATT (obstructive sleep apnea)     Parkinson disease ([address]     PVD (peripheral vascular disease) ([address]     Seizure ([address]        Patient Active Problem List   Diagnosis    Ac[facility]vascular [facility] ([facility]) [facility]    Acute respiratory failure with hypoxia ([facility]    Stenosis of left carotid artery    Hypoalbuminemia    Electrolyte imbalance    Hypernatremia    Anemia    GI bleeding       Diet:    ADULT DIET; Dysphagia - Minced and Moist; 4 carb choices (60 gm/meal); Less than 60 gm  ADULT ORAL NUTRITION SUPPLEMENT; Breakfast, Dinner; Wound Healing Oral Supplement  ADULT ORAL NUTRITION SUPPLEMENT; Lunch, Dinner; Fortified Pudding Oral Supplement    Meds:     cloNIDine  0.2 mg Oral TID    bumetanide  1 mg IntraVENous Daily    chlorhexidine  15 mL Mouth/Throat BID    insulin lispro  0-4 Units SubCUTAneous Q6H    aspirin  81 mg Oral Daily    amLODIPine  10 mg Oral Daily    metoprolol succinate  50 mg Oral Daily    [Held by provider] QUEtiapine  50 mg Oral BID    miconazole nitrate   Topical BID    [Held by provider] gabapentin  600 mg Oral 4x daily    white petrolatum   Topical BID    sodium chloride flush  5-40 mL IntraVENous 2 times per day    miconazole   Topical BID    [Held by provider] epoetin sharath-epbx  6,000 Units SubCUTAneous Once per day on Mon Wed Fri    hydrALAZINE  50 mg Oral 3 times per day    pantoprazole (PROTONIX) 40 mg injection  40 mg IntraVENous Q12H    sennosides  5 mL Oral Nightly    [Held by provider] carbidopa-levodopa  1 tablet Per NG tube TID    ipratropium-albuterol  1 ampule Inhalation Q4H WA    collagenase   Topical Q MWF    [Held by provider] benzonatate  100 mg Oral TID    [Held by provider] atorvastatin  80 mg Oral Nightly        sodium chloride      dextrose         Meds prn:     sodium chloride flush, morphine, hydrALAZINE, miconazole nitrate **AND** miconazole nitrate, sodium chloride, labetalol, glucose, dextrose bolus **OR** dextrose bolus, glucagon (rDNA), dextrose    Meds prior to admission:     No current facility-administered medications on file prior to encounter.      Current Outpatient Medications on File Prior to Encounter   Medication Sig Dispense Refill    apixaban (ELIQUIS) 5 MG TABS tablet Take 5 mg by mouth 2 times daily      aspirin 81 MG chewable tablet Take 81 mg by mouth daily      carbidopa-levodopa (SINEMET)  MG per tablet Take 1 tablet by mouth 3 times daily      carvedilol (COREG) 25 MG tablet Take 25 mg by mouth 2 times daily (with meals)      cetirizine (ZYRTEC) 10 MG tablet Take 10 mg by mouth daily      clopidogrel (PLAVIX) 75 MG tablet Take 75 mg by mouth daily      diphenhydrAMINE (BENADRYL) 25 MG capsule Take 25 mg by mouth every 6 hours      ezetimibe (ZETIA) 10 MG tablet Take 10 mg by mouth daily      fluticasone (FLONASE) 50 MCG/ACT nasal spray 2 sprays by Each Nostril route daily      gabapentin (NEURONTIN) 600 MG tablet Take 600 mg by mouth 4 times daily. HYDROcodone-acetaminophen (NORCO) 5-325 MG per tablet Take 1 tablet by mouth 2 times daily. Insulin Glargine, 2 Unit Dial, (TOUJEO MAX SOLOSTAR) 300 UNIT/ML SOPN Inject 66 Units into the skin daily      piperacillin-tazobactam (ZOSYN) 3-0.375 GM per 50ML IVPB extended infusion Infuse 4.5 mg intravenously in the morning and 4.5 mg at noon and 4.5 mg in the evening. acetaminophen (TYLENOL) 325 MG tablet Take 650 mg by mouth every 6 hours as needed for Pain      amLODIPine (NORVASC) 5 MG tablet Take 5 mg by mouth daily      benzonatate (TESSALON) 100 MG capsule Take 100 mg by mouth 3 times daily      cloNIDine (CATAPRES) 0.1 MG tablet Take 0.1 mg by mouth in the morning and 0.1 mg in the evening. hydrALAZINE (APRESOLINE) 100 MG tablet Take 100 mg by mouth 3 times daily         Allergies:    Codeine    Social History:         Family History:     History reviewed. No pertinent family history.     Physical Exam:      Patient Vitals for the past 24 hrs:   BP Temp Temp src Pulse Resp SpO2 Weight   10/22/22 1324 -- -- -- (!) 104 16 100 % --   10/22/22 1145 (!) 146/70 98.3 °F (36.8 °C) Temporal 86 18 -- --   10/22/22 1025 -- -- -- 79 16 100 % --   10/22/22 1015 (!) 140/63 98.1 °F (36.7 °C) -- 85 -- 100 % --   10/22/22 0808 (!) 163/76 -- -- 93 -- -- --   10/22/22 0730 (!) 163/76 98.6 °F (37 °C) Temporal 93 19 99 % --   10/22/22 0625 (!) 151/89 -- -- -- -- -- --   10/21/22 2338 -- -- -- -- -- -- 199 lb (90.3 kg)   10/21/22 2115 (!) 156/94 97.6 °F (36.4 °C) Temporal 96 20 97 % --         Intake/Output Summary (Last 24 hours) at 10/22/2022 1504  Last data filed at 10/22/2022 1015  Gross per 24 hour   Intake 840 ml   Output 1150 ml   Net -310 ml     General: Lethargic, no acute distress  Neck: No JVD noted  Lungs: Clear bilaterally upper, diminished to the bases bilaterally. Unlabored  CV: Regular rate and rhythm. No rub  Abd: Soft, nontender, nondistended. Active bowel sounds  Skin: Warm and dry.   No rash on exposed extremities  Ext: No edema; RLE dressing in place  Neuro: Lethargic, awakens and answers questions appropriately    Data:    Recent Labs     10/20/22  0452 10/21/22  0603 10/22/22  0520   WBC 8.1 6.9 5.3   HGB 8.2* 9.0* 8.9*   HCT 25.9* 27.8* 27.9*   MCV 89.3 89.4 87.7    276 299       Recent Labs     10/20/22  0452 10/21/22  0603 10/22/22  0520    146 139   K 3.8 3.2*  3.2* 3.0*  3.0*   CL 91* 94* 93*   CO2 32* 34* 33*   CREATININE 1.7* 1.9* 1.7*   BUN 20 21 19   LABGLOM 44 39 44   GLUCOSE 115* 143* 135*   CALCIUM 9.1 9.0 8.9   PHOS 4.1 3.5 2.9   MG 1.7 1.9 1.9       No results found for: VITD25    No results found for: PTH    Recent Labs     10/20/22  0452   ALT 11   AST 20   ALKPHOS 292*   BILITOT 0.6       Recent Labs     10/20/22  0452 10/21/22  0603 10/22/22  0520   LABALBU 3.6 3.3* 3.4*       Ferritin   Date Value Ref Range Status   10/18/2022 228 ng/mL Final     Comment:     FERRITIN Reference Ranges:  Adult Males   20 - 60 years:    30 - 400 ng/mL  Adult females 16 - 60 years:    13 - 150 ng/mL  Adults greater than 60 years:   no established reference range  Pediatrics:                     no established reference range       Iron   Date Value Ref Range Status   10/18/2022 24 (L) 59 - 158 mcg/dL Final     TIBC   Date Value Ref Range Status   10/18/2022 139 (L) 250 - 450 mcg/dL Final       Vitamin B-12   Date Value Ref Range Status   10/18/2022 753 211 - 946 pg/mL Final       Folate   Date Value Ref Range Status   10/18/2022 10.5 4.8 - 24.2 ng/mL Final       No results found for: VOL, APPEARANCE, COLORU, LABSPEC, LABPH, LEUKBLD, NITRU, GLUCOSEU, KETUA, UROBILINOGEN, KETUA, UROBILINOGEN, BILIRUBINUR, OCBU    Lab Results   Component Value Date/Time    KIRTI 338 10/14/2022 02:10 PM       No components found for: URIC    No results found for: LIPIDPAN    Assessment and Plans: LARY on CKD 3B  Likely in the setting of ACEI use, contrast nephrotoxicity; component of urinary retention  Baseline creatinine 1.7-1.9  Creatinine peaked at 3 on 10/3--> 1.9 today-at baseline  S/p Bumex drip  Avoid nephrotoxins/NSAIDs-adjust meds for level of renal function  Strict I&O, daily weights  On Bumex 1 mg IV daily; will transition to oral  Monitor labs    2. Acute CVA  MRI brain 9/28 2 small regions petechial infarcts 1 in the left posterior frontal lobe and second in the left parietal lobe  S/p IR angioplasty and stent 9/27  Neurology previously following    3. Anemia  Hemoglobin target 10-12  Hemoglobin 9 today-below target  S/p EGD 10/3 no evidence of upper GI bleed  S/p colonoscopy 10/9 blood in the right colon  S/p colonoscopy polypectomy, biopsy 10/18  S/p PRBCs  ASHLEY- will hold today with hypertension  Transfuse hemoglobin<7  Monitor H&H    4.  Hypertensive emergency  BP goal<130/80  BP above goal  Monitor on current regimen    5. Urinary retention  [place]  Urology previously following    6. Respiratory failure  S/p intubation/extubation    7. Foot wound, right  S/p antibiotics  ID previously following  Podiatry following    8.   HFpEF  Echo 9/2022 EF 60 to 65%, mild tricuspid regurg, mild mitral regurg, severe concentric left ventricular hypertrophy  UOP 1520 mL last 24 hours, net -9.9 L  On Bumex 1 mg IV daily  Strict I&O, daily weights  Monitor volume status    9.   Hypokalemia  Likely in the setting of diuresis  K+ 3.0 today  Supplement as needed  Monitor labs    Jerrod Barger MD

## 2022-10-22 NOTE — PROGRESS NOTES
Department of Podiatry   Progress Note    Patient  seen and evaluated at bedside this morning. No acute events overnight, mild pain reported but managed with medication. Denies n/v//d//f/c at time of encounter. Wound vac remains intact with no excessive drainage. No new pedal complaints.     Past Medical History:            Diagnosis Date    Chronic back pain     CKD (chronic kidney disease)     CVA (cerebral vascular accident) ([facility] [address]     CVA (cerebral vascular accident) ([facility] [address]     DM (diabetes mellitus) ([facility] [address]     Major depression     MI (myocardial infarction) ([facility] [address]     MATT (obstructive sleep apnea)     Parkinson disease ([facility] [address]     PVD (peripheral vascular disease) ([facility] [address]     Seizure ([facility] [address]        Past Surgical History:        Procedure Laterality Date    COLONOSCOPY N/A 10/9/2022    COLONOSCOPY DIAGNOSTIC performed by Mikki Holter, MD at [id number] [place] [address],[address] N/A 10/18/2022    COLONOSCOPY POLYPECTOMY SNARE/COLD BIOPSY performed by Ruel Romero MD at [place] [address]      IR CAROTID STENT UNI W PROTECTION  9/27/2022    IR CAROTID STENT UNI W PROTECTION 9/27/2022 Priya Donahue MD [facility]    UPPER GASTROINTESTINAL ENDOSCOPY N/A 10/3/2022    EGD DIAGNOSTIC ONLY performed by Lakhwinder Medeiros MD at [facility] ENDOSCOPY       Medications Prior to Admission:    Medications Prior to Admission: apixaban (ELIQUIS) 5 MG TABS tablet, Take 5 mg by mouth 2 times daily  aspirin 81 MG chewable tablet, Take 81 mg by mouth daily  carbidopa-levodopa (SINEMET)  MG per tablet, Take 1 tablet by mouth 3 times daily  carvedilol (COREG) 25 MG tablet, Take 25 mg by mouth 2 times daily (with meals)  cetirizine (ZYRTEC) 10 MG tablet, Take 10 mg by mouth daily  clopidogrel (PLAVIX) 75 MG tablet, Take 75 mg by mouth daily  diphenhydrAMINE (BENADRYL) 25 MG capsule, Take 25 mg by mouth every 6 hours  ezetimibe (ZETIA) 10 MG tablet, Take 10 mg by mouth daily  fluticasone (FLONASE) 50 MCG/ACT nasal spray, 2 sprays by Each Nostril route daily  gabapentin (NEURONTIN) 600 MG tablet, Take 600 mg by mouth 4 times daily. HYDROcodone-acetaminophen (NORCO) 5-325 MG per tablet, Take 1 tablet by mouth 2 times daily. Insulin Glargine, 2 Unit Dial, (TOUJEO MAX SOLOSTAR) 300 UNIT/ML SOPN, Inject 66 Units into the skin daily  piperacillin-tazobactam (ZOSYN) 3-0.375 GM per 50ML IVPB extended infusion, Infuse 4.5 mg intravenously in the morning and 4.5 mg at noon and 4.5 mg in the evening. acetaminophen (TYLENOL) 325 MG tablet, Take 650 mg by mouth every 6 hours as needed for Pain  amLODIPine (NORVASC) 5 MG tablet, Take 5 mg by mouth daily  benzonatate (TESSALON) 100 MG capsule, Take 100 mg by mouth 3 times daily  cloNIDine (CATAPRES) 0.1 MG tablet, Take 0.1 mg by mouth in the morning and 0.1 mg in the evening. hydrALAZINE (APRESOLINE) 100 MG tablet, Take 100 mg by mouth 3 times daily    Allergies:  Codeine    Social History:   TOBACCO:   has no history on file for tobacco use. ETOH:   has no history on file for alcohol use. DRUGS:   Social History     Substance and Sexual Activity   Drug Use Not on file       Family History:   History reviewed. No pertinent family history. REVIEW OF SYSTEMS:    All pertinent positives and negatives as noted in HPI       LOWER EXTREMITY EXAMINATION     VASCULAR:  DP and PT pulses are non palpable. CFT < 5 seconds B/L. Warm to warm from the tibial tuberosity to the distal aspect of the digits dorsally. NEUROLOGIC:  Protective sensation is diminished but grossly intact    DERM:  Right foot lateral plantar wound measuring approx 6cm in diameter. Wound has a notably hyperkeratotic rim. No erythema, serosanguinous drainage, no malodor.     MUSCULOSKELETAL: deferred    Pictures as of 10/21/22              CONSULTS:  IP CONSULT TO CRITICAL CARE  IP CONSULT TO DIETITIAN  IP CONSULT TO CARDIOLOGY  IP CONSULT TO PODIATRY  IP CONSULT TO PODIATRY  IP CONSULT TO UROLOGY  IP CONSULT TO NEPHROLOGY  IP CONSULT TO GENERAL SURGERY  IP CONSULT TO INFECTIOUS DISEASES  IP CONSULT TO GI  IP CONSULT TO GENERAL SURGERY  IP CONSULT TO GENERAL SURGERY  IP CONSULT TO PALLIATIVE CARE  IP CONSULT TO DIETITIAN    MEDICATION:  Scheduled Meds:   potassium chloride  20 mEq Oral Once    cloNIDine  0.2 mg Oral TID    bumetanide  1 mg IntraVENous Daily    chlorhexidine  15 mL Mouth/Throat BID    insulin lispro  0-4 Units SubCUTAneous Q6H    aspirin  81 mg Oral Daily    amLODIPine  10 mg Oral Daily    metoprolol succinate  50 mg Oral Daily    [Held by provider] QUEtiapine  50 mg Oral BID    miconazole nitrate   Topical BID    [Held by provider] gabapentin  600 mg Oral 4x daily    white petrolatum   Topical BID    sodium chloride flush  5-40 mL IntraVENous 2 times per day    miconazole   Topical BID    [Held by provider] epoetin sharath-epbx  6,000 Units SubCUTAneous Once per day on Mon Wed Fri    hydrALAZINE  50 mg Oral 3 times per day    pantoprazole (PROTONIX) 40 mg injection  40 mg IntraVENous Q12H    sennosides  5 mL Oral Nightly    [Held by provider] carbidopa-levodopa  1 tablet Per NG tube TID    ipratropium-albuterol  1 ampule Inhalation Q4H WA    collagenase   Topical Q MWF    [Held by provider] benzonatate  100 mg Oral TID    [Held by provider] atorvastatin  80 mg Oral Nightly     Continuous Infusions:   sodium chloride      dextrose       PRN Meds:.sodium chloride flush, morphine, hydrALAZINE, miconazole nitrate **AND** miconazole nitrate, sodium chloride, labetalol, glucose, dextrose bolus **OR** dextrose bolus, glucagon (rDNA), dextrose    RADIOLOGY:  XR CHEST PORTABLE   Final Result   Improved aeration of the lungs bilaterally with persistent hazy opacities at   both lung bases. A small, loculated right pleural effusion is decreased in   size from yesterday.          Fluoroscopy modified barium swallow with video   Final Result   Mildly impaired swallowing mechanism evident by swallowing delay and followed by laryngeal penetration with thin liquid barium when using a straw. No   barium aspiration. Please see separate speech pathology report for full discussion of findings   and recommendations. XR CHEST PORTABLE   Final Result   Findings for volume overload as observed the in October 19. XR CHEST PORTABLE   Final Result   The enteric tube has been removed, otherwise stable appearance of the chest   compared to 10/18/2022. XR CHEST PORTABLE   Final Result   No interval change         US DUP LOWER EXTREMITIES BILATERAL VENOUS   Final Result   Within the visualized vessels there is no evidence for deep venous   thrombosis               XR CHEST PORTABLE   Final Result   Worsening congestive heart failure suspected         XR CHEST PORTABLE   Final Result   Very minimal improvement seen in the aeration of the upper lung fields with   stable increased markings seen within the mid and lower lung fields   bilaterally with small bilateral pleural effusions. Heart remains enlarged. XR CHEST PORTABLE   Final Result   New right pleural effusion. XR CHEST PORTABLE   Final Result   Stable bilateral pulmonary infiltrates. XR ABDOMEN (KUB) (SINGLE AP VIEW)   Final Result   Catheter is in the stomach. XR ABDOMEN FOR NG/OG/NE TUBE PLACEMENT   Final Result   1. Gastric catheter is coiled in the pharynx and upper esophagus and should   be withdrawn and reinserted. Follow-up imaging recommended. XR CHEST PORTABLE   Final Result   Bilateral airspace disease with interval progression on the right. NM GI BLOOD LOSS   Final Result   No evidence of active GI bleeding during acquisition. XR CHEST PORTABLE   Final Result   Unchanged bilateral chest opacities with right pleural effusion evident. See   above. XR CHEST PORTABLE   Final Result   1. Persistent bilateral patchy parenchymal densities concerning for pneumonia   and or atelectasis   2. Persistent small bilateral pleural effusions, left greater than right   3. Cardiomegaly, stable         XR CHEST PORTABLE   Final Result   No significant change compared to the prior exam with cardiomegaly, bilateral   airspace opacities and bilateral pleural effusions seen most suggestive of   congestive heart failure, but superimposed pneumonia not excluded. US DUP ABD PEL RETRO SCROT COMPLETE   Final Result   1. No evidence of testicular torsion      2. Severe scrotal skin thickening. 3.  Trace hydroceles. 4.  No evidence of bowel containing inguinal hernia. US SCROTUM AND TESTICLES   Final Result   1. No evidence of testicular torsion      2. Severe scrotal skin thickening. 3.  Trace hydroceles. 4.  No evidence of bowel containing inguinal hernia. XR CHEST PORTABLE   Final Result   1. No significant interval change in the appearance of the chest.      2.  Bilateral pleural effusions and areas of atelectasis or multifocal   pneumonia. CTA ABDOMEN PELVIS W CONTRAST   Final Result   Mixed densities throughout the colonic segments including in the ascending   colon and rectum could represent mixed densities of blood products although   no focal area of arterial enhancement or extravasation/blushing is observed   to localize acute hemorrhage. No evidence for perforation or abscess. No   pneumatosis intestinalis or portal venous gas evident. 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 No adjacent hematoma with only minimal   adjacent stranding. Bilateral pleural effusions moderate to large right and moderate left   effusions with adjacent atelectasis. Anasarca. XR CHEST PORTABLE   Final Result   Bilateral pleural effusions with bibasilar patchy infiltrates and mild   cardiomegaly.          NM GI BLOOD LOSS   Final Result   Active GI bleeding identified during acquisition in the splenic flexure with   peristalsis identified into the proximal descending colon. RECOMMENDATIONS:   Unavailable         CT ABDOMEN PELVIS WO CONTRAST   Final Result   CHEST:      No evidence of neoplastic disease, allowing for limitations of noncontrast   technique. Moderate bilateral pleural effusions. Nonemergent incidental findings as above. ABDOMEN/PELVIS:      No evidence of neoplastic disease, allowing for limitations of noncontrast   technique. Bladder wall thickening is nonspecific given under distension; recommend   correlation urinalysis to evaluate for UTI. Nonemergent incidental findings as above. CT CHEST WO CONTRAST   Final Result   CHEST:      No evidence of neoplastic disease, allowing for limitations of noncontrast   technique. Moderate bilateral pleural effusions. Nonemergent incidental findings as above. ABDOMEN/PELVIS:      No evidence of neoplastic disease, allowing for limitations of noncontrast   technique. Bladder wall thickening is nonspecific given under distension; recommend   correlation urinalysis to evaluate for UTI. Nonemergent incidental findings as above. FL MODIFIED BARIUM SWALLOW W VIDEO   Final Result   1. Mildly impaired swallowing mechanism with swallowing delay and followed   by laryngeal penetration with thin liquid barium when using a straw. No   barium aspiration      2. Please see separate speech pathology report for full discussion of   findings and recommendations. RECOMMENDATIONS:   Unavailable         XR ABDOMEN (KUB) (SINGLE AP VIEW)   Final Result   Somewhat greater than average quantity of retained fecal material thin the   lower GI tract suggesting dysfunction with evacuation. XR CHEST PORTABLE   Final Result   1. Atherosclerotic disease and mild cardiomegaly.       2.  Persistent but improved opacities in the bilateral lungs most pronounced   in the mid and lower lungs. There appears to be a small left and trace right   pleural effusion         XR FOOT RIGHT (2 VIEWS)   Final Result   No evidence of calcaneal osteomyelitis. CT HEAD WO CONTRAST   Final Result   Parenchymal volume loss and chronic microvascular ischemic changes. No acute intracranial abnormality. Ethmoid and sphenoid sinusitis. Right mastoid effusion. US RETROPERITONEAL COMPLETE   Final Result   1. Normal appearance of the bilateral kidneys. No hydronephrosis. 2.  A Mahan catheter is decompressing the bladder. XR CHEST PORTABLE   Final Result   Worsening infiltrate and or atelectasis on the left, stable on the right with   suspected layering pleural effusion. XR CHEST PORTABLE   Final Result   Unchanged bilateral atelectasis and or infiltrate as well as small right   pleural effusion. MRI BRAIN WO CONTRAST   Final Result   There are 2 small regions of petechial infarcts one in the left   posterior frontal lobe and a second in the left parietal lobe not   exceeding 3 mm. There is otherwise extensive small vessel ischemic   changes         XR CHEST PORTABLE   Final Result   1. Multifocal bilateral airspace disease more prominent within the lower   lobes. The airspace disease is unchanged when compared with the prior study. CT HEAD WO CONTRAST   Final Result   Diffuse atrophy likely age related   Findings compatible with small vessel ischemic changes. XR CHEST PORTABLE   Final Result   1. Pulmonary opacities present favoring edema and atelectasis, also small   pleural effusions, but nonspecific with some additional considerations noted   above   2. Support devices present as described above   3. Heart size appears borderline enlarged         XR ABDOMEN FOR NG/OG/NE TUBE PLACEMENT   Final Result   NG/OG is in the stomach.          IR CAROTID STENT W PROTECTION Final Result   1. Angiogram demonstrates successful placement of a carotid stent ,   post procedure images demonstrate a widely patent stent and internal   carotid         CT HEAD WO CONTRAST   Final Result   Diffuse atrophy likely age related   Findings compatible with small vessel ischemic changes. Findings were called at the time of dictation         CT BRAIN PERFUSION   Final Result      No significant ischemic penumbra identified      This study was analyzed by the [employer] ai algorithm. CTA NECK W CONTRAST   Final Result   1. Estimated stenosis of the proximal right and left internal carotid   artery by NASCET criteria is greater than 90% on the left   2. Severe atherosclerotic disease . 3. No large vessel occlusion identified            This study was analyzed by the [employer] ai algorithm. CTA HEAD W CONTRAST   Final Result   1. Estimated stenosis of the proximal right and left internal carotid   artery by NASCET criteria is greater than 90% on the left   2. Severe atherosclerotic disease . 3. No large vessel occlusion identified            This study was analyzed by the [employer] ai algorithm. XR CHEST PORTABLE    (Results Pending)   XR CHEST PORTABLE    (Results Pending)       Vitals:    BP (!) 163/76   Pulse 93   Temp 98.6 °F (37 °C) (Temporal)   Resp 19   Ht 5' 8\" (1.727 m)   Wt 199 lb (90.3 kg)   SpO2 99%   BMI 30.26 kg/m²     LABS:   Recent Labs     10/21/22  0603 10/22/22  0520   WBC 6.9 5.3   HGB 9.0* 8.9*   HCT 27.8* 27.9*    299     Recent Labs     10/22/22  0520      K 3.0*  3.0*   CL 93*   CO2 33*   PHOS 2.9   BUN 19   CREATININE 1.7*     Recent Labs     10/20/22  0452   PROT 6.6       ASSESSMENTS:   1. Right foot wound diabetic ulcer  2. DM  3. Pain right foot  Acute cerebrovascular accident (CVA) ([facility] [address]       PLAN:  -Patient examined and evaluated at bedside. All pertinent labs, charts, and imaging reviewed prior to encounter   -Antibiotics as per ID: Stopped  -Culture: Corynebacteria  -Continue Wound vac changes to [facility]. -X rays: No acute osseous abnormalities to foot   -venous studies: No evidence of DVT  -Will continue conservative care for now.    -Patient discussed with Dr. Radha Denson  -Continue to offload

## 2022-10-22 NOTE — PROGRESS NOTES
Hospitalist Progress Note      PCP: Rose Dubin, MD    Date of Admission: 9/27/2022  Days in the hospital: [facility] [address] [address] Course:   Patient is a 44-year-old male with history of CKD, diabetes mellitus, obstructive sleep apnea who initially presented to [facility] with strokelike symptoms and right-sided weakness along with aphasia. Patient was noted to have severe left ICA stenosis. He was transferred to [facility] on September 27 and patient underwent left ICA angioplasty and stent placement by IR. He was initially admitted to the neuro ICU. He was placed on dual antiplatelet therapy with Brilinta and aspirin. Also was noted to have GI bleed and received 15 units of PRBC. EGD was done on 10/3/2022 and no evidence of upper GI bleed noted. Colonoscopy on 10/9/2022 showed large amount of blood. CT of the abdomen did not show any localization of hemorrhage. Nuclear medicine scan was done which showed bleeding around the splenic flexure. RRT was called due to hypotension and hemorrhagic shock and patient was transferred to ICU. Due to diffuse bleeding patient was taken off dual antiplatelet therapy. He is planned for colonoscopy with GI once he has been adequately prepped. He had acute respiratory distress on 10/13/2022 and had to be placed on Bumex for volume overload. Subjective  He reports no complaints, states that he is doing well and hopeful for discharge next week    Exam:    BP (!) 163/76   Pulse 93   Temp 98.6 °F (37 °C) (Temporal)   Resp 19   Ht 5' 8\" (1.727 m)   Wt 199 lb (90.3 kg)   SpO2 99%   BMI 30.26 kg/m²     HEENT: + Pallor, no icterus. Respiratory:  CTA, decreased air entry  Cardiovascular: RRR, no murmur. Anasarca  Abdomen: Soft, non-tender, BS noted. Musculoskeletal: Right foot dressing and wound VAC noted. Neurologic: Alert and oriented x3.   Nonfocal        ASSESSMENT/PLAN:    Acute CVA 2/2 symptomatic left ICA stenosis   S/p angioplasty and stent placement 9/27  MRI of the brain on 9/28 with petechial infarcts, 1 in the frontal lobe and 1 in the left parietal lobe  Dual antiplatelet therapy on hold due to GIB- once able, recommendation to start plavix monotherapy per neurology as it would have less risk as monotherapy for GIB in the interim than ASA or brilinta  He is currently on ASA alone and per GI if H&H remains stable x 7 days may consider DAPT with close monitoring  continue serial neurochecks  Follow-up with neurology as an outpatient    Acute lower GI bleed   S/p 15 units total PRBC since admission, most recent transfusion on 10/13  Colonoscopy on 10/9 with blood in the right colon  Colonoscopy on 10/18 negative for active bleeding, s/p polypectomy and biopsy    Acute blood loss anemia   As above  H&H remain stable- day 4 of ASA monotherapy     Acute hypoxic respiratory failure  S/p intubation and extubation  Presently wearing 3L via nasal cannula with O2 saturation 100%  Wean O2 off    LARY stage I-II on CKD stage III  Baseline creatinine 1.7-1.9 mg/dL  Creatinine peaked at 3 on 10/3  Now resolved    Dysphagia  Recommendation for minced and moist consistency solids with thin liquids    Uncontrolled hypertension  Continue amlodipine, clonidine, hydralazine and metoprolol succinate    Hypokalemia  Diuretic induced  For supplementation    HFpEF 60 to 65%  With volume overload earlier in the admission  Presently on Bumex 1 mg IV daily  S/p Bumex drip per nephrology  Volume status much improved    Right foot nonhealing wound  Completed antibiotic therapy with Zyvox and ciprofloxacin. Continue with local wound care  Follow-up with podiatry    MATT  Compliant with CPAP        DISPOSITION:  Awaiting placement-plan to discharge to [facility].   Will need updated short clinicals on Monday    Labs:   Recent Labs     10/20/22  0452 10/21/22  0603 10/22/22  0520   WBC 8.1 6.9 5.3   HGB 8.2* 9.0* 8.9*   HCT 25.9* 27.8* 27.9*  276 299       Recent Labs     10/20/22  0452 10/21/22  0603 10/22/22  0520    146 139   K 3.8 3.2*  3.2* 3.0*  3.0*   CL 91* 94* 93*   CO2 32* 34* 33*   BUN 20 21 19   CREATININE 1.7* 1.9* 1.7*   CALCIUM 9.1 9.0 8.9   PHOS 4.1 3.5 2.9       Recent Labs     10/20/22  0452   AST 20   ALT 11   BILITOT 0.6   ALKPHOS 292*       No results for input(s): INR in the last 72 hours. No results for input(s): Chen Dai in the last 72 hours.     Medications:  Reviewed    Infusion Medications    sodium chloride      dextrose       Scheduled Medications    potassium chloride  20 mEq Oral Once    cloNIDine  0.2 mg Oral TID    bumetanide  1 mg IntraVENous Daily    chlorhexidine  15 mL Mouth/Throat BID    insulin lispro  0-4 Units SubCUTAneous Q6H    aspirin  81 mg Oral Daily    amLODIPine  10 mg Oral Daily    metoprolol succinate  50 mg Oral Daily    [Held by provider] QUEtiapine  50 mg Oral BID    miconazole nitrate   Topical BID    [Held by provider] gabapentin  600 mg Oral 4x daily    white petrolatum   Topical BID    sodium chloride flush  5-40 mL IntraVENous 2 times per day    miconazole   Topical BID    [Held by provider] epoetin sharath-epbx  6,000 Units SubCUTAneous Once per day on Mon Wed Fri    hydrALAZINE  50 mg Oral 3 times per day    pantoprazole (PROTONIX) 40 mg injection  40 mg IntraVENous Q12H    sennosides  5 mL Oral Nightly    [Held by provider] carbidopa-levodopa  1 tablet Per NG tube TID    ipratropium-albuterol  1 ampule Inhalation Q4H WA    collagenase   Topical Q MWF    [Held by provider] benzonatate  100 mg Oral TID    [Held by provider] atorvastatin  80 mg Oral Nightly     PRN Meds: sodium chloride flush, morphine, hydrALAZINE, miconazole nitrate **AND** miconazole nitrate, sodium chloride, labetalol, glucose, dextrose bolus **OR** dextrose bolus, glucagon (rDNA), dextrose      Intake/Output Summary (Last 24 hours) at 10/22/2022 1013  Last data filed at 10/22/2022 0433  Gross per 24 hour   Intake 240 ml   Output 1150 ml   Net -910 ml       Body mass index is 30.26 kg/m². Diet  ADULT DIET; Dysphagia - Minced and Moist; 4 carb choices (60 gm/meal); Less than 60 gm  ADULT ORAL NUTRITION SUPPLEMENT; Breakfast, Dinner; Wound Healing Oral Supplement  ADULT ORAL NUTRITION SUPPLEMENT; Lunch, Dinner; Fortified Pudding Oral Supplement    Code Status  Full Code       Electronically signed by CONSTANTINE Sorenson CNP on 10/22/2022 at 10:13 AM  [employer]   Please contact me through perfect serve    NOTE: This report was transcribed using voice recognition software. Every effort was made to ensure accuracy; however, inadvertent computerized transcription errors may be present.

## 2022-10-23 ENCOUNTER — APPOINTMENT (OUTPATIENT)
Dept: GENERAL RADIOLOGY | Age: 67
DRG: 034 | End: 2022-10-23
Attending: INTERNAL MEDICINE
Payer: MEDICARE

## 2022-10-23 LAB
ALBUMIN SERPL-MCNC: 3.4 G/DL (ref 3.5–5.2)
ANION GAP SERPL CALCULATED.3IONS-SCNC: 14 MMOL/L (ref 7–16)
BASOPHILS ABSOLUTE: 0.03 E9/L (ref 0–0.2)
BASOPHILS RELATIVE PERCENT: 0.6 % (ref 0–2)
BUN BLDV-MCNC: 24 MG/DL (ref 6–23)
CALCIUM IONIZED: 1.23 MMOL/L (ref 1.15–1.33)
CALCIUM SERPL-MCNC: 9 MG/DL (ref 8.6–10.2)
CHLORIDE BLD-SCNC: 95 MMOL/L (ref 98–107)
CO2: 33 MMOL/L (ref 22–29)
CREAT SERPL-MCNC: 1.6 MG/DL (ref 0.7–1.2)
EOSINOPHILS ABSOLUTE: 0.32 E9/L (ref 0.05–0.5)
EOSINOPHILS RELATIVE PERCENT: 6.6 % (ref 0–6)
GFR SERPL CREATININE-BSD FRML MDRD: 47 ML/MIN/1.73
GLUCOSE BLD-MCNC: 130 MG/DL (ref 74–99)
HCT VFR BLD CALC: 29.4 % (ref 37–54)
HEMOGLOBIN: 9.3 G/DL (ref 12.5–16.5)
IMMATURE GRANULOCYTES #: 0.03 E9/L
IMMATURE GRANULOCYTES %: 0.6 % (ref 0–5)
LYMPHOCYTES ABSOLUTE: 1 E9/L (ref 1.5–4)
LYMPHOCYTES RELATIVE PERCENT: 20.5 % (ref 20–42)
MAGNESIUM: 1.8 MG/DL (ref 1.6–2.6)
MCH RBC QN AUTO: 28.1 PG (ref 26–35)
MCHC RBC AUTO-ENTMCNC: 31.6 % (ref 32–34.5)
MCV RBC AUTO: 88.8 FL (ref 80–99.9)
METER GLUCOSE: 144 MG/DL (ref 74–99)
METER GLUCOSE: 145 MG/DL (ref 74–99)
METER GLUCOSE: 151 MG/DL (ref 74–99)
METER GLUCOSE: 209 MG/DL (ref 74–99)
MONOCYTES ABSOLUTE: 0.71 E9/L (ref 0.1–0.95)
MONOCYTES RELATIVE PERCENT: 14.6 % (ref 2–12)
NEUTROPHILS ABSOLUTE: 2.78 E9/L (ref 1.8–7.3)
NEUTROPHILS RELATIVE PERCENT: 57.1 % (ref 43–80)
PDW BLD-RTO: 14.1 FL (ref 11.5–15)
PHOSPHORUS: 3.6 MG/DL (ref 2.5–4.5)
PLATELET # BLD: 310 E9/L (ref 130–450)
PMV BLD AUTO: 10.1 FL (ref 7–12)
POTASSIUM REFLEX MAGNESIUM: 3.6 MMOL/L (ref 3.5–5)
POTASSIUM SERPL-SCNC: 3.6 MMOL/L (ref 3.5–5)
RBC # BLD: 3.31 E12/L (ref 3.8–5.8)
SODIUM BLD-SCNC: 142 MMOL/L (ref 132–146)
WBC # BLD: 4.9 E9/L (ref 4.5–11.5)

## 2022-10-23 PROCEDURE — 6370000000 HC RX 637 (ALT 250 FOR IP): Performed by: NURSE PRACTITIONER

## 2022-10-23 PROCEDURE — 83735 ASSAY OF MAGNESIUM: CPT

## 2022-10-23 PROCEDURE — 71045 X-RAY EXAM CHEST 1 VIEW: CPT

## 2022-10-23 PROCEDURE — 2060000000 HC ICU INTERMEDIATE R&B

## 2022-10-23 PROCEDURE — 2700000000 HC OXYGEN THERAPY PER DAY

## 2022-10-23 PROCEDURE — 36415 COLL VENOUS BLD VENIPUNCTURE: CPT

## 2022-10-23 PROCEDURE — 94640 AIRWAY INHALATION TREATMENT: CPT

## 2022-10-23 PROCEDURE — 80069 RENAL FUNCTION PANEL: CPT

## 2022-10-23 PROCEDURE — 2580000003 HC RX 258: Performed by: SURGERY

## 2022-10-23 PROCEDURE — 6370000000 HC RX 637 (ALT 250 FOR IP): Performed by: INTERNAL MEDICINE

## 2022-10-23 PROCEDURE — 6360000002 HC RX W HCPCS: Performed by: SURGERY

## 2022-10-23 PROCEDURE — 94660 CPAP INITIATION&MGMT: CPT

## 2022-10-23 PROCEDURE — 82962 GLUCOSE BLOOD TEST: CPT

## 2022-10-23 PROCEDURE — C9113 INJ PANTOPRAZOLE SODIUM, VIA: HCPCS | Performed by: SURGERY

## 2022-10-23 PROCEDURE — 80048 BASIC METABOLIC PNL TOTAL CA: CPT

## 2022-10-23 PROCEDURE — 6370000000 HC RX 637 (ALT 250 FOR IP): Performed by: SURGERY

## 2022-10-23 PROCEDURE — 97530 THERAPEUTIC ACTIVITIES: CPT

## 2022-10-23 PROCEDURE — 82330 ASSAY OF CALCIUM: CPT

## 2022-10-23 PROCEDURE — 85025 COMPLETE CBC W/AUTO DIFF WBC: CPT

## 2022-10-23 PROCEDURE — 6370000000 HC RX 637 (ALT 250 FOR IP): Performed by: FAMILY MEDICINE

## 2022-10-23 PROCEDURE — A4216 STERILE WATER/SALINE, 10 ML: HCPCS | Performed by: SURGERY

## 2022-10-23 RX ORDER — POTASSIUM CHLORIDE 20 MEQ/1
20 TABLET, EXTENDED RELEASE ORAL ONCE
Status: COMPLETED | OUTPATIENT
Start: 2022-10-23 | End: 2022-10-23

## 2022-10-23 RX ADMIN — Medication 10 ML: at 08:59

## 2022-10-23 RX ADMIN — MICONAZOLE NITRATE: 2 OINTMENT TOPICAL at 09:06

## 2022-10-23 RX ADMIN — PETROLATUM: 420 OINTMENT TOPICAL at 20:52

## 2022-10-23 RX ADMIN — IPRATROPIUM BROMIDE AND ALBUTEROL SULFATE 1 AMPULE: .5; 2.5 SOLUTION RESPIRATORY (INHALATION) at 14:00

## 2022-10-23 RX ADMIN — PETROLATUM: 420 OINTMENT TOPICAL at 09:00

## 2022-10-23 RX ADMIN — HYDRALAZINE HYDROCHLORIDE 50 MG: 50 TABLET, FILM COATED ORAL at 05:47

## 2022-10-23 RX ADMIN — CLONIDINE HYDROCHLORIDE 0.2 MG: 0.2 TABLET ORAL at 08:59

## 2022-10-23 RX ADMIN — AMLODIPINE BESYLATE 10 MG: 10 TABLET ORAL at 08:59

## 2022-10-23 RX ADMIN — ANTI-FUNGAL POWDER MICONAZOLE NITRATE TALC FREE: 1.42 POWDER TOPICAL at 20:53

## 2022-10-23 RX ADMIN — CHLORHEXIDINE GLUCONATE 15 ML: 1.2 RINSE BUCCAL at 20:52

## 2022-10-23 RX ADMIN — MICONAZOLE NITRATE: 2 OINTMENT TOPICAL at 20:59

## 2022-10-23 RX ADMIN — INSULIN LISPRO 2 UNITS: 100 INJECTION, SOLUTION INTRAVENOUS; SUBCUTANEOUS at 17:53

## 2022-10-23 RX ADMIN — HYDRALAZINE HYDROCHLORIDE 50 MG: 50 TABLET, FILM COATED ORAL at 13:48

## 2022-10-23 RX ADMIN — SODIUM CHLORIDE, PRESERVATIVE FREE 40 MG: 5 INJECTION INTRAVENOUS at 16:10

## 2022-10-23 RX ADMIN — IPRATROPIUM BROMIDE AND ALBUTEROL SULFATE 1 AMPULE: .5; 2.5 SOLUTION RESPIRATORY (INHALATION) at 09:38

## 2022-10-23 RX ADMIN — MICONAZOLE NITRATE: 2 OINTMENT TOPICAL at 09:05

## 2022-10-23 RX ADMIN — IPRATROPIUM BROMIDE AND ALBUTEROL SULFATE 1 AMPULE: .5; 2.5 SOLUTION RESPIRATORY (INHALATION) at 19:11

## 2022-10-23 RX ADMIN — SODIUM CHLORIDE, PRESERVATIVE FREE 40 MG: 5 INJECTION INTRAVENOUS at 03:26

## 2022-10-23 RX ADMIN — ASPIRIN 81 MG CHEWABLE TABLET 81 MG: 81 TABLET CHEWABLE at 08:59

## 2022-10-23 RX ADMIN — POTASSIUM CHLORIDE 20 MEQ: 1500 TABLET, EXTENDED RELEASE ORAL at 11:00

## 2022-10-23 RX ADMIN — SENNOSIDES 5 ML: 8.8 SYRUP ORAL at 20:52

## 2022-10-23 RX ADMIN — HYDRALAZINE HYDROCHLORIDE 50 MG: 50 TABLET, FILM COATED ORAL at 20:52

## 2022-10-23 RX ADMIN — Medication 10 ML: at 20:52

## 2022-10-23 RX ADMIN — CHLORHEXIDINE GLUCONATE 15 ML: 1.2 RINSE BUCCAL at 09:00

## 2022-10-23 RX ADMIN — CLONIDINE HYDROCHLORIDE 0.2 MG: 0.2 TABLET ORAL at 20:52

## 2022-10-23 RX ADMIN — BUMETANIDE 2 MG: 1 TABLET ORAL at 08:59

## 2022-10-23 RX ADMIN — METOPROLOL SUCCINATE 50 MG: 50 TABLET, EXTENDED RELEASE ORAL at 08:59

## 2022-10-23 RX ADMIN — ANTI-FUNGAL POWDER MICONAZOLE NITRATE TALC FREE: 1.42 POWDER TOPICAL at 09:01

## 2022-10-23 RX ADMIN — CLONIDINE HYDROCHLORIDE 0.2 MG: 0.2 TABLET ORAL at 13:48

## 2022-10-23 ASSESSMENT — PAIN SCALES - GENERAL
PAINLEVEL_OUTOF10: 0
PAINLEVEL_OUTOF10: 0

## 2022-10-23 NOTE — PROGRESS NOTES
Hospitalist Progress Note      PCP: Angie Valdivia MD    Date of Admission: 9/27/2022  Days in the hospital: Misbah [place] Course:   Patient is a 77-year-old male with history of CKD, diabetes mellitus, obstructive sleep apnea who initially presented to [facility] with strokelike symptoms and right-sided weakness along with aphasia. Patient was noted to have severe left ICA stenosis. He was transferred to [facility] on September 27 and patient underwent left ICA angioplasty and stent placement by IR. He was initially admitted to the neuro ICU. He was placed on dual antiplatelet therapy with Brilinta and aspirin. Also was noted to have GI bleed and received 15 units of PRBC. EGD was done on 10/3/2022 and no evidence of upper GI bleed noted. Colonoscopy on 10/9/2022 showed large amount of blood. CT of the abdomen did not show any localization of hemorrhage. Nuclear medicine scan was done which showed bleeding around the splenic flexure. RRT was called due to hypotension and hemorrhagic shock and patient was transferred to ICU. Due to diffuse bleeding patient was taken off dual antiplatelet therapy. He is planned for colonoscopy with GI once he has been adequately prepped. He had acute respiratory distress on 10/13/2022 and had to be placed on Bumex for volume overload. Subjective  He reports no complaints today. Exam:    BP (!) 149/67   Pulse 75   Temp 97.5 °F (36.4 °C) (Temporal)   Resp 12   Ht 5' 8\" (1.727 m)   Wt 194 lb (88 kg)   SpO2 98%   BMI 29.50 kg/m²     HEENT: + Pallor, no icterus. Respiratory:  CTA, decreased air entry  Cardiovascular: RRR, no murmur. Anasarca  Abdomen: Soft, non-tender, BS noted. Musculoskeletal: Right foot dressing and wound VAC noted. Neurologic: Alert and oriented x3.   Nonfocal        ASSESSMENT/PLAN:    Acute CVA 2/2 symptomatic left ICA stenosis   S/p angioplasty and stent placement 9/27  MRI of the brain on 9/28 with petechial infarcts, 1 in the frontal lobe and 1 in the left parietal lobe  Dual antiplatelet therapy on hold due to GIB- once able, recommendation to start plavix monotherapy per neurology as it would have less risk as monotherapy for GIB in the interim than ASA or brilinta  He is currently on ASA alone and per GI if H&H remains stable x 7 days may consider DAPT with close monitoring  continue serial neurochecks  Follow-up with neurology as an outpatient    Acute lower GI bleed   S/p 15 units total PRBC since admission, most recent transfusion on 10/13  Colonoscopy on 10/9 with blood in the right colon  Colonoscopy on 10/18 negative for active bleeding, s/p polypectomy and biopsy    Acute blood loss anemia   As above  H&H remain stable- day 5 of ASA monotherapy     Acute hypoxic respiratory failure  S/p intubation and extubation  Presently wearing 3L via nasal cannula with O2 saturation 98%  Wean O2 off    LARY stage I-II on CKD stage III  Baseline creatinine 1.7-1.9 mg/dL  Creatinine peaked at 3 on 10/3  Now resolved    Dysphagia  Recommendation for minced and moist consistency solids with thin liquids    Uncontrolled hypertension  Continue amlodipine, clonidine, hydralazine and metoprolol succinate    Hypokalemia  Diuretic induced  For continued supplementation    HFpEF 60 to 65%  With volume overload earlier in the admission  Presently on Bumex 1 mg IV daily  S/p Bumex drip per nephrology  Volume status much improved    Right foot nonhealing wound  Completed antibiotic therapy with Zyvox and ciprofloxacin. Continue with local wound care  Follow-up with podiatry    MATT  Compliant with CPAP        DISPOSITION:  Awaiting placement-plan to discharge to [facility].   Will need updated short clinicals on Monday    Labs:   Recent Labs     10/21/22  0603 10/22/22  0520 10/23/22  0527   WBC 6.9 5.3 4.9   HGB 9.0* 8.9* 9.3*   HCT 27.8* 27.9* 29.4*    299 310       Recent Labs     10/21/22  0603 10/22/22  0520 10/23/22  0527    139 142   K 3.2*  3.2* 3.0*  3.0* 3.6  3.6   CL 94* 93* 95*   CO2 34* 33* 33*   BUN 21 19 24*   CREATININE 1.9* 1.7* 1.6*   CALCIUM 9.0 8.9 9.0   PHOS 3.5 2.9 3.6       No results for input(s): AST, ALT, BILIDIR, BILITOT, ALKPHOS in the last 72 hours. No results for input(s): INR in the last 72 hours. No results for input(s): [place] in the last 72 hours.     Medications:  Reviewed    Infusion Medications    sodium chloride      dextrose       Scheduled Medications    insulin lispro  0-8 Units SubCUTAneous TID WC    insulin lispro  0-4 Units SubCUTAneous Nightly    bumetanide  2 mg Oral Daily    cloNIDine  0.2 mg Oral TID    chlorhexidine  15 mL Mouth/Throat BID    aspirin  81 mg Oral Daily    amLODIPine  10 mg Oral Daily    metoprolol succinate  50 mg Oral Daily    [Held by provider] QUEtiapine  50 mg Oral BID    miconazole nitrate   Topical BID    [Held by provider] gabapentin  600 mg Oral 4x daily    white petrolatum   Topical BID    sodium chloride flush  5-40 mL IntraVENous 2 times per day    miconazole   Topical BID    [Held by provider] epoetin sharath-epbx  6,000 Units SubCUTAneous Once per day on Mon Wed Fri    hydrALAZINE  50 mg Oral 3 times per day    pantoprazole (PROTONIX) 40 mg injection  40 mg IntraVENous Q12H    sennosides  5 mL Oral Nightly    [Held by provider] carbidopa-levodopa  1 tablet Per NG tube TID    ipratropium-albuterol  1 ampule Inhalation Q4H WA    collagenase   Topical Q MWF    [Held by provider] benzonatate  100 mg Oral TID    [Held by provider] atorvastatin  80 mg Oral Nightly     PRN Meds: sodium chloride flush, morphine, hydrALAZINE, miconazole nitrate **AND** miconazole nitrate, sodium chloride, labetalol, glucose, dextrose bolus **OR** dextrose bolus, glucagon (rDNA), dextrose      Intake/Output Summary (Last 24 hours) at 10/23/2022 1025  Last data filed at 10/23/2022 0938  Gross per 24 hour   Intake 120 ml   Output 1300 ml Net -1180 ml       Body mass index is 29.5 kg/m². Diet  ADULT DIET; Dysphagia - Minced and Moist; 4 carb choices (60 gm/meal); Less than 60 gm  ADULT ORAL NUTRITION SUPPLEMENT; Breakfast, Dinner; Wound Healing Oral Supplement  ADULT ORAL NUTRITION SUPPLEMENT; Lunch, Dinner; Fortified Pudding Oral Supplement    Code Status  Full Code       Electronically signed by CONSTANTINE Reeves CNP on 10/23/2022 at 10:25 AM  [employer]   Please contact me through perfect serve    NOTE: This report was transcribed using voice recognition software. Every effort was made to ensure accuracy; however, inadvertent computerized transcription errors may be present.

## 2022-10-23 NOTE — PROGRESS NOTES
[facility]  Nephrology Progress Note    Patient's Name: Christa Cooper    History of Present Illness from 9/30 consult Note:    Fernanda Mcclelland is a 74 y.o. male history of CAD s/p MI, hypertension, chronic kidney disease stage IIIa (baseline creatinine of recent 1.7-1.9) he is followed by our group with Dr. Rebeca Levine. He initially was admitted to [facility] with right lower extremity wound. .  Patient subsequently developed aphasia noted to facial droop associated with right-sided weakness. He was transferred to [address] [place] [address] for further management. Patient was admitted told the neuro ICU. He required intubation with mechanical ventilation. CT of the head and neck demonstrated bilateral carotid stenosis with the left ICA being dominant. IR performed angiogram with angioplasty of the left ICA. Laboratory data showed progressive increase in his serum creatinine to currently 2.9 mg/dL. Hemoglobin was noted to be 5.6. He has received at least 2 units of packed cells but with further drop in his hemoglobin with requirement for additional transfusion. He had an episode of urinary retention with gross hematuria Mahan catheter was reinserted and the hematuria subsequently cleared. Renal is consulted for LARY. \"    Subjective:    10/21: Patient was seen and examined. He is lethargic however he does wake up and reports that he feels pretty good. 10/22: Patient gives no complaints. He denies shortness of breath. He reports improved appetite. 10/23: No new c/o; says he feels better    Patient Active Problem List   Diagnosis    Acute cerebrovascular accident (CVA) ([address]    Acute respiratory failure with hypoxia ([address]    Stenosis of left carotid artery    Hypoalbuminemia    Electrolyte imbalance    Hypernatremia    Anemia    GI bleeding       Diet:    ADULT DIET; Dysphagia - Minced and Moist; 4 carb choices (60 gm/meal); Less than 60 gm  ADULT ORAL NUTRITION SUPPLEMENT; Breakfast, Dinner;  Wound Healing Oral Supplement  ADULT ORAL NUTRITION SUPPLEMENT; Lunch, Dinner; Fortified Pudding Oral Supplement    Meds:     insulin lispro  0-8 Units SubCUTAneous TID WC    insulin lispro  0-4 Units SubCUTAneous Nightly    bumetanide  2 mg Oral Daily    cloNIDine  0.2 mg Oral TID    chlorhexidine  15 mL Mouth/Throat BID    aspirin  81 mg Oral Daily    amLODIPine  10 mg Oral Daily    metoprolol succinate  50 mg Oral Daily    [Held by provider] QUEtiapine  50 mg Oral BID    miconazole nitrate   Topical BID    [Held by provider] gabapentin  600 mg Oral 4x daily    white petrolatum   Topical BID    sodium chloride flush  5-40 mL IntraVENous 2 times per day    miconazole   Topical BID    [Held by provider] epoetin sharath-epbx  6,000 Units SubCUTAneous Once per day on Mon Wed Fri    hydrALAZINE  50 mg Oral 3 times per day    pantoprazole (PROTONIX) 40 mg injection  40 mg IntraVENous Q12H    sennosides  5 mL Oral Nightly    [Held by provider] carbidopa-levodopa  1 tablet Per NG tube TID    ipratropium-albuterol  1 ampule Inhalation Q4H WA    collagenase   Topical Q MWF    [Held by provider] benzonatate  100 mg Oral TID    [Held by provider] atorvastatin  80 mg Oral Nightly        sodium chloride      dextrose         Meds prn:     sodium chloride flush, morphine, hydrALAZINE, miconazole nitrate **AND** miconazole nitrate, sodium chloride, labetalol, glucose, dextrose bolus **OR** dextrose bolus, glucagon (rDNA), dextrose    Meds prior to admission:     No current facility-administered medications on file prior to encounter.      Current Outpatient Medications on File Prior to Encounter   Medication Sig Dispense Refill    apixaban (ELIQUIS) 5 MG TABS tablet Take 5 mg by mouth 2 times daily      aspirin 81 MG chewable tablet Take 81 mg by mouth daily      carbidopa-levodopa (SINEMET)  MG per tablet Take 1 tablet by mouth 3 times daily      carvedilol (COREG) 25 MG tablet Take 25 mg by mouth 2 times daily (with meals)      cetirizine (ZYRTEC) 10 MG tablet Take 10 mg by mouth daily      clopidogrel (PLAVIX) 75 MG tablet Take 75 mg by mouth daily      diphenhydrAMINE (BENADRYL) 25 MG capsule Take 25 mg by mouth every 6 hours      ezetimibe (ZETIA) 10 MG tablet Take 10 mg by mouth daily      fluticasone (FLONASE) 50 MCG/ACT nasal spray 2 sprays by Each Nostril route daily      gabapentin (NEURONTIN) 600 MG tablet Take 600 mg by mouth 4 times daily. HYDROcodone-acetaminophen (NORCO) 5-325 MG per tablet Take 1 tablet by mouth 2 times daily. Insulin Glargine, 2 Unit Dial, (TOUJEO MAX SOLOSTAR) 300 UNIT/ML SOPN Inject 66 Units into the skin daily      piperacillin-tazobactam (ZOSYN) 3-0.375 GM per 50ML IVPB extended infusion Infuse 4.5 mg intravenously in the morning and 4.5 mg at noon and 4.5 mg in the evening. acetaminophen (TYLENOL) 325 MG tablet Take 650 mg by mouth every 6 hours as needed for Pain      amLODIPine (NORVASC) 5 MG tablet Take 5 mg by mouth daily      benzonatate (TESSALON) 100 MG capsule Take 100 mg by mouth 3 times daily      cloNIDine (CATAPRES) 0.1 MG tablet Take 0.1 mg by mouth in the morning and 0.1 mg in the evening.       hydrALAZINE (APRESOLINE) 100 MG tablet Take 100 mg by mouth 3 times daily         Allergies:    Codeine      Physical Exam:      Patient Vitals for the past 24 hrs:   BP Temp Temp src Pulse Resp SpO2 Weight   10/23/22 1400 -- -- -- 74 16 99 % --   10/23/22 0938 -- -- -- 75 12 98 % --   10/23/22 0800 -- -- -- 74 -- -- --   10/23/22 0759 (!) 149/67 97.5 °F (36.4 °C) Temporal 79 16 97 % --   10/23/22 0317 -- -- -- -- -- -- 194 lb (88 kg)   10/22/22 1913 (!) 149/76 97 °F (36.1 °C) Temporal 80 16 98 % --   10/22/22 1902 -- -- -- 85 17 100 % --   10/22/22 1547 -- -- -- 82 16 100 % --         Intake/Output Summary (Last 24 hours) at 10/23/2022 1411  Last data filed at 10/23/2022 1100  Gross per 24 hour   Intake 120 ml   Output 1450 ml   Net -1330 ml     General: Lethargic, no acute distress  Neck: No JVD noted  Lungs: Clear bilaterally upper, diminished to the bases bilaterally. Unlabored  CV: Regular rate and rhythm. No rub  Abd: Soft, nontender, nondistended. Active bowel sounds  Skin: Warm and dry. No rash on exposed extremities  Ext: No edema; RLE dressing in place  Neuro: Lethargic, awakens and answers questions appropriately    Data:    Recent Labs     10/21/22  0603 10/22/22  0520 10/23/22  0527   WBC 6.9 5.3 4.9   HGB 9.0* 8.9* 9.3*   HCT 27.8* 27.9* 29.4*   MCV 89.4 87.7 88.8    299 310       Recent Labs     10/21/22  0603 10/22/22  0520 10/23/22  0527    139 142   K 3.2*  3.2* 3.0*  3.0* 3.6  3.6   CL 94* 93* 95*   CO2 34* 33* 33*   CREATININE 1.9* 1.7* 1.6*   BUN 21 19 24*   LABGLOM 39 44 47   GLUCOSE 143* 135* 130*   CALCIUM 9.0 8.9 9.0   PHOS 3.5 2.9 3.6   MG 1.9 1.9 1.8       No results found for: VITD25    No results found for: PTH    No results for input(s): ALT, AST, ALKPHOS, BILITOT, BILIDIR in the last 72 hours.       Recent Labs     10/21/22  0603 10/22/22  0520 10/23/22  0527   LABALBU 3.3* 3.4* 3.4*       Ferritin   Date Value Ref Range Status   10/18/2022 228 ng/mL Final     Comment:     FERRITIN Reference Ranges:  Adult Males   20 - 60 years:    30 - 400 ng/mL  Adult females 16 - 60 years:    15 - 150 ng/mL  Adults greater than 60 years:   no established reference range  Pediatrics:                     no established reference range       Iron   Date Value Ref Range Status   10/18/2022 24 (L) 59 - 158 mcg/dL Final     TIBC   Date Value Ref Range Status   10/18/2022 139 (L) 250 - 450 mcg/dL Final       Vitamin B-12   Date Value Ref Range Status   10/18/2022 753 211 - 946 pg/mL Final       Folate   Date Value Ref Range Status   10/18/2022 10.5 4.8 - 24.2 ng/mL Final       No results found for: VOL, APPEARANCE, COLORU, LABSPEC, LABPH, LEUKBLD, NITRU, GLUCOSEU, KETUA, UROBILINOGEN, KETUA, UROBILINOGEN, BILIRUBINUR, OCBU    Lab Results   Component Value Date/Time    OSMOU 338 10/14/2022 02:10 PM       No components found for: URIC    No results found for: LIPIDPAN    Assessment and Plans: LARY on CKD 3B  Likely in the setting of ACEI use, contrast nephrotoxicity; component of urinary retention  Baseline creatinine 1.7-1.9  Creatinine peaked at 3 on 10/3--> 1.9 today-at baseline  S/p Bumex drip  Avoid nephrotoxins/NSAIDs-adjust meds for level of renal function  Strict I&O, daily weights  On Bumex 1 mg IV daily; changed to oral 10/22  Monitor labs    2. Acute CVA  MRI brain 9/28 2 small regions petechial infarcts 1 in the left posterior frontal lobe and second in the left parietal lobe  S/p IR angioplasty and stent 9/27  Neurology previously following    3. Anemia  Hemoglobin target 10-12  Hemoglobin 9 today-below target  S/p EGD 10/3 no evidence of upper GI bleed  S/p colonoscopy 10/9 blood in the right colon  S/p colonoscopy polypectomy, biopsy 10/18  S/p PRBCs  ASHLEY- will hold today with hypertension  Transfuse hemoglobin<7  Monitor H&H    4.  Hypertensive emergency  BP goal<130/80  BP above goal  Monitor on current regimen    5. Urinary retention  Mahan  Urology previously following    6. Respiratory failure  S/p intubation/extubation    7. Foot wound, right  S/p antibiotics  ID previously following  Podiatry following    8. HFpEF  Echo 9/2022 EF 60 to 65%, mild tricuspid regurg, mild mitral regurg, severe concentric left ventricular hypertrophy  UOP 1520 mL last 24 hours, net -9.9 L  On Bumex 1 mg IV daily  Strict I&O, daily weights  Monitor volume status    9.   Hypokalemia  Likely in the setting of diuresis  K+ 3.6 today  Supplement as needed  Monitor labs    Discharge planning    Cecilie Gitelman, MD

## 2022-10-23 NOTE — PROGRESS NOTES
Department of Podiatry   Progress Note    Patient  seen and evaluated at bedside this morning. No acute events overnight. Denies n/v//d//f/c. Wound vac remains intact with no excessive drainage and running continuous pressure. No new pedal complaints.     Past Medical History:            Diagnosis Date    Chronic back pain     CKD (chronic kidney disease)     CVA (cerebral vascular accident) ([facility] [address]     CVA (cerebral vascular accident) ([facility] [address]     DM (diabetes mellitus) ([facility] [address]     Major depression     MI (myocardial infarction) ([facility] [address]     MATT (obstructive sleep apnea)     Parkinson disease ([facility] [address]     PVD (peripheral vascular disease) ([facility] [address]     Seizure ([facility] [address]        Past Surgical History:        Procedure Laterality Date    COLONOSCOPY N/A 10/9/2022    COLONOSCOPY DIAGNOSTIC performed by Anna Sanabria MD at [address] [facility] N/A 10/18/2022    COLONOSCOPY POLYPECTOMY SNARE/COLD BIOPSY performed by May Rios MD at [place] [address]      IR CAROTID STENT UNI W PROTECTION  9/27/2022    IR CAROTID STENT UNI W PROTECTION 9/27/2022 Marcy Luis MD [facility]    UPPER GASTROINTESTINAL ENDOSCOPY N/A 10/3/2022    EGD DIAGNOSTIC ONLY performed by Lucero Urbano MD at [facility] ENDOSCOPY       Medications Prior to Admission:    Medications Prior to Admission: apixaban (ELIQUIS) 5 MG TABS tablet, Take 5 mg by mouth 2 times daily  aspirin 81 MG chewable tablet, Take 81 mg by mouth daily  carbidopa-levodopa (SINEMET)  MG per tablet, Take 1 tablet by mouth 3 times daily  carvedilol (COREG) 25 MG tablet, Take 25 mg by mouth 2 times daily (with meals)  cetirizine (ZYRTEC) 10 MG tablet, Take 10 mg by mouth daily  clopidogrel (PLAVIX) 75 MG tablet, Take 75 mg by mouth daily  diphenhydrAMINE (BENADRYL) 25 MG capsule, Take 25 mg by mouth every 6 hours  ezetimibe (ZETIA) 10 MG tablet, Take 10 mg by mouth daily  fluticasone (FLONASE) 50 MCG/ACT nasal spray, 2 sprays by Each Nostril route daily  gabapentin (NEURONTIN) 600 MG tablet, Take 600 mg by mouth 4 times daily. HYDROcodone-acetaminophen (NORCO) 5-325 MG per tablet, Take 1 tablet by mouth 2 times daily. Insulin Glargine, 2 Unit Dial, (TOUJEO MAX SOLOSTAR) 300 UNIT/ML SOPN, Inject 66 Units into the skin daily  piperacillin-tazobactam (ZOSYN) 3-0.375 GM per 50ML IVPB extended infusion, Infuse 4.5 mg intravenously in the morning and 4.5 mg at noon and 4.5 mg in the evening. acetaminophen (TYLENOL) 325 MG tablet, Take 650 mg by mouth every 6 hours as needed for Pain  amLODIPine (NORVASC) 5 MG tablet, Take 5 mg by mouth daily  benzonatate (TESSALON) 100 MG capsule, Take 100 mg by mouth 3 times daily  cloNIDine (CATAPRES) 0.1 MG tablet, Take 0.1 mg by mouth in the morning and 0.1 mg in the evening. hydrALAZINE (APRESOLINE) 100 MG tablet, Take 100 mg by mouth 3 times daily    Allergies:  Codeine    Social History:   TOBACCO:   has no history on file for tobacco use. ETOH:   has no history on file for alcohol use. DRUGS:   Social History     Substance and Sexual Activity   Drug Use Not on file       Family History:   History reviewed. No pertinent family history. REVIEW OF SYSTEMS:    All pertinent positives and negatives as noted in HPI       LOWER EXTREMITY EXAMINATION     VASCULAR:  DP and PT pulses are non palpable. CFT < 5 seconds B/L. Warm to warm from the tibial tuberosity to the distal aspect of the digits dorsally. NEUROLOGIC:  Protective sensation is diminished but grossly intact    DERM:  Right foot lateral plantar wound measuring approx 6cm in diameter. Wound has a notably hyperkeratotic rim. No erythema, serosanguinous drainage, no malodor.     MUSCULOSKELETAL: deferred    Pictures as of 10/21/22              CONSULTS:  IP CONSULT TO CRITICAL CARE  IP CONSULT TO DIETITIAN  IP CONSULT TO CARDIOLOGY  IP CONSULT TO PODIATRY  IP CONSULT TO PODIATRY  IP CONSULT TO UROLOGY  IP CONSULT TO NEPHROLOGY  IP CONSULT TO GENERAL SURGERY  IP CONSULT TO INFECTIOUS DISEASES  IP CONSULT TO GI  IP CONSULT TO GENERAL SURGERY  IP CONSULT TO GENERAL SURGERY  IP CONSULT TO PALLIATIVE CARE  IP CONSULT TO DIETITIAN    MEDICATION:  Scheduled Meds:   insulin lispro  0-8 Units SubCUTAneous TID WC    insulin lispro  0-4 Units SubCUTAneous Nightly    bumetanide  2 mg Oral Daily    cloNIDine  0.2 mg Oral TID    chlorhexidine  15 mL Mouth/Throat BID    aspirin  81 mg Oral Daily    amLODIPine  10 mg Oral Daily    metoprolol succinate  50 mg Oral Daily    [Held by provider] QUEtiapine  50 mg Oral BID    miconazole nitrate   Topical BID    [Held by provider] gabapentin  600 mg Oral 4x daily    white petrolatum   Topical BID    sodium chloride flush  5-40 mL IntraVENous 2 times per day    miconazole   Topical BID    [Held by provider] epoetin sharath-epbx  6,000 Units SubCUTAneous Once per day on Mon Wed Fri    hydrALAZINE  50 mg Oral 3 times per day    pantoprazole (PROTONIX) 40 mg injection  40 mg IntraVENous Q12H    sennosides  5 mL Oral Nightly    [Held by provider] carbidopa-levodopa  1 tablet Per NG tube TID    ipratropium-albuterol  1 ampule Inhalation Q4H WA    collagenase   Topical Q MWF    [Held by provider] benzonatate  100 mg Oral TID    [Held by provider] atorvastatin  80 mg Oral Nightly     Continuous Infusions:   sodium chloride      dextrose       PRN Meds:.sodium chloride flush, morphine, hydrALAZINE, miconazole nitrate **AND** miconazole nitrate, sodium chloride, labetalol, glucose, dextrose bolus **OR** dextrose bolus, glucagon (rDNA), dextrose    RADIOLOGY:  XR CHEST PORTABLE   Final Result   1. No interval change in the bilateral perihilar and lower lobe airspace   disease. XR CHEST PORTABLE   Final Result   Stable chest series. Persistent retrocardiac and bibasilar opacities and   bilateral faint interstitial opacities. Small pleural effusions.          XR CHEST PORTABLE   Final Result   Improved aeration of the lungs bilaterally with persistent hazy opacities at   both lung bases. A small, loculated right pleural effusion is decreased in   size from yesterday. Fluoroscopy modified barium swallow with video   Final Result   Mildly impaired swallowing mechanism evident by swallowing delay and followed   by laryngeal penetration with thin liquid barium when using a straw. No   barium aspiration. Please see separate speech pathology report for full discussion of findings   and recommendations. XR CHEST PORTABLE   Final Result   Findings for volume overload as observed the in October 19. XR CHEST PORTABLE   Final Result   The enteric tube has been removed, otherwise stable appearance of the chest   compared to 10/18/2022. XR CHEST PORTABLE   Final Result   No interval change         US DUP LOWER EXTREMITIES BILATERAL VENOUS   Final Result   Within the visualized vessels there is no evidence for deep venous   thrombosis               XR CHEST PORTABLE   Final Result   Worsening congestive heart failure suspected         XR CHEST PORTABLE   Final Result   Very minimal improvement seen in the aeration of the upper lung fields with   stable increased markings seen within the mid and lower lung fields   bilaterally with small bilateral pleural effusions. Heart remains enlarged. XR CHEST PORTABLE   Final Result   New right pleural effusion. XR CHEST PORTABLE   Final Result   Stable bilateral pulmonary infiltrates. XR ABDOMEN (KUB) (SINGLE AP VIEW)   Final Result   Catheter is in the stomach. XR ABDOMEN FOR NG/OG/NE TUBE PLACEMENT   Final Result   1. Gastric catheter is coiled in the pharynx and upper esophagus and should   be withdrawn and reinserted. Follow-up imaging recommended. XR CHEST PORTABLE   Final Result   Bilateral airspace disease with interval progression on the right. NM GI BLOOD LOSS   Final Result   No evidence of active GI bleeding during acquisition. XR CHEST PORTABLE   Final Result   Unchanged bilateral chest opacities with right pleural effusion evident. See   above. XR CHEST PORTABLE   Final Result   1. Persistent bilateral patchy parenchymal densities concerning for pneumonia   and or atelectasis   2. Persistent small bilateral pleural effusions, left greater than right   3. Cardiomegaly, stable         XR CHEST PORTABLE   Final Result   No significant change compared to the prior exam with cardiomegaly, bilateral   airspace opacities and bilateral pleural effusions seen most suggestive of   congestive heart failure, but superimposed pneumonia not excluded. US DUP ABD PEL RETRO SCROT COMPLETE   Final Result   1. No evidence of testicular torsion      2. Severe scrotal skin thickening. 3.  Trace hydroceles. 4.  No evidence of bowel containing inguinal hernia. US SCROTUM AND TESTICLES   Final Result   1. No evidence of testicular torsion      2. Severe scrotal skin thickening. 3.  Trace hydroceles. 4.  No evidence of bowel containing inguinal hernia. XR CHEST PORTABLE   Final Result   1. No significant interval change in the appearance of the chest.      2.  Bilateral pleural effusions and areas of atelectasis or multifocal   pneumonia. CTA ABDOMEN PELVIS W CONTRAST   Final Result   Mixed densities throughout the colonic segments including in the ascending   colon and rectum could represent mixed densities of blood products although   no focal area of arterial enhancement or extravasation/blushing is observed   to localize acute hemorrhage. No evidence for perforation or abscess. No   pneumatosis intestinalis or portal venous gas evident. 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  No adjacent hematoma with only minimal   adjacent stranding. Bilateral pleural effusions moderate to large right and moderate left   effusions with adjacent atelectasis. Anasarca. XR CHEST PORTABLE   Final Result   Bilateral pleural effusions with bibasilar patchy infiltrates and mild   cardiomegaly. NM GI BLOOD LOSS   Final Result   Active GI bleeding identified during acquisition in the splenic flexure with   peristalsis identified into the proximal descending colon. RECOMMENDATIONS:   Unavailable         CT ABDOMEN PELVIS WO CONTRAST   Final Result   CHEST:      No evidence of neoplastic disease, allowing for limitations of noncontrast   technique. Moderate bilateral pleural effusions. Nonemergent incidental findings as above. ABDOMEN/PELVIS:      No evidence of neoplastic disease, allowing for limitations of noncontrast   technique. Bladder wall thickening is nonspecific given under distension; recommend   correlation urinalysis to evaluate for UTI. Nonemergent incidental findings as above. CT CHEST WO CONTRAST   Final Result   CHEST:      No evidence of neoplastic disease, allowing for limitations of noncontrast   technique. Moderate bilateral pleural effusions. Nonemergent incidental findings as above. ABDOMEN/PELVIS:      No evidence of neoplastic disease, allowing for limitations of noncontrast   technique. Bladder wall thickening is nonspecific given under distension; recommend   correlation urinalysis to evaluate for UTI. Nonemergent incidental findings as above. FL MODIFIED BARIUM SWALLOW W VIDEO   Final Result   1. Mildly impaired swallowing mechanism with swallowing delay and followed   by laryngeal penetration with thin liquid barium when using a straw. No   barium aspiration      2. Please see separate speech pathology report for full discussion of   findings and recommendations.       RECOMMENDATIONS:   Unavailable         XR ABDOMEN (KUB) (SINGLE AP VIEW) Final Result   Somewhat greater than average quantity of retained fecal material thin the   lower GI tract suggesting dysfunction with evacuation. XR CHEST PORTABLE   Final Result   1. Atherosclerotic disease and mild cardiomegaly. 2.  Persistent but improved opacities in the bilateral lungs most pronounced   in the mid and lower lungs. There appears to be a small left and trace right   pleural effusion         XR FOOT RIGHT (2 VIEWS)   Final Result   No evidence of calcaneal osteomyelitis. CT HEAD WO CONTRAST   Final Result   Parenchymal volume loss and chronic microvascular ischemic changes. No acute intracranial abnormality. Ethmoid and sphenoid sinusitis. Right mastoid effusion. US RETROPERITONEAL COMPLETE   Final Result   1. Normal appearance of the bilateral kidneys. No hydronephrosis. 2.  A Mahan catheter is decompressing the bladder. XR CHEST PORTABLE   Final Result   Worsening infiltrate and or atelectasis on the left, stable on the right with   suspected layering pleural effusion. XR CHEST PORTABLE   Final Result   Unchanged bilateral atelectasis and or infiltrate as well as small right   pleural effusion. MRI BRAIN WO CONTRAST   Final Result   There are 2 small regions of petechial infarcts one in the left   posterior frontal lobe and a second in the left parietal lobe not   exceeding 3 mm. There is otherwise extensive small vessel ischemic   changes         XR CHEST PORTABLE   Final Result   1. Multifocal bilateral airspace disease more prominent within the lower   lobes. The airspace disease is unchanged when compared with the prior study. CT HEAD WO CONTRAST   Final Result   Diffuse atrophy likely age related   Findings compatible with small vessel ischemic changes. XR CHEST PORTABLE   Final Result   1.  Pulmonary opacities present favoring edema and atelectasis, also small   pleural effusions, but nonspecific with some additional considerations noted   above   2. Support devices present as described above   3. Heart size appears borderline enlarged         XR ABDOMEN FOR NG/OG/NE TUBE PLACEMENT   Final Result   NG/OG is in the stomach. IR CAROTID STENT W PROTECTION   Final Result   1. Angiogram demonstrates successful placement of a carotid stent ,   post procedure images demonstrate a widely patent stent and internal   carotid         CT HEAD WO CONTRAST   Final Result   Diffuse atrophy likely age related   Findings compatible with small vessel ischemic changes. Findings were called at the time of dictation         CT BRAIN PERFUSION   Final Result      No significant ischemic penumbra identified      This study was analyzed by the [employer]. ai algorithm. CTA NECK W CONTRAST   Final Result   1. Estimated stenosis of the proximal right and left internal carotid   artery by NASCET criteria is greater than 90% on the left   2. Severe atherosclerotic disease . 3. No large vessel occlusion identified            This study was analyzed by the [employer]. ai algorithm. CTA HEAD W CONTRAST   Final Result   1. Estimated stenosis of the proximal right and left internal carotid   artery by NASCET criteria is greater than 90% on the left   2. Severe atherosclerotic disease . 3. No large vessel occlusion identified            This study was analyzed by the [employer]. iPeen algorithm.                XR CHEST PORTABLE    (Results Pending)       Vitals:    BP (!) 149/67   Pulse 75   Temp 97.5 °F (36.4 °C) (Temporal)   Resp 12   Ht 5' 8\" (1.727 m)   Wt 194 lb (88 kg)   SpO2 98%   BMI 29.50 kg/m²     LABS:   Recent Labs     10/22/22  0520 10/23/22  0527   WBC 5.3 4.9   HGB 8.9* 9.3*   HCT 27.9* 29.4*    310     Recent Labs     10/23/22  0527      K 3.6  3.6   CL 95*   CO2 33*   PHOS 3.6   BUN 24*   CREATININE 1.6*     No results for input(s): PROT, INR, APTT in the last 72 hours.      ASSESSMENTS:   1. Right foot wound diabetic ulcer  2. DM  3. Pain right foot  Acute cerebrovascular accident (CVA) ([facility] [address]       PLAN:  -Patient examined and evaluated at bedside. All pertinent labs, charts, and imaging reviewed prior to encounter   -Culture: Corynebacteria  -Continue Wound vac changes to [facility]. -X rays: No acute osseous abnormalities to foot   -venous studies: No evidence of DVT  -Will continue conservative care for now.    -Patient discussed with Dr. Venkatesh Collins  -Continue to offload runny nose

## 2022-10-23 NOTE — PROGRESS NOTES
Occupational Therapy  OT BEDSIDE TREATMENT NOTE   [address] [facility] [place], [place]      Date:10/23/2022  Patient Name: Tamika Hilario  MRN: [id number]  : 1955  Room: [address] [place] [address]     Re-evaluating OT: Stein Cables OTD, OTR/L, [id number]     Evaluating OT: Stein Cables, OTD,  OTR/L; [id number]     Re-evaluation indicated s/p pt transferred to MICU with GI bleed requiring ICU monitoring. Referring Provider: Kyara Cardoso DO   Specific Provider Orders/Date: OT eval and treat (10/3/22)        Diagnosis: Acute cerebrovascular accident (CVA) ([address] [I63.9]      Reason for admission: Pt admitted with Acute CVA, R heel wound. Surgery/Procedures:   10/3: EGD   10/9: Colonoscopy  10/15: Colonoscopy  10/18: Colonoscopy     Pertinent Medical History:    Past Medical History        Past Medical History:   Diagnosis Date    Chronic back pain      CKD (chronic kidney disease)      CVA (cerebral vascular accident) ([address]      CVA (cerebral vascular accident) ([address]      DM (diabetes mellitus) ([address]      Major depression      MI (myocardial infarction) ([address]      MATT (obstructive sleep apnea)      Parkinson disease (HCC)      PVD (peripheral vascular disease) ([address]      Seizure ([address]               *Precautions:  Fall Risk, O2, R shaista, expressive > receptive aphasia, RLE wound with wound vac, NWB RLE, off-loading shoe to RLE. Assessment of current deficits   [x] Functional mobility          [x]ADLs           [x] Strength                  [x]Cognition   [x] Functional transfers        [x] IADLs         [x] Safety Awareness   [x]Endurance   [x] Fine Coordination           [x] ROM           [x] Vision/perception    []Sensation     [x]Gross Motor Coordination [x] Balance    [] Delirium                  [x]Motor Control     [x] Communication     OT PLAN OF CARE   OT POC based on physician orders, patient diagnosis and results of clinical assessment. functional independence  * Delirium prevention/treatment  * Manual techniques for edema management     Modified [place] Scale   Score     Description  0             No symptoms  1             No significant disability despite symptoms  2             Slight disability; able to look after own affairs  3             Moderate disability; able to ambulate without assist/ requires assist with ADLs  4             Moderate/Severe disability;requires assist to ambulate/assist with ADLs  5             Severe disability;bedridden/incontinent   6               Score:   4     Recommended Adaptive Equipment: tub bench, LB AE, TBD pending progress      Home Living: Pt lives with family  in a 2 story home with 4+4-5 step(s) to enter and 1 rail(s); bed/bath on 2nd floor  Bathroom setup: tub/shower  Equipment owned: grab bars in shower, quad cane     Prior Level of Function: Ind with ADLs; Ind with IADLs. Quad cane for functional mobility. Driving: Unknown  Occupation: None stated     Pain Level: pt c/o 6/10 pain in R foot at wound site. Positioning provided. Cognition: A&O: 2/4; unable to report location/month; Follows 2 step commands; answers appropriately. Memory: G-             Comprehension: G-             Problem solving: F+             Judgement/safety: F+                Communication skills: Impaired; difficulty with word finding- increased time; easily frustrated. Vision: Impaired peripherals; requires increases font size/contrast/lighting. Glasses: Yes                                                       Hearing: WFL               RASS: 0  CAM-ICU: (NT) Delirium     UE Assessment:  Hand Dominance: Right []  Left []   ? ROM Strength STM goal: PRN   RUE  AROM  Shoulder flx: 0-50  Elbow: ~20 AROM  Wrist: ~10 AROM  Hand: digits 4,5 flx contracture.      PROM  Shoulder: 0-110  Elbow: WFL  Wrist: WFL  Hand: WFL Shoulder: 1/5  Elbow flx: 2+/5; ext: 2/5  Wrist flx: 2-/5; ext: 1/5  Thumb: 2-/5  : Poor+  FMC: Poor  GMC: Fair- Increase RUE strength for participation in ADLs. Demonstrate good knowledge of adaptive techniques for dressing. Good understanding of AAROM exercises. LUE WFL Proximal 4+/5  Elbow: 5/5  : Good  FMC: WFL  GMC: WFL Increase overall LUE strength         Sensation: c/o numbness/tinging to B feet. Tone: Hypertonicity in RUE flexors. Edema: Unremarkable. Functional Assessment:  -PAC Daily Activity Raw Score: 13/24    Initial Eval Status  Date: 10/3/22 Re-eval Status  Date: 10/19/22 Treatment Status  Date: 10/23/22 STGs = LTGs  Time frame: 7-14 days   Feeding Min A  To drink from cup with BUE for stabilization. Min A  For bilateral tasks SBA                      S; set-up         Grooming Max A overall  Min A to wash face with LUE. Verbal cues for use of RUE. Max A Min A  seated                      Min A         UB dressing/bathing Max A Mod A  Pt demo fair use of shaista techniques to don gown in semi supine. Mod A  To don/doff gown seated                      Min A  With use of shaista techniques         LB dressing/bathing Dep Dep Max A  To don/doff socks seated EOB                      Mod A   With use of shaista techniques         Toileting Dep Dep  dependent                     Mod A      Bed Mobility  Supine to sit:   Mod A     Sit to supine:   NT  Pt seated in chair upon exit. Supine to sit:   Mod Ax2     Sit to supine:   NT  Pt seated in chair upon exit. Mod A- supine>sit  Educated pt on technique to increase independence. SBA      Functional Transfers Sit to stand: Mod A x2     Stand to sit:   Mod A x2     Stand Pivot: Mod A x2  Verbal cues to maintain NWB of RLE. Sit to stand: Mod A x2     Stand to sit:   Mod A x2     Stand Pivot: Mod A x2        Fair maintenance of NWB to RLE requiring verbal cues.     Mod A x 2- sit<->stand  Cuing for hand placement                        Mod A Functional Mobility NT NT Mod A- stand pivot transfer using w/w                        Mod A         Balance Sitting:     Static: Min A    Dynamic: Mod A  Standing: Mod A x2 Sitting:     Static: Min A<>SBA    Dynamic: Mod A  Standing: Mod A x2 Sitting:     Static: SBA    Dynamic: CGA  Standing: Mod A  Sitting:     Static: SBA    Dynamic: Min A  Standing: Mod A                   Endurance/Activity Tolerance    fair tolerance with light activity. Fair- tolerance with light activity. Fair                      WFL  For full ADL. Visual/  Perceptual Impaired: Pt reporting vision is \"dimming. \" Impaired peripheral vision with assessment, improving at midline. Impaired peripheral vision. Pt requires increased lighting, font size, and contrast to read board and small font. Comments: Upon arrival pt supine in bed. Pt educated on techniques to increase independence and safety during ADL's, bed mobility, and functional transfers. At end of session pt left seated in bedside chair, call light within reach. Pt has made fair progress towards set goals.      Continue with current plan of care    Treatment Time In: 8:17            Treatment Time Out: 8:30             Treatment Charges: Mins Units   Ther Ex  [id number]     Manual Therapy [id number]     Thera Activities [id number] 13 1   ADL/Home Mgt [id number]     Neuro Re-ed [id number]     Group Therapy      Orthotic manage/training  [id number]     Non-Billable Time     Total Timed Treatment 13 69 Pallavi Thakkar

## 2022-10-23 NOTE — PROGRESS NOTES
Physical Therapy    Physical Therapy Daily Treatment Note      Name: Nani Carvajal  : 1955  MRN: [id number]      Date of Service: 10/23/2022    Re-Evaluating PT:  Alanna Roman PT, DPT  [id number]     Room #:  8705/5835-K  Diagnosis:  Acute cerebrovascular accident (CVA) ([facility] [I63.9]  PMHx/PSHx:   has a past medical history of Chronic back pain, CKD (chronic kidney disease), CVA (cerebral vascular accident) ([facility] CVA (cerebral vascular accident) ([facility] DM (diabetes mellitus) ([facility] Major depression, MI (myocardial infarction) ([facility] MATT (obstructive sleep apnea), Parkinson disease ([facility] PVD (peripheral vascular disease) ([facility] and Seizure ([place] [id number].). Procedure/Surgery:  L ICA angioplasty and stent ;  EGD 10/3;  Colonoscopy 10/9, Colonoscopy polypectomy 10/18  Precautions:  Falls, R foot wound, R foot wound vac, Aphasia, R hemiparesis, O2, TSM  *R foot WB status not clarified in chart. Will assume NWB d/t wound vac until clarified. Equipment Needs:  TBD - possible FWW    Reason for re-evaluation:  Change in status requiring transfer to intensive care. Multiple procedures since last time seen by PT. Date of re-evaluation:  10/19/22    SUBJECTIVE:    Pt lives with his caregiver. He reports 2 story home with 4+4 steps with 1 rail to enter. Reports full flight with B rails to second floor bedroom and bathroom. Ambulates with quad cane PTA. Per chart, pt wears an offloading shoe during ambulation on R foot. OBJECTIVE:   Re-Evaluation  Date: 10/19/22 Treatment  10/23/22 Short Term/ Long Term   Goals   AM-PAC 6 Clicks 85/87     Was pt agreeable to Re-Eval/treatment? yes yes    Does pt have pain? 6/10 R foot pain  Not reported     Bed Mobility  Rolling: Min A  Supine to sit: Mod A  Sit to supine: Mod A  Scooting: Min A Rolling: Radha  Supine to sit: ModA  Sit to supine: NT  Scooting: Radha Rolling: SBA  Supine to sit: SBA  Sit to supine: SBA  Scooting: SBA   Transfers Sit to stand:  Mod A x2  Stand to sit: Mod A x2  Stand pivot: NT Sit to stand: ModA x2  Stand to sit: ModA x2  Stand pivot: ModA with FWW Sit to stand: Min A  Stand to sit: Min A  Stand pivot: Min A with AAD   Ambulation    NT NT >10 feet with AAD Mod A   Stair negotiation: ascended and descended  NT NT NA   ROM BUE:  Per OT eval   BLE:  WFL     Strength BUE:  Per OT eval   RLE:  Grossly 2-/5 at hip, 2/5 at knee, 1/5 at ankle     Balance Sitting EOB:  SBA static, Min A dynamic   Dynamic Standing: Mod A x2 with FWW Sitting EOB:  SBA    Dynamic Standing:  ModA with FWW Sitting EOB:  SBA  Dynamic Standing:  Min A   Pt is A & O x 2 to self and year. Once reoriented, pt able to recall place and month  Sensation:  NT  Edema:  none noted    Vitals:  SPO2 on 3L: 97%    Patient education  Pt educated on role of PT, safety during mobility    Patient response to education:   Pt verbalized understanding Pt demonstrated skill Pt requires further education in this area   yes yes yes     ASSESSMENT:    Comments:  patient semi-supine in bed upon entry and agreeable to PT treatment. Pt with mild word finding difficulty, but able to follow all commands. Pt able to complete bed mobility with significant assist to trunk. Pt sat EOB for approximately 8 minutes during session with no assist for sitting balance. Pt able to stand and complete pivot to chair with [employer] and significant assist. Questionable adherence to NWB RLE during transfer. Pt left sitting in chair at end of session with all needs met. Treatment:  Patient practiced and was instructed in the following treatment:    Bed Mobility: VCs provided for sequencing and safety during mobility. Manual assist provided for completion of task. Transfer Training: Verbal and tactile cueing provided for sequencing and safety during mobility. Manual assist provided for completion of task  Therapeutic Activities: pt sat EOB for approximately 8 minutes during session with no assist for static and dynamic sitting balance. PLAN:    Patient is making good progress towards established goals. Will continue with current POC.       Time in  0806  Time out  0832    Total Treatment Time  26 minutes     CPT codes:  [] Gait training 10486 - minutes  [] Manual therapy 40211 - minutes  [x] Therapeutic activities 23196 26 minutes  [] Therapeutic exercises 45253 - minutes  [] Neuromuscular reeducation 93683 - minutes    Arturo Wets PT, DPT  [id number]

## 2022-10-24 ENCOUNTER — APPOINTMENT (OUTPATIENT)
Dept: GENERAL RADIOLOGY | Age: 67
DRG: 034 | End: 2022-10-24
Attending: INTERNAL MEDICINE
Payer: MEDICARE

## 2022-10-24 LAB
ALBUMIN SERPL-MCNC: 3.1 G/DL (ref 3.5–5.2)
ANION GAP SERPL CALCULATED.3IONS-SCNC: 14 MMOL/L (ref 7–16)
BASOPHILS ABSOLUTE: 0.03 E9/L (ref 0–0.2)
BASOPHILS RELATIVE PERCENT: 0.5 % (ref 0–2)
BUN BLDV-MCNC: 23 MG/DL (ref 6–23)
CALCIUM IONIZED: 1.21 MMOL/L (ref 1.15–1.33)
CALCIUM SERPL-MCNC: 9 MG/DL (ref 8.6–10.2)
CHLORIDE BLD-SCNC: 93 MMOL/L (ref 98–107)
CO2: 29 MMOL/L (ref 22–29)
CREAT SERPL-MCNC: 1.6 MG/DL (ref 0.7–1.2)
EOSINOPHILS ABSOLUTE: 0.27 E9/L (ref 0.05–0.5)
EOSINOPHILS RELATIVE PERCENT: 4.8 % (ref 0–6)
GFR SERPL CREATININE-BSD FRML MDRD: 47 ML/MIN/1.73
GLUCOSE BLD-MCNC: 133 MG/DL (ref 74–99)
HCT VFR BLD CALC: 28.3 % (ref 37–54)
HEMOGLOBIN: 9.1 G/DL (ref 12.5–16.5)
IMMATURE GRANULOCYTES #: 0.03 E9/L
IMMATURE GRANULOCYTES %: 0.5 % (ref 0–5)
LYMPHOCYTES ABSOLUTE: 1.29 E9/L (ref 1.5–4)
LYMPHOCYTES RELATIVE PERCENT: 23 % (ref 20–42)
MAGNESIUM: 1.7 MG/DL (ref 1.6–2.6)
MCH RBC QN AUTO: 28 PG (ref 26–35)
MCHC RBC AUTO-ENTMCNC: 32.2 % (ref 32–34.5)
MCV RBC AUTO: 87.1 FL (ref 80–99.9)
METER GLUCOSE: 146 MG/DL (ref 74–99)
METER GLUCOSE: 176 MG/DL (ref 74–99)
METER GLUCOSE: 186 MG/DL (ref 74–99)
METER GLUCOSE: 193 MG/DL (ref 74–99)
MONOCYTES ABSOLUTE: 0.69 E9/L (ref 0.1–0.95)
MONOCYTES RELATIVE PERCENT: 12.3 % (ref 2–12)
NEUTROPHILS ABSOLUTE: 3.29 E9/L (ref 1.8–7.3)
NEUTROPHILS RELATIVE PERCENT: 58.9 % (ref 43–80)
PDW BLD-RTO: 14.3 FL (ref 11.5–15)
PHOSPHORUS: 3.7 MG/DL (ref 2.5–4.5)
PLATELET # BLD: 294 E9/L (ref 130–450)
PMV BLD AUTO: 10.2 FL (ref 7–12)
POTASSIUM REFLEX MAGNESIUM: 3.4 MMOL/L (ref 3.5–5)
POTASSIUM SERPL-SCNC: 3.4 MMOL/L (ref 3.5–5)
RBC # BLD: 3.25 E12/L (ref 3.8–5.8)
SARS-COV-2, NAAT: NOT DETECTED
SODIUM BLD-SCNC: 136 MMOL/L (ref 132–146)
WBC # BLD: 5.6 E9/L (ref 4.5–11.5)

## 2022-10-24 PROCEDURE — 6370000000 HC RX 637 (ALT 250 FOR IP): Performed by: NURSE PRACTITIONER

## 2022-10-24 PROCEDURE — 83735 ASSAY OF MAGNESIUM: CPT

## 2022-10-24 PROCEDURE — 6370000000 HC RX 637 (ALT 250 FOR IP): Performed by: INTERNAL MEDICINE

## 2022-10-24 PROCEDURE — 87635 SARS-COV-2 COVID-19 AMP PRB: CPT

## 2022-10-24 PROCEDURE — 80048 BASIC METABOLIC PNL TOTAL CA: CPT

## 2022-10-24 PROCEDURE — 92526 ORAL FUNCTION THERAPY: CPT

## 2022-10-24 PROCEDURE — C9113 INJ PANTOPRAZOLE SODIUM, VIA: HCPCS | Performed by: SURGERY

## 2022-10-24 PROCEDURE — 71045 X-RAY EXAM CHEST 1 VIEW: CPT

## 2022-10-24 PROCEDURE — 6370000000 HC RX 637 (ALT 250 FOR IP): Performed by: SURGERY

## 2022-10-24 PROCEDURE — 85025 COMPLETE CBC W/AUTO DIFF WBC: CPT

## 2022-10-24 PROCEDURE — 2060000000 HC ICU INTERMEDIATE R&B

## 2022-10-24 PROCEDURE — 80069 RENAL FUNCTION PANEL: CPT

## 2022-10-24 PROCEDURE — 94660 CPAP INITIATION&MGMT: CPT

## 2022-10-24 PROCEDURE — 36415 COLL VENOUS BLD VENIPUNCTURE: CPT

## 2022-10-24 PROCEDURE — 6360000002 HC RX W HCPCS: Performed by: INTERNAL MEDICINE

## 2022-10-24 PROCEDURE — A4216 STERILE WATER/SALINE, 10 ML: HCPCS | Performed by: SURGERY

## 2022-10-24 PROCEDURE — 82330 ASSAY OF CALCIUM: CPT

## 2022-10-24 PROCEDURE — 82962 GLUCOSE BLOOD TEST: CPT

## 2022-10-24 PROCEDURE — 2580000003 HC RX 258: Performed by: SURGERY

## 2022-10-24 PROCEDURE — 6360000002 HC RX W HCPCS: Performed by: SURGERY

## 2022-10-24 RX ORDER — CLOPIDOGREL BISULFATE 75 MG/1
75 TABLET ORAL DAILY
Qty: 30 TABLET | Refills: 0 | DISCHARGE
Start: 2022-10-25

## 2022-10-24 RX ORDER — PANTOPRAZOLE SODIUM 40 MG/1
40 TABLET, DELAYED RELEASE ORAL 2 TIMES DAILY
Qty: 30 TABLET | Refills: 0 | DISCHARGE
Start: 2022-10-24

## 2022-10-24 RX ORDER — BUMETANIDE 2 MG/1
2 TABLET ORAL DAILY
Qty: 30 TABLET | Refills: 0 | DISCHARGE
Start: 2022-10-25

## 2022-10-24 RX ORDER — ATORVASTATIN CALCIUM 80 MG/1
80 TABLET, FILM COATED ORAL NIGHTLY
Qty: 30 TABLET | Refills: 0 | DISCHARGE
Start: 2022-10-24

## 2022-10-24 RX ORDER — HYDRALAZINE HYDROCHLORIDE 50 MG/1
50 TABLET, FILM COATED ORAL EVERY 8 HOURS SCHEDULED
Qty: 90 TABLET | Refills: 0 | DISCHARGE
Start: 2022-10-24

## 2022-10-24 RX ORDER — POTASSIUM CHLORIDE 20 MEQ/1
40 TABLET, EXTENDED RELEASE ORAL ONCE
Status: COMPLETED | OUTPATIENT
Start: 2022-10-24 | End: 2022-10-24

## 2022-10-24 RX ORDER — METOPROLOL SUCCINATE 50 MG/1
50 TABLET, EXTENDED RELEASE ORAL DAILY
Qty: 30 TABLET | Refills: 0 | DISCHARGE
Start: 2022-10-25

## 2022-10-24 RX ADMIN — QUETIAPINE 50 MG: 25 TABLET ORAL at 22:00

## 2022-10-24 RX ADMIN — CLONIDINE HYDROCHLORIDE 0.2 MG: 0.2 TABLET ORAL at 10:05

## 2022-10-24 RX ADMIN — ATORVASTATIN CALCIUM 80 MG: 40 TABLET, FILM COATED ORAL at 22:00

## 2022-10-24 RX ADMIN — MICONAZOLE NITRATE: 2 OINTMENT TOPICAL at 10:06

## 2022-10-24 RX ADMIN — ANTI-FUNGAL POWDER MICONAZOLE NITRATE TALC FREE: 1.42 POWDER TOPICAL at 22:00

## 2022-10-24 RX ADMIN — Medication 10 ML: at 22:00

## 2022-10-24 RX ADMIN — AMLODIPINE BESYLATE 10 MG: 10 TABLET ORAL at 10:05

## 2022-10-24 RX ADMIN — BENZONATATE 100 MG: 100 CAPSULE ORAL at 22:00

## 2022-10-24 RX ADMIN — POTASSIUM CHLORIDE 40 MEQ: 1500 TABLET, EXTENDED RELEASE ORAL at 10:04

## 2022-10-24 RX ADMIN — PETROLATUM: 420 OINTMENT TOPICAL at 22:14

## 2022-10-24 RX ADMIN — BUMETANIDE 2 MG: 1 TABLET ORAL at 10:05

## 2022-10-24 RX ADMIN — CLONIDINE HYDROCHLORIDE 0.2 MG: 0.2 TABLET ORAL at 22:00

## 2022-10-24 RX ADMIN — SENNOSIDES 5 ML: 8.8 SYRUP ORAL at 22:00

## 2022-10-24 RX ADMIN — ASPIRIN 81 MG CHEWABLE TABLET 81 MG: 81 TABLET CHEWABLE at 10:04

## 2022-10-24 RX ADMIN — MORPHINE SULFATE 1 MG: 2 INJECTION, SOLUTION INTRAMUSCULAR; INTRAVENOUS at 03:26

## 2022-10-24 RX ADMIN — SODIUM CHLORIDE, PRESERVATIVE FREE 40 MG: 5 INJECTION INTRAVENOUS at 03:15

## 2022-10-24 RX ADMIN — CHLORHEXIDINE GLUCONATE 15 ML: 1.2 RINSE BUCCAL at 22:00

## 2022-10-24 RX ADMIN — HYDRALAZINE HYDROCHLORIDE 50 MG: 50 TABLET, FILM COATED ORAL at 05:35

## 2022-10-24 RX ADMIN — CARBIDOPA AND LEVODOPA 1 TABLET: 25; 100 TABLET ORAL at 22:00

## 2022-10-24 RX ADMIN — MICONAZOLE NITRATE: 2 OINTMENT TOPICAL at 22:15

## 2022-10-24 RX ADMIN — PETROLATUM: 420 OINTMENT TOPICAL at 10:05

## 2022-10-24 RX ADMIN — GABAPENTIN 600 MG: 300 CAPSULE ORAL at 22:00

## 2022-10-24 RX ADMIN — HYDRALAZINE HYDROCHLORIDE 50 MG: 50 TABLET, FILM COATED ORAL at 22:00

## 2022-10-24 RX ADMIN — METOPROLOL SUCCINATE 50 MG: 50 TABLET, EXTENDED RELEASE ORAL at 10:04

## 2022-10-24 ASSESSMENT — PAIN SCALES - GENERAL
PAINLEVEL_OUTOF10: 9
PAINLEVEL_OUTOF10: 5

## 2022-10-24 ASSESSMENT — PAIN DESCRIPTION - ORIENTATION: ORIENTATION: RIGHT

## 2022-10-24 ASSESSMENT — PAIN DESCRIPTION - FREQUENCY: FREQUENCY: INTERMITTENT

## 2022-10-24 ASSESSMENT — PAIN DESCRIPTION - ONSET: ONSET: GRADUAL

## 2022-10-24 ASSESSMENT — PAIN DESCRIPTION - PAIN TYPE: TYPE: ACUTE PAIN

## 2022-10-24 ASSESSMENT — PAIN DESCRIPTION - LOCATION: LOCATION: FOOT

## 2022-10-24 ASSESSMENT — PAIN DESCRIPTION - DESCRIPTORS: DESCRIPTORS: ACHING;DISCOMFORT

## 2022-10-24 NOTE — PROGRESS NOTES
Pt seen for dysphagia x2 . Results and recommendations of swallow study reviewed with patient. Tolerating current diet without noted difficulty. Laryngeal elevation and tongue base retraction exercises completed poorly. Pt was instructed to continue exercises independently throughout the day.   Will continue

## 2022-10-24 NOTE — PROGRESS NOTES
Hospitalist Progress Note      Synopsis: Patient admitted as a transfer from [facility] for neurological evaluation after presenting with right-sided weakness and aphasia. He was found to have severe left ICA stenosis. He underwent a left ICA angioplasty with stent placement in IR on . He was subsequently admitted to neuro ICU and initiated on DAPT. His course was complicated by her GI bleed in which she required 15 units of PRBCs. EGD done on 10/30 with no evidence of upper GI bleed. Colonoscopy on 10/9 revealed a large amount of blood the CT of the abdomen did not show any localization of hemorrhage. Bleeding scan revealed bleeding around the splenic flexure. Patient RRT'd on 10/10 due to hypotension and hemorrhagic shock and was transferred back to the ICU. He was taken off of DAPT. He is currently being treated with aspirin monotherapy. Per general surgery okay to resume Brilinta if hemoglobin remained stable x7 days. He is also with resolving LARY for which nephrology is following. He is stable for discharge to [facility] rehab but is awaiting precert. Hospital day 27     Subjective:  Stable overnight. No issues reported. Patient seen and examined. No complaints. Anxious for discharge. Records reviewed. Temp (24hrs), Av.7 °F (36.5 °C), Min:97.5 °F (36.4 °C), Max:97.8 °F (36.6 °C)    DIET: ADULT DIET; Dysphagia - Minced and Moist; 4 carb choices (60 gm/meal); Less than 60 gm  ADULT ORAL NUTRITION SUPPLEMENT; Breakfast, Dinner; Wound Healing Oral Supplement  ADULT ORAL NUTRITION SUPPLEMENT; Lunch, Dinner; Fortified Pudding Oral Supplement  CODE: Full Code    Intake/Output Summary (Last 24 hours) at 10/24/2022 1139  Last data filed at 10/24/2022 0325  Gross per 24 hour   Intake 120 ml   Output 1100 ml   Net -980 ml       Review of Systems:     All bolded are positive; please see HPI  General:  Fever, chills, diaphoresis, fatigue, malaise, night sweats, mg Oral Daily    amLODIPine  10 mg Oral Daily    metoprolol succinate  50 mg Oral Daily    [Held by provider] QUEtiapine  50 mg Oral BID    miconazole nitrate   Topical BID    [Held by provider] gabapentin  600 mg Oral 4x daily    white petrolatum   Topical BID    sodium chloride flush  5-40 mL IntraVENous 2 times per day    miconazole   Topical BID    [Held by provider] epoetin sharath-epbx  6,000 Units SubCUTAneous Once per day on Mon Wed Fri    hydrALAZINE  50 mg Oral 3 times per day    pantoprazole (PROTONIX) 40 mg injection  40 mg IntraVENous Q12H    sennosides  5 mL Oral Nightly    [Held by provider] carbidopa-levodopa  1 tablet Per NG tube TID    ipratropium-albuterol  1 ampule Inhalation Q4H WA    collagenase   Topical Q MWF    [Held by provider] benzonatate  100 mg Oral TID    [Held by provider] atorvastatin  80 mg Oral Nightly     PRN Meds: sodium chloride flush, morphine, hydrALAZINE, miconazole nitrate **AND** miconazole nitrate, sodium chloride, labetalol, glucose, dextrose bolus **OR** dextrose bolus, glucagon (rDNA), dextrose    Labs:     Recent Labs     10/22/22  0520 10/23/22  0527 10/24/22  0610   WBC 5.3 4.9 5.6   HGB 8.9* 9.3* 9.1*   HCT 27.9* 29.4* 28.3*    310 294       Recent Labs     10/22/22  0520 10/23/22  0527 10/24/22  0610    142 136   K 3.0*  3.0* 3.6  3.6 3.4*  3.4*   CL 93* 95* 93*   CO2 33* 33* 29   BUN 19 24* 23   CREATININE 1.7* 1.6* 1.6*   CALCIUM 8.9 9.0 9.0   PHOS 2.9 3.6 3.7       No results for input(s): PROT, ALB, ALKPHOS, ALT, AST, BILITOT, AMYLASE, LIPASE in the last 72 hours. No results for input(s): INR in the last 72 hours. No results for input(s): Laban [place] in the last 72 hours.     Chronic labs:    Lab Results   Component Value Date    PSA 1.13 09/30/2022    INR 1.5 10/18/2022    LABA1C 6.4 (H) 09/28/2022       Radiology: REVIEWED DAILY    Assessment:  Acute CVA secondary to symptomatic left ICA stenosis status post angioplasty and stent placement  Acute lower GI bleeding  Acute blood loss anemia  Acute hypoxic respiratory failure-resolved  LARY on CKD III-Baseline creatinine 1.7-1.9 - RESOLVED  Dysphagia  Uncontrolled hypertension  Hypokalemia  HFpEF-EF 60 to 65%  Decompensated heart failure-resolved  Chronic wound right foot  MATT on CPAP  Urinary retention    Plan:  Neurology s/o to f/u OP  GS s/o with recs to resume Plavix tomorrow if H&H remains stable  Nephrology following  Monitor renal fxn, I&O  Modified diet per SLP recs  To f/u with podiatry OP  Mahan to remain in place with OP f/u with urology  Monitor and replace electrolytes PRN  Unhold meds as patient is taking PO    DVT Prophylaxis [] Lovenox  []  Heparin [] DOAC [x] PCDs [] Ambulation    GI Prophylaxis [x] PPI  [] H2 Blocker   [] Carafate  [] Diet/Tube Feeds   Level of care [x] Med/Surg  [] Intermediate  []  ICU   Diet ADULT DIET; Dysphagia - Minced and Moist; 4 carb choices (60 gm/meal); Less than 60 gm  ADULT ORAL NUTRITION SUPPLEMENT; Breakfast, Dinner; Wound Healing Oral Supplement  ADULT ORAL NUTRITION SUPPLEMENT; Lunch, Dinner; Fortified Pudding Oral Supplement    Family contact []  N/A    [] At bedside  [] Phone call     Discharge Plan: Stable for discharge to [facility] once precert obtained.     +++++++++++++++++++++++++++++++++++++++++++++++++  TON Lopez/ [address]  +++++++++++++++++++++++++++++++++++++++++++++++++  NOTE: This report was transcribed using voice recognition software. Every effort was made to ensure accuracy; however, inadvertent computerized transcription errors may be present.

## 2022-10-24 NOTE — FLOWSHEET NOTE
Inpatient Wound Care(follow up) 8504b    Admit Date: 9/27/2022  5:45 PM    Reason for consult:  wound vac management  Assessment of new wounds    Findings:    10/24/22 1001   Wound 09/28/22 Foot Right;Lateral   Date First Assessed/Time First Assessed: 09/28/22 0944   Present on Hospital Admission: No  Location: Foot  Wound Location Orientation: Right;Lateral   Wound Image    Dressing Status New dressing applied   Wound Cleansed Cleansed with saline   Dressing/Treatment Negative pressure wound therapy; Pharmaceutical agent (see MAR)   Wound Assessment Pink/red   Drainage Amount None   Emily-wound Assessment   (gummy from exoderm)   Wound 10/24/22 Foot Right;Lateral   Date First Assessed: 10/24/22   Present on Hospital Admission: No  Location: Foot  Wound Location Orientation: Right;Lateral   Wound Image    Wound Etiology Arterial   Dressing/Treatment Roll gauze   Wound Length (cm) 3 cm   Wound Width (cm) 2 cm   Wound Depth (cm) 0.1 cm   Wound Surface Area (cm^2) 6 cm^2   Wound Volume (cm^3) 0.6 cm^3   Wound Assessment Pink/red;Purple/maroon   Drainage Amount None   Emily-wound Assessment Dry/flaky   Wound 10/24/22 Right web space between 4/5 toe   Date First Assessed: 10/24/22   Present on Hospital Admission: No  Wound Location Orientation: Right  Wound Description (Comments): web space between 4/5 toe   Wound Image    Dressing Status New dressing applied   Wound Cleansed Cleansed with saline   Dressing/Treatment Alginate;Roll gauze   Wound Length (cm) 0.8 cm   Wound Width (cm) 0.8 cm   Wound Depth (cm) 0.1 cm   Wound Surface Area (cm^2) 0.64 cm^2   Wound Volume (cm^3) 0.064 cm^3   Drainage Amount None   Emily-wound Assessment Dry/flaky   Wound 10/24/22 Right 3rd/ 4th web space   Date First Assessed: 10/24/22   Present on Hospital Admission: No  Wound Location Orientation: Right  Wound Description (Comments): 3rd/ 4th web space   Wound Image    Dressing Status New dressing applied   Wound Cleansed Cleansed with saline Dressing/Treatment Alginate;Roll gauze   Wound Length (cm) 1 cm   Wound Width (cm) 1 cm   Wound Depth (cm) 0.1 cm   Wound Surface Area (cm^2) 1 cm^2   Wound Volume (cm^3) 0.1 cm^3   Drainage Amount None   Emily-wound Assessment Dry/flaky   Wound 10/24/22 Heel Right;Plantar   Date First Assessed: 10/24/22   Present on Hospital Admission: No  Location: Heel  Wound Location Orientation: Right;Plantar   Wound Image    Dressing/Treatment Roll gauze   Wound Length (cm) 2 cm   Wound Width (cm) 2 cm   Wound Depth (cm) 0.1 cm   Wound Surface Area (cm^2) 4 cm^2   Wound Volume (cm^3) 0.4 cm^3   Wound Assessment Purple/maroon;Dry   Drainage Amount None   Emily-wound Assessment Dry/flaky   Wound 10/24/22 Foot Right;Medial   Date First Assessed: 10/24/22   Present on Hospital Admission: No  Location: Foot  Wound Location Orientation: Right;Medial   Wound Image    Wound Etiology Arterial   Dressing/Treatment Roll gauze   Wound Length (cm) 4 cm   Wound Width (cm) 5 cm   Wound Depth (cm) 0.1 cm   Wound Surface Area (cm^2) 20 cm^2   Wound Volume (cm^3) 2 cm^3   Wound Assessment Purple/maroon   Drainage Amount None   Emily-wound Assessment Dry/flaky   Wound 10/24/22 Foot Right;Dorsal   Date First Assessed: 10/24/22   Present on Hospital Admission: No  Location: Foot  Wound Location Orientation: Right;Dorsal   Wound Image    Wound Etiology Arterial   Dressing/Treatment Roll gauze   Wound Length (cm) 1.4 cm   Wound Width (cm) 1.2 cm   Wound Depth (cm) 0.1 cm   Wound Surface Area (cm^2) 1.68 cm^2   Wound Volume (cm^3) 0.168 cm^3   Wound Assessment Purple/maroon   Drainage Amount None   Emily-wound Assessment Dry/flaky   Negative Pressure Wound Therapy Foot Right;Plantar;Lateral   Placement Date/Time: 09/28/22 1040   Location: Foot  Wound Location Orientation: Right;Plantar;Lateral   Dressing Type Black Foam   Number of pieces used 3   Cycle Continuous   Target Pressure (mmHg) 125   Canister changed?  No   Drainage Amount None     Called  Andreas to assess new wounds on foot and toes that were not noted last week    **Informed Consent**    photos taken of wounds and inserted into their chart as part of their permanent medical record for purposes of documentation, treatment management and/or medical review. All Images taken on 10/24/22 of patient name: Rachael Odonnell were transmitted and stored on secured RawFlow located within [facility] by a registered [employer] Mobile Application Device.     Plan:  Orders reviewed and updated  Will follow    Mukesh Larry RN 10/24/2022 2:10 PM

## 2022-10-24 NOTE — PROGRESS NOTES
Department of Podiatry   Progress Note    Patient  seen and evaluated at bedside this morning. No acute events overnight. Denies n/v//d//f/c. Wound vac remains intact with no excessive drainage and running continuous pressure. Patient has developed new maceration and wounds to the webspaces 3,4 right side. Patient also has bruising to the foot, denies recent trauma.     Past Medical History:            Diagnosis Date    Chronic back pain     CKD (chronic kidney disease)     CVA (cerebral vascular accident) ([facility] [address]     CVA (cerebral vascular accident) ([facility] [address]     DM (diabetes mellitus) ([facility] [address]     Major depression     MI (myocardial infarction) ([facility] [address]     MATT (obstructive sleep apnea)     Parkinson disease ([facility] [address]     PVD (peripheral vascular disease) ([facility] [address]     Seizure ([facility] [address]        Past Surgical History:        Procedure Laterality Date    COLONOSCOPY N/A 10/9/2022    COLONOSCOPY DIAGNOSTIC performed by Erica Arellano MD at [address] [place] [address],[address] N/A 10/18/2022    COLONOSCOPY POLYPECTOMY SNARE/COLD BIOPSY performed by Yrn Rick MD at [place] [address]      IR CAROTID STENT UNI W PROTECTION  9/27/2022    IR CAROTID STENT UNI W PROTECTION 9/27/2022 Caio Lee MD SE SPECIAL PROCEDURES    UPPER GASTROINTESTINAL ENDOSCOPY N/A 10/3/2022    EGD DIAGNOSTIC ONLY performed by Rachel Chambers MD at [address] [place] [address]       Medications Prior to Admission:    Medications Prior to Admission: apixaban (ELIQUIS) 5 MG TABS tablet, Take 5 mg by mouth 2 times daily  aspirin 81 MG chewable tablet, Take 81 mg by mouth daily  carbidopa-levodopa (SINEMET)  MG per tablet, Take 1 tablet by mouth 3 times daily  carvedilol (COREG) 25 MG tablet, Take 25 mg by mouth 2 times daily (with meals)  cetirizine (ZYRTEC) 10 MG tablet, Take 10 mg by mouth daily  clopidogrel (PLAVIX) 75 MG tablet, Take 75 mg by mouth daily  diphenhydrAMINE (BENADRYL) 25 MG capsule, Take 25 mg by mouth every 6 hours  ezetimibe (ZETIA) 10 MG tablet, Take 10 mg by mouth daily  fluticasone (FLONASE) 50 MCG/ACT nasal spray, 2 sprays by Each Nostril route daily  gabapentin (NEURONTIN) 600 MG tablet, Take 600 mg by mouth 4 times daily. HYDROcodone-acetaminophen (NORCO) 5-325 MG per tablet, Take 1 tablet by mouth 2 times daily. Insulin Glargine, 2 Unit Dial, (TOUJEO MAX SOLOSTAR) 300 UNIT/ML SOPN, Inject 66 Units into the skin daily  piperacillin-tazobactam (ZOSYN) 3-0.375 GM per 50ML IVPB extended infusion, Infuse 4.5 mg intravenously in the morning and 4.5 mg at noon and 4.5 mg in the evening. acetaminophen (TYLENOL) 325 MG tablet, Take 650 mg by mouth every 6 hours as needed for Pain  amLODIPine (NORVASC) 5 MG tablet, Take 5 mg by mouth daily  benzonatate (TESSALON) 100 MG capsule, Take 100 mg by mouth 3 times daily  cloNIDine (CATAPRES) 0.1 MG tablet, Take 0.1 mg by mouth in the morning and 0.1 mg in the evening. hydrALAZINE (APRESOLINE) 100 MG tablet, Take 100 mg by mouth 3 times daily    Allergies:  Codeine    Social History:   TOBACCO:   has no history on file for tobacco use. ETOH:   has no history on file for alcohol use. DRUGS:   Social History     Substance and Sexual Activity   Drug Use Not on file       Family History:   History reviewed. No pertinent family history. REVIEW OF SYSTEMS:    All pertinent positives and negatives as noted in HPI       LOWER EXTREMITY EXAMINATION     VASCULAR:  DP and PT pulses are non palpable. CFT < 5 seconds B/L. Warm to warm from the tibial tuberosity to the distal aspect of the digits dorsally. NEUROLOGIC:  Protective sensation is diminished but grossly intact    DERM:  Right foot lateral plantar wound measuring approx 6cm in diameter. Wound has a notably hyperkeratotic rim. No erythema, serosanguinous drainage. Webspaces 3,4 right side appear macerated, broken down with purulence, malodor.     MUSCULOSKELETAL: deferred                CONSULTS:  IP CONSULT TO CRITICAL CARE  IP CONSULT TO DIETITIAN  IP CONSULT TO CARDIOLOGY  IP CONSULT TO PODIATRY  IP CONSULT TO PODIATRY  IP CONSULT TO UROLOGY  IP CONSULT TO NEPHROLOGY  IP CONSULT TO GENERAL SURGERY  IP CONSULT TO INFECTIOUS DISEASES  IP CONSULT TO GI  IP CONSULT TO GENERAL SURGERY  IP CONSULT TO GENERAL SURGERY  IP CONSULT TO PALLIATIVE CARE  IP CONSULT TO DIETITIAN    MEDICATION:  Scheduled Meds:   insulin lispro  0-8 Units SubCUTAneous TID WC    insulin lispro  0-4 Units SubCUTAneous Nightly    bumetanide  2 mg Oral Daily    cloNIDine  0.2 mg Oral TID    chlorhexidine  15 mL Mouth/Throat BID    aspirin  81 mg Oral Daily    amLODIPine  10 mg Oral Daily    metoprolol succinate  50 mg Oral Daily    [Held by provider] QUEtiapine  50 mg Oral BID    miconazole nitrate   Topical BID    [Held by provider] gabapentin  600 mg Oral 4x daily    white petrolatum   Topical BID    sodium chloride flush  5-40 mL IntraVENous 2 times per day    miconazole   Topical BID    [Held by provider] epoetin sharath-epbx  6,000 Units SubCUTAneous Once per day on Mon Wed Fri    hydrALAZINE  50 mg Oral 3 times per day    pantoprazole (PROTONIX) 40 mg injection  40 mg IntraVENous Q12H    sennosides  5 mL Oral Nightly    [Held by provider] carbidopa-levodopa  1 tablet Per NG tube TID    ipratropium-albuterol  1 ampule Inhalation Q4H WA    collagenase   Topical Q MWF    [Held by provider] benzonatate  100 mg Oral TID    [Held by provider] atorvastatin  80 mg Oral Nightly     Continuous Infusions:   sodium chloride      dextrose       PRN Meds:.sodium chloride flush, morphine, hydrALAZINE, miconazole nitrate **AND** miconazole nitrate, sodium chloride, labetalol, glucose, dextrose bolus **OR** dextrose bolus, glucagon (rDNA), dextrose    RADIOLOGY:  XR CHEST PORTABLE   Final Result   No interval change         XR CHEST PORTABLE   Final Result   1. No interval change in the bilateral perihilar and lower lobe airspace   disease.          XR CHEST PORTABLE   Final Result   Stable chest series. Persistent retrocardiac and bibasilar opacities and   bilateral faint interstitial opacities. Small pleural effusions. XR CHEST PORTABLE   Final Result   Improved aeration of the lungs bilaterally with persistent hazy opacities at   both lung bases. A small, loculated right pleural effusion is decreased in   size from yesterday. Fluoroscopy modified barium swallow with video   Final Result   Mildly impaired swallowing mechanism evident by swallowing delay and followed   by laryngeal penetration with thin liquid barium when using a straw. No   barium aspiration. Please see separate speech pathology report for full discussion of findings   and recommendations. XR CHEST PORTABLE   Final Result   Findings for volume overload as observed the in October 19. XR CHEST PORTABLE   Final Result   The enteric tube has been removed, otherwise stable appearance of the chest   compared to 10/18/2022. XR CHEST PORTABLE   Final Result   No interval change         US DUP LOWER EXTREMITIES BILATERAL VENOUS   Final Result   Within the visualized vessels there is no evidence for deep venous   thrombosis               XR CHEST PORTABLE   Final Result   Worsening congestive heart failure suspected         XR CHEST PORTABLE   Final Result   Very minimal improvement seen in the aeration of the upper lung fields with   stable increased markings seen within the mid and lower lung fields   bilaterally with small bilateral pleural effusions. Heart remains enlarged. XR CHEST PORTABLE   Final Result   New right pleural effusion. XR CHEST PORTABLE   Final Result   Stable bilateral pulmonary infiltrates. XR ABDOMEN (KUB) (SINGLE AP VIEW)   Final Result   Catheter is in the stomach. XR ABDOMEN FOR NG/OG/NE TUBE PLACEMENT   Final Result   1. Gastric catheter is coiled in the pharynx and upper esophagus and should   be withdrawn and reinserted.   Follow-up imaging recommended. XR CHEST PORTABLE   Final Result   Bilateral airspace disease with interval progression on the right. NM GI BLOOD LOSS   Final Result   No evidence of active GI bleeding during acquisition. XR CHEST PORTABLE   Final Result   Unchanged bilateral chest opacities with right pleural effusion evident. See   above. XR CHEST PORTABLE   Final Result   1. Persistent bilateral patchy parenchymal densities concerning for pneumonia   and or atelectasis   2. Persistent small bilateral pleural effusions, left greater than right   3. Cardiomegaly, stable         XR CHEST PORTABLE   Final Result   No significant change compared to the prior exam with cardiomegaly, bilateral   airspace opacities and bilateral pleural effusions seen most suggestive of   congestive heart failure, but superimposed pneumonia not excluded. US DUP ABD PEL RETRO SCROT COMPLETE   Final Result   1. No evidence of testicular torsion      2. Severe scrotal skin thickening. 3.  Trace hydroceles. 4.  No evidence of bowel containing inguinal hernia. US SCROTUM AND TESTICLES   Final Result   1. No evidence of testicular torsion      2. Severe scrotal skin thickening. 3.  Trace hydroceles. 4.  No evidence of bowel containing inguinal hernia. XR CHEST PORTABLE   Final Result   1. No significant interval change in the appearance of the chest.      2.  Bilateral pleural effusions and areas of atelectasis or multifocal   pneumonia. CTA ABDOMEN PELVIS W CONTRAST   Final Result   Mixed densities throughout the colonic segments including in the ascending   colon and rectum could represent mixed densities of blood products although   no focal area of arterial enhancement or extravasation/blushing is observed   to localize acute hemorrhage. No evidence for perforation or abscess. No   pneumatosis intestinalis or portal venous gas evident.       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 No adjacent hematoma with only minimal   adjacent stranding. Bilateral pleural effusions moderate to large right and moderate left   effusions with adjacent atelectasis. Anasarca. XR CHEST PORTABLE   Final Result   Bilateral pleural effusions with bibasilar patchy infiltrates and mild   cardiomegaly. NM GI BLOOD LOSS   Final Result   Active GI bleeding identified during acquisition in the splenic flexure with   peristalsis identified into the proximal descending colon. RECOMMENDATIONS:   Unavailable         CT ABDOMEN PELVIS WO CONTRAST   Final Result   CHEST:      No evidence of neoplastic disease, allowing for limitations of noncontrast   technique. Moderate bilateral pleural effusions. Nonemergent incidental findings as above. ABDOMEN/PELVIS:      No evidence of neoplastic disease, allowing for limitations of noncontrast   technique. Bladder wall thickening is nonspecific given under distension; recommend   correlation urinalysis to evaluate for UTI. Nonemergent incidental findings as above. CT CHEST WO CONTRAST   Final Result   CHEST:      No evidence of neoplastic disease, allowing for limitations of noncontrast   technique. Moderate bilateral pleural effusions. Nonemergent incidental findings as above. ABDOMEN/PELVIS:      No evidence of neoplastic disease, allowing for limitations of noncontrast   technique. Bladder wall thickening is nonspecific given under distension; recommend   correlation urinalysis to evaluate for UTI. Nonemergent incidental findings as above. FL MODIFIED BARIUM SWALLOW W VIDEO   Final Result   1.   Mildly impaired swallowing mechanism with swallowing delay and followed   by laryngeal penetration with thin liquid barium when using a straw. No   barium aspiration      2. Please see separate speech pathology report for full discussion of   findings and recommendations. RECOMMENDATIONS:   Unavailable         XR ABDOMEN (KUB) (SINGLE AP VIEW)   Final Result   Somewhat greater than average quantity of retained fecal material thin the   lower GI tract suggesting dysfunction with evacuation. XR CHEST PORTABLE   Final Result   1. Atherosclerotic disease and mild cardiomegaly. 2.  Persistent but improved opacities in the bilateral lungs most pronounced   in the mid and lower lungs. There appears to be a small left and trace right   pleural effusion         XR FOOT RIGHT (2 VIEWS)   Final Result   No evidence of calcaneal osteomyelitis. CT HEAD WO CONTRAST   Final Result   Parenchymal volume loss and chronic microvascular ischemic changes. No acute intracranial abnormality. Ethmoid and sphenoid sinusitis. Right mastoid effusion. US RETROPERITONEAL COMPLETE   Final Result   1. Normal appearance of the bilateral kidneys. No hydronephrosis. 2.  A Mahan catheter is decompressing the bladder. XR CHEST PORTABLE   Final Result   Worsening infiltrate and or atelectasis on the left, stable on the right with   suspected layering pleural effusion. XR CHEST PORTABLE   Final Result   Unchanged bilateral atelectasis and or infiltrate as well as small right   pleural effusion. MRI BRAIN WO CONTRAST   Final Result   There are 2 small regions of petechial infarcts one in the left   posterior frontal lobe and a second in the left parietal lobe not   exceeding 3 mm. There is otherwise extensive small vessel ischemic   changes         XR CHEST PORTABLE   Final Result   1. Multifocal bilateral airspace disease more prominent within the lower   lobes. The airspace disease is unchanged when compared with the prior study.          CT HEAD WO CONTRAST   Final Result   Diffuse atrophy likely age related   Findings compatible with small vessel ischemic changes. XR CHEST PORTABLE   Final Result   1. Pulmonary opacities present favoring edema and atelectasis, also small   pleural effusions, but nonspecific with some additional considerations noted   above   2. Support devices present as described above   3. Heart size appears borderline enlarged         XR ABDOMEN FOR NG/OG/NE TUBE PLACEMENT   Final Result   NG/OG is in the stomach. IR CAROTID STENT W PROTECTION   Final Result   1. Angiogram demonstrates successful placement of a carotid stent ,   post procedure images demonstrate a widely patent stent and internal   carotid         CT HEAD WO CONTRAST   Final Result   Diffuse atrophy likely age related   Findings compatible with small vessel ischemic changes. Findings were called at the time of dictation         CT BRAIN PERFUSION   Final Result      No significant ischemic penumbra identified      This study was analyzed by the [employer]. ai algorithm. CTA NECK W CONTRAST   Final Result   1. Estimated stenosis of the proximal right and left internal carotid   artery by NASCET criteria is greater than 90% on the left   2. Severe atherosclerotic disease . 3. No large vessel occlusion identified            This study was analyzed by the [employer]. ai algorithm. CTA HEAD W CONTRAST   Final Result   1. Estimated stenosis of the proximal right and left internal carotid   artery by NASCET criteria is greater than 90% on the left   2. Severe atherosclerotic disease . 3. No large vessel occlusion identified            This study was analyzed by the [employer]. ai algorithm.                XR CHEST PORTABLE    (Results Pending)       Vitals:    BP (!) 158/63   Pulse 79   Temp 97.8 °F (36.6 °C) (Temporal)   Resp 18   Ht 5' 8\" (1.727 m)   Wt 195 lb (88.5 kg)   SpO2 97%   BMI 29.65 kg/m²     LABS:   Recent Labs     10/23/22  0527 10/24/22  0610   WBC 4.9 5.6   HGB 9.3* 9.1* HCT 29.4* 28.3*    294     Recent Labs     10/24/22  0610      K 3.4*  3.4*   CL 93*   CO2 29   PHOS 3.7   BUN 23   CREATININE 1.6*     No results for input(s): PROT, INR, APTT in the last 72 hours. ASSESSMENTS:   1. Right foot wound diabetic ulcer  2. DM  3. Pain right foot  4. Wounds of webspaces 3,4 right  Acute cerebrovascular accident (CVA) ([facility] [address]       PLAN:  -Patient examined and evaluated at bedside. All pertinent labs, charts, and imaging reviewed prior to encounter   -Culture: Corynebacteria  -Continue Wound vac changes to [facility]. -X rays: No acute osseous abnormalities to foot   -Venous studies: No evidence of DVT  - Webspaces 3,4 were dressed with betadine, dsd. To use linda going forward.  -Will continue conservative care for now.    -Patient discussed with Dr. Silvia Lee  -Continue to offload

## 2022-10-24 NOTE — PROGRESS NOTES
[facility]  Nephrology Progress Note    Patient's Name: Amanda Pascal    History of Present Illness from 9/30 consult Note:    Bradly Connor is a 74 y.o. male history of CAD s/p MI, hypertension, chronic kidney disease stage IIIa (baseline creatinine of recent 1.7-1.9) he is followed by our group with Dr. Wong Booker. He initially was admitted to [facility] with right lower extremity wound. .  Patient subsequently developed aphasia noted to facial droop associated with right-sided weakness. He was transferred to [address] [place] [address] for further management. Patient was admitted told the neuro ICU. He required intubation with mechanical ventilation. CT of the head and neck demonstrated bilateral carotid stenosis with the left ICA being dominant. IR performed angiogram with angioplasty of the left ICA. Laboratory data showed progressive increase in his serum creatinine to currently 2.9 mg/dL. Hemoglobin was noted to be 5.6. He has received at least 2 units of packed cells but with further drop in his hemoglobin with requirement for additional transfusion. He had an episode of urinary retention with gross hematuria Mahan catheter was reinserted and the hematuria subsequently cleared. Renal is consulted for LARY. \"    Subjective:    10/24: Patient was seen and examined. He reports that he feels great. He denies any chest pain or shortness of breath. He denies any abdominal pain or nausea.     PMH:    Past Medical History:   Diagnosis Date    Chronic back pain     CKD (chronic kidney disease)     CVA (cerebral vascular accident) ([address]     CVA (cerebral vascular accident) ([address]     DM (diabetes mellitus) ([address]     Major depression     MI (myocardial infarction) ([address]     MATT (obstructive sleep apnea)     Parkinson disease ([address]     PVD (peripheral vascular disease) ([address]     Seizure ([address]        Patient Active Problem List   Diagnosis    Acute cerebrovascular accident (CVA) ([address]    Acute respiratory failure with hypoxia ([facility]    Stenosis of left carotid artery    Hypoalbuminemia    Electrolyte imbalance    Hypernatremia    Anemia    GI bleeding       Diet:    ADULT DIET; Dysphagia - Minced and Moist; 4 carb choices (60 gm/meal); Less than 60 gm  ADULT ORAL NUTRITION SUPPLEMENT; Breakfast, Dinner; Wound Healing Oral Supplement  ADULT ORAL NUTRITION SUPPLEMENT; Lunch, Dinner; Fortified Pudding Oral Supplement    Meds:     insulin lispro  0-8 Units SubCUTAneous TID WC    insulin lispro  0-4 Units SubCUTAneous Nightly    bumetanide  2 mg Oral Daily    cloNIDine  0.2 mg Oral TID    chlorhexidine  15 mL Mouth/Throat BID    aspirin  81 mg Oral Daily    amLODIPine  10 mg Oral Daily    metoprolol succinate  50 mg Oral Daily    [Held by provider] QUEtiapine  50 mg Oral BID    miconazole nitrate   Topical BID    [Held by provider] gabapentin  600 mg Oral 4x daily    white petrolatum   Topical BID    sodium chloride flush  5-40 mL IntraVENous 2 times per day    miconazole   Topical BID    [Held by provider] epoetin sharath-epbx  6,000 Units SubCUTAneous Once per day on Mon Wed Fri    hydrALAZINE  50 mg Oral 3 times per day    pantoprazole (PROTONIX) 40 mg injection  40 mg IntraVENous Q12H    sennosides  5 mL Oral Nightly    [Held by provider] carbidopa-levodopa  1 tablet Per NG tube TID    ipratropium-albuterol  1 ampule Inhalation Q4H WA    collagenase   Topical Q MWF    [Held by provider] benzonatate  100 mg Oral TID    [Held by provider] atorvastatin  80 mg Oral Nightly        sodium chloride      dextrose         Meds prn:     sodium chloride flush, morphine, hydrALAZINE, miconazole nitrate **AND** miconazole nitrate, sodium chloride, labetalol, glucose, dextrose bolus **OR** dextrose bolus, glucagon (rDNA), dextrose    Meds prior to admission:     No current facility-administered medications on file prior to encounter.      Current Outpatient Medications on File Prior to Encounter   Medication Sig Dispense Refill    apixaban (ELIQUIS) 5 MG TABS tablet Take 5 mg by mouth 2 times daily      aspirin 81 MG chewable tablet Take 81 mg by mouth daily      carbidopa-levodopa (SINEMET)  MG per tablet Take 1 tablet by mouth 3 times daily      carvedilol (COREG) 25 MG tablet Take 25 mg by mouth 2 times daily (with meals)      cetirizine (ZYRTEC) 10 MG tablet Take 10 mg by mouth daily      clopidogrel (PLAVIX) 75 MG tablet Take 75 mg by mouth daily      diphenhydrAMINE (BENADRYL) 25 MG capsule Take 25 mg by mouth every 6 hours      ezetimibe (ZETIA) 10 MG tablet Take 10 mg by mouth daily      fluticasone (FLONASE) 50 MCG/ACT nasal spray 2 sprays by Each Nostril route daily      gabapentin (NEURONTIN) 600 MG tablet Take 600 mg by mouth 4 times daily. HYDROcodone-acetaminophen (NORCO) 5-325 MG per tablet Take 1 tablet by mouth 2 times daily. Insulin Glargine, 2 Unit Dial, (TOUJEO MAX SOLOSTAR) 300 UNIT/ML SOPN Inject 66 Units into the skin daily      piperacillin-tazobactam (ZOSYN) 3-0.375 GM per 50ML IVPB extended infusion Infuse 4.5 mg intravenously in the morning and 4.5 mg at noon and 4.5 mg in the evening. acetaminophen (TYLENOL) 325 MG tablet Take 650 mg by mouth every 6 hours as needed for Pain      amLODIPine (NORVASC) 5 MG tablet Take 5 mg by mouth daily      benzonatate (TESSALON) 100 MG capsule Take 100 mg by mouth 3 times daily      cloNIDine (CATAPRES) 0.1 MG tablet Take 0.1 mg by mouth in the morning and 0.1 mg in the evening. hydrALAZINE (APRESOLINE) 100 MG tablet Take 100 mg by mouth 3 times daily         Allergies:    Codeine    Social History:         Family History:     History reviewed. No pertinent family history.     Physical Exam:      Patient Vitals for the past 24 hrs:   BP Temp Temp src Pulse Resp SpO2 Weight   10/24/22 0815 (!) 158/63 97.8 °F (36.6 °C) Temporal 79 18 97 % --   10/24/22 0533 (!) 144/74 -- -- 76 -- -- --   10/24/22 0356 -- -- -- -- 16 -- --   10/24/22 0326 -- -- -- -- 16 -- -- 10/24/22 0325 -- -- -- 74 -- 96 % --   10/24/22 0252 -- -- -- -- -- -- 195 lb (88.5 kg)   10/23/22 2350 -- -- -- 76 -- 96 % --   10/23/22 2030 136/72 97.5 °F (36.4 °C) Temporal 75 16 97 % --   10/23/22 1400 -- -- -- 74 16 99 % --   10/23/22 1200 -- -- -- -- -- 99 % --           Intake/Output Summary (Last 24 hours) at 10/24/2022 1055  Last data filed at 10/24/2022 0325  Gross per 24 hour   Intake 360 ml   Output 1250 ml   Net -890 ml       General: Awake, no acute distress  Neck: No JVD noted  Lungs: Clear bilaterally upper, diminished to the bases bilaterally. Unlabored  CV: Regular rate and rhythm. No rub  Abd: Soft, nontender, nondistended. Active bowel sounds  Skin: Warm and dry. No rash on exposed extremities  Ext: No edema; RLE dressing in place  Neuro: Awake, answers questions appropriately    Data:    Recent Labs     10/22/22  0520 10/23/22  0527 10/24/22  0610   WBC 5.3 4.9 5.6   HGB 8.9* 9.3* 9.1*   HCT 27.9* 29.4* 28.3*   MCV 87.7 88.8 87.1    310 294         Recent Labs     10/22/22  0520 10/23/22  0527 10/24/22  0610    142 136   K 3.0*  3.0* 3.6  3.6 3.4*  3.4*   CL 93* 95* 93*   CO2 33* 33* 29   CREATININE 1.7* 1.6* 1.6*   BUN 19 24* 23   LABGLOM 44 47 47   GLUCOSE 135* 130* 133*   CALCIUM 8.9 9.0 9.0   PHOS 2.9 3.6 3.7   MG 1.9 1.8 1.7         No results found for: VITD25    No results found for: PTH    No results for input(s): ALT, AST, ALKPHOS, BILITOT, BILIDIR in the last 72 hours.       Recent Labs     10/22/22  0520 10/23/22  0527 10/24/22  0610   LABALBU 3.4* 3.4* 3.1*         Ferritin   Date Value Ref Range Status   10/18/2022 228 ng/mL Final     Comment:     FERRITIN Reference Ranges:  Adult Males   20 - 60 years:    30 - 400 ng/mL  Adult females 16 - 60 years:    15 - 150 ng/mL  Adults greater than 60 years:   no established reference range  Pediatrics:                     no established reference range       Iron   Date Value Ref Range Status   10/18/2022 24 (L) 59 - 158 mcg/dL Final     TIBC   Date Value Ref Range Status   10/18/2022 139 (L) 250 - 450 mcg/dL Final       Vitamin B-12   Date Value Ref Range Status   10/18/2022 753 211 - 946 pg/mL Final       Folate   Date Value Ref Range Status   10/18/2022 10.5 4.8 - 24.2 ng/mL Final       No results found for: VOL, APPEARANCE, COLORU, LABSPEC, LABPH, LEUKBLD, NITRU, GLUCOSEU, KETUA, UROBILINOGEN, KETUA, UROBILINOGEN, BILIRUBINUR, OCBU    Lab Results   Component Value Date/Time    KIRTI 338 10/14/2022 02:10 PM       No components found for: URIC    No results found for: LIPIDPAN    Assessment and Plans: LARY on CKD 3B  Likely in the setting of ACEI use, contrast nephrotoxicity; component of urinary retention  Baseline creatinine 1.7-1.9  Creatinine peaked at 3 on 10/3--> 1.6 today  S/p Bumex drip  Avoid nephrotoxins/NSAIDs-adjust meds for level of renal function  Strict I&O, daily weights  On Bumex 2 mg oral daily  Monitor labs    2. Acute CVA  MRI brain 9/28 2 small regions petechial infarcts 1 in the left posterior frontal lobe and second in the left parietal lobe  S/p IR angioplasty and stent 9/27  Neurology previously following    3. Anemia  Hemoglobin target 10-12  Hemoglobin 9.1 today-below target  S/p EGD 10/3 no evidence of upper GI bleed  S/p colonoscopy 10/9 blood in the right colon  S/p colonoscopy polypectomy, biopsy 10/18  S/p PRBCs  Transfuse hemoglobin<7  Monitor H&H    4.  Hypertensive emergency  BP goal<130/80  BP above goal  Monitor on current regimen    5. Urinary retention  [place]  Urology previously following    6. Respiratory failure  S/p intubation/extubation    7. Foot wound, right  S/p antibiotics  ID previously following  Podiatry following    8. HFpEF  Echo 9/2022 EF 60 to 65%, mild tricuspid regurg, mild mitral regurg, severe concentric left ventricular hypertrophy  UOP 1250 mL last 24 hours, net -24 L  On Bumex 2 mg oral daily  Strict I&O, daily weights  Monitor volume status    9. Hypokalemia  Likely in the setting of diuresis  K+ 3.4 today  S/p potassium supplementation  Supplement as needed  Monitor labs    Gen Rodriguez, APRN - CNP    Patient seen and examined all key components of the physical performed independently , case discussed with NP, all pertinent labs and radiologic tests personally reviewed agree with above.       Fatmata Lund MD

## 2022-10-24 NOTE — CARE COORDINATION
SOCIAL WORK/CASEMANAGEMENT TRANSITION OF CARE PLANNINGDarykacie Paola Berg, [address]):  met with pt this a.m. he said he doesn't have anthem but has C. I called Andria Je his caregiver for the past 7 years to verify it and she said our information was correct. PT and OT saw pt yesterday and I faxed clinicals to the [facility] in [place] to see if they have a bed for pt and can take him. If so I will start precert today. Dr. Jackelin Yin is the pcp at the [facility] with npi # [id number] and the facility npi # [id number]. ERI Otero  10/24/2022  Once the tax id is obtained for the [facility] in [place] adri sellers from  dept will start precert.  ERI Otero  10/24/2022

## 2022-10-25 ENCOUNTER — APPOINTMENT (OUTPATIENT)
Dept: GENERAL RADIOLOGY | Age: 67
DRG: 034 | End: 2022-10-25
Attending: INTERNAL MEDICINE
Payer: MEDICARE

## 2022-10-25 VITALS
DIASTOLIC BLOOD PRESSURE: 56 MMHG | WEIGHT: 195 LBS | RESPIRATION RATE: 16 BRPM | HEIGHT: 68 IN | OXYGEN SATURATION: 96 % | SYSTOLIC BLOOD PRESSURE: 106 MMHG | HEART RATE: 72 BPM | TEMPERATURE: 98.2 F | BODY MASS INDEX: 29.55 KG/M2

## 2022-10-25 LAB
ANION GAP SERPL CALCULATED.3IONS-SCNC: 13 MMOL/L (ref 7–16)
BASOPHILS ABSOLUTE: 0.04 E9/L (ref 0–0.2)
BASOPHILS RELATIVE PERCENT: 0.8 % (ref 0–2)
BUN BLDV-MCNC: 28 MG/DL (ref 6–23)
CALCIUM IONIZED: 1.22 MMOL/L (ref 1.15–1.33)
CALCIUM SERPL-MCNC: 8.9 MG/DL (ref 8.6–10.2)
CHLORIDE BLD-SCNC: 94 MMOL/L (ref 98–107)
CO2: 30 MMOL/L (ref 22–29)
CREAT SERPL-MCNC: 1.6 MG/DL (ref 0.7–1.2)
EOSINOPHILS ABSOLUTE: 0.24 E9/L (ref 0.05–0.5)
EOSINOPHILS RELATIVE PERCENT: 4.8 % (ref 0–6)
GFR SERPL CREATININE-BSD FRML MDRD: 47 ML/MIN/1.73
GLUCOSE BLD-MCNC: 139 MG/DL (ref 74–99)
HCT VFR BLD CALC: 27.9 % (ref 37–54)
HEMOGLOBIN: 9.3 G/DL (ref 12.5–16.5)
IMMATURE GRANULOCYTES #: 0.04 E9/L
IMMATURE GRANULOCYTES %: 0.8 % (ref 0–5)
LYMPHOCYTES ABSOLUTE: 1.4 E9/L (ref 1.5–4)
LYMPHOCYTES RELATIVE PERCENT: 27.9 % (ref 20–42)
MAGNESIUM: 1.6 MG/DL (ref 1.6–2.6)
MCH RBC QN AUTO: 28.8 PG (ref 26–35)
MCHC RBC AUTO-ENTMCNC: 33.3 % (ref 32–34.5)
MCV RBC AUTO: 86.4 FL (ref 80–99.9)
METER GLUCOSE: 125 MG/DL (ref 74–99)
METER GLUCOSE: 176 MG/DL (ref 74–99)
METER GLUCOSE: 232 MG/DL (ref 74–99)
METER GLUCOSE: 269 MG/DL (ref 74–99)
MONOCYTES ABSOLUTE: 0.54 E9/L (ref 0.1–0.95)
MONOCYTES RELATIVE PERCENT: 10.8 % (ref 2–12)
NEUTROPHILS ABSOLUTE: 2.75 E9/L (ref 1.8–7.3)
NEUTROPHILS RELATIVE PERCENT: 54.9 % (ref 43–80)
PDW BLD-RTO: 14.5 FL (ref 11.5–15)
PHOSPHORUS: 3.7 MG/DL (ref 2.5–4.5)
PLATELET # BLD: 278 E9/L (ref 130–450)
PMV BLD AUTO: 10.1 FL (ref 7–12)
POTASSIUM REFLEX MAGNESIUM: 3.5 MMOL/L (ref 3.5–5)
RBC # BLD: 3.23 E12/L (ref 3.8–5.8)
SARS-COV-2, NAAT: NOT DETECTED
SODIUM BLD-SCNC: 137 MMOL/L (ref 132–146)
WBC # BLD: 5 E9/L (ref 4.5–11.5)

## 2022-10-25 PROCEDURE — 87635 SARS-COV-2 COVID-19 AMP PRB: CPT

## 2022-10-25 PROCEDURE — 6370000000 HC RX 637 (ALT 250 FOR IP): Performed by: SURGERY

## 2022-10-25 PROCEDURE — A4216 STERILE WATER/SALINE, 10 ML: HCPCS | Performed by: SURGERY

## 2022-10-25 PROCEDURE — 6370000000 HC RX 637 (ALT 250 FOR IP): Performed by: NURSE PRACTITIONER

## 2022-10-25 PROCEDURE — 84100 ASSAY OF PHOSPHORUS: CPT

## 2022-10-25 PROCEDURE — 97129 THER IVNTJ 1ST 15 MIN: CPT

## 2022-10-25 PROCEDURE — 6360000002 HC RX W HCPCS: Performed by: SURGERY

## 2022-10-25 PROCEDURE — 85025 COMPLETE CBC W/AUTO DIFF WBC: CPT

## 2022-10-25 PROCEDURE — 6370000000 HC RX 637 (ALT 250 FOR IP): Performed by: FAMILY MEDICINE

## 2022-10-25 PROCEDURE — 83735 ASSAY OF MAGNESIUM: CPT

## 2022-10-25 PROCEDURE — 1200000000 HC SEMI PRIVATE

## 2022-10-25 PROCEDURE — 36415 COLL VENOUS BLD VENIPUNCTURE: CPT

## 2022-10-25 PROCEDURE — 82962 GLUCOSE BLOOD TEST: CPT

## 2022-10-25 PROCEDURE — 99232 SBSQ HOSP IP/OBS MODERATE 35: CPT | Performed by: STUDENT IN AN ORGANIZED HEALTH CARE EDUCATION/TRAINING PROGRAM

## 2022-10-25 PROCEDURE — 82330 ASSAY OF CALCIUM: CPT

## 2022-10-25 PROCEDURE — 80048 BASIC METABOLIC PNL TOTAL CA: CPT

## 2022-10-25 PROCEDURE — C9113 INJ PANTOPRAZOLE SODIUM, VIA: HCPCS | Performed by: SURGERY

## 2022-10-25 PROCEDURE — 92526 ORAL FUNCTION THERAPY: CPT

## 2022-10-25 PROCEDURE — 94660 CPAP INITIATION&MGMT: CPT

## 2022-10-25 PROCEDURE — 6370000000 HC RX 637 (ALT 250 FOR IP): Performed by: INTERNAL MEDICINE

## 2022-10-25 PROCEDURE — 71045 X-RAY EXAM CHEST 1 VIEW: CPT

## 2022-10-25 PROCEDURE — 2580000003 HC RX 258: Performed by: SURGERY

## 2022-10-25 PROCEDURE — 94640 AIRWAY INHALATION TREATMENT: CPT

## 2022-10-25 PROCEDURE — 97535 SELF CARE MNGMENT TRAINING: CPT

## 2022-10-25 RX ORDER — CLOPIDOGREL BISULFATE 75 MG/1
75 TABLET ORAL DAILY
Status: DISCONTINUED | OUTPATIENT
Start: 2022-10-25 | End: 2022-10-26 | Stop reason: HOSPADM

## 2022-10-25 RX ADMIN — GABAPENTIN 600 MG: 300 CAPSULE ORAL at 10:10

## 2022-10-25 RX ADMIN — Medication 10 ML: at 22:09

## 2022-10-25 RX ADMIN — GABAPENTIN 600 MG: 300 CAPSULE ORAL at 18:18

## 2022-10-25 RX ADMIN — SODIUM CHLORIDE, PRESERVATIVE FREE 40 MG: 5 INJECTION INTRAVENOUS at 18:18

## 2022-10-25 RX ADMIN — SODIUM CHLORIDE, PRESERVATIVE FREE 40 MG: 5 INJECTION INTRAVENOUS at 06:40

## 2022-10-25 RX ADMIN — AMLODIPINE BESYLATE 10 MG: 10 TABLET ORAL at 10:10

## 2022-10-25 RX ADMIN — QUETIAPINE 50 MG: 25 TABLET ORAL at 10:11

## 2022-10-25 RX ADMIN — PETROLATUM: 420 OINTMENT TOPICAL at 10:16

## 2022-10-25 RX ADMIN — CLONIDINE HYDROCHLORIDE 0.2 MG: 0.2 TABLET ORAL at 10:10

## 2022-10-25 RX ADMIN — CHLORHEXIDINE GLUCONATE 15 ML: 1.2 RINSE BUCCAL at 10:09

## 2022-10-25 RX ADMIN — IPRATROPIUM BROMIDE AND ALBUTEROL SULFATE 1 AMPULE: .5; 2.5 SOLUTION RESPIRATORY (INHALATION) at 11:56

## 2022-10-25 RX ADMIN — METOPROLOL SUCCINATE 50 MG: 50 TABLET, EXTENDED RELEASE ORAL at 10:10

## 2022-10-25 RX ADMIN — GABAPENTIN 600 MG: 300 CAPSULE ORAL at 13:46

## 2022-10-25 RX ADMIN — CLONIDINE HYDROCHLORIDE 0.2 MG: 0.2 TABLET ORAL at 13:46

## 2022-10-25 RX ADMIN — CARBIDOPA AND LEVODOPA 1 TABLET: 25; 100 TABLET ORAL at 10:12

## 2022-10-25 RX ADMIN — PETROLATUM: 420 OINTMENT TOPICAL at 22:12

## 2022-10-25 RX ADMIN — CLOPIDOGREL BISULFATE 75 MG: 75 TABLET ORAL at 10:10

## 2022-10-25 RX ADMIN — ASPIRIN 81 MG CHEWABLE TABLET 81 MG: 81 TABLET CHEWABLE at 10:10

## 2022-10-25 RX ADMIN — IPRATROPIUM BROMIDE AND ALBUTEROL SULFATE 1 AMPULE: .5; 2.5 SOLUTION RESPIRATORY (INHALATION) at 08:16

## 2022-10-25 RX ADMIN — HYDRALAZINE HYDROCHLORIDE 50 MG: 50 TABLET, FILM COATED ORAL at 22:13

## 2022-10-25 RX ADMIN — CARBIDOPA AND LEVODOPA 1 TABLET: 25; 100 TABLET ORAL at 22:10

## 2022-10-25 RX ADMIN — MICONAZOLE NITRATE: 2 OINTMENT TOPICAL at 10:15

## 2022-10-25 RX ADMIN — Medication 10 ML: at 10:09

## 2022-10-25 RX ADMIN — INSULIN LISPRO 2 UNITS: 100 INJECTION, SOLUTION INTRAVENOUS; SUBCUTANEOUS at 18:19

## 2022-10-25 RX ADMIN — CARBIDOPA AND LEVODOPA 1 TABLET: 25; 100 TABLET ORAL at 13:46

## 2022-10-25 RX ADMIN — HYDRALAZINE HYDROCHLORIDE 50 MG: 50 TABLET, FILM COATED ORAL at 06:40

## 2022-10-25 RX ADMIN — ANTI-FUNGAL POWDER MICONAZOLE NITRATE TALC FREE: 1.42 POWDER TOPICAL at 10:16

## 2022-10-25 RX ADMIN — BENZONATATE 100 MG: 100 CAPSULE ORAL at 13:51

## 2022-10-25 RX ADMIN — CLONIDINE HYDROCHLORIDE 0.2 MG: 0.2 TABLET ORAL at 22:10

## 2022-10-25 RX ADMIN — BUMETANIDE 2 MG: 1 TABLET ORAL at 10:14

## 2022-10-25 RX ADMIN — BENZONATATE 100 MG: 100 CAPSULE ORAL at 10:14

## 2022-10-25 RX ADMIN — BENZONATATE 100 MG: 100 CAPSULE ORAL at 22:10

## 2022-10-25 RX ADMIN — ATORVASTATIN CALCIUM 80 MG: 40 TABLET, FILM COATED ORAL at 22:10

## 2022-10-25 RX ADMIN — ANTI-FUNGAL POWDER MICONAZOLE NITRATE TALC FREE: 1.42 POWDER TOPICAL at 22:14

## 2022-10-25 RX ADMIN — IPRATROPIUM BROMIDE AND ALBUTEROL SULFATE 1 AMPULE: .5; 2.5 SOLUTION RESPIRATORY (INHALATION) at 17:22

## 2022-10-25 RX ADMIN — HYDRALAZINE HYDROCHLORIDE 50 MG: 50 TABLET, FILM COATED ORAL at 13:46

## 2022-10-25 RX ADMIN — GABAPENTIN 600 MG: 300 CAPSULE ORAL at 22:10

## 2022-10-25 RX ADMIN — QUETIAPINE 50 MG: 25 TABLET ORAL at 22:10

## 2022-10-25 RX ADMIN — IPRATROPIUM BROMIDE AND ALBUTEROL SULFATE 1 AMPULE: .5; 2.5 SOLUTION RESPIRATORY (INHALATION) at 21:02

## 2022-10-25 NOTE — PROGRESS NOTES
he is leaving to the [facility] in [facility] is leaving at 830pand he has been on Seroquel 50 mg 2 x aday and is not on the AVS Can you please add it on . Thanks . Message sent to Dr. Angela Marinelli on call .

## 2022-10-25 NOTE — DISCHARGE SUMMARY
Hospitalist Discharge Summary    Patient ID: Derrick Pemberton   Patient : 1955  Patient's PCP: Kimberley Turner MD    Admit Date: 2022   Admitting Physician: Milly Marley DO    Discharge Date:  10/25/2022   Discharge Physician: CONSTANTINE Davison NP   Discharge Condition: Stable  Discharge Disposition: [facility] course in brief:  (Please refer to daily progress notes for a comprehensive review of the hospitalization by requesting medical records)      Patient admitted as a transfer from [facility] for neurological evaluation after presenting with right-sided weakness and aphasia. He was found to have severe left ICA stenosis. He underwent a left ICA angioplasty with stent placement in IR on . He was subsequently admitted to neuro ICU and initiated on DAPT. His course was complicated by her GI bleed in which she required 15 units of PRBCs. EGD done on 10/30 with no evidence of upper GI bleed. Colonoscopy on 10/9 revealed a large amount of blood the CT of the abdomen did not show any localization of hemorrhage. Bleeding scan revealed bleeding around the splenic flexure. Patient RRT'd on 10/10 due to hypotension and hemorrhagic shock and was transferred back to the ICU. He was taken off of DAPT. He is currently being treated with aspirin monotherapy. Per general surgery okay to resume Brilinta if hemoglobin remained stable x7 days. He is also with resolving LARY for which nephrology is following. He is stable for discharge to [facility].     Consults:   IP CONSULT TO CRITICAL CARE  IP CONSULT TO DIETITIAN  IP CONSULT TO CARDIOLOGY  IP CONSULT TO PODIATRY  IP CONSULT TO PODIATRY  IP CONSULT TO UROLOGY  IP CONSULT TO NEPHROLOGY  IP CONSULT TO GENERAL SURGERY  IP CONSULT TO INFECTIOUS DISEASES  IP CONSULT TO GI  IP CONSULT TO GENERAL SURGERY  IP CONSULT TO GENERAL SURGERY  IP CONSULT TO PALLIATIVE CARE  IP CONSULT TO DIETITIAN    Discharge Diagnoses:  Acute CVA secondary to symptomatic left ICA stenosis status post angioplasty and stent placement  Acute lower GI bleeding  Acute blood loss anemia  Acute hypoxic respiratory failure-resolved  LARY on CKD III-Baseline creatinine 1.7-1.9 - RESOLVED  Dysphagia  Uncontrolled hypertension  Hypokalemia  HFpEF-EF 60 to 65%  Decompensated heart failure-resolved  Chronic wound right foot  MATT noncompliant with CPAP  Urinary retention      Discharge Instructions / Follow up: Follow-up with PCP within 1 week of discharge. Check H&H in 3-5 days  Follow up with nephrology, neurology, and podiatry as soon as possible after discharge. OK to resume Plavix 10/25  Compliance with medications as prescribed on discharge. No future appointments. The patient's condition is stable. At this time the patient is without objective evidence of an acute process requiring continuing hospitalization or inpatient management. They are stable for discharge with outpatient follow-up. I have spoken with the patient and discussed the results of the current hospitalization, in addition to providing specific details for the plan of care and counseling regarding the diagnosis and prognosis. The plan has been discussed in detail and they are aware of the specific conditions for emergent return, as well as the importance of follow-up. Their questions are answered at this time and they are agreeable with the plan for discharge to [facility].     Continued appropriate risk factor modification of blood pressure, diabetes and serum lipids will remain essential to reducing risk of future atherosclerotic development    Activity: activity as tolerated    Significant labs:  CBC:   Recent Labs     10/23/22  0527 10/24/22  0610 10/25/22  0454   WBC 4.9 5.6 5.0   RBC 3.31* 3.25* 3.23*   HGB 9.3* 9.1* 9.3*   HCT 29.4* 28.3* 27.9*   MCV 88.8 87.1 86.4   RDW 14.1 14.3 14.5    294 278     BMP:   Recent Labs     10/23/22  0527 10/24/22  0610 10/25/22  0454    136 137   K 3.6  3.6 3.4*  3.4* 3.5   CL 95* 93* 94*   CO2 33* 29 30*   BUN 24* 23 28*   CREATININE 1.6* 1.6* 1.6*   MG 1.8 1.7 1.6   PHOS 3.6 3.7 3.7     LFT:  No results for input(s): PROT, ALB, ALKPHOS, ALT, AST, BILITOT, AMYLASE, LIPASE in the last 72 hours. PT/INR: No results for input(s): INR, APTT in the last 72 hours. BNP: No results for input(s): BNP in the last 72 hours. Hgb A1C:   Lab Results   Component Value Date    LABA1C 6.4 (H) 09/28/2022     Folate and B12:   Lab Results   Component Value Date    [id number] 396 10/18/2022   ,   Lab Results   Component Value Date    FOLATE 10.5 10/18/2022     Thyroid Studies: No results found for: TSH, T6JIXIA, J5ELSIR, THYROIDAB    Urinalysis:  No results found for: NITRU, WBCUA, BACTERIA, RBCUA, BLOODU, SPECGRAV, GLUCOSEU    Imaging:  CT ABDOMEN PELVIS WO CONTRAST    Result Date: 10/7/2022  EXAMINATION: CT OF THE ABDOMEN AND PELVIS WITHOUT CONTRAST; CT OF THE CHEST WITHOUT CONTRAST 10/7/2022 1:37 pm TECHNIQUE: CT of the abdomen and pelvis was performed without the administration of intravenous contrast. Multiplanar reformatted images are provided for review. Automated exposure control, iterative reconstruction, and/or weight based adjustment of the mA/kV was utilized to reduce the radiation dose to as low as reasonably achievable.; CT of the chest was performed without the administration of intravenous contrast. Multiplanar reformatted images are provided for review. Automated exposure control, iterative reconstruction, and/or weight based adjustment of the mA/kV was utilized to reduce the radiation dose to as low as reasonably achievable. COMPARISON: None.  HISTORY: ORDERING SYSTEM PROVIDED HISTORY: anemia eval for malignancy TECHNOLOGIST PROVIDED HISTORY: Reason for exam:->anemia eval for malignancy What reading provider will be dictating this exam?->CRC FINDINGS: CHEST: No cardiomegaly or pericardial effusion. Atherosclerotic disease. No thoracic aortic aneurysm. Nondilated pulmonary trunk. Subcentimeter short axis lymph nodes. Unremarkable thyroid and esophagus. Moderate bilateral pleural effusions. No pneumothorax. The central airways are patent. Dependent atelectasis in the bilateral lower lobes. Bands of linear atelectasis versus scarring elsewhere in the lungs. There is a calcification within the right lower lobe, as can be seen with calcified granuloma or sequela prior aspiration. No pneumonia. No pulmonary mass or suspicious pulmonary nodule. Low lung volumes. Degenerative changes of the spine. Partially imaged cervical spinal fusion hardware. Schmorl's nodes. Bilateral gynecomastia. No lytic blastic osseous metastases. Anasarca. ABDOMEN/PELVIS: No solid organ mass, allowing for limitations of noncontrast technique. No acute abnormality of the solid organs. No free air. Trace perihepatic and pelvic free fluid. No bowel obstruction. Nonvisualized appendix; no secondary signs to suggest appendicitis. The large bowel is opacified with enteric contrast. Bladder wall thickening is nonspecific given under distension. Mahan catheter. Bilateral scrotal edema, partially imaged. No abdominal aortic aneurysm. Atherosclerotic disease. No retroperitoneal no mesenteric. No pelvic lymphadenopathy. Degenerative changes of the spine. Schmorl's nodes. No lytic or blastic osseous metastases. Anasarca. CHEST: No evidence of neoplastic disease, allowing for limitations of noncontrast technique. Moderate bilateral pleural effusions. Nonemergent incidental findings as above. ABDOMEN/PELVIS: No evidence of neoplastic disease, allowing for limitations of noncontrast technique. Bladder wall thickening is nonspecific given under distension; recommend correlation urinalysis to evaluate for UTI. Nonemergent incidental findings as above.      XR FOOT RIGHT (2 VIEWS)    Result Date: 10/2/2022  EXAMINATION: TWO XRAY VIEWS OF THE RIGHT FOOT 10/2/2022 5:21 pm COMPARISON: None. HISTORY: ORDERING SYSTEM PROVIDED HISTORY: non healing wound r/o osteomyelitis ( calceneous ) TECHNOLOGIST PROVIDED HISTORY: Reason for exam:->non healing wound r/o osteomyelitis ( calceneous ) What reading provider will be dictating this exam?->CRC FINDINGS: The calcaneus appears intact with no osseous destruction seen. 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 There is soft tissue swelling and a wound VAC in place. No acute fracture seen. No evidence of calcaneal osteomyelitis. XR ABDOMEN (KUB) (SINGLE AP VIEW)    Result Date: 10/13/2022  EXAMINATION: ONE SUPINE XRAY VIEW(S) OF THE ABDOMEN 10/13/2022 10:01 pm COMPARISON: None. HISTORY: ORDERING SYSTEM PROVIDED HISTORY: assess NG tube placement TECHNOLOGIST PROVIDED HISTORY: Reason for exam:->assess NG tube placement What reading provider will be dictating this exam?->CRC FINDINGS: Esophageal route catheter extends into the left upper quadrant stomach location. Chest evaluation is reported separately. Mild dilation of central colonic loop and adjacent small bowel, may represent ileus but incompletely evaluated     Catheter is in the stomach. XR ABDOMEN (KUB) (SINGLE AP VIEW)    Result Date: 10/4/2022  EXAMINATION: ONE SUPINE XRAY VIEW(S) OF THE ABDOMEN 10/4/2022 3:14 pm COMPARISON: 28 September 2022 HISTORY: ORDERING SYSTEM PROVIDED HISTORY: abd discomfort TECHNOLOGIST PROVIDED HISTORY: Reason for exam:->abd discomfort What reading provider will be dictating this exam?->CRC FINDINGS: No free air, bowel wall pneumatosis or pathologically dilated bowel. There is somewhat greater than average retained fecal material within the lower GI tract which could indicate some evacuation dysfunction. No abnormal calcifications are evident.      Somewhat greater than average quantity of retained fecal material thin the lower GI tract suggesting dysfunction with evacuation. CT HEAD WO CONTRAST    Result Date: 2022  EXAMINATION: CT OF THE HEAD WITHOUT CONTRAST  2022 2:22 pm TECHNIQUE: CT of the head was performed without the administration of intravenous contrast. Automated exposure control, iterative reconstruction, and/or weight based adjustment of the mA/kV was utilized to reduce the radiation dose to as low as reasonably achievable. COMPARISON: MRI head 2022. CT head 2022. HISTORY: ORDERING SYSTEM PROVIDED HISTORY: [employer] TECHNOLOGIST PROVIDED HISTORY: Reason for exam:->[employer] Has a \"code stroke\" or \"stroke alert\" been called? ->No What reading provider will be dictating this exam?->CRC FINDINGS: There are parenchymal volume loss and chronic microvascular ischemic changes. There are no findings to suggest an acute large vessel infarct. There is no acute intracranial hemorrhage. No intracranial mass or mass effect is identified. No abnormal extra-axial fluid collection is seen. There is mucoperiosteal thickening in the sphenoid sinus and in the ethmoid air cells. There is fluid in the right mastoid. Parenchymal volume loss and chronic microvascular ischemic changes. No acute intracranial abnormality. Ethmoid and sphenoid sinusitis. Right mastoid effusion. CT HEAD WO CONTRAST    Result Date: 2022  Patient MRN:  [id number] : 1955 Age: 74 years Gender: Male Order Date:  2022 5:35 AM EXAM: CT HEAD WO CONTRAST NUMBER OF IMAGES:  200 INDICATION:  stroke evaluation after mechanical thrombectomy Please assign to read to Dr. Roly Núñez in [facility] stroke evaluation after mechanical thrombectomy What reading provider will be dictating this exam?->[facility] COMPARISON: None Technique: Low-dose CT  acquisition technique included one of following options; 1 . Automated exposure control, 2. Adjustment of MA and or KV according to patient's size or 3. Use of iterative reconstruction. Multiple CT sections were obtained with sagittal and coronal MPR reconstructions. The ventricles are prominent. The gyri and sulci appear  prominent. The white matter appears  prominent. There is no evidence for hemorrhage. There is no infarct identified. There is no mass effect identified. There is no mass identified. Diffuse atrophy likely age related Findings compatible with small vessel ischemic changes. CT HEAD WO CONTRAST    Result Date: 2022  Patient MRN:  [id number] : 1955 Age: 74 years Gender: Male Order Date:  2022 EXAM: CT HEAD WO CONTRAST NUMBER OF IMAGES:  357 INDICATION:  cva cva COMPARISON: None Technique: Low-dose CT  acquisition technique included one of following options; 1 . Automated exposure control, 2. Adjustment of MA and or KV according to patient's size or 3. Use of iterative reconstruction. Multiple CT sections were obtained with sagittal and coronal MPR reconstructions. The ventricles are prominent. The gyri and sulci appear  prominent. The white matter appears  prominent. There is no evidence for hemorrhage. There is no infarct identified. There is no mass effect identified. There is no mass identified. Diffuse atrophy likely age related Findings compatible with small vessel ischemic changes. Findings were called at the time of dictation     CT CHEST WO CONTRAST    Result Date: 10/7/2022  EXAMINATION: CT OF THE ABDOMEN AND PELVIS WITHOUT CONTRAST; CT OF THE CHEST WITHOUT CONTRAST 10/7/2022 1:37 pm TECHNIQUE: CT of the abdomen and pelvis was performed without the administration of intravenous contrast. Multiplanar reformatted images are provided for review.  Automated exposure control, iterative reconstruction, and/or weight based adjustment of the mA/kV was utilized to reduce the radiation dose to as low as reasonably achievable.; CT of the chest was performed without the administration of intravenous contrast. Multiplanar reformatted images are provided for review. Automated exposure control, iterative reconstruction, and/or weight based adjustment of the mA/kV was utilized to reduce the radiation dose to as low as reasonably achievable. COMPARISON: None. HISTORY: ORDERING SYSTEM PROVIDED HISTORY: anemia eval for malignancy TECHNOLOGIST PROVIDED HISTORY: Reason for exam:->anemia eval for malignancy What reading provider will be dictating this exam?->CRC FINDINGS: CHEST: No cardiomegaly or pericardial effusion. Atherosclerotic disease. No thoracic aortic aneurysm. Nondilated pulmonary trunk. Subcentimeter short axis lymph nodes. Unremarkable thyroid and esophagus. Moderate bilateral pleural effusions. No pneumothorax. The central airways are patent. Dependent atelectasis in the bilateral lower lobes. Bands of linear atelectasis versus scarring elsewhere in the lungs. There is a calcification within the right lower lobe, as can be seen with calcified granuloma or sequela prior aspiration. No pneumonia. No pulmonary mass or suspicious pulmonary nodule. Low lung volumes. Degenerative changes of the spine. Partially imaged cervical spinal fusion hardware. Schmorl's nodes. Bilateral gynecomastia. No lytic blastic osseous metastases. Anasarca. ABDOMEN/PELVIS: No solid organ mass, allowing for limitations of noncontrast technique. No acute abnormality of the solid organs. No free air. Trace perihepatic and pelvic free fluid. No bowel obstruction. Nonvisualized appendix; no secondary signs to suggest appendicitis. The large bowel is opacified with enteric contrast. Bladder wall thickening is nonspecific given under distension. Mahan catheter. Bilateral scrotal edema, partially imaged. No abdominal aortic aneurysm. Atherosclerotic disease. No retroperitoneal no mesenteric. No pelvic lymphadenopathy. Degenerative changes of the spine. Schmorl's nodes. No lytic or blastic osseous metastases. Anasarca.      CHEST: No evidence of neoplastic disease, allowing for limitations of noncontrast technique. Moderate bilateral pleural effusions. Nonemergent incidental findings as above. ABDOMEN/PELVIS: No evidence of neoplastic disease, allowing for limitations of noncontrast technique. Bladder wall thickening is nonspecific given under distension; recommend correlation urinalysis to evaluate for UTI. Nonemergent incidental findings as above. NM GI BLOOD LOSS    Result Date: 10/11/2022  EXAMINATION: NUCLEAR MEDICINE GASTRIC BLEEDING STUDY 10/11/2022 TECHNIQUE: Following the intravenous injection of 29 mCi of 99 mTc-labeled RBC's, a flow study and standard images of the abdomen was obtained over a total period of 60 minutes COMPARISON: None. HISTORY: ORDERING SYSTEM PROVIDED HISTORY: GI bleed TECHNOLOGIST PROVIDED HISTORY: Reason for exam:->GI bleed FINDINGS: Activity is seen within the blood pool, including the great vessels, heart, liver, and spleen. A small amount of activity accumulates in the bladder over the course of the study. There was no abnormal accumulation of radioactivity in the abdomen to suggest active bleeding during study acquisition. No evidence of active GI bleeding during acquisition. NM GI BLOOD LOSS    Result Date: 10/8/2022  EXAMINATION: NUCLEAR MEDICINE GASTRIC BLEEDING STUDY 10/8/2022 TECHNIQUE: Following the intravenous injection of 25 mCi of 99 mTc-labeled RBC's, a flow study and standard images of the abdomen was obtained over a total period of 60 minutes COMPARISON: None. HISTORY: ORDERING SYSTEM PROVIDED HISTORY: active bleeding TECHNOLOGIST PROVIDED HISTORY: Reason for exam:->active bleeding What reading provider will be dictating this exam?->CRC FINDINGS: Activity is seen within the blood pool, including the great vessels, heart, liver, and spleen. A small amount of activity accumulates in the bladder over the course of the study.  There was  abnormal accumulation of radioactivity splenic flexure with peristalsis identified of the tracer distally to suggest active bleeding during study acquisition. Active GI bleeding identified during acquisition in the splenic flexure with peristalsis identified into the proximal descending colon. RECOMMENDATIONS: Unavailable     US SCROTUM AND TESTICLES    Result Date: 10/10/2022  EXAMINATION: ULTRASOUND OF THE SCROTUM/TESTICLES WITH COLOR DOPPLER FLOW EVALUATION; DOPPLER EVALUATION OF THE PELVIS 10/10/2022 TECHNIQUE: Duplex ultrasound using B-mode/gray scaled imaging, Doppler spectral analysis and color flow Doppler was obtained of the testicles.; Duplex ultrasound using B-mode/gray scaled imaging and Doppler spectral analysis and color flow was obtained of the pelvis. COMPARISON: None. HISTORY: ORDERING SYSTEM PROVIDED HISTORY: scrotal swelling and discomfort TECHNOLOGIST PROVIDED HISTORY: Reason for exam:->scrotal swelling and discomfort What reading provider will be dictating this exam?->CRC FINDINGS: Limited evaluation of the right and left inguinal canal demonstrates no visible hernia. Measurements: Right Testicle: 3.4 x 2.5 x 2.4 cm Left Testicle: 3.6 x 2.3 x 2.8 cm Right: Grey Scale: The right testicle is normal to slightly increased in echogenicity. No focal testicular mass. Doppler Evaluation: There is normal arterial and venous Doppler flow within the testicle. Scrotal Sac: Severe scrotal skin thickening and trace hydrocele. Epididymis: Epididymal head appears normal measuring 7 x 6 x 9 mm. Left: Grey Scale: The left testicle demonstrates normal homogeneous echotexture without focal lesion. No evidence of testicular microlithiasis. Doppler Evaluation: There is normal arterial and venous Doppler flow within the testicle. Scrotal Sac: Severe scrotal skin thickening and trace hydrocele. Epididymis: No acute abnormality. 1.  No evidence of testicular torsion 2. Severe scrotal skin thickening. 3.  Trace hydroceles. 4.  No evidence of bowel containing inguinal hernia.      XR CHEST PORTABLE    Result Date: 10/25/2022  EXAMINATION: ONE XRAY VIEW OF THE CHEST 10/25/2022 8:46 am COMPARISON: 10/24/2022. HISTORY: ORDERING SYSTEM PROVIDED HISTORY: follow up CHF TECHNOLOGIST PROVIDED HISTORY: Reason for exam:->follow up CHF What reading provider will be dictating this exam?->CRC FINDINGS: The cardiac silhouette is probably normal in size. The pulmonary vasculature is within normal limits. There are pleural and parenchymal changes in both lung bases. This finding is greater on the left than right. No pneumothorax is seen. Bibasilar pleural and parenchymal disease, left greater than right. XR CHEST PORTABLE    Result Date: 10/24/2022  EXAMINATION: ONE XRAY VIEW OF THE CHEST 10/24/2022 8:11 am COMPARISON: 10/23/2022 HISTORY: ORDERING SYSTEM PROVIDED HISTORY: follow up CHF TECHNOLOGIST PROVIDED HISTORY: Reason for exam:->follow up CHF What reading provider will be dictating this exam?->CRC FINDINGS: Bilateral basilar infiltrates and elevation left hemidiaphragm are unchanged. Heart size is normal.  Upper lobes are unremarkable. No interval change     XR CHEST PORTABLE    Result Date: 10/23/2022  EXAMINATION: ONE XRAY VIEW OF THE CHEST 10/23/2022 7:58 am COMPARISON: 10/22/2022 HISTORY: ORDERING SYSTEM PROVIDED HISTORY: follow up CHF TECHNOLOGIST PROVIDED HISTORY: Reason for exam:->follow up CHF What reading provider will be dictating this exam?->CRC FINDINGS: The heart is enlarged Patchy bilateral lower lobe airspace disease is noted. There is platelike atelectasis seen within the right upper lobe. There is no pneumothorax. 1. No interval change in the bilateral perihilar and lower lobe airspace disease.      XR CHEST PORTABLE    Result Date: 10/22/2022  EXAMINATION: ONE XRAY VIEW OF THE CHEST 10/22/2022 2:34 pm COMPARISON: Chest series from October 21, 2022 HISTORY: ORDERING SYSTEM PROVIDED HISTORY: follow up CHF TECHNOLOGIST PROVIDED HISTORY: Reason for exam:->follow up CHF What reading provider will be dictating this exam?->CRC FINDINGS: Atherosclerotic disease and mild prominence of the cardiac silhouette. There are bilateral interstitial opacities. Persistent retrocardiac and bibasilar opacities which are not significantly changed from yesterday's exam. Possible trace pleural effusions. No pneumothorax. Oral contrast material identified in the colon. Stable chest series. Persistent retrocardiac and bibasilar opacities and bilateral faint interstitial opacities. Small pleural effusions. XR CHEST PORTABLE    Result Date: 10/21/2022  EXAMINATION: ONE XRAY VIEW OF THE CHEST 10/21/2022 7:35 am COMPARISON: Multiple priors, most recent from yesterday. HISTORY: ORDERING SYSTEM PROVIDED HISTORY: follow up CHF TECHNOLOGIST PROVIDED HISTORY: Reason for exam:->follow up CHF What reading provider will be dictating this exam?->CRC FINDINGS: Heart size is unable to be accurately assessed on this single portable view of the chest, but appears to be stable. There is improved aeration of the lungs bilaterally with persistent hazy opacification at both lung bases. A small, loculated right pleural effusion is decreased in size. Difficult to exclude a small left pleural effusion. No evidence of a pneumothorax. No acute osseous abnormality. Anterior fusion hardware of the cervical spine is present. Improved aeration of the lungs bilaterally with persistent hazy opacities at both lung bases. A small, loculated right pleural effusion is decreased in size from yesterday. XR CHEST PORTABLE    Result Date: 10/20/2022  EXAMINATION: ONE XRAY VIEW OF THE CHEST 10/20/2022 7:33 am COMPARISON: October 16-19 HISTORY: ORDERING SYSTEM PROVIDED HISTORY: follow up CHF TECHNOLOGIST PROVIDED HISTORY: Reason for exam:->follow up CHF What reading provider will be dictating this exam?->CRC FINDINGS: Cardiac area appears mildly enlarged. CTR: 19.7/32.9 cm.  Increased density in lower lung bases relate with bilateral pleural effusions in mild-to-moderate degree. There is also a component of perihilar vascular congestion and mild interstitial edema. No pneumothorax on the right or on the left. Patient face overlaps and obscure detail in the left apical region. Findings for volume overload as observed the in October 19. XR CHEST PORTABLE    Result Date: 10/19/2022  EXAMINATION: ONE XRAY VIEW OF THE CHEST 10/19/2022 10:56 am COMPARISON: 10/18/2022. HISTORY: ORDERING SYSTEM PROVIDED HISTORY: follow up CHF TECHNOLOGIST PROVIDED HISTORY: Reason for exam:->follow up CHF What reading provider will be dictating this exam?->CRC FINDINGS: The enteric tube has been removed. Stable bilateral pulmonary opacities and pleural effusions. No pneumothorax. Stable cardiomegaly. The osseous structures are without acute process. The enteric tube has been removed, otherwise stable appearance of the chest compared to 10/18/2022. XR CHEST PORTABLE    Result Date: 10/18/2022  EXAMINATION: ONE XRAY VIEW OF THE CHEST 10/18/2022 6:41 am COMPARISON: 10/17/2022 HISTORY: ORDERING SYSTEM PROVIDED HISTORY: Pulmonary edema TECHNOLOGIST PROVIDED HISTORY: Reason for exam:->Pulmonary edema What reading provider will be dictating this exam?->[facility] FINDINGS: Significant bilateral infiltrates and effusions are unchanged. Heart size is borderline prominent. NG tube is appropriate. No interval change     XR CHEST PORTABLE    Result Date: 10/17/2022  EXAMINATION: ONE XRAY VIEW OF THE CHEST 10/17/2022 7:03 am COMPARISON: 10/16/2022 HISTORY: ORDERING SYSTEM PROVIDED HISTORY: CHF vs. PNA TECHNOLOGIST PROVIDED HISTORY: Reason for exam:->CHF vs. PNA What reading provider will be dictating this exam?->CRC FINDINGS: Heart is enlarged. There is worsening right-sided infiltrate and effusion. Extensive infiltrates in the left lung and moderate left effusion are not significantly changed. The infiltrates could reflect areas of pulmonary edema. NG tube is appropriate. Worsening congestive heart failure suspected     XR CHEST PORTABLE    Result Date: 10/16/2022  EXAMINATION: ONE XRAY VIEW OF THE CHEST 10/16/2022 8:32 am COMPARISON: 10/15/2022 HISTORY: ORDERING SYSTEM PROVIDED HISTORY: CHF vs. PNA TECHNOLOGIST PROVIDED HISTORY: Reason for exam:->CHF vs. PNA What reading provider will be dictating this exam?->[facility] FINDINGS: Portable chest reveals heart to be enlarged. Nasogastric tube tip below the level diaphragm. Ill-defined opacification seen in the mid and lower lung fields bilaterally with small bilateral pleural effusions. There is slight improvement seen in the aeration of the upper lung fields in the interval. Remainder of the chest is stable. Very minimal improvement seen in the aeration of the upper lung fields with stable increased markings seen within the mid and lower lung fields bilaterally with small bilateral pleural effusions. Heart remains enlarged. XR CHEST PORTABLE    Result Date: 10/15/2022  EXAMINATION: ONE XRAY VIEW OF THE CHEST 10/15/2022 8:44 am COMPARISON: 10/14/2022 HISTORY: ORDERING SYSTEM PROVIDED HISTORY: CHF vs. PNA TECHNOLOGIST PROVIDED HISTORY: Reason for exam:->CHF vs. PNA What reading provider will be dictating this exam?->CRC FINDINGS: Stable bilateral pulmonary opacities. New right pleural effusion. No pneumothorax. Stable cardiomegaly. The osseous structures are without acute process. New right pleural effusion. XR CHEST PORTABLE    Result Date: 10/14/2022  EXAMINATION: ONE XRAY VIEW OF THE CHEST 10/14/2022 7:38 am COMPARISON: 10/13/2022 HISTORY: ORDERING SYSTEM PROVIDED HISTORY: CHF vs. PNA TECHNOLOGIST PROVIDED HISTORY: Reason for exam:->CHF vs. PNA What reading provider will be dictating this exam?->CRC FINDINGS: Stable bilateral pulmonary infiltrates. There is no effusion or pneumothorax. The cardiomediastinal silhouette is without acute process.  The osseous structures are without acute process. Stable bilateral pulmonary infiltrates. XR CHEST PORTABLE    Result Date: 10/13/2022  EXAMINATION: ONE XRAY VIEW OF THE CHEST 10/13/2022 5:52 am COMPARISON: 12 October 2022 HISTORY: ORDERING SYSTEM PROVIDED HISTORY: CHF vs. PNA TECHNOLOGIST PROVIDED HISTORY: Reason for exam:->CHF vs. PNA What reading provider will be dictating this exam?->CRC FINDINGS: Bilateral airspace disease with interval progression on the right and stability on the left. Bilateral airspace disease with interval progression on the right. XR CHEST PORTABLE    Result Date: 10/12/2022  EXAMINATION: ONE XRAY VIEW OF THE CHEST 10/12/2022 7:18 am COMPARISON: 11 October 2022 HISTORY: ORDERING SYSTEM PROVIDED HISTORY: CHF vs. PNA TECHNOLOGIST PROVIDED HISTORY: Reason for exam:->CHF vs. PNA What reading provider will be dictating this exam?->CRC FINDINGS: Bilateral chest opacities are unchanged. The right is comprised of layering pleural effusion with adjacent infiltrate and or atelectasis. The left may be composed of any combination of layering pleural effusion, atelectasis and or infiltrate. Stable cardiomediastinal silhouette. Unchanged bilateral chest opacities with right pleural effusion evident. See above. XR CHEST PORTABLE    Result Date: 10/11/2022  EXAMINATION: ONE XRAY VIEW OF THE CHEST 10/11/2022 7:48 am COMPARISON: 10/10/2022 HISTORY: ORDERING SYSTEM PROVIDED HISTORY: CHF vs. PNA TECHNOLOGIST PROVIDED HISTORY: Reason for exam:->CHF vs. PNA What reading provider will be dictating this exam?->CRC FINDINGS: There is blunting of the costophrenic angles bilaterally suggests small effusions. There are also signs of bibasilar parenchymal densities as well as parenchymal density projecting over the base of right upper lung concerning for pneumonia and or atelectasis. Given the bilateral nature of this process, atypical pneumonia could give this appearance.  The heart is prominent in size but stable when compared the previous study. No pneumothorax is observed. 1. Persistent bilateral patchy parenchymal densities concerning for pneumonia and or atelectasis 2. Persistent small bilateral pleural effusions, left greater than right 3. Cardiomegaly, stable     XR CHEST PORTABLE    Result Date: 10/10/2022  EXAMINATION: ONE XRAY VIEW OF THE CHEST 10/10/2022 3:43 pm COMPARISON: 10/10/2022 at 14:33 HISTORY: ORDERING SYSTEM PROVIDED HISTORY: respiratory distress TECHNOLOGIST PROVIDED HISTORY: Reason for exam:->respiratory distress What reading provider will be dictating this exam?->CRC FINDINGS: Compared to the prior exam, there has been no significant interval change in bilateral mid to lower lung zone patchy airspace opacities. Bilateral pleural effusions also appear stable. The heart is enlarged, but partially obscured. No significant change compared to the prior exam with cardiomegaly, bilateral airspace opacities and bilateral pleural effusions seen most suggestive of congestive heart failure, but superimposed pneumonia not excluded. XR CHEST PORTABLE    Result Date: 10/10/2022  EXAMINATION: ONE XRAY VIEW OF THE CHEST 10/10/2022 2:39 pm COMPARISON: 10/08/2022 HISTORY: ORDERING SYSTEM PROVIDED HISTORY: bilat opacities and /or infiltrates TECHNOLOGIST PROVIDED HISTORY: Reason for exam:->bilat opacities and /or infiltrates What reading provider will be dictating this exam?->CRC FINDINGS: Heart is upper limits of normal in size to mildly enlarged. There are small bilateral pleural effusions and areas of atelectasis or pneumonia in the lower lobes. The appearance is not significantly changed from the prior exam.  No evidence of pneumothorax. 1.  No significant interval change in the appearance of the chest. 2.  Bilateral pleural effusions and areas of atelectasis or multifocal pneumonia.      XR CHEST PORTABLE    Result Date: 10/8/2022  EXAMINATION: ONE XRAY VIEW OF THE CHEST 10/8/2022 3:16 pm COMPARISON: CT chest 7 October 2022 HISTORY: ORDERING SYSTEM PROVIDED HISTORY: cough productive  eval for HAP TECHNOLOGIST PROVIDED HISTORY: Reason for exam:->cough productive  eval for HAP What reading provider will be dictating this exam?->CRC FINDINGS: Single AP upright portable chest demonstrate bilateral pleural effusions most pronounced on the right. There is moderate cardiomegaly with increased perihilar markings in Kerley B lines. There is increased markings at the lung bases. Bilateral pleural effusions with bibasilar patchy infiltrates and mild cardiomegaly. XR CHEST PORTABLE    Result Date: 10/4/2022  EXAMINATION: ONE XRAY VIEW OF THE CHEST 10/4/2022 4:13 pm COMPARISON: Chest series from September 30, 2022 HISTORY: ORDERING SYSTEM PROVIDED HISTORY: cough aspiration TECHNOLOGIST PROVIDED HISTORY: Reason for exam:->cough aspiration What reading provider will be dictating this exam?->CRC FINDINGS: There appear to be symmetric low lung volumes. Persistent but improved ill-defined opacities in the bilateral lungs most pronounced in the mid and lower lungs. There appears to be a small left and trace right pleural effusion. No obvious pneumothorax. Atherosclerotic disease and prominence of the cardiac silhouette. Difficult to assess pulmonary vascularity. Osseous and thoracic soft tissue structures demonstrate no acute findings. 1.  Atherosclerotic disease and mild cardiomegaly. 2.  Persistent but improved opacities in the bilateral lungs most pronounced in the mid and lower lungs. There appears to be a small left and trace right pleural effusion     XR CHEST PORTABLE    Result Date: 9/30/2022  EXAMINATION: ONE XRAY VIEW OF THE CHEST 9/30/2022 9:47 am COMPARISON: 29 September 2022 HISTORY: ORDERING SYSTEM PROVIDED HISTORY: sob TECHNOLOGIST PROVIDED HISTORY: Reason for exam:->sob What reading provider will be dictating this exam?->CRC FINDINGS: Worsening infiltrate and or atelectasis on the left.   Stable right-sided infiltrate and or atelectasis with small pleural effusion suggested. Endotracheal and nasogastric tubes have been removed. Worsening infiltrate and or atelectasis on the left, stable on the right with suspected layering pleural effusion. XR CHEST PORTABLE    Result Date: 9/29/2022  EXAMINATION: ONE XRAY VIEW OF THE CHEST 9/29/2022 6:01 am COMPARISON: 28 September 2022 HISTORY: ORDERING SYSTEM PROVIDED HISTORY: intubated TECHNOLOGIST PROVIDED HISTORY: Reason for exam:->intubated What reading provider will be dictating this exam?->CRC FINDINGS: Unchanged support lines. Bilateral infiltrate and or atelectasis is unchanged. Small right pleural effusion is not clearly changed as well. No new abnormal findings. Unchanged bilateral atelectasis and or infiltrate as well as small right pleural effusion. XR CHEST PORTABLE    Result Date: 9/28/2022  EXAMINATION: ONE XRAY VIEW OF THE CHEST 9/28/2022 6:57 am COMPARISON: None. HISTORY: ORDERING SYSTEM PROVIDED HISTORY: intubated TECHNOLOGIST PROVIDED HISTORY: Reason for exam:->intubated What reading provider will be dictating this exam?->CRC FINDINGS: There is stable position of the endotracheal tube and NG tube Multifocal bilateral airspace disease is again noted unchanged compared to prior study and most prominent within the lower lobes. There trace bilateral pleural effusions. 1. Multifocal bilateral airspace disease more prominent within the lower lobes. The airspace disease is unchanged when compared with the prior study. XR CHEST PORTABLE    Result Date: 9/28/2022  EXAMINATION: ONE XRAY VIEW OF THE CHEST 9/28/2022 12:41 am COMPARISON: None. HISTORY: ORDERING SYSTEM PROVIDED HISTORY: intuabted TECHNOLOGIST PROVIDED HISTORY: Reason for exam:->intuabted What reading provider will be dictating this exam?->CRC FINDINGS: Indistinct pulmonary opacification rather diffusely most aggregated in mid to lower locations.   The may represent pulmonary edema but nonspecific probably includes atelectasis at least in the bases other etiologies such is infiltrates, ARDS, etc.  Are possible with no comparison available. Advise clinical correlation Esophageal route catheter is into the stomach. ET tube has tip approximately 4.1 cm above the mark. Heart size around upper normal limits. Small pleural effusions suggested The detail of evaluation on the exam is suboptimal due to technique and body habitus. Further imaging may be helpful. 1. Pulmonary opacities present favoring edema and atelectasis, also small pleural effusions, but nonspecific with some additional considerations noted above 2. Support devices present as described above 3. Heart size appears borderline enlarged     CTA ABDOMEN PELVIS W CONTRAST    Result Date: 10/9/2022  EXAMINATION: CTA OF THE ABDOMEN AND PELVIS WITH CONTRAST 10/9/2022 4:20 pm: TECHNIQUE: CTA of the abdomen and pelvis was performed with the administration of intravenous contrast. Multiplanar reformatted images are provided for review. 3D/MIP images are provided for review. Automated exposure control, iterative reconstruction, and/or weight based adjustment of the mA/kV was utilized to reduce the radiation dose to as low as reasonably achievable. COMPARISON: CT dated 10/07/2022 HISTORY: ORDERING SYSTEM PROVIDED HISTORY: eval for bleeding in right colon TECHNOLOGIST PROVIDED HISTORY: Reason for exam:->eval for bleeding in right colon What reading provider will be dictating this exam?->CRC FINDINGS: CTA ABDOMEN: Noted somewhat limited evaluation due to late arterial phase imaging based upon attenuation values within the aorta as this is of early portal venous phase late arterial involvement with atherosclerotic nonaneurysmal patent abdominal aorta demonstrating mild intramural thrombus formation. Patent celiac and SMA visceral branches in origins with up to mild to moderate celiac and moderate SMA stenoses of associated calcifications.  CTA PELVIS: Patent iliofemoral vessels with mild calcifications in the common iliac arteries however no focal severe stenosis or occlusion. 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 No adjacent hematoma with only minimal adjacent stranding. Nonvascular: Lower Chest: Lung bases reveal persistent and enlarging moderate to large right and moderate left pleural effusions with adjacent atelectasis. Organs: Liver without focal lesion. Gallbladder unremarkable. Pancreas and spleen unremarkable. Adrenals without nodule. Kidneys without suspicious renal lesion and no hydronephrosis. GI/Bowel: No evidence for mechanical obstructive process of bowel. Small bowel is nondilated. Mixed densities throughout the colonic segments including in the ascending colon and rectum could represent mixed densities of blood products although no focal area of arterial enhancement or extravasation/blushing is observed. Pelvis: No suspicious pelvic lesion or bulky pelvic adenopathy/free fluid. Mahan catheter in place. Peritoneum/Retroperitoneum: No bulky retroperitoneal adenopathy. Mesenteric edema with trace ascites. Bones/Soft Tissues: No acute osseous findings. Diffuse body wall edema of anasarca. Mixed densities throughout the colonic segments including in the ascending colon and rectum could represent mixed densities of blood products although no focal area of arterial enhancement or extravasation/blushing is observed to localize acute hemorrhage. No evidence for perforation or abscess. No pneumatosis intestinalis or portal venous gas evident. 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 No adjacent hematoma with only minimal adjacent stranding. Bilateral pleural effusions moderate to large right and moderate left effusions with adjacent atelectasis. Anasarca. CTA NECK W CONTRAST    Result Date: 2022  Patient MRN:  [id number] : 1955 Age: 74 years Gender: Male Order Date:  2022 6:24 PM EXAM: CTA NECK W CONTRAST, CTA HEAD W CONTRAST NUMBER OF IMAGES:  36 INDICATION:  cva cva What reading provider will be dictating this exam?->[facility] COMPARISON: None Technique: Low-dose CT  acquisition technique included one of following options; 1 . Automated exposure control, 2. Adjustment of MA and or KV according to patient's size or 3. Use of iterative reconstruction. Contiguous spiral images were obtained in the axial plane, following the administration of intravenous contrast using CT angiographic protocol. Sagittal and coronal images were reconstructed from the axial plane acquisition. Additional MIP reconstructions were presented to aid in the interpretation of this study. Images were obtained from the skull base cranially. There is mild calcified plaque identified in the vessels compatible with atherosclerotic disease. The right carotid is moderately atherosclerotic without significant stenosis The left carotid is abnormal. There is  evidence for hemodynamically significant stenosis at the level the proximal internal carotid artery. By NASCET criteria estimated stenosis is 90% or greater The right vertebral artery is mildly atherosclerotic without significant stenosis The left vertebral artery is mildly atherosclerotic without significant stenosis The basilar artery is unremarkable The middle cerebral arteries are unremarkable The anterior cerebral arteries are unremarkable The posterior cerebral arteries are unremarkable     1. Estimated stenosis of the proximal right and left internal carotid artery by NASCET criteria is greater than 90% on the left 2. Severe atherosclerotic disease . 3.  No large vessel occlusion identified This study was analyzed by the [id number] ai algorithm. US DUP ABD PEL RETRO SCROT COMPLETE    Result Date: 10/10/2022  EXAMINATION: ULTRASOUND OF THE SCROTUM/TESTICLES WITH COLOR DOPPLER FLOW EVALUATION; DOPPLER EVALUATION OF THE PELVIS 10/10/2022 TECHNIQUE: Duplex ultrasound using B-mode/gray scaled imaging, Doppler spectral analysis and color flow Doppler was obtained of the testicles.; Duplex ultrasound using B-mode/gray scaled imaging and Doppler spectral analysis and color flow was obtained of the pelvis. COMPARISON: None. HISTORY: ORDERING SYSTEM PROVIDED HISTORY: scrotal swelling and discomfort TECHNOLOGIST PROVIDED HISTORY: Reason for exam:->scrotal swelling and discomfort What reading provider will be dictating this exam?->CRC FINDINGS: Limited evaluation of the right and left inguinal canal demonstrates no visible hernia. Measurements: Right Testicle: 3.4 x 2.5 x 2.4 cm Left Testicle: 3.6 x 2.3 x 2.8 cm Right: Grey Scale: The right testicle is normal to slightly increased in echogenicity. No focal testicular mass. Doppler Evaluation: There is normal arterial and venous Doppler flow within the testicle. Scrotal Sac: Severe scrotal skin thickening and trace hydrocele. Epididymis: Epididymal head appears normal measuring 7 x 6 x 9 mm. Left: Grey Scale: The left testicle demonstrates normal homogeneous echotexture without focal lesion. No evidence of testicular microlithiasis. Doppler Evaluation: There is normal arterial and venous Doppler flow within the testicle. Scrotal Sac: Severe scrotal skin thickening and trace hydrocele. Epididymis: No acute abnormality. 1.  No evidence of testicular torsion 2. Severe scrotal skin thickening. 3.  Trace hydroceles. 4.  No evidence of bowel containing inguinal hernia.      CT BRAIN PERFUSION    Result Date: 2022  Patient MRN: [id number] : 1955 Age:  74 years Gender: Male Order Date: 2022 Exam: CT BRAIN PERFUSION Number of Images: 333 views Indication:   cva cva What reading provider will be dictating this exam?->[facility] Comparison: None. Findings: Perfusion images demonstrate symmetric blood volume Blood flow images demonstrate symmetric blood flow There is no significant ischemic penumbra identified. There is no significant core infarct identified. No significant ischemic penumbra identified This study was analyzed by the Samuel. abimael algorithm. XR ABDOMEN FOR NG/OG/NE TUBE PLACEMENT    Result Date: 10/13/2022  EXAMINATION: ONE SUPINE XRAY VIEW(S) OF THE ABDOMEN 10/13/2022 4:28 pm COMPARISON: 10/13/2022, 0607 hours HISTORY: ORDERING SYSTEM PROVIDED HISTORY: Confirmation of course of NG/OG/NE tube and location of tip of tube TECHNOLOGIST PROVIDED HISTORY: Reason for exam:->Confirmation of course of NG/OG/NE tube and location of tip of tube Portable? ->Yes What reading provider will be dictating this exam?->CRC FINDINGS: There appear to be bilateral pleural effusions and bilateral airspace opacities. Heart is mildly enlarged. Gastric catheter is coiled in the pharynx and should be withdrawn and reinserted. 1.  Gastric catheter is coiled in the pharynx and upper esophagus and should be withdrawn and reinserted. Follow-up imaging recommended. XR ABDOMEN FOR NG/OG/NE TUBE PLACEMENT    Result Date: 9/28/2022  EXAMINATION: ONE SUPINE XRAY VIEW(S) OF THE ABDOMEN 9/28/2022 12:40 am COMPARISON: None. HISTORY: ORDERING SYSTEM PROVIDED HISTORY: Confirmation of course of NG/OG/NE tube and location of tip of tube TECHNOLOGIST PROVIDED HISTORY: Reason for exam:->Confirmation of course of NG/OG/NE tube and location of tip of tube Portable? ->Yes What reading provider will be dictating this exam?->[facility] FINDINGS: Esophageal route catheter is into the left upper quadrant expected stomach location. No clear bowel obstruction identified. Possible constipation. Limited detailed evaluation present on exam.  Chest evaluation is done separately. NG/OG is in the stomach.      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 Subsequently selective catheterization of the left common carotid was performed and angiogram was performed . Images demonstrate 80 stenosis of the proximal internal carotid artery by NASCET criteria on the  left prior to stent placement The external carotid artery is patent. The catheter was then exchanged for a guide catheter The distal protection device was placed. A filter wire was utilized Angioplasty was performed. Subsequently a carotid stent was placed Subsequently angioplasty of the stent was performed Repeat images demonstrate A patent stent with a patent distal internal carotid artery. Evelyn Cool TICI 3 0 no perfusion 1 Penetration but no distal branch filling 2a perfusion with incomplete distal branch filling, less than 50% 2b  perfusion with incomplete distal branch filling greater than 50% 3  Full perfusion with filling of distal branches The patient tolerated the procedure well. Angiogram of the right common femoral artery demonstrates a patent vessel and Angio-Seal was utilized. The procedure was performed with general anesthesia. 12 minutes 13 seconds of fluoroscopy was utilized. Time out occurred at 1920 hours.      1. Angiogram demonstrates successful placement of a carotid stent , post procedure images demonstrate a widely patent stent and internal carotid     US RETROPERITONEAL COMPLETE    Result Date: 2022  EXAMINATION: RETROPERITONEAL ULTRASOUND OF THE KIDNEYS AND URINARY BLADDER 2022 COMPARISON: None HISTORY: ORDERING SYSTEM PROVIDED HISTORY: Azotemia assess for hydronephrosis TECHNOLOGIST PROVIDED HISTORY: Reason for exam:->Azotemia assess for hydronephrosis What reading provider will be dictating this exam?->[facility] FINDINGS: Kidneys: The right kidney measures 12.4 cm in length and the left kidney measures 12.1 cm in length. The corticomedullary differentiation and cortical thickness is maintained bilaterally. No renal mass, renal cysts, nor intrarenal calcification. There is no hydronephrosis on either side. Bladder: A Mahan catheter is present and is decompressing the bladder. 1.  Normal appearance of the bilateral kidneys. No hydronephrosis. 2.  A Mahan catheter is decompressing the bladder. CTA HEAD W CONTRAST    Result Date: 2022  Patient MRN:  [id number] : 1955 Age: 74 years Gender: Male Order Date:  2022 6:24 PM EXAM: CTA NECK W CONTRAST, CTA HEAD W CONTRAST NUMBER OF IMAGES:  36 INDICATION:  cva cva What reading provider will be dictating this exam?->[facility] COMPARISON: None Technique: Low-dose CT  acquisition technique included one of following options; 1 . Automated exposure control, 2. Adjustment of MA and or KV according to patient's size or 3. Use of iterative reconstruction. Contiguous spiral images were obtained in the axial plane, following the administration of intravenous contrast using CT angiographic protocol. Sagittal and coronal images were reconstructed from the axial plane acquisition. Additional MIP reconstructions were presented to aid in the interpretation of this study. Images were obtained from the skull base cranially. There is mild calcified plaque identified in the vessels compatible with atherosclerotic disease.  The right carotid is moderately atherosclerotic without significant stenosis The left carotid is abnormal. There is  evidence for hemodynamically significant stenosis at the level the proximal internal carotid artery. By NASCET criteria estimated stenosis is 90% or greater The right vertebral artery is mildly atherosclerotic without significant stenosis The left vertebral artery is mildly atherosclerotic without significant stenosis The basilar artery is unremarkable The middle cerebral arteries are unremarkable The anterior cerebral arteries are unremarkable The posterior cerebral arteries are unremarkable     1. Estimated stenosis of the proximal right and left internal carotid artery by NASCET criteria is greater than 90% on the left 2. Severe atherosclerotic disease . 3. No large vessel occlusion identified This study was analyzed by the Viz. ai algorithm. MRI BRAIN WO CONTRAST    Result Date: 2022  Patient MRN:  [id number] : 1955 Age: 74 years Gender: Male Order Date:  2022 3:24 PM EXAM: MRI BRAIN WO CONTRAST NUMBER OF IMAGES:  [id number] INDICATION:  Stroke Evaluation Mechanical Thrombectomy MRI of the brain 24 hours following mechanical thrombectomy. Discontinue order after first follow up. Please assign to Dr. Navdeep Petersen to read in [facility] Stroke Evaluation Mechanical Thrombectomy What reading provider will be dictating this exam?->[facility] COMPARISON: CT scan 2022 Total sequences obtained: 9 The ventricles are prominent. The gyri and sulci appear  prominent. The white matter appears  prominent. No convincing hemorrhage identified. There is no mass effect identified. There is no mass identified. There is a small region of restricted diffusion measuring approximately 3 mm seen in the left parietal lobe and a similar finding present in the left posterior frontal lobe. No other restricted diffusion is seen to suggest acute infarct. There is a small region of susceptibility infarct in the left frontal lobe.     There are 2 small regions of petechial infarcts one in the left posterior frontal lobe and a second in the left parietal lobe not exceeding 3 mm. There is otherwise extensive small vessel ischemic changes     Fluoroscopy modified barium swallow with video    Result Date: 10/20/2022  EXAMINATION: MODIFIED BARIUM SWALLOW WAS PERFORMED IN CONJUNCTION WITH SPEECH PATHOLOGY SERVICES WITH ULI UNIT DICOM DISPLAY TECHNIQUE: Under fluoroscopic evaluation cineradiography/videoradiography recordings were performed in conjunction with the speech-language pathologist (SLP). Various liquid, solid and/or semi-solid barium preparations were used to assess swallowing function. FLUOROSCOPY DOSE AND TYPE OR TIME AND EXPOSURES: Images: Cine fluoroscopy Fluoroscopic time: 1.2 minutes Total dose: 12 mGy COMPARISON: 10/05/2022 HISTORY: ORDERING SYSTEM PROVIDED HISTORY: concerns for aspiration, previous stroke TECHNOLOGIST PROVIDED HISTORY: Reason for exam:->concerns for aspiration, previous stroke What reading provider will be dictating this exam?->[facility] FINDINGS: --Cervical spine multilevel anterior fusion. --Combined oral and pharyngeal phase mild swallowing delay. No persistent barium residuals. --Laryngeal penetration with thin liquid barium when using a straw. No barium aspiration. Mildly impaired swallowing mechanism evident by swallowing delay and followed by laryngeal penetration with thin liquid barium when using a straw. No barium aspiration. Please see separate speech pathology report for full discussion of findings and recommendations. FL MODIFIED BARIUM SWALLOW W VIDEO    Result Date: 10/5/2022  EXAMINATION: MODIFIED BARIUM SWALLOW WAS PERFORMED IN CONJUNCTION WITH SPEECH PATHOLOGY SERVICES WITH ULI UNIT DICOM DISPLAY TECHNIQUE: Fluoroscopic evaluation of the swallowing mechanism was performed using cineradiography with multiple consistency of barium product in conjunction with speech pathology services.  FLUOROSCOPY DOSE AND TYPE OR TIME AND EXPOSURES: Images: Cine fluoroscopy Fluoroscopic time: 1.3 minutes Total dose: 12 mGy COMPARISON: None HISTORY: ORDERING SYSTEM PROVIDED HISTORY: aspiration risk evaluation TECHNOLOGIST PROVIDED HISTORY: Reason for exam:->aspiration risk evaluation What reading provider will be dictating this exam?->[facility] FINDINGS: --The patient is edentulous. Combined oral and pharyngeal phase swallowing delay. No significant barium residuals. --Pharyngeal phase laryngeal penetration with thin liquid barium when using a straw. No barium aspiration. 1.  Mildly impaired swallowing mechanism with swallowing delay and followed by laryngeal penetration with thin liquid barium when using a straw. No barium aspiration 2. Please see separate speech pathology report for full discussion of findings and recommendations. RECOMMENDATIONS: Unavailable     US DUP LOWER EXTREMITIES BILATERAL VENOUS    Result Date: 10/17/2022  Patient MRN:  [id number] : 1955 Age: 74 years Gender: Male Order Date:  10/17/2022 12:18 PM EXAM: US DUP LOWER EXTREMITIES BILATERAL VENOUS NUMBER OF IMAGES:  [id number] INDICATION:  DVT, pain on palpation posteiror calf L DVT, pain on palpation posteiror calf L What reading provider will be dictating this exam?->[facility] Study is technically limited Within the visualized vessels, there is no evidence for deep venous thrombosis There is good compressibility, there is good augmentation, there is good color flow. Within the visualized vessels there is no evidence for deep venous thrombosis       Discharge Medications:      Medication List        START taking these medications      atorvastatin 80 MG tablet  Commonly known as: LIPITOR  Take 1 tablet by mouth nightly     bumetanide 2 MG tablet  Commonly known as: BUMEX  Take 1 tablet by mouth daily     collagenase 250 UNIT/GM ointment  Apply topically daily. Apply to right lateral foot wound     * miconazole 2 % powder  Commonly known as: MICOTIN  Apply topically 2 times daily. Apply to groins,medial thighs, back     * miconazole nitrate 2 % Oint  Apply topically 2 times daily Apply to buttocks, thighs     * miconazole nitrate 2 % Oint  Apply topically 3 times daily as needed (when soiled) Apply to buttocks, thighs     pantoprazole 40 MG tablet  Commonly known as: PROTONIX  Take 1 tablet by mouth 2 times daily     white petrolatum Oint ointment  Apply topically in the morning and at bedtime Apply to scrotum, dry skin           * This list has 3 medication(s) that are the same as other medications prescribed for you. Read the directions carefully, and ask your doctor or other care provider to review them with you.                 CHANGE how you take these medications      hydrALAZINE 50 MG tablet  Commonly known as: APRESOLINE  Take 1 tablet by mouth every 8 hours  What changed:   medication strength  how much to take  when to take this     metoprolol succinate 50 MG extended release tablet  Commonly known as: TOPROL XL  Take 1 tablet by mouth daily            CONTINUE taking these medications      acetaminophen 325 MG tablet  Commonly known as: TYLENOL     amLODIPine 5 MG tablet  Commonly known as: NORVASC     aspirin 81 MG chewable tablet     benzonatate 100 MG capsule  Commonly known as: TESSALON     carbidopa-levodopa  MG per tablet  Commonly known as: SINEMET     cetirizine 10 MG tablet  Commonly known as: ZYRTEC     cloNIDine 0.1 MG tablet  Commonly known as: CATAPRES     clopidogrel 75 MG tablet  Commonly known as: Plavix  Take 1 tablet by mouth daily     diphenhydrAMINE 25 MG capsule  Commonly known as: BENADRYL     ezetimibe 10 MG tablet  Commonly known as: ZETIA     fluticasone 50 MCG/ACT nasal spray  Commonly known as: FLONASE     gabapentin 600 MG tablet  Commonly known as: NEURONTIN     HYDROcodone-acetaminophen 5-325 MG per tablet  Commonly known as: NORCO     Toujeo Max SoloStar 300 UNIT/ML Sopn  Generic drug: Insulin Glargine (2 Unit Dial)            STOP taking these medications      apixaban 5 MG Tabs tablet  Commonly known as: ELIQUIS     carvedilol 25 MG tablet  Commonly known as: COREG     piperacillin-tazobactam 3-0.375 GM per 50ML IVPB extended infusion  Commonly known as: ZOSYN               Where to Get Your Medications        Information about where to get these medications is not yet available    Ask your nurse or doctor about these medications  atorvastatin 80 MG tablet  bumetanide 2 MG tablet  clopidogrel 75 MG tablet  collagenase 250 UNIT/GM ointment  hydrALAZINE 50 MG tablet  metoprolol succinate 50 MG extended release tablet  miconazole 2 % powder  miconazole nitrate 2 % Oint  miconazole nitrate 2 % Oint  pantoprazole 40 MG tablet  white petrolatum Oint ointment         Time Spent on discharge is more than 45 minutes in the examination, evaluation, counseling and review of medications and discharge plan.    +++++++++++++++++++++++++++++++++++++++++++++++++  Castro Arrington , CONSTANTINE - NP  [address]  +++++++++++++++++++++++++++++++++++++++++++++++++  NOTE: This report was transcribed using voice recognition software. Every effort was made to ensure accuracy; however, inadvertent computerized transcription errors may be present.

## 2022-10-25 NOTE — PROGRESS NOTES
He is being discharged today and need wound care orders  for discharge to [facility] do you want the wound vac continued ? Message sent to Dr Danyelle Villanueva .

## 2022-10-25 NOTE — PROGRESS NOTES
[facility]  Nephrology Progress Note    Patient's Name: Ya Victor    History of Present Illness from 9/30 consult Note:    Archana Chavez is a 74 y.o. male history of CAD s/p MI, hypertension, chronic kidney disease stage IIIa (baseline creatinine of recent 1.7-1.9) he is followed by our group with Dr. Iza Kaur. He initially was admitted to [facility] with right lower extremity wound. .  Patient subsequently developed aphasia noted to facial droop associated with right-sided weakness. He was transferred to [address] [place] [address] for further management. Patient was admitted told the neuro ICU. He required intubation with mechanical ventilation. CT of the head and neck demonstrated bilateral carotid stenosis with the left ICA being dominant. IR performed angiogram with angioplasty of the left ICA. Laboratory data showed progressive increase in his serum creatinine to currently 2.9 mg/dL. Hemoglobin was noted to be 5.6. He has received at least 2 units of packed cells but with further drop in his hemoglobin with requirement for additional transfusion. He had an episode of urinary retention with gross hematuria Mahan catheter was reinserted and the hematuria subsequently cleared. Renal is consulted for LARY. \"    Subjective:    10/25: Patient was seen and examined. He reports that he feels good. He reports that his right foot is sore. He denies any shortness of breath. He denies any abdominal pain or nausea.     PMH:    Past Medical History:   Diagnosis Date    Chronic back pain     CKD (chronic kidney disease)     CVA (cerebral vascular accident) ([address]     CVA (cerebral vascular accident) ([address]     DM (diabetes mellitus) ([address]     Major depression     MI (myocardial infarction) ([address]     MATT (obstructive sleep apnea)     Parkinson disease ([address]     PVD (peripheral vascular disease) ([address]     Seizure ([address]        Patient Active Problem List   Diagnosis    Acute cerebrovascular accident (CVA) ([address] Acute respiratory failure with hypoxia (HCC)    Stenosis of left carotid artery    Hypoalbuminemia    Electrolyte imbalance    Hypernatremia    Anemia    GI bleeding       Diet:    ADULT DIET; Dysphagia - Minced and Moist; 4 carb choices (60 gm/meal); Less than 60 gm  ADULT ORAL NUTRITION SUPPLEMENT; Breakfast, Dinner; Wound Healing Oral Supplement  ADULT ORAL NUTRITION SUPPLEMENT; Lunch, Dinner; Fortified Pudding Oral Supplement    Meds:     clopidogrel  75 mg Oral Daily    insulin lispro  0-8 Units SubCUTAneous TID WC    insulin lispro  0-4 Units SubCUTAneous Nightly    bumetanide  2 mg Oral Daily    cloNIDine  0.2 mg Oral TID    chlorhexidine  15 mL Mouth/Throat BID    aspirin  81 mg Oral Daily    amLODIPine  10 mg Oral Daily    metoprolol succinate  50 mg Oral Daily    QUEtiapine  50 mg Oral BID    miconazole nitrate   Topical BID    gabapentin  600 mg Oral 4x daily    white petrolatum   Topical BID    sodium chloride flush  5-40 mL IntraVENous 2 times per day    miconazole   Topical BID    [Held by provider] epoetin sharath-epbx  6,000 Units SubCUTAneous Once per day on Mon Wed Fri    hydrALAZINE  50 mg Oral 3 times per day    pantoprazole (PROTONIX) 40 mg injection  40 mg IntraVENous Q12H    sennosides  5 mL Oral Nightly    carbidopa-levodopa  1 tablet Per NG tube TID    ipratropium-albuterol  1 ampule Inhalation Q4H WA    collagenase   Topical Q MWF    benzonatate  100 mg Oral TID    atorvastatin  80 mg Oral Nightly        sodium chloride      dextrose         Meds prn:     sodium chloride flush, morphine, hydrALAZINE, miconazole nitrate **AND** miconazole nitrate, sodium chloride, labetalol, glucose, dextrose bolus **OR** dextrose bolus, glucagon (rDNA), dextrose    Meds prior to admission:     No current facility-administered medications on file prior to encounter.      Current Outpatient Medications on File Prior to Encounter   Medication Sig Dispense Refill    aspirin 81 MG chewable tablet Take 81 mg by mouth daily      carbidopa-levodopa (SINEMET)  MG per tablet Take 1 tablet by mouth 3 times daily      cetirizine (ZYRTEC) 10 MG tablet Take 10 mg by mouth daily      diphenhydrAMINE (BENADRYL) 25 MG capsule Take 25 mg by mouth every 6 hours      ezetimibe (ZETIA) 10 MG tablet Take 10 mg by mouth daily      fluticasone (FLONASE) 50 MCG/ACT nasal spray 2 sprays by Each Nostril route daily      gabapentin (NEURONTIN) 600 MG tablet Take 600 mg by mouth 4 times daily. HYDROcodone-acetaminophen (NORCO) 5-325 MG per tablet Take 1 tablet by mouth 2 times daily. Insulin Glargine, 2 Unit Dial, (TOUJEO MAX SOLOSTAR) 300 UNIT/ML SOPN Inject 66 Units into the skin daily      acetaminophen (TYLENOL) 325 MG tablet Take 650 mg by mouth every 6 hours as needed for Pain      amLODIPine (NORVASC) 5 MG tablet Take 5 mg by mouth daily      benzonatate (TESSALON) 100 MG capsule Take 100 mg by mouth 3 times daily      cloNIDine (CATAPRES) 0.1 MG tablet Take 0.1 mg by mouth in the morning and 0.1 mg in the evening. Allergies:    Codeine    Social History:         Family History:     History reviewed. No pertinent family history. Physical Exam:      Patient Vitals for the past 24 hrs:   BP Temp Temp src Pulse Resp SpO2   10/25/22 0725 112/68 97.7 °F (36.5 °C) Temporal 74 18 100 %   10/24/22 2200 (!) 161/65 -- -- -- -- --   10/24/22 2000 (!) 140/82 97.4 °F (36.3 °C) Temporal 74 20 97 %   10/24/22 1504 101/72 -- -- -- -- --           Intake/Output Summary (Last 24 hours) at 10/25/2022 1120  Last data filed at 10/25/2022 0847  Gross per 24 hour   Intake 300 ml   Output 1900 ml   Net -1600 ml     General: Awake, no acute distress  Neck: No JVD noted  Lungs: Clear bilaterally upper, diminished to the bases bilaterally. Unlabored  CV: Regular rate and rhythm. No rub  Abd: Soft, nontender, nondistended. Active bowel sounds  Skin: Warm and dry.   No rash on exposed extremities  Ext: No edema; RLE dressing in place  Neuro: Awake, answers questions appropriately    Data:    Recent Labs     10/23/22  0527 10/24/22  0610 10/25/22  0454   WBC 4.9 5.6 5.0   HGB 9.3* 9.1* 9.3*   HCT 29.4* 28.3* 27.9*   MCV 88.8 87.1 86.4    294 278         Recent Labs     10/23/22  0527 10/24/22  0610 10/25/22  0454    136 137   K 3.6  3.6 3.4*  3.4* 3.5   CL 95* 93* 94*   CO2 33* 29 30*   CREATININE 1.6* 1.6* 1.6*   BUN 24* 23 28*   LABGLOM 47 47 47   GLUCOSE 130* 133* 139*   CALCIUM 9.0 9.0 8.9   PHOS 3.6 3.7 3.7   MG 1.8 1.7 1.6         No results found for: VITD25    No results found for: PTH    No results for input(s): ALT, AST, ALKPHOS, BILITOT, BILIDIR in the last 72 hours. Recent Labs     10/23/22  0527 10/24/22  0610   LABALBU 3.4* 3.1*         Ferritin   Date Value Ref Range Status   10/18/2022 228 ng/mL Final     Comment:     FERRITIN Reference Ranges:  Adult Males   20 - 60 years:    30 - 400 ng/mL  Adult females 16 - 60 years:    15 - 150 ng/mL  Adults greater than 60 years:   no established reference range  Pediatrics:                     no established reference range       Iron   Date Value Ref Range Status   10/18/2022 24 (L) 59 - 158 mcg/dL Final     TIBC   Date Value Ref Range Status   10/18/2022 139 (L) 250 - 450 mcg/dL Final       Vitamin B-12   Date Value Ref Range Status   10/18/2022 753 211 - 946 pg/mL Final       Folate   Date Value Ref Range Status   10/18/2022 10.5 4.8 - 24.2 ng/mL Final       No results found for: VOL, APPEARANCE, COLORU, LABSPEC, LABPH, LEUKBLD, NITRU, GLUCOSEU, KETUA, UROBILINOGEN, KETUA, UROBILINOGEN, BILIRUBINUR, OCBU    Lab Results   Component Value Date/Time    KIRTI Miller 10/14/2022 02:10 PM       No components found for: URIC    No results found for: LIPIDPAN    Assessment and Plans:     LARY on CKD 3B  Likely in the setting of ACEI use, contrast nephrotoxicity; component of urinary retention  Baseline creatinine 1.7-1.9  Creatinine peaked at 3 on 10/3--> 1.6 today  S/p Bumex drip  Avoid nephrotoxins/NSAIDs-adjust meds for level of renal function  Strict I&O, daily weights  On Bumex 2 mg oral daily  Monitor labs    2. Acute CVA  MRI brain 9/28 2 small regions petechial infarcts 1 in the left posterior frontal lobe and second in the left parietal lobe  S/p IR angioplasty and stent 9/27  Neurology previously following    3. Anemia  Hemoglobin target 10-12  Hemoglobin 9.3 today-below target  S/p EGD 10/3 no evidence of upper GI bleed  S/p colonoscopy 10/9 blood in the right colon  S/p colonoscopy polypectomy, biopsy 10/18  S/p PRBCs  Transfuse hemoglobin<7  Monitor H&H    4.  Hypertensive emergency  BP goal<130/80  BP at goal  Monitor on current regimen    5. Urinary retention  Mahan  Urology previously following    6. Respiratory failure  S/p intubation/extubation    7. Foot wound, right  S/p antibiotics  ID previously following  Podiatry following    8. HFpEF  Echo 9/2022 EF 60 to 65%, mild tricuspid regurg, mild mitral regurg, severe concentric left ventricular hypertrophy  UOP 1900 mL last 24 hours, net -25.1 L  On Bumex 2 mg oral daily  Strict I&O, daily weights  Monitor volume status    9. Hypokalemia  Likely in the setting of diuresis  K+ 3.5 today  Supplement as needed  Monitor labs    Mayito Fox, APRN - CNP    Patient seen and examined all key components of the physical performed independently , case discussed with NP, all pertinent labs and radiologic tests personally reviewed agree with above.       Rolando Walsh MD

## 2022-10-25 NOTE — CARE COORDINATION
10/25/22 Update CM Note: Patient is now on general medical floor from telemetry. He is up on the side of the bed currently with therapies. The PA precert was initiated yesterday and is pending for patient to discharge to [facility] for therapies. He remains with a wound vac to his Right leg. He remains on iv protonix bid. He is now on room air and sats are 100%. Labs remain stable. Will await precert for patient to discharge.  Electronically signed by Brook Pinedo RN CM on 10/25/2022 at 9:48 AM

## 2022-10-25 NOTE — PROGRESS NOTES
Nurse to nurse called to the [facility] in [facility] spoke to CHANTEL

## 2022-10-25 NOTE — CARE COORDINATION
10/25, Per CM auth was given on patient for him to go to the [facility] in [facility]. Patient informed. Patient requested that Pratima Healy and Robert Hurst on patient's chart be called. Contacted both Pratima Haywardr and Robert Hurst and informed them of patient leaving later today. Patient is set up for an 8:30pm  time to go to [facility] to [facility] patient up. Patient will need a COVID test which was given to RN. Nursing and Navya To from [facility] informed of  time. Contact number for the [facility] for report is [phone] and fax # is [phone]. Auth information to be faxed over to Navya To by Jluis Toney in the [facility] department. All discharge paperwork on soft chart. ELEAZAR/CM to follow.       ERI Goyal  [facility] Case Management  [phone]

## 2022-10-25 NOTE — PROGRESS NOTES
Department of Podiatry   Progress Note    Patient  seen and evaluated at bedside this morning. No acute events overnight. Denies n/v//d//f/c. Wound vac remains intact with no excessive drainage and running continuous pressure. Patient is not very talkative today. Past Medical History:            Diagnosis Date    Chronic back pain     CKD (chronic kidney disease)     CVA (cerebral vascular accident) ([address]     CVA (cerebral vascular accident) ([address]     DM (diabetes mellitus) ([facility] [address]     Major depression     MI (myocardial infarction) ([facility] [address]     MATT (obstructive sleep apnea)     Parkinson disease ([facility] [address]     PVD (peripheral vascular disease) ([facility] [address]     Seizure ([facility] [address]        Past Surgical History:        Procedure Laterality Date    COLONOSCOPY N/A 10/9/2022    COLONOSCOPY DIAGNOSTIC performed by Brigitte Montero MD at [address] [facility] [facility] N/A 10/18/2022    COLONOSCOPY POLYPECTOMY SNARE/COLD BIOPSY performed by Syl Odell MD at [place] [address]      IR CAROTID STENT UNI W PROTECTION  9/27/2022    IR CAROTID STENT UNI W PROTECTION 9/27/2022 Jeanette Lugo MD [facility]    UPPER GASTROINTESTINAL ENDOSCOPY N/A 10/3/2022    EGD DIAGNOSTIC ONLY performed by Babita Bennett MD at [facility] ENDOSCOPY       Medications Prior to Admission:    Medications Prior to Admission: aspirin 81 MG chewable tablet, Take 81 mg by mouth daily  carbidopa-levodopa (SINEMET)  MG per tablet, Take 1 tablet by mouth 3 times daily  cetirizine (ZYRTEC) 10 MG tablet, Take 10 mg by mouth daily  diphenhydrAMINE (BENADRYL) 25 MG capsule, Take 25 mg by mouth every 6 hours  ezetimibe (ZETIA) 10 MG tablet, Take 10 mg by mouth daily  fluticasone (FLONASE) 50 MCG/ACT nasal spray, 2 sprays by Each Nostril route daily  gabapentin (NEURONTIN) 600 MG tablet, Take 600 mg by mouth 4 times daily. HYDROcodone-acetaminophen (NORCO) 5-325 MG per tablet, Take 1 tablet by mouth 2 times daily.   Insulin Glargine, 2 Unit Dial, (TOUJEO MAX SOLOSTAR) 300 UNIT/ML SOPN, Inject 66 Units into the skin daily  acetaminophen (TYLENOL) 325 MG tablet, Take 650 mg by mouth every 6 hours as needed for Pain  amLODIPine (NORVASC) 5 MG tablet, Take 5 mg by mouth daily  benzonatate (TESSALON) 100 MG capsule, Take 100 mg by mouth 3 times daily  cloNIDine (CATAPRES) 0.1 MG tablet, Take 0.1 mg by mouth in the morning and 0.1 mg in the evening. [DISCONTINUED] apixaban (ELIQUIS) 5 MG TABS tablet, Take 5 mg by mouth 2 times daily  [DISCONTINUED] carvedilol (COREG) 25 MG tablet, Take 25 mg by mouth 2 times daily (with meals)  [DISCONTINUED] piperacillin-tazobactam (ZOSYN) 3-0.375 GM per 50ML IVPB extended infusion, Infuse 4.5 mg intravenously in the morning and 4.5 mg at noon and 4.5 mg in the evening. [DISCONTINUED] hydrALAZINE (APRESOLINE) 100 MG tablet, Take 100 mg by mouth 3 times daily    Allergies:  Codeine    Social History:   TOBACCO:   has no history on file for tobacco use. ETOH:   has no history on file for alcohol use. DRUGS:   Social History     Substance and Sexual Activity   Drug Use Not on file       Family History:   History reviewed. No pertinent family history. REVIEW OF SYSTEMS:    All pertinent positives and negatives as noted in HPI       Exam Wound vac was left intact. Physical exam was limited at this time due to wound vac application. Webspaces 3,4 right side appear to have less drainage than yesterday. Physical exam from 10/24/22  VASCULAR:  DP and PT pulses are non palpable. CFT < 5 seconds B/L. Warm to warm from the tibial tuberosity to the distal aspect of the digits dorsally. NEUROLOGIC:  Protective sensation is diminished but grossly intact    DERM:  Right foot lateral plantar wound measuring approx 6cm in diameter. Wound has a notably hyperkeratotic rim. No erythema, serosanguinous drainage. Webspaces 3,4 right side appear macerated, broken down with purulence, malodor.     MUSCULOSKELETAL: deferred  Images from 10/24/22                CONSULTS:  IP CONSULT TO CRITICAL CARE  IP CONSULT TO DIETITIAN  IP CONSULT TO CARDIOLOGY  IP CONSULT TO PODIATRY  IP CONSULT TO PODIATRY  IP CONSULT TO UROLOGY  IP CONSULT TO NEPHROLOGY  IP CONSULT TO GENERAL SURGERY  IP CONSULT TO INFECTIOUS DISEASES  IP CONSULT TO GI  IP CONSULT TO GENERAL SURGERY  IP CONSULT TO GENERAL SURGERY  IP CONSULT TO PALLIATIVE CARE  IP CONSULT TO DIETITIAN    MEDICATION:  Scheduled Meds:   clopidogrel  75 mg Oral Daily    insulin lispro  0-8 Units SubCUTAneous TID WC    insulin lispro  0-4 Units SubCUTAneous Nightly    bumetanide  2 mg Oral Daily    cloNIDine  0.2 mg Oral TID    chlorhexidine  15 mL Mouth/Throat BID    aspirin  81 mg Oral Daily    amLODIPine  10 mg Oral Daily    metoprolol succinate  50 mg Oral Daily    QUEtiapine  50 mg Oral BID    miconazole nitrate   Topical BID    gabapentin  600 mg Oral 4x daily    white petrolatum   Topical BID    sodium chloride flush  5-40 mL IntraVENous 2 times per day    miconazole   Topical BID    [Held by provider] epoetin sharath-epbx  6,000 Units SubCUTAneous Once per day on Mon Wed Fri    hydrALAZINE  50 mg Oral 3 times per day    pantoprazole (PROTONIX) 40 mg injection  40 mg IntraVENous Q12H    sennosides  5 mL Oral Nightly    carbidopa-levodopa  1 tablet Per NG tube TID    ipratropium-albuterol  1 ampule Inhalation Q4H WA    collagenase   Topical Q MWF    benzonatate  100 mg Oral TID    atorvastatin  80 mg Oral Nightly     Continuous Infusions:   sodium chloride      dextrose       PRN Meds:.sodium chloride flush, morphine, hydrALAZINE, miconazole nitrate **AND** miconazole nitrate, sodium chloride, labetalol, glucose, dextrose bolus **OR** dextrose bolus, glucagon (rDNA), dextrose    RADIOLOGY:  XR CHEST PORTABLE   Final Result   Bibasilar pleural and parenchymal disease, left greater than right. XR CHEST PORTABLE   Final Result   No interval change         XR CHEST PORTABLE   Final Result   1.  No interval change in the bilateral perihilar and lower lobe airspace   disease. XR CHEST PORTABLE   Final Result   Stable chest series. Persistent retrocardiac and bibasilar opacities and   bilateral faint interstitial opacities. Small pleural effusions. XR CHEST PORTABLE   Final Result   Improved aeration of the lungs bilaterally with persistent hazy opacities at   both lung bases. A small, loculated right pleural effusion is decreased in   size from yesterday. Fluoroscopy modified barium swallow with video   Final Result   Mildly impaired swallowing mechanism evident by swallowing delay and followed   by laryngeal penetration with thin liquid barium when using a straw. No   barium aspiration. Please see separate speech pathology report for full discussion of findings   and recommendations. XR CHEST PORTABLE   Final Result   Findings for volume overload as observed the in October 19. XR CHEST PORTABLE   Final Result   The enteric tube has been removed, otherwise stable appearance of the chest   compared to 10/18/2022. XR CHEST PORTABLE   Final Result   No interval change         US DUP LOWER EXTREMITIES BILATERAL VENOUS   Final Result   Within the visualized vessels there is no evidence for deep venous   thrombosis               XR CHEST PORTABLE   Final Result   Worsening congestive heart failure suspected         XR CHEST PORTABLE   Final Result   Very minimal improvement seen in the aeration of the upper lung fields with   stable increased markings seen within the mid and lower lung fields   bilaterally with small bilateral pleural effusions. Heart remains enlarged. XR CHEST PORTABLE   Final Result   New right pleural effusion. XR CHEST PORTABLE   Final Result   Stable bilateral pulmonary infiltrates. XR ABDOMEN (KUB) (SINGLE AP VIEW)   Final Result   Catheter is in the stomach.          XR ABDOMEN FOR NG/OG/NE TUBE PLACEMENT   Final Result   1. Gastric catheter is coiled in the pharynx and upper esophagus and should   be withdrawn and reinserted. Follow-up imaging recommended. XR CHEST PORTABLE   Final Result   Bilateral airspace disease with interval progression on the right. NM GI BLOOD LOSS   Final Result   No evidence of active GI bleeding during acquisition. XR CHEST PORTABLE   Final Result   Unchanged bilateral chest opacities with right pleural effusion evident. See   above. XR CHEST PORTABLE   Final Result   1. Persistent bilateral patchy parenchymal densities concerning for pneumonia   and or atelectasis   2. Persistent small bilateral pleural effusions, left greater than right   3. Cardiomegaly, stable         XR CHEST PORTABLE   Final Result   No significant change compared to the prior exam with cardiomegaly, bilateral   airspace opacities and bilateral pleural effusions seen most suggestive of   congestive heart failure, but superimposed pneumonia not excluded. US DUP ABD PEL RETRO SCROT COMPLETE   Final Result   1. No evidence of testicular torsion      2. Severe scrotal skin thickening. 3.  Trace hydroceles. 4.  No evidence of bowel containing inguinal hernia. US SCROTUM AND TESTICLES   Final Result   1. No evidence of testicular torsion      2. Severe scrotal skin thickening. 3.  Trace hydroceles. 4.  No evidence of bowel containing inguinal hernia. XR CHEST PORTABLE   Final Result   1. No significant interval change in the appearance of the chest.      2.  Bilateral pleural effusions and areas of atelectasis or multifocal   pneumonia.          CTA ABDOMEN PELVIS W CONTRAST   Final Result   Mixed densities throughout the colonic segments including in the ascending   colon and rectum could represent mixed densities of blood products although   no focal area of arterial enhancement or extravasation/blushing is observed   to localize acute hemorrhage. No evidence for perforation or abscess. No   pneumatosis intestinalis or portal venous gas evident. 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 No adjacent hematoma with only minimal   adjacent stranding. Bilateral pleural effusions moderate to large right and moderate left   effusions with adjacent atelectasis. Anasarca. XR CHEST PORTABLE   Final Result   Bilateral pleural effusions with bibasilar patchy infiltrates and mild   cardiomegaly. NM GI BLOOD LOSS   Final Result   Active GI bleeding identified during acquisition in the splenic flexure with   peristalsis identified into the proximal descending colon. RECOMMENDATIONS:   Unavailable         CT ABDOMEN PELVIS WO CONTRAST   Final Result   CHEST:      No evidence of neoplastic disease, allowing for limitations of noncontrast   technique. Moderate bilateral pleural effusions. Nonemergent incidental findings as above. ABDOMEN/PELVIS:      No evidence of neoplastic disease, allowing for limitations of noncontrast   technique. Bladder wall thickening is nonspecific given under distension; recommend   correlation urinalysis to evaluate for UTI. Nonemergent incidental findings as above. CT CHEST WO CONTRAST   Final Result   CHEST:      No evidence of neoplastic disease, allowing for limitations of noncontrast   technique. Moderate bilateral pleural effusions. Nonemergent incidental findings as above. ABDOMEN/PELVIS:      No evidence of neoplastic disease, allowing for limitations of noncontrast   technique. Bladder wall thickening is nonspecific given under distension; recommend   correlation urinalysis to evaluate for UTI. Nonemergent incidental findings as above.          FL MODIFIED BARIUM SWALLOW W VIDEO   Final Result   1. Mildly impaired swallowing mechanism with swallowing delay and followed   by laryngeal penetration with thin liquid barium when using a straw. No   barium aspiration      2. Please see separate speech pathology report for full discussion of   findings and recommendations. RECOMMENDATIONS:   Unavailable         XR ABDOMEN (KUB) (SINGLE AP VIEW)   Final Result   Somewhat greater than average quantity of retained fecal material thin the   lower GI tract suggesting dysfunction with evacuation. XR CHEST PORTABLE   Final Result   1. Atherosclerotic disease and mild cardiomegaly. 2.  Persistent but improved opacities in the bilateral lungs most pronounced   in the mid and lower lungs. There appears to be a small left and trace right   pleural effusion         XR FOOT RIGHT (2 VIEWS)   Final Result   No evidence of calcaneal osteomyelitis. CT HEAD WO CONTRAST   Final Result   Parenchymal volume loss and chronic microvascular ischemic changes. No acute intracranial abnormality. Ethmoid and sphenoid sinusitis. Right mastoid effusion. US RETROPERITONEAL COMPLETE   Final Result   1. Normal appearance of the bilateral kidneys. No hydronephrosis. 2.  A Mahan catheter is decompressing the bladder. XR CHEST PORTABLE   Final Result   Worsening infiltrate and or atelectasis on the left, stable on the right with   suspected layering pleural effusion. XR CHEST PORTABLE   Final Result   Unchanged bilateral atelectasis and or infiltrate as well as small right   pleural effusion. MRI BRAIN WO CONTRAST   Final Result   There are 2 small regions of petechial infarcts one in the left   posterior frontal lobe and a second in the left parietal lobe not   exceeding 3 mm. There is otherwise extensive small vessel ischemic   changes         XR CHEST PORTABLE   Final Result   1.  Multifocal bilateral airspace disease more prominent within the lower lobes.  The airspace disease is unchanged when compared with the prior study. CT HEAD WO CONTRAST   Final Result   Diffuse atrophy likely age related   Findings compatible with small vessel ischemic changes. XR CHEST PORTABLE   Final Result   1. Pulmonary opacities present favoring edema and atelectasis, also small   pleural effusions, but nonspecific with some additional considerations noted   above   2. Support devices present as described above   3. Heart size appears borderline enlarged         XR ABDOMEN FOR NG/OG/NE TUBE PLACEMENT   Final Result   NG/OG is in the stomach. IR CAROTID STENT W PROTECTION   Final Result   1. Angiogram demonstrates successful placement of a carotid stent ,   post procedure images demonstrate a widely patent stent and internal   carotid         CT HEAD WO CONTRAST   Final Result   Diffuse atrophy likely age related   Findings compatible with small vessel ischemic changes. Findings were called at the time of dictation         CT BRAIN PERFUSION   Final Result      No significant ischemic penumbra identified      This study was analyzed by the [employer] ai algorithm. CTA NECK W CONTRAST   Final Result   1. Estimated stenosis of the proximal right and left internal carotid   artery by NASCET criteria is greater than 90% on the left   2. Severe atherosclerotic disease . 3. No large vessel occlusion identified            This study was analyzed by the [employer] ai algorithm. CTA HEAD W CONTRAST   Final Result   1. Estimated stenosis of the proximal right and left internal carotid   artery by NASCET criteria is greater than 90% on the left   2. Severe atherosclerotic disease . 3. No large vessel occlusion identified            This study was analyzed by the [employer] ai algorithm.                    Vitals:    /63   Pulse 77   Temp 97.8 °F (36.6 °C) (Temporal)   Resp 16   Ht 5' 8\" (1.727 m)   Wt 195 lb (88.5 kg)   SpO2 94%   BMI 29.65 kg/m²     LABS:   Recent Labs     10/24/22  0610 10/25/22  0454   WBC 5.6 5.0   HGB 9.1* 9.3*   HCT 28.3* 27.9*    278     Recent Labs     10/25/22  0454      K 3.5   CL 94*   CO2 30*   PHOS 3.7   BUN 28*   CREATININE 1.6*     No results for input(s): PROT, INR, APTT in the last 72 hours. ASSESSMENTS:   1. Right foot wound diabetic ulcer  2. DM  3. Pain right foot  4. Wounds of webspaces 3,4 right  Acute cerebrovascular accident (CVA) ([facility] [address]       PLAN:  -Patient examined and evaluated at bedside. All pertinent labs, charts, and imaging reviewed prior to encounter   -Culture: Corynebacteria  -Continue Wound vac changes to [facility]. -X rays: No acute osseous abnormalities to foot   -Venous studies: No evidence of DVT  - Webspaces 3,4 were dressed with betadine, dsd. To use Faiza going forward.  -Will continue conservative care for now.    -Patient discussed with Dr. Stanford Wilde  -Continue to offload

## 2022-10-25 NOTE — PROGRESS NOTES
Occupational Therapy  OT BEDSIDE TREATMENT NOTE   [facility] [address]  [address] [place] [address]      Date:10/25/2022  Patient Name: Ag Caldera  MRN: [id number]  : 1955  Room: [address] [place] [address]-A     Per eval:  Re-evaluating OT: Haydee Creeks OTD, OTR/L, [id number]     Evaluating OT: Haydee Creeks, OTD,  OTR/L; [id number]     Re-evaluation indicated s/p pt transferred to MICU with GI bleed requiring ICU monitoring. Referring Provider: Xin Chan DO   Specific Provider Orders/Date: OT eval and treat (10/3/22)     Diagnosis: Acute cerebrovascular accident (CVA) ([address] [I63.9]      Reason for admission: Pt admitted with Acute CVA, R heel wound. Surgery/Procedures:   10/3: EGD   10/9: Colonoscopy  10/15: Colonoscopy  10/18: Colonoscopy     Pertinent Medical History:    Past Medical History        Past Medical History:   Diagnosis Date    Chronic back pain      CKD (chronic kidney disease)      CVA (cerebral vascular accident) ([address]      CVA (cerebral vascular accident) ([address]      DM (diabetes mellitus) ([address]      Major depression      MI (myocardial infarction) ([address]      MATT (obstructive sleep apnea)      Parkinson disease ([address]      PVD (peripheral vascular disease) ([address]      Seizure ([address]               *Precautions:  Fall Risk, , R shaista, expressive > receptive aphasia, RLE wound with wound vac, NWB RLE, off-loading shoe to RLE. Assessment of current deficits   [x] Functional mobility          [x]ADLs           [x] Strength                  [x]Cognition   [x] Functional transfers        [x] IADLs         [x] Safety Awareness   [x]Endurance   [x] Fine Coordination           [x] ROM           [x] Vision/perception    []Sensation     [x]Gross Motor Coordination [x] Balance    [] Delirium                  [x]Motor Control     [x] Communication     OT PLAN OF CARE   OT POC based on physician orders, patient diagnosis and results of clinical assessment. Frequency/Duration: 1-3 days/wk for 1-2 weeks PRN    Specific OT Treatment Interventions to include:   * Instruction/training on adapted ADL techniques and AE recommendations to increase functional independence within precautions       * Training on energy conservation strategies, correct breathing pattern and techniques to improve independence/tolerance for self-care routine  * Functional transfer/mobility training/DME recommendations for increased independence, safety, and fall prevention  * Patient/Family education to increase follow through with safety techniques and functional independence  * Recommendation of environmental modifications for increased safety with functional transfers/mobility and ADLs  * Cognitive retraining/development of therapeutic activities to improve problem solving, judgement, memory, and attention for increased safety/participation in ADL/IADL tasks  * Sensory re-education to improve body/limb awareness, maintain/improve skin integrity, and improve hand/UE motor function  * Visual-perceptual training to improve environmental scanning, visual attention/focus, and oculomotor skills for increased safety/independence with functional transfers/mobility and ADLs  * Splinting/positioning for increased function, prevention of contractures, and improve skin integrity  * Therapeutic exercise to improve motor endurance, ROM, and functional strength for ADLs/functional transfers  * Therapeutic activities to facilitate/challenge dynamic balance, stand tolerance for increased safety and independence with ADLs  * Therapeutic activities to facilitate gross/fine motor skills for increased independence with ADLs  * Neuro-muscular re-education: facilitation of righting/equilibrium reactions, midline orientation, scapular stability/mobility, normalization of muscle tone, and facilitation of volitional active controled movement  * Positioning to improve skin integrity, interaction with environment and functional independence  * Delirium prevention/treatment  * Manual techniques for edema management     Modified Ashe Scale   Score     Description  0             No symptoms  1             No significant disability despite symptoms  2             Slight disability; able to look after own affairs  3             Moderate disability; able to ambulate without assist/ requires assist with ADLs  4             Moderate/Severe disability;requires assist to ambulate/assist with ADLs  5             Severe disability;bedridden/incontinent   6               Score:   4     Recommended Adaptive Equipment: tub bench, LB AE, TBD pending progress      Home Living: Pt lives with family  in a 2 story home with 4+4-5 step(s) to enter and 1 rail(s); bed/bath on 2nd floor  Bathroom setup: tub/shower  Equipment owned: grab bars in shower, quad cane     Prior Level of Function: Ind with ADLs; Ind with IADLs. Quad cane for functional mobility. Driving: Unknown  Occupation: None stated     Pain Level: RLE     Cognition: A&O: 2/4; unable to report location/month; Follows 2 step commands; answers appropriately. Memory: G-             Comprehension: G-             Problem solving: F+             Judgement/safety: F+                Communication skills: Impaired; difficulty with word finding- increased time; easily frustrated. Vision: Impaired peripherals; requires increases font size/contrast/lighting. Glasses: Yes                                                       Hearing: WFL               RASS: 0  CAM-ICU: (NT) Delirium     UE Assessment:  Hand Dominance: Right []  Left []   ? ROM Strength STM goal: PRN   RUE  AROM  Shoulder flx: 0-50  Elbow: ~20 AROM  Wrist: ~10 AROM  Hand: digits 4,5 flx contracture.      PROM  Shoulder: 0-110  Elbow: WFL  Wrist: WFL  Hand: WFL Shoulder: 1/5  Elbow flx: 2+/5; ext: 2/5  Wrist flx: 2-/5; ext: 1/5  Thumb: 2-/5  : Poor+  FMC: Poor  GMC: Fair- Increase RUE strength for participation in ADLs. Demonstrate good knowledge of adaptive techniques for dressing. Good understanding of AAROM exercises. LUE WFL Proximal 4+/5  Elbow: 5/5  : Good  FMC: WFL  GMC: WFL Increase overall LUE strength         Sensation: c/o numbness/tinging to B feet. Tone: Hypertonicity in RUE flexors. Edema: Unremarkable. Functional Assessment:  AM-PAC Daily Activity Raw Score: 14/24    Initial Eval Status  Date: 10/3/22 Re-eval Status  Date: 10/19/22 Treatment Status  Date: 10/25/22 STGs = LTGs  Time frame: 7-14 days   Feeding Min A  To drink from cup with BUE for stabilization. Min A  For bilateral tasks SUP (bed level)                     S; set-up         Grooming Max A overall  Min A to wash face with LUE. Verbal cues for use of RUE. Max A SBA (seated EOB)                     Min A         UB dressing/bathing Max A Mod A  Pt demo fair use of shaista techniques to don gown in semi supine. UB dressing: Mod A (due to limited ROM)     UB bathing: SBA (simulated, pt declined)                     Min A  With use of shaista techniques         LB dressing/bathing Dep Dep LB dressing: Max A (assist with socks, pt crossed LLE to jeny sock, deferred R due to wound and wound vac)    LB bathing: Max A (simulated, pt declined)                     Mod A   With use of shaista techniques         Toileting Dep Dep Max A (while standing)                     Mod A      Bed Mobility  Supine to sit:   Mod A     Sit to supine:   NT  Pt seated in chair upon exit. Supine to sit:   Mod Ax2     Sit to supine:   NT  Pt seated in chair upon exit. Mod A                     SBA      Functional Transfers Sit to stand: Mod A x2     Stand to sit:   Mod A x2     Stand Pivot: Mod A x2  Verbal cues to maintain NWB of RLE. Sit to stand: Mod A x2     Stand to sit:   Mod A x2     Stand Pivot: Mod A x2        Fair maintenance of NWB to RLE requiring verbal cues.     Max A Mod A      Functional Mobility NT NT NT                     Mod A         Balance Sitting:     Static: Min A    Dynamic: Mod A  Standing: Mod A x2 Sitting:     Static: Min A<>SBA    Dynamic: Mod A  Standing: Mod A x2 Sitting: SBA    Standing: Mod A Sitting:     Static: SBA    Dynamic: Min A  Standing: Mod A                   Endurance/Activity Tolerance    fair tolerance with light activity. Fair- tolerance with light activity. fair                     WFL  For full ADL. Visual/  Perceptual Impaired: Pt reporting vision is \"dimming. \" Impaired peripheral vision with assessment, improving at midline. Impaired peripheral vision. Pt requires increased lighting, font size, and contrast to read board and small font. Comments: Cleared by RN to see pt. Upon arrival pt supine in bed and agreeable to OT session. Pt required vc's and physical assist for proper technique/safety with hand placement/body mechanics/posture for bed mobility/ADLs/functional tranfers. Pt required vc's for sequencing/initiation of ADLs/functional transfers. Pt educated on shaista dressing technique. Pt able to  sit EOB  ~7 mins to increase core strength/balance/activity tolerance for ease with ADLs. Pt required re-enforcement of NWB RLE. Pt required rest breaks during session. Pt appeared to have tolerated session fairly and appears cooperative/pleasant . Pt instructed on use of call light for assistance and fall prevention. Pt demo'ing fair understanding of education provided. Continue to educate. At end of session pt supine in bed, call light within reach. Pt has made fair progress towards set goals.    Continue with current plan of care    Treatment Time In: 2525           Treatment Time Out: 8:50             Treatment Charges: Mins Units   Ther Ex  [id number]     Manual Therapy [id number] [facility]     Thera Activities [id number] 15 1   ADL/Home Mgt [id number]     Neuro Re-ed [id number]     Group Therapy      Orthotic manage/training  [id number]     Non-Billable Time     Total Timed Treatment 15 [facility], [facility], OTR/L [id number]

## 2022-10-25 NOTE — PROGRESS NOTES
[facility] [place] ([facility])  Gastroenterology, Hepatology &  Advanced Endoscopy     Progress Note    SUBJECTIVE:      Patient resting comfortably. He denies abdominal pain. Hgb stable on ASA. He has started Plavix today. OBJECTIVE      Physical    VITALS:  /66   Pulse 74   Temp 97.7 °F (36.5 °C) (Temporal)   Resp 16   Ht 5' 8\" (1.727 m)   Wt 195 lb (88.5 kg)   SpO2 92%   BMI 29.65 kg/m²   Physical Exam:  General: Chronically ill-appearing, NAD  HEENT: PERRLA, EOMI, Anicteric sclera, MMM, no rhinorrhea  Cards: RRR, no LE edema  Resp: Breathing comfortably on room air, good air movement, no use of accessory muscles, no audible wheezing  Abdomen: soft, NT, ND. Extremities: Moves all extremities. RLE remains dressed with wound vac. Skin: No rashes or jaundice  Neuro: Fatigued but answers questions appropriately, non-focal.    ASSESSMENT AND PLAN      67y/M w/ CKD, DMII, and MATT who presents w/ acute CVA now on DAPT and R foot infection s/p debridement who has been having issues with persistent anemia requiring several transfusions. Over the weekend, he showed active signs of bleeding with positive tagged RBC scan. EGD negative, Colonoscopy with BRB in R colon and no actively bleeding lesions. CTA w/ no active extravasation. DAPT has since been d/c. Repeat colonoscopy off of therapy shows no high risk lesions. Hgb has now been stable on aspirin with no evidence of blood in stool      PLAN:  -Agree with addition of Plavix to Aspirin at this point.  -Continue monitoring Hgb daily.  -Continue to monitor stools for signs of bleeding. I will follow peripherally. Thank you for including us in the care of this patient. Please do not hesitate to contact us with any additional questions or concerns.      Rossi Brown MD  Gastroenterology/Hepatology  Advanced Endoscopy

## 2022-10-25 NOTE — PLAN OF CARE
Problem: Neurosensory - Adult  Goal: Achieves maximal functionality and self care  Outcome: Not Progressing     Problem: Discharge Planning  Goal: Discharge to home or other facility with appropriate resources  Outcome: Progressing     Problem: Pain  Goal: Verbalizes/displays adequate comfort level or baseline comfort level  Outcome: Progressing     Problem: Skin/Tissue Integrity  Goal: Absence of new skin breakdown  Description: 1. Monitor for areas of redness and/or skin breakdown  2. Assess vascular access sites hourly  3. Every 4-6 hours minimum:  Change oxygen saturation probe site  4. Every 4-6 hours:  If on nasal continuous positive airway pressure, respiratory therapy assess nares and determine need for appliance change or resting period.   Outcome: Progressing     Problem: Safety - Adult  Goal: Free from fall injury  Outcome: Progressing     Problem: ABCDS Injury Assessment  Goal: Absence of physical injury  Outcome: Progressing     Problem: Chronic Conditions and Co-morbidities  Goal: Patient's chronic conditions and co-morbidity symptoms are monitored and maintained or improved  Outcome: Progressing     Problem: Neurosensory - Adult  Goal: Achieves stable or improved neurological status  Outcome: Progressing  Goal: Remains free of injury related to seizures activity  Outcome: Progressing     Problem: Respiratory - Adult  Goal: Achieves optimal ventilation and oxygenation  Outcome: Progressing     Problem: Cardiovascular - Adult  Goal: Maintains optimal cardiac output and hemodynamic stability  Outcome: Progressing  Goal: Absence of cardiac dysrhythmias or at baseline  Outcome: Progressing     Problem: Skin/Tissue Integrity - Adult  Goal: Skin integrity remains intact  Outcome: Progressing  Goal: Incisions, wounds, or drain sites healing without S/S of infection  Outcome: Progressing  Goal: Oral mucous membranes remain intact  Outcome: Progressing     Problem: Nutrition Deficit:  Goal: Optimize nutritional status  Outcome: Progressing     Problem: Neurosensory - Adult  Goal: Achieves maximal functionality and self care  Outcome: Not Progressing

## 2022-10-25 NOTE — PROGRESS NOTES
Hospitalist Progress Note      Synopsis: Patient admitted as a transfer from [facility] for neurological evaluation after presenting with right-sided weakness and aphasia. He was found to have severe left ICA stenosis. He underwent a left ICA angioplasty with stent placement in IR on . He was subsequently admitted to neuro ICU and initiated on DAPT. His course was complicated by her GI bleed in which she required 15 units of PRBCs. EGD done on 10/30 with no evidence of upper GI bleed. Colonoscopy on 10/9 revealed a large amount of blood the CT of the abdomen did not show any localization of hemorrhage. Bleeding scan revealed bleeding around the splenic flexure. Patient RRT'd on 10/10 due to hypotension and hemorrhagic shock and was transferred back to the ICU. He was taken off of DAPT. He is currently being treated with aspirin monotherapy. Per general surgery okay to resume Brilinta if hemoglobin remained stable x7 days. He is also with resolving LARY for which nephrology is following. He is stable for discharge to [facility] rehab but is awaiting precert. Hospital day 28     Subjective:  Stable overnight. Refused CPAP overnight. Patient seen and examined. Resting comfortably. Voices no complaints. Records reviewed. Temp (24hrs), Av.6 °F (36.4 °C), Min:97.4 °F (36.3 °C), Max:97.8 °F (36.6 °C)    DIET: ADULT DIET; Dysphagia - Minced and Moist; 4 carb choices (60 gm/meal); Less than 60 gm  ADULT ORAL NUTRITION SUPPLEMENT; Breakfast, Dinner; Wound Healing Oral Supplement  ADULT ORAL NUTRITION SUPPLEMENT; Lunch, Dinner; Fortified Pudding Oral Supplement  CODE: Full Code    Intake/Output Summary (Last 24 hours) at 10/25/2022 0736  Last data filed at 10/25/2022 0626  Gross per 24 hour   Intake 120 ml   Output 1900 ml   Net -1780 ml       Review of Systems:     All bolded are positive; please see HPI  General:  Fever, chills, diaphoresis, fatigue, malaise, night sweats, weight loss  Psychological:  Anxiety, disorientation, hallucinations. ENT:  Epistaxis, headaches, vertigo, visual changes. Cardiovascular:  Chest pain, irregular heartbeats, palpitations, paroxysmal nocturnal dyspnea. Respiratory:  Shortness of breath, coughing, sputum production, hemoptysis, wheezing, orthopnea. Gastrointestinal:  Nausea, vomiting, diarrhea, heartburn, constipation, abdominal pain, hematemesis, hematochezia, melena, acholic stools  Genito-Urinary:  Dysuria, urgency, frequency, hematuria  Musculoskeletal:  Joint pain, joint stiffness, joint swelling, muscle pain  Neurology:  Headache, focal neurological deficits, weakness, numbness, paresthesia  Derm:  Rashes, ulcers, excoriations, bruising  Extremities:  Decreased ROM, peripheral edema, mottling    Objective:    /68   Pulse 74   Temp 97.7 °F (36.5 °C) (Temporal)   Resp 18   Ht 5' 8\" (1.727 m)   Wt 195 lb (88.5 kg)   SpO2 100%   BMI 29.65 kg/m²     General appearance: Elderly male in no apparent distress, appears stated age and cooperative. HEENT: Conjunctivae/corneas clear. Mucous membranes moist.  Neck: Supple. No JVD. Respiratory:  Clear to auscultation bilaterally. Normal respiratory effort. Cardiovascular:  RRR. S1, S2 without MRG. PV: Pulses palpable. No edema. Abdomen: Soft, non-tender, non-distended. +BS  Musculoskeletal: No obvious deformities. Skin: R foot wound with dressing in place. Abrasion to nasal bridge. Good turgor. Neurologic:  Grossly non-focal. Awake, alert, following commands.    Psychiatric: Alert and oriented, thought content appropriate, normal insight and judgement    Medications:  REVIEWED DAILY    Infusion Medications    sodium chloride      dextrose       Scheduled Medications    insulin lispro  0-8 Units SubCUTAneous TID WC    insulin lispro  0-4 Units SubCUTAneous Nightly    bumetanide  2 mg Oral Daily    cloNIDine  0.2 mg Oral TID    chlorhexidine  15 mL Mouth/Throat BID    aspirin 81 mg Oral Daily    amLODIPine  10 mg Oral Daily    metoprolol succinate  50 mg Oral Daily    QUEtiapine  50 mg Oral BID    miconazole nitrate   Topical BID    gabapentin  600 mg Oral 4x daily    white petrolatum   Topical BID    sodium chloride flush  5-40 mL IntraVENous 2 times per day    miconazole   Topical BID    [Held by provider] epoetin sharath-epbx  6,000 Units SubCUTAneous Once per day on Mon Wed Fri    hydrALAZINE  50 mg Oral 3 times per day    pantoprazole (PROTONIX) 40 mg injection  40 mg IntraVENous Q12H    sennosides  5 mL Oral Nightly    carbidopa-levodopa  1 tablet Per NG tube TID    ipratropium-albuterol  1 ampule Inhalation Q4H WA    collagenase   Topical Q MWF    benzonatate  100 mg Oral TID    atorvastatin  80 mg Oral Nightly     PRN Meds: sodium chloride flush, morphine, hydrALAZINE, miconazole nitrate **AND** miconazole nitrate, sodium chloride, labetalol, glucose, dextrose bolus **OR** dextrose bolus, glucagon (rDNA), dextrose    Labs:     Recent Labs     10/23/22  0527 10/24/22  0610 10/25/22  0454   WBC 4.9 5.6 5.0   HGB 9.3* 9.1* 9.3*   HCT 29.4* 28.3* 27.9*    294 278       Recent Labs     10/23/22  0527 10/24/22  0610 10/25/22  0454    136 137   K 3.6  3.6 3.4*  3.4* 3.5   CL 95* 93* 94*   CO2 33* 29 30*   BUN 24* 23 28*   CREATININE 1.6* 1.6* 1.6*   CALCIUM 9.0 9.0 8.9   PHOS 3.6 3.7 3.7       No results for input(s): PROT, ALB, ALKPHOS, ALT, AST, BILITOT, AMYLASE, LIPASE in the last 72 hours. No results for input(s): INR in the last 72 hours. No results for input(s): Cale Bigness in the last 72 hours.     Chronic labs:    Lab Results   Component Value Date    PSA 1.13 09/30/2022    INR 1.5 10/18/2022    LABA1C 6.4 (H) 09/28/2022       Radiology: REVIEWED DAILY    Assessment:  Acute CVA secondary to symptomatic left ICA stenosis status post angioplasty and stent placement  Acute lower GI bleeding  Acute blood loss anemia  Acute hypoxic respiratory failure-resolved  LARY on CKD III-Baseline creatinine 1.7-1.9 - RESOLVED  Dysphagia  Uncontrolled hypertension  Hypokalemia  HFpEF-EF 60 to 65%  Decompensated heart failure-resolved  Chronic wound right foot  MATT noncompliant with CPAP  Urinary retention    Plan:  Neurology s/o to f/u OP  To restart Plavix today, monitor H&H  Nephrology following  Monitor renal fxn, I&O - cr currently at baseline  Modified diet per SLP recs  To f/u with podiatry OP  Mahan to remain in place with OP f/u with urology  Monitor and replace electrolytes PRN    DVT Prophylaxis [] Lovenox  []  Heparin [] DOAC [x] PCDs [] Ambulation    GI Prophylaxis [x] PPI  [] H2 Blocker   [] Carafate  [] Diet/Tube Feeds   Level of care [x] Med/Surg  [] Intermediate  []  ICU   Diet ADULT DIET; Dysphagia - Minced and Moist; 4 carb choices (60 gm/meal); Less than 60 gm  ADULT ORAL NUTRITION SUPPLEMENT; Breakfast, Dinner; Wound Healing Oral Supplement  ADULT ORAL NUTRITION SUPPLEMENT; Lunch, Dinner; Fortified Pudding Oral Supplement    Family contact []  N/A    [] At bedside  [] Phone call     Discharge Plan: Stable for discharge to [facility] once precert obtained.     +++++++++++++++++++++++++++++++++++++++++++++++++  Trent Meraz [address]  +++++++++++++++++++++++++++++++++++++++++++++++++  NOTE: This report was transcribed using voice recognition software. Every effort was made to ensure accuracy; however, inadvertent computerized transcription errors may be present.

## (undated) DEVICE — CANNULA NSL ORAL AD FOR CAPNOFLEX CO2 O2 AIRLFE

## (undated) DEVICE — Z DISCONTINUED NO SUB IDED TUBING ETCO2 AD L6.5FT NSL ORAL CVD PRNG NONFLARED TIP OVR

## (undated) DEVICE — SNARE VASC L240CM LOOP W10MM SHTH DIA2.4MM RND STIFF CLD

## (undated) DEVICE — SPONGE GZ W4XL4IN RAYON POLY FILL CVR W/ NONWOVEN FAB

## (undated) DEVICE — SYRINGE MED 50ML LUERLOCK TIP

## (undated) DEVICE — DEFENDO AIR WATER SUCTION AND BIOPSY VALVE KIT FOR  OLYMPUS: Brand: DEFENDO AIR/WATER/SUCTION AND BIOPSY VALVE

## (undated) DEVICE — TRAP POLYP ETRAP

## (undated) DEVICE — SYRINGE MED 50ML LUERSLIP TIP

## (undated) DEVICE — CONNECTOR IRRIGATION AUXILIARY H2O JET W/ PRT MTL THRD HYDR

## (undated) DEVICE — GAUZE,SPONGE,POST-OP,4X3,STRL,LF: Brand: MEDLINE

## (undated) DEVICE — Device

## (undated) DEVICE — BITEBLOCK 54FR W/ DENT RIM BLOX